# Patient Record
Sex: FEMALE | Race: WHITE | NOT HISPANIC OR LATINO | Employment: OTHER | URBAN - METROPOLITAN AREA
[De-identification: names, ages, dates, MRNs, and addresses within clinical notes are randomized per-mention and may not be internally consistent; named-entity substitution may affect disease eponyms.]

---

## 2017-03-21 ENCOUNTER — OFFICE VISIT (OUTPATIENT)
Dept: FAMILY MEDICINE | Facility: CLINIC | Age: 77
End: 2017-03-21

## 2017-03-21 VITALS
BODY MASS INDEX: 34.16 KG/M2 | HEART RATE: 83 BPM | RESPIRATION RATE: 16 BRPM | OXYGEN SATURATION: 93 % | WEIGHT: 205 LBS | SYSTOLIC BLOOD PRESSURE: 104 MMHG | HEIGHT: 65 IN | DIASTOLIC BLOOD PRESSURE: 62 MMHG | TEMPERATURE: 98.1 F

## 2017-03-21 DIAGNOSIS — E11.9 DIABETES MELLITUS WITHOUT COMPLICATION (H): ICD-10-CM

## 2017-03-21 DIAGNOSIS — J44.89 COPD WITH ASTHMA (H): ICD-10-CM

## 2017-03-21 DIAGNOSIS — N18.30 CKD (CHRONIC KIDNEY DISEASE) STAGE 3, GFR 30-59 ML/MIN (H): ICD-10-CM

## 2017-03-21 DIAGNOSIS — S62.102S: ICD-10-CM

## 2017-03-21 DIAGNOSIS — E03.9 ACQUIRED HYPOTHYROIDISM: ICD-10-CM

## 2017-03-21 DIAGNOSIS — Z01.818 PREOP GENERAL PHYSICAL EXAM: Primary | ICD-10-CM

## 2017-03-21 LAB
% GRANULOCYTES: 72.5 % (ref 42.2–75.2)
HCT VFR BLD AUTO: 36.5 % (ref 35–46)
HEMOGLOBIN: 12.1 G/DL (ref 11.8–15.5)
LYMPHOCYTES NFR BLD AUTO: 21.3 % (ref 20.5–51.1)
MCH RBC QN AUTO: 29 PG (ref 27–31)
MCHC RBC AUTO-ENTMCNC: 33.1 G/DL (ref 33–37)
MCV RBC AUTO: 87.5 FL (ref 80–100)
MONOCYTES NFR BLD AUTO: 6.2 % (ref 1.7–9.3)
PLATELET # BLD AUTO: 160 K/UL (ref 140–450)
RBC # BLD AUTO: 4.17 X10/CMM (ref 3.7–5.2)
WBC # BLD AUTO: 5.3 X10/CMM (ref 3.8–11)

## 2017-03-21 PROCEDURE — 80048 BASIC METABOLIC PNL TOTAL CA: CPT | Mod: 90 | Performed by: FAMILY MEDICINE

## 2017-03-21 PROCEDURE — 84443 ASSAY THYROID STIM HORMONE: CPT | Mod: 90 | Performed by: FAMILY MEDICINE

## 2017-03-21 PROCEDURE — 99215 OFFICE O/P EST HI 40 MIN: CPT | Performed by: FAMILY MEDICINE

## 2017-03-21 PROCEDURE — 93000 ELECTROCARDIOGRAM COMPLETE: CPT | Performed by: FAMILY MEDICINE

## 2017-03-21 PROCEDURE — 85025 COMPLETE CBC W/AUTO DIFF WBC: CPT | Performed by: FAMILY MEDICINE

## 2017-03-21 PROCEDURE — 83036 HEMOGLOBIN GLYCOSYLATED A1C: CPT | Mod: 90 | Performed by: FAMILY MEDICINE

## 2017-03-21 PROCEDURE — 36415 COLL VENOUS BLD VENIPUNCTURE: CPT | Performed by: FAMILY MEDICINE

## 2017-03-21 RX ORDER — METFORMIN HCL 500 MG
1500 TABLET, EXTENDED RELEASE 24 HR ORAL
Qty: 270 TABLET | Refills: 3 | Status: SHIPPED | OUTPATIENT
Start: 2017-03-21 | End: 2018-08-01

## 2017-03-21 NOTE — LETTER
[facility]  [address] [place]  [address]  Phone: [phone]    March 23, 2017      Macie Jorge  [address] [place] [address]              Dear Macie,    The results from your recent visit showed  That due to your hard work at weight loss and improving her health your diabetes control is remarkably better.    You have the best hemoglobin A1c you have had in the last two years.    Continue on your current medications.    This test should be repeated in six months.    Plan on seeing me in the end of September or early October.        Sincerely,     Eric Ledezma M.D.    Results for orders placed or performed in visit on 03/21/17   CBC with Diff/Plt ([facility])   Result Value Ref Range    WBC x10/cmm 5.3 3.8 - 11.0 x10/cmm    % Lymphocytes 21.3 20.5 - 51.1 %    % Monocytes 6.2 1.7 - 9.3 %    % Granulocytes 72.5 42.2 - 75.2 %    RBC x10/cmm 4.17 3.7 - 5.2 x10/cmm    Hemoglobin 12.1 11.8 - 15.5 g/dl    Hematocrit 36.5 35 - 46 %    MCV 87.5 80 - 100 fL    MCH 29.0 27.0 - 31.0 pg    MCHC 33.1 33.0 - 37.0 g/dL    Platelet Count 160 140 - 450 K/uL   TSH ([facility])   Result Value Ref Range    TSH 5.230 (H) 0.450 - 4.500 uIU/mL    Narrative    Performed at:  01 - [facility] [address], [place], [place]  [id number]  : Kristopher Candelaria MD, Phone:  [phone]   Basic Metabolic Panel (8) ([facility])   Result Value Ref Range    Glucose 92 65 - 99 mg/dL    Urea Nitrogen 23 8 - 27 mg/dL    Creatinine 1.31 (H) 0.57 - 1.00 mg/dL    eGFR If NonAfricn Am 40 (L) >59 mL/min/1.73    eGFR If Africn Am 46 (L) >59 mL/min/1.73    BUN/Creatinine Ratio 18 11 - 26    Sodium 139 134 - 144 mmol/L    Potassium 4.3 3.5 - 5.2 mmol/L    Chloride 101 96 - 106 mmol/L    Total CO2 24 18 - 28 mmol/L    Calcium 9.4 8.7 - 10.3 mg/dL    Narrative    Performed at:  01 - [facility] [address], [place], [place]  [id number]  : Kristopher Candelaria MD, Phone:  [phone]   Hemoglobin  A1C ([facility])   Result Value Ref Range    Hemoglobin A1C 5.9 (H) 4.8 - 5.6 %    Narrative    Performed at:  01 - [facility] [address], [place], [place]  [id number]  : Kristopher Candelaria MD, Phone:  [phone]

## 2017-03-21 NOTE — MR AVS SNAPSHOT
After Visit Summary   3/21/2017    Macie Jorge    MRN: [id number]           Patient Information     Date Of Birth          1940        Visit Information        Provider Department      3/21/2017 3:30 PM Eric Henry MD [facility]        Today's Diagnoses     Preop general physical exam    -  1    Fracture of wrist, left, sequela        COPD with asthma (H)        CKD (chronic kidney disease) stage 3, GFR 30-59 ml/min        Diabetes mellitus without complication (H)        Acquired hypothyroidism          Care Instructions      Before Your Surgery      Call your surgeon if there is any change in your health. This includes signs of a cold or flu (such as a sore throat, runny nose, cough, rash or fever).    Do not smoke, drink alcohol or take over the counter medicine (unless your surgeon or primary care doctor tells you to) for the 24 hours before and after surgery.    If you take prescribed drugs: Follow your doctor s orders about which medicines to take and which to stop until after surgery.    Eating and drinking prior to surgery: follow the instructions from your surgeon    Take a shower or bath the night before surgery. Use the soap your surgeon gave you to gently clean your skin. If you do not have soap from your surgeon, use your regular soap. Do not shave or scrub the surgery site.  Wear clean pajamas and have clean sheets on your bed.         Follow-ups after your visit        Additional Services     OPHTHALMOLOGY ADULT REFERRAL       Your provider has referred you to: patient's choice according to insurance coverage    Please be aware that coverage of these services is subject to the terms and limitations of your health insurance plan.  Call member services at your health plan with any benefit or coverage questions.      Please bring the following with you to your appointment:    (1) Any X-Rays, CTs or MRIs which have been performed.  Contact the facility where  "they were done to arrange for  prior to your scheduled appointment.    (2) List of current medications  (3) This referral request   (4) Any documents/labs given to you for this referral                  Who to contact     If you have questions or need follow up information about today's clinic visit or your schedule please contact [facility] directly at [phone].  Normal or non-critical lab and imaging results will be communicated to you by MyChart, letter or phone within 4 business days after the clinic has received the results. If you do not hear from us within 7 days, please contact the clinic through [email or web address] or phone. If you have a critical or abnormal lab result, we will notify you by phone as soon as possible.  Submit refill requests through [employer] or call your pharmacy and they will forward the refill request to us. Please allow 3 business days for your refill to be completed.          Additional Information About Your Visit        [email or web address] Education Information     [employer] lets you send messages to your doctor, view your test results, renew your prescriptions, schedule appointments and more. To sign up, go to [email or web address] . Click on \"Log in\" on the left side of the screen, which will take you to the Welcome page. Then click on \"Sign up Now\" on the right side of the page.     You will be asked to enter the access code listed below, as well as some personal information. Please follow the directions to create your username and password.     Your access code is: [email or web address]  Expires: 2017  7:35 PM     Your access code will  in 90 days. If you need help or a new code, please call your [place] clinic or [phone].        Care EveryWhere ID     This is your Care EveryWhere ID. This could be used by other organizations to access your [place] medical records  [id number]        Your Vitals Were     Pulse Temperature Respirations Height Pulse Oximetry BMI (Body Mass Index)    " "83 98.1  F (36.7  C) (Oral) 16 1.651 m (5' 5\") 93% 34.11 kg/m2       Blood Pressure from Last 3 Encounters:   03/21/17 104/62   09/21/16 132/74   06/14/16 143/78    Weight from Last 3 Encounters:   03/21/17 93 kg (205 lb)   09/21/16 93.2 kg (205 lb 6.4 oz)   06/14/16 96.6 kg (212 lb 14.4 oz)              We Performed the Following     Basic Metabolic Panel (8) ([facility])     CBC with Diff/Plt ([facility])     EKG 12-lead complete w/read - Clinics     Hemoglobin A1C ([facility])     Hemoglobin A1c     OPHTHALMOLOGY ADULT REFERRAL     TSH ([facility])          Today's Medication Changes          These changes are accurate as of: 3/21/17  7:35 PM.  If you have any questions, ask your nurse or doctor.               Stop taking these medicines if you haven't already. Please contact your care team if you have questions.     azithromycin 250 MG tablet   Commonly known as:  ZITHROMAX   Stopped by:  Eric Henry MD           cephALEXin 500 MG capsule   Commonly known as:  KEFLEX   Stopped by:  Eric Henry MD           traMADol 50 MG tablet   Commonly known as:  ULTRAM   Stopped by:  Eric Henry MD                Where to get your medicines      These medications were sent to [facility] [address] [place] [address]     Phone:  [phone]     metFORMIN 500 MG 24 hr tablet                Primary Care Provider Office Phone # Fax #    Eric Henry [phone]       [facility] [address]  [facility] [place] [address]        Thank you!     Thank you for choosing [facility]  for your care. Our goal is always to provide you with excellent care. Hearing back from our patients is one way we can continue to improve our services. Please take a few minutes to complete the written survey that you may receive in the mail after your visit with us. Thank you!             Your Updated Medication List - Protect others around " you: Learn how to safely use, store and throw away your medicines at www.disposemymeds.org.          This list is accurate as of: 3/21/17  7:35 PM.  Always use your most recent med list.                   Brand Name Dispense Instructions for use    albuterol 108 (90 BASE) MCG/ACT Inhaler    albuterol    1 Inhaler    INHALE 2 PUFFS INTO LUNGS EVERY 6 HOURS AS NEEDED       blood glucose lancing device     1 each    Use to test blood sugars one time daily or as directed.       blood glucose monitoring lancets     1 Box    Use to test blood sugars 1 times daily or as directed.       blood glucose monitoring test strip    ONE TOUCH ULTRA    100 each    Use to test blood sugars one time daily or as directed.       butalbital-acetaminophen-caffeine -40 MG per tablet    FIORICET/ESGIC    30 tablet    Take 1 tablet by mouth every 4 hours as needed       fluticasone-salmeterol 250-50 MCG/DOSE diskus inhaler    ADVAIR DISKUS    180 Inhaler    INHALE 1 PUFF INTO THE LUNGS 2 TIMES DAILY       HYDROcodone-acetaminophen 5-325 MG per tablet    NORCO     as needed Reported on 3/21/2017       levothyroxine 112 MCG tablet    SYNTHROID/LEVOTHROID    90 tablet    Take 1 tablet (112 mcg) by mouth daily       metFORMIN 500 MG 24 hr tablet    GLUCOPHAGE-XR    270 tablet    Take 3 tablets (1,500 mg) by mouth daily (with dinner)       RABEprazole 20 MG EC tablet    ACIPHEX    30 tablet    TAKE ONE TABLET BY MOUTH ONCE DAILY       STATIN NOT PRESCRIBED (INTENTIONAL)     0 each    1 each 2 times daily Statin not prescribed intentionally due to Allergy to statin

## 2017-03-21 NOTE — PROGRESS NOTES
[facility]  [address] [place] [id number] [phone]  Dept: [phone]    PRE-OP EVALUATION:  Today's date: 3/21/2017    Macie Jorge (: 1940) presents for pre-operative evaluation assessment as requested by Dr. Hastings.  She requires evaluation and anesthesia risk assessment prior to undergoing surgery/procedure for treatment of left wrist fractured - Dec 19, 2016 - remove plate & pins .  Proposed procedure: fractured - Dec 19, 2016 - remove plate & pins - left wrist    Date of Surgery/ Procedure: 3/30/17  Time of Surgery/ Procedure: [address] [place] [address]/Surgical Facility: [facility]   [phone]  Primary Physician: Eric Henry  Type of Anesthesia Anticipated: General    Patient has a Health Care Directive or Living Will:  YES in chart    1. NO - Do you have a history of heart attack, stroke, stent, bypass or surgery on an artery in the head, neck, heart or legs?  2. NO - Do you ever have any pain or discomfort in your chest?  3. NO - Do you have a history of  Heart Failure?  4. NO - Are you troubled by shortness of breath when: walking on the level, up a slight hill or at night?  5. NO - Do you currently have a cold, bronchitis or other respiratory infection?  6. NO - Do you have a cough, shortness of breath or wheezing?  7. NO - Do you sometimes get pains in the calves of your legs when you walk?  8. NO - Do you or anyone in your family have previous history of blood clots?  9. NO - Do you or does anyone in your family have a serious bleeding problem such as prolonged bleeding following surgeries or cuts?  10. YES - HAVE YOU EVER HAD PROBLEMS WITH ANEMIA OR BEEN TOLD TO TAKE IRON PILLS? Years ago with pregnancy  11. NO - Have you had any abnormal blood loss such as black, tarry or bloody stools, or abnormal vaginal bleeding?  12. NO - Have you ever had a blood transfusion?  13. NO - Have you or any of your relatives ever had problems with  anesthesia?  14. NO - Do you have sleep apnea, excessive snoring or daytime drowsiness?  15. NO - Do you have any prosthetic heart valves?  16. NO - Do you have prosthetic joints?  17. NO - Is there any chance that you may be pregnant?      HPI:                                                      Brief HPI related to upcoming procedure: Macie is an obese lady who has diabetes as well as  COPD.  Currently she feels well she's not having any problems with her breathing she tells me her diabetes is generally below 120 in the morning but she checks it infrequently.      MEDICAL HISTORY:                                                      Patient Active Problem List    Diagnosis Date Noted     COPD with asthma (H)      Priority: Medium      followed with pulmonary DR LENNOX 60-pack-year history of smoking       Type 2 diabetes mellitus without complication (H)      Priority: Medium     Nonintractable migraine, unspecified migraine type 01/20/2016     Priority: Medium     Urgency incontinence 04/02/2014     Priority: Medium     CKD (chronic kidney disease) stage 3, GFR 30-59 ml/min 04/02/2014     Priority: Medium     Obesity 03/05/2014     Priority: Medium     Health Care Home 03/04/2014     Priority: Medium     State Tier Level:  Tier 2         History of hip replacement, total 03/13/2013     Priority: Medium     Hyperlipidemia with target LDL less than 100      Priority: Medium     Diagnosis updated by automated process. Provider to review and confirm.       Advanced directives, counseling/discussion 09/01/2011     Priority: Medium     As per reasonable care for Seniors, wants in the Short term aggressive care.   No desire for long term prolongation of life through artificial means if no hope to bring back to a reasonable status.        Hypothyroid      Priority: Medium     Panic attacks      Priority: Medium      Past Medical History   Diagnosis Date     Calculus of left ureter 2014     dr Arriaga - ureteroscopy and  lithitripsy     Cataract      Chronic low back pain      Dr Villagran at [facility] in 2015- not surgical      COPD with asthma (H)       followed with pulmonary DR LENNOX 60-pack-year history of smoking     Esophagitis, reflux 9/2012     SOME EOSINOPHILIA NO BARRETS     Gastro-oesophageal reflux disease       has seen GI     Hyperlipidaemia LDL goal < 100      Hypothyroid      Migraine headaches 9/1/2011     Moderate persistent asthma       lifelong, saw Pulm 2013     Obesity (BMI 30-39.9)      Panic attacks      Renal stones 2016     kidney stones, multiple small stones high risk recurrent ureteral stones. , Dr Arriaga in 2016 rec 24 hour urine.     Type 2 diabetes mellitus without complication (H)      Past Surgical History   Procedure Laterality Date     Cholecystectomy, open  25 yo     Laminect/discectomy, lumbar       Laminectomy/Discectomy Lumbar     Recession eyelid upper       Combined cystoscopy, insert stent ureter(s)  8/5/2014     Procedure: COMBINED CYSTOSCOPY, INSERT STENT URETER(S);  Surgeon: Sam Arriaga MD;  Location:  OR     Laser holmium lithotripsy ureter(s), insert stent, combined Left 8/20/2014     Procedure: COMBINED CYSTOSCOPY, URETEROSCOPY, LASER HOLMIUM LITHOTRIPSY URETER(S), INSERT STENT;  Surgeon: Sam Arriaga MD;  Location:  OR     Arthroplasty patello-femoral (knee)  2005ish     Left     Orthopedic surgery       left ankle     Arthroplasty hip  3/13/2013     Procedure: ARTHROPLASTY HIP;  LEFT TOTAL HIP ARTHROPLASTY (BIOMET)^ ;  Surgeon: Anand Main MD;  Location:  OR     Genitourinary surgery       Combined cystoscopy, retrogrades, ureteroscopy, laser holmium lithotripsy ureter(s), insert stent Left 6/14/2016     Procedure: COMBINED CYSTOSCOPY, RETROGRADES, URETEROSCOPY, LASER HOLMIUM LITHOTRIPSY URETER(S), INSERT STENT;  Surgeon: Thomas Edmondson MD;  Location:  OR     Current Outpatient Prescriptions   Medication Sig Dispense Refill     metFORMIN (GLUCOPHAGE-XR) 500 MG 24  hr tablet Take 4 tablets (2,000 mg) by mouth daily (with dinner) (Patient taking differently: Take 1,500 mg by mouth daily (with dinner) ) 360 tablet 1     RABEprazole (ACIPHEX) 20 MG tablet TAKE ONE TABLET BY MOUTH ONCE DAILY 30 tablet 3     fluticasone-salmeterol (ADVAIR DISKUS) 250-50 MCG/DOSE diskus inhaler INHALE 1 PUFF INTO THE LUNGS 2 TIMES DAILY 180 Inhaler 1     blood glucose (ONE TOUCH DELICA) lancing device Use to test blood sugars one time daily or as directed. 1 each 0     blood glucose monitoring (ONE TOUCH ULTRA) test strip Use to test blood sugars one time daily or as directed. 100 each 3     blood glucose monitoring (ONE TOUCH DELICA) lancets Use to test blood sugars 1 times daily or as directed. 1 Box 3     butalbital-acetaminophen-caffeine (FIORICET, ESGIC) -40 MG per tablet Take 1 tablet by mouth every 4 hours as needed 30 tablet 1     levothyroxine (SYNTHROID, LEVOTHROID) 112 MCG tablet Take 1 tablet (112 mcg) by mouth daily 90 tablet 3     albuterol (ALBUTEROL) 108 (90 BASE) MCG/ACT inhaler INHALE 2 PUFFS INTO LUNGS EVERY 6 HOURS AS NEEDED 1 Inhaler 2     HYDROcodone-acetaminophen (NORCO) 5-325 MG per tablet as needed Reported on 3/21/2017       azithromycin (ZITHROMAX) 250 MG tablet 2 pills to start then one daily IF FLARE UP OF ASTHMA (Patient not taking: Reported on 3/21/2017) 6 tablet 0     cephALEXin (KEFLEX) 500 MG capsule as needed Reported on 3/21/2017       STATIN NOT PRESCRIBED, INTENTIONAL, 1 each 2 times daily Statin not prescribed intentionally due to Allergy to statin (Patient not taking: Reported on 3/21/2017) 0 each 0     OTC products: None     Allergies   Allergen Reactions     Imitrex [Sumatriptan] Nausea     Morphine      Severe vomiting     Oxycontin [Oxycodone] Nausea and Vomiting     Severe vomiting     Tetracycline      Sores in mouth      Latex Allergy: NO    Social History   Substance Use Topics     Smoking status: Former Smoker     Quit date: 1/1/1995      "Smokeless tobacco: Never Used     Alcohol use Yes      Comment: occ.     History   Drug Use No       REVIEW OF SYSTEMS:                                                    C: NEGATIVE for fever, chills, change in weight  I: NEGATIVE for worrisome rashes, moles or lesions  E: NEGATIVE for vision changes or irritation  E/M: NEGATIVE for ear, mouth and throat problems  RESP:NEGATIVE for significant cough or SOB and no recent pulm complaints  B: NEGATIVE for masses, tenderness or discharge  CV: NEGATIVE for chest pain, palpitations or peripheral edema  GI: NEGATIVE for nausea, abdominal pain, heartburn, or change in bowel habits  : NEGATIVE for frequency, dysuria, or hematuria  MUSCULOSKELETAL:pain in wrist and chronic low back pain  N: NEGATIVE for weakness, dizziness or paresthesias  E: NEGATIVE for temperature intolerance, skin/hair changes  H: NEGATIVE for bleeding problems  P: NEGATIVE for changes in mood or affect    Diabetes   Not checking much in 120   Lab Results   Component Value Date    A1C 6.4 09/21/2016    A1C 7.5 05/18/2016    A1C 6.0 11/25/2015    A1C 6.4 04/22/2015    A1C 6.1 11/19/2014       EXAM:                                                    /62  Pulse 83  Temp 98.1  F (36.7  C) (Oral)  Resp 16  Ht 1.651 m (5' 5\")  Wt 93 kg (205 lb)  SpO2 93%  BMI 34.11 kg/m2    GENERAL APPEARANCE: healthy, alert and no distress     EYES: EOMI, PERRL     HENT: ear canals and TM's normal and nose and mouth without ulcers or lesions     NECK: no adenopathy, no asymmetry, masses, or scars and thyroid normal to palpation     RESP: lungs clear to auscultation - no rales, rhonchi or wheezes     CV: regular rates and rhythm, sys murmur heard Rirht upper sternal area     ABDOMEN:  soft, nontender, no HSM or masses and bowel sounds normal     MS: wrist on left with well healed scar     SKIN: no suspicious lesions or rashes     NEURO: Normal strength and tone, sensory exam grossly normal, mentation intact and speech " normal     PSYCH: mentation appears normal. and affect normal/bright     LYMPHATICS: No axillary, cervical, or supraclavicular nodes    DIAGNOSTICS:                                                    EKG: Normal Sinus Rhythm, Right axis deviation, no LVH by voltage criteria,     Recent Labs   Lab Test  09/21/16   1249  05/18/16   1444  05/18/16   1433   10/01/15   0900   08/04/14   1721   03/16/13   0635   HGB   --   11.4*   --    --   12.8   < >  13.5   < >   --    PLT   --   187   --    --   167   < >  170   < >   --    INR   --    --    --    --    --    --   1.02   --   2.39*   NA   --    --   139   --   135   < >  139   < >   --    POTASSIUM   --    --   4.8   --   4.0   < >  3.8   < >   --    CR   --    --   1.53*   --   1.39*   < >  1.49*   < >  0.89   A1C  6.4*   --   7.5*   < >   --    < >   --    < >   --     < > = values in this interval not displayed.      Hemoglobin   Date Value Ref Range Status   03/21/2017 12.1 11.8 - 15.5 g/dl Final   ]    IMPRESSION:                                                    Reason for surgery/procedure: removal of plate in wrist  Diagnosis/reason for consult: preop evaluation      The proposed surgical procedure is considered LOW risk.    REVISED CARDIAC RISK INDEX  The patient has the following serious cardiovascular risks for perioperative complications such as (MI, PE, VFib and 3  AV Block):  No serious cardiac risks  INTERPRETATION: 1 risks: Class II (low risk - 0.9% complication rate)    The patient has the following additional risks for perioperative complications:  No identified additional risks    (Z01.818) Preop general physical exam  (primary encounter diagnosis)  OKAY FOR SURGERY    (S62.102S) Fracture of wrist, left, sequela      (J44.9,  J45.909) COPD with asthma (H)  STABLE ON CURRENT INHALERS    (E11.9) Type 2 diabetes mellitus without complication (H)  REPEAT TODAY BUT SEEMS LIKE CONTROL OKAY  Lab Results   Component Value Date    A1C 6.4 09/21/2016    A1C  7.5 05/18/2016    A1C 6.0 11/25/2015    A1C 6.4 04/22/2015    A1C 6.1 11/19/2014       (N18.3) CKD (chronic kidney disease) stage 3, GFR   REPEAT BMP TODAY    (E03.9) Hypothyroid    Plan: TSH ([facility]),     RECOMMENDATIONS:                                                          --Patient is to take all scheduled medications on the day of surgery EXCEPT for modifications listed below.    APPROVAL GIVEN to proceed with proposed procedure, without further diagnostic evaluation       Signed Electronically by: Eric Henry MD    Copy of this evaluation report is provided to requesting physician.    [place] Preop Guidelines

## 2017-03-22 LAB
BUN SERPL-MCNC: 23 MG/DL (ref 8–27)
BUN/CREATININE RATIO: 18 (ref 11–26)
CALCIUM SERPL-MCNC: 9.4 MG/DL (ref 8.7–10.3)
CHLORIDE SERPLBLD-SCNC: 101 MMOL/L (ref 96–106)
CREAT SERPL-MCNC: 1.31 MG/DL (ref 0.57–1)
EGFR IF AFRICN AM: 46 ML/MIN/1.73
EGFR IF NONAFRICN AM: 40 ML/MIN/1.73
GLUCOSE SERPL-MCNC: 92 MG/DL (ref 65–99)
HBA1C MFR BLD: 5.9 % (ref 4.8–5.6)
POTASSIUM SERPL-SCNC: 4.3 MMOL/L (ref 3.5–5.2)
SODIUM SERPL-SCNC: 139 MMOL/L (ref 134–144)
TOTAL CO2: 24 MMOL/L (ref 18–28)
TSH BLD-ACNC: 5.23 UIU/ML (ref 0.45–4.5)

## 2017-03-22 ASSESSMENT — ASTHMA QUESTIONNAIRES: ACT_TOTALSCORE: 25

## 2017-03-29 NOTE — PROGRESS NOTES
Order(s) created erroneously. Erroneous order ID: [id number]   Order canceled by: TAY HENNESSY   Order cancel date/time: 03/29/2017 2:03 PM

## 2017-04-12 ENCOUNTER — TRANSFERRED RECORDS (OUTPATIENT)
Dept: FAMILY MEDICINE | Facility: CLINIC | Age: 77
End: 2017-04-12

## 2017-04-13 ENCOUNTER — TRANSFERRED RECORDS (OUTPATIENT)
Dept: FAMILY MEDICINE | Facility: CLINIC | Age: 77
End: 2017-04-13

## 2017-04-24 ENCOUNTER — TRANSFERRED RECORDS (OUTPATIENT)
Dept: FAMILY MEDICINE | Facility: CLINIC | Age: 77
End: 2017-04-24

## 2017-05-08 ENCOUNTER — TRANSFERRED RECORDS (OUTPATIENT)
Dept: FAMILY MEDICINE | Facility: CLINIC | Age: 77
End: 2017-05-08

## 2017-06-09 ENCOUNTER — OFFICE VISIT (OUTPATIENT)
Dept: FAMILY MEDICINE | Facility: CLINIC | Age: 77
End: 2017-06-09

## 2017-06-09 VITALS
TEMPERATURE: 98 F | BODY MASS INDEX: 34.82 KG/M2 | HEIGHT: 65 IN | DIASTOLIC BLOOD PRESSURE: 78 MMHG | OXYGEN SATURATION: 98 % | HEART RATE: 80 BPM | WEIGHT: 209 LBS | SYSTOLIC BLOOD PRESSURE: 132 MMHG | RESPIRATION RATE: 18 BRPM

## 2017-06-09 DIAGNOSIS — J45.901 ASTHMA WITH ACUTE EXACERBATION, UNSPECIFIED ASTHMA SEVERITY: ICD-10-CM

## 2017-06-09 DIAGNOSIS — J44.1 CHRONIC OBSTRUCTIVE PULMONARY DISEASE WITH ACUTE EXACERBATION (H): ICD-10-CM

## 2017-06-09 DIAGNOSIS — J20.9 ACUTE BRONCHITIS WITH COEXISTING CONDITION REQUIRING PROPHYLACTIC TREATMENT: Primary | ICD-10-CM

## 2017-06-09 DIAGNOSIS — E11.9 TYPE 2 DIABETES MELLITUS WITHOUT COMPLICATION, WITHOUT LONG-TERM CURRENT USE OF INSULIN (H): ICD-10-CM

## 2017-06-09 DIAGNOSIS — E78.5 HYPERLIPIDEMIA WITH TARGET LDL LESS THAN 100: ICD-10-CM

## 2017-06-09 DIAGNOSIS — N18.30 CKD (CHRONIC KIDNEY DISEASE) STAGE 3, GFR 30-59 ML/MIN (H): ICD-10-CM

## 2017-06-09 DIAGNOSIS — E03.9 ACQUIRED HYPOTHYROIDISM: ICD-10-CM

## 2017-06-09 PROCEDURE — 99213 OFFICE O/P EST LOW 20 MIN: CPT | Performed by: FAMILY MEDICINE

## 2017-06-09 RX ORDER — METHYLPREDNISOLONE 4 MG
4 TABLET, DOSE PACK ORAL SEE ADMIN INSTRUCTIONS
Qty: 21 TABLET | Refills: 0 | Status: SHIPPED | OUTPATIENT
Start: 2017-06-09 | End: 2017-10-27

## 2017-06-09 RX ORDER — CODEINE PHOSPHATE AND GUAIFENESIN 10; 100 MG/5ML; MG/5ML
1 SOLUTION ORAL EVERY 4 HOURS PRN
Qty: 473 ML | Refills: 0 | Status: SHIPPED | OUTPATIENT
Start: 2017-06-09 | End: 2017-10-27

## 2017-06-09 RX ORDER — CIPROFLOXACIN 500 MG/1
500 TABLET, FILM COATED ORAL 2 TIMES DAILY
Qty: 20 TABLET | Refills: 0 | Status: SHIPPED | OUTPATIENT
Start: 2017-06-09 | End: 2017-10-27

## 2017-06-09 NOTE — MR AVS SNAPSHOT
"              After Visit Summary   6/9/2017    Macie Jorge    MRN: [id number]           Patient Information     Date Of Birth          1940        Visit Information        Provider Department      6/9/2017 11:15 AM Jeni Gutierrez MD [facility]        Today's Diagnoses     Acute bronchitis with coexisting condition requiring prophylactic treatment    -  1    Chronic obstructive pulmonary disease with acute exacerbation (H)        Asthma with acute exacerbation, unspecified asthma severity        CKD (chronic kidney disease) stage 3, GFR 30-59 ml/min          Care Instructions    Codeine cough syrup  Cipro antibiotic twice daily  Medrol dose pack 4 mg     F/u if not better in 2 weeks          Follow-ups after your visit        Who to contact     If you have questions or need follow up information about today's clinic visit or your schedule please contact [facility] directly at [phone].  Normal or non-critical lab and imaging results will be communicated to you by Nallatechhart, letter or phone within 4 business days after the clinic has received the results. If you do not hear from us within 7 days, please contact the clinic through Nallatechhart or phone. If you have a critical or abnormal lab result, we will notify you by phone as soon as possible.  Submit refill requests through Presidio or call your pharmacy and they will forward the refill request to us. Please allow 3 business days for your refill to be completed.          Additional Information About Your Visit        MyChart Information     Presidio lets you send messages to your doctor, view your test results, renew your prescriptions, schedule appointments and more. To sign up, go to [email or web address] . Click on \"Log in\" on the left side of the screen, which will take you to the Welcome page. Then click on \"Sign up Now\" on the right side of the page.     You will be asked to enter the access code listed below, as " "well as some personal information. Please follow the directions to create your username and password.     Your access code is: [email or web address]  Expires: 2017  7:35 PM     Your access code will  in 90 days. If you need help or a new code, please call your [place] clinic or [phone].        Care EveryWhere ID     This is your Care EveryWhere ID. This could be used by other organizations to access your [place] medical records  [id number]        Your Vitals Were     Pulse Temperature Respirations Height Pulse Oximetry BMI (Body Mass Index)    80 98  F (36.7  C) (Oral) 18 1.651 m (5' 5\") 98% 34.78 kg/m2       Blood Pressure from Last 3 Encounters:   17 132/78   17 104/62   16 132/74    Weight from Last 3 Encounters:   17 94.8 kg (209 lb)   17 93 kg (205 lb)   16 93.2 kg (205 lb 6.4 oz)              Today, you had the following     No orders found for display         Today's Medication Changes          These changes are accurate as of: 17 11:49 AM.  If you have any questions, ask your nurse or doctor.               Start taking these medicines.        Dose/Directions    ciprofloxacin 500 MG tablet   Commonly known as:  CIPRO   Used for:  Acute bronchitis with coexisting condition requiring prophylactic treatment, Chronic obstructive pulmonary disease with acute exacerbation (H), Asthma with acute exacerbation, unspecified asthma severity   Started by:  Jeni Gutierrez MD        Dose:  500 mg   Take 1 tablet (500 mg) by mouth 2 times daily   Quantity:  20 tablet   Refills:  0       guaiFENesin-codeine 100-10 MG/5ML Soln solution   Commonly known as:  ROBITUSSIN AC   Used for:  Asthma with acute exacerbation, unspecified asthma severity, Chronic obstructive pulmonary disease with acute exacerbation (H), Acute bronchitis with coexisting condition requiring prophylactic treatment   Started by:  Jeni Gutierrez MD        Dose:  1 tsp.   Take 5 mLs by mouth every 4 " hours as needed for cough   Quantity:  473 mL   Refills:  0       methylPREDNISolone 4 MG tablet   Commonly known as:  MEDROL   Used for:  Acute bronchitis with coexisting condition requiring prophylactic treatment, Chronic obstructive pulmonary disease with acute exacerbation (H), Asthma with acute exacerbation, unspecified asthma severity   Started by:  Jeni Gutierrez MD        Dose:  4 mg   Take 1 tablet (4 mg) by mouth See Admin Instructions follow package directions   Quantity:  21 tablet   Refills:  0            Where to get your medicines      These medications were sent to [facility] [address] [place] [address]  [address] [facility] [address]     Phone:  [phone]     ciprofloxacin 500 MG tablet    methylPREDNISolone 4 MG tablet         Some of these will need a paper prescription and others can be bought over the counter.  Ask your nurse if you have questions.     Bring a paper prescription for each of these medications     guaiFENesin-codeine 100-10 MG/5ML Soln solution                Primary Care Provider Office Phone # Fax #    Eric Henry [phone]       [facility] [address] [address]        Thank you!     Thank you for choosing [facility]  for your care. Our goal is always to provide you with excellent care. Hearing back from our patients is one way we can continue to improve our services. Please take a few minutes to complete the written survey that you may receive in the mail after your visit with us. Thank you!             Your Updated Medication List - Protect others around you: Learn how to safely use, store and throw away your medicines at www.disposemymeds.org.          This list is accurate as of: 6/9/17 11:49 AM.  Always use your most recent med list.                   Brand Name Dispense Instructions for use    albuterol 108 (90 BASE) MCG/ACT Inhaler    albuterol    1  Inhaler    INHALE 2 PUFFS INTO LUNGS EVERY 6 HOURS AS NEEDED       blood glucose lancing device     1 each    Use to test blood sugars one time daily or as directed.       blood glucose monitoring lancets     1 Box    Use to test blood sugars 1 times daily or as directed.       blood glucose monitoring test strip    ONE TOUCH ULTRA    100 each    Use to test blood sugars one time daily or as directed.       butalbital-acetaminophen-caffeine -40 MG per tablet    FIORICET/ESGIC    30 tablet    Take 1 tablet by mouth every 4 hours as needed       ciprofloxacin 500 MG tablet    CIPRO    20 tablet    Take 1 tablet (500 mg) by mouth 2 times daily       fluticasone-salmeterol 250-50 MCG/DOSE diskus inhaler    ADVAIR DISKUS    180 Inhaler    INHALE 1 PUFF INTO THE LUNGS 2 TIMES DAILY       guaiFENesin-codeine 100-10 MG/5ML Soln solution    ROBITUSSIN AC    473 mL    Take 5 mLs by mouth every 4 hours as needed for cough       levothyroxine 112 MCG tablet    SYNTHROID/LEVOTHROID    90 tablet    Take 1 tablet (112 mcg) by mouth daily       metFORMIN 500 MG 24 hr tablet    GLUCOPHAGE-XR    270 tablet    Take 3 tablets (1,500 mg) by mouth daily (with dinner)       methylPREDNISolone 4 MG tablet    MEDROL    21 tablet    Take 1 tablet (4 mg) by mouth See Admin Instructions follow package directions       RABEprazole 20 MG EC tablet    ACIPHEX    30 tablet    TAKE ONE TABLET BY MOUTH ONCE DAILY       STATIN NOT PRESCRIBED (INTENTIONAL)     0 each    1 each 2 times daily Statin not prescribed intentionally due to Allergy to statin

## 2017-06-09 NOTE — PROGRESS NOTES
SUBJECTIVE:                                                    Macie Jorge is a 76 year old female who presents to clinic today for the following health issues:  Obese white female    Retired   Hobbies   Pets no    Deep bronchial coughing    Diabetes Follow-up  Lab Results   Component Value Date    A1C 5.9 03/21/2017    A1C 6.4 09/21/2016    A1C 7.5 05/18/2016    A1C 6.0 11/25/2015    A1C 6.4 04/22/2015     Metformin    No results found for: TSH]  Consider recheck next lab draw    Patient is checking blood sugars: not at all    Diabetic concerns: None     Symptoms of hypoglycemia (low blood sugar): none     Paresthesias (numbness or burning in feet) or sores: No     Date of last diabetic eye exam: UTD     Hyperlipidemia Follow-Up  LDL Cholesterol Calculated   Date Value Ref Range Status   11/25/2015 121 (H) 0 - 99 mg/dL Final   ]      Rate your low fat/cholesterol diet?: not monitoring fat    Taking statin?  No    Other lipid medications/supplements?:  none     Hypertension Follow-up  BP Readings from Last 3 Encounters:   06/09/17 132/78   03/21/17 104/62   09/21/16 132/74         Outpatient blood pressures are not being checked.    Low Salt Diet: not monitoring salt         Amount of exercise or physical activity: None    Problems taking medications regularly: No    Medication side effects: none    Diet: regular (no restrictions)    Kidney stones no pain  No panic recently  Migraine last week, getting them less.  No results found for: TSH]  GERD occasional    COPD/Asthma no pulmonary doctor  Epidural in back 2-3 weeks ago.    Sleep 6 hours  Appetite ok  Exercise when not side shopping and stairs 13.    Creatinine   Date Value Ref Range Status   03/21/2017 1.31 (H) 0.57 - 1.00 mg/dL Final   ]  GFR Estimate   Date Value Ref Range Status   10/01/2015 37 (L) >60 mL/min/1.7m2 Final     Comment:     Non  GFR Calc   08/07/2014 47 (L) >60 mL/min/1.7m2 Final     Comment:     Non   GFR Calc   08/05/2014 40 (L) >60 mL/min/1.7m2 Final     Comment:     Non  GFR Calc         ETOH no  Street/[place] no  Caffeine yes coffee    RESPIRATORY SYMPTOMS      Duration: Onset 1 week ago. Tightness in the mid chest while [place] drove there and it was raining 3 days. Ex smoker for 23 years. No fever. Productive. Yellow nasal drainage.    Bronchitis/asthma since 5 years old. No Chills. Hot flashes not related to this. No N/V/D/R    No exposures    Description  cough and wheezing    Severity: moderate    Accompanying signs and symptoms: None    History (predisposing factors):  asthma and COPD    Precipitating or alleviating factors: None    Therapies tried and outcome:  rest and fluids guaifenesin       Problem list and histories reviewed & adjusted, as indicated.  Additional history: as documented    Patient Active Problem List   Diagnosis     Panic attacks     Advanced directives, counseling/discussion     Hyperlipidemia with target LDL less than 100     History of hip replacement, total     Health Care Home     Obesity     Urgency incontinence     CKD (chronic kidney disease) stage 3, GFR 30-59 ml/min     Nonintractable migraine, unspecified migraine type     Type 2 diabetes mellitus without complication (H)     COPD with asthma (H)     Diabetes mellitus without complication (H)     Acquired hypothyroidism     Past Surgical History:   Procedure Laterality Date     ARTHROPLASTY HIP  3/13/2013    Procedure: ARTHROPLASTY HIP;  LEFT TOTAL HIP ARTHROPLASTY (BIOMET)^ ;  Surgeon: Anand Main MD;  Location:  OR     ARTHROPLASTY PATELLO-FEMORAL (KNEE)  2005ish    Left     CHOLECYSTECTOMY, OPEN  25 yo     COMBINED CYSTOSCOPY, INSERT STENT URETER(S)  8/5/2014    Procedure: COMBINED CYSTOSCOPY, INSERT STENT URETER(S);  Surgeon: Sam Arriaga MD;  Location:  OR     COMBINED CYSTOSCOPY, RETROGRADES, URETEROSCOPY, LASER HOLMIUM LITHOTRIPSY URETER(S), INSERT STENT Left 6/14/2016     Procedure: COMBINED CYSTOSCOPY, RETROGRADES, URETEROSCOPY, LASER HOLMIUM LITHOTRIPSY URETER(S), INSERT STENT;  Surgeon: Thomas Edmondson MD;  Location:  OR     GENITOURINARY SURGERY       LAMINECT/DISCECTOMY, LUMBAR      Laminectomy/Discectomy Lumbar     LASER HOLMIUM LITHOTRIPSY URETER(S), INSERT STENT, COMBINED Left 8/20/2014    Procedure: COMBINED CYSTOSCOPY, URETEROSCOPY, LASER HOLMIUM LITHOTRIPSY URETER(S), INSERT STENT;  Surgeon: Sam Arriaga MD;  Location:  OR     ORTHOPEDIC SURGERY      left ankle     RECESSION EYELID UPPER         Social History   Substance Use Topics     Smoking status: Former Smoker     Quit date: 1/1/1995     Smokeless tobacco: Never Used     Alcohol use Yes      Comment: occ.     No family history on file.      Current Outpatient Prescriptions   Medication Sig Dispense Refill     ciprofloxacin (CIPRO) 500 MG tablet Take 1 tablet (500 mg) by mouth 2 times daily 20 tablet 0     methylPREDNISolone (MEDROL) 4 MG tablet Take 1 tablet (4 mg) by mouth See Admin Instructions follow package directions 21 tablet 0     guaiFENesin-codeine (ROBITUSSIN AC) 100-10 MG/5ML SOLN solution Take 5 mLs by mouth every 4 hours as needed for cough 473 mL 0     metFORMIN (GLUCOPHAGE-XR) 500 MG 24 hr tablet Take 3 tablets (1,500 mg) by mouth daily (with dinner) 270 tablet 3     RABEprazole (ACIPHEX) 20 MG tablet TAKE ONE TABLET BY MOUTH ONCE DAILY 30 tablet 3     fluticasone-salmeterol (ADVAIR DISKUS) 250-50 MCG/DOSE diskus inhaler INHALE 1 PUFF INTO THE LUNGS 2 TIMES DAILY 180 Inhaler 1     blood glucose (ONE TOUCH DELICA) lancing device Use to test blood sugars one time daily or as directed. 1 each 0     blood glucose monitoring (ONE TOUCH ULTRA) test strip Use to test blood sugars one time daily or as directed. 100 each 3     blood glucose monitoring (ONE TOUCH DELICA) lancets Use to test blood sugars 1 times daily or as directed. 1 Box 3     butalbital-acetaminophen-caffeine (FIORICET,  ESGIC) -40 MG per tablet Take 1 tablet by mouth every 4 hours as needed 30 tablet 1     levothyroxine (SYNTHROID, LEVOTHROID) 112 MCG tablet Take 1 tablet (112 mcg) by mouth daily 90 tablet 3     albuterol (ALBUTEROL) 108 (90 BASE) MCG/ACT inhaler INHALE 2 PUFFS INTO LUNGS EVERY 6 HOURS AS NEEDED 1 Inhaler 2     STATIN NOT PRESCRIBED, INTENTIONAL, 1 each 2 times daily Statin not prescribed intentionally due to Allergy to statin 0 each 0     Allergies   Allergen Reactions     Imitrex [Sumatriptan] Nausea     Morphine      Severe vomiting     Oxycontin [Oxycodone] Nausea and Vomiting     Severe vomiting     Tetracycline      Sores in mouth     Recent Labs   Lab Test  03/21/17   1657  03/21/17   1530  09/21/16   1249  05/18/16   1433  11/25/15   1635  10/01/15   0900   04/22/15   1430   08/07/14   0735   08/04/14   1721   02/13/14   1244   09/01/11   0949   A1C  5.9*   --   6.4*  7.5*  6.0*   --    < >   --    < >   --    < >   --    --   6.1*   < >   --    LDL   --    --    --    --   121*   --    --    --    --    --    --    --    --   128*   --   98   HDL   --    --    --    --   49   --    --    --    --    --    --    --    --   46   --   40   TRIG   --    --    --    --   143   --    --    --    --    --    --    --    --   123   --   105   ALT   --    --    --    --    --    --    --   15   --    --    --   23   --   21   < >   --    CR   --   1.31*   --   1.53*   --   1.39*   --   1.19*   < >  1.14*   < >  1.49*   < >  1.42*   < >   --    GFRESTIMATED   --    --    --    --    --   37*   --    --    --   47*   < >  34*   --    --    < >   --    GFRESTBLACK   --    --    --    --    --   45*   --    --    --   56*   < >  41*   --    --    < >   --    POTASSIUM   --   4.3   --   4.8   --   4.0   --   4.2   < >  3.8   < >  3.8   < >  5.1   < >   --     < > = values in this interval not displayed.      BP Readings from Last 3 Encounters:   06/09/17 132/78   03/21/17 104/62   09/21/16 132/74    Wt Readings  "from Last 3 Encounters:   06/09/17 94.8 kg (209 lb)   03/21/17 93 kg (205 lb)   09/21/16 93.2 kg (205 lb 6.4 oz)       Estimated body mass index is 34.78 kg/(m^2) as calculated from the following:    Height as of this encounter: 1.651 m (5' 5\").    Weight as of this encounter: 94.8 kg (209 lb).  Weight loss is encouraged           Labs reviewed in EPIC    Reviewed and updated as needed this visit by clinical staff  Tobacco  Allergies  Meds       Reviewed and updated as needed this visit by Provider         ROS:  Constitutional, HEENT, cardiovascular, pulmonary, GI, , musculoskeletal, neuro, skin, endocrine and psych systems are negative, except as otherwise noted.    OBJECTIVE:                                                    /78  Pulse 80  Temp 98  F (36.7  C) (Oral)  Resp 18  Ht 1.651 m (5' 5\")  Wt 94.8 kg (209 lb)  SpO2 98%  BMI 34.78 kg/m2  Body mass index is 34.78 kg/(m^2).  GENERAL:Obese white female healthy, alert and no distress Deep bronchial cough. No cyanosis or retractions.  EYES: Eyes grossly normal to inspection, PERRL and conjunctivae and sclerae normal  HENT: ear canals and TM's normal, nose and mouth without ulcers or lesions  NECK: no adenopathy, no asymmetry, masses, or scars and thyroid normal to palpation  RESP: lungs clear to auscultation - no rales, rhonchi or wheezes  CV: regular rate and rhythm, normal S1 S2, no S3 or S4, no murmur, click or rub, no peripheral edema and peripheral pulses strong no dennise/cords  ABDOMEN: soft, nontender, no hepatosplenomegaly, no masses and bowel sounds normal  MS: no gross musculoskeletal defects noted, no edema  SKIN: no suspicious lesions or rashes  NEURO: Normal strength and tone, mentation intact and speech normal  PSYCH: mentation appears normal, affect normal/bright  LYMPH: no cervical, supraclavicular, axillary, or inguinal adenopathy    Diagnostic Test Results:  Results for orders placed or performed in visit on 03/21/17   CBC with " Diff/Plt ([facility] [place]   Result Value Ref Range    WBC x10/cmm 5.3 3.8 - 11.0 x10/cmm    % Lymphocytes 21.3 20.5 - 51.1 %    % Monocytes 6.2 1.7 - 9.3 %    % Granulocytes 72.5 42.2 - 75.2 %    RBC x10/cmm 4.17 3.7 - 5.2 x10/cmm    Hemoglobin 12.1 11.8 - 15.5 g/dl    Hematocrit 36.5 35 - 46 %    MCV 87.5 80 - 100 fL    MCH 29.0 27.0 - 31.0 pg    MCHC 33.1 33.0 - 37.0 g/dL    Platelet Count 160 140 - 450 K/uL   TSH ([facility])   Result Value Ref Range    TSH 5.230 (H) 0.450 - 4.500 uIU/mL    Narrative    Performed at:  01 - [facility] [address], [place], [place]  [id number]  : Kristopher Candelaria MD, Phone:  [phone]   Basic Metabolic Panel (8) ([facility])   Result Value Ref Range    Glucose 92 65 - 99 mg/dL    Urea Nitrogen 23 8 - 27 mg/dL    Creatinine 1.31 (H) 0.57 - 1.00 mg/dL    eGFR If NonAfricn Am 40 (L) >59 mL/min/1.73    eGFR If Africn Am 46 (L) >59 mL/min/1.73    BUN/Creatinine Ratio 18 11 - 26    Sodium 139 134 - 144 mmol/L    Potassium 4.3 3.5 - 5.2 mmol/L    Chloride 101 96 - 106 mmol/L    Total CO2 24 18 - 28 mmol/L    Calcium 9.4 8.7 - 10.3 mg/dL    Narrative    Performed at:  01 - [facility] [address], [place], [place]  [id number]  : Kristopher Candelaria MD, Phone:  [phone]   Hemoglobin A1C ([facility])   Result Value Ref Range    Hemoglobin A1C 5.9 (H) 4.8 - 5.6 %    Narrative    Performed at:  01 - [facility] [address], [place], [place]  [id number]  : Kristopher Candelaria MD, Phone:  [phone]        ASSESSMENT/PLAN:                                                    ASSESSMENT / PLAN:  (J20.9) Acute bronchitis with coexisting condition requiring prophylactic treatment  (primary encounter diagnosis)  Comment: she has her advair and albuterol  Plan: ciprofloxacin (CIPRO) 500 MG tablet,         methylPREDNISolone (MEDROL) 4 MG tablet,         guaiFENesin-codeine (ROBITUSSIN AC) 100-10         MG/5ML SOLN solution        Sugar free cough syrups  Monitor sugars while on  steroids Call if >300    (J44.1) Chronic obstructive pulmonary disease with acute exacerbation (H)  Comment:   Plan: ciprofloxacin (CIPRO) 500 MG tablet,         methylPREDNISolone (MEDROL) 4 MG tablet,         guaiFENesin-codeine (ROBITUSSIN AC) 100-10         MG/5ML SOLN solution            (J45.901) Asthma with acute exacerbation, unspecified asthma severity  Comment:   Plan: ciprofloxacin (CIPRO) 500 MG tablet,         methylPREDNISolone (MEDROL) 4 MG tablet,         guaiFENesin-codeine (ROBITUSSIN AC) 100-10         MG/5ML SOLN solution            (N18.3) CKD (chronic kidney disease) stage 3, GFR 30-59 ml/min  Comment:   Creatinine   Date Value Ref Range Status   03/21/2017 1.31 (H) 0.57 - 1.00 mg/dL Final   ]  GFR Estimate   Date Value Ref Range Status   10/01/2015 37 (L) >60 mL/min/1.7m2 Final     Comment:     Non  GFR Calc   08/07/2014 47 (L) >60 mL/min/1.7m2 Final     Comment:     Non  GFR Calc   08/05/2014 40 (L) >60 mL/min/1.7m2 Final     Comment:     Non  GFR Calc       Plan: follow.     LDL Cholesterol Calculated   Date Value Ref Range Status   11/25/2015 121 (H) 0 - 99 mg/dL Final   ]  CONTINUE CARES    DIABETES TYPE 2 ON METFORMIN.  Consider checking blood sugars.    Hypothyroid needs lab check next blood draw.    Patient Instructions   Codeine cough syrup  Cipro antibiotic twice daily  Medrol dose pack 4 mg     F/u if not better in 2 weeks      Jeni Gutierrez MD  [facility]

## 2017-06-09 NOTE — PATIENT INSTRUCTIONS
Codeine cough syrup  Cipro antibiotic twice daily  Medrol dose pack 4 mg     F/u if not better in 2 weeks

## 2017-06-16 ENCOUNTER — TRANSFERRED RECORDS (OUTPATIENT)
Dept: FAMILY MEDICINE | Facility: CLINIC | Age: 77
End: 2017-06-16

## 2017-06-30 ENCOUNTER — TRANSFERRED RECORDS (OUTPATIENT)
Dept: FAMILY MEDICINE | Facility: CLINIC | Age: 77
End: 2017-06-30

## 2017-08-07 DIAGNOSIS — Z76.0 ENCOUNTER FOR MEDICATION REFILL: ICD-10-CM

## 2017-08-07 RX ORDER — METFORMIN HCL 500 MG
TABLET, EXTENDED RELEASE 24 HR ORAL
Qty: 360 TABLET | Refills: 1 | Status: SHIPPED | OUTPATIENT
Start: 2017-08-07 | End: 2017-10-27

## 2017-08-17 ENCOUNTER — TELEPHONE (OUTPATIENT)
Dept: FAMILY MEDICINE | Facility: CLINIC | Age: 77
End: 2017-08-17

## 2017-10-27 ENCOUNTER — OFFICE VISIT (OUTPATIENT)
Dept: FAMILY MEDICINE | Facility: CLINIC | Age: 77
End: 2017-10-27

## 2017-10-27 VITALS
DIASTOLIC BLOOD PRESSURE: 80 MMHG | HEART RATE: 76 BPM | BODY MASS INDEX: 33.43 KG/M2 | SYSTOLIC BLOOD PRESSURE: 132 MMHG | OXYGEN SATURATION: 98 % | WEIGHT: 208 LBS | HEIGHT: 66 IN

## 2017-10-27 DIAGNOSIS — E03.9 ACQUIRED HYPOTHYROIDISM: ICD-10-CM

## 2017-10-27 DIAGNOSIS — M54.5 MIDLINE LOW BACK PAIN, UNSPECIFIED CHRONICITY, WITH SCIATICA PRESENCE UNSPECIFIED: Primary | ICD-10-CM

## 2017-10-27 DIAGNOSIS — J45.31 MILD PERSISTENT ASTHMA WITH EXACERBATION: ICD-10-CM

## 2017-10-27 DIAGNOSIS — R94.6 THYROID FUNCTION TEST ABNORMAL: ICD-10-CM

## 2017-10-27 PROCEDURE — 36415 COLL VENOUS BLD VENIPUNCTURE: CPT | Performed by: FAMILY MEDICINE

## 2017-10-27 PROCEDURE — 99214 OFFICE O/P EST MOD 30 MIN: CPT | Performed by: FAMILY MEDICINE

## 2017-10-27 PROCEDURE — 84443 ASSAY THYROID STIM HORMONE: CPT | Mod: 90 | Performed by: FAMILY MEDICINE

## 2017-10-27 RX ORDER — AZITHROMYCIN 250 MG/1
TABLET, FILM COATED ORAL
Qty: 6 TABLET | Refills: 0 | Status: SHIPPED | OUTPATIENT
Start: 2017-10-27 | End: 2017-11-30

## 2017-10-27 RX ORDER — METHYLPREDNISOLONE 4 MG
TABLET, DOSE PACK ORAL
Qty: 21 TABLET | Refills: 0 | Status: SHIPPED | OUTPATIENT
Start: 2017-10-27 | End: 2017-11-30

## 2017-10-27 RX ORDER — HYDROCODONE BITARTRATE AND ACETAMINOPHEN 5; 325 MG/1; MG/1
1-2 TABLET ORAL PRN
Qty: 30 TABLET | Refills: 0 | Status: SHIPPED | OUTPATIENT
Start: 2017-10-27 | End: 2018-02-27

## 2017-10-27 NOTE — LETTER
[place] [address] [place]  [address]  Phone: [phone]    October 30, 2017      Macie Jorge  [address] [facility] [address]              Dear Macie,    The results from your recent visit showed the thyroid test is good.  Plan to repeat in 6 months.    Sincerely,     Eric Ledezma M.D.    Results for orders placed or performed in visit on 10/27/17   TSH ([facility])   Result Value Ref Range    TSH 3.920 0.450 - 4.500 uIU/mL    Narrative    Performed at:  01 - [facility] [address], [place], [place]  [id number]  : Kristopher Candelaria MD, Phone:  [phone]

## 2017-10-27 NOTE — MR AVS SNAPSHOT
"              After Visit Summary   10/27/2017    Macie Jorge    MRN: [id number]           Patient Information     Date Of Birth          1940        Visit Information        Provider Department      10/27/2017 2:00 PM Dominguez Reina MD [facility]        Today's Diagnoses     Midline low back pain, unspecified chronicity, with sciatica presence unspecified    -  1    Thyroid function test abnormal        Acquired hypothyroidism        Mild persistent asthma with exacerbation           Follow-ups after your visit        Who to contact     If you have questions or need follow up information about today's clinic visit or your schedule please contact [facility] directly at [phone].  Normal or non-critical lab and imaging results will be communicated to you by MyChart, letter or phone within 4 business days after the clinic has received the results. If you do not hear from us within 7 days, please contact the clinic through [employer] or phone. If you have a critical or abnormal lab result, we will notify you by phone as soon as possible.  Submit refill requests through [employer] or call your pharmacy and they will forward the refill request to us. Please allow 3 business days for your refill to be completed.          Additional Information About Your Visit        MyChart Information     [employer] lets you send messages to your doctor, view your test results, renew your prescriptions, schedule appointments and more. To sign up, go to [email or web address] . Click on \"Log in\" on the left side of the screen, which will take you to the Welcome page. Then click on \"Sign up Now\" on the right side of the page.     You will be asked to enter the access code listed below, as well as some personal information. Please follow the directions to create your username and password.     Your access code is: [email or web address]  Expires: 2018  4:29 PM     Your access code will  in 90 days. " "If you need help or a new code, please call your [facility] clinic or [phone].        Care EveryWhere ID     This is your Care EveryWhere ID. This could be used by other organizations to access your [facility] medical records  [id number]        Your Vitals Were     Pulse Height Pulse Oximetry BMI (Body Mass Index)          76 1.67 m (5' 5.75\") 98% 33.83 kg/m2         Blood Pressure from Last 3 Encounters:   10/27/17 132/80   06/09/17 132/78   03/21/17 104/62    Weight from Last 3 Encounters:   10/27/17 94.3 kg (208 lb)   06/09/17 94.8 kg (209 lb)   03/21/17 93 kg (205 lb)              We Performed the Following     TSH ([facility])          Today's Medication Changes          These changes are accurate as of: 10/27/17  4:29 PM.  If you have any questions, ask your nurse or doctor.               Start taking these medicines.        Dose/Directions    azithromycin 250 MG tablet   Commonly known as:  ZITHROMAX   Used for:  Mild persistent asthma with exacerbation   Started by:  Dominguez Reina MD        Two tablets first day, then one tablet daily for four days.   Quantity:  6 tablet   Refills:  0       HYDROcodone-acetaminophen 5-325 MG per tablet   Commonly known as:  NORCO   Used for:  Midline low back pain, unspecified chronicity, with sciatica presence unspecified   Started by:  Dominguez Reina MD        Dose:  1-2 tablet   Take 1-2 tablets by mouth as needed Reported on 3/21/2017   Quantity:  30 tablet   Refills:  0       methylPREDNISolone 4 MG tablet   Commonly known as:  MEDROL DOSEPAK   Used for:  Mild persistent asthma with exacerbation   Started by:  Dominguez Reina MD        Follow package instructions   Quantity:  21 tablet   Refills:  0         Stop taking these medicines if you haven't already. Please contact your care team if you have questions.     ciprofloxacin 500 MG tablet   Commonly known as:  CIPRO   Stopped by:  Dominguez Reina MD                Where to get your medicines      These " medications were sent to [facility] [address] [place], [place] - [address]  [address] [address]     Phone:  [phone]     azithromycin 250 MG tablet    methylPREDNISolone 4 MG tablet         Some of these will need a paper prescription and others can be bought over the counter.  Ask your nurse if you have questions.     Bring a paper prescription for each of these medications     HYDROcodone-acetaminophen 5-325 MG per tablet                Primary Care Provider Office Phone # Fax #    Eric Henry [phone] [address]  [facility] [address]        Equal Access to Services     [facility]: Hadii aad ku hadasho Soomaali, waaxda luqadaha, qaybta kaalmada adeegyada, waxkalin macias haygustabon anne marie ingram . So [facility] [phone].    ATENCIÓN: Si habla español, tiene a hanna disposición servicios gratuitos de asistencia lingüística. LlThe [facility] [phone].    We comply with applicable federal civil rights laws and [place] laws. We do not discriminate on the basis of race, color, national origin, age, disability, sex, sexual orientation, or gender identity.            Thank you!     Thank you for choosing [facility]  for your care. Our goal is always to provide you with excellent care. Hearing back from our patients is one way we can continue to improve our services. Please take a few minutes to complete the written survey that you may receive in the mail after your visit with us. Thank you!             Your Updated Medication List - Protect others around you: Learn how to safely use, store and throw away your medicines at www.disposemymeds.org.          This list is accurate as of: 10/27/17  4:29 PM.  Always use your most recent med list.                   Brand Name Dispense Instructions for use Diagnosis    ADVAIR DISKUS 250-50 MCG/DOSE diskus inhaler   Generic drug:  fluticasone-salmeterol     1 Inhaler    INHALE ONE DOSE  INTO THE LUNGS 2 TIMES DAILY    COPD with asthma (H)       albuterol 108 (90 BASE) MCG/ACT Inhaler    PROAIR HFA    1 Inhaler    INHALE 2 PUFFS INTO LUNGS EVERY 6 HOURS AS NEEDED    Moderate persistent asthma without complication       azithromycin 250 MG tablet    ZITHROMAX    6 tablet    Two tablets first day, then one tablet daily for four days.    Mild persistent asthma with exacerbation       blood glucose lancing device     1 each    Use to test blood sugars one time daily or as directed.    Type 2 diabetes mellitus without complication (H)       blood glucose monitoring lancets     1 Box    Use to test blood sugars 1 times daily or as directed.    Type 2 diabetes mellitus without complication (H)       blood glucose monitoring test strip    ONE TOUCH ULTRA    100 each    Use to test blood sugars one time daily or as directed.    Type 2 diabetes mellitus without complication (H)       HYDROcodone-acetaminophen 5-325 MG per tablet    NORCO    30 tablet    Take 1-2 tablets by mouth as needed Reported on 3/21/2017    Midline low back pain, unspecified chronicity, with sciatica presence unspecified       levothyroxine 112 MCG tablet    SYNTHROID/LEVOTHROID    30 tablet    TAKE ONE TABLET BY MOUTH ONCE DAILY    Refill clinic medication management patient       metFORMIN 500 MG 24 hr tablet    GLUCOPHAGE-XR    270 tablet    Take 3 tablets (1,500 mg) by mouth daily (with dinner)    Diabetes mellitus without complication (H)       methylPREDNISolone 4 MG tablet    MEDROL DOSEPAK    21 tablet    Follow package instructions    Mild persistent asthma with exacerbation       RABEprazole 20 MG EC tablet    ACIPHEX    30 tablet    TAKE ONE TABLET BY MOUTH ONCE DAILY    Encounter for medication refill       STATIN NOT PRESCRIBED (INTENTIONAL)     0 each    1 each 2 times daily Statin not prescribed intentionally due to Allergy to statin    Type 2 diabetes mellitus with diabetic chronic kidney disease (H)

## 2017-10-27 NOTE — PROGRESS NOTES
"Here for TSH follow up of slightly elevated TSH on new dose of l-thyroxine; recent chest tightness on top of her mild persistent asthma for a few days; and also requests pain medication for low back pain. Her  is suffering with extensive myalgias, and she is doing more work. This has flared her low back, for which she is receiving treatment at [facility]. She has not taken opioids routinely.   /80  Pulse 76  Ht 1.67 m (5' 5.75\")  Wt 94.3 kg (208 lb)  SpO2 98%  BMI 33.83 kg/m2   NAD WDWN lungs are clear, but decreased flow noticed with cough. HEENT Ok. Back is not examined  (M54.5) Midline low back pain, unspecified chronicity, with sciatica presence unspecified  (primary encounter diagnosis)  Comment:   Plan: HYDROcodone-acetaminophen (NORCO) 5-325 MG per         tablet            (R94.6) Thyroid function test abnormal  Comment:   Plan: TSH order released    (E03.9) Acquired hypothyroidism  Comment:   Plan:     (J45.31) Mild persistent asthma with exacerbation  Comment:   Plan: azithromycin (ZITHROMAX) 250 MG tablet,         methylPREDNISolone (MEDROL DOSEPAK) 4 MG tablet                "

## 2017-10-28 LAB — TSH BLD-ACNC: 3.92 UIU/ML (ref 0.45–4.5)

## 2017-10-28 ASSESSMENT — ASTHMA QUESTIONNAIRES: ACT_TOTALSCORE: 20

## 2017-10-30 DIAGNOSIS — J44.89 COPD WITH ASTHMA (H): ICD-10-CM

## 2017-11-09 DIAGNOSIS — J45.40 MODERATE PERSISTENT ASTHMA WITHOUT COMPLICATION: ICD-10-CM

## 2017-11-09 RX ORDER — ALBUTEROL SULFATE 90 UG/1
AEROSOL, METERED RESPIRATORY (INHALATION)
Qty: 18 G | Refills: 2 | Status: SHIPPED | OUTPATIENT
Start: 2017-11-09 | End: 2019-04-23

## 2017-11-09 NOTE — TELEPHONE ENCOUNTER
VENTOLIN  (90 BASE) MCG/ACT Inhaler last OV - & labs 10/27/17  Gaby Overton MA November 9, 2017 10:37 AM

## 2017-11-14 ENCOUNTER — OFFICE VISIT (OUTPATIENT)
Dept: FAMILY MEDICINE | Facility: CLINIC | Age: 77
End: 2017-11-14

## 2017-11-14 VITALS
TEMPERATURE: 98.3 F | HEART RATE: 81 BPM | DIASTOLIC BLOOD PRESSURE: 70 MMHG | OXYGEN SATURATION: 97 % | SYSTOLIC BLOOD PRESSURE: 124 MMHG

## 2017-11-14 DIAGNOSIS — J44.89 COPD WITH ASTHMA (H): Primary | ICD-10-CM

## 2017-11-14 DIAGNOSIS — R05.9 COUGH: ICD-10-CM

## 2017-11-14 DIAGNOSIS — J20.9 ACUTE BRONCHITIS WITH SYMPTOMS > 10 DAYS: ICD-10-CM

## 2017-11-14 PROCEDURE — 99214 OFFICE O/P EST MOD 30 MIN: CPT | Performed by: FAMILY MEDICINE

## 2017-11-14 PROCEDURE — 71020 XR CHEST 2 VW: CPT | Performed by: FAMILY MEDICINE

## 2017-11-14 RX ORDER — CEFUROXIME AXETIL 500 MG/1
500 TABLET ORAL 2 TIMES DAILY
Qty: 20 TABLET | Refills: 0 | Status: SHIPPED | OUTPATIENT
Start: 2017-11-14 | End: 2018-02-27

## 2017-11-14 NOTE — MR AVS SNAPSHOT
"              After Visit Summary   2017    Macie Jorge    MRN: [id number]           Patient Information     Date Of Birth          1940        Visit Information        Provider Department      2017 11:15 AM Marjan Pa MD [facility]        Today's Diagnoses     COPD with asthma (H)    -  1    Cough        Acute bronchitis with symptoms > 10 days           Follow-ups after your visit        Who to contact     If you have questions or need follow up information about today's clinic visit or your schedule please contact [facility] directly at [phone].  Normal or non-critical lab and imaging results will be communicated to you by [employer], letter or phone within 4 business days after the clinic has received the results. If you do not hear from us within 7 days, please contact the clinic through [employer] or phone. If you have a critical or abnormal lab result, we will notify you by phone as soon as possible.  Submit refill requests through [employer] or call your pharmacy and they will forward the refill request to us. Please allow 3 business days for your refill to be completed.          Additional Information About Your Visit        MyChart Information     [employer] lets you send messages to your doctor, view your test results, renew your prescriptions, schedule appointments and more. To sign up, go to [email or web address] . Click on \"Log in\" on the left side of the screen, which will take you to the Welcome page. Then click on \"Sign up Now\" on the right side of the page.     You will be asked to enter the access code listed below, as well as some personal information. Please follow the directions to create your username and password.     Your access code is: [email or web address]  Expires: 2018  3:29 PM     Your access code will  in 90 days. If you need help or a new code, please call your [facility] clinic or [phone].        Care EveryWhere ID     " This is your Care EveryWhere ID. This could be used by other organizations to access your [facility] medical records  [id number]        Your Vitals Were     Pulse Temperature Pulse Oximetry             81 98.3  F (36.8  C) (Oral) 97%          Blood Pressure from Last 3 Encounters:   11/14/17 124/70   10/27/17 132/80   06/09/17 132/78    Weight from Last 3 Encounters:   10/27/17 94.3 kg (208 lb)   06/09/17 94.8 kg (209 lb)   03/21/17 93 kg (205 lb)              We Performed the Following     X-ray Chest 2 vws*          Today's Medication Changes          These changes are accurate as of: 11/14/17 10:37 PM.  If you have any questions, ask your nurse or doctor.               Start taking these medicines.        Dose/Directions    cefuroxime 500 MG tablet   Commonly known as:  CEFTIN   Used for:  Acute bronchitis with symptoms > 10 days   Started by:  Marjan Pa MD        Dose:  500 mg   Take 1 tablet (500 mg) by mouth 2 times daily   Quantity:  20 tablet   Refills:  0            Where to get your medicines      These medications were sent to [facility] [address] [place] [address]     Phone:  [phone]     cefuroxime 500 MG tablet                Primary Care Provider Office Phone # Fax #    Eric Henry [phone] [address] [address]        Equal Access to Services     [facility] : Hadii jt mccoy hadasho Someghanali, waaxda luqadaha, qaybta kaalmada yahiregdat sainikalin gilberto ingram . So [facility] [phone].    ATENCIÓN: Si habla español, tiene a hanna disposición servicios gratuitos de asistencia lingüística. Llame al [phone].    We comply with applicable federal civil rights laws and [place] laws. We do not discriminate on the basis of race, color, national origin, age, disability, sex, sexual orientation, or gender identity.            Thank you!     Thank you for  choosing [facility]  for your care. Our goal is always to provide you with excellent care. Hearing back from our patients is one way we can continue to improve our services. Please take a few minutes to complete the written survey that you may receive in the mail after your visit with us. Thank you!             Your Updated Medication List - Protect others around you: Learn how to safely use, store and throw away your medicines at www.disposemymeds.org.          This list is accurate as of: 11/14/17 10:37 PM.  Always use your most recent med list.                   Brand Name Dispense Instructions for use Diagnosis    azithromycin 250 MG tablet    ZITHROMAX    6 tablet    Two tablets first day, then one tablet daily for four days.    Mild persistent asthma with exacerbation       blood glucose lancing device     1 each    Use to test blood sugars one time daily or as directed.    Type 2 diabetes mellitus without complication (H)       blood glucose monitoring lancets     1 Box    Use to test blood sugars 1 times daily or as directed.    Type 2 diabetes mellitus without complication (H)       blood glucose monitoring test strip    ONETOUCH ULTRA    100 each    Use to test blood sugars one time daily or as directed.    Type 2 diabetes mellitus without complication (H)       cefuroxime 500 MG tablet    CEFTIN    20 tablet    Take 1 tablet (500 mg) by mouth 2 times daily    Acute bronchitis with symptoms > 10 days       fluticasone-salmeterol 250-50 MCG/DOSE diskus inhaler    ADVAIR DISKUS    3 Inhaler    INHALE ONE DOSE INTO THE LUNGS 2 TIMES DAILY    COPD with asthma (H)       HYDROcodone-acetaminophen 5-325 MG per tablet    NORCO    30 tablet    Take 1-2 tablets by mouth as needed Reported on 3/21/2017    Midline low back pain, unspecified chronicity, with sciatica presence unspecified       levothyroxine 112 MCG tablet    SYNTHROID/LEVOTHROID    90 tablet    TAKE ONE TABLET BY MOUTH ONCE DAILY (DOCTOR'S  NOTE: PATIENT DUE FOR LABS AND OFFICE VISIT.)    Refill clinic medication management patient       metFORMIN 500 MG 24 hr tablet    GLUCOPHAGE-XR    270 tablet    Take 3 tablets (1,500 mg) by mouth daily (with dinner)    Diabetes mellitus without complication (H)       methylPREDNISolone 4 MG tablet    MEDROL DOSEPAK    21 tablet    Follow package instructions    Mild persistent asthma with exacerbation       RABEprazole 20 MG EC tablet    ACIPHEX    30 tablet    TAKE ONE TABLET BY MOUTH ONCE DAILY    Encounter for medication refill       STATIN NOT PRESCRIBED (INTENTIONAL)     0 each    1 each 2 times daily Statin not prescribed intentionally due to Allergy to statin    Type 2 diabetes mellitus with diabetic chronic kidney disease (H)       VENTOLIN  (90 BASE) MCG/ACT Inhaler   Generic drug:  albuterol     18 g    INHALE TWO PUFFS INTO LUNGS BY MOUTH EVERY 6 HOURS AS NEEDED    Moderate persistent asthma without complication

## 2017-11-14 NOTE — PROGRESS NOTES
"Problem(s) Oriented visit        SUBJECTIVE:                                                    Macie Jorge is a 77 year old female who presents to clinic today for ongoing cough. She took azithromycin and medrol dose pack and is not feeling better. She has had asthma since childhood. She quit tobacco 22 years ago but smoked for about 40 years. She typically takes Advair and prn ventolin. Cough is phlegmy but she is unable to get anything up. No fever. She is fatigued and \"just doesn't feel good.\" Her voice is more hoarse than usual.       Problem list, Medication list, Allergies, and Medical/Social/Surgical histories reviewed in [facility] and updated as appropriate.   Additional history: as documented    ROS:  5 point ROS completed and negative except noted above, including Gen, CV, Resp, GI, MS      Histories:   Patient Active Problem List   Diagnosis     Panic attacks     Advanced directives, counseling/discussion     Hyperlipidemia with target LDL less than 100     History of hip replacement, total     Health Care Home     Obesity     Urgency incontinence     CKD (chronic kidney disease) stage 3, GFR 30-59 ml/min     Nonintractable migraine, unspecified migraine type     Type 2 diabetes mellitus without complication (H)     COPD with asthma (H)     Diabetes mellitus without complication (H)     Acquired hypothyroidism     Past Surgical History:   Procedure Laterality Date     ARTHROPLASTY HIP  3/13/2013    Procedure: ARTHROPLASTY HIP;  LEFT TOTAL HIP ARTHROPLASTY (BIOMET)^ ;  Surgeon: Anand Main MD;  Location:  OR     ARTHROPLASTY PATELLO-FEMORAL (KNEE)  2005ish    Left     CHOLECYSTECTOMY, OPEN  27 yo     COMBINED CYSTOSCOPY, INSERT STENT URETER(S)  8/5/2014    Procedure: COMBINED CYSTOSCOPY, INSERT STENT URETER(S);  Surgeon: Sam Arriaga MD;  Location:  OR     COMBINED CYSTOSCOPY, RETROGRADES, URETEROSCOPY, LASER HOLMIUM LITHOTRIPSY URETER(S), INSERT STENT Left 6/14/2016    Procedure: " COMBINED CYSTOSCOPY, RETROGRADES, URETEROSCOPY, LASER HOLMIUM LITHOTRIPSY URETER(S), INSERT STENT;  Surgeon: Thomas Edmondson MD;  Location:  OR     GENITOURINARY SURGERY       LAMINECT/DISCECTOMY, LUMBAR      Laminectomy/Discectomy Lumbar     LASER HOLMIUM LITHOTRIPSY URETER(S), INSERT STENT, COMBINED Left 8/20/2014    Procedure: COMBINED CYSTOSCOPY, URETEROSCOPY, LASER HOLMIUM LITHOTRIPSY URETER(S), INSERT STENT;  Surgeon: Sam Arriaga MD;  Location:  OR     ORTHOPEDIC SURGERY      left ankle     RECESSION EYELID UPPER         Social History   Substance Use Topics     Smoking status: Former Smoker     Quit date: 1/1/1995     Smokeless tobacco: Never Used     Alcohol use Yes      Comment: occ.     No family history on file.        OBJECTIVE:                                                    /70  Pulse 81  Temp 98.3  F (36.8  C) (Oral)  SpO2 97%  There is no height or weight on file to calculate BMI.   GENERAL APPEARANCE: Alert, no acute distress  EYES: PERRL, EOM normal, conjunctiva and lids normal  HENT: Ears and TMs normal, oral mucosa and posterior oropharynx normal  NECK: No adenopathy,masses or thyromegaly  RESP: lungs clear to auscultation , no wheeze, rales, or rhonchi. Distant breath sounds. No increased expiratory phase.  CV: normal rate, regular rhythm, no murmur or gallop  MS: extremities normal, no peripheral edema  NEURO: Alert, oriented, speech and mentation normal  PSYCH: mentation appears normal, affect and mood normal  CXR: no apparent pneumonia, but bases have atalectasis.   Labs Resulted Today:   Results for orders placed or performed in visit on 10/27/17   TSH ([facility])   Result Value Ref Range    TSH 3.920 0.450 - 4.500 uIU/mL    Narrative    Performed at:  01 - [facility]  [address] [place] [address]  [id number]  : Kristopher Candelaria MD, Phone:  [phone]     ASSESSMENT/PLAN:                                                        Macie was seen today  for cough and asthma.    Diagnoses and all orders for this visit:    COPD with asthma (H)  -     X-ray Chest 2 vws*    Cough  -     X-ray Chest 2 vws*    Acute bronchitis with symptoms > 10 days  -     cefuroxime (CEFTIN) 500 MG tablet; Take 1 tablet (500 mg) by mouth 2 times daily  No improvement on Z-pack, therefore change to cefuroxime. Recommend Mucinex to loosen cough. She will remain on her Advair. She is given a sample of Incruse one puff daily assuming that 40 years of smoking caused some degree of COPD. She needs formal testing for this and therefore given referral to Pulmonary for PFTs. F/U after that to discuss treatment options.    There are no Patient Instructions on file for this visit.    The following health maintenance items are reviewed in Epic and correct as of today:  Health Maintenance   Topic Date Due     COPD ACTION PLAN Q1 YR  1940     MIGRAINE ACTION PLAN  10/14/1958     ADVANCE DIRECTIVE PLANNING Q5 YRS  09/01/2016     LIPID MONITORING Q1 YEAR  11/25/2016     EYE EXAM Q1 YEAR  01/28/2017     MICROALBUMIN Q1 YEAR  08/11/2017     INFLUENZA VACCINE (SYSTEM ASSIGNED)  09/01/2017     FOOT EXAM Q1 YEAR  09/21/2017     A1C Q6 MO  09/21/2017     CREATININE Q1 YEAR  03/21/2018     ASTHMA ACTION PLAN Q1 YR  03/21/2018     FALL RISK ASSESSMENT  03/21/2018     ASTHMA CONTROL TEST Q6 MOS  04/27/2018     TSH Q1 YEAR  10/27/2018     COLONOSCOPY Q3 YR  03/16/2019     TSH W/ FREE T4 REFLEX Q2 YEAR  10/27/2019     TETANUS IMMUNIZATION (SYSTEM ASSIGNED)  11/15/2022     SPIROMETRY ONETIME  Completed     DEXA SCAN SCREENING (SYSTEM ASSIGNED)  Completed     PNEUMOCOCCAL  Completed       Marjan Pa MD  [facility]  Family Practice  [facility]  [phone]    For any issues my office # is [phone]

## 2017-11-30 ENCOUNTER — OFFICE VISIT (OUTPATIENT)
Dept: FAMILY MEDICINE | Facility: CLINIC | Age: 77
End: 2017-11-30

## 2017-11-30 VITALS — SYSTOLIC BLOOD PRESSURE: 128 MMHG | DIASTOLIC BLOOD PRESSURE: 72 MMHG | RESPIRATION RATE: 20 BRPM | HEART RATE: 80 BPM

## 2017-11-30 DIAGNOSIS — R40.0 SOMNOLENCE: Primary | ICD-10-CM

## 2017-11-30 PROCEDURE — 99213 OFFICE O/P EST LOW 20 MIN: CPT | Performed by: FAMILY MEDICINE

## 2017-11-30 RX ORDER — TRIAMCINOLONE ACETONIDE 1 MG/G
OINTMENT TOPICAL
COMMUNITY
Start: 2017-10-31 | End: 2019-11-12

## 2017-11-30 NOTE — MR AVS SNAPSHOT
"              After Visit Summary   2017    Macie Jorge    MRN: [id number]           Patient Information     Date Of Birth          1940        Visit Information        Provider Department      2017 12:00 PM Eric Henry MD [facility]        Today's Diagnoses     Somnolence    -  1       Follow-ups after your visit        Who to contact     If you have questions or need follow up information about today's clinic visit or your schedule please contact [facility] directly at [phone].  Normal or non-critical lab and imaging results will be communicated to you by [employer]i-Logichart, letter or phone within 4 business days after the clinic has received the results. If you do not hear from us within 7 days, please contact the clinic through Opti-Logichart or phone. If you have a critical or abnormal lab result, we will notify you by phone as soon as possible.  Submit refill requests through [email or web address] or call your pharmacy and they will forward the refill request to us. Please allow 3 business days for your refill to be completed.          Additional Information About Your Visit        MyChart Information     [email or web address] lets you send messages to your doctor, view your test results, renew your prescriptions, schedule appointments and more. To sign up, go to [email or web address] . Click on \"Log in\" on the left side of the screen, which will take you to the Welcome page. Then click on \"Sign up Now\" on the right side of the page.     You will be asked to enter the access code listed below, as well as some personal information. Please follow the directions to create your username and password.     Your access code is: [email or web address]  Expires: 2018  3:29 PM     Your access code will  in 90 days. If you need help or a new code, please call your [place] clinic or [phone].        Care EveryWhere ID     This is your Care EveryWhere ID. This could be used by other organizations to " access your [facility] medical records  [id number]        Your Vitals Were     Pulse Respirations                80 20           Blood Pressure from Last 3 Encounters:   11/30/17 128/72   11/14/17 124/70   10/27/17 132/80    Weight from Last 3 Encounters:   10/27/17 94.3 kg (208 lb)   06/09/17 94.8 kg (209 lb)   03/21/17 93 kg (205 lb)              Today, you had the following     No orders found for display       Primary Care Provider Office Phone # Fax #    Eric Henry [phone] [address]  [facility] [address]        Equal Access to Services     ERWIN CHOWDHURY : Hadii jt mccoy hadasho Soomaali, waaxda luqadaha, qaybta kaalmada adeegyada, lian ingram . So [facility] [phone].    ATENCIÓN: Si habla español, tiene a hanna disposición servicios gratuitos de asistencia lingüística. Llame al [phone].    We comply with applicable federal civil rights laws and [place] laws. We do not discriminate on the basis of race, color, national origin, age, disability, sex, sexual orientation, or gender identity.            Thank you!     Thank you for choosing [facility]  for your care. Our goal is always to provide you with excellent care. Hearing back from our patients is one way we can continue to improve our services. Please take a few minutes to complete the written survey that you may receive in the mail after your visit with us. Thank you!             Your Updated Medication List - Protect others around you: Learn how to safely use, store and throw away your medicines at www.disposemymeds.org.          This list is accurate as of: 11/30/17 11:59 PM.  Always use your most recent med list.                   Brand Name Dispense Instructions for use Diagnosis    blood glucose lancing device     1 each    Use to test blood sugars one time daily or as directed.    Type 2 diabetes mellitus without complication (H)       blood glucose monitoring lancets      1 Box    Use to test blood sugars 1 times daily or as directed.    Type 2 diabetes mellitus without complication (H)       blood glucose monitoring test strip    ONETOUCH ULTRA    100 each    Use to test blood sugars one time daily or as directed.    Type 2 diabetes mellitus without complication (H)       cefuroxime 500 MG tablet    CEFTIN    20 tablet    Take 1 tablet (500 mg) by mouth 2 times daily    Acute bronchitis with symptoms > 10 days       fluticasone-salmeterol 250-50 MCG/DOSE diskus inhaler    ADVAIR DISKUS    3 Inhaler    INHALE ONE DOSE INTO THE LUNGS 2 TIMES DAILY    COPD with asthma (H)       HYDROcodone-acetaminophen 5-325 MG per tablet    NORCO    30 tablet    Take 1-2 tablets by mouth as needed Reported on 3/21/2017    Midline low back pain, unspecified chronicity, with sciatica presence unspecified       levothyroxine 112 MCG tablet    SYNTHROID/LEVOTHROID    90 tablet    TAKE ONE TABLET BY MOUTH ONCE DAILY (DOCTOR'S NOTE: PATIENT DUE FOR LABS AND OFFICE VISIT.)    Refill clinic medication management patient       metFORMIN 500 MG 24 hr tablet    GLUCOPHAGE-XR    270 tablet    Take 3 tablets (1,500 mg) by mouth daily (with dinner)    Diabetes mellitus without complication (H)       RABEprazole 20 MG EC tablet    ACIPHEX    30 tablet    TAKE ONE TABLET BY MOUTH ONCE DAILY    Encounter for medication refill       STATIN NOT PRESCRIBED (INTENTIONAL)     0 each    1 each 2 times daily Statin not prescribed intentionally due to Allergy to statin    Type 2 diabetes mellitus with diabetic chronic kidney disease (H)       triamcinolone 0.1 % ointment    KENALOG          VENTOLIN  (90 BASE) MCG/ACT Inhaler   Generic drug:  albuterol     18 g    INHALE TWO PUFFS INTO LUNGS BY MOUTH EVERY 6 HOURS AS NEEDED    Moderate persistent asthma without complication

## 2017-12-01 PROBLEM — R41.82 ALTERED MENTAL STATUS: Status: ACTIVE | Noted: 2017-12-01

## 2017-12-01 NOTE — PROGRESS NOTES
"Macie is here today with her  who had an appointment. Initially she was waiting in her car and her  stated she was very confused and disoriented in the middle of the night. He is cincerned she had a stroke.     Macie feels fine, she states she was having a dream and awoke thinking she was in a hotel, she couldn't find the bathroom until she \"woke up\"   She did not have speech issues , visual problems weakness or unsteadiness.    Past Medical History:   Diagnosis Date     Calculus of left ureter 2014    dr Arriaga - ureteroscopy and lithitripsy     Cataract      Chronic low back pain     Dr Villagran at [facility] in 2015- not surgical      COPD with asthma (H)      followed with pulmonary DR LENNOX 60-pack-year history of smoking     Esophagitis, reflux 9/2012    SOME EOSINOPHILIA NO BARRETS     Gastro-oesophageal reflux disease      has seen GI     Hydronephrosis, left     chronic , in 2017 Dr Arriaga plan was observaton     Hyperlipidaemia LDL goal < 100      Hypothyroid      Migraine headaches 9/1/2011     Moderate persistent asthma      lifelong, saw Pulm 2013     Obesity (BMI 30-39.9)      Panic attacks      Renal stones 2016    kidney stones, multiple small stones high risk recurrent ureteral stones. , Dr Arriaga in 2016 rec 24 hour urine.     Type 2 diabetes mellitus without complication (H)      Past Surgical History:   Procedure Laterality Date     ARTHROPLASTY HIP  3/13/2013    Procedure: ARTHROPLASTY HIP;  LEFT TOTAL HIP ARTHROPLASTY (BIOMET)^ ;  Surgeon: Anand Main MD;  Location:  OR     ARTHROPLASTY PATELLO-FEMORAL (KNEE)  2005ish    Left     CHOLECYSTECTOMY, OPEN  27 yo     COMBINED CYSTOSCOPY, INSERT STENT URETER(S)  8/5/2014    Procedure: COMBINED CYSTOSCOPY, INSERT STENT URETER(S);  Surgeon: Sam Arriaga MD;  Location:  OR     COMBINED CYSTOSCOPY, RETROGRADES, URETEROSCOPY, LASER HOLMIUM LITHOTRIPSY URETER(S), INSERT STENT Left 6/14/2016    Procedure: COMBINED CYSTOSCOPY, RETROGRADES, " URETEROSCOPY, LASER HOLMIUM LITHOTRIPSY URETER(S), INSERT STENT;  Surgeon: Thomas Edmondson MD;  Location:  OR     GENITOURINARY SURGERY       LAMINECT/DISCECTOMY, LUMBAR      Laminectomy/Discectomy Lumbar     LASER HOLMIUM LITHOTRIPSY URETER(S), INSERT STENT, COMBINED Left 8/20/2014    Procedure: COMBINED CYSTOSCOPY, URETEROSCOPY, LASER HOLMIUM LITHOTRIPSY URETER(S), INSERT STENT;  Surgeon: Sam Arriaga MD;  Location:  OR     ORTHOPEDIC SURGERY      left ankle     RECESSION EYELID UPPER       Current Outpatient Prescriptions   Medication     triamcinolone (KENALOG) 0.1 % ointment     VENTOLIN  (90 BASE) MCG/ACT Inhaler     levothyroxine (SYNTHROID/LEVOTHROID) 112 MCG tablet     fluticasone-salmeterol (ADVAIR DISKUS) 250-50 MCG/DOSE diskus inhaler     HYDROcodone-acetaminophen (NORCO) 5-325 MG per tablet     RABEprazole (ACIPHEX) 20 MG EC tablet     metFORMIN (GLUCOPHAGE-XR) 500 MG 24 hr tablet     cefuroxime (CEFTIN) 500 MG tablet     blood glucose (ONE TOUCH DELICA) lancing device     blood glucose monitoring (ONE TOUCH ULTRA) test strip     blood glucose monitoring (ONE TOUCH DELICA) lancets     STATIN NOT PRESCRIBED, INTENTIONAL,     No current facility-administered medications for this visit.      Ros   Neuro not dizzy, no headache   Cor no palp no cp, no Dyspnea    /72  Pulse 80  Resp 20  Obese looks well  EYES = NL  EARS= NL  MOUTH =NL  NECK = NL  LUNGS=NL  COR=NL  NEURO:  equal  strength, neg Romberg, DTR II/IV bilaterally (UE and LE), finger to nose normal, CN II-XII intact, ambulates without difficulty.  no focal deficits noted.neg pronator drift.  EXT=NL   MOOD AFFECT =NL      ASSESSMENT / PLAN  (R41.82) Altered mental status  (primary encounter diagnosis)  Related to dream state  No evidence on exam of problem. Story seems consistent with waking state.     Richard Ledezma MD

## 2017-12-04 ENCOUNTER — TELEPHONE (OUTPATIENT)
Dept: FAMILY MEDICINE | Facility: CLINIC | Age: 77
End: 2017-12-04

## 2017-12-04 NOTE — TELEPHONE ENCOUNTER
Received fax from [facility]'s pharmacy saying patients rx for Rabeprazole needs PA.  Submitted PA to [employer].   Received fax back stating approval.  Approval dates 09/05/2017-12/04/2018.  Called [facility] pharmacy to inform them of approval.

## 2017-12-31 ENCOUNTER — TRANSFERRED RECORDS (OUTPATIENT)
Dept: FAMILY MEDICINE | Facility: CLINIC | Age: 77
End: 2017-12-31

## 2018-01-05 ENCOUNTER — TRANSFERRED RECORDS (OUTPATIENT)
Dept: FAMILY MEDICINE | Facility: CLINIC | Age: 78
End: 2018-01-05

## 2018-02-27 ENCOUNTER — OFFICE VISIT (OUTPATIENT)
Dept: FAMILY MEDICINE | Facility: CLINIC | Age: 78
End: 2018-02-27

## 2018-02-27 VITALS
WEIGHT: 203.2 LBS | BODY MASS INDEX: 33.05 KG/M2 | DIASTOLIC BLOOD PRESSURE: 70 MMHG | HEART RATE: 84 BPM | SYSTOLIC BLOOD PRESSURE: 150 MMHG | TEMPERATURE: 97.4 F

## 2018-02-27 DIAGNOSIS — M67.442 GANGLION CYST OF FLEXOR TENDON SHEATH OF FINGER OF LEFT HAND: ICD-10-CM

## 2018-02-27 DIAGNOSIS — E11.9 TYPE 2 DIABETES MELLITUS WITHOUT COMPLICATION, WITHOUT LONG-TERM CURRENT USE OF INSULIN (H): ICD-10-CM

## 2018-02-27 DIAGNOSIS — M54.9 CHRONIC BACK PAIN, UNSPECIFIED BACK LOCATION, UNSPECIFIED BACK PAIN LATERALITY: ICD-10-CM

## 2018-02-27 DIAGNOSIS — N18.30 CHRONIC RENAL INSUFFICIENCY, STAGE 3 (MODERATE) (H): ICD-10-CM

## 2018-02-27 DIAGNOSIS — F41.0 PANIC ATTACKS: ICD-10-CM

## 2018-02-27 DIAGNOSIS — R11.0 CHRONIC NAUSEA: Primary | ICD-10-CM

## 2018-02-27 DIAGNOSIS — G89.29 CHRONIC BACK PAIN, UNSPECIFIED BACK LOCATION, UNSPECIFIED BACK PAIN LATERALITY: ICD-10-CM

## 2018-02-27 DIAGNOSIS — E03.9 ACQUIRED HYPOTHYROIDISM: ICD-10-CM

## 2018-02-27 DIAGNOSIS — Z86.0100 HISTORY OF COLONIC POLYPS: ICD-10-CM

## 2018-02-27 LAB
% GRANULOCYTES: 75.9 % (ref 42.2–75.2)
HCT VFR BLD AUTO: 35.7 % (ref 35–46)
HEMOGLOBIN: 11.7 G/DL (ref 11.8–15.5)
LYMPHOCYTES NFR BLD AUTO: 17.7 % (ref 20.5–51.1)
MCH RBC QN AUTO: 28.7 PG (ref 27–31)
MCHC RBC AUTO-ENTMCNC: 32.8 G/DL (ref 33–37)
MCV RBC AUTO: 87.4 FL (ref 80–100)
MONOCYTES NFR BLD AUTO: 6.4 % (ref 1.7–9.3)
PLATELET # BLD AUTO: 179 K/UL (ref 140–450)
RBC # BLD AUTO: 4.08 X10/CMM (ref 3.7–5.2)
WBC # BLD AUTO: 5.5 X10/CMM (ref 3.8–11)

## 2018-02-27 PROCEDURE — 84439 ASSAY OF FREE THYROXINE: CPT | Mod: 90 | Performed by: FAMILY MEDICINE

## 2018-02-27 PROCEDURE — 80050 GENERAL HEALTH PANEL: CPT | Mod: 90 | Performed by: FAMILY MEDICINE

## 2018-02-27 PROCEDURE — 80053 COMPREHEN METABOLIC PANEL: CPT | Mod: 90 | Performed by: FAMILY MEDICINE

## 2018-02-27 PROCEDURE — 84443 ASSAY THYROID STIM HORMONE: CPT | Mod: 90 | Performed by: FAMILY MEDICINE

## 2018-02-27 PROCEDURE — 99215 OFFICE O/P EST HI 40 MIN: CPT | Performed by: FAMILY MEDICINE

## 2018-02-27 PROCEDURE — 36415 COLL VENOUS BLD VENIPUNCTURE: CPT | Performed by: FAMILY MEDICINE

## 2018-02-27 PROCEDURE — 85025 COMPLETE CBC W/AUTO DIFF WBC: CPT | Performed by: FAMILY MEDICINE

## 2018-02-27 RX ORDER — NICOTINE POLACRILEX 4 MG/1
20 GUM, CHEWING ORAL 2 TIMES DAILY
COMMUNITY
End: 2021-08-02

## 2018-02-27 RX ORDER — TRAMADOL HYDROCHLORIDE 50 MG/1
50-100 TABLET ORAL EVERY 6 HOURS PRN
Qty: 60 TABLET | Refills: 0 | Status: SHIPPED | OUTPATIENT
Start: 2018-02-27 | End: 2018-04-30

## 2018-02-27 RX ORDER — DIAZEPAM 5 MG
TABLET ORAL
Qty: 20 TABLET | Refills: 0 | Status: SHIPPED | OUTPATIENT
Start: 2018-02-27 | End: 2018-05-17

## 2018-02-27 NOTE — NURSING NOTE
Ongoing nausea for 2 months. Switched from Aciphex to Omeprazole. Robbie Hastings LPN      2/27/2018    11:16 AM

## 2018-02-27 NOTE — PROGRESS NOTES
"Problem(s) Oriented visit        SUBJECTIVE:                                                    Macie Jorge is a 77 year old female who presents to clinic today for ongoing nausea for 2 months. She notes that she is scheduled to be at a wedding shower on Saturday and will be seeing her daughter from whom she has been estranged for >3 years. She doesn't want to just blame it on moods, but realizes it may just be that. She vomits occasionally. She seems to be set off by bad smells. Her bowel movements are normal. She occasionally has a soft stool, but not typically. She has lost about 4 pounds since November.     She notes confounding issues include:    History of severe back pain for which there is no good treatment. She needs something for pain and to \"get through this wedding.\"     She also has history of multiple colon polyps with last colonoscopy showing 13 done in 3/2016. She was advised to repeat colonoscopy in 6 months, but hasn't.     She has history of diabetes, typically with A1c readings in the 6.0-6.7 range. Most recently her A1c was 5.9 one year ago. She has history of     She has history of nephrolithiasis and is s/p lithotripsy 1.5 years ago. She has three remaining stones.     She has history of renal insufficiency, urinating frequently. No pain with urination, but gets up 2-3 times per night to urinate.      Problem list, Medication list, Allergies, and Medical/Social/Surgical histories reviewed in [facility] and updated as appropriate.   Additional history: as documented    ROS:  5 point ROS completed and negative except noted above, including Gen, CV, Resp, GI, MS  She complains about hard lumps under the skin on her left palm.    Histories:   Patient Active Problem List   Diagnosis     Panic attacks     Advanced directives, counseling/discussion     Hyperlipidemia with target LDL less than 100     History of hip replacement, total     Health Care Home     Obesity     Urgency incontinence     " CKD (chronic kidney disease) stage 3, GFR 30-59 ml/min     Nonintractable migraine, unspecified migraine type     Type 2 diabetes mellitus without complication (H)     COPD with asthma (H)     Acquired hypothyroidism     Altered mental status     History of colonic polyps     Past Surgical History:   Procedure Laterality Date     ARTHROPLASTY HIP  3/13/2013    Procedure: ARTHROPLASTY HIP;  LEFT TOTAL HIP ARTHROPLASTY (BIOMET)^ ;  Surgeon: Anand Main MD;  Location:  OR     ARTHROPLASTY PATELLO-FEMORAL (KNEE)  [facility]    Left     CHOLECYSTECTOMY, OPEN  27 yo     COMBINED CYSTOSCOPY, INSERT STENT URETER(S)  8/5/2014    Procedure: COMBINED CYSTOSCOPY, INSERT STENT URETER(S);  Surgeon: Sam Arriaga MD;  Location:  OR     COMBINED CYSTOSCOPY, RETROGRADES, URETEROSCOPY, LASER HOLMIUM LITHOTRIPSY URETER(S), INSERT STENT Left 6/14/2016    Procedure: COMBINED CYSTOSCOPY, RETROGRADES, URETEROSCOPY, LASER HOLMIUM LITHOTRIPSY URETER(S), INSERT STENT;  Surgeon: Thomas Edmondson MD;  Location:  OR     GENITOURINARY SURGERY       LAMINECT/DISCECTOMY, LUMBAR      Laminectomy/Discectomy Lumbar     LASER HOLMIUM LITHOTRIPSY URETER(S), INSERT STENT, COMBINED Left 8/20/2014    Procedure: COMBINED CYSTOSCOPY, URETEROSCOPY, LASER HOLMIUM LITHOTRIPSY URETER(S), INSERT STENT;  Surgeon: Sam Arriaga MD;  Location:  OR     ORTHOPEDIC SURGERY      left ankle     RECESSION EYELID UPPER         Social History   Substance Use Topics     Smoking status: Former Smoker     Quit date: 1/1/1995     Smokeless tobacco: Never Used     Alcohol use Yes      Comment: occ.     No family history on file.        OBJECTIVE:                                                    /70 (BP Location: Left arm, Patient Position: Sitting, Cuff Size: Adult Large)  Pulse 84  Temp 97.4  F (36.3  C) (Oral)  Wt 92.2 kg (203 lb 3.2 oz)  Breastfeeding? No  BMI 33.05 kg/m2  Body mass index is 33.05 kg/(m^2).   GENERAL APPEARANCE: Alert, no  acute distress  EYES: sclera anicteric  HENT: Ears and TMs normal, oral mucosa and posterior oropharynx normal  NECK: No adenopathy,masses or thyromegaly  RESP: lungs clear to auscultation   CV: normal rate, regular rhythm, no murmur or gallop  ABDOMEN: diffusely tender but without any rebound or guarding, soft, no organomegaly or masses   MS: extremities normal, no peripheral edema  MS: palmar surface of the left hand has what feels like three separate cystic lesions on the tendons overlying the 3rd and 4th metacarpals.  NEURO: Alert, oriented, speech and mentation normal  PSYCH: mentation appears normal, affect and mood normal   Labs Resulted Today:   Results for orders placed or performed in visit on 02/27/18   CBC with Diff/Plt ([facility])   Result Value Ref Range    WBC x10/cmm 5.5 3.8 - 11.0 x10/cmm    % Lymphocytes 17.7 (A) 20.5 - 51.1 %    % Monocytes 6.4 1.7 - 9.3 %    % Granulocytes 75.9 (A) 42.2 - 75.2 %    RBC x10/cmm 4.08 3.7 - 5.2 x10/cmm    Hemoglobin 11.7 (A) 11.8 - 15.5 g/dl    Hematocrit 35.7 35 - 46 %    MCV 87.4 80 - 100 fL    MCH 28.7 27.0 - 31.0 pg    MCHC 32.8 (A) 33.0 - 37.0 g/dL    Platelet Count 179 140 - 450 K/uL     ASSESSMENT/PLAN:                                                        Mcaie was seen today for nausea.    Diagnoses and all orders for this visit:    Chronic nausea, etiology uncertain, possibly multifactorial  -     Comp. Metabolic Panel (14) ([facility])  -     TSH ([facility])  -     CBC with Diff/Plt ([facility])  -     Thyroxine (T4) Free  Direct  S ([facility])  -     Urinalysis w/reflex protein, bili ([facility]); Future  Follow up pending results of tests, UA, colonoscopy.    Chronic renal insufficiency, stage 3 (moderate)  -     ACE/ARB/ARNI NOT PRESCRIBED, INTENTIONAL,; Please choose reason not prescribed, below  -     Comp. Metabolic Panel (14) ([facility])  Continue metformin, may need to reduce dose if A1c drops any further.    Type 2 diabetes mellitus without complication, without long-term  current use of insulin (H)  -     Comp. Metabolic Panel (14) ([facility])    Acquired hypothyroidism  -     TSH ([facility])  -     Thyroxine (T4) Free  Direct  S ([facility])  F/U pending result.    History of colonic polyps  -     GASTROENTEROLOGY ADULT REF CONSULT ONLY for colonoscopy that is 1.5 years overdue.    Panic attacks  -     diazepam (VALIUM) 5 MG tablet; 1/2 to one po every 8 hours as needed for panic attacks or back spasm    Chronic back pain, unspecified back location, unspecified back pain laterality  -     diazepam (VALIUM) 5 MG tablet; 1/2 to one po every 8 hours as needed for panic attacks or back spasm  -     traMADol (ULTRAM) 50 MG tablet; Take 1-2 tablets ( mg) by mouth every 6 hours as needed for pain maximum 8 tablet(s) per day  She will be careful adding the valium, warned about possible sedation.    Ganglion cyst of flexor tendon sheath of finger of left hand  -     Referral to [facility] To discuss removal.      There are no Patient Instructions on file for this visit.    The following health maintenance items are reviewed in Epic and correct as of today:  Health Maintenance   Topic Date Due     COPD ACTION PLAN Q1 YR  1940     MIGRAINE ACTION PLAN  10/14/1958     ADVANCE DIRECTIVE PLANNING Q5 YRS  09/01/2016     LIPID MONITORING Q1 YEAR  11/25/2016     MICROALBUMIN Q1 YEAR  08/11/2017     FOOT EXAM Q1 YEAR  09/21/2017     A1C Q6 MO  09/21/2017     CREATININE Q1 YEAR  03/21/2018     ASTHMA ACTION PLAN Q1 YR  03/21/2018     FALL RISK ASSESSMENT  03/21/2018     ASTHMA CONTROL TEST Q6 MOS  04/27/2018     EYE EXAM Q1 YEAR  06/16/2018     TSH Q1 YEAR  10/27/2018     COLONOSCOPY Q3 YR  03/16/2019     TSH W/ FREE T4 REFLEX Q2 YEAR  10/27/2019     TETANUS IMMUNIZATION (SYSTEM ASSIGNED)  11/15/2022     INFLUENZA VACCINE (SYSTEM ASSIGNED)  Completed     SPIROMETRY ONETIME  Completed     DEXA SCAN SCREENING (SYSTEM ASSIGNED)  Completed     PNEUMOCOCCAL  Completed       Marjan SHAH  MD Thierno  [facility]  [phone]    For any issues my office # is [phone]

## 2018-02-27 NOTE — MR AVS SNAPSHOT
After Visit Summary   2/27/2018    Macie Jorge    MRN: [id number]           Patient Information     Date Of Birth          1940        Visit Information        Provider Department      2/27/2018 11:15 AM Marjan Pa MD [facility]        Today's Diagnoses     Chronic nausea    -  1    Chronic renal insufficiency, stage 3 (moderate)        Type 2 diabetes mellitus without complication, without long-term current use of insulin (H)        Acquired hypothyroidism        History of colonic polyps        Panic attacks        Chronic back pain, unspecified back location, unspecified back pain laterality        Ganglion cyst of flexor tendon sheath of finger of left hand           Follow-ups after your visit        Additional Services     GASTROENTEROLOGY ADULT REF CONSULT ONLY       Preferred Location: [facility] [phone]  COLONOSCOPY-Polyposis    Please be aware that coverage of these services is subject to the terms and limitations of your health insurance plan.  Call member services at your health plan with any benefit or coverage questions.  Any procedures must be performed at a [facility] facility OR coordinated by your clinic's referral office.    Please bring the following with you to your appointment:    (1) Any X-Rays, CTs or MRIs which have been performed.  Contact the facility where they were done to arrange for  prior to your scheduled appointment.    (2) List of current medications   (3) This referral request   (4) Any documents/labs given to you for this referral            Referral to [facility]       Referral to [facility]  Phone:  [phone]  Reason for Referral:  Ganglion Cyst                         She has had surgery with Dr Gautam Hastings in the past and would like to see him    Please be aware that coverage of these services is subject to the terms and limitations of your health insurance plan.  Call member  "services at your health plan with any benefit or coverage questions.                  Future tests that were ordered for you today     Open Future Orders        Priority Expected Expires Ordered    Urinalysis w/reflex protein, bili ([facility]) Routine 2018 3/2/2018 2018            Who to contact     If you have questions or need follow up information about today's clinic visit or your schedule please contact [facility] directly at [phone].  Normal or non-critical lab and imaging results will be communicated to you by Kabanchikhart, letter or phone within 4 business days after the clinic has received the results. If you do not hear from us within 7 days, please contact the clinic through ZQGamet or phone. If you have a critical or abnormal lab result, we will notify you by phone as soon as possible.  Submit refill requests through Antria or call your pharmacy and they will forward the refill request to us. Please allow 3 business days for your refill to be completed.          Additional Information About Your Visit        Antria Information     Antria lets you send messages to your doctor, view your test results, renew your prescriptions, schedule appointments and more. To sign up, go to [email or web address] . Click on \"Log in\" on the left side of the screen, which will take you to the Welcome page. Then click on \"Sign up Now\" on the right side of the page.     You will be asked to enter the access code listed below, as well as some personal information. Please follow the directions to create your username and password.     Your access code is: [email or web address]  Expires: 2018  5:13 PM     Your access code will  in 90 days. If you need help or a new code, please call your [facility] or [phone].        Care EveryWhere ID     This is your Care EveryWhere ID. This could be used by other organizations to access your [facility] medical records  [id number]        Your Vitals Were     " Pulse Temperature Breastfeeding? BMI (Body Mass Index)          84 97.4  F (36.3  C) (Oral) No 33.05 kg/m2         Blood Pressure from Last 3 Encounters:   02/27/18 150/70   11/30/17 128/72   11/14/17 124/70    Weight from Last 3 Encounters:   02/27/18 92.2 kg (203 lb 3.2 oz)   10/27/17 94.3 kg (208 lb)   06/09/17 94.8 kg (209 lb)              We Performed the Following     CBC with Diff/Plt ([facility])     Comp. Metabolic Panel (14) ([facility])     GASTROENTEROLOGY ADULT REF CONSULT ONLY     Referral to [facility]     Thyroxine (T4) Free  Direct  S ([facility])     TSH ([facility])          Today's Medication Changes          These changes are accurate as of 2/27/18  5:13 PM.  If you have any questions, ask your nurse or doctor.               Start taking these medicines.        Dose/Directions    ACE/ARB/ARNI NOT PRESCRIBED (INTENTIONAL)   Used for:  Chronic renal insufficiency, stage 3 (moderate)   Started by:  Marjan Pa MD        Please choose reason not prescribed, below   Refills:  0       diazepam 5 MG tablet   Commonly known as:  VALIUM   Used for:  Panic attacks, Chronic back pain, unspecified back location, unspecified back pain laterality   Started by:  Marjan Pa MD        1/2 to one po every 8 hours as needed for panic attacks or back spasm   Quantity:  20 tablet   Refills:  0       traMADol 50 MG tablet   Commonly known as:  ULTRAM   Used for:  Chronic back pain, unspecified back location, unspecified back pain laterality   Started by:  Marjan Pa MD        Dose:   mg   Take 1-2 tablets ( mg) by mouth every 6 hours as needed for pain maximum 8 tablet(s) per day   Quantity:  60 tablet   Refills:  0         Stop taking these medicines if you haven't already. Please contact your care team if you have questions.     RABEprazole 20 MG EC tablet   Commonly known as:  ACIPHEX   Stopped by:  Marjan Pa MD                Where to get your  medicines      Some of these will need a paper prescription and others can be bought over the counter.  Ask your nurse if you have questions.     Bring a paper prescription for each of these medications     diazepam 5 MG tablet    traMADol 50 MG tablet       You don't need a prescription for these medications     ACE/ARB/ARNI NOT PRESCRIBED (INTENTIONAL)                Primary Care Provider Office Phone # Fax #    Marjan Senia Pa [phone]       [facility] [address]  [facility] [address]        Equal Access to Services     MITCHELL CHOWDHURY : Hadii aad ku hadasho Soomaali, waaxda luqadaha, qaybta kaalmada adeegyada, waxay idiin hayaan adeeg kharash laann . So [facility] [phone].    ATENCIÓN: Si habla español, tiene a hanna disposición servicios gratuitos de asistencia lingüística. Llame al [phone].    We comply with applicable federal civil rights laws and [place] laws. We do not discriminate on the basis of race, color, national origin, age, disability, sex, sexual orientation, or gender identity.            Thank you!     Thank you for choosing [facility]  for your care. Our goal is always to provide you with excellent care. Hearing back from our patients is one way we can continue to improve our services. Please take a few minutes to complete the written survey that you may receive in the mail after your visit with us. Thank you!             Your Updated Medication List - Protect others around you: Learn how to safely use, store and throw away your medicines at www.disposemymeds.org.          This list is accurate as of 2/27/18  5:13 PM.  Always use your most recent med list.                   Brand Name Dispense Instructions for use Diagnosis    ACE/ARB/ARNI NOT PRESCRIBED (INTENTIONAL)      Please choose reason not prescribed, below    Chronic renal insufficiency, stage 3 (moderate)       blood glucose lancing device     1 each    Use to test blood sugars  one time daily or as directed.    Type 2 diabetes mellitus without complication (H)       blood glucose monitoring lancets     1 Box    Use to test blood sugars 1 times daily or as directed.    Type 2 diabetes mellitus without complication (H)       blood glucose monitoring test strip    ONETOUCH ULTRA    100 each    Use to test blood sugars one time daily or as directed.    Type 2 diabetes mellitus without complication (H)       diazepam 5 MG tablet    VALIUM    20 tablet    1/2 to one po every 8 hours as needed for panic attacks or back spasm    Panic attacks, Chronic back pain, unspecified back location, unspecified back pain laterality       fluticasone-salmeterol 250-50 MCG/DOSE diskus inhaler    ADVAIR DISKUS    3 Inhaler    INHALE ONE DOSE INTO THE LUNGS 2 TIMES DAILY    COPD with asthma (H)       levothyroxine 112 MCG tablet    SYNTHROID/LEVOTHROID    90 tablet    TAKE ONE TABLET BY MOUTH ONCE DAILY (DOCTOR'S NOTE: PATIENT DUE FOR LABS AND OFFICE VISIT.)    Refill clinic medication management patient       metFORMIN 500 MG 24 hr tablet    GLUCOPHAGE-XR    270 tablet    Take 3 tablets (1,500 mg) by mouth daily (with dinner)    Diabetes mellitus without complication (H)       omeprazole 20 MG tablet      Take 20 mg by mouth 2 times daily        STATIN NOT PRESCRIBED (INTENTIONAL)     0 each    1 each 2 times daily Statin not prescribed intentionally due to Allergy to statin    Type 2 diabetes mellitus with diabetic chronic kidney disease (H)       traMADol 50 MG tablet    ULTRAM    60 tablet    Take 1-2 tablets ( mg) by mouth every 6 hours as needed for pain maximum 8 tablet(s) per day    Chronic back pain, unspecified back location, unspecified back pain laterality       triamcinolone 0.1 % ointment    KENALOG          VENTOLIN  (90 BASE) MCG/ACT Inhaler   Generic drug:  albuterol     18 g    INHALE TWO PUFFS INTO LUNGS BY MOUTH EVERY 6 HOURS AS NEEDED    Moderate persistent asthma without  complication

## 2018-02-28 ENCOUNTER — TELEPHONE (OUTPATIENT)
Dept: FAMILY MEDICINE | Facility: CLINIC | Age: 78
End: 2018-02-28

## 2018-02-28 DIAGNOSIS — R11.0 CHRONIC NAUSEA: ICD-10-CM

## 2018-02-28 DIAGNOSIS — Z76.0 REFILL CLINIC MEDICATION MANAGEMENT PATIENT: ICD-10-CM

## 2018-02-28 DIAGNOSIS — E03.9 ACQUIRED HYPOTHYROIDISM: Primary | ICD-10-CM

## 2018-02-28 LAB
ALBUMIN SERPL-MCNC: 4.3 G/DL (ref 3.5–4.8)
ALBUMIN/GLOB SERPL: 1.2 {RATIO} (ref 1.2–2.2)
ALP SERPL-CCNC: 52 IU/L (ref 39–117)
ALT SERPL-CCNC: 16 IU/L (ref 0–32)
AST SERPL-CCNC: 20 IU/L (ref 0–40)
BACTERIA URINE: NORMAL
BILIRUB SERPL-MCNC: 0.4 MG/DL (ref 0–1.2)
BILIRUB UR QL STRIP: NORMAL
BLOOD URINE DIP: NORMAL
BUN SERPL-MCNC: 18 MG/DL (ref 8–27)
BUN/CREATININE RATIO: 15 (ref 12–28)
CALCIUM SERPL-MCNC: 9.7 MG/DL (ref 8.7–10.3)
CASTS/LPF: 0
CHLORIDE SERPLBLD-SCNC: 103 MMOL/L (ref 96–106)
COLOR UR: YELLOW
CREAT SERPL-MCNC: 1.19 MG/DL (ref 0.57–1)
CRYSTAL URINE: 0
EGFR IF AFRICN AM: 51 ML/MIN/1.73
EGFR IF NONAFRICN AM: 44 ML/MIN/1.73
EPITHELIAL CELLS - QUEST: NORMAL
GLOBULIN, TOTAL: 3.6 G/DL (ref 1.5–4.5)
GLUCOSE SERPL-MCNC: 137 MG/DL (ref 65–99)
GLUCOSE UR STRIP-MCNC: 0 MG/DL
KETONES UR QL STRIP: 0
LEUKOCYTE ESTERASE URINE DIP: NORMAL
MUCOUS URINE: 0
NITRITE UR QL STRIP: NORMAL
PH UR STRIP: 6 PH (ref 5–9)
POTASSIUM SERPL-SCNC: 4.5 MMOL/L (ref 3.5–5.2)
PROT SERPL-MCNC: 7.9 G/DL (ref 6–8.5)
PROT UR QL: 0 MG/DL (ref ?–0.01)
RBC URINE: NORMAL (ref 0–3)
SODIUM SERPL-SCNC: 143 MMOL/L (ref 134–144)
SP GR UR STRIP: 1.02 (ref 1–1.02)
T4 FREE SERPL-MCNC: 1.48 NG/DL (ref 0.82–1.77)
TOTAL CO2: 26 MMOL/L (ref 18–28)
TSH BLD-ACNC: 6.27 UIU/ML (ref 0.45–4.5)
UROBILINOGEN UR QL STRIP: 0.2 EU/DL (ref 0.2–1)
WBC URINE: NORMAL (ref 0–3)

## 2018-02-28 PROCEDURE — 81003 URINALYSIS AUTO W/O SCOPE: CPT | Performed by: FAMILY MEDICINE

## 2018-02-28 RX ORDER — LEVOTHYROXINE SODIUM 112 UG/1
112 TABLET ORAL DAILY
Qty: 90 TABLET | Refills: 3 | Status: SHIPPED | OUTPATIENT
Start: 2018-02-28 | End: 2019-02-07

## 2018-03-01 NOTE — TELEPHONE ENCOUNTER
----- Message from Marjan Pa MD sent at 2/28/2018  6:45 PM CST -----  Kidney function is mildly impaired, but better than at last check, continue good hydration on a regular basis.  TSH is slightly abnormal, but free T4, the active hormone, is normal therefore levothyroxine 112  g daily and recheck annually.

## 2018-03-01 NOTE — TELEPHONE ENCOUNTER
Called patient with results and Dr. Pa's recommendations. Refill sent for levothyroxine x 1 year.  Vickie Potts RN

## 2018-03-16 ENCOUNTER — TRANSFERRED RECORDS (OUTPATIENT)
Dept: FAMILY MEDICINE | Facility: CLINIC | Age: 78
End: 2018-03-16

## 2018-04-30 DIAGNOSIS — M54.9 CHRONIC BACK PAIN, UNSPECIFIED BACK LOCATION, UNSPECIFIED BACK PAIN LATERALITY: ICD-10-CM

## 2018-04-30 DIAGNOSIS — G89.29 CHRONIC BACK PAIN, UNSPECIFIED BACK LOCATION, UNSPECIFIED BACK PAIN LATERALITY: ICD-10-CM

## 2018-04-30 RX ORDER — TRAMADOL HYDROCHLORIDE 50 MG/1
TABLET ORAL
Qty: 60 TABLET | Refills: 0 | Status: SHIPPED | OUTPATIENT
Start: 2018-04-30 | End: 2018-05-17

## 2018-04-30 NOTE — TELEPHONE ENCOUNTER
traMADol (ULTRAM) 50 MG   LAST  Med check 2/27/18   last labs(for RX) 2/27/18  Next  appt scheduled =  none  Gaby Overton MA

## 2018-05-02 ENCOUNTER — TRANSFERRED RECORDS (OUTPATIENT)
Dept: FAMILY MEDICINE | Facility: CLINIC | Age: 78
End: 2018-05-02

## 2018-05-09 ENCOUNTER — TRANSFERRED RECORDS (OUTPATIENT)
Dept: FAMILY MEDICINE | Facility: CLINIC | Age: 78
End: 2018-05-09

## 2018-05-17 ENCOUNTER — OFFICE VISIT (OUTPATIENT)
Dept: FAMILY MEDICINE | Facility: CLINIC | Age: 78
End: 2018-05-17

## 2018-05-17 VITALS
BODY MASS INDEX: 33.05 KG/M2 | OXYGEN SATURATION: 98 % | SYSTOLIC BLOOD PRESSURE: 130 MMHG | HEIGHT: 65 IN | HEART RATE: 89 BPM | RESPIRATION RATE: 16 BRPM | WEIGHT: 198.4 LBS | DIASTOLIC BLOOD PRESSURE: 72 MMHG

## 2018-05-17 DIAGNOSIS — R53.81 PHYSICAL DECONDITIONING: ICD-10-CM

## 2018-05-17 DIAGNOSIS — M47.816 LUMBAR SPONDYLOSIS: ICD-10-CM

## 2018-05-17 DIAGNOSIS — M54.50 CHRONIC MIDLINE LOW BACK PAIN WITHOUT SCIATICA: Primary | ICD-10-CM

## 2018-05-17 DIAGNOSIS — G89.29 CHRONIC MIDLINE LOW BACK PAIN WITHOUT SCIATICA: Primary | ICD-10-CM

## 2018-05-17 PROCEDURE — 99214 OFFICE O/P EST MOD 30 MIN: CPT | Performed by: FAMILY MEDICINE

## 2018-05-17 RX ORDER — HYDROCODONE BITARTRATE AND ACETAMINOPHEN 5; 325 MG/1; MG/1
1 TABLET ORAL 2 TIMES DAILY PRN
Qty: 45 TABLET | Refills: 0 | Status: SHIPPED | OUTPATIENT
Start: 2018-05-17 | End: 2018-06-20

## 2018-05-17 NOTE — PROGRESS NOTES
Problem(s) Oriented visit      SUBJECTIVE:                                                    Macie Jorge is a 77 year old female who presents to clinic today for:  Back Pain for Years    Hx of multilevel disc disease and moderate scoliosis having acute on chronic flare in back pain. Hx of lumbar spine decompression. MRI in scanning in 2014 shows progressively worsening disc disease.   No recent falls. Having a flare. Now pain is localized to low back. No radiculopathy.   Hx L THAO.   Nml Dexa 5/7/2015    Problem list, Medication list, Allergies, and Medical/Social/Surgical histories reviewed in [facility] and updated as appropriate.       ROS:  General and Resp. completed and negative except as noted above    Histories:   Patient Active Problem List   Diagnosis     Panic attacks     Advanced directives, counseling/discussion     Hyperlipidemia with target LDL less than 100     History of hip replacement, total     Health Care Home     Obesity     Urgency incontinence     CKD (chronic kidney disease) stage 3, GFR 30-59 ml/min     Nonintractable migraine, unspecified migraine type     Type 2 diabetes mellitus without complication (H)     COPD with asthma (H)     Acquired hypothyroidism     Altered mental status     History of colonic polyps     Past Medical History:   Diagnosis Date     Calculus of left ureter 2014    dr Arriaga - ureteroscopy and lithitripsy     Cataract      Chronic low back pain     Dr Villagran at [facility] in 2015- not surgical      COPD with asthma (H)      followed with pulmonary DR LENNOX 60-pack-year history of smoking     Esophagitis, reflux 9/2012    SOME EOSINOPHILIA NO BARRETS     Gastro-oesophageal reflux disease      has seen GI     Hydronephrosis, left     chronic , in 2017 Dr Arriaga plan was observaton     Hyperlipidaemia LDL goal < 100      Hypothyroid      Migraine headaches 9/1/2011     Moderate persistent asthma      lifelong, saw Pulm 2013     Obesity (BMI 30-39.9)      Panic attacks      Renal  "stones 2016    kidney stones, multiple small stones high risk recurrent ureteral stones. , Dr Arriaga in 2016 rec 24 hour urine.     Type 2 diabetes mellitus without complication (H)      Past Surgical History:   Procedure Laterality Date     ARTHROPLASTY HIP  3/13/2013    Procedure: ARTHROPLASTY HIP;  LEFT TOTAL HIP ARTHROPLASTY (BIOMET)^ ;  Surgeon: Anand Main MD;  Location:  OR     ARTHROPLASTY PATELLO-FEMORAL (KNEE)  2005ish    Left     CHOLECYSTECTOMY, OPEN  25 yo     COMBINED CYSTOSCOPY, INSERT STENT URETER(S)  8/5/2014    Procedure: COMBINED CYSTOSCOPY, INSERT STENT URETER(S);  Surgeon: Sam Arriaga MD;  Location:  OR     COMBINED CYSTOSCOPY, RETROGRADES, URETEROSCOPY, LASER HOLMIUM LITHOTRIPSY URETER(S), INSERT STENT Left 6/14/2016    Procedure: COMBINED CYSTOSCOPY, RETROGRADES, URETEROSCOPY, LASER HOLMIUM LITHOTRIPSY URETER(S), INSERT STENT;  Surgeon: Thomas Edmondson MD;  Location:  OR     GENITOURINARY SURGERY       LAMINECT/DISCECTOMY, LUMBAR      Laminectomy/Discectomy Lumbar     LASER HOLMIUM LITHOTRIPSY URETER(S), INSERT STENT, COMBINED Left 8/20/2014    Procedure: COMBINED CYSTOSCOPY, URETEROSCOPY, LASER HOLMIUM LITHOTRIPSY URETER(S), INSERT STENT;  Surgeon: Sam Arriaga MD;  Location:  OR     ORTHOPEDIC SURGERY      left ankle     RECESSION EYELID UPPER       Social History   Substance Use Topics     Smoking status: Former Smoker     Quit date: 1/1/1995     Smokeless tobacco: Never Used     Alcohol use Yes      Comment: occ.     No family history on file.    OBJECTIVE:                                                    /72  Pulse 89  Resp 16  Ht 1.651 m (5' 5\")  Wt 90 kg (198 lb 6.4 oz)  SpO2 98%  BMI 33.02 kg/m2  Body mass index is 33.02 kg/(m^2).  Obese body habitus noted.   Guards her low back.   APPEARANCE: = alert and moderate distress     ASSESSMENT/PLAN:                                                      Macie was seen today for back pain.    Diagnoses " and all orders for this visit:    Chronic midline low back pain without sciatica  -     HYDROcodone-acetaminophen (NORCO) 5-325 MG per tablet; Take 1 tablet by mouth 2 times daily as needed for severe pain  -     PHYSICAL THERAPY REFERRAL   Pt aware we generally do not tx low back pain w/ chronic narcotics.    She has known lumbar spine abnormalities - it is reasonable to try pain meds for the acute aspect of her pain.    She is aware to call if anything gets worse.     Lumbar spondylosis  -     HYDROcodone-acetaminophen (NORCO) 5-325 MG per tablet; Take 1 tablet by mouth 2 times daily as needed for severe pain  -     PHYSICAL THERAPY REFERRAL    Physical deconditioning  -     HYDROcodone-acetaminophen (NORCO) 5-325 MG per tablet; Take 1 tablet by mouth 2 times daily as needed for severe pain  -     PHYSICAL THERAPY REFERRAL      >25 min spent with patient, greater than 50% spent on discussion/education/planning, etc. About The primary encounter diagnosis was Chronic midline low back pain without sciatica. Diagnoses of Lumbar spondylosis and Physical deconditioning were also pertinent to this visit.    The following health maintenance items are reviewed in Epic and correct as of today:  Health Maintenance   Topic Date Due     COPD ACTION PLAN Q1 YR  1940     MIGRAINE ACTION PLAN  10/14/1958     ADVANCE DIRECTIVE PLANNING Q5 YRS  09/01/2016     LIPID MONITORING Q1 YEAR  11/25/2016     MICROALBUMIN Q1 YEAR  08/11/2017     FOOT EXAM Q1 YEAR  09/21/2017     A1C Q6 MO  09/21/2017     EYE EXAM Q1 YEAR  06/16/2018     ASTHMA CONTROL TEST Q6 MOS  11/17/2018     TSH Q1 YEAR  02/27/2019     CREATININE Q1 YEAR  02/27/2019     ASTHMA ACTION PLAN Q1 YR  05/17/2019     FALL RISK ASSESSMENT  05/17/2019     TSH W/ FREE T4 REFLEX Q2 YEAR  02/27/2020     COLONOSCOPY Q3 YR  04/05/2021     TETANUS IMMUNIZATION (SYSTEM ASSIGNED)  11/15/2022     SPIROMETRY ONETIME  Completed     DEXA SCAN SCREENING (SYSTEM ASSIGNED)  Completed      PNEUMOCOCCAL  Completed     INFLUENZA VACCINE  Completed       Samantha Gao MD  Family Medicine    For any issues, please call my office  [facility] at [phone].

## 2018-05-17 NOTE — LETTER
My Asthma Action Plan  Name: Macie Jorge   YOB: 1940  Date: 5/17/2018   My doctor: Samantha Gao MD   My clinic: [facility]        My Control Medicine: Fluticasone + salmeterol (Advair) -  Diskus 250/50 mcg /DOSE  My Rescue Medicine: Albuterol (Proair/Ventolin/Proventil) inhaler 108mcg   My Asthma Severity: mild persistent  Avoid your asthma triggers: Patient is unaware of triggers               GREEN ZONE   Good Control    I feel good    No cough or wheeze    Can work, sleep and play without asthma symptoms       Take your asthma control medicine every day.     1. If exercise triggers your asthma, take your rescue medication    15 minutes before exercise or sports, and    During exercise if you have asthma symptoms  2. Spacer to use with inhaler: If you have a spacer, make sure to use it with your inhaler             YELLOW ZONE Getting Worse  I have ANY of these:    I do not feel good    Cough or wheeze    Chest feels tight    Wake up at night   1. Keep taking your Green Zone medications  2. Start taking your rescue medicine:    every 20 minutes for up to 1 hour. Then every 4 hours for 24-48 hours.  3. If you stay in the Yellow Zone for more than 12-24 hours, contact your doctor.  4. If you do not return to the Green Zone in 12-24 hours or you get worse, start taking your oral steroid medicine if prescribed by your provider.           RED ZONE Medical Alert - Get Help  I have ANY of these:    I feel awful    Medicine is not helping    Breathing getting harder    Trouble walking or talking    Nose opens wide to breathe       1. Take your rescue medicine NOW  2. If your provider has prescribed an oral steroid medicine, start taking it NOW  3. Call your doctor NOW  4. If you are still in the Red Zone after 20 minutes and you have not reached your doctor:    Take your rescue medicine again and    Call 911 or go to the emergency room right away    See your regular doctor  within 2 weeks of an Emergency Room or Urgent Care visit for follow-up treatment.          Annual Reminders:  Meet with Asthma Educator,  Flu Shot in the Fall, consider Pneumonia Vaccination for patients with asthma (aged 19 and older).    Pharmacy:    [facility] - MAIL ORDER MAINT MEDS - NON-EPRESCRIBE  [facility] [address] [place], [place] [address] [facility] [address] [place] [address]                      Asthma Triggers  How To Control Things That Make Your Asthma Worse    Triggers are things that make your asthma worse.  Look at the list below to help you find your triggers and what you can do about them.  You can help prevent asthma flare-ups by staying away from your triggers.      Trigger                                                          What you can do   Cigarette Smoke  Tobacco smoke can make asthma worse. Do not allow smoking in your home, car or around you.  Be sure no one smokes at a child s day care or school.  If you smoke, ask your health care provider for ways to help you quit.  Ask family members to quit too.  Ask your health care provider for a referral to Quit Plan to help you quit smoking, or call 5-260-020-PLAN.     Colds, Flu, Bronchitis  These are common triggers of asthma. Wash your hands often.  Don t touch your eyes, nose or mouth.  Get a flu shot every year.     Dust Mites  These are tiny bugs that live in cloth or carpet. They are too small to see. Wash sheets and blankets in hot water every week.   Encase pillows and mattress in dust mite proof covers.  Avoid having carpet if you can. If you have carpet, vacuum weekly.   Use a dust mask and HEPA vacuum.   Pollen and Outdoor Mold  Some people are allergic to trees, grass, or weed pollen, or molds. Try to keep your windows closed.  Limit time out doors when pollen count is high.   Ask you health care provider about taking medicine during allergy season.     Animal Dander  Some  people are allergic to skin flakes, urine or saliva from pets with fur or feathers. Keep pets with fur or feathers out of your home.    If you can t keep the pet outdoors, then keep the pet out of your bedroom.  Keep the bedroom door closed.  Keep pets off cloth furniture and away from stuffed toys.     Mice, Rats, and Cockroaches  Some people are allergic to the waste from these pests.   Cover food and garbage.  Clean up spills and food crumbs.  Store grease in the refrigerator.   Keep food out of the bedroom.   Indoor Mold  This can be a trigger if your home has high moisture. Fix leaking faucets, pipes, or other sources of water.   Clean moldy surfaces.  Dehumidify basement if it is damp and smelly.   Smoke, Strong Odors, and Sprays  These can reduce air quality. Stay away from strong odors and sprays, such as perfume, powder, hair spray, paints, smoke incense, paint, cleaning products, candles and new carpet.   Exercise or Sports  Some people with asthma have this trigger. Be active!  Ask your doctor about taking medicine before sports or exercise to prevent symptoms.    Warm up for 5-10 minutes before and after sports or exercise.     Other Triggers of Asthma  Cold air:  Cover your nose and mouth with a scarf.  Sometimes laughing or crying can be a trigger.  Some medicines and food can trigger asthma.

## 2018-05-17 NOTE — MR AVS SNAPSHOT
"              After Visit Summary   2018    Macie Jorge    MRN: [id number]           Patient Information     Date Of Birth          1940        Visit Information        Provider Department      2018 10:30 AM Samantha Gao MD [facility]        Today's Diagnoses     Chronic midline low back pain without sciatica    -  1    Lumbar spondylosis        Physical deconditioning           Follow-ups after your visit        Follow-up notes from your care team     Return in about 1 month (around 2018).      Who to contact     If you have questions or need follow up information about today's clinic visit or your schedule please contact [facility] directly at [phone].  Normal or non-critical lab and imaging results will be communicated to you by MyChart, letter or phone within 4 business days after the clinic has received the results. If you do not hear from us within 7 days, please contact the clinic through [employer] or phone. If you have a critical or abnormal lab result, we will notify you by phone as soon as possible.  Submit refill requests through easy2map or call your pharmacy and they will forward the refill request to us. Please allow 3 business days for your refill to be completed.          Additional Information About Your Visit        MyChart Information     [email or web address] lets you send messages to your doctor, view your test results, renew your prescriptions, schedule appointments and more. To sign up, go to [email or web address] . Click on \"Log in\" on the left side of the screen, which will take you to the Welcome page. Then click on \"Sign up Now\" on the right side of the page.     You will be asked to enter the access code listed below, as well as some personal information. Please follow the directions to create your username and password.     Your access code is: [email or web address]  Expires: 2018  6:13 PM     Your access code will  in 90 days. " "If you need help or a new code, please call your [facility] clinic or [phone].        Care EveryWhere ID     This is your Care EveryWhere ID. This could be used by other organizations to access your [facility] medical records  [id number]        Your Vitals Were     Pulse Respirations Height Pulse Oximetry BMI (Body Mass Index)       89 16 1.651 m (5' 5\") 98% 33.02 kg/m2        Blood Pressure from Last 3 Encounters:   05/17/18 130/72   02/27/18 150/70   11/30/17 128/72    Weight from Last 3 Encounters:   05/17/18 90 kg (198 lb 6.4 oz)   02/27/18 92.2 kg (203 lb 3.2 oz)   10/27/17 94.3 kg (208 lb)              We Performed the Following     Asthma Action Plan (AAP)          Today's Medication Changes          These changes are accurate as of 5/17/18 11:07 AM.  If you have any questions, ask your nurse or doctor.               Start taking these medicines.        Dose/Directions    HYDROcodone-acetaminophen 5-325 MG per tablet   Commonly known as:  NORCO   Used for:  Chronic midline low back pain without sciatica, Lumbar spondylosis, Physical deconditioning   Started by:  Samantha Gao MD        Dose:  1 tablet   Take 1 tablet by mouth 2 times daily as needed for severe pain   Quantity:  45 tablet   Refills:  0         Stop taking these medicines if you haven't already. Please contact your care team if you have questions.     diazepam 5 MG tablet   Commonly known as:  VALIUM   Stopped by:  Samantha Gao MD           traMADol 50 MG tablet   Commonly known as:  ULTRAM   Stopped by:  Samantha Gao MD                Where to get your medicines      Some of these will need a paper prescription and others can be bought over the counter.  Ask your nurse if you have questions.     Bring a paper prescription for each of these medications     HYDROcodone-acetaminophen 5-325 MG per tablet               Information about OPIOIDS     PRESCRIPTION OPIOIDS: WHAT YOU NEED TO KNOW   You have a " prescription for an opioid (narcotic) pain medicine. Opioids can cause addiction. If you have a history of chemical dependency of any type, you are at a higher risk of becoming addicted to opioids. Only take this medicine after all other options have been tried. Take it for as short a time and as few doses as possible.     Do not:    Drive. If you drive while taking these medicines, you could be arrested for driving under the influence (DUI).    Operate heavy machinery    Do any other dangerous activities while taking these medicines.     Drink any alcohol while taking these medicines.      Take with any other medicines that contain acetaminophen. Read all labels carefully. Look for the word  acetaminophen  or  Tylenol.  Ask your pharmacist if you have questions or are unsure.    Store your pills in a secure place, locked if possible. We will not replace any lost or stolen medicine. If you don t finish your medicine, please throw away (dispose) as directed by your pharmacist. The [place] Pollution Control Agency has more information about safe disposal: https://www.pca.[facility].[place].us/living-green/managing-unwanted-medications    All opioids tend to cause constipation. Drink plenty of water and eat foods that have a lot of fiber, such as fruits, vegetables, prune juice, apple juice and high-fiber cereal. Take a laxative (Miralax, milk of magnesia, Colace, Senna) if you don t move your bowels at least every other day.          Primary Care Provider Office Phone # Fax #    Marjan Senia Pa [phone] [address] [place] [address]        Equal Access to Services     [facility] AH: Hadii jt ku hadasho Soomaali, waaxda luqadaha, qaybta kaalmada adeegyada, lian ingram . So [facility] [phone].    ATENCIÓN: Si habla español, tiene a hanna disposición servicios gratuitos de asistencia lingüística. Llame al [phone].    We comply with applicable federal  civil rights laws and [place] laws. We do not discriminate on the basis of race, color, national origin, age, disability, sex, sexual orientation, or gender identity.            Thank you!     Thank you for choosing [facility]  for your care. Our goal is always to provide you with excellent care. Hearing back from our patients is one way we can continue to improve our services. Please take a few minutes to complete the written survey that you may receive in the mail after your visit with us. Thank you!             Your Updated Medication List - Protect others around you: Learn how to safely use, store and throw away your medicines at www.disposemymeds.org.          This list is accurate as of 5/17/18 11:07 AM.  Always use your most recent med list.                   Brand Name Dispense Instructions for use Diagnosis    ACE/ARB/ARNI NOT PRESCRIBED (INTENTIONAL)      Please choose reason not prescribed, below    Chronic renal insufficiency, stage 3 (moderate)       blood glucose lancing device     1 each    Use to test blood sugars one time daily or as directed.    Type 2 diabetes mellitus without complication (H)       blood glucose monitoring lancets     1 Box    Use to test blood sugars 1 times daily or as directed.    Type 2 diabetes mellitus without complication (H)       blood glucose monitoring test strip    ONETOUCH ULTRA    100 each    Use to test blood sugars one time daily or as directed.    Type 2 diabetes mellitus without complication (H)       fluticasone-salmeterol 250-50 MCG/DOSE diskus inhaler    ADVAIR DISKUS    3 Inhaler    INHALE ONE DOSE INTO THE LUNGS 2 TIMES DAILY    COPD with asthma (H)       HYDROcodone-acetaminophen 5-325 MG per tablet    NORCO    45 tablet    Take 1 tablet by mouth 2 times daily as needed for severe pain    Chronic midline low back pain without sciatica, Lumbar spondylosis, Physical deconditioning       levothyroxine 112 MCG tablet    SYNTHROID/LEVOTHROID     90 tablet    Take 1 tablet (112 mcg) by mouth daily    Acquired hypothyroidism       metFORMIN 500 MG 24 hr tablet    GLUCOPHAGE-XR    270 tablet    Take 3 tablets (1,500 mg) by mouth daily (with dinner)    Diabetes mellitus without complication (H)       omeprazole 20 MG tablet      Take 20 mg by mouth 2 times daily        STATIN NOT PRESCRIBED (INTENTIONAL)     0 each    1 each 2 times daily Statin not prescribed intentionally due to Allergy to statin    Type 2 diabetes mellitus with diabetic chronic kidney disease (H)       triamcinolone 0.1 % ointment    KENALOG          VENTOLIN  (90 Base) MCG/ACT Inhaler   Generic drug:  albuterol     18 g    INHALE TWO PUFFS INTO LUNGS BY MOUTH EVERY 6 HOURS AS NEEDED    Moderate persistent asthma without complication

## 2018-05-18 ASSESSMENT — ASTHMA QUESTIONNAIRES: ACT_TOTALSCORE: 22

## 2018-06-20 DIAGNOSIS — M47.816 LUMBAR SPONDYLOSIS: ICD-10-CM

## 2018-06-20 DIAGNOSIS — M54.50 CHRONIC MIDLINE LOW BACK PAIN WITHOUT SCIATICA: ICD-10-CM

## 2018-06-20 DIAGNOSIS — R53.81 PHYSICAL DECONDITIONING: ICD-10-CM

## 2018-06-20 DIAGNOSIS — G89.29 CHRONIC MIDLINE LOW BACK PAIN WITHOUT SCIATICA: ICD-10-CM

## 2018-06-20 RX ORDER — HYDROCODONE BITARTRATE AND ACETAMINOPHEN 5; 325 MG/1; MG/1
1 TABLET ORAL 2 TIMES DAILY PRN
Qty: 45 TABLET | Refills: 0 | Status: SHIPPED | OUTPATIENT
Start: 2018-06-20 | End: 2018-08-01

## 2018-06-20 NOTE — TELEPHONE ENCOUNTER
Patient calls requesting refill on norco 5/325 #45 tabs ordered by Dr. Jinny Gao at 5/17/18 appt. Filled this rx on 5/17/18.   States has lot of stress with her husbands upcoming heart surgery and her own bad back and knee pain - needs knee replacement.   Plan: routed request to Dr. Jinny Gao.  Vickie Potts RN

## 2018-06-20 NOTE — TELEPHONE ENCOUNTER
I will refill once, but we discussed this is not a viable long-term solution. I would like to see her in clinic after this refill if she is still needing rx. We'll discuss other options for managing her pain at that visit.     I hope her 's heart surgery goes well!!!

## 2018-06-21 NOTE — TELEPHONE ENCOUNTER
Called patient and informed of Dr. Jinny Gao's message to her. She will RTC  Today to  rx.   Vickie Potts RN

## 2018-07-10 ENCOUNTER — TRANSFERRED RECORDS (OUTPATIENT)
Dept: FAMILY MEDICINE | Facility: CLINIC | Age: 78
End: 2018-07-10

## 2018-08-01 ENCOUNTER — OFFICE VISIT (OUTPATIENT)
Dept: FAMILY MEDICINE | Facility: CLINIC | Age: 78
End: 2018-08-01

## 2018-08-01 VITALS
SYSTOLIC BLOOD PRESSURE: 126 MMHG | OXYGEN SATURATION: 97 % | RESPIRATION RATE: 16 BRPM | HEART RATE: 78 BPM | WEIGHT: 190.4 LBS | DIASTOLIC BLOOD PRESSURE: 76 MMHG | BODY MASS INDEX: 31.68 KG/M2

## 2018-08-01 DIAGNOSIS — Z02.89 ENCOUNTER FOR COMPLETION OF FORM WITH PATIENT: ICD-10-CM

## 2018-08-01 DIAGNOSIS — R53.81 PHYSICAL DECONDITIONING: ICD-10-CM

## 2018-08-01 DIAGNOSIS — E78.00 ELEVATED LDL CHOLESTEROL LEVEL: ICD-10-CM

## 2018-08-01 DIAGNOSIS — E11.9 DIABETES MELLITUS WITHOUT COMPLICATION (H): ICD-10-CM

## 2018-08-01 DIAGNOSIS — G89.29 CHRONIC MIDLINE LOW BACK PAIN WITHOUT SCIATICA: Primary | ICD-10-CM

## 2018-08-01 DIAGNOSIS — M54.50 CHRONIC MIDLINE LOW BACK PAIN WITHOUT SCIATICA: Primary | ICD-10-CM

## 2018-08-01 DIAGNOSIS — M47.816 LUMBAR SPONDYLOSIS: ICD-10-CM

## 2018-08-01 PROCEDURE — 36415 COLL VENOUS BLD VENIPUNCTURE: CPT | Performed by: FAMILY MEDICINE

## 2018-08-01 PROCEDURE — 83036 HEMOGLOBIN GLYCOSYLATED A1C: CPT | Mod: 90 | Performed by: FAMILY MEDICINE

## 2018-08-01 PROCEDURE — 80061 LIPID PANEL: CPT | Mod: 90 | Performed by: FAMILY MEDICINE

## 2018-08-01 PROCEDURE — 99214 OFFICE O/P EST MOD 30 MIN: CPT | Performed by: FAMILY MEDICINE

## 2018-08-01 RX ORDER — HYDROCODONE BITARTRATE AND ACETAMINOPHEN 5; 325 MG/1; MG/1
1 TABLET ORAL 2 TIMES DAILY PRN
Qty: 60 TABLET | Refills: 0 | Status: SHIPPED | OUTPATIENT
Start: 2018-08-01 | End: 2018-09-11

## 2018-08-01 RX ORDER — METFORMIN HCL 500 MG
1500 TABLET, EXTENDED RELEASE 24 HR ORAL
Qty: 270 TABLET | Refills: 3 | Status: SHIPPED | OUTPATIENT
Start: 2018-08-01 | End: 2019-02-05

## 2018-08-01 NOTE — LETTER
"[facility]  [address] [place]  [address]  Phone: [phone]    August 3, 2018      Macie Jorge  [address] [place] [address]              Dear Macie,    Good news - your cholesterol numbers look very good and your diabetes continues to be well controlled (your A1c is stable).  Keep up the good work!        Sincerely,     Samantha \"Jinny\" MD Sima  Cristina, CMA    Results for orders placed or performed in visit on 08/01/18   Hemoglobin A1C ([facility])   Result Value Ref Range    Hemoglobin A1C 5.9 (H) 4.8 - 5.6 %    Narrative    Performed at:  01 - [facility]  [employer] [place] [address]  [id number]  : Riley Rachel MD, Phone:  [phone]   Lipid Panel ([facility])   Result Value Ref Range    Cholesterol 170 100 - 199 mg/dL    Triglycerides 102 0 - 149 mg/dL    HDL Cholesterol 54 >39 mg/dL    VLDL Cholesterol Wilber 20 5 - 40 mg/dL    LDL Cholesterol Calculated 96 0 - 99 mg/dL    LDL/HDL Ratio 1.8 0.0 - 3.2 ratio    Narrative    Performed at:  01 - [facility]  [employer] [place], [place]  [id number]  : Riley Rachel MD, Phone:  [phone]      "

## 2018-08-01 NOTE — PROGRESS NOTES
Problem(s) Oriented visit      SUBJECTIVE:                                                    Macie Jorge is a 77 year old female who presents to clinic today for:  Patient presents with:  RECHECK: Back Pain - still very painful. Only relief is when taking Norco.  had open heart surgery so doing all housework.  Recheck Medication  Refill Request: Norco  Forms: Handicap sticker    Here to follow up on chronic back pain. I gave Norco refill on 6/20/2018, but requested pt RTC if needed additional pain meds.     Pt had Ortho consult 5/9/2018. See notes in My Chart. Was to RTC in 4 weeks. Did not return as planned.     From my last note on 5/17/2018:   Hx of multilevel disc disease and moderate scoliosis having acute on chronic flare in back pain. Hx of lumbar spine decompression. MRI in scanning in 2014 shows progressively worsening disc disease.   No recent falls. Having a flare. Now pain is localized to low back. No radiculopathy.   Hx L THAO.   Nml Dexa 5/7/2015   was about to have heart surgery at her last visit.     At last visit, wrote [place], gave PT referral for lumbar spondylosis and deconditioning.   Norco is very helpful managing her back pain.   Was not able go to pool therapy due to 's heart surgery. He is very unstable by her report. She is concerned he will fall. He also needs hip surgery.   She is doing more around the house as a result of 's inability to assist. Back pain is worse - combo of increased activity and stress from her 's medical conditions.  All pain localized to low back. No radiculopathy symptoms.     Losing weight due to moving around more. She has DM. Due for DM visit. Pt states is not good at checking blood sugars. She does not bring in her meter today. We do not have time to do a DM visit today, but she agrees to lab draw in anticipation of meeting w/ her PCP to discuss DM mgmt. She is not fasting today, but is long overdue for cholesterol  check. She states she will likely not be in the office for some time now until her  is more stable. We opted to check cholesterol today in non-fasting state. Wrote recent food intake in the lipid panel order.     Problem list, Medication list, Allergies, and Medical/Social/Surgical histories reviewed in [facility] and updated as appropriate.     ROS:  5 point ROS completed and negative except noted above, including Gen, CV, Resp, GI, MS    Histories:   Patient Active Problem List   Diagnosis     Panic attacks     Advanced directives, counseling/discussion     Hyperlipidemia with target LDL less than 100     History of hip replacement, total     Health Care Home     Obesity     Urgency incontinence     CKD (chronic kidney disease) stage 3, GFR 30-59 ml/min     Nonintractable migraine, unspecified migraine type     Type 2 diabetes mellitus without complication (H)     COPD with asthma (H)     Acquired hypothyroidism     Altered mental status     History of colonic polyps     Past Medical History:   Diagnosis Date     Calculus of left ureter 2014    dr Arriaga - ureteroscopy and lithitripsy     Cataract      Chronic low back pain     Dr Villagran at [facility] in 2015- not surgical      COPD with asthma (H)      followed with pulmonary DR LENNOX 60-pack-year history of smoking     Esophagitis, reflux 9/2012    SOME EOSINOPHILIA NO BARRETS     Gastro-oesophageal reflux disease      has seen GI     Hydronephrosis, left     chronic , in 2017 Dr Arriaga plan was observaton     Hyperlipidaemia LDL goal < 100      Hypothyroid      Migraine headaches 9/1/2011     Moderate persistent asthma      lifelong, saw Pulm 2013     Obesity (BMI 30-39.9)      Panic attacks      Renal stones 2016    kidney stones, multiple small stones high risk recurrent ureteral stones. , Dr Arriaga in 2016 rec 24 hour urine.     Type 2 diabetes mellitus without complication (H)      Past Surgical History:   Procedure Laterality Date     ARTHROPLASTY HIP  3/13/2013     Procedure: ARTHROPLASTY HIP;  LEFT TOTAL HIP ARTHROPLASTY (BIOMET)^ ;  Surgeon: Anand Main MD;  Location:  OR     ARTHROPLASTY PATELLO-FEMORAL (KNEE)  2005ish    Left     CHOLECYSTECTOMY, OPEN  27 yo     COMBINED CYSTOSCOPY, INSERT STENT URETER(S)  8/5/2014    Procedure: COMBINED CYSTOSCOPY, INSERT STENT URETER(S);  Surgeon: Sam Arriaga MD;  Location:  OR     COMBINED CYSTOSCOPY, RETROGRADES, URETEROSCOPY, LASER HOLMIUM LITHOTRIPSY URETER(S), INSERT STENT Left 6/14/2016    Procedure: COMBINED CYSTOSCOPY, RETROGRADES, URETEROSCOPY, LASER HOLMIUM LITHOTRIPSY URETER(S), INSERT STENT;  Surgeon: Thomas Edmondson MD;  Location:  OR     GENITOURINARY SURGERY       LAMINECT/DISCECTOMY, LUMBAR      Laminectomy/Discectomy Lumbar     LASER HOLMIUM LITHOTRIPSY URETER(S), INSERT STENT, COMBINED Left 8/20/2014    Procedure: COMBINED CYSTOSCOPY, URETEROSCOPY, LASER HOLMIUM LITHOTRIPSY URETER(S), INSERT STENT;  Surgeon: Sam Arriaga MD;  Location:  OR     ORTHOPEDIC SURGERY      left ankle     RECESSION EYELID UPPER       Social History   Substance Use Topics     Smoking status: Former Smoker     Quit date: 1/1/1995     Smokeless tobacco: Never Used     Alcohol use Yes      Comment: occ.     No family history on file.    OBJECTIVE:                                                    /76  Pulse 78  Resp 16  Wt 86.4 kg (190 lb 6.4 oz)  SpO2 97%  BMI 31.68 kg/m2  Body mass index is 31.68 kg/(m^2).   Gen: Cooperative. No acute distress. Obese body habitus noted. Appears tired.   Eyes: PERRL, sclera white.   Neck: Supple, without masses, lymphadenopathy or tenderness.   Heart: Regular rate and rhythm without murmurs, rubs, or gallops.   Lungs: Normal respiratory effort. Lungs are clear to auscultation. Good breath sounds bilaterally.   Musculoskeletal: No point tenderness w/ palpation of her low back musculature. Sits slid down in her chair to protect her back.   Skin: Warm and well perfused.    Neurologic: Alert, oriented x3, nonfocal. Gait is very cautious.   Psych:  Mood and affect are appropriate. Tends to repeat herself periodically - is obviously very concerned about her 's medical conditions.      ASSESSMENT/PLAN:                                                      Macie was seen today for recheck, recheck medication, refill request and forms.    Diagnoses and all orders for this visit:    Chronic midline low back pain without sciatica  -     HYDROcodone-acetaminophen (NORCO) 5-325 MG per tablet; Take 1 tablet by mouth 2 times daily as needed for severe pain    Encounter for completion of form with patient   Completed [employer] parking application for patient.     Lumbar spondylosis  -     HYDROcodone-acetaminophen (NORCO) 5-325 MG per tablet; Take 1 tablet by mouth 2 times daily as needed for severe pain    Physical deconditioning  -     HYDROcodone-acetaminophen (NORCO) 5-325 MG per tablet; Take 1 tablet by mouth 2 times daily as needed for severe pain   She will return to pool therapy as soon as her  stabilizes.     Diabetes mellitus without complication (H)  -     Cancel: Microalbumin ([facility])  -     Hemoglobin A1C ([facility])  -     metFORMIN (GLUCOPHAGE-XR) 500 MG 24 hr tablet; Take 3 tablets (1,500 mg) by mouth daily (with dinner)  -     Cancel: Lipid Panel ([facility]); Future  -     Lipid Panel ([facility])  -     Microalbumin ([facility]); Future    Elevated LDL cholesterol level  -     Lipid Panel ([facility])    Other orders  -     Cancel: C FOOT EXAM  NO CHARGE - not performed today. Not a DM visit.   -     Cancel: Lipid Panel ([facility])    OK to refill [place] next month if she not seen by me. If she continues to need Norco, may either have her meet w/ her PCP or me. Will need a pain contract if we continue to fill it at [facility].   Saw [facility] - will need knee surgery. Had steroid injection to temporize her situation.     The following health maintenance items are reviewed in Epic and correct  as of today:  Health Maintenance   Topic Date Due     COPD ACTION PLAN Q1 YR  1940     MIGRAINE ACTION PLAN  10/14/1958     ADVANCE DIRECTIVE PLANNING Q5 YRS  09/01/2016     LIPID MONITORING Q1 YEAR  11/25/2016     PHQ-2 Q1 YR  01/20/2017     MICROALBUMIN Q1 YEAR  08/11/2017     FOOT EXAM Q1 YEAR  09/21/2017     A1C Q6 MO  09/21/2017     EYE EXAM Q1 YEAR  06/16/2018     INFLUENZA VACCINE (1) 09/01/2018     ASTHMA CONTROL TEST Q6 MOS  11/17/2018     TSH Q1 YEAR  02/27/2019     CREATININE Q1 YEAR  02/27/2019     ASTHMA ACTION PLAN Q1 YR  05/17/2019     FALL RISK ASSESSMENT  05/17/2019     TSH W/ FREE T4 REFLEX Q2 YEAR  02/27/2020     COLONOSCOPY Q3 YR  04/05/2021     TETANUS IMMUNIZATION (SYSTEM ASSIGNED)  11/15/2022     SPIROMETRY ONETIME  Completed     DEXA SCAN SCREENING (SYSTEM ASSIGNED)  Completed     PNEUMOCOCCAL  Completed       Samantha Gao MD  Family Medicine    For any issues, please call my office  [facility] at [phone].

## 2018-08-01 NOTE — MR AVS SNAPSHOT
"              After Visit Summary   2018    Macie Jorge    MRN: [id number]           Patient Information     Date Of Birth          1940        Visit Information        Provider Department      2018 10:30 AM Samantha Gao MD [facility]        Today's Diagnoses     Chronic midline low back pain without sciatica    -  1    Lumbar spondylosis        Physical deconditioning        Diabetes mellitus without complication (H)        Elevated LDL cholesterol level           Follow-ups after your visit        Who to contact     If you have questions or need follow up information about today's clinic visit or your schedule please contact [facility] directly at [phone].  Normal or non-critical lab and imaging results will be communicated to you by MyChart, letter or phone within 4 business days after the clinic has received the results. If you do not hear from us within 7 days, please contact the clinic through Qumuhart or phone. If you have a critical or abnormal lab result, we will notify you by phone as soon as possible.  Submit refill requests through Paws for Life or call your pharmacy and they will forward the refill request to us. Please allow 3 business days for your refill to be completed.          Additional Information About Your Visit        MyChart Information     P[employer] Life lets you send messages to your doctor, view your test results, renew your prescriptions, schedule appointments and more. To sign up, go to [email or web address] . Click on \"Log in\" on the left side of the screen, which will take you to the Welcome page. Then click on \"Sign up Now\" on the right side of the page.     You will be asked to enter the access code listed below, as well as some personal information. Please follow the directions to create your username and password.     Your access code is: [email or web address]  Expires: 10/30/2018 11:10 AM     Your access code will  in 90 days. If " you need help or a new code, please call your [place] clinic or [phone].        Care EveryWhere ID     This is your Care EveryWhere ID. This could be used by other organizations to access your [place] medical records  [id number]        Your Vitals Were     Pulse Respirations Pulse Oximetry BMI (Body Mass Index)          78 16 97% 31.68 kg/m2         Blood Pressure from Last 3 Encounters:   08/01/18 126/76   05/17/18 130/72   02/27/18 150/70    Weight from Last 3 Encounters:   08/01/18 86.4 kg (190 lb 6.4 oz)   05/17/18 90 kg (198 lb 6.4 oz)   02/27/18 92.2 kg (203 lb 3.2 oz)              We Performed the Following     Hemoglobin A1C ([facility])     Lipid Panel ([facility])     Microalbumin ([facility])          Where to get your medicines      These medications were sent to [facility] [address] [place] [id number]     Phone:  [phone]     metFORMIN 500 MG 24 hr tablet         Some of these will need a paper prescription and others can be bought over the counter.  Ask your nurse if you have questions.     Bring a paper prescription for each of these medications     HYDROcodone-acetaminophen 5-325 MG per tablet         Information about OPIOIDS     PRESCRIPTION OPIOIDS: WHAT YOU NEED TO KNOW   We gave you an opioid (narcotic) pain medicine. It is important to manage your pain, but opioids are not always the best choice. You should first try all the other options your care team gave you. Take this medicine for as short a time (and as few doses) as possible.     These medicines have risks:    DO NOT drive when on new or higher doses of pain medicine. These medicines can affect your alertness and reaction times, and you could be arrested for driving under the influence (DUI). If you need to use opioids long-term, talk to your care team about driving.    DO NOT operate heave machinery    DO NOT do any other dangerous activities while taking these  medicines.     DO NOT drink any alcohol while taking these medicines.      If the opioid prescribed includes acetaminophen, DO NOT take with any other medicines that contain acetaminophen. Read all labels carefully. Look for the word  acetaminophen  or  Tylenol.  Ask your pharmacist if you have questions or are unsure.    You can get addicted to pain medicines, especially if you have a history of addiction (chemical, alcohol or substance dependence). Talk to your care team about ways to reduce this risk.    Store your pills in a secure place, locked if possible. We will not replace any lost or stolen medicine. If you don t finish your medicine, please throw away (dispose) as directed by your pharmacist. The [place] Pollution Control Agency has more information about safe disposal: https://www.pca.[facility].[place]/living-green/managing-unwanted-medications.     All opioids tend to cause constipation. Drink plenty of water and eat foods that have a lot of fiber, such as fruits, vegetables, prune juice, apple juice and high-fiber cereal. Take a laxative (Miralax, milk of magnesia, Colace, Senna) if you don t move your bowels at least every other day.          Primary Care Provider Office Phone # Fax #    Marjan Senia Pa [phone] [address] [place] [address]        Equal Access to Services     MITCHELL CHOWDHURY AH: Hadii jt mccoy hadasho Soomaali, waaxda luqadaha, qaybta kaalmada adeegyada, lian olvera. So [facility] [phone].    ATENCIÓN: Si habla español, tiene a hanna disposición servicios gratuitos de asistencia lingüística. Llame al [phone].    We comply with applicable federal civil rights laws and [place] laws. We do not discriminate on the basis of race, color, national origin, age, disability, sex, sexual orientation, or gender identity.            Thank you!     Thank you for choosing [facility]  for your care. Our goal is  always to provide you with excellent care. Hearing back from our patients is one way we can continue to improve our services. Please take a few minutes to complete the written survey that you may receive in the mail after your visit with us. Thank you!             Your Updated Medication List - Protect others around you: Learn how to safely use, store and throw away your medicines at www.disposemymeds.org.          This list is accurate as of 8/1/18 11:10 AM.  Always use your most recent med list.                   Brand Name Dispense Instructions for use Diagnosis    ACE/ARB/ARNI NOT PRESCRIBED (INTENTIONAL)      Please choose reason not prescribed, below    Chronic renal insufficiency, stage 3 (moderate)       blood glucose lancing device     1 each    Use to test blood sugars one time daily or as directed.    Type 2 diabetes mellitus without complication (H)       blood glucose monitoring lancets     1 Box    Use to test blood sugars 1 times daily or as directed.    Type 2 diabetes mellitus without complication (H)       blood glucose monitoring test strip    ONETOUCH ULTRA    100 each    Use to test blood sugars one time daily or as directed.    Type 2 diabetes mellitus without complication (H)       fluticasone-salmeterol 250-50 MCG/DOSE diskus inhaler    ADVAIR DISKUS    3 Inhaler    INHALE ONE DOSE INTO THE LUNGS 2 TIMES DAILY    COPD with asthma (H)       HYDROcodone-acetaminophen 5-325 MG per tablet    NORCO    60 tablet    Take 1 tablet by mouth 2 times daily as needed for severe pain    Chronic midline low back pain without sciatica, Lumbar spondylosis, Physical deconditioning       levothyroxine 112 MCG tablet    SYNTHROID/LEVOTHROID    90 tablet    Take 1 tablet (112 mcg) by mouth daily    Acquired hypothyroidism       metFORMIN 500 MG 24 hr tablet    GLUCOPHAGE-XR    270 tablet    Take 3 tablets (1,500 mg) by mouth daily (with dinner)    Diabetes mellitus without complication (H)       omeprazole 20 MG  tablet      Take 20 mg by mouth 2 times daily        STATIN NOT PRESCRIBED (INTENTIONAL)     0 each    1 each 2 times daily Statin not prescribed intentionally due to Allergy to statin    Type 2 diabetes mellitus with diabetic chronic kidney disease (H)       triamcinolone 0.1 % ointment    KENALOG          VENTOLIN  (90 Base) MCG/ACT Inhaler   Generic drug:  albuterol     18 g    INHALE TWO PUFFS INTO LUNGS BY MOUTH EVERY 6 HOURS AS NEEDED    Moderate persistent asthma without complication

## 2018-08-02 LAB
CHOLEST SERPL-MCNC: 170 MG/DL (ref 100–199)
HBA1C MFR BLD: 5.9 % (ref 4.8–5.6)
HDLC SERPL-MCNC: 54 MG/DL
LDL/HDL RATIO: 1.8 RATIO (ref 0–3.2)
LDLC SERPL CALC-MCNC: 96 MG/DL (ref 0–99)
TRIGL SERPL-MCNC: 102 MG/DL (ref 0–149)
VLDLC SERPL CALC-MCNC: 20 MG/DL (ref 5–40)

## 2018-08-31 DIAGNOSIS — R35.0 URINARY FREQUENCY: Primary | ICD-10-CM

## 2018-08-31 RX ORDER — MIRABEGRON 50 MG/1
50 TABLET, EXTENDED RELEASE ORAL DAILY
Qty: 7 TABLET | Refills: 0
Start: 2018-08-31 | End: 2018-09-05

## 2018-09-05 DIAGNOSIS — R35.0 URINARY FREQUENCY: ICD-10-CM

## 2018-09-06 RX ORDER — MIRABEGRON 50 MG/1
50 TABLET, EXTENDED RELEASE ORAL DAILY
Qty: 90 TABLET | Refills: 0 | Status: SHIPPED | OUTPATIENT
Start: 2018-09-06 | End: 2019-02-05

## 2018-09-11 DIAGNOSIS — M54.50 CHRONIC MIDLINE LOW BACK PAIN WITHOUT SCIATICA: ICD-10-CM

## 2018-09-11 DIAGNOSIS — N32.81 OVERACTIVE BLADDER: Primary | ICD-10-CM

## 2018-09-11 DIAGNOSIS — G89.29 CHRONIC MIDLINE LOW BACK PAIN WITHOUT SCIATICA: ICD-10-CM

## 2018-09-11 DIAGNOSIS — M47.816 LUMBAR SPONDYLOSIS: ICD-10-CM

## 2018-09-11 DIAGNOSIS — R53.81 PHYSICAL DECONDITIONING: ICD-10-CM

## 2018-09-11 RX ORDER — HYDROCODONE BITARTRATE AND ACETAMINOPHEN 5; 325 MG/1; MG/1
1 TABLET ORAL 2 TIMES DAILY PRN
Qty: 60 TABLET | Refills: 0 | Status: SHIPPED | OUTPATIENT
Start: 2018-09-11 | End: 2018-10-26

## 2018-09-11 NOTE — TELEPHONE ENCOUNTER
Patient calls requesting refill on Bailey 5/325 for her chronic back pain and knee pain. Needs knee replacement but can't have this done until after  has his hip replacement surgery 10/11 and recovers from this. Is primary caregiver for .   Last visit regarding pain was 8/1/18 with Dr. Jinny Gao.   Per [place] PDMP last fills on Norco:   8/2/18 #60  6/21/18 #45  5/17/18 #45  5/1/18 tramadol #60  2/27/18 tramadol #60      Patient also requesting cheaper alternative to Myrbetriq that was ordered last week for urinary frequency. Reports cost of this for her is $105 per month - not affordable. Patient has tried several meds for overactive bladder over past several years.   Myrbetriq has worked well for her in past but cost was issue then and now.   Detrol=dry mouth  Sanctura (trospium) - insurance coverage/cost issues - 2013 and 2015.   vesicare - prescribed 4/2014-dc'd from med list 8/2014 during hospital stay-no reason noted. Assume patient had not been taking due to cost or ineffective.   Discussed with patient today that going back to less expensive alternatives may come with undesirable s/e. Patient verbalizes understanding and states will have to deal with it as can't afford the Myrbetriq.   Will forward to Dr. Jinny Gao for review of these requests.  Vickie Potts RN

## 2018-09-12 RX ORDER — OXYBUTYNIN CHLORIDE 10 MG/1
10 TABLET, EXTENDED RELEASE ORAL DAILY
Qty: 30 TABLET | Refills: 1 | Status: SHIPPED | OUTPATIENT
Start: 2018-09-12 | End: 2019-01-15

## 2018-09-12 NOTE — TELEPHONE ENCOUNTER
Dr. Jinny Gao authorized refill on [employer]. Rx is at  for patient to . Patient aware.     Regarding overactive bladder - Dr. Jinny Gao suggests try oxybutynin ER 10mg QD - may increase to 15mg QD wishes. Patient informed and rx sent to pharmacy.  Vickie Potts RN

## 2018-10-26 ENCOUNTER — OFFICE VISIT (OUTPATIENT)
Dept: FAMILY MEDICINE | Facility: CLINIC | Age: 78
End: 2018-10-26

## 2018-10-26 VITALS
OXYGEN SATURATION: 94 % | DIASTOLIC BLOOD PRESSURE: 78 MMHG | HEART RATE: 74 BPM | RESPIRATION RATE: 16 BRPM | SYSTOLIC BLOOD PRESSURE: 124 MMHG

## 2018-10-26 DIAGNOSIS — Z23 NEED FOR PROPHYLACTIC VACCINATION AND INOCULATION AGAINST INFLUENZA: ICD-10-CM

## 2018-10-26 DIAGNOSIS — M54.50 CHRONIC MIDLINE LOW BACK PAIN WITHOUT SCIATICA: Primary | ICD-10-CM

## 2018-10-26 DIAGNOSIS — G89.29 CHRONIC MIDLINE LOW BACK PAIN WITHOUT SCIATICA: Primary | ICD-10-CM

## 2018-10-26 DIAGNOSIS — R53.81 PHYSICAL DECONDITIONING: ICD-10-CM

## 2018-10-26 DIAGNOSIS — M47.816 LUMBAR SPONDYLOSIS: ICD-10-CM

## 2018-10-26 PROCEDURE — 99213 OFFICE O/P EST LOW 20 MIN: CPT | Mod: 25 | Performed by: FAMILY MEDICINE

## 2018-10-26 PROCEDURE — G0008 ADMIN INFLUENZA VIRUS VAC: HCPCS | Performed by: FAMILY MEDICINE

## 2018-10-26 PROCEDURE — 90662 IIV NO PRSV INCREASED AG IM: CPT | Performed by: FAMILY MEDICINE

## 2018-10-26 RX ORDER — HYDROCODONE BITARTRATE AND ACETAMINOPHEN 5; 325 MG/1; MG/1
TABLET ORAL
Qty: 120 TABLET | Refills: 0 | Status: SHIPPED | OUTPATIENT
Start: 2018-10-26 | End: 2019-02-05

## 2018-10-26 NOTE — PROGRESS NOTES
Injectable Influenza Immunization Documentation    1.  Is the person to be vaccinated sick today?   No    2. Does the person to be vaccinated have an allergy to a component   of the vaccine?   No  Egg Allergy Algorithm Link    3. Has the person to be vaccinated ever had a serious reaction   to influenza vaccine in the past?   No    4. Has the person to be vaccinated ever had Guillain-Barré syndrome?   No    Form completed by Fariha Aguirre [facility]

## 2018-10-26 NOTE — MR AVS SNAPSHOT
"              After Visit Summary   10/26/2018    Macie Jorge    MRN: [id number]           Patient Information     Date Of Birth          1940        Visit Information        Provider Department      10/26/2018 3:45 PM Samantha Gao MD [facility]        Today's Diagnoses     Chronic midline low back pain without sciatica    -  1    Lumbar spondylosis        Physical deconditioning        Need for prophylactic vaccination and inoculation against influenza           Follow-ups after your visit        Who to contact     If you have questions or need follow up information about today's clinic visit or your schedule please contact [facility] directly at [phone].  Normal or non-critical lab and imaging results will be communicated to you by Ruck.ushart, letter or phone within 4 business days after the clinic has received the results. If you do not hear from us within 7 days, please contact the clinic through Ruck.ushart or phone. If you have a critical or abnormal lab result, we will notify you by phone as soon as possible.  Submit refill requests through [employer] or call your pharmacy and they will forward the refill request to us. Please allow 3 business days for your refill to be completed.          Additional Information About Your Visit        MyChart Information     S[employer]Call lets you send messages to your doctor, view your test results, renew your prescriptions, schedule appointments and more. To sign up, go to [email or web address] . Click on \"Log in\" on the left side of the screen, which will take you to the Welcome page. Then click on \"Sign up Now\" on the right side of the page.     You will be asked to enter the access code listed below, as well as some personal information. Please follow the directions to create your username and password.     Your access code is: [email or web address]  Expires: 10/30/2018 11:10 AM     Your access code will  in 90 days. If you need " help or a new code, please call your [place] clinic or [phone].        Care EveryWhere ID     This is your Care EveryWhere ID. This could be used by other organizations to access your [place] medical records  [id number]        Your Vitals Were     Pulse Respirations Pulse Oximetry             74 16 94%          Blood Pressure from Last 3 Encounters:   10/26/18 124/78   08/01/18 126/76   05/17/18 130/72    Weight from Last 3 Encounters:   08/01/18 86.4 kg (190 lb 6.4 oz)   05/17/18 90 kg (198 lb 6.4 oz)   02/27/18 92.2 kg (203 lb 3.2 oz)              We Performed the Following     ADMIN INFLUENZA (For MEDICARE Patients ONLY) []     FLU VACCINE, INCREASED ANTIGEN, PRESV FREE, AGE 65+ [57948]          Today's Medication Changes          These changes are accurate as of 10/26/18  4:40 PM.  If you have any questions, ask your nurse or doctor.               These medicines have changed or have updated prescriptions.        Dose/Directions    HYDROcodone-acetaminophen 5-325 MG per tablet   Commonly known as:  NORCO   This may have changed:    - how much to take  - how to take this  - when to take this  - reasons to take this  - additional instructions   Used for:  Chronic midline low back pain without sciatica, Lumbar spondylosis, Physical deconditioning   Changed by:  Samantha Gao MD        Take 1 tablet up to 4 times a day for severe back pain. Use lowest effective dose.   Quantity:  120 tablet   Refills:  0            Where to get your medicines      Some of these will need a paper prescription and others can be bought over the counter.  Ask your nurse if you have questions.     Bring a paper prescription for each of these medications     HYDROcodone-acetaminophen 5-325 MG per tablet               Information about OPIOIDS     PRESCRIPTION OPIOIDS: WHAT YOU NEED TO KNOW   We gave you an opioid (narcotic) pain medicine. It is important to manage your pain, but opioids are not always the best  choice. You should first try all the other options your care team gave you. Take this medicine for as short a time (and as few doses) as possible.    Some activities can increase your pain, such as bandage changes or therapy sessions. It may help to take your pain medicine 30 to 60 minutes before these activities. Reduce your stress by getting enough sleep, working on hobbies you enjoy and practicing relaxation or meditation. Talk to your care team about ways to manage your pain beyond prescription opioids.    These medicines have risks:    DO NOT drive when on new or higher doses of pain medicine. These medicines can affect your alertness and reaction times, and you could be arrested for driving under the influence (DUI). If you need to use opioids long-term, talk to your care team about driving.    DO NOT operate heavy machinery    DO NOT do any other dangerous activities while taking these medicines.    DO NOT drink any alcohol while taking these medicines.     If the opioid prescribed includes acetaminophen, DO NOT take with any other medicines that contain acetaminophen. Read all labels carefully. Look for the word  acetaminophen  or  Tylenol.  Ask your pharmacist if you have questions or are unsure.    You can get addicted to pain medicines, especially if you have a history of addiction (chemical, alcohol or substance dependence). Talk to your care team about ways to reduce this risk.    All opioids tend to cause constipation. Drink plenty of water and eat foods that have a lot of fiber, such as fruits, vegetables, prune juice, apple juice and high-fiber cereal. Take a laxative (Miralax, milk of magnesia, Colace, Senna) if you don t move your bowels at least every other day. Other side effects include upset stomach, sleepiness, dizziness, throwing up, tolerance (needing more of the medicine to have the same effect), physical dependence and slowed breathing.    Store your pills in a secure place, locked if  possible. We will not replace any lost or stolen medicine. If you don t finish your medicine, please throw away (dispose) as directed by your pharmacist. The [place] Pollution Control Agency has more information about safe disposal: https://www.pca.[facility].[place]/living-green/managing-unwanted-medications         Primary Care Provider Office Phone # Fax #    Samantha Gao [phone] [address] [facility] [address]        Equal Access to Services     MITCHELL CHOWDHURY : Hadii aad ku hadasho Soomaali, waaxda luqadaha, qaybta kaalmada adeegyada, waxay idiin hayaan adeeg kharash laDallinaageoffrey . So [facility] [phone].    ATENCIÓN: Si habla español, tiene a hanna disposición servicios gratuitos de asistencia lingüística. Luis E al [phone].    We comply with applicable federal civil rights laws and [place] laws. We do not discriminate on the basis of race, color, national origin, age, disability, sex, sexual orientation, or gender identity.            Thank you!     Thank you for choosing [facility]  for your care. Our goal is always to provide you with excellent care. Hearing back from our patients is one way we can continue to improve our services. Please take a few minutes to complete the written survey that you may receive in the mail after your visit with us. Thank you!             Your Updated Medication List - Protect others around you: Learn how to safely use, store and throw away your medicines at www.disposemymeds.org.          This list is accurate as of 10/26/18  4:40 PM.  Always use your most recent med list.                   Brand Name Dispense Instructions for use Diagnosis    ACE/ARB/ARNI NOT PRESCRIBED (INTENTIONAL)      Please choose reason not prescribed, below    Chronic renal insufficiency, stage 3 (moderate) (H)       blood glucose lancing device     1 each    Use to test blood sugars one time daily or as directed.    Type 2 diabetes mellitus without  complication (H)       blood glucose monitoring lancets     1 Box    Use to test blood sugars 1 times daily or as directed.    Type 2 diabetes mellitus without complication (H)       blood glucose monitoring test strip    ONETOUCH ULTRA    100 each    Use to test blood sugars one time daily or as directed.    Type 2 diabetes mellitus without complication (H)       fluticasone-salmeterol 250-50 MCG/DOSE diskus inhaler    ADVAIR DISKUS    3 Inhaler    INHALE ONE DOSE INTO THE LUNGS 2 TIMES DAILY    COPD with asthma (H)       HYDROcodone-acetaminophen 5-325 MG per tablet    NORCO    120 tablet    Take 1 tablet up to 4 times a day for severe back pain. Use lowest effective dose.    Chronic midline low back pain without sciatica, Lumbar spondylosis, Physical deconditioning       levothyroxine 112 MCG tablet    SYNTHROID/LEVOTHROID    90 tablet    Take 1 tablet (112 mcg) by mouth daily    Acquired hypothyroidism       metFORMIN 500 MG 24 hr tablet    GLUCOPHAGE-XR    270 tablet    Take 3 tablets (1,500 mg) by mouth daily (with dinner)    Diabetes mellitus without complication (H)       mirabegron 50 MG 24 hr tablet    MYRBETRIQ    90 tablet    Take 1 tablet (50 mg) by mouth daily    Urinary frequency       omeprazole 20 MG tablet      Take 20 mg by mouth 2 times daily        oxybutynin 10 MG 24 hr tablet    DITROPAN XL    30 tablet    Take 1 tablet (10 mg) by mouth daily    Overactive bladder       STATIN NOT PRESCRIBED (INTENTIONAL)     0 each    1 each 2 times daily Statin not prescribed intentionally due to Allergy to statin    Type 2 diabetes mellitus with diabetic chronic kidney disease (H)       triamcinolone 0.1 % ointment    KENALOG          VENTOLIN  (90 Base) MCG/ACT inhaler   Generic drug:  albuterol     18 g    INHALE TWO PUFFS INTO LUNGS BY MOUTH EVERY 6 HOURS AS NEEDED    Moderate persistent asthma without complication

## 2018-10-26 NOTE — PROGRESS NOTES
Problem(s) Oriented visit      SUBJECTIVE:                                                    Macie Jorge is a 78 year old female who presents to clinic today for:  Patient presents with:  Recheck Medication  Flu Shot    Pt is here to request narcotic refill. Takes regularly due to chronic back pain. Pain has been present for years. Getting worse. Limits her ADLs. Present all of the time. Last lumbar MRI 2014 w/ disc disease at all levels, evidence of prior surgery and possible worsening scoliosis. She reports she has seen 3 orthos over time, had PT, injections and nothing more can be done for her back.      Gary recently had MI then hip replaced. She is doing everything around the house now. He is not able live or move anything, and also requires a lot of care. Her back is flaring with with all of the extra activity. No falls. Has tried OTCs in the past and not as effective. Tries to minimize narcotic use.     Cholesterol was checked 8/1/18 and under good control. A1c was 5.9% 8/2018.   TSH was elevated to 6.270 when last checked 2/27/2018. Her med was refilled at a stable dose of 112 mcg the following day.   Cr was mildly elevated to 1.19 and hgb was slightly low at 11.7 on 2/27/2018.     Problem list, Medication list, Allergies, and Medical/Social/Surgical histories reviewed in [facility] and updated as appropriate.     ROS:  5 point ROS completed and negative except noted above, including Gen, CV, Resp, GI, MS    Histories:   Patient Active Problem List   Diagnosis     Panic attacks     Advanced directives, counseling/discussion     Hyperlipidemia with target LDL less than 100     History of hip replacement, total     Health Care Home     Obesity     Urgency incontinence     CKD (chronic kidney disease) stage 3, GFR 30-59 ml/min (H)     Nonintractable migraine, unspecified migraine type     Type 2 diabetes mellitus without complication (H)     COPD with asthma (H)     Acquired hypothyroidism      Altered mental status     History of colonic polyps     Past Medical History:   Diagnosis Date     Calculus of left ureter 2014    dr Arriaga - ureteroscopy and lithitripsy     Cataract      Chronic low back pain     Dr Villagran at [facility] in 2015- not surgical      COPD with asthma (H)      followed with pulmonary DR LENNOX 60-pack-year history of smoking     Esophagitis, reflux 9/2012    SOME EOSINOPHILIA NO BARRETS     Gastro-oesophageal reflux disease      has seen GI     Hydronephrosis, left     chronic , in 2017 Dr Arriaga plan was observaton     Hyperlipidaemia LDL goal < 100      Hypothyroid      Migraine headaches 9/1/2011     Moderate persistent asthma      lifelong, saw Pulm 2013     Obesity (BMI 30-39.9)      Panic attacks      Renal stones 2016    kidney stones, multiple small stones high risk recurrent ureteral stones. , Dr Arriaga in 2016 rec 24 hour urine.     Type 2 diabetes mellitus without complication (H)      Past Surgical History:   Procedure Laterality Date     ARTHROPLASTY HIP  3/13/2013    Procedure: ARTHROPLASTY HIP;  LEFT TOTAL HIP ARTHROPLASTY (BIOMET)^ ;  Surgeon: Anand Main MD;  Location:  OR     ARTHROPLASTY PATELLO-FEMORAL (KNEE)  2005ish    Left     CHOLECYSTECTOMY, OPEN  25 yo     COMBINED CYSTOSCOPY, INSERT STENT URETER(S)  8/5/2014    Procedure: COMBINED CYSTOSCOPY, INSERT STENT URETER(S);  Surgeon: Sam Arriaga MD;  Location:  OR     COMBINED CYSTOSCOPY, RETROGRADES, URETEROSCOPY, LASER HOLMIUM LITHOTRIPSY URETER(S), INSERT STENT Left 6/14/2016    Procedure: COMBINED CYSTOSCOPY, RETROGRADES, URETEROSCOPY, LASER HOLMIUM LITHOTRIPSY URETER(S), INSERT STENT;  Surgeon: Thomas Edmondson MD;  Location:  OR     GENITOURINARY SURGERY       LAMINECT/DISCECTOMY, LUMBAR      Laminectomy/Discectomy Lumbar     LASER HOLMIUM LITHOTRIPSY URETER(S), INSERT STENT, COMBINED Left 8/20/2014    Procedure: COMBINED CYSTOSCOPY, URETEROSCOPY, LASER HOLMIUM LITHOTRIPSY URETER(S), INSERT STENT;   "Surgeon: Sam Arriaga MD;  Location:  OR     ORTHOPEDIC SURGERY      left ankle     RECESSION EYELID UPPER       Social History   Substance Use Topics     Smoking status: Former Smoker     Quit date: 1/1/1995     Smokeless tobacco: Never Used     Alcohol use Yes      Comment: occ.     No family history on file.    OBJECTIVE:                                                    /78  Pulse 74  Resp 16  SpO2 94%  There is no height or weight on file to calculate BMI.  Pt declines wt check today. Last ht 5' 5.75\". Last BMI was 33 in May 2018. BMI appears stable to me today.   Gen: Cooperative. Appears uncomfortable sitting in exam room chair.    Eyes: PERRL, sclera white. Pupils are 3 mm.   Neck: Supple, without masses, lymphadenopathy or tenderness.   Heart: Regular rate and rhythm.  Lungs: Normal respiratory effort. Lungs a little wheezy today. Moving air OK.   Musculoskeletal: No lower extremity edema. Limited ROM w/ low back discomfort. Flex/ex and rotation at hips is limited. No point tenderness.   Skin: Warm and well perfused.   Neurologic: Alert, oriented x3, nonfocal.    Psych:  Mood and affect are appropriate.     ASSESSMENT/PLAN:                                                      Macie was seen today for recheck medication and flu shot.    Diagnoses and all orders for this visit:    Chronic midline low back pain without sciatica  -     HYDROcodone-acetaminophen (NORCO) 5-325 MG per tablet; Take 1 tablet up to 4 times a day for severe back pain. Use lowest effective dose.   Refilled rx. She is aware not to mix w/ etoh and all narcotics are to come from this office. This has not been a problem in the past.     Lumbar spondylosis  -     HYDROcodone-acetaminophen (NORCO) 5-325 MG per tablet; Take 1 tablet up to 4 times a day for severe back pain. Use lowest effective dose.    Physical deconditioning  -     HYDROcodone-acetaminophen (NORCO) 5-325 MG per tablet; Take 1 tablet up to 4 times a day for " severe back pain. Use lowest effective dose.    Need for prophylactic vaccination and inoculation against influenza  -     FLU VACCINE, INCREASED ANTIGEN, PRESV FREE, AGE 65+ [68441]  -     ADMIN INFLUENZA (For MEDICARE Patients ONLY) []   Counseled pt on vaccination. No known complications w/ administration.      Other orders  -     Cancel: C FOOT EXAM  NO CHARGE  -     Cancel: Microalbumin ([facility])   Due for DM check, but states needs to get home to her  to tend to his care.    Last A1c was 5.9%.    She will RTC for DM med check. Will need TSH checked - it was a little high at 6.27 when checked by her then PCP in Feb 2018.     RTC prn.     The following health maintenance items are reviewed in Epic and correct as of today:  Health Maintenance   Topic Date Due     COPD ACTION PLAN Q1 YR  1940     MIGRAINE ACTION PLAN  10/14/1958     ADVANCE DIRECTIVE PLANNING Q5 YRS  09/01/2016     PHQ-2 Q1 YR  01/20/2017     MICROALBUMIN Q1 YEAR  08/11/2017     FOOT EXAM Q1 YEAR  09/21/2017     EYE EXAM Q1 YEAR  06/16/2018     ASTHMA CONTROL TEST Q6 MOS  11/17/2018     A1C Q6 MO  02/01/2019     TSH Q1 YEAR  02/27/2019     CREATININE Q1 YEAR  02/27/2019     ASTHMA ACTION PLAN Q1 YR  05/17/2019     FALL RISK ASSESSMENT  05/17/2019     LIPID MONITORING Q1 YEAR  08/01/2019     TSH W/ FREE T4 REFLEX Q2 YEAR  02/27/2020     COLONOSCOPY Q3 YR  04/05/2021     TETANUS IMMUNIZATION (SYSTEM ASSIGNED)  11/15/2022     SPIROMETRY ONETIME  Completed     DEXA SCAN SCREENING (SYSTEM ASSIGNED)  Completed     PNEUMOCOCCAL  Completed     INFLUENZA VACCINE  Completed       Samantha Gao MD  Family Medicine    For any issues, please call my office  [facility] at [phone].

## 2018-12-13 ENCOUNTER — TRANSFERRED RECORDS (OUTPATIENT)
Dept: FAMILY MEDICINE | Facility: CLINIC | Age: 78
End: 2018-12-13

## 2019-01-15 DIAGNOSIS — N32.81 OVERACTIVE BLADDER: ICD-10-CM

## 2019-01-15 RX ORDER — OXYBUTYNIN CHLORIDE 10 MG/1
10 TABLET, EXTENDED RELEASE ORAL DAILY
Qty: 90 TABLET | Refills: 2 | Status: SHIPPED | OUTPATIENT
Start: 2019-01-15 | End: 2019-11-12

## 2019-02-05 ENCOUNTER — OFFICE VISIT (OUTPATIENT)
Dept: FAMILY MEDICINE | Facility: CLINIC | Age: 79
End: 2019-02-05

## 2019-02-05 VITALS
BODY MASS INDEX: 32.45 KG/M2 | RESPIRATION RATE: 16 BRPM | SYSTOLIC BLOOD PRESSURE: 118 MMHG | DIASTOLIC BLOOD PRESSURE: 78 MMHG | WEIGHT: 195 LBS | HEART RATE: 82 BPM

## 2019-02-05 DIAGNOSIS — R53.81 PHYSICAL DECONDITIONING: ICD-10-CM

## 2019-02-05 DIAGNOSIS — E03.4 HYPOTHYROIDISM DUE TO ACQUIRED ATROPHY OF THYROID: ICD-10-CM

## 2019-02-05 DIAGNOSIS — M48.062 SPINAL STENOSIS OF LUMBAR REGION WITH NEUROGENIC CLAUDICATION: ICD-10-CM

## 2019-02-05 DIAGNOSIS — Z87.891 HISTORY OF SMOKING GREATER THAN 50 PACK YEARS: Primary | ICD-10-CM

## 2019-02-05 DIAGNOSIS — N18.30 TYPE 2 DIABETES MELLITUS WITH STAGE 3 CHRONIC KIDNEY DISEASE, WITHOUT LONG-TERM CURRENT USE OF INSULIN (H): ICD-10-CM

## 2019-02-05 DIAGNOSIS — G89.29 CHRONIC MIDLINE LOW BACK PAIN WITHOUT SCIATICA: ICD-10-CM

## 2019-02-05 DIAGNOSIS — J44.89 COPD WITH ASTHMA (H): ICD-10-CM

## 2019-02-05 DIAGNOSIS — M54.50 CHRONIC MIDLINE LOW BACK PAIN WITHOUT SCIATICA: ICD-10-CM

## 2019-02-05 DIAGNOSIS — E11.22 TYPE 2 DIABETES MELLITUS WITH STAGE 3 CHRONIC KIDNEY DISEASE, WITHOUT LONG-TERM CURRENT USE OF INSULIN (H): ICD-10-CM

## 2019-02-05 PROCEDURE — 80053 COMPREHEN METABOLIC PANEL: CPT | Mod: 90 | Performed by: FAMILY MEDICINE

## 2019-02-05 PROCEDURE — 83036 HEMOGLOBIN GLYCOSYLATED A1C: CPT | Mod: 90 | Performed by: FAMILY MEDICINE

## 2019-02-05 PROCEDURE — 82570 ASSAY OF URINE CREATININE: CPT | Mod: 90 | Performed by: FAMILY MEDICINE

## 2019-02-05 PROCEDURE — 82044 UR ALBUMIN SEMIQUANTITATIVE: CPT | Performed by: FAMILY MEDICINE

## 2019-02-05 PROCEDURE — 36415 COLL VENOUS BLD VENIPUNCTURE: CPT | Performed by: FAMILY MEDICINE

## 2019-02-05 PROCEDURE — 84443 ASSAY THYROID STIM HORMONE: CPT | Mod: 90 | Performed by: FAMILY MEDICINE

## 2019-02-05 PROCEDURE — 99207 C FOOT EXAM  NO CHARGE: CPT | Performed by: FAMILY MEDICINE

## 2019-02-05 PROCEDURE — 99214 OFFICE O/P EST MOD 30 MIN: CPT | Performed by: FAMILY MEDICINE

## 2019-02-05 RX ORDER — METFORMIN HCL 500 MG
1500 TABLET, EXTENDED RELEASE 24 HR ORAL
Qty: 270 TABLET | Refills: 3 | Status: SHIPPED | OUTPATIENT
Start: 2019-02-05 | End: 2020-01-24

## 2019-02-05 RX ORDER — HYDROCODONE BITARTRATE AND ACETAMINOPHEN 5; 325 MG/1; MG/1
TABLET ORAL
Qty: 120 TABLET | Refills: 0 | Status: SHIPPED | OUTPATIENT
Start: 2019-02-05 | End: 2019-04-23

## 2019-02-05 NOTE — PROGRESS NOTES
Problem(s) Oriented visit        SUBJECTIVE:                                                    Macie Jorge is a 78 year old female who presents to clinic today for narcotic refill and medication check.   1. Back pain: due to spinal stenosis, disc disease, and scoliosis. She has been told she is not a surgical candidate. She uses Norco as needed. She estimates taking 0-2 daily with her last Rx for #120 on 10/27/18.   2. Right arm pain/numbness/tingling: seems to be made better by moving the arm.   3. DM: taking metformin, tolerating this well. She does not follow a diabetic diet. She checks finger stick very infrequently but gets numbers of about 110-130. No neuropathy symptoms in the feet. She has an annual eye exam.   4. Hypothyroidism: Her weight has been stable, she denies any heat or cold intolerance, no hair or skin changes.  5. COPD: Using Advair BID with prn albuterol, not needing commonly. No smoking, feels stable. No current cough.    Problem list, Medication list, Allergies, and Medical/Social/Surgical histories reviewed in [facility] and updated as appropriate.   Additional history: as documented    ROS:  5 point ROS completed and negative except noted above, including Gen, CV, Resp, GI, MS      Histories:   Patient Active Problem List   Diagnosis     Panic attacks     Advanced directives, counseling/discussion     Hyperlipidemia with target LDL less than 100     History of hip replacement, total     Health Care Home     Obesity     Urgency incontinence     CKD (chronic kidney disease) stage 3, GFR 30-59 ml/min (H)     Nonintractable migraine, unspecified migraine type     Type 2 diabetes mellitus with stage 3 chronic kidney disease, without long-term current use of insulin (H)     COPD with asthma (H)     Acquired hypothyroidism     Altered mental status     History of colonic polyps     Chronic midline low back pain without sciatica     Physical deconditioning     Spinal stenosis of lumbar region  with neurogenic claudication     Hypothyroidism due to acquired atrophy of thyroid     Past Surgical History:   Procedure Laterality Date     ARTHROPLASTY HIP  3/13/2013    Procedure: ARTHROPLASTY HIP;  LEFT TOTAL HIP ARTHROPLASTY (BIOMET)^ ;  Surgeon: Anand Main MD;  Location:  OR     ARTHROPLASTY PATELLO-FEMORAL (KNEE)  2005ish    Left     CHOLECYSTECTOMY, OPEN  25 yo     COMBINED CYSTOSCOPY, INSERT STENT URETER(S)  2014    Procedure: COMBINED CYSTOSCOPY, INSERT STENT URETER(S);  Surgeon: Sam Arriaga MD;  Location:  OR     COMBINED CYSTOSCOPY, RETROGRADES, URETEROSCOPY, LASER HOLMIUM LITHOTRIPSY URETER(S), INSERT STENT Left 2016    Procedure: COMBINED CYSTOSCOPY, RETROGRADES, URETEROSCOPY, LASER HOLMIUM LITHOTRIPSY URETER(S), INSERT STENT;  Surgeon: Thomas Edmondson MD;  Location:  OR     GENITOURINARY SURGERY       LAMINECT/DISCECTOMY, LUMBAR      Laminectomy/Discectomy Lumbar     LASER HOLMIUM LITHOTRIPSY URETER(S), INSERT STENT, COMBINED Left 2014    Procedure: COMBINED CYSTOSCOPY, URETEROSCOPY, LASER HOLMIUM LITHOTRIPSY URETER(S), INSERT STENT;  Surgeon: Sam Arriaga MD;  Location:  OR     ORTHOPEDIC SURGERY      left ankle     RECESSION EYELID UPPER         Social History     Tobacco Use     Smoking status: Former Smoker     Packs/day: 1.50     Years: 40.00     Pack years: 60.00     Last attempt to quit: 1995     Years since quittin.1     Smokeless tobacco: Never Used   Substance Use Topics     Alcohol use: Yes     Comment: occ.     No family history on file.        OBJECTIVE:                                                    /78   Pulse 82   Resp 16   Wt 88.5 kg (195 lb)   BMI 32.45 kg/m    Body mass index is 32.45 kg/m .   GENERAL APPEARANCE: Alert, no acute distress  NECK: No adenopathy,masses or thyromegaly  RESP: lungs clear to auscultation   CV: normal rate, regular rhythm, no murmur or gallop  MS: foot exam normal DP and PT pulses, no  trophic changes or ulcerative lesions and normal monofilament exam  NEURO: Alert, oriented, speech and mentation normal  PSYCH: mentation appears normal, affect and mood normal   Labs Resulted Today:   Results for orders placed or performed in visit on 08/01/18   Hemoglobin A1C ([facility])   Result Value Ref Range    Hemoglobin A1C 5.9 (H) 4.8 - 5.6 %    Narrative    Performed at:  01 - [facility]  [facility] [address] [place] [address]  [id number]  : Riley Rachel MD, Phone:  [phone]   Lipid Panel ([facility])   Result Value Ref Range    Cholesterol 170 100 - 199 mg/dL    Triglycerides 102 0 - 149 mg/dL    HDL Cholesterol 54 >39 mg/dL    VLDL Cholesterol Wilber 20 5 - 40 mg/dL    LDL Cholesterol Calculated 96 0 - 99 mg/dL    LDL/HDL Ratio 1.8 0.0 - 3.2 ratio    Narrative    Performed at:  01 - [facility]  [facility] [address] [place] [address]  [id number]  : Rliey Rachel MD, Phone:  [phone]     ASSESSMENT/PLAN:                                                        Macie was seen today for recheck medication and refill request.    Diagnoses and all orders for this visit:    History of smoking greater than 50 pack years  -     Referral to [facility] for CT chest for lung cancer screening.    COPD with asthma (H)  -     fluticasone-salmeterol (ADVAIR DISKUS) 250-50 MCG/DOSE inhaler; INHALE ONE DOSE INTO THE LUNGS 2 TIMES DAILY  Continue Advair.    Chronic midline low back pain without sciatica    Physical deconditioning    Spinal stenosis of lumbar region with neurogenic claudication  -     HYDROcodone-acetaminophen (NORCO) 5-325 MG tablet; Take 1 tablet up to 4 times a day for severe back pain. Use lowest effective dose.    Hypothyroidism due to acquired atrophy of thyroid  -     TSH ([facility])  -     VENOUS COLLECTION  Need to verify proper dosing.    Type 2 diabetes mellitus with stage 3 chronic kidney disease, without long-term current use of insulin (H)  -     C FOOT EXAM  NO CHARGE  -      Hemoglobin A1C ([facility])  -     Comp. Metabolic Panel (14) ([facility])  -     Microalbumin ([facility]); Future  -     VENOUS COLLECTION  -     metFORMIN (GLUCOPHAGE-XR) 500 MG 24 hr tablet; Take 3 tablets (1,500 mg) by mouth daily (with dinner)  She prefers to just eat what she wants as long as her blood sugar is controlled with metformin. She is encouraged to just adopt the very basics of diabetic eating concepts. Follow up pending A1c,       Patient Instructions   Refer to [facility] for CT chest to screen for lung cancer.  Diagnosis: 60 pack year smoking history      The following health maintenance items are reviewed in Epic and correct as of today:  Health Maintenance   Topic Date Due     COPD ACTION PLAN Q1 YR  1940     MIGRAINE ACTION PLAN  10/14/1958     MARLYS QUESTIONNAIRE 1 YEAR  10/14/1958     ZOSTER IMMUNIZATION (1 of 2) 10/14/1990     DTAP/TDAP/TD IMMUNIZATION (1 - Tdap) 11/16/2012     PHQ-9 Q1YR  02/13/2015     ADVANCE DIRECTIVE PLANNING Q5 YRS  09/01/2016     MICROALBUMIN Q1 YEAR  08/11/2017     EYE EXAM Q1 YEAR  06/16/2018     ASTHMA CONTROL TEST Q6 MOS  11/17/2018     A1C Q6 MO  02/01/2019     TSH Q1 YEAR  02/27/2019     CREATININE Q1 YEAR  02/27/2019     ASTHMA ACTION PLAN Q1 YR  05/17/2019     FALL RISK ASSESSMENT  05/17/2019     LIPID MONITORING Q1 YEAR  08/01/2019     FOOT EXAM Q1 YEAR  02/05/2020     TSH W/ FREE T4 REFLEX Q2 YEAR  02/27/2020     COLONOSCOPY Q3 YR  04/05/2021     SPIROMETRY ONETIME  Completed     DEXA SCAN SCREENING (SYSTEM ASSIGNED)  Completed     INFLUENZA VACCINE  Completed     PNEUMOCOCCAL IMMUNIZATION 65+ LOW/MEDIUM RISK  Completed     IPV IMMUNIZATION  Aged Out     MENINGITIS IMMUNIZATION  Aged Out       Marjan Pa MD  [facility]  Family Practice  [facility]  [phone]    For any issues my office # is [phone]

## 2019-02-05 NOTE — PATIENT INSTRUCTIONS
Refer to [facility] for CT chest to screen for lung cancer.  Diagnosis: 60 pack year smoking history

## 2019-02-06 LAB
A/C RATIO MG/G: ABNORMAL MG/G
ALBUMIN (URINE) MG/L: 80 MG/L
ALBUMIN SERPL-MCNC: 4.3 G/DL (ref 3.5–4.8)
ALBUMIN/GLOB SERPL: 1.1 {RATIO} (ref 1.2–2.2)
ALP SERPL-CCNC: 52 IU/L (ref 39–117)
ALT SERPL-CCNC: 10 IU/L (ref 0–32)
AST SERPL-CCNC: 15 IU/L (ref 0–40)
BILIRUB SERPL-MCNC: 0.4 MG/DL (ref 0–1.2)
BUN SERPL-MCNC: 24 MG/DL (ref 8–27)
BUN/CREATININE RATIO: 22 (ref 12–28)
CALCIUM SERPL-MCNC: 9.6 MG/DL (ref 8.7–10.3)
CHLORIDE SERPLBLD-SCNC: 103 MMOL/L (ref 96–106)
CREAT SERPL-MCNC: 1.1 MG/DL (ref 0.57–1)
EGFR IF AFRICN AM: 56 ML/MIN/1.73
EGFR IF NONAFRICN AM: 48 ML/MIN/1.73
GLOBULIN, TOTAL: 3.8 G/DL (ref 1.5–4.5)
GLUCOSE SERPL-MCNC: 102 MG/DL (ref 65–99)
HBA1C MFR BLD: 5.9 % (ref 4.8–5.6)
INTERPRETATION: ABNORMAL
POTASSIUM SERPL-SCNC: 4.4 MMOL/L (ref 3.5–5.2)
PROT SERPL-MCNC: 8.1 G/DL (ref 6–8.5)
SODIUM SERPL-SCNC: 141 MMOL/L (ref 134–144)
TOTAL CO2: 26 MMOL/L (ref 20–29)
TSH BLD-ACNC: 5.12 UIU/ML (ref 0.45–4.5)
URINE CREATININE MG/DL - QUEST: 200 MG/DL

## 2019-02-07 DIAGNOSIS — E03.9 ACQUIRED HYPOTHYROIDISM: ICD-10-CM

## 2019-02-07 RX ORDER — LEVOTHYROXINE SODIUM 112 UG/1
112 TABLET ORAL DAILY
Qty: 90 TABLET | Refills: 3 | Status: SHIPPED | OUTPATIENT
Start: 2019-02-07 | End: 2020-01-24

## 2019-02-11 ENCOUNTER — TELEPHONE (OUTPATIENT)
Dept: FAMILY MEDICINE | Facility: CLINIC | Age: 79
End: 2019-02-11

## 2019-02-11 DIAGNOSIS — N18.30 TYPE 2 DIABETES MELLITUS WITH STAGE 3 CHRONIC KIDNEY DISEASE, WITHOUT LONG-TERM CURRENT USE OF INSULIN (H): Primary | ICD-10-CM

## 2019-02-11 DIAGNOSIS — R80.9 MICROALBUMINURIA: ICD-10-CM

## 2019-02-11 DIAGNOSIS — E11.22 TYPE 2 DIABETES MELLITUS WITH STAGE 3 CHRONIC KIDNEY DISEASE, WITHOUT LONG-TERM CURRENT USE OF INSULIN (H): Primary | ICD-10-CM

## 2019-02-11 RX ORDER — LISINOPRIL 10 MG/1
10 TABLET ORAL DAILY
Qty: 90 TABLET | Refills: 3 | Status: SHIPPED | OUTPATIENT
Start: 2019-02-11 | End: 2019-03-04

## 2019-02-11 NOTE — LETTER
[facility]  [address] [place]  [address]  Phone: [phone]    February 11, 2019      Macie Jorge  [address] [facility] [id number]    Mrs. Jorge -- here is Dr. Pa's note to you that we discussed this evening:   Your A1c is excellent at 5.9. Continue metformin 1500 mg daily. If you are having any low blood sugars we should lower metformin to 1000 mg daily.  Thyroid is stable. Continue same dose of levothyroxine.  Normal electrolytes and liver function. Kidney function is mildly diminished, but stable.  Urine has more protein. It would be helpful to add a medication to keep your kidney function good. I would recommend starting lisinopril 10 mg daily.    We did sent the prescription to [facility] as you requested.   Please call us if you have any questions.  Thanks  Vickie JONES       Results for orders placed or performed in visit on 02/05/19   Hemoglobin A1C ([facility])   Result Value Ref Range    Hemoglobin A1C 5.9 (H) 4.8 - 5.6 %    Narrative    Performed at:  01  [facility] [address], [place], [place]  [id number]  : Riley Rachel MD, Phone:  [phone]   TSH ([facility])   Result Value Ref Range    TSH 5.120 (H) 0.450 - 4.500 uIU/mL    Narrative    Performed at:  01 - [facility] [address], [place], [place]  [id number]  : Riley Rachel MD, Phone:  [phone]   Comp. Metabolic Panel (14) ([facility])   Result Value Ref Range    Glucose 102 (H) 65 - 99 mg/dL    Urea Nitrogen 24 8 - 27 mg/dL    Creatinine 1.10 (H) 0.57 - 1.00 mg/dL    eGFR If NonAfricn Am 48 (L) >59 mL/min/1.73    eGFR If Africn Am 56 (L) >59 mL/min/1.73    BUN/Creatinine Ratio 22 12 - 28    Sodium 141 134 - 144 mmol/L    Potassium 4.4 3.5 - 5.2 mmol/L    Chloride 103 96 - 106 mmol/L    Total CO2 26 20 - 29 mmol/L    Calcium 9.6 8.7 - 10.3 mg/dL    Protein Total 8.1 6.0 - 8.5 g/dL    Albumin 4.3 3.5 - 4.8 g/dL    Globulin, Total 3.8 1.5 - 4.5 g/dL    A/G Ratio  1.1 (L) 1.2 - 2.2    Bilirubin Total 0.4 0.0 - 1.2 mg/dL    Alkaline Phosphatase 52 39 - 117 IU/L    AST 15 0 - 40 IU/L    ALT 10 0 - 32 IU/L    Narrative    Performed at:  01 - [facility] [address], [place], [place]  [id number]  : Riley Rachel MD, Phone:  [phone]   Microalbumin ([facility])   Result Value Ref Range    Albumin mg/L 80 (A) mg/L    Urine Creatinine Mg/Dl 200 mg/dL    A/C Ratio mg/g  (A) mg/g    Interpretation Abnormal

## 2019-02-12 NOTE — TELEPHONE ENCOUNTER
Called patient and reviewed results and Dr. Pa's recommendations.   Patient agreeable to starting lisinopril 10mg QD. Rx sent to pharmacy.   Mailed result letter with plan.   Vickie Potts RN

## 2019-02-12 NOTE — TELEPHONE ENCOUNTER
----- Message from Marjan Pa MD sent at 2/7/2019  6:48 PM CST -----  A1c is excellent at 5.9. Continue metformin 1500 mg daily. If you are having any low blood sugars we should lower metformin to 1000 mg daily.  Thyroid is stable. Continue same dose of levothyroxine.  Normal electrolytes and liver function. Kidney function is mildly diminished, but stable.  Urine has more protein. It would be helpful to add and ACE or ARB to keep your kidney function good. I would recommend starting lisinopril 10 mg daily. If you had intolerance of lisinopril in the past I would recommend starting losartan 25 mg daily.

## 2019-02-14 ENCOUNTER — TRANSFERRED RECORDS (OUTPATIENT)
Dept: FAMILY MEDICINE | Facility: CLINIC | Age: 79
End: 2019-02-14

## 2019-02-21 ENCOUNTER — TRANSFERRED RECORDS (OUTPATIENT)
Dept: FAMILY MEDICINE | Facility: CLINIC | Age: 79
End: 2019-02-21

## 2019-02-22 NOTE — PROGRESS NOTES
[facility]  [address] [place] [id number] [phone]  Dept: [phone]    PRE-OP EVALUATION:  Today's date: 2019    Macie Jorge (: 1940) presents for pre-operative evaluation assessment as requested by  ***.  She requires evaluation and anesthesia risk assessment prior to undergoing surgery/procedure for treatment of *** .    {PREOP QUESTIONNAIRE OPTIONS (by MA):494032}    HPI:     HPI related to upcoming procedure: ***      {Ch. Problems:022287}    MEDICAL HISTORY:     Patient Active Problem List    Diagnosis Date Noted     Chronic midline low back pain without sciatica 2019     Priority: Medium     Physical deconditioning 2019     Priority: Medium     Spinal stenosis of lumbar region with neurogenic claudication 2019     Priority: Medium     Hypothyroidism due to acquired atrophy of thyroid 2019     Priority: Medium     History of colonic polyps 2018     Priority: Medium     Altered mental status 2017     Priority: Medium     Acquired hypothyroidism 2017     Priority: Medium     COPD with asthma (H)      Priority: Medium      followed with pulmonary DR LENNOX 60-pack-year history of smoking       Type 2 diabetes mellitus with stage 3 chronic kidney disease, without long-term current use of insulin (H)      Priority: Medium     Nonintractable migraine, unspecified migraine type 2016     Priority: Medium     Urgency incontinence 2014     Priority: Medium     CKD (chronic kidney disease) stage 3, GFR 30-59 ml/min (H) 2014     Priority: Medium     Obesity 2014     Priority: Medium     Health Care Home 2014     Priority: Medium     State Tier Level:  Tier 2         History of hip replacement, total 2013     Priority: Medium     Hyperlipidemia with target LDL less than 100      Priority: Medium     Diagnosis updated by automated process. Provider to review and confirm.        Advanced directives, counseling/discussion 09/01/2011     Priority: Medium     As per reasonable care for Seniors, wants in the Short term aggressive care.   No desire for long term prolongation of life through artificial means if no hope to bring back to a reasonable status.        Panic attacks      Priority: Medium      Past Medical History:   Diagnosis Date     Calculus of left ureter 2014    dr Arriaga - ureteroscopy and lithitripsy     Cataract      Chronic low back pain     Dr Villagran at [facility] in 2015- not surgical      COPD with asthma (H)      followed with pulmonary DR LENNOX 60-pack-year history of smoking     Esophagitis, reflux 9/2012    SOME EOSINOPHILIA NO BARRETS     Gastro-oesophageal reflux disease      has seen GI     Hydronephrosis, left     chronic , in 2017 Dr Arriaga plan was observaton     Hyperlipidaemia LDL goal < 100      Hypothyroid      Migraine headaches 9/1/2011     Moderate persistent asthma      lifelong, saw Pulm 2013     Obesity (BMI 30-39.9)      Panic attacks      Renal stones 2016    kidney stones, multiple small stones high risk recurrent ureteral stones. , Dr Arriaga in 2016 rec 24 hour urine.     Type 2 diabetes mellitus without complication (H)      Past Surgical History:   Procedure Laterality Date     ARTHROPLASTY HIP  3/13/2013    Procedure: ARTHROPLASTY HIP;  LEFT TOTAL HIP ARTHROPLASTY (BIOMET)^ ;  Surgeon: Anand Main MD;  Location:  OR     ARTHROPLASTY PATELLO-FEMORAL (KNEE)  2005ish    Left     CHOLECYSTECTOMY, OPEN  27 yo     COMBINED CYSTOSCOPY, INSERT STENT URETER(S)  8/5/2014    Procedure: COMBINED CYSTOSCOPY, INSERT STENT URETER(S);  Surgeon: Sam Arriaga MD;  Location:  OR     COMBINED CYSTOSCOPY, RETROGRADES, URETEROSCOPY, LASER HOLMIUM LITHOTRIPSY URETER(S), INSERT STENT Left 6/14/2016    Procedure: COMBINED CYSTOSCOPY, RETROGRADES, URETEROSCOPY, LASER HOLMIUM LITHOTRIPSY URETER(S), INSERT STENT;  Surgeon: Thomas Edmondson MD;  Location:  OR      GENITOURINARY SURGERY       LAMINECT/DISCECTOMY, LUMBAR      Laminectomy/Discectomy Lumbar     LASER HOLMIUM LITHOTRIPSY URETER(S), INSERT STENT, COMBINED Left 8/20/2014    Procedure: COMBINED CYSTOSCOPY, URETEROSCOPY, LASER HOLMIUM LITHOTRIPSY URETER(S), INSERT STENT;  Surgeon: Sam Arriaga MD;  Location:  OR     ORTHOPEDIC SURGERY      left ankle     RECESSION EYELID UPPER       Current Outpatient Medications   Medication Sig Dispense Refill     ACE/ARB/ARNI NOT PRESCRIBED, INTENTIONAL, Please choose reason not prescribed, below       blood glucose (ONE TOUCH DELICA) lancing device Use to test blood sugars one time daily or as directed. 1 each 0     blood glucose monitoring (ONE TOUCH DELICA) lancets Use to test blood sugars 1 times daily or as directed. 1 Box 3     blood glucose monitoring (ONE TOUCH ULTRA) test strip Use to test blood sugars one time daily or as directed. 100 each 3     fluticasone-salmeterol (ADVAIR DISKUS) 250-50 MCG/DOSE inhaler INHALE ONE DOSE INTO THE LUNGS 2 TIMES DAILY 3 Inhaler 3     HYDROcodone-acetaminophen (NORCO) 5-325 MG tablet Take 1 tablet up to 4 times a day for severe back pain. Use lowest effective dose. 120 tablet 0     levothyroxine (SYNTHROID/LEVOTHROID) 112 MCG tablet Take 1 tablet (112 mcg) by mouth daily 90 tablet 3     lisinopril (PRINIVIL/ZESTRIL) 10 MG tablet Take 1 tablet (10 mg) by mouth daily 90 tablet 3     metFORMIN (GLUCOPHAGE-XR) 500 MG 24 hr tablet Take 3 tablets (1,500 mg) by mouth daily (with dinner) 270 tablet 3     omeprazole 20 MG tablet Take 20 mg by mouth 2 times daily       oxybutynin ER (DITROPAN XL) 10 MG 24 hr tablet Take 1 tablet (10 mg) by mouth daily 90 tablet 2     STATIN NOT PRESCRIBED, INTENTIONAL, 1 each 2 times daily Statin not prescribed intentionally due to Allergy to statin 0 each 0     triamcinolone (KENALOG) 0.1 % ointment        VENTOLIN  (90 BASE) MCG/ACT Inhaler INHALE TWO PUFFS INTO LUNGS BY MOUTH EVERY 6 HOURS AS NEEDED  "18 g 2     OTC products: {OTC ANALGESICS:610975}    Allergies   Allergen Reactions     Baclofen GI Disturbance     Imitrex [Sumatriptan] Nausea     Morphine      Severe vomiting     Tetracycline      Sores in mouth      Latex Allergy: {YES/NO WITH DEFAULT:097238::\"NO\"}    Social History     Tobacco Use     Smoking status: Former Smoker     Packs/day: 1.50     Years: 40.00     Pack years: 60.00     Last attempt to quit: 1995     Years since quittin.1     Smokeless tobacco: Never Used   Substance Use Topics     Alcohol use: Yes     Comment: occ.     History   Drug Use No       REVIEW OF SYSTEMS:   {ROS Preop Choices:095714}    EXAM:   There were no vitals taken for this visit.  {EXAM Preop Choices:010098}    DIAGNOSTICS:   {DIAGNOSTIC FOR PREOP:906307}    Recent Labs   Lab Test 19  1203 18  1129 18  1259 18  1209  17  1539  14  1721  13  0635   HGB  --   --   --  11.7*  --  12.1   < > 13.5   < >  --    PLT  --   --   --  179  --  160   < > 170   < >  --    INR  --   --   --   --   --   --   --  1.02  --  2.39*     --  143  --   --   --    < > 139   < >  --    POTASSIUM 4.4  --  4.5  --   --   --    < > 3.8   < >  --    CR 1.10*  --  1.19*  --   --   --    < > 1.49*   < > 0.89   A1C 5.9* 5.9*  --   --    < >  --    < >  --    < >  --     < > = values in this interval not displayed.        IMPRESSION:   {PREOP REASONS:220211::\"Reason for surgery/procedure: ***\",\"Diagnosis/reason for consult: ***\"}    The proposed surgical procedure is considered {HIGH=major cardiovascular or procedures requiring prolonged anesthesia >4 hours or large fluid shifts;    INTERMEDIATE=abdominal, most orthopedic and intrathoracic surgery; LOW= endoscopy, cataract and breast surgery:819855} risk.    REVISED CARDIAC RISK INDEX  The patient has the following serious cardiovascular risks for perioperative complications such as (MI, PE, VFib and 3  AV Block):  {PREOP REVISED CARDIAC INDEX " "(RCI):495017:p:\"No serious cardiac risks\"}  INTERPRETATION: {REVISED CARDIAC RISK INTERPRETATION:883931}    The patient has the following additional risks for perioperative complications:  {Additional perioperative risks:170060:p:\"No identified additional risks\"}      ICD-10-CM    1. Preop general physical exam Z01.818        RECOMMENDATIONS:     {IMPORTANT - Conditions - complete carefully!!:442154}    {IMPORTANT - Medications:956507::\"--Patient is to take all scheduled medications on the day of surgery EXCEPT for modifications listed below.\"}    {IMPORTANT - Approval:207114:p:\"APPROVAL GIVEN to proceed with proposed procedure, without further diagnostic evaluation\"}       Signed Electronically by: Jeni Gutierrez MD    Copy of this evaluation report is provided to requesting physician.    [facility] Preop Guidelines    Revised Cardiac Risk Index  "

## 2019-03-04 ENCOUNTER — OFFICE VISIT (OUTPATIENT)
Dept: FAMILY MEDICINE | Facility: CLINIC | Age: 79
End: 2019-03-04

## 2019-03-04 VITALS
BODY MASS INDEX: 32.45 KG/M2 | HEART RATE: 79 BPM | DIASTOLIC BLOOD PRESSURE: 80 MMHG | TEMPERATURE: 98 F | WEIGHT: 195 LBS | RESPIRATION RATE: 16 BRPM | OXYGEN SATURATION: 97 % | SYSTOLIC BLOOD PRESSURE: 126 MMHG

## 2019-03-04 DIAGNOSIS — N18.30 TYPE 2 DIABETES MELLITUS WITH STAGE 3 CHRONIC KIDNEY DISEASE, WITHOUT LONG-TERM CURRENT USE OF INSULIN (H): ICD-10-CM

## 2019-03-04 DIAGNOSIS — Z01.818 PREOP GENERAL PHYSICAL EXAM: Primary | ICD-10-CM

## 2019-03-04 DIAGNOSIS — J44.9 CHRONIC OBSTRUCTIVE PULMONARY DISEASE (H): ICD-10-CM

## 2019-03-04 DIAGNOSIS — E11.22 TYPE 2 DIABETES MELLITUS WITH STAGE 3 CHRONIC KIDNEY DISEASE, WITHOUT LONG-TERM CURRENT USE OF INSULIN (H): ICD-10-CM

## 2019-03-04 DIAGNOSIS — E03.4 HYPOTHYROIDISM DUE TO ACQUIRED ATROPHY OF THYROID: ICD-10-CM

## 2019-03-04 DIAGNOSIS — R79.89 ABNORMAL TSH: ICD-10-CM

## 2019-03-04 DIAGNOSIS — N18.2 CHRONIC RENAL INSUFFICIENCY, STAGE 2 (MILD): ICD-10-CM

## 2019-03-04 PROCEDURE — 99215 OFFICE O/P EST HI 40 MIN: CPT | Performed by: FAMILY MEDICINE

## 2019-03-04 PROCEDURE — 84480 ASSAY TRIIODOTHYRONINE (T3): CPT | Mod: 90 | Performed by: FAMILY MEDICINE

## 2019-03-04 PROCEDURE — 93000 ELECTROCARDIOGRAM COMPLETE: CPT | Performed by: FAMILY MEDICINE

## 2019-03-04 PROCEDURE — 36415 COLL VENOUS BLD VENIPUNCTURE: CPT | Performed by: FAMILY MEDICINE

## 2019-03-04 PROCEDURE — 84443 ASSAY THYROID STIM HORMONE: CPT | Mod: 90 | Performed by: FAMILY MEDICINE

## 2019-03-04 PROCEDURE — 84439 ASSAY OF FREE THYROXINE: CPT | Mod: 90 | Performed by: FAMILY MEDICINE

## 2019-03-04 PROCEDURE — 80048 BASIC METABOLIC PNL TOTAL CA: CPT | Mod: 90 | Performed by: FAMILY MEDICINE

## 2019-03-04 RX ORDER — CIPROFLOXACIN 2 MG/ML
400 INJECTION, SOLUTION INTRAVENOUS
Status: CANCELLED | OUTPATIENT
Start: 2019-03-08

## 2019-03-04 ASSESSMENT — ANXIETY QUESTIONNAIRES
7. FEELING AFRAID AS IF SOMETHING AWFUL MIGHT HAPPEN: NOT AT ALL
1. FEELING NERVOUS, ANXIOUS, OR ON EDGE: NOT AT ALL
2. NOT BEING ABLE TO STOP OR CONTROL WORRYING: NOT AT ALL
IF YOU CHECKED OFF ANY PROBLEMS ON THIS QUESTIONNAIRE, HOW DIFFICULT HAVE THESE PROBLEMS MADE IT FOR YOU TO DO YOUR WORK, TAKE CARE OF THINGS AT HOME, OR GET ALONG WITH OTHER PEOPLE: NOT DIFFICULT AT ALL
5. BEING SO RESTLESS THAT IT IS HARD TO SIT STILL: NOT AT ALL
6. BECOMING EASILY ANNOYED OR IRRITABLE: NOT AT ALL
GAD7 TOTAL SCORE: 0
3. WORRYING TOO MUCH ABOUT DIFFERENT THINGS: NOT AT ALL

## 2019-03-04 ASSESSMENT — PATIENT HEALTH QUESTIONNAIRE - PHQ9
SUM OF ALL RESPONSES TO PHQ QUESTIONS 1-9: 0
5. POOR APPETITE OR OVEREATING: NOT AT ALL

## 2019-03-04 NOTE — LETTER
[facility]  [address] [place]  [address]  Phone: [phone]    March 7, 2019      Macie Jorge  [address], [place]. \[facility].\"" [id number]              Dear Macie,    The results from your recent visit showed Thyroid is entirely normal. No changes needed.   Kidney function is stable, electrolytes are normal.         Sincerely,     Marjan Pa M.D.    Results for orders placed or performed in visit on 03/04/19   Basic Metabolic Panel (8) ([facility])   Result Value Ref Range    Glucose 140 (H) 65 - 99 mg/dL    Urea Nitrogen 20 8 - 27 mg/dL    Creatinine 1.08 (H) 0.57 - 1.00 mg/dL    eGFR If NonAfricn Am 49 (L) >59 mL/min/1.73    eGFR If Africn Am 57 (L) >59 mL/min/1.73    BUN/Creatinine Ratio 19 12 - 28    Sodium 139 134 - 144 mmol/L    Potassium 4.3 3.5 - 5.2 mmol/L    Chloride 101 96 - 106 mmol/L    Total CO2 25 20 - 29 mmol/L    Calcium 9.4 8.7 - 10.3 mg/dL    Narrative    Performed at:  01 - [facility] [address], [place], [place]  [id number]  : Riley Rachel MD, Phone:  [phone]   TSH ([facility])   Result Value Ref Range    TSH 3.650 0.450 - 4.500 uIU/mL    Narrative    Performed at:  01 - [facility] [address], [place], [place]  [id number]  : Riley Rachel MD, Phone:  [phone]   Thyroxine (T4) Free  Direct  S ([facility])   Result Value Ref Range    T4 Free 1.27 0.82 - 1.77 ng/dL    Narrative    Performed at:  01 - [facility] [address], [place], [place]  [id number]  : Riley Rachel MD, Phone:  [phone]   Triiodothyronine (T3) ([facility])   Result Value Ref Range    Triiodothyronine (T3) 81 71 - 180 ng/dL    Narrative    Performed at:  01 - [facility] [address], [place], [place]  [id number]  : Riley Rachel MD, Phone:  [phone]

## 2019-03-04 NOTE — PROGRESS NOTES
[facility]  [address] [place] [id number] [phone]  Dept: [phone]    PRE-OP EVALUATION:  Today's date: 3/4/2019    Macie Jorge (: 1940) presents for pre-operative evaluation assessment as requested by Dr. Rivera.  She requires evaluation and anesthesia risk assessment prior to undergoing surgery/procedure for treatment of Kidney Stones .    Proposed Surgery/ Procedure: CYSTOSCOPY,LEFT UREASEROSCOPY, HOLMIUM LITHOTRIPSY URETER(S), POSSIBLE LEFT STENT  Date of Surgery/ Procedure: 3/8/19  Time of Surgery/ Procedure: 730  Hospital/Surgical Facility: [facility]  Fax number for surgical facility:   Primary Physician: Marjan Pa  Type of Anesthesia Anticipated: to be determined    Patient has a Health Care Directive or Living Will:  YES     1. NO - Do you have a history of heart attack, stroke, stent, bypass or surgery on an artery in the head, neck, heart or legs?  2. NO - Do you ever have any pain or discomfort in your chest?  3. NO - Do you have a history of  Heart Failure?  4. YES - ARE YOUR TROUBLED BY SHORTNESS OF BREATH WHEN WALKING ON THE LEVEL, UP A SLIGHT HILL OR AT NIGHT? Due to asthma  5. NO - Do you currently have a cold, bronchitis or other respiratory infection?  6. NO - Do you have a cough, shortness of breath or wheezing?  7. NO - Do you sometimes get pains in the calves of your legs when you walk?  8. NO - Do you or anyone in your family have previous history of blood clots?  9. NO - Do you or does anyone in your family have a serious bleeding problem such as prolonged bleeding following surgeries or cuts?  10. NO - Have you ever had problems with anemia or been told to take iron pills?  11. NO - Have you had any abnormal blood loss such as black, tarry or bloody stools, or abnormal vaginal bleeding?  12. NO - Have you ever had a blood transfusion?  13. NO - Have you or any of your relatives ever had problems with anesthesia?  14. NO - Do  you have sleep apnea, excessive snoring or daytime drowsiness?  15. NO - Do you have any prosthetic heart valves?  16. YES - DO YOU HAVE PROSTHETIC JOINTS? Left knee   17. NO - Is there any chance that you may be pregnant?      HPI:     HPI related to upcoming procedure: Macie was seen by her urologist for a routine exam and was found to have a large kidney stone. She has chronic low back pain daily and was not aware of a change in this chronic pain.      ASTHMA - Patient has a longstanding history of moderate-severe Asthma . Patient has been doing well overall noting NO SYMPTOMS and continues on medication regimen consisting of Advair 250/50 one puff BID and prn albuterol which is rarely needed, without adverse reactions or side effects.                                                                                                                                               .  DIABETES - Patient has a longstanding history of DiabetesType Type II . Patient is being treated with oral agents and denies significant side effects. Control has been good. Complicating factors include but are not limited to: tobacco use in the past, quit 1994.                                                                                                                            .  HYPOTHYROIDISM - Patient has a longstanding history of chronic Hypothyroidism. Patient has been doing well, noting no tremor, insomnia, hair loss or changes in skin texture. Continues to take medications as directed, without adverse reactions or side effects. Last TSH one month ago was mildly elevated at 5.120.                                                                                                                                                                                                                        .  RENAL INSUFFICIENCY - Patient has a longstanding history of moderate-severe chronic renal insufficiency. Last Cr 1.10 on 2/5/19.                                                                                                                                                                                   MEDICAL HISTORY:     Patient Active Problem List    Diagnosis Date Noted     Chronic midline low back pain without sciatica 02/05/2019     Priority: Medium     Physical deconditioning 02/05/2019     Priority: Medium     Spinal stenosis of lumbar region with neurogenic claudication 02/05/2019     Priority: Medium     Hypothyroidism due to acquired atrophy of thyroid 02/05/2019     Priority: Medium     History of colonic polyps 02/27/2018     Priority: Medium     Altered mental status 12/01/2017     Priority: Medium     Acquired hypothyroidism 03/21/2017     Priority: Medium     COPD with asthma (H)      Priority: Medium      followed with pulmonary DR LENNOX 60-pack-year history of smoking       Type 2 diabetes mellitus with stage 3 chronic kidney disease, without long-term current use of insulin (H)      Priority: Medium     Nonintractable migraine, unspecified migraine type 01/20/2016     Priority: Medium     Urgency incontinence 04/02/2014     Priority: Medium     CKD (chronic kidney disease) stage 3, GFR 30-59 ml/min (H) 04/02/2014     Priority: Medium     Obesity 03/05/2014     Priority: Medium     Health Care Home 03/04/2014     Priority: Medium     State Tier Level:  Tier 2         History of hip replacement, total 03/13/2013     Priority: Medium     Hyperlipidemia with target LDL less than 100      Priority: Medium     Diagnosis updated by automated process. Provider to review and confirm.       Advanced directives, counseling/discussion 09/01/2011     Priority: Medium     As per reasonable care for Seniors, wants in the Short term aggressive care.   No desire for long term prolongation of life through artificial means if no hope to bring back to a reasonable status.        Panic attacks      Priority: Medium      Past Medical History:    Diagnosis Date     Calculus of left ureter 2014    dr Arriaga - ureteroscopy and lithitripsy     Cataract      Chronic low back pain     Dr Villagran at [facility] in 2015- not surgical      COPD with asthma (H)      followed with pulmonary DR LENNOX 60-pack-year history of smoking     Esophagitis, reflux 9/2012    SOME EOSINOPHILIA NO BARRETS     Gastro-oesophageal reflux disease      has seen GI     Hydronephrosis, left     chronic , in 2017 Dr Arriaga plan was observaton     Hyperlipidaemia LDL goal < 100      Hypothyroid      Migraine headaches 9/1/2011     Moderate persistent asthma      lifelong, saw Pulm 2013     Obesity (BMI 30-39.9)      Panic attacks      Renal stones 2016    kidney stones, multiple small stones high risk recurrent ureteral stones. , Dr Arriaga in 2016 rec 24 hour urine.     Type 2 diabetes mellitus without complication (H)      Past Surgical History:   Procedure Laterality Date     ARTHROPLASTY HIP  3/13/2013    Procedure: ARTHROPLASTY HIP;  LEFT TOTAL HIP ARTHROPLASTY (BIOMET)^ ;  Surgeon: Anand Main MD;  Location:  OR     ARTHROPLASTY PATELLO-FEMORAL (KNEE)  2005ish    Left     CHOLECYSTECTOMY, OPEN  27 yo     COMBINED CYSTOSCOPY, INSERT STENT URETER(S)  8/5/2014    Procedure: COMBINED CYSTOSCOPY, INSERT STENT URETER(S);  Surgeon: Sam Arriaga MD;  Location:  OR     COMBINED CYSTOSCOPY, RETROGRADES, URETEROSCOPY, LASER HOLMIUM LITHOTRIPSY URETER(S), INSERT STENT Left 6/14/2016    Procedure: COMBINED CYSTOSCOPY, RETROGRADES, URETEROSCOPY, LASER HOLMIUM LITHOTRIPSY URETER(S), INSERT STENT;  Surgeon: Thomas Edmondson MD;  Location:  OR     GENITOURINARY SURGERY       LAMINECT/DISCECTOMY, LUMBAR      Laminectomy/Discectomy Lumbar     LASER HOLMIUM LITHOTRIPSY URETER(S), INSERT STENT, COMBINED Left 8/20/2014    Procedure: COMBINED CYSTOSCOPY, URETEROSCOPY, LASER HOLMIUM LITHOTRIPSY URETER(S), INSERT STENT;  Surgeon: Sam Arriaga MD;  Location:  OR     ORTHOPEDIC SURGERY      [facility]  ankle     RECESSION EYELID UPPER       Current Outpatient Medications   Medication Sig Dispense Refill     ACE/ARB/ARNI NOT PRESCRIBED, INTENTIONAL, Please choose reason not prescribed, below       blood glucose (ONE TOUCH DELICA) lancing device Use to test blood sugars one time daily or as directed. 1 each 0     blood glucose monitoring (ONE TOUCH DELICA) lancets Use to test blood sugars 1 times daily or as directed. 1 Box 3     blood glucose monitoring (ONE TOUCH ULTRA) test strip Use to test blood sugars one time daily or as directed. 100 each 3     fluticasone-salmeterol (ADVAIR DISKUS) 250-50 MCG/DOSE inhaler INHALE ONE DOSE INTO THE LUNGS 2 TIMES DAILY 3 Inhaler 3     HYDROcodone-acetaminophen (NORCO) 5-325 MG tablet Take 1 tablet up to 4 times a day for severe back pain. Use lowest effective dose. 120 tablet 0     levothyroxine (SYNTHROID/LEVOTHROID) 112 MCG tablet Take 1 tablet (112 mcg) by mouth daily 90 tablet 3     metFORMIN (GLUCOPHAGE-XR) 500 MG 24 hr tablet Take 3 tablets (1,500 mg) by mouth daily (with dinner) 270 tablet 3     omeprazole 20 MG tablet Take 20 mg by mouth 2 times daily       oxybutynin ER (DITROPAN XL) 10 MG 24 hr tablet Take 1 tablet (10 mg) by mouth daily 90 tablet 2     STATIN NOT PRESCRIBED, INTENTIONAL, 1 each 2 times daily Statin not prescribed intentionally due to Allergy to statin 0 each 0     triamcinolone (KENALOG) 0.1 % ointment        VENTOLIN  (90 BASE) MCG/ACT Inhaler INHALE TWO PUFFS INTO LUNGS BY MOUTH EVERY 6 HOURS AS NEEDED 18 g 2     OTC products: None, except as noted above    Allergies   Allergen Reactions     Morphine DO NOT GIVE MORPHINE. She has tolerated hydrocodone in the past.     Severe vomiting     Baclofen GI Disturbance     Imitrex [Sumatriptan] Nausea     Tetracycline      Sores in mouth      Latex Allergy: NO    Social History     Tobacco Use     Smoking status: Former Smoker     Packs/day: 1.50     Years: 40.00     Pack years: 60.00     Last  attempt to quit: 1995     Years since quittin.1     Smokeless tobacco: Never Used   Substance Use Topics     Alcohol use: Yes     Comment: occ.     History   Drug Use No       REVIEW OF SYSTEMS:   Constitutional, neuro, ENT, endocrine, pulmonary, cardiac, gastrointestinal, genitourinary, musculoskeletal, integument and psychiatric systems are negative, except as otherwise noted. She has chronic back pain that is unchanged, chronic right knee pain that is unchanged.  Right eye has increased tearing, no photophobia or pain, no foreign body sensation, no vision change.  EXAM:   /80   Pulse 79   Temp 98  F (36.7  C) (Oral)   Resp 16   Wt 88.5 kg (195 lb)   SpO2 97%   BMI 32.45 kg/m      GENERAL APPEARANCE: healthy, alert and no distress     EYES: EOMI, PERRL     HENT: ear canals and TM's normal and nose and mouth without ulcers or lesions     NECK: no adenopathy, no asymmetry, masses, or scars and thyroid normal to palpation     RESP: lungs clear to auscultation - no rales, rhonchi or wheezes     CV: regular rates and rhythm, normal S1 S2, no S3 or S4 and no murmur, click or rub     ABDOMEN:  soft, nontender, no HSM or masses and bowel sounds normal     MS: extremities normal- no gross deformities noted, no evidence of inflammation in joints, FROM in all extremities.     SKIN: no suspicious lesions or rashes     NEURO: Normal strength and tone, sensory exam grossly normal, mentation intact and speech normal     PSYCH: mentation appears normal. and affect normal/bright     LYMPHATICS: No cervical adenopathy    DIAGNOSTICS:     EKG: appears normal, NSR, normal axis, normal intervals, no acute ST/T changes c/w ischemia, no LVH by voltage criteria, unchanged from previous tracings  Labs Drawn and in Process:   Unresulted Labs Ordered in the Past 30 Days of this Admission     No orders found from 1/3/2019 to 3/5/2019.          Recent Labs   Lab Test 19  1203 18  1129 18  1259  02/27/18  1209  03/21/17  1539  08/04/14  1721  03/16/13  0635   HGB  --   --   --  11.7*  --  12.1   < > 13.5   < >  --    PLT  --   --   --  179  --  160   < > 170   < >  --    INR  --   --   --   --   --   --   --  1.02  --  2.39*     --  143  --   --   --    < > 139   < >  --    POTASSIUM 4.4  --  4.5  --   --   --    < > 3.8   < >  --    CR 1.10*  --  1.19*  --   --   --    < > 1.49*   < > 0.89   A1C 5.9* 5.9*  --   --    < >  --    < >  --    < >  --     < > = values in this interval not displayed.        IMPRESSION:   Reason for surgery/procedure: obstructing nephrolithiasis  Diagnosis/reason for consult: preoperative clearance    The proposed surgical procedure is considered INTERMEDIATE risk.    REVISED CARDIAC RISK INDEX  The patient has the following serious cardiovascular risks for perioperative complications such as (MI, PE, VFib and 3  AV Block):  No serious cardiac risks  INTERPRETATION: 0 risks: Class I (very low risk - 0.4% complication rate)    The patient has the following additional risks for perioperative complications:  No identified additional risks      ICD-10-CM    1. Preop general physical exam Z01.818    2. Abnormal TSH R79.89 TSH ([facility])     Thyroxine (T4) Free  Direct  S ([facility])     Triiodothyronine (T3) ([facility])   3. Hypothyroidism due to acquired atrophy of thyroid E03.4 TSH ([facility])     Thyroxine (T4) Free  Direct  S ([facility])     Triiodothyronine (T3) ([facility])   4. Chronic renal insufficiency, stage 2 (mild) N18.2 Basic Metabolic Panel (8) ([facility])     EKG 12-lead complete w/read - Clinics   5. Type 2 diabetes mellitus with stage 3 chronic kidney disease, without long-term current use of insulin (H) E11.22 EKG 12-lead complete w/read - Clinics    N18.3        RECOMMENDATIONS:         Hold metformin dose the night prior to surgery.    APPROVAL GIVEN to proceed with proposed procedure, without further diagnostic evaluation       Signed Electronically by: Marjan Pa,  MD    Copy of this evaluation report is provided to requesting physician.    [place] Preop Guidelines    Revised Cardiac Risk Index

## 2019-03-04 NOTE — H&P (VIEW-ONLY)
[facility]  [address] [place] [id number] [phone]  Dept: [phone]    PRE-OP EVALUATION:  Today's date: 3/4/2019    Macie Jorge (: 1940) presents for pre-operative evaluation assessment as requested by Dr. Rivera.  She requires evaluation and anesthesia risk assessment prior to undergoing surgery/procedure for treatment of Kidney Stones .    Proposed Surgery/ Procedure: CYSTOSCOPY,LEFT UREASEROSCOPY, HOLMIUM LITHOTRIPSY URETER(S), POSSIBLE LEFT STENT  Date of Surgery/ Procedure: 3/8/19  Time of Surgery/ Procedure: 730  Hospital/Surgical Facility: [facility]  Fax number for surgical facility:   Primary Physician: Marjan Pa  Type of Anesthesia Anticipated: to be determined    Patient has a Health Care Directive or Living Will:  YES     1. NO - Do you have a history of heart attack, stroke, stent, bypass or surgery on an artery in the head, neck, heart or legs?  2. NO - Do you ever have any pain or discomfort in your chest?  3. NO - Do you have a history of  Heart Failure?  4. YES - ARE YOUR TROUBLED BY SHORTNESS OF BREATH WHEN WALKING ON THE LEVEL, UP A SLIGHT HILL OR AT NIGHT? Due to asthma  5. NO - Do you currently have a cold, bronchitis or other respiratory infection?  6. NO - Do you have a cough, shortness of breath or wheezing?  7. NO - Do you sometimes get pains in the calves of your legs when you walk?  8. NO - Do you or anyone in your family have previous history of blood clots?  9. NO - Do you or does anyone in your family have a serious bleeding problem such as prolonged bleeding following surgeries or cuts?  10. NO - Have you ever had problems with anemia or been told to take iron pills?  11. NO - Have you had any abnormal blood loss such as black, tarry or bloody stools, or abnormal vaginal bleeding?  12. NO - Have you ever had a blood transfusion?  13. NO - Have you or any of your relatives ever had problems with anesthesia?  14. NO - Do  you have sleep apnea, excessive snoring or daytime drowsiness?  15. NO - Do you have any prosthetic heart valves?  16. YES - DO YOU HAVE PROSTHETIC JOINTS? Left knee   17. NO - Is there any chance that you may be pregnant?      HPI:     HPI related to upcoming procedure: Macie was seen by her urologist for a routine exam and was found to have a large kidney stone. She has chronic low back pain daily and was not aware of a change in this chronic pain.      ASTHMA - Patient has a longstanding history of moderate-severe Asthma . Patient has been doing well overall noting NO SYMPTOMS and continues on medication regimen consisting of Advair 250/50 one puff BID and prn albuterol which is rarely needed, without adverse reactions or side effects.                                                                                                                                               .  DIABETES - Patient has a longstanding history of DiabetesType Type II . Patient is being treated with oral agents and denies significant side effects. Control has been good. Complicating factors include but are not limited to: tobacco use in the past, quit 1994.                                                                                                                            .  HYPOTHYROIDISM - Patient has a longstanding history of chronic Hypothyroidism. Patient has been doing well, noting no tremor, insomnia, hair loss or changes in skin texture. Continues to take medications as directed, without adverse reactions or side effects. Last TSH one month ago was mildly elevated at 5.120.                                                                                                                                                                                                                        .  RENAL INSUFFICIENCY - Patient has a longstanding history of moderate-severe chronic renal insufficiency. Last Cr 1.10 on 2/5/19.                                                                                                                                                                                   MEDICAL HISTORY:     Patient Active Problem List    Diagnosis Date Noted     Chronic midline low back pain without sciatica 02/05/2019     Priority: Medium     Physical deconditioning 02/05/2019     Priority: Medium     Spinal stenosis of lumbar region with neurogenic claudication 02/05/2019     Priority: Medium     Hypothyroidism due to acquired atrophy of thyroid 02/05/2019     Priority: Medium     History of colonic polyps 02/27/2018     Priority: Medium     Altered mental status 12/01/2017     Priority: Medium     Acquired hypothyroidism 03/21/2017     Priority: Medium     COPD with asthma (H)      Priority: Medium      followed with pulmonary DR LENNOX 60-pack-year history of smoking       Type 2 diabetes mellitus with stage 3 chronic kidney disease, without long-term current use of insulin (H)      Priority: Medium     Nonintractable migraine, unspecified migraine type 01/20/2016     Priority: Medium     Urgency incontinence 04/02/2014     Priority: Medium     CKD (chronic kidney disease) stage 3, GFR 30-59 ml/min (H) 04/02/2014     Priority: Medium     Obesity 03/05/2014     Priority: Medium     Health Care Home 03/04/2014     Priority: Medium     State Tier Level:  Tier 2         History of hip replacement, total 03/13/2013     Priority: Medium     Hyperlipidemia with target LDL less than 100      Priority: Medium     Diagnosis updated by automated process. Provider to review and confirm.       Advanced directives, counseling/discussion 09/01/2011     Priority: Medium     As per reasonable care for Seniors, wants in the Short term aggressive care.   No desire for long term prolongation of life through artificial means if no hope to bring back to a reasonable status.        Panic attacks      Priority: Medium      Past Medical History:    Diagnosis Date     Calculus of left ureter 2014    dr Arriaga - ureteroscopy and lithitripsy     Cataract      Chronic low back pain     Dr Villagran at [facility] in 2015- not surgical      COPD with asthma (H)      followed with pulmonary DR LENNOX 60-pack-year history of smoking     Esophagitis, reflux 9/2012    SOME EOSINOPHILIA NO BARRETS     Gastro-oesophageal reflux disease      has seen GI     Hydronephrosis, left     chronic , in 2017 Dr Arriaga plan was observaton     Hyperlipidaemia LDL goal < 100      Hypothyroid      Migraine headaches 9/1/2011     Moderate persistent asthma      lifelong, saw Pulm 2013     Obesity (BMI 30-39.9)      Panic attacks      Renal stones 2016    kidney stones, multiple small stones high risk recurrent ureteral stones. , Dr Arriaga in 2016 rec 24 hour urine.     Type 2 diabetes mellitus without complication (H)      Past Surgical History:   Procedure Laterality Date     ARTHROPLASTY HIP  3/13/2013    Procedure: ARTHROPLASTY HIP;  LEFT TOTAL HIP ARTHROPLASTY (BIOMET)^ ;  Surgeon: Anand Main MD;  Location:  OR     ARTHROPLASTY PATELLO-FEMORAL (KNEE)  2005ish    Left     CHOLECYSTECTOMY, OPEN  25 yo     COMBINED CYSTOSCOPY, INSERT STENT URETER(S)  8/5/2014    Procedure: COMBINED CYSTOSCOPY, INSERT STENT URETER(S);  Surgeon: Sam Arriaga MD;  Location:  OR     COMBINED CYSTOSCOPY, RETROGRADES, URETEROSCOPY, LASER HOLMIUM LITHOTRIPSY URETER(S), INSERT STENT Left 6/14/2016    Procedure: COMBINED CYSTOSCOPY, RETROGRADES, URETEROSCOPY, LASER HOLMIUM LITHOTRIPSY URETER(S), INSERT STENT;  Surgeon: Thomas Edmondson MD;  Location:  OR     GENITOURINARY SURGERY       LAMINECT/DISCECTOMY, LUMBAR      Laminectomy/Discectomy Lumbar     LASER HOLMIUM LITHOTRIPSY URETER(S), INSERT STENT, COMBINED Left 8/20/2014    Procedure: COMBINED CYSTOSCOPY, URETEROSCOPY, LASER HOLMIUM LITHOTRIPSY URETER(S), INSERT STENT;  Surgeon: Sam Arriaga MD;  Location:  OR     ORTHOPEDIC SURGERY      [facility]  ankle     RECESSION EYELID UPPER       Current Outpatient Medications   Medication Sig Dispense Refill     ACE/ARB/ARNI NOT PRESCRIBED, INTENTIONAL, Please choose reason not prescribed, below       blood glucose (ONE TOUCH DELICA) lancing device Use to test blood sugars one time daily or as directed. 1 each 0     blood glucose monitoring (ONE TOUCH DELICA) lancets Use to test blood sugars 1 times daily or as directed. 1 Box 3     blood glucose monitoring (ONE TOUCH ULTRA) test strip Use to test blood sugars one time daily or as directed. 100 each 3     fluticasone-salmeterol (ADVAIR DISKUS) 250-50 MCG/DOSE inhaler INHALE ONE DOSE INTO THE LUNGS 2 TIMES DAILY 3 Inhaler 3     HYDROcodone-acetaminophen (NORCO) 5-325 MG tablet Take 1 tablet up to 4 times a day for severe back pain. Use lowest effective dose. 120 tablet 0     levothyroxine (SYNTHROID/LEVOTHROID) 112 MCG tablet Take 1 tablet (112 mcg) by mouth daily 90 tablet 3     metFORMIN (GLUCOPHAGE-XR) 500 MG 24 hr tablet Take 3 tablets (1,500 mg) by mouth daily (with dinner) 270 tablet 3     omeprazole 20 MG tablet Take 20 mg by mouth 2 times daily       oxybutynin ER (DITROPAN XL) 10 MG 24 hr tablet Take 1 tablet (10 mg) by mouth daily 90 tablet 2     STATIN NOT PRESCRIBED, INTENTIONAL, 1 each 2 times daily Statin not prescribed intentionally due to Allergy to statin 0 each 0     triamcinolone (KENALOG) 0.1 % ointment        VENTOLIN  (90 BASE) MCG/ACT Inhaler INHALE TWO PUFFS INTO LUNGS BY MOUTH EVERY 6 HOURS AS NEEDED 18 g 2     OTC products: None, except as noted above    Allergies   Allergen Reactions     Morphine DO NOT GIVE MORPHINE. She has tolerated hydrocodone in the past.     Severe vomiting     Baclofen GI Disturbance     Imitrex [Sumatriptan] Nausea     Tetracycline      Sores in mouth      Latex Allergy: NO    Social History     Tobacco Use     Smoking status: Former Smoker     Packs/day: 1.50     Years: 40.00     Pack years: 60.00     Last  attempt to quit: 1995     Years since quittin.1     Smokeless tobacco: Never Used   Substance Use Topics     Alcohol use: Yes     Comment: occ.     History   Drug Use No       REVIEW OF SYSTEMS:   Constitutional, neuro, ENT, endocrine, pulmonary, cardiac, gastrointestinal, genitourinary, musculoskeletal, integument and psychiatric systems are negative, except as otherwise noted. She has chronic back pain that is unchanged, chronic right knee pain that is unchanged.  Right eye has increased tearing, no photophobia or pain, no foreign body sensation, no vision change.  EXAM:   /80   Pulse 79   Temp 98  F (36.7  C) (Oral)   Resp 16   Wt 88.5 kg (195 lb)   SpO2 97%   BMI 32.45 kg/m      GENERAL APPEARANCE: healthy, alert and no distress     EYES: EOMI, PERRL     HENT: ear canals and TM's normal and nose and mouth without ulcers or lesions     NECK: no adenopathy, no asymmetry, masses, or scars and thyroid normal to palpation     RESP: lungs clear to auscultation - no rales, rhonchi or wheezes     CV: regular rates and rhythm, normal S1 S2, no S3 or S4 and no murmur, click or rub     ABDOMEN:  soft, nontender, no HSM or masses and bowel sounds normal     MS: extremities normal- no gross deformities noted, no evidence of inflammation in joints, FROM in all extremities.     SKIN: no suspicious lesions or rashes     NEURO: Normal strength and tone, sensory exam grossly normal, mentation intact and speech normal     PSYCH: mentation appears normal. and affect normal/bright     LYMPHATICS: No cervical adenopathy    DIAGNOSTICS:     EKG: appears normal, NSR, normal axis, normal intervals, no acute ST/T changes c/w ischemia, no LVH by voltage criteria, unchanged from previous tracings  Labs Drawn and in Process:   Unresulted Labs Ordered in the Past 30 Days of this Admission     No orders found from 1/3/2019 to 3/5/2019.          Recent Labs   Lab Test 19  1203 18  1129 18  1259  02/27/18  1209  03/21/17  1539  08/04/14  1721  03/16/13  0635   HGB  --   --   --  11.7*  --  12.1   < > 13.5   < >  --    PLT  --   --   --  179  --  160   < > 170   < >  --    INR  --   --   --   --   --   --   --  1.02  --  2.39*     --  143  --   --   --    < > 139   < >  --    POTASSIUM 4.4  --  4.5  --   --   --    < > 3.8   < >  --    CR 1.10*  --  1.19*  --   --   --    < > 1.49*   < > 0.89   A1C 5.9* 5.9*  --   --    < >  --    < >  --    < >  --     < > = values in this interval not displayed.        IMPRESSION:   Reason for surgery/procedure: obstructing nephrolithiasis  Diagnosis/reason for consult: preoperative clearance    The proposed surgical procedure is considered INTERMEDIATE risk.    REVISED CARDIAC RISK INDEX  The patient has the following serious cardiovascular risks for perioperative complications such as (MI, PE, VFib and 3  AV Block):  No serious cardiac risks  INTERPRETATION: 0 risks: Class I (very low risk - 0.4% complication rate)    The patient has the following additional risks for perioperative complications:  No identified additional risks      ICD-10-CM    1. Preop general physical exam Z01.818    2. Abnormal TSH R79.89 TSH ([facility])     Thyroxine (T4) Free  Direct  S ([facility])     Triiodothyronine (T3) ([facility])   3. Hypothyroidism due to acquired atrophy of thyroid E03.4 TSH ([facility])     Thyroxine (T4) Free  Direct  S ([facility])     Triiodothyronine (T3) ([facility])   4. Chronic renal insufficiency, stage 2 (mild) N18.2 Basic Metabolic Panel (8) ([facility])     EKG 12-lead complete w/read - Clinics   5. Type 2 diabetes mellitus with stage 3 chronic kidney disease, without long-term current use of insulin (H) E11.22 EKG 12-lead complete w/read - Clinics    N18.3        RECOMMENDATIONS:         Hold metformin dose the night prior to surgery.    APPROVAL GIVEN to proceed with proposed procedure, without further diagnostic evaluation       Signed Electronically by: Marjan Pa,  MD    Copy of this evaluation report is provided to requesting physician.    [facility] Preop Guidelines    Revised Cardiac Risk Index

## 2019-03-05 ASSESSMENT — ANXIETY QUESTIONNAIRES: GAD7 TOTAL SCORE: 0

## 2019-03-05 ASSESSMENT — ASTHMA QUESTIONNAIRES: ACT_TOTALSCORE: 23

## 2019-03-06 LAB
BUN SERPL-MCNC: 20 MG/DL (ref 8–27)
BUN/CREATININE RATIO: 19 (ref 12–28)
CALCIUM SERPL-MCNC: 9.4 MG/DL (ref 8.7–10.3)
CHLORIDE SERPLBLD-SCNC: 101 MMOL/L (ref 96–106)
CREAT SERPL-MCNC: 1.08 MG/DL (ref 0.57–1)
EGFR IF AFRICN AM: 57 ML/MIN/1.73
EGFR IF NONAFRICN AM: 49 ML/MIN/1.73
GLUCOSE SERPL-MCNC: 140 MG/DL (ref 65–99)
POTASSIUM SERPL-SCNC: 4.3 MMOL/L (ref 3.5–5.2)
SODIUM SERPL-SCNC: 139 MMOL/L (ref 134–144)
T3 SERPL-MCNC: 81 NG/DL (ref 71–180)
T4 FREE SERPL-MCNC: 1.27 NG/DL (ref 0.82–1.77)
TOTAL CO2: 25 MMOL/L (ref 20–29)
TSH BLD-ACNC: 3.65 UIU/ML (ref 0.45–4.5)

## 2019-03-08 ENCOUNTER — APPOINTMENT (OUTPATIENT)
Dept: GENERAL RADIOLOGY | Facility: CLINIC | Age: 79
End: 2019-03-08
Attending: UROLOGY
Payer: COMMERCIAL

## 2019-03-08 ENCOUNTER — HOSPITAL ENCOUNTER (OUTPATIENT)
Facility: CLINIC | Age: 79
Discharge: HOME | End: 2019-03-08
Attending: UROLOGY | Admitting: UROLOGY
Payer: COMMERCIAL

## 2019-03-08 ENCOUNTER — ANESTHESIA (OUTPATIENT)
Dept: SURGERY | Facility: CLINIC | Age: 79
End: 2019-03-08
Payer: COMMERCIAL

## 2019-03-08 ENCOUNTER — ANESTHESIA EVENT (OUTPATIENT)
Dept: SURGERY | Facility: CLINIC | Age: 79
End: 2019-03-08
Payer: COMMERCIAL

## 2019-03-08 VITALS
WEIGHT: 197.5 LBS | RESPIRATION RATE: 12 BRPM | DIASTOLIC BLOOD PRESSURE: 74 MMHG | HEART RATE: 67 BPM | BODY MASS INDEX: 31.74 KG/M2 | TEMPERATURE: 95.4 F | HEIGHT: 66 IN | SYSTOLIC BLOOD PRESSURE: 145 MMHG | OXYGEN SATURATION: 95 %

## 2019-03-08 DIAGNOSIS — Z98.890 POSTOPERATIVE STATE: Primary | ICD-10-CM

## 2019-03-08 PROCEDURE — 71000012 ZZH RECOVERY PHASE 1 LEVEL 1 FIRST HR: Performed by: UROLOGY

## 2019-03-08 PROCEDURE — 71000027 ZZH RECOVERY PHASE 2 EACH 15 MINS: Performed by: UROLOGY

## 2019-03-08 PROCEDURE — 25000128 H RX IP 250 OP 636: Performed by: NURSE ANESTHETIST, CERTIFIED REGISTERED

## 2019-03-08 PROCEDURE — C1769 GUIDE WIRE: HCPCS | Performed by: UROLOGY

## 2019-03-08 PROCEDURE — 25000125 ZZHC RX 250: Performed by: NURSE ANESTHETIST, CERTIFIED REGISTERED

## 2019-03-08 PROCEDURE — 27210794 ZZH OR GENERAL SUPPLY STERILE: Performed by: UROLOGY

## 2019-03-08 PROCEDURE — 25000132 ZZH RX MED GY IP 250 OP 250 PS 637: Performed by: UROLOGY

## 2019-03-08 PROCEDURE — 36000058 ZZH SURGERY LEVEL 3 EA 15 ADDTL MIN: Performed by: UROLOGY

## 2019-03-08 PROCEDURE — C2617 STENT, NON-COR, TEM W/O DEL: HCPCS | Performed by: UROLOGY

## 2019-03-08 PROCEDURE — 40000169 ZZH STATISTIC PRE-PROCEDURE ASSESSMENT I: Performed by: UROLOGY

## 2019-03-08 PROCEDURE — 25800030 ZZH RX IP 258 OP 636: Performed by: NURSE ANESTHETIST, CERTIFIED REGISTERED

## 2019-03-08 PROCEDURE — 36000056 ZZH SURGERY LEVEL 3 1ST 30 MIN: Performed by: UROLOGY

## 2019-03-08 PROCEDURE — C1758 CATHETER, URETERAL: HCPCS | Performed by: UROLOGY

## 2019-03-08 PROCEDURE — 25000566 ZZH SEVOFLURANE, EA 15 MIN: Performed by: UROLOGY

## 2019-03-08 PROCEDURE — 25800025 ZZH RX 258: Performed by: UROLOGY

## 2019-03-08 PROCEDURE — 37000008 ZZH ANESTHESIA TECHNICAL FEE, 1ST 30 MIN: Performed by: UROLOGY

## 2019-03-08 PROCEDURE — 40000277 XR SURGERY CARM FLUORO LESS THAN 5 MIN W STILLS: Mod: TC

## 2019-03-08 PROCEDURE — 25000128 H RX IP 250 OP 636: Performed by: UROLOGY

## 2019-03-08 PROCEDURE — 37000009 ZZH ANESTHESIA TECHNICAL FEE, EACH ADDTL 15 MIN: Performed by: UROLOGY

## 2019-03-08 DEVICE — STENT URETERAL CONTOUR SOFT PERCUFLEX 6FRX24CM
Type: IMPLANTABLE DEVICE | Site: URETER | Status: NON-FUNCTIONAL
Removed: 2019-03-28

## 2019-03-08 RX ORDER — DEXAMETHASONE SODIUM PHOSPHATE 4 MG/ML
INJECTION, SOLUTION INTRA-ARTICULAR; INTRALESIONAL; INTRAMUSCULAR; INTRAVENOUS; SOFT TISSUE PRN
Status: DISCONTINUED | OUTPATIENT
Start: 2019-03-08 | End: 2019-03-08

## 2019-03-08 RX ORDER — FENTANYL CITRATE 50 UG/ML
25-50 INJECTION, SOLUTION INTRAMUSCULAR; INTRAVENOUS EVERY 5 MIN PRN
Status: DISCONTINUED | OUTPATIENT
Start: 2019-03-08 | End: 2019-03-08 | Stop reason: HOSPADM

## 2019-03-08 RX ORDER — LIDOCAINE HYDROCHLORIDE 20 MG/ML
INJECTION, SOLUTION INFILTRATION; PERINEURAL PRN
Status: DISCONTINUED | OUTPATIENT
Start: 2019-03-08 | End: 2019-03-08

## 2019-03-08 RX ORDER — DEXAMETHASONE SODIUM PHOSPHATE 4 MG/ML
4 INJECTION, SOLUTION INTRA-ARTICULAR; INTRALESIONAL; INTRAMUSCULAR; INTRAVENOUS; SOFT TISSUE EVERY 10 MIN PRN
Status: DISCONTINUED | OUTPATIENT
Start: 2019-03-08 | End: 2019-03-08 | Stop reason: HOSPADM

## 2019-03-08 RX ORDER — HYDROCODONE BITARTRATE AND ACETAMINOPHEN 5; 325 MG/1; MG/1
1-2 TABLET ORAL EVERY 4 HOURS PRN
Qty: 15 TABLET | Refills: 0 | Status: ON HOLD | OUTPATIENT
Start: 2019-03-08 | End: 2019-03-28

## 2019-03-08 RX ORDER — HYDROCODONE BITARTRATE AND ACETAMINOPHEN 5; 325 MG/1; MG/1
1 TABLET ORAL ONCE
Status: COMPLETED | OUTPATIENT
Start: 2019-03-08 | End: 2019-03-08

## 2019-03-08 RX ORDER — PROPOFOL 10 MG/ML
INJECTION, EMULSION INTRAVENOUS PRN
Status: DISCONTINUED | OUTPATIENT
Start: 2019-03-08 | End: 2019-03-08

## 2019-03-08 RX ORDER — SODIUM CHLORIDE, SODIUM LACTATE, POTASSIUM CHLORIDE, CALCIUM CHLORIDE 600; 310; 30; 20 MG/100ML; MG/100ML; MG/100ML; MG/100ML
INJECTION, SOLUTION INTRAVENOUS CONTINUOUS PRN
Status: DISCONTINUED | OUTPATIENT
Start: 2019-03-08 | End: 2019-03-08

## 2019-03-08 RX ORDER — ALBUTEROL SULFATE 0.83 MG/ML
2.5 SOLUTION RESPIRATORY (INHALATION)
Status: DISCONTINUED | OUTPATIENT
Start: 2019-03-08 | End: 2019-03-08 | Stop reason: HOSPADM

## 2019-03-08 RX ORDER — NALOXONE HYDROCHLORIDE 0.4 MG/ML
.1-.4 INJECTION, SOLUTION INTRAMUSCULAR; INTRAVENOUS; SUBCUTANEOUS
Status: DISCONTINUED | OUTPATIENT
Start: 2019-03-08 | End: 2019-03-08 | Stop reason: HOSPADM

## 2019-03-08 RX ORDER — HYDROMORPHONE HYDROCHLORIDE 1 MG/ML
.3-.5 INJECTION, SOLUTION INTRAMUSCULAR; INTRAVENOUS; SUBCUTANEOUS EVERY 10 MIN PRN
Status: DISCONTINUED | OUTPATIENT
Start: 2019-03-08 | End: 2019-03-08 | Stop reason: HOSPADM

## 2019-03-08 RX ORDER — ONDANSETRON 4 MG/1
4 TABLET, ORALLY DISINTEGRATING ORAL EVERY 30 MIN PRN
Status: DISCONTINUED | OUTPATIENT
Start: 2019-03-08 | End: 2019-03-08 | Stop reason: HOSPADM

## 2019-03-08 RX ORDER — LORAZEPAM 2 MG/ML
.5-1 INJECTION INTRAMUSCULAR
Status: DISCONTINUED | OUTPATIENT
Start: 2019-03-08 | End: 2019-03-08 | Stop reason: HOSPADM

## 2019-03-08 RX ORDER — DIMENHYDRINATE 50 MG/ML
12.5 INJECTION, SOLUTION INTRAMUSCULAR; INTRAVENOUS
Status: DISCONTINUED | OUTPATIENT
Start: 2019-03-08 | End: 2019-03-08 | Stop reason: HOSPADM

## 2019-03-08 RX ORDER — CEFUROXIME AXETIL 500 MG/1
500 TABLET ORAL 2 TIMES DAILY
Qty: 6 TABLET | Refills: 0 | Status: ON HOLD | OUTPATIENT
Start: 2019-03-08 | End: 2019-03-28

## 2019-03-08 RX ORDER — ONDANSETRON 2 MG/ML
INJECTION INTRAMUSCULAR; INTRAVENOUS PRN
Status: DISCONTINUED | OUTPATIENT
Start: 2019-03-08 | End: 2019-03-08

## 2019-03-08 RX ORDER — FENTANYL CITRATE 50 UG/ML
INJECTION, SOLUTION INTRAMUSCULAR; INTRAVENOUS PRN
Status: DISCONTINUED | OUTPATIENT
Start: 2019-03-08 | End: 2019-03-08

## 2019-03-08 RX ORDER — NEOSTIGMINE METHYLSULFATE 1 MG/ML
VIAL (ML) INJECTION PRN
Status: DISCONTINUED | OUTPATIENT
Start: 2019-03-08 | End: 2019-03-08

## 2019-03-08 RX ORDER — SODIUM CHLORIDE, SODIUM LACTATE, POTASSIUM CHLORIDE, CALCIUM CHLORIDE 600; 310; 30; 20 MG/100ML; MG/100ML; MG/100ML; MG/100ML
INJECTION, SOLUTION INTRAVENOUS CONTINUOUS
Status: DISCONTINUED | OUTPATIENT
Start: 2019-03-08 | End: 2019-03-08 | Stop reason: HOSPADM

## 2019-03-08 RX ORDER — CEFAZOLIN SODIUM 2 G/100ML
2 INJECTION, SOLUTION INTRAVENOUS EVERY 12 HOURS
Status: DISCONTINUED | OUTPATIENT
Start: 2019-03-08 | End: 2019-03-08 | Stop reason: HOSPADM

## 2019-03-08 RX ORDER — MEPERIDINE HYDROCHLORIDE 25 MG/ML
12.5 INJECTION INTRAMUSCULAR; INTRAVENOUS; SUBCUTANEOUS
Status: DISCONTINUED | OUTPATIENT
Start: 2019-03-08 | End: 2019-03-08 | Stop reason: HOSPADM

## 2019-03-08 RX ORDER — GLYCOPYRROLATE 0.2 MG/ML
INJECTION, SOLUTION INTRAMUSCULAR; INTRAVENOUS PRN
Status: DISCONTINUED | OUTPATIENT
Start: 2019-03-08 | End: 2019-03-08

## 2019-03-08 RX ORDER — ONDANSETRON 2 MG/ML
4 INJECTION INTRAMUSCULAR; INTRAVENOUS EVERY 30 MIN PRN
Status: DISCONTINUED | OUTPATIENT
Start: 2019-03-08 | End: 2019-03-08 | Stop reason: HOSPADM

## 2019-03-08 RX ADMIN — ONDANSETRON 4 MG: 2 INJECTION INTRAMUSCULAR; INTRAVENOUS at 07:34

## 2019-03-08 RX ADMIN — FENTANYL CITRATE 25 MCG: 50 INJECTION, SOLUTION INTRAMUSCULAR; INTRAVENOUS at 07:52

## 2019-03-08 RX ADMIN — NEOSTIGMINE METHYLSULFATE 5 MG: 1 INJECTION, SOLUTION INTRAVENOUS at 08:06

## 2019-03-08 RX ADMIN — SODIUM CHLORIDE, POTASSIUM CHLORIDE, SODIUM LACTATE AND CALCIUM CHLORIDE: 600; 310; 30; 20 INJECTION, SOLUTION INTRAVENOUS at 07:23

## 2019-03-08 RX ADMIN — CEFAZOLIN SODIUM 2 G: 2 INJECTION, SOLUTION INTRAVENOUS at 07:32

## 2019-03-08 RX ADMIN — GLYCOPYRROLATE 0.8 MG: 0.2 INJECTION, SOLUTION INTRAMUSCULAR; INTRAVENOUS at 08:06

## 2019-03-08 RX ADMIN — PROPOFOL 160 MG: 10 INJECTION, EMULSION INTRAVENOUS at 07:27

## 2019-03-08 RX ADMIN — ROCURONIUM BROMIDE 40 MG: 10 INJECTION INTRAVENOUS at 07:28

## 2019-03-08 RX ADMIN — FENTANYL CITRATE 25 MCG: 50 INJECTION, SOLUTION INTRAMUSCULAR; INTRAVENOUS at 07:49

## 2019-03-08 RX ADMIN — DEXAMETHASONE SODIUM PHOSPHATE 4 MG: 4 INJECTION, SOLUTION INTRA-ARTICULAR; INTRALESIONAL; INTRAMUSCULAR; INTRAVENOUS; SOFT TISSUE at 07:29

## 2019-03-08 RX ADMIN — HYDROCODONE BITARTRATE AND ACETAMINOPHEN 1 TABLET: 5; 325 TABLET ORAL at 08:48

## 2019-03-08 RX ADMIN — FENTANYL CITRATE 50 MCG: 50 INJECTION, SOLUTION INTRAMUSCULAR; INTRAVENOUS at 07:27

## 2019-03-08 RX ADMIN — LIDOCAINE HYDROCHLORIDE 80 MG: 20 INJECTION, SOLUTION INFILTRATION; PERINEURAL at 07:27

## 2019-03-08 ASSESSMENT — ENCOUNTER SYMPTOMS
SEIZURES: 0
DYSRHYTHMIAS: 0

## 2019-03-08 ASSESSMENT — COPD QUESTIONNAIRES
CAT_SEVERITY: MILD
COPD: 1

## 2019-03-08 ASSESSMENT — MIFFLIN-ST. JEOR: SCORE: 1392.6

## 2019-03-08 ASSESSMENT — LIFESTYLE VARIABLES: TOBACCO_USE: 1

## 2019-03-08 NOTE — DISCHARGE INSTRUCTIONS
Same Day Surgery Discharge Instructions for  Sedation and General Anesthesia       It's not unusual to feel dizzy, light-headed or faint for up to 24 hours after surgery or while taking pain medication.  If you have these symptoms: sit for a few minutes before standing and have someone assist you when you get up to walk or use the bathroom.      You should rest and relax for the next 24 hours. We recommend you make arrangements to have an adult stay with you for at least 24 hours after your discharge.  Avoid hazardous and strenuous activity.      DO NOT DRIVE any vehicle or operate mechanical equipment for 24 hours following the end of your surgery.  Even though you may feel normal, your reactions may be affected by the medication you have received.      Do not drink alcoholic beverages for 24 hours following surgery.       Slowly progress to your regular diet as you feel able. It's not unusual to feel nauseated and/or vomit after receiving anesthesia.  If you develop these symptoms, drink clear liquids (apple juice, ginger ale, broth, 7-up, etc. ) until you feel better.  If your nausea and vomiting persists for 24 hours, please notify your surgeon.        All narcotic pain medications, along with inactivity and anesthesia, can cause constipation. Drinking plenty of liquids and increasing fiber intake will help.      For any questions of a medical nature, call your surgeon.      Do not make important decisions for 24 hours.      If you had general anesthesia, you may have a sore throat for a couple of days related to the breathing tube used during surgery.  You may use Cepacol lozenges to help with this discomfort.  If it worsens or if you develop a fever, contact your surgeon.       If you feel your pain is not well managed with the pain medications prescribed by your surgeon, please contact your surgeon's office to let them know so they can address your concerns.       Cystoscopy and Stent Placement Discharge  Instructions    During surgery, a stent was placed in the ureter.  The ureter is the tube that drains urine from the kidney to the bladder.  The stent is placed to dilate (open) the ureter so the stone fragments can pass easily through the ureter or to decrease ureteral swelling after surgery, or to relieve an obstruction. The stent is made of rubber. The upper end of the stent curls in the kidney while the lower end rests in the bladder      Diet:    Return to the diet that you were on before the procedure, unless you are given specific diet instructions.    It is important to drink 6-8 glasses of fluids per day at home - at least 3-4 glasses should be water.    Activity:    Walk short distances and increase as your strength allows.    You may climb stairs.    Do not do strenuous exercise or heavy lifting until approved by surgeon.    Do not drive while taking narcotic pain medications.    Bathing:    You may take a shower.    While the stent is in place you may experience the following symptoms:    Blood and/or small blood clots in urine.    Bladder spasm (frequency and urgency of urination).    Discomfort or aching in the back or side where the stent is.    Burning or discomfort at the end of urine stream.    To decrease these symptoms you should:    Take pain medication as prescribed.    Drink plenty of fluids.    If you experience pain at the end of urination try not emptying your bladder completely.    If having discomfort in back or side, decrease activity.    Call your physician if these signs/symptoms are present:    Pain that is not relieved by a short rest or ordered pain medications.    Temperature at or above 101.0 F or chills.    Inability or difficulty urinating.     Excessive blood in urine.    Any questions or concerns.    **If you have questions or concerns about your procedure,   call Dr. Rivera at [phone]**

## 2019-03-08 NOTE — ANESTHESIA POSTPROCEDURE EVALUATION
Patient: Macie Jorge    Procedure(s):  CYSTOSCOPY,LEFT RETROGRADE, LEFT STENT PLACEMENT    Diagnosis:LEFT URETEROPELVIC STONE AND MILD LEFT HYDRONEPHROSIS  Diagnosis Additional Information: No value filed.    Anesthesia Type:  General, ETT    Note:  Anesthesia Post Evaluation    Patient location during evaluation: Bedside  Patient participation: Able to fully participate in evaluation  Level of consciousness: awake and alert  Pain management: adequate  Airway patency: patent  Cardiovascular status: acceptable  Respiratory status: acceptable  Hydration status: acceptable  PONV: none             Last vitals:  Vitals:    03/08/19 0815 03/08/19 0830 03/08/19 0845   BP: 151/74 146/74 145/67   Pulse: 94 77 75   Resp: 16 16 12   Temp:  35.3  C (95.5  F) 35.2  C (95.4  F)   SpO2: 100% 100% 95%         Electronically Signed By: Dwaine Weiss MD  March 8, 2019  9:14 AM

## 2019-03-08 NOTE — ANESTHESIA CARE TRANSFER NOTE
Patient: Macie Jorge    Procedure(s):  CYSTOSCOPY,LEFT RETROGRADE, LEFT STENT PLACEMENT    Diagnosis: LEFT URETEROPELVIC STONE AND MILD LEFT HYDRONEPHROSIS  Diagnosis Additional Information: No value filed.    Anesthesia Type:   No value filed.     Note:  Airway :Face Mask  Patient transferred to:PACU  Comments: Anesthesia Care Note    Patient: Macie Jorge    Transferred to: PACU    Patient vital signs: Stable    Airway: FM    Monitors placed. VSS. PIV patent. No change in dentition. Report given to RN.      Yaima Urbina CRNA   3/8/2019  Handoff Report: Identifed the Patient, Identified the Reponsible Provider, Reviewed the pertinent medical history, Discussed the surgical course, Reviewed Intra-OP anesthesia mangement and issues during anesthesia, Set expectations for post-procedure period and Allowed opportunity for questions and acknowledgement of understanding      Vitals: (Last set prior to Anesthesia Care Transfer)    CRNA VITALS  3/8/2019 0744 - 3/8/2019 0818      3/8/2019             NIBP:  154/91  (Abnormal)     Pulse:  98    NIBP Mean:  119    SpO2:  99 %    Resp Rate (observed):  18    Resp Rate (set):  10                Electronically Signed By: WILEY Craig CRNA  March 8, 2019  8:18 AM

## 2019-03-08 NOTE — ANESTHESIA PREPROCEDURE EVALUATION
Anesthesia Pre-Procedure Evaluation    Patient: Macie Jorge   MRN: [id number] : 1940          Preoperative Diagnosis: LEFT URETEROPELVIC STONE AND MILD LEFT HYDRONEPHROSIS    Procedure(s):  CYSTOSCOPY,LEFT UREASEROSCOPY, HOLMIUM LITHOTRIPSY URETER(S), POSSIBLE LEFT STENT    Past Medical History:   Diagnosis Date     Calculus of left ureter     dr Arriaga - ureteroscopy and lithitripsy     Cataract      Chronic low back pain     Dr Villagran at [facility] in - not surgical      COPD with asthma (H)      followed with pulmonary DR LENNOX 60-pack-year history of smoking     Esophagitis, reflux 2012    SOME EOSINOPHILIA NO BARRETS     Gastro-oesophageal reflux disease      has seen GI     Hydronephrosis, left     chronic , in  Dr Arriaga plan was observaton     Hyperlipidaemia LDL goal < 100      Hypothyroid      Migraine headaches 2011     Moderate persistent asthma      lifelong, saw Pulm      Obesity (BMI 30-39.9)      Panic attacks      Renal stones 2016    kidney stones, multiple small stones high risk recurrent ureteral stones. , Dr Arriaga in  rec 24 hour urine.     Type 2 diabetes mellitus without complication (H)      Past Surgical History:   Procedure Laterality Date     ARTHROPLASTY HIP  3/13/2013    Procedure: ARTHROPLASTY HIP;  LEFT TOTAL HIP ARTHROPLASTY (BIOMET)^ ;  Surgeon: Anand Main MD;  Location:  OR     ARTHROPLASTY PATELLO-FEMORAL (KNEE)  2005ish    Left     CHOLECYSTECTOMY, OPEN  25 yo     COMBINED CYSTOSCOPY, INSERT STENT URETER(S)  2014    Procedure: COMBINED CYSTOSCOPY, INSERT STENT URETER(S);  Surgeon: Sam Arriaga MD;  Location:  OR     COMBINED CYSTOSCOPY, RETROGRADES, URETEROSCOPY, LASER HOLMIUM LITHOTRIPSY URETER(S), INSERT STENT Left 2016    Procedure: COMBINED CYSTOSCOPY, RETROGRADES, URETEROSCOPY, LASER HOLMIUM LITHOTRIPSY URETER(S), INSERT STENT;  Surgeon: Thomas Edmondson MD;  Location:  OR     GENITOURINARY SURGERY        LAMINECT/DISCECTOMY, LUMBAR      Laminectomy/Discectomy Lumbar     LASER HOLMIUM LITHOTRIPSY URETER(S), INSERT STENT, COMBINED Left 8/20/2014    Procedure: COMBINED CYSTOSCOPY, URETEROSCOPY, LASER HOLMIUM LITHOTRIPSY URETER(S), INSERT STENT;  Surgeon: Sam Arriaga MD;  Location:  OR     ORTHOPEDIC SURGERY      left ankle     RECESSION EYELID UPPER         Anesthesia Evaluation     .             ROS/MED HX    ENT/Pulmonary:     (+)tobacco use, Past use Mild Persistent asthma Treatment: Inhaler prn,  mild COPD, , . .   (-) sleep apnea   Neurologic:     (+)migraines, other neuro Lumbar spinal stenosis   (-) seizures, CVA and TIA   Cardiovascular:     (+) Dyslipidemia, ----. : . . . :. . Previous cardiac testing Echodate:2013results:Left Ventricle  The left ventricle is normal in size.  There is normal left ventricular wall thickness.  Left ventricular systolic function is normal.  The visual ejection fraction is estimated at 60-65%.  Left ventricular diastolic function is indeterminent.  No regional wall motion abnormalities noted.     Right Ventricle  The right ventricle is normal size.  The right ventricular systolic function is normal.     Atria  Normal left atrial size.  Right atrial size is normal.     Mitral Valve  The mitral valve leaflets appear normal. There is no evidence of stenosis,   fluttering, or prolapse.     Tricuspid Valve  The tricuspid valve is not well visualized, but is grossly normal.  There is trace tricuspid regurgitation.  The right ventricular systolic pressure is approximated at 29 mmHgmmHg plus   the right atrial pressure.     Aortic Valve  The aortic valve is trileaflet with aortic valve sclerosis.  There is mild (1+) aortic regurgitation.     Pulmonic Valve  The pulmonic valve is not well visualized.     Vessels  Normal size ascending aorta.  The IVC is normal in size and reactivity with respiration, suggesting normal   central venous pressure.     Pericardium  There is no  pericardial effusion.     Rhythm  The rhythm was normal sinus.Stress Testdate:2014 results:Impression  1. Myocardial perfusion imaging using single isotope technique  demonstrated no evidence of ischemia or infarction. A fixed inferior  defect is noted likely due to soft tissue attenuation.  2. Gated images demonstrated normal-sized left ventricle with normal  wall motion. The left ventricular systolic function is normal  ejection fraction 76%.  3. Compared to the prior study from  .ECG reviewed date:3/2019 results:NSR, RAD date: results:         (-) CAD, CHF and arrhythmias   METS/Exercise Tolerance:     Hematologic:         Musculoskeletal:         GI/Hepatic:     (+) GERD Asymptomatic on medication,      (-) liver disease   Renal/Genitourinary:     (+) chronic renal disease, type: CRI, Nephrolithiasis ,       Endo: Comment: BMI 32    (+) type II DM thyroid problem hypothyroidism, Obesity, .   (-) Type I DM   Psychiatric:     (+) psychiatric history anxiety      Infectious Disease:         Malignancy:         Other:                          Physical Exam  Normal systems: dental    Airway   Mallampati: II  TM distance: >3 FB  Neck ROM: full    Dental     Cardiovascular   Rhythm and rate: regular  (+) murmur       Pulmonary    breath sounds clear to auscultation            Lab Results   Component Value Date    WBC 5.5 02/27/2018    HGB 11.7 (A) 02/27/2018    HCT 35.7 02/27/2018     02/27/2018     03/04/2019    POTASSIUM 4.3 03/04/2019    CHLORIDE 101 03/04/2019    CO2 26 10/01/2015    BUN 20 03/04/2019    BUN 19 03/04/2019    CR 1.08 (H) 03/04/2019     (H) 03/04/2019    WARD 9.4 03/04/2019    ALBUMIN 4.3 02/05/2019    PROTTOTAL 8.1 02/05/2019    ALT 10 02/05/2019    AST 15 02/05/2019    ALKPHOS 52 02/05/2019    BILITOTAL 0.4 02/05/2019    LIPASE 154 08/04/2014    PTT 31 02/15/2006    INR 1.02 08/04/2014    T4 1.27 03/04/2019    T3 81 03/04/2019       Preop Vitals  BP Readings from Last 3  "Encounters:   03/08/19 153/73   03/04/19 126/80   02/05/19 118/78    Pulse Readings from Last 3 Encounters:   03/08/19 76   03/04/19 79   02/05/19 82      Resp Readings from Last 3 Encounters:   03/08/19 18   03/04/19 16   02/05/19 16    SpO2 Readings from Last 3 Encounters:   03/08/19 96%   03/04/19 97%   10/26/18 94%      Temp Readings from Last 1 Encounters:   03/08/19 36.2  C (97.2  F) (Temporal)    Ht Readings from Last 1 Encounters:   03/08/19 1.676 m (5' 6\")      Wt Readings from Last 1 Encounters:   03/08/19 89.6 kg (197 lb 8 oz)    Estimated body mass index is 31.88 kg/m  as calculated from the following:    Height as of this encounter: 1.676 m (5' 6\").    Weight as of this encounter: 89.6 kg (197 lb 8 oz).       Anesthesia Plan      History & Physical Review  History and physical reviewed and following examination; no interval change.    ASA Status:  3 .    NPO Status:  > 8 hours    Plan for General and ETT with Intravenous and Propofol induction. Maintenance will be Balanced.    PONV prophylaxis:  Ondansetron (or other 5HT-3) and Dexamethasone or Solumedrol       Postoperative Care  Postoperative pain management:  Multi-modal analgesia.      Consents  Anesthetic plan, risks, benefits and alternatives discussed with:  Patient..                 Dwaine Weiss MD  "

## 2019-03-08 NOTE — BRIEF OP NOTE
[facility] Urology Brief Operative Note    Pre-operative diagnosis: LEFT URETEROPELVIC STONE AND MILD LEFT HYDRONEPHROSIS   Post-operative diagnosis: Same and Left proximal ureteral stricture   Procedure: Procedure(s):  CYSTOSCOPY,LEFT RETROGRADE, LEFT STENT PLACEMENT   Surgeon: POWER DELANEY MD   Assistant(s): None   Anesthesia: General endotracheal anesthesia   Estimated blood loss: Less than 10 ml   Total IV fluids: (See anesthesia record)   Blood transfusion: No transfusion was given during surgery   Total urine output: (See anesthesia record)   Drains: None   Specimens: None   Implants: 6 Fr x 24 cm stent in Left ureter   Findings: Dense - 5 Fr x 4 mm - stricture in proximal ureter   Complications: None   Condition: Stable   Comments: See dictated operative report for full details

## 2019-03-09 NOTE — OP NOTE
Procedure Date: 03/08/2019      PREOPERATIVE DIAGNOSIS:  Left renal calculi.        POSTOPERATIVE DIAGNOSIS:  Left renal calculi, left proximal ureteral stricture.      PROCEDURE PERFORMED:  Cystoscopy, left retrograde pyelogram and left ureteral stent placement.      SURGEON:  Derrek Rivera MD      ANESTHESIA:  General.        ESTIMATED BLOOD LOSS:  3 mL.         SPECIMEN:  None.         COMPLICATIONS:  None.         FINDINGS:  Dense stricture in ureter, 5-Mexican by 4 mm.      INDICATIONS:  The patient is a 78-year-old female who has a history of recurrent kidney stones (85% calcium oxalate dihydrate and 15% calcium phosphate) who has had several procedures in the past.  Her last surgery was a left ureteroscopy with laser lithotripsy on 06/14/2016.  At that time she had a dense stricture in the proximal ureter.  She saw Dr. Arriaga in February with complaints of back pain.  A CT scan was performed on 02/14/2019 which showed mild-to-moderate hydronephrosis of the left kidney.  There appears to be a 1 cm stone at the ureteropelvic junction, two 4 mm stones in the mid pole of her left kidney, and 4 stones (from 2 to 4 mm) in the lower pole of the left kidney.  In addition, she had a 1.1 cm stone in the lower pole of her right kidney.  Discussed the treatment options and presents today to undergo cystoscopy with left ureteroscopy, laser lithotripsy, stone removal and stent placement.  The potential risk for significant stricture was discussed and need to have a stent without taking her stones out.  Other risks include bleeding, infection and stent irritation.      DESCRIPTION OF PROCEDURE:  The patient was brought to the operating room, placed in a supine position.  After undergoing general anesthesia, she was placed in a low lithotomy position.  A 23-Mexican cystoscope was in the bladder.  There were no urethral strictures.  Inspection of the bladder to the trigone.  There is 1+ trabeculation of the bladder, but no tumors  or erythema in the bladder.  A 0.035 Sensor was passed up the left ureter with aid of 8-Belizean ureteral catheter.  The cystoscope was removed.  Attempts were made to advance a dual lumen catheter up the ureter and met significant resistance in the proximal ureter.  Retrograde pyelogram was performed which showed a very narrow stricture in the proximal ureter appeared to be roughly 4 mm wide and roughly 5-Belizean in diameter.  A decision at this point was made to abort the uroscopy and plan to put a stent and come back in 2-3 weeks.  The Sensor wire was exchanged for a 0.035 Amplatz Super Stiff wire.  The scope was advanced in the bladder.  Attempts were made to place a 7-Belizean by 24 cm stent that could not be advanced through the strictured area.  Next, attempts were made to place a 6-Belizean by 24 cm stent.  This was advanced through the stricture into the kidney.  The stent was seen curled in the renal pelvis.  The wire was removed and the distal end was curled in the bladder.  The patient's bladder was emptied and cystoscope was removed.  She was put back in a supine position, woken anesthesia, transferred back to the recovery room in good condition.      PLAN:  The stent should allow dilation of the strictured area and hopefully allow us to perform uroscopy and that will be in 3 weeks' time.         POWER DELANEY MD             D: 2019   T: 2019   MT: ALVA      Name:     MAURI GRUBER   MRN:      [id number]        Account:        [id number]   :      1940           Procedure Date: 2019      Document: [id number]       cc: Marjan Pa MD

## 2019-03-19 RX ORDER — ALBUTEROL SULFATE 90 UG/1
2 AEROSOL, METERED RESPIRATORY (INHALATION) EVERY 6 HOURS
Qty: 8.5 G | Refills: 1 | Status: SHIPPED | OUTPATIENT
Start: 2019-03-19 | End: 2020-09-14

## 2019-03-27 RX ORDER — CEFAZOLIN SODIUM 2 G/100ML
2 INJECTION, SOLUTION INTRAVENOUS
Status: CANCELLED | OUTPATIENT
Start: 2019-03-28

## 2019-03-28 ENCOUNTER — HOSPITAL ENCOUNTER (OUTPATIENT)
Facility: CLINIC | Age: 79
Discharge: HOME | End: 2019-03-29
Attending: UROLOGY | Admitting: UROLOGY
Payer: COMMERCIAL

## 2019-03-28 ENCOUNTER — APPOINTMENT (OUTPATIENT)
Dept: GENERAL RADIOLOGY | Facility: CLINIC | Age: 79
End: 2019-03-28
Attending: UROLOGY
Payer: COMMERCIAL

## 2019-03-28 ENCOUNTER — SURGERY (OUTPATIENT)
Age: 79
End: 2019-03-28
Payer: COMMERCIAL

## 2019-03-28 ENCOUNTER — ANESTHESIA (OUTPATIENT)
Dept: SURGERY | Facility: CLINIC | Age: 79
End: 2019-03-28
Payer: COMMERCIAL

## 2019-03-28 ENCOUNTER — ANESTHESIA EVENT (OUTPATIENT)
Dept: SURGERY | Facility: CLINIC | Age: 79
End: 2019-03-28
Payer: COMMERCIAL

## 2019-03-28 DIAGNOSIS — Z98.890 POSTOPERATIVE STATE: Primary | ICD-10-CM

## 2019-03-28 PROBLEM — N20.0 KIDNEY STONE: Status: ACTIVE | Noted: 2019-03-28

## 2019-03-28 PROCEDURE — 36000056 ZZH SURGERY LEVEL 3 1ST 30 MIN: Performed by: UROLOGY

## 2019-03-28 PROCEDURE — 71000012 ZZH RECOVERY PHASE 1 LEVEL 1 FIRST HR: Performed by: UROLOGY

## 2019-03-28 PROCEDURE — 25000132 ZZH RX MED GY IP 250 OP 250 PS 637: Performed by: UROLOGY

## 2019-03-28 PROCEDURE — 37000008 ZZH ANESTHESIA TECHNICAL FEE, 1ST 30 MIN: Performed by: UROLOGY

## 2019-03-28 PROCEDURE — 71000013 ZZH RECOVERY PHASE 1 LEVEL 1 EA ADDTL HR: Performed by: UROLOGY

## 2019-03-28 PROCEDURE — 25000128 H RX IP 250 OP 636: Performed by: NURSE ANESTHETIST, CERTIFIED REGISTERED

## 2019-03-28 PROCEDURE — 25000125 ZZHC RX 250: Performed by: UROLOGY

## 2019-03-28 PROCEDURE — 36000058 ZZH SURGERY LEVEL 3 EA 15 ADDTL MIN: Performed by: UROLOGY

## 2019-03-28 PROCEDURE — 27210794 ZZH OR GENERAL SUPPLY STERILE: Performed by: UROLOGY

## 2019-03-28 PROCEDURE — 40000936 ZZH STATISTIC OUTPATIENT (NON-OBS) NIGHT

## 2019-03-28 PROCEDURE — 25000128 H RX IP 250 OP 636: Performed by: UROLOGY

## 2019-03-28 PROCEDURE — C1758 CATHETER, URETERAL: HCPCS | Performed by: UROLOGY

## 2019-03-28 PROCEDURE — 25800025 ZZH RX 258: Performed by: UROLOGY

## 2019-03-28 PROCEDURE — 25000125 ZZHC RX 250: Performed by: NURSE ANESTHETIST, CERTIFIED REGISTERED

## 2019-03-28 PROCEDURE — C1894 INTRO/SHEATH, NON-LASER: HCPCS | Performed by: UROLOGY

## 2019-03-28 PROCEDURE — 37000009 ZZH ANESTHESIA TECHNICAL FEE, EACH ADDTL 15 MIN: Performed by: UROLOGY

## 2019-03-28 PROCEDURE — 25500064 ZZH RX 255 OP 636: Performed by: UROLOGY

## 2019-03-28 PROCEDURE — C2617 STENT, NON-COR, TEM W/O DEL: HCPCS | Performed by: UROLOGY

## 2019-03-28 PROCEDURE — 25800030 ZZH RX IP 258 OP 636: Performed by: UROLOGY

## 2019-03-28 PROCEDURE — 25000566 ZZH SEVOFLURANE, EA 15 MIN: Performed by: UROLOGY

## 2019-03-28 PROCEDURE — 40000935 ZZH STATISTIC OUTPATIENT (NON-OBS) EVE

## 2019-03-28 PROCEDURE — C1769 GUIDE WIRE: HCPCS | Performed by: UROLOGY

## 2019-03-28 PROCEDURE — 40000170 ZZH STATISTIC PRE-PROCEDURE ASSESSMENT II: Performed by: UROLOGY

## 2019-03-28 PROCEDURE — 25800030 ZZH RX IP 258 OP 636: Performed by: ANESTHESIOLOGY

## 2019-03-28 PROCEDURE — 25800030 ZZH RX IP 258 OP 636: Performed by: NURSE ANESTHETIST, CERTIFIED REGISTERED

## 2019-03-28 PROCEDURE — 40000277 XR SURGERY CARM FLUORO LESS THAN 5 MIN W STILLS: Mod: TC

## 2019-03-28 PROCEDURE — 25000128 H RX IP 250 OP 636: Performed by: ANESTHESIOLOGY

## 2019-03-28 DEVICE — IMPLANTABLE DEVICE: Type: IMPLANTABLE DEVICE | Site: URETER | Status: FUNCTIONAL

## 2019-03-28 RX ORDER — LIDOCAINE HYDROCHLORIDE 20 MG/ML
INJECTION, SOLUTION INFILTRATION; PERINEURAL PRN
Status: DISCONTINUED | OUTPATIENT
Start: 2019-03-28 | End: 2019-03-28

## 2019-03-28 RX ORDER — HYDROCODONE BITARTRATE AND ACETAMINOPHEN 5; 325 MG/1; MG/1
1-2 TABLET ORAL EVERY 4 HOURS PRN
Qty: 15 TABLET | Refills: 0 | Status: SHIPPED | OUTPATIENT
Start: 2019-03-28 | End: 2019-06-05

## 2019-03-28 RX ORDER — FENTANYL CITRATE 50 UG/ML
INJECTION, SOLUTION INTRAMUSCULAR; INTRAVENOUS PRN
Status: DISCONTINUED | OUTPATIENT
Start: 2019-03-28 | End: 2019-03-28

## 2019-03-28 RX ORDER — LEVOTHYROXINE SODIUM 112 UG/1
112 TABLET ORAL DAILY
Status: DISCONTINUED | OUTPATIENT
Start: 2019-03-28 | End: 2019-03-29 | Stop reason: HOSPADM

## 2019-03-28 RX ORDER — SODIUM CHLORIDE, SODIUM LACTATE, POTASSIUM CHLORIDE, CALCIUM CHLORIDE 600; 310; 30; 20 MG/100ML; MG/100ML; MG/100ML; MG/100ML
INJECTION, SOLUTION INTRAVENOUS CONTINUOUS PRN
Status: DISCONTINUED | OUTPATIENT
Start: 2019-03-28 | End: 2019-03-28

## 2019-03-28 RX ORDER — GLYCOPYRROLATE 0.2 MG/ML
INJECTION, SOLUTION INTRAMUSCULAR; INTRAVENOUS PRN
Status: DISCONTINUED | OUTPATIENT
Start: 2019-03-28 | End: 2019-03-28

## 2019-03-28 RX ORDER — ALBUTEROL SULFATE 90 UG/1
2 AEROSOL, METERED RESPIRATORY (INHALATION) EVERY 6 HOURS PRN
Status: DISCONTINUED | OUTPATIENT
Start: 2019-03-28 | End: 2019-03-29 | Stop reason: HOSPADM

## 2019-03-28 RX ORDER — LIDOCAINE 40 MG/G
CREAM TOPICAL
Status: DISCONTINUED | OUTPATIENT
Start: 2019-03-28 | End: 2019-03-29 | Stop reason: HOSPADM

## 2019-03-28 RX ORDER — ONDANSETRON 2 MG/ML
INJECTION INTRAMUSCULAR; INTRAVENOUS PRN
Status: DISCONTINUED | OUTPATIENT
Start: 2019-03-28 | End: 2019-03-28

## 2019-03-28 RX ORDER — MEPERIDINE HYDROCHLORIDE 25 MG/ML
12.5 INJECTION INTRAMUSCULAR; INTRAVENOUS; SUBCUTANEOUS
Status: DISCONTINUED | OUTPATIENT
Start: 2019-03-28 | End: 2019-03-28

## 2019-03-28 RX ORDER — HYDROCODONE BITARTRATE AND ACETAMINOPHEN 5; 325 MG/1; MG/1
1 TABLET ORAL ONCE
Status: COMPLETED | OUTPATIENT
Start: 2019-03-28 | End: 2019-03-28

## 2019-03-28 RX ORDER — ACETAMINOPHEN 325 MG/1
650 TABLET ORAL ONCE
Status: DISCONTINUED | OUTPATIENT
Start: 2019-03-28 | End: 2019-03-28

## 2019-03-28 RX ORDER — MAGNESIUM HYDROXIDE 1200 MG/15ML
LIQUID ORAL PRN
Status: DISCONTINUED | OUTPATIENT
Start: 2019-03-28 | End: 2019-03-28 | Stop reason: HOSPADM

## 2019-03-28 RX ORDER — METFORMIN HCL 500 MG
500 TABLET, EXTENDED RELEASE 24 HR ORAL ONCE
Status: COMPLETED | OUTPATIENT
Start: 2019-03-28 | End: 2019-03-28

## 2019-03-28 RX ORDER — SODIUM CHLORIDE, SODIUM LACTATE, POTASSIUM CHLORIDE, CALCIUM CHLORIDE 600; 310; 30; 20 MG/100ML; MG/100ML; MG/100ML; MG/100ML
INJECTION, SOLUTION INTRAVENOUS CONTINUOUS
Status: DISCONTINUED | OUTPATIENT
Start: 2019-03-28 | End: 2019-03-28

## 2019-03-28 RX ORDER — ONDANSETRON 4 MG/1
4 TABLET, ORALLY DISINTEGRATING ORAL EVERY 30 MIN PRN
Status: DISCONTINUED | OUTPATIENT
Start: 2019-03-28 | End: 2019-03-28

## 2019-03-28 RX ORDER — ONDANSETRON 2 MG/ML
4 INJECTION INTRAMUSCULAR; INTRAVENOUS EVERY 6 HOURS PRN
Status: DISCONTINUED | OUTPATIENT
Start: 2019-03-28 | End: 2019-03-29 | Stop reason: HOSPADM

## 2019-03-28 RX ORDER — OXYBUTYNIN CHLORIDE 10 MG/1
10 TABLET, EXTENDED RELEASE ORAL DAILY
Status: DISCONTINUED | OUTPATIENT
Start: 2019-03-29 | End: 2019-03-29 | Stop reason: HOSPADM

## 2019-03-28 RX ORDER — PROCHLORPERAZINE MALEATE 5 MG
5 TABLET ORAL EVERY 6 HOURS PRN
Status: DISCONTINUED | OUTPATIENT
Start: 2019-03-28 | End: 2019-03-29 | Stop reason: HOSPADM

## 2019-03-28 RX ORDER — PROPOFOL 10 MG/ML
INJECTION, EMULSION INTRAVENOUS PRN
Status: DISCONTINUED | OUTPATIENT
Start: 2019-03-28 | End: 2019-03-28

## 2019-03-28 RX ORDER — NALOXONE HYDROCHLORIDE 0.4 MG/ML
.1-.4 INJECTION, SOLUTION INTRAMUSCULAR; INTRAVENOUS; SUBCUTANEOUS
Status: DISCONTINUED | OUTPATIENT
Start: 2019-03-28 | End: 2019-03-28

## 2019-03-28 RX ORDER — ONDANSETRON 2 MG/ML
4 INJECTION INTRAMUSCULAR; INTRAVENOUS EVERY 30 MIN PRN
Status: DISCONTINUED | OUTPATIENT
Start: 2019-03-28 | End: 2019-03-28

## 2019-03-28 RX ORDER — CEFUROXIME AXETIL 500 MG/1
500 TABLET ORAL 2 TIMES DAILY
Qty: 6 TABLET | Refills: 0 | Status: SHIPPED | OUTPATIENT
Start: 2019-03-28 | End: 2019-04-23

## 2019-03-28 RX ORDER — ACETAMINOPHEN 325 MG/1
650 TABLET ORAL EVERY 6 HOURS PRN
Status: DISCONTINUED | OUTPATIENT
Start: 2019-03-28 | End: 2019-03-29 | Stop reason: HOSPADM

## 2019-03-28 RX ORDER — ALBUTEROL SULFATE 90 UG/1
2 AEROSOL, METERED RESPIRATORY (INHALATION) EVERY 6 HOURS PRN
Status: DISCONTINUED | OUTPATIENT
Start: 2019-03-28 | End: 2019-03-28

## 2019-03-28 RX ORDER — TRIAMCINOLONE ACETONIDE 1 MG/G
OINTMENT TOPICAL 2 TIMES DAILY
Status: DISCONTINUED | OUTPATIENT
Start: 2019-03-28 | End: 2019-03-28

## 2019-03-28 RX ORDER — NEOSTIGMINE METHYLSULFATE 1 MG/ML
VIAL (ML) INJECTION PRN
Status: DISCONTINUED | OUTPATIENT
Start: 2019-03-28 | End: 2019-03-28

## 2019-03-28 RX ORDER — DEXTROSE MONOHYDRATE, SODIUM CHLORIDE, AND POTASSIUM CHLORIDE 50; 1.49; 4.5 G/1000ML; G/1000ML; G/1000ML
INJECTION, SOLUTION INTRAVENOUS CONTINUOUS
Status: DISCONTINUED | OUTPATIENT
Start: 2019-03-28 | End: 2019-03-29 | Stop reason: HOSPADM

## 2019-03-28 RX ORDER — CEFAZOLIN SODIUM 500 MG/2.2ML
INJECTION, POWDER, FOR SOLUTION INTRAMUSCULAR; INTRAVENOUS PRN
Status: DISCONTINUED | OUTPATIENT
Start: 2019-03-28 | End: 2019-03-28

## 2019-03-28 RX ORDER — HYDROCODONE BITARTRATE AND ACETAMINOPHEN 5; 325 MG/1; MG/1
1-2 TABLET ORAL EVERY 6 HOURS PRN
Status: DISCONTINUED | OUTPATIENT
Start: 2019-03-28 | End: 2019-03-29 | Stop reason: HOSPADM

## 2019-03-28 RX ORDER — HYDROMORPHONE HYDROCHLORIDE 1 MG/ML
.3-.5 INJECTION, SOLUTION INTRAMUSCULAR; INTRAVENOUS; SUBCUTANEOUS EVERY 10 MIN PRN
Status: DISCONTINUED | OUTPATIENT
Start: 2019-03-28 | End: 2019-03-28

## 2019-03-28 RX ORDER — ONDANSETRON 4 MG/1
4 TABLET, ORALLY DISINTEGRATING ORAL EVERY 6 HOURS PRN
Status: DISCONTINUED | OUTPATIENT
Start: 2019-03-28 | End: 2019-03-29 | Stop reason: HOSPADM

## 2019-03-28 RX ORDER — METFORMIN HCL 500 MG
1500 TABLET, EXTENDED RELEASE 24 HR ORAL
Status: DISCONTINUED | OUTPATIENT
Start: 2019-03-28 | End: 2019-03-29 | Stop reason: HOSPADM

## 2019-03-28 RX ORDER — CEFUROXIME AXETIL 500 MG/1
500 TABLET ORAL 2 TIMES DAILY
Status: DISCONTINUED | OUTPATIENT
Start: 2019-03-28 | End: 2019-03-29 | Stop reason: HOSPADM

## 2019-03-28 RX ORDER — NALOXONE HYDROCHLORIDE 0.4 MG/ML
.1-.4 INJECTION, SOLUTION INTRAMUSCULAR; INTRAVENOUS; SUBCUTANEOUS
Status: DISCONTINUED | OUTPATIENT
Start: 2019-03-28 | End: 2019-03-29 | Stop reason: HOSPADM

## 2019-03-28 RX ORDER — CEFAZOLIN SODIUM 1 G/3ML
INJECTION, POWDER, FOR SOLUTION INTRAMUSCULAR; INTRAVENOUS PRN
Status: DISCONTINUED | OUTPATIENT
Start: 2019-03-28 | End: 2019-03-28

## 2019-03-28 RX ORDER — HYDROMORPHONE HYDROCHLORIDE 1 MG/ML
0.2 INJECTION, SOLUTION INTRAMUSCULAR; INTRAVENOUS; SUBCUTANEOUS
Status: DISCONTINUED | OUTPATIENT
Start: 2019-03-28 | End: 2019-03-29 | Stop reason: HOSPADM

## 2019-03-28 RX ORDER — FENTANYL CITRATE 50 UG/ML
25-50 INJECTION, SOLUTION INTRAMUSCULAR; INTRAVENOUS
Status: DISCONTINUED | OUTPATIENT
Start: 2019-03-28 | End: 2019-03-28 | Stop reason: HOSPADM

## 2019-03-28 RX ORDER — DEXAMETHASONE SODIUM PHOSPHATE 4 MG/ML
INJECTION, SOLUTION INTRA-ARTICULAR; INTRALESIONAL; INTRAMUSCULAR; INTRAVENOUS; SOFT TISSUE PRN
Status: DISCONTINUED | OUTPATIENT
Start: 2019-03-28 | End: 2019-03-28

## 2019-03-28 RX ADMIN — FENTANYL CITRATE 50 MCG: 50 INJECTION, SOLUTION INTRAMUSCULAR; INTRAVENOUS at 13:44

## 2019-03-28 RX ADMIN — ONDANSETRON 4 MG: 2 INJECTION INTRAMUSCULAR; INTRAVENOUS at 16:23

## 2019-03-28 RX ADMIN — SODIUM CHLORIDE 38 ML GIVEN: 900 IRRIGANT IRRIGATION at 14:02

## 2019-03-28 RX ADMIN — DEXMEDETOMIDINE HYDROCHLORIDE 8 MCG: 100 INJECTION, SOLUTION INTRAVENOUS at 13:09

## 2019-03-28 RX ADMIN — CEFAZOLIN 2 G: 1 INJECTION, POWDER, FOR SOLUTION INTRAMUSCULAR; INTRAVENOUS at 13:21

## 2019-03-28 RX ADMIN — POTASSIUM CHLORIDE, DEXTROSE MONOHYDRATE AND SODIUM CHLORIDE: 150; 5; 450 INJECTION, SOLUTION INTRAVENOUS at 17:54

## 2019-03-28 RX ADMIN — FLUTICASONE FUROATE AND VILANTEROL TRIFENATATE 1 PUFF: 100; 25 POWDER RESPIRATORY (INHALATION) at 22:57

## 2019-03-28 RX ADMIN — SODIUM CHLORIDE, POTASSIUM CHLORIDE, SODIUM LACTATE AND CALCIUM CHLORIDE: 600; 310; 30; 20 INJECTION, SOLUTION INTRAVENOUS at 15:07

## 2019-03-28 RX ADMIN — HYDROCODONE BITARTRATE AND ACETAMINOPHEN 1 TABLET: 5; 325 TABLET ORAL at 21:06

## 2019-03-28 RX ADMIN — PROPOFOL 180 MG: 10 INJECTION, EMULSION INTRAVENOUS at 13:11

## 2019-03-28 RX ADMIN — HYDROCODONE BITARTRATE AND ACETAMINOPHEN 1 TABLET: 5; 325 TABLET ORAL at 15:45

## 2019-03-28 RX ADMIN — ONDANSETRON 4 MG: 2 INJECTION INTRAMUSCULAR; INTRAVENOUS at 17:55

## 2019-03-28 RX ADMIN — ONDANSETRON 4 MG: 2 INJECTION INTRAMUSCULAR; INTRAVENOUS at 20:57

## 2019-03-28 RX ADMIN — PHENYLEPHRINE HYDROCHLORIDE 150 MCG: 10 INJECTION, SOLUTION INTRAMUSCULAR; INTRAVENOUS; SUBCUTANEOUS at 13:58

## 2019-03-28 RX ADMIN — LIDOCAINE HYDROCHLORIDE 80 MG: 20 INJECTION, SOLUTION INFILTRATION; PERINEURAL at 13:10

## 2019-03-28 RX ADMIN — PROCHLORPERAZINE EDISYLATE 5 MG: 5 INJECTION INTRAMUSCULAR; INTRAVENOUS at 22:56

## 2019-03-28 RX ADMIN — PHENYLEPHRINE HYDROCHLORIDE 100 MCG: 10 INJECTION, SOLUTION INTRAMUSCULAR; INTRAVENOUS; SUBCUTANEOUS at 13:13

## 2019-03-28 RX ADMIN — NEOSTIGMINE METHYLSULFATE 4 MG: 1 INJECTION, SOLUTION INTRAVENOUS at 14:07

## 2019-03-28 RX ADMIN — SODIUM CHLORIDE, POTASSIUM CHLORIDE, SODIUM LACTATE AND CALCIUM CHLORIDE: 600; 310; 30; 20 INJECTION, SOLUTION INTRAVENOUS at 13:09

## 2019-03-28 RX ADMIN — METFORMIN HYDROCHLORIDE 1500 MG: 500 TABLET, EXTENDED RELEASE ORAL at 20:30

## 2019-03-28 RX ADMIN — SODIUM CHLORIDE 1000 ML: 900 IRRIGANT IRRIGATION at 13:24

## 2019-03-28 RX ADMIN — PHENYLEPHRINE HYDROCHLORIDE 100 MCG: 10 INJECTION, SOLUTION INTRAMUSCULAR; INTRAVENOUS; SUBCUTANEOUS at 13:31

## 2019-03-28 RX ADMIN — SODIUM CHLORIDE, POTASSIUM CHLORIDE, SODIUM LACTATE AND CALCIUM CHLORIDE: 600; 310; 30; 20 INJECTION, SOLUTION INTRAVENOUS at 16:25

## 2019-03-28 RX ADMIN — FENTANYL CITRATE 50 MCG: 50 INJECTION, SOLUTION INTRAMUSCULAR; INTRAVENOUS at 13:10

## 2019-03-28 RX ADMIN — METFORMIN HYDROCHLORIDE 500 MG: 500 TABLET, EXTENDED RELEASE ORAL at 20:56

## 2019-03-28 RX ADMIN — FENTANYL CITRATE 50 MCG: 50 INJECTION, SOLUTION INTRAMUSCULAR; INTRAVENOUS at 15:10

## 2019-03-28 RX ADMIN — CEFAZOLIN 2000 MG: 225 INJECTION, POWDER, FOR SOLUTION INTRAMUSCULAR; INTRAVENOUS at 13:20

## 2019-03-28 RX ADMIN — LEVOTHYROXINE SODIUM 112 MCG: 112 TABLET ORAL at 20:32

## 2019-03-28 RX ADMIN — DEXAMETHASONE SODIUM PHOSPHATE 4 MG: 4 INJECTION, SOLUTION INTRA-ARTICULAR; INTRALESIONAL; INTRAMUSCULAR; INTRAVENOUS; SOFT TISSUE at 13:27

## 2019-03-28 RX ADMIN — SODIUM CHLORIDE 3000 ML: 900 IRRIGANT IRRIGATION at 13:30

## 2019-03-28 RX ADMIN — OMEPRAZOLE 20 MG: 20 CAPSULE, DELAYED RELEASE ORAL at 20:32

## 2019-03-28 RX ADMIN — PHENYLEPHRINE HYDROCHLORIDE 100 MCG: 10 INJECTION, SOLUTION INTRAMUSCULAR; INTRAVENOUS; SUBCUTANEOUS at 13:26

## 2019-03-28 RX ADMIN — ROCURONIUM BROMIDE 40 MG: 10 INJECTION INTRAVENOUS at 13:12

## 2019-03-28 RX ADMIN — PROPOFOL 20 MG: 10 INJECTION, EMULSION INTRAVENOUS at 13:43

## 2019-03-28 RX ADMIN — ONDANSETRON 4 MG: 2 INJECTION INTRAMUSCULAR; INTRAVENOUS at 13:27

## 2019-03-28 RX ADMIN — Medication 0.5 MG: at 15:20

## 2019-03-28 RX ADMIN — PHENYLEPHRINE HYDROCHLORIDE 100 MCG: 10 INJECTION, SOLUTION INTRAMUSCULAR; INTRAVENOUS; SUBCUTANEOUS at 13:52

## 2019-03-28 RX ADMIN — GLYCOPYRROLATE 0.6 MG: 0.2 INJECTION, SOLUTION INTRAMUSCULAR; INTRAVENOUS at 14:06

## 2019-03-28 RX ADMIN — PHENYLEPHRINE HYDROCHLORIDE 100 MCG: 10 INJECTION, SOLUTION INTRAMUSCULAR; INTRAVENOUS; SUBCUTANEOUS at 13:24

## 2019-03-28 RX ADMIN — FENTANYL CITRATE 50 MCG: 50 INJECTION, SOLUTION INTRAMUSCULAR; INTRAVENOUS at 15:05

## 2019-03-28 RX ADMIN — Medication 0.5 MG: at 15:35

## 2019-03-28 RX ADMIN — DEXMEDETOMIDINE HYDROCHLORIDE 12 MCG: 100 INJECTION, SOLUTION INTRAVENOUS at 13:44

## 2019-03-28 RX ADMIN — CEFUROXIME AXETIL 500 MG: 500 TABLET ORAL at 20:31

## 2019-03-28 ASSESSMENT — COPD QUESTIONNAIRES
CAT_SEVERITY: MILD
COPD: 1

## 2019-03-28 ASSESSMENT — LIFESTYLE VARIABLES: TOBACCO_USE: 1

## 2019-03-28 NOTE — DISCHARGE INSTRUCTIONS
Same Day Surgery Discharge Instructions for  Sedation and General Anesthesia       It's not unusual to feel dizzy, light-headed or faint for up to 24 hours after surgery or while taking pain medication.  If you have these symptoms: sit for a few minutes before standing and have someone assist you when you get up to walk or use the bathroom.      You should rest and relax for the next 24 hours. We recommend you make arrangements to have an adult stay with you for at least 24 hours after your discharge.  Avoid hazardous and strenuous activity.      DO NOT DRIVE any vehicle or operate mechanical equipment for 24 hours following the end of your surgery.  Even though you may feel normal, your reactions may be affected by the medication you have received.      Do not drink alcoholic beverages for 24 hours following surgery.       Slowly progress to your regular diet as you feel able. It's not unusual to feel nauseated and/or vomit after receiving anesthesia.  If you develop these symptoms, drink clear liquids (apple juice, ginger ale, broth, 7-up, etc. ) until you feel better.  If your nausea and vomiting persists for 24 hours, please notify your surgeon.        All narcotic pain medications, along with inactivity and anesthesia, can cause constipation. Drinking plenty of liquids and increasing fiber intake will help.      For any questions of a medical nature, call your surgeon.      Do not make important decisions for 24 hours.      If you had general anesthesia, you may have a sore throat for a couple of days related to the breathing tube used during surgery.  You may use Cepacol lozenges to help with this discomfort.  If it worsens or if you develop a fever, contact your surgeon.       If you feel your pain is not well managed with the pain medications prescribed by your surgeon, please contact your surgeon's office to let them know so they can address your concerns.       Cystoscopy, Holmium Laser with Stent  Placement Discharge Instructions    Holmium laser lithotripsy was used to break up your kidney stone(s). It is normal to have visible blood in the urine, burning, urgency and frequency following this procedure. These symptoms may last for a few days to weeks.     Diet:    To help pass stone fragments and clear the blood out of the urine, it is important to drink 6-8 glasses of fluids per day at home - at least 3-4 glasses should be water.       Return to the diet that you were on before the procedure, unless you are given specific diet instructions.    Activity:    Walk short distances and increase as your strength allows.    You may climb stairs.    Do not do strenuous exercise or heavy lifting until approved by surgeon.    Do not drive while taking narcotic pain medications.    Bathing:    You may take a shower.          Stent information    During surgery, a stent was placed in the ureter.  The ureter is the tube that drains urine from the kidney to the bladder.  The stent is placed to dilate (open) the ureter so the stone fragments can pass easily through the ureter or to decrease ureteral swelling after surgery, or to relieve an obstruction. The stent is made of rubber. The upper end of the stent curls in the kidney while the lower end rests in the bladder.    While the stent is in place you may experience the following symptoms:    Blood and/or small blood clots in urine.    Bladder spasm (frequency and urgency of urination).    Discomfort or aching in the back or side where the stent is.    Burning or discomfort at the end of urine stream.    To decrease these symptoms you should:    Take pain medication as prescribed.    Drink plenty of fluids.    If you experience pain at the end of urination try not emptying your bladder completely.    If having discomfort in back or side, decrease activity.    Call your physician if these signs/symptoms are present:    Pain that is not relieved by a short rest or ordered  pain medications.    Temperature at or above 101.0 F or chills.    Inability or difficulty urinating.     Excessive blood in urine.    Any questions or concerns.    **If you have questions or concerns about your procedure,   call Dr. Rivera at [phone]**

## 2019-03-28 NOTE — ANESTHESIA PREPROCEDURE EVALUATION
Anesthesia Pre-Procedure Evaluation    Patient: Macie Jorge   MRN: [id number] : 1940          Preoperative Diagnosis: LEFT RENAL STONE    Procedure(s):  COMBINED CYSTOSCOPY,LEFT  RETROGRADE PYELOGRAM ; LASER HOLMIUM LITHOTRIPSY; LEFT URETERAL STENT PLACEMENT (digital flexible scope)    Past Medical History:   Diagnosis Date     Calculus of left ureter     dr Arriaga - ureteroscopy and lithitripsy     Cataract      Chronic low back pain     Dr Villagran at [facility] in - not surgical      COPD with asthma (H)      followed with pulmonary DR LENNOX 60-pack-year history of smoking     Esophagitis, reflux 2012    SOME EOSINOPHILIA NO BARRETS     Gastro-oesophageal reflux disease      has seen GI     Hydronephrosis, left     chronic , in  Dr Arriaga plan was observaton     Hyperlipidaemia LDL goal < 100      Hypothyroid      Migraine headaches 2011     Moderate persistent asthma      lifelong, saw Pulm 2013     Obesity (BMI 30-39.9)      Panic attacks      Renal stones 2016    kidney stones, multiple small stones high risk recurrent ureteral stones. , Dr Arriaga in  rec 24 hour urine.     Type 2 diabetes mellitus without complication (H)      Past Surgical History:   Procedure Laterality Date     ARTHROPLASTY HIP  3/13/2013    Procedure: ARTHROPLASTY HIP;  LEFT TOTAL HIP ARTHROPLASTY (BIOMET)^ ;  Surgeon: Anand Main MD;  Location:  OR     ARTHROPLASTY PATELLO-FEMORAL (KNEE)  2005ish    Left     CHOLECYSTECTOMY, OPEN  27 yo     COMBINED CYSTOSCOPY, INSERT STENT URETER(S)  2014    Procedure: COMBINED CYSTOSCOPY, INSERT STENT URETER(S);  Surgeon: Sam Arriaga MD;  Location:  OR     COMBINED CYSTOSCOPY, RETROGRADES, URETEROSCOPY, LASER HOLMIUM LITHOTRIPSY URETER(S), INSERT STENT Left 2016    Procedure: COMBINED CYSTOSCOPY, RETROGRADES, URETEROSCOPY, LASER HOLMIUM LITHOTRIPSY URETER(S), INSERT STENT;  Surgeon: Thomas Edmondson MD;  Location:  OR     CYSTOSCOPY, RETROGRADES,  INSERT STENT URETER(S), COMBINED Left 3/8/2019    Procedure: CYSTOSCOPY,LEFT RETROGRADE, LEFT STENT PLACEMENT;  Surgeon: Derrek Rivera MD;  Location:  OR     GENITOURINARY SURGERY       LAMINECT/DISCECTOMY, LUMBAR      Laminectomy/Discectomy Lumbar     LASER HOLMIUM LITHOTRIPSY URETER(S), INSERT STENT, COMBINED Left 8/20/2014    Procedure: COMBINED CYSTOSCOPY, URETEROSCOPY, LASER HOLMIUM LITHOTRIPSY URETER(S), INSERT STENT;  Surgeon: Sam Arriaga MD;  Location:  OR     ORTHOPEDIC SURGERY      left ankle     RECESSION EYELID UPPER         Anesthesia Evaluation     . Pt has had prior anesthetic.     No history of anesthetic complications          ROS/MED HX    ENT/Pulmonary:     (+)tobacco use, Current use asthma mild COPD, , . .   (-) sleep apnea   Neurologic:       Cardiovascular:     (+) ----. : . . . :. valvular problems/murmurs .       METS/Exercise Tolerance:     Hematologic:         Musculoskeletal:         GI/Hepatic:     (+) GERD       Renal/Genitourinary:     (+) chronic renal disease, Nephrolithiasis ,       Endo:     (+) type II DM thyroid problem Obesity, .      Psychiatric:         Infectious Disease:         Malignancy:         Other:                          Physical Exam  Normal systems: dental    Airway   Mallampati: II  TM distance: >3 FB  Neck ROM: full    Dental     Cardiovascular   Rhythm and rate: regular and normal      Pulmonary    breath sounds clear to auscultation            Lab Results   Component Value Date    WBC 5.5 02/27/2018    HGB 11.7 (A) 02/27/2018    HCT 35.7 02/27/2018     02/27/2018     03/04/2019    POTASSIUM 4.3 03/04/2019    CHLORIDE 101 03/04/2019    CO2 26 10/01/2015    BUN 20 03/04/2019    BUN 19 03/04/2019    CR 1.08 (H) 03/04/2019     (H) 03/04/2019    WARD 9.4 03/04/2019    ALBUMIN 4.3 02/05/2019    PROTTOTAL 8.1 02/05/2019    ALT 10 02/05/2019    AST 15 02/05/2019    ALKPHOS 52 02/05/2019    BILITOTAL 0.4 02/05/2019    LIPASE 154  "08/04/2014    PTT 31 02/15/2006    INR 1.02 08/04/2014    T4 1.27 03/04/2019    T3 81 03/04/2019       Preop Vitals  BP Readings from Last 3 Encounters:   03/08/19 145/74   03/04/19 126/80   02/05/19 118/78    Pulse Readings from Last 3 Encounters:   03/08/19 67   03/04/19 79   02/05/19 82      Resp Readings from Last 3 Encounters:   03/08/19 12   03/04/19 16   02/05/19 16    SpO2 Readings from Last 3 Encounters:   03/08/19 95%   03/04/19 97%   10/26/18 94%      Temp Readings from Last 1 Encounters:   03/08/19 35.2  C (95.4  F)    Ht Readings from Last 1 Encounters:   03/08/19 1.676 m (5' 6\")      Wt Readings from Last 1 Encounters:   03/08/19 89.6 kg (197 lb 8 oz)    Estimated body mass index is 31.88 kg/m  as calculated from the following:    Height as of 3/8/19: 1.676 m (5' 6\").    Weight as of 3/8/19: 89.6 kg (197 lb 8 oz).       Anesthesia Plan      History & Physical Review  History and physical reviewed and following examination; no interval change.    ASA Status:  2 .        Plan for General and ETT with Intravenous induction. Maintenance will be Balanced.    PONV prophylaxis:  Ondansetron (or other 5HT-3) and Dexamethasone or Solumedrol       Postoperative Care  Postoperative pain management:  IV analgesics and Oral pain medications.      Consents  Anesthetic plan, risks, benefits and alternatives discussed with:  Patient..                 Jeanette Ruelas  "

## 2019-03-28 NOTE — OR NURSING
"In PACU when awoken c/o dalila n 10/10- admin  Fentanyl 50 mcgX2- not effective- admin dilaudid 0.5 IV- pain control more effective- however shortly after c/o pain returning- pain to right flank- \sharp- checked flank- palpated- soft- no hardened areas noted- admin additional dilaudid 0.5 mg IV- pain decreases- pt does desat to 85% RA- encouraged DB&C use of I/S- says back to greater than 93% ra however at attempting to get pt up to be dressed for discharge- pt states \"Oh I cant move- the pain is terrible\" also c/o nausea-pt states \"I cant go home like this.\" Dr Rivera called- Dr morillo at bedside- talks to pt- pt will be admitted for overnight observation.   "

## 2019-03-28 NOTE — ANESTHESIA CARE TRANSFER NOTE
Patient: Macie Jorge    Procedure(s):  CYSTOSCOPY, LEFT RETROGRADE PYLEOGRAM, LEFT URETEROSCOPY, HOLMIUM LASER LITHOTRIPSY, LEFT URETERAL STENT EXCHANGE    Diagnosis: LEFT RENAL STONE  Diagnosis Additional Information: No value filed.    Anesthesia Type:   General, ETT     Note:  Airway :Face Mask  Patient transferred to:PACU  Comments: Vitals stable, exchanging well.      Vitals: (Last set prior to Anesthesia Care Transfer)    CRNA VITALS  3/28/2019 1348 - 3/28/2019 1423      3/28/2019             Pulse:  36  (Abnormal)     SpO2:  100 %    Resp Rate (observed):  1  (Abnormal)     Resp Rate (set):  10                Electronically Signed By: WILEY Dill CRNA  March 28, 2019  2:23 PM

## 2019-03-28 NOTE — BRIEF OP NOTE
[facility] Urology Brief Operative Note    Pre-operative diagnosis: Left kidney stones   Post-operative diagnosis: Same / blood clot   Procedure: Procedure(s):  CYSTOSCOPY, LEFT RETROGRADE PYLEOGRAM, LEFT URETEROSCOPY, HOLMIUM LASER LITHOTRIPSY, LEFT URETERAL STENT EXCHANGE   Surgeon: POWER DELANEY MD   Assistant(s): None   Anesthesia: General endotracheal anesthesia   Estimated blood loss: Less than 10 ml   Total IV fluids: (See anesthesia record)   Blood transfusion: No transfusion was given during surgery   Total urine output: (See anesthesia record)   Drains: None   Specimens: None   Implants: 8 Fr x 24 cm stent in Left ureter   Findings: Large blood clot in renal pelvis - obstructing vision   Complications: Large blood clot in renal pelvis obstructing visualization and causing termination of surgery   Condition: Stable   Comments: See dictated operative report for full details

## 2019-03-28 NOTE — ANESTHESIA POSTPROCEDURE EVALUATION
Patient: Macie Jorge    Procedure(s):  CYSTOSCOPY, LEFT RETROGRADE PYLEOGRAM, LEFT URETEROSCOPY, HOLMIUM LASER LITHOTRIPSY, LEFT URETERAL STENT EXCHANGE    Diagnosis:LEFT RENAL STONE  Diagnosis Additional Information: No value filed.    Anesthesia Type:  General, ETT    Note:  Anesthesia Post Evaluation    Patient location during evaluation: PACU  Patient participation: Able to fully participate in evaluation  Level of consciousness: awake  Pain management: adequate  Airway patency: patent  Cardiovascular status: acceptable  Respiratory status: acceptable  Hydration status: acceptable  PONV: none     Anesthetic complications: None          Last vitals:  Vitals:    03/28/19 1223 03/28/19 1420 03/28/19 1430   BP: 145/62 132/62 121/58   Pulse:  90    Resp: 16 20 20   Temp: 36.4  C (97.5  F) 35.9  C (96.6  F) 35.9  C (96.6  F)   SpO2:  99% 99%         Electronically Signed By: Jeanette Ruelas  March 28, 2019  3:00 PM

## 2019-03-29 VITALS
DIASTOLIC BLOOD PRESSURE: 62 MMHG | RESPIRATION RATE: 16 BRPM | SYSTOLIC BLOOD PRESSURE: 127 MMHG | WEIGHT: 198 LBS | TEMPERATURE: 97.4 F | BODY MASS INDEX: 31.96 KG/M2 | HEART RATE: 70 BPM | OXYGEN SATURATION: 92 %

## 2019-03-29 PROCEDURE — 25800030 ZZH RX IP 258 OP 636: Performed by: UROLOGY

## 2019-03-29 PROCEDURE — 25000128 H RX IP 250 OP 636: Performed by: UROLOGY

## 2019-03-29 PROCEDURE — 25000132 ZZH RX MED GY IP 250 OP 250 PS 637: Performed by: UROLOGY

## 2019-03-29 PROCEDURE — 40000934 ZZH STATISTIC OUTPATIENT (NON-OBS) DAY

## 2019-03-29 RX ORDER — CALCIUM CARBONATE 500 MG/1
1000 TABLET, CHEWABLE ORAL EVERY 4 HOURS PRN
Status: DISCONTINUED | OUTPATIENT
Start: 2019-03-29 | End: 2019-03-29 | Stop reason: HOSPADM

## 2019-03-29 RX ADMIN — CEFUROXIME AXETIL 500 MG: 500 TABLET ORAL at 07:53

## 2019-03-29 RX ADMIN — OXYBUTYNIN CHLORIDE 10 MG: 10 TABLET, EXTENDED RELEASE ORAL at 07:53

## 2019-03-29 RX ADMIN — HYDROMORPHONE HYDROCHLORIDE 0.2 MG: 1 INJECTION, SOLUTION INTRAMUSCULAR; INTRAVENOUS; SUBCUTANEOUS at 01:09

## 2019-03-29 RX ADMIN — HYDROCODONE BITARTRATE AND ACETAMINOPHEN 2 TABLET: 5; 325 TABLET ORAL at 03:05

## 2019-03-29 RX ADMIN — HYDROCODONE BITARTRATE AND ACETAMINOPHEN 1 TABLET: 5; 325 TABLET ORAL at 08:50

## 2019-03-29 RX ADMIN — POTASSIUM CHLORIDE, DEXTROSE MONOHYDRATE AND SODIUM CHLORIDE: 150; 5; 450 INJECTION, SOLUTION INTRAVENOUS at 03:14

## 2019-03-29 RX ADMIN — LEVOTHYROXINE SODIUM 112 MCG: 112 TABLET ORAL at 07:53

## 2019-03-29 RX ADMIN — OMEPRAZOLE 20 MG: 20 CAPSULE, DELAYED RELEASE ORAL at 07:53

## 2019-03-29 RX ADMIN — FLUTICASONE FUROATE AND VILANTEROL TRIFENATATE 1 PUFF: 100; 25 POWDER RESPIRATORY (INHALATION) at 07:57

## 2019-03-29 RX ADMIN — CALCIUM CARBONATE (ANTACID) CHEW TAB 500 MG 1000 MG: 500 CHEW TAB at 01:15

## 2019-03-29 NOTE — PLAN OF CARE
PT A&Ox4, SBA, advanced diet to regular for tomorrow morning, had 3 episodes of nausea of vomiting throughout the shift - controlled with Zofran. Voiding adequately, but bloody urine. IV fluids running. 1L of oxygen, continuous pulse ox. Breo inhaler ordered but is not up yet. Pending discharge tomorrow. Will continue to monitor.

## 2019-03-29 NOTE — PLAN OF CARE
VSS. Tele NSR. A&O x4. C/o back pain. IV dilaudid given x1 and two tablets of NORCO given with improvement. Heat pack applied. Pt slept soundly. Urine red/bloody. Denies passing flatus. No nausea overnight. TUMS given per pt request. Up SBA to bathroom. Likely discharge today.

## 2019-03-29 NOTE — OP NOTE
Procedure Date: 03/28/2019      PREOPERATIVE DIAGNOSIS:  Left renal calculi.      POSTOPERATIVE DIAGNOSIS:  Left renal calculi -- large renal pelvic blood clot.      PROCEDURE:  Cystoscopy, left retrograde pyelogram, left ureteroscopy with laser lithotripsy and left ureteral stent exchange.      OPERATING SURGEON:  Derrek Rivera MD      ANESTHESIA:  General.      ESTIMATED BLOOD LOSS:  5 mL.      SPECIMENS:  None.      COMPLICATIONS:  Unable to complete the procedure due to a blood clot obscuring view during surgery.      FINDINGS:  A previous proximal ureteral stricture was now open.  However, ureteroscopy revealed a large clot in the renal pelvis which obscured the view of the stones and prevented completion of her surgery.      INDICATIONS:  The patient is a 78-year-old female who has history of kidney stones (85% calcium oxalate dihydrate and 50% calcium phosphate).  She has had multiple surgeries in the past for stones.  Her last surgery was a left ureteroscopy with laser lithotripsy on 06/14/2016.  At that time, she had a dense stricture in the proximal ureter.  She had left back pain in February.  She was seen by Dr. Arriaga and a CT scan was performed on 02/14/2019.  This showed mild to moderate hydronephrosis of the left kidney.  There appears to be a 2 (4-5 mm) stones at the left ureteropelvic junction, 2 (4 mm) stones in the mid pole of left kidney and 4 (2-4 mm) stones lower pole of the left kidney.  In addition, she has a 1.1 cm stone in the lower pole of right kidney.  She had an attempted ureteroscopy and stent placement on 03/08/2019.  She presents today to undergo repeat left ureteroscopy with laser lithotripsy, stone removal, and stent change.  The risks and benefits were discussed with  the patient.      PROCEDURE:  The patient was brought to the operating room, placed in a supine position.  After undergoing general anesthetic, she was placed in a low lithotomy position.  Her genitalia was prepped and  draped in the usual sterile fashion.  A 21-Greek rigid cystoscope was inserted in the bladder.  There were no urethral lesions or strictures.  A stent could be seen curled in the left ureteral orifice.  A 2-prong grasper was used to grab the stent and was brought to the urethral opening.  A 0.035 Sensor wire was passed up the ureteral stent in the ureter and the stent was removed.  Retrograde pyelogram was performed which showed that the wire was not completely in the kidney.  A Glidewire was passed up the ureteral catheter.  I was able to advance it up and into the kidney.  The ureteral catheter was advanced over the Glidewire into the kidney.  A retrograde pyelogram confirmed this.  A 0.035 Sensor wire was then passed through the ureteral catheter into the kidney.  The ureteral catheter was removed.  The cystoscope was removed.  A dual-lumen catheter was advanced over the Sensor wire into the proximal left ureter.  An Amplatz Super Stiff wire was then passed through the dual lumen catheter into the kidney.  The lumen catheter was removed.  An 11/13 Greek ureteral sheath was then advanced up in the proximal ureter under fluoroscopic guidance.  The Amplatz Super Stiff wire was then removed.      The digital flexible ureteroscope was then advanced through the sheath up into the kidney.  Once we get past the ureteropelvic junction, a large clot was present in the renal pelvis.  Attempts were made to remove this out of the way as well as to explore the kidney.  Due to the large clot and hematuria, visualization was very poor.  Contrast was injected into the kidney to see different locations.  In the lower pole, I did see 4 stones (ranging in size from 2-4 mm).  A 200 micron laser was then passed through the ureteroscope.  Holmium laser was used to fragment a stones.  After 1 stone was fragmented, hematuria persisted, and visualization was so bad I could no longer see the stones.  Decision was made to abort the  procedure at this point.  Contrast injection of the kidney which shows roughly 2/3 renal pelvis was clot.  The ureteroscope was removed.  There were no stones in the ureter.      The cystoscope was again inserted in the bladder.  An 8-Maori x 24 cm stent was then placed in the left ureter.  The stent could be seen curled in the kidney and the bladder.  The patient's bladder was emptied, and cystoscope was removed.  She was put back in a supine position, awoken from anesthesia, and transferred back recovery room in good condition.  Only complication was having to abort the procedure.        PLAN:  We will need to drain her kidney for several weeks to let the clot dissolve.  We will plan to check a noncontrast CT scan in 3 weeks to make sure the clot is gone, then proceed with repeat left ureteroscopy, laser lithotripsy, stone removal and stent exchange.         POWER DELANEY MD             D: 2019   T: 2019   MT: CHRIS      Name:     MAURI GRUBER   MRN:      [id number]        Account:        [id number]   :      1940           Procedure Date: 2019      Document: [id number]       cc: Marjan Pa MD       [facility]

## 2019-03-29 NOTE — PROGRESS NOTES
[facility]    Urology Progress Note     Assessment & Plan   Macie Jorge is a 78 year old female who was admitted on 3/28/2019. POD #1 left ureteroscopy-- stable.      Plan:   -Right flank pain and n/v improved this AM   -Regular diet  -Ambulate   -Plan for discharge home midday   -our office to coordinate 3 week follow up with repeat CT scan   -She is aware she will likely have hematuria for several weeks     Anabella Riley PA-C  [facility]   [email or web address]  Text Page (7:30am to 4:30pm)    Interval History   Postop n/v and right flank pain-- now improved  Voiding well, urine very bloody per pt   Ambulating without issue     AVSS    Physical Exam   Temp: 97.1  F (36.2  C) Temp src: Oral BP: 141/70 Pulse: 70 Heart Rate: 90 Resp: 16 SpO2: 92 % O2 Device: None (Room air) Oxygen Delivery: 1 LPM  Vitals:    03/28/19 1223   Weight: 89.8 kg (198 lb)     Vital Signs with Ranges  Temp:  [95.9  F (35.5  C)-97.5  F (36.4  C)] 97.1  F (36.2  C)  Pulse:  [69-90] 70  Heart Rate:  [65-90] 90  Resp:  [9-24] 16  BP: (120-173)/(58-98) 141/70  SpO2:  [92 %-99 %] 92 %  I/O last 3 completed shifts:  In: 3320 [P.O.:560; I.V.:2760]  Out: 1555 [Urine:1400; Emesis/NG output:150; Blood:5]    Sitting up in bed, NAD  Breathing unlabored   abd soft, mildly tender, ND  Mild bilateral CVAT   No LE edema   A&Ox3    Medications     dextrose 5% and 0.45% NaCl + KCl 20 mEq/L 100 mL/hr at 03/29/19 0314       cefuroxime  500 mg Oral BID     fluticasone-vilanterol  1 puff Inhalation Daily     levothyroxine  112 mcg Oral Daily     metFORMIN  1,500 mg Oral Daily with supper     omeprazole  20 mg Oral BID     oxybutynin ER  10 mg Oral Daily     sodium chloride (PF)  3 mL Intracatheter Q8H       Data   No results found for this or any previous visit (from the past 24 hour(s)).

## 2019-03-29 NOTE — PLAN OF CARE
Pt a/o. VSS. Pain controlled. Tolerating diet. Up adllib. Discharge paper work reviewed. Verbalizes understanding. PIV removed. Follow up aware. Discharge meds given. Verified 5 rights of med. Copy signed. Discharge home with family/

## 2019-04-20 ENCOUNTER — TRANSFERRED RECORDS (OUTPATIENT)
Dept: FAMILY MEDICINE | Facility: CLINIC | Age: 79
End: 2019-04-20

## 2019-04-22 ENCOUNTER — TRANSFERRED RECORDS (OUTPATIENT)
Dept: FAMILY MEDICINE | Facility: CLINIC | Age: 79
End: 2019-04-22

## 2019-04-23 ENCOUNTER — OFFICE VISIT (OUTPATIENT)
Dept: FAMILY MEDICINE | Facility: CLINIC | Age: 79
End: 2019-04-23

## 2019-04-23 VITALS
DIASTOLIC BLOOD PRESSURE: 82 MMHG | HEART RATE: 78 BPM | RESPIRATION RATE: 16 BRPM | SYSTOLIC BLOOD PRESSURE: 140 MMHG | OXYGEN SATURATION: 99 %

## 2019-04-23 DIAGNOSIS — M54.50 CHRONIC BILATERAL LOW BACK PAIN WITHOUT SCIATICA: ICD-10-CM

## 2019-04-23 DIAGNOSIS — N18.30 TYPE 2 DIABETES MELLITUS WITH STAGE 3 CHRONIC KIDNEY DISEASE, WITHOUT LONG-TERM CURRENT USE OF INSULIN (H): ICD-10-CM

## 2019-04-23 DIAGNOSIS — Z01.818 PREOP GENERAL PHYSICAL EXAM: Primary | ICD-10-CM

## 2019-04-23 DIAGNOSIS — E11.22 TYPE 2 DIABETES MELLITUS WITH STAGE 3 CHRONIC KIDNEY DISEASE, WITHOUT LONG-TERM CURRENT USE OF INSULIN (H): ICD-10-CM

## 2019-04-23 DIAGNOSIS — N20.0 KIDNEY STONE: ICD-10-CM

## 2019-04-23 DIAGNOSIS — G89.29 CHRONIC BILATERAL LOW BACK PAIN WITHOUT SCIATICA: ICD-10-CM

## 2019-04-23 PROCEDURE — 99215 OFFICE O/P EST HI 40 MIN: CPT | Performed by: FAMILY MEDICINE

## 2019-04-23 RX ORDER — OXYCODONE HCL 10 MG/1
10 TABLET, FILM COATED, EXTENDED RELEASE ORAL EVERY 12 HOURS
Qty: 30 TABLET | Refills: 0 | Status: ON HOLD | OUTPATIENT
Start: 2019-04-23 | End: 2019-06-13

## 2019-04-23 RX ORDER — OXYCODONE HYDROCHLORIDE 15 MG/1
15 TABLET, FILM COATED, EXTENDED RELEASE ORAL EVERY 12 HOURS
Qty: 30 TABLET | Refills: 0 | Status: SHIPPED | OUTPATIENT
Start: 2019-04-23 | End: 2019-04-23

## 2019-04-23 NOTE — PROGRESS NOTES
Received fax from [facility] pharmacy that patient's rx for oxycontin ER needs PA. Submitted through [employer]. Will wait for response.

## 2019-04-23 NOTE — PROGRESS NOTES
[facility]  [address] [place] [id number] [phone]  Dept: [phone]    PRE-OP EVALUATION:  Today's date: 2019    Macie Jorge (: 1940) presents for pre-operative evaluation assessment as requested by Derrek Doyle MD .  She requires evaluation and anesthesia risk assessment prior to undergoing surgery/procedure for treatment of CYSTOSCOPY, LEFT URETEROSCOPY, HOLMIUM LASER LITHOTRIPSY, STONE REMOVAL, LEFT STENT EXCHANGE .    Proposed Surgery/ Procedure: CYSTOSCOPY, LEFT URETEROSCOPY, HOLMIUM LASER LITHOTRIPSY, STONE REMOVAL, LEFT STENT EXCHANGE  Date of Surgery/ Procedure: 2019  Time of Surgery/ Procedure: [address] [place] [address]/Surgical Facility: [facility]  Primary Physician: Marjan Pa  Type of Anesthesia Anticipated: to be determined    Patient has a Health Care Directive or Living Will:  YES    1. NO - Do you have a history of heart attack, stroke, stent, bypass or surgery on an artery in the head, neck, heart or legs?  2. NO - Do you ever have any pain or discomfort in your chest?  3. NO - Do you have a history of  Heart Failure?  4. NO - Are you troubled by shortness of breath when: walking on the level, up a slight hill or at night?  5. NO - Do you currently have a cold, bronchitis or other respiratory infection?  6. NO - Do you have a cough, shortness of breath or wheezing?  7. NO - Do you sometimes get pains in the calves of your legs when you walk?  8. NO - Do you or anyone in your family have previous history of blood clots?  9. NO - Do you or does anyone in your family have a serious bleeding problem such as prolonged bleeding following surgeries or cuts?  10. NO - Have you ever had problems with anemia or been told to take iron pills?  11. NO - Have you had any abnormal blood loss such as black, tarry or bloody stools, or abnormal vaginal bleeding?  12. NO - Have you ever had a blood transfusion?  13. NO - Have you or any of your  relatives ever had problems with anesthesia?  14. NO - Do you have sleep apnea, excessive snoring or daytime drowsiness?  15. NO - Do you have any prosthetic heart valves?  16. YES- Do you have prosthetic joints? Left knee, hip, and ankle  17. NO - Is there any chance that you may be pregnant?      HPI:     HPI related to upcoming procedure: Recurrent issues with kidney stones for 2 years, most recently with need for stent and had a failed attempt at lithotripsy due to blood clot.       COPD - Patient has a longstanding history of moderate-severe COPD . Patient has been doing well overall noting NO SYMPTOMS and continues on medication regimen consisting of Advair 250/50 one puff BID without adverse reactions or side effects. She has ProAir as needed for rescue inhaler.                                                                                                      .  DIABETES - Patient has a longstanding history of Diabetes Type II . Patient is being treated with diet and oral agents and denies significant side effects. Control has been good. Complicating factors include but are not limited to: hyperlipidemia. Her last A1c was 5.9 on 2/5/19.                                                                                                                   HYPERLIPIDEMIA - Patient has a long history of significant Hyperlipidemia requiring medication for treatment with recent good control. Patient reports no problems or side effects with the medication.                                                                                                                                                                 HYPOTHYROIDISM - Patient has a longstanding history of chronic Hypothyroidism. Patient has been doing well, noting no tremor, insomnia, hair loss or changes in skin texture. Continues to take medications as directed, without adverse reactions or side effects. Last TSH was 3.65 on 3/4/19.                                                                                                                                                                                                                       .    MEDICAL HISTORY:     Patient Active Problem List    Diagnosis Date Noted     Kidney stone 03/28/2019     Priority: Medium     Chronic midline low back pain without sciatica 02/05/2019     Priority: Medium     Physical deconditioning 02/05/2019     Priority: Medium     Spinal stenosis of lumbar region with neurogenic claudication 02/05/2019     Priority: Medium     Hypothyroidism due to acquired atrophy of thyroid 02/05/2019     Priority: Medium     History of colonic polyps 02/27/2018     Priority: Medium     Altered mental status 12/01/2017     Priority: Medium     Acquired hypothyroidism 03/21/2017     Priority: Medium     COPD with asthma (H)      Priority: Medium      followed with pulmonary DR LENNOX 60-pack-year history of smoking       Type 2 diabetes mellitus with stage 3 chronic kidney disease, without long-term current use of insulin (H)      Priority: Medium     Nonintractable migraine, unspecified migraine type 01/20/2016     Priority: Medium     Urgency incontinence 04/02/2014     Priority: Medium     CKD (chronic kidney disease) stage 3, GFR 30-59 ml/min (H) 04/02/2014     Priority: Medium     Obesity 03/05/2014     Priority: Medium     Health Care Home 03/04/2014     Priority: Medium     State Tier Level:  Tier 2         History of hip replacement, total 03/13/2013     Priority: Medium     Hyperlipidemia with target LDL less than 100      Priority: Medium     Diagnosis updated by automated process. Provider to review and confirm.       Advanced directives, counseling/discussion 09/01/2011     Priority: Medium     As per reasonable care for Seniors, wants in the Short term aggressive care.   No desire for long term prolongation of life through artificial means if no hope to bring back to a reasonable status.         Panic attacks      Priority: Medium      Past Medical History:   Diagnosis Date     Calculus of left ureter 2014    dr Arriaga - ureteroscopy and lithitripsy     Cataract      Chronic low back pain     Dr Villagran at [facility] in 2015- not surgical      COPD with asthma (H)      followed with pulmonary DR LENNOX 60-pack-year history of smoking     Esophagitis, reflux 9/2012    SOME EOSINOPHILIA NO BARRETS     Gastro-oesophageal reflux disease      has seen GI     Hydronephrosis, left     chronic , in 2017 Dr Arriaga plan was observaton     Hyperlipidaemia LDL goal < 100      Hypothyroid      Migraine headaches 9/1/2011     Moderate persistent asthma      lifelong, saw Pulm 2013     Obesity (BMI 30-39.9)      Panic attacks      Renal stones 2016    kidney stones, multiple small stones high risk recurrent ureteral stones. , Dr Arriaga in 2016 rec 24 hour urine.     Type 2 diabetes mellitus without complication (H)      Past Surgical History:   Procedure Laterality Date     ARTHROPLASTY HIP  3/13/2013    Procedure: ARTHROPLASTY HIP;  LEFT TOTAL HIP ARTHROPLASTY (BIOMET)^ ;  Surgeon: Anand Main MD;  Location:  OR     ARTHROPLASTY PATELLO-FEMORAL (KNEE)  2005ish    Left     CHOLECYSTECTOMY, OPEN  27 yo     COMBINED CYSTOSCOPY, INSERT STENT URETER(S)  8/5/2014    Procedure: COMBINED CYSTOSCOPY, INSERT STENT URETER(S);  Surgeon: Sam Arriaga MD;  Location:  OR     COMBINED CYSTOSCOPY, RETROGRADES, URETEROSCOPY, LASER HOLMIUM LITHOTRIPSY URETER(S), INSERT STENT Left 6/14/2016    Procedure: COMBINED CYSTOSCOPY, RETROGRADES, URETEROSCOPY, LASER HOLMIUM LITHOTRIPSY URETER(S), INSERT STENT;  Surgeon: Thomas Edmondson MD;  Location:  OR     COMBINED CYSTOSCOPY, RETROGRADES, URETEROSCOPY, LASER HOLMIUM LITHOTRIPSY URETER(S), INSERT STENT Left 3/28/2019    Procedure: CYSTOSCOPY, LEFT RETROGRADE PYLEOGRAM, LEFT URETEROSCOPY, HOLMIUM LASER LITHOTRIPSY, LEFT URETERAL STENT EXCHANGE;  Surgeon: Derrek Rivera MD;  Location:   OR     CYSTOSCOPY, RETROGRADES, INSERT STENT URETER(S), COMBINED Left 3/8/2019    Procedure: CYSTOSCOPY,LEFT RETROGRADE, LEFT STENT PLACEMENT;  Surgeon: Derrek Rivera MD;  Location:  OR     GENITOURINARY SURGERY       LAMINECT/DISCECTOMY, LUMBAR      Laminectomy/Discectomy Lumbar     LASER HOLMIUM LITHOTRIPSY URETER(S), INSERT STENT, COMBINED Left 8/20/2014    Procedure: COMBINED CYSTOSCOPY, URETEROSCOPY, LASER HOLMIUM LITHOTRIPSY URETER(S), INSERT STENT;  Surgeon: Sam Arriaga MD;  Location:  OR     ORTHOPEDIC SURGERY      left ankle     RECESSION EYELID UPPER       Current Outpatient Medications   Medication Sig Dispense Refill     albuterol (PROAIR HFA/PROVENTIL HFA/VENTOLIN HFA) 108 (90 Base) MCG/ACT inhaler Inhale 2 puffs into the lungs every 6 hours (Patient taking differently: Inhale 2 puffs into the lungs every 6 hours as needed ) 8.5 g 1     blood glucose (ONE TOUCH DELICA) lancing device Use to test blood sugars one time daily or as directed. 1 each 0     blood glucose monitoring (ONE TOUCH DELICA) lancets Use to test blood sugars 1 times daily or as directed. 1 Box 3     blood glucose monitoring (ONE TOUCH ULTRA) test strip Use to test blood sugars one time daily or as directed. 100 each 3     fluticasone-salmeterol (ADVAIR DISKUS) 250-50 MCG/DOSE inhaler INHALE ONE DOSE INTO THE LUNGS 2 TIMES DAILY 3 Inhaler 3     HYDROcodone-acetaminophen (NORCO) 5-325 MG tablet Take 1-2 tablets by mouth every 4 hours as needed for moderate to severe pain 15 tablet 0     levothyroxine (SYNTHROID/LEVOTHROID) 112 MCG tablet Take 1 tablet (112 mcg) by mouth daily 90 tablet 3     metFORMIN (GLUCOPHAGE-XR) 500 MG 24 hr tablet Take 3 tablets (1,500 mg) by mouth daily (with dinner) 270 tablet 3     omeprazole 20 MG tablet Take 20 mg by mouth 2 times daily       oxybutynin ER (DITROPAN XL) 10 MG 24 hr tablet Take 1 tablet (10 mg) by mouth daily 90 tablet 2     oxyCODONE (OXYCONTIN) 15 MG 12 hr tablet Take 1  tablet (15 mg) by mouth every 12 hours maximum 2 tablet(s) per day 30 tablet 0     triamcinolone (KENALOG) 0.1 % ointment        OTC products: None, except as noted above    Allergies   Allergen Reactions     Morphine      Severe vomiting     Baclofen GI Disturbance     Imitrex [Sumatriptan] Nausea     Tetracycline      Sores in mouth      Latex Allergy: NO    Social History     Tobacco Use     Smoking status: Former Smoker     Packs/day: 1.50     Years: 40.00     Pack years: 60.00     Last attempt to quit: 1995     Years since quittin.3     Smokeless tobacco: Never Used   Substance Use Topics     Alcohol use: Yes     Comment: occ.     History   Drug Use No       REVIEW OF SYSTEMS:   Constitutional, neuro, ENT, endocrine, pulmonary, cardiac, gastrointestinal, genitourinary, musculoskeletal, integument and psychiatric systems are negative, except as otherwise noted.  Recent migraine headache, previously used sumatriptan but with significant side effect.  EXAM:   /82   Pulse 78   Resp 16   SpO2 99%     GENERAL APPEARANCE: healthy, alert and no distress     EYES: EOMI, PERRL     HENT: ear canals and TM's normal and nose and mouth without ulcers or lesions     NECK: no adenopathy, no asymmetry, masses, or scars and thyroid normal to palpation     RESP: lungs clear to auscultation - no rales, rhonchi or wheezes     CV: regular rates and rhythm, normal S1 S2, no S3 or S4, but with II/VI systolic murmur     ABDOMEN:  soft, nontender, no HSM or masses and bowel sounds normal     MS: extremities normal- no gross deformities noted, no evidence of inflammation in joints, FROM in all extremities.     SKIN: no suspicious lesions or rashes     NEURO: Normal strength and tone, sensory exam grossly normal, mentation intact and speech normal     PSYCH: mentation appears normal. and affect normal/bright     LYMPHATICS: No cervical adenopathy    DIAGNOSTICS:   EKG was normal on 3/4/19 therefore not repeated.  Labs  not needed.    Recent Labs   Lab Test 03/04/19  1713 02/05/19  1203 08/01/18  1129  02/27/18  1209  03/21/17  1539  08/04/14  1721  03/16/13  0635   HGB  --   --   --   --  11.7*  --  12.1   < > 13.5   < >  --    PLT  --   --   --   --  179  --  160   < > 170   < >  --    INR  --   --   --   --   --   --   --   --  1.02  --  2.39*    141  --    < >  --   --   --    < > 139   < >  --    POTASSIUM 4.3 4.4  --    < >  --   --   --    < > 3.8   < >  --    CR 1.08* 1.10*  --    < >  --   --   --    < > 1.49*   < > 0.89   A1C  --  5.9* 5.9*  --   --    < >  --    < >  --    < >  --     < > = values in this interval not displayed.        IMPRESSION:   Reason for surgery/procedure: kidney stones  Diagnosis/reason for consult: preoperative clearance    The proposed surgical procedure is considered INTERMEDIATE risk.    REVISED CARDIAC RISK INDEX  The patient has the following serious cardiovascular risks for perioperative complications such as (MI, PE, VFib and 3  AV Block):  No serious cardiac risks  INTERPRETATION: 0 risks: Class I (very low risk - 0.4% complication rate)    The patient has the following additional risks for perioperative complications:  No identified additional risks      ICD-10-CM    1. Preop general physical exam Z01.818    2. Chronic bilateral low back pain with bilateral sciatica M54.42 oxyCODONE (OXYCONTIN) 15 MG 12 hr tablet    M54.41     G89.29    3. Kidney stone N20.0    4. Spinal stenosis of lumbar region with neurogenic claudication M48.062    5. Type 2 diabetes mellitus with stage 3 chronic kidney disease, without long-term current use of insulin (H) E11.22     N18.3    She is given a small Rx for Oxycontin 12 hour as she only gets 1-2 hours of good relief from her Norco and is planning a trip to visit an ill relative right after surgery. The trip will likely involve a lot of driving. She understands that she is not allowed to drive after taking any narcotic.    RECOMMENDATIONS:          --Patient is to take all scheduled medications on the day of surgery EXCEPT for modifications listed below.  Do NOT take Metformin on the evening before surgery as you are not allowed to eat after midnight.       APPROVAL GIVEN to proceed with proposed procedure, without further diagnostic evaluation       Signed Electronically by: Marjan Pa MD    Copy of this evaluation report is provided to requesting physician.    [facility] Preop Guidelines    Revised Cardiac Risk Index

## 2019-04-26 ENCOUNTER — TELEPHONE (OUTPATIENT)
Dept: FAMILY MEDICINE | Facility: CLINIC | Age: 79
End: 2019-04-26

## 2019-04-26 NOTE — TELEPHONE ENCOUNTER
Received fax from [facility] pharmacy with request for PA to be done for Oxycontin.  Submitted through [employer] on 04/23.  Fax came back with approval.  Approval dates 01/25/2019-04/25/2020.  Called [facility] pharmacy and informed them of approval.

## 2019-05-02 ENCOUNTER — ANESTHESIA (OUTPATIENT)
Dept: SURGERY | Facility: CLINIC | Age: 79
End: 2019-05-02
Payer: COMMERCIAL

## 2019-05-02 ENCOUNTER — ANESTHESIA EVENT (OUTPATIENT)
Dept: SURGERY | Facility: CLINIC | Age: 79
End: 2019-05-02
Payer: COMMERCIAL

## 2019-05-02 ENCOUNTER — HOSPITAL ENCOUNTER (OUTPATIENT)
Facility: CLINIC | Age: 79
Discharge: HOME | End: 2019-05-02
Attending: UROLOGY | Admitting: UROLOGY
Payer: COMMERCIAL

## 2019-05-02 ENCOUNTER — APPOINTMENT (OUTPATIENT)
Dept: GENERAL RADIOLOGY | Facility: CLINIC | Age: 79
End: 2019-05-02
Attending: UROLOGY
Payer: COMMERCIAL

## 2019-05-02 VITALS
HEART RATE: 69 BPM | DIASTOLIC BLOOD PRESSURE: 70 MMHG | SYSTOLIC BLOOD PRESSURE: 151 MMHG | RESPIRATION RATE: 14 BRPM | WEIGHT: 194 LBS | TEMPERATURE: 96.1 F | OXYGEN SATURATION: 96 % | BODY MASS INDEX: 31.18 KG/M2 | HEIGHT: 66 IN

## 2019-05-02 DIAGNOSIS — Z98.890 POSTOPERATIVE STATE: Primary | ICD-10-CM

## 2019-05-02 LAB — GLUCOSE BLDC GLUCOMTR-MCNC: 107 MG/DL (ref 70–99)

## 2019-05-02 PROCEDURE — 82962 GLUCOSE BLOOD TEST: CPT

## 2019-05-02 PROCEDURE — 27210794 ZZH OR GENERAL SUPPLY STERILE: Performed by: UROLOGY

## 2019-05-02 PROCEDURE — 71000027 ZZH RECOVERY PHASE 2 EACH 15 MINS: Performed by: UROLOGY

## 2019-05-02 PROCEDURE — C1769 GUIDE WIRE: HCPCS | Performed by: UROLOGY

## 2019-05-02 PROCEDURE — 71000012 ZZH RECOVERY PHASE 1 LEVEL 1 FIRST HR: Performed by: UROLOGY

## 2019-05-02 PROCEDURE — 25800030 ZZH RX IP 258 OP 636: Performed by: NURSE ANESTHETIST, CERTIFIED REGISTERED

## 2019-05-02 PROCEDURE — 37000009 ZZH ANESTHESIA TECHNICAL FEE, EACH ADDTL 15 MIN: Performed by: UROLOGY

## 2019-05-02 PROCEDURE — 36000058 ZZH SURGERY LEVEL 3 EA 15 ADDTL MIN: Performed by: UROLOGY

## 2019-05-02 PROCEDURE — 40000170 ZZH STATISTIC PRE-PROCEDURE ASSESSMENT II: Performed by: UROLOGY

## 2019-05-02 PROCEDURE — 36000056 ZZH SURGERY LEVEL 3 1ST 30 MIN: Performed by: UROLOGY

## 2019-05-02 PROCEDURE — C1894 INTRO/SHEATH, NON-LASER: HCPCS | Performed by: UROLOGY

## 2019-05-02 PROCEDURE — 40000277 XR SURGERY CARM FLUORO LESS THAN 5 MIN W STILLS: Mod: TC

## 2019-05-02 PROCEDURE — 25500064 ZZH RX 255 OP 636: Performed by: UROLOGY

## 2019-05-02 PROCEDURE — 82365 CALCULUS SPECTROSCOPY: CPT | Performed by: UROLOGY

## 2019-05-02 PROCEDURE — C1758 CATHETER, URETERAL: HCPCS | Performed by: UROLOGY

## 2019-05-02 PROCEDURE — 25000566 ZZH SEVOFLURANE, EA 15 MIN: Performed by: UROLOGY

## 2019-05-02 PROCEDURE — 88300 SURGICAL PATH GROSS: CPT | Performed by: UROLOGY

## 2019-05-02 PROCEDURE — 37000008 ZZH ANESTHESIA TECHNICAL FEE, 1ST 30 MIN: Performed by: UROLOGY

## 2019-05-02 PROCEDURE — C2617 STENT, NON-COR, TEM W/O DEL: HCPCS | Performed by: UROLOGY

## 2019-05-02 PROCEDURE — 25800025 ZZH RX 258: Performed by: UROLOGY

## 2019-05-02 PROCEDURE — 25000128 H RX IP 250 OP 636: Performed by: NURSE ANESTHETIST, CERTIFIED REGISTERED

## 2019-05-02 PROCEDURE — 25000128 H RX IP 250 OP 636: Performed by: UROLOGY

## 2019-05-02 PROCEDURE — 25000125 ZZHC RX 250: Performed by: NURSE ANESTHETIST, CERTIFIED REGISTERED

## 2019-05-02 DEVICE — STENT URETERAL CONTOUR SOFT PERCUFLEX 6FRX24CM: Type: IMPLANTABLE DEVICE | Site: URETER | Status: FUNCTIONAL

## 2019-05-02 RX ORDER — ACETAMINOPHEN 325 MG/1
650 TABLET ORAL ONCE
Status: DISCONTINUED | OUTPATIENT
Start: 2019-05-02 | End: 2019-05-02 | Stop reason: HOSPADM

## 2019-05-02 RX ORDER — CEFAZOLIN SODIUM 2 G/100ML
2 INJECTION, SOLUTION INTRAVENOUS
Status: COMPLETED | OUTPATIENT
Start: 2019-05-02 | End: 2019-05-02

## 2019-05-02 RX ORDER — IOPAMIDOL 612 MG/ML
INJECTION, SOLUTION INTRAVASCULAR PRN
Status: DISCONTINUED | OUTPATIENT
Start: 2019-05-02 | End: 2019-05-02 | Stop reason: HOSPADM

## 2019-05-02 RX ORDER — LIDOCAINE HYDROCHLORIDE 20 MG/ML
INJECTION, SOLUTION INFILTRATION; PERINEURAL PRN
Status: DISCONTINUED | OUTPATIENT
Start: 2019-05-02 | End: 2019-05-02

## 2019-05-02 RX ORDER — HYDROCODONE BITARTRATE AND ACETAMINOPHEN 5; 325 MG/1; MG/1
1-2 TABLET ORAL EVERY 4 HOURS PRN
Qty: 15 TABLET | Refills: 0 | Status: SHIPPED | OUTPATIENT
Start: 2019-05-02 | End: 2019-06-05

## 2019-05-02 RX ORDER — PROPOFOL 10 MG/ML
INJECTION, EMULSION INTRAVENOUS PRN
Status: DISCONTINUED | OUTPATIENT
Start: 2019-05-02 | End: 2019-05-02

## 2019-05-02 RX ORDER — DEXAMETHASONE SODIUM PHOSPHATE 4 MG/ML
INJECTION, SOLUTION INTRA-ARTICULAR; INTRALESIONAL; INTRAMUSCULAR; INTRAVENOUS; SOFT TISSUE PRN
Status: DISCONTINUED | OUTPATIENT
Start: 2019-05-02 | End: 2019-05-02

## 2019-05-02 RX ORDER — CEFUROXIME AXETIL 500 MG/1
500 TABLET ORAL 2 TIMES DAILY
Qty: 6 TABLET | Refills: 0 | Status: ON HOLD | OUTPATIENT
Start: 2019-05-02 | End: 2019-06-13

## 2019-05-02 RX ORDER — HYDROCODONE BITARTRATE AND ACETAMINOPHEN 5; 325 MG/1; MG/1
1 TABLET ORAL ONCE
Status: DISCONTINUED | OUTPATIENT
Start: 2019-05-02 | End: 2019-05-02 | Stop reason: HOSPADM

## 2019-05-02 RX ORDER — ONDANSETRON 2 MG/ML
INJECTION INTRAMUSCULAR; INTRAVENOUS PRN
Status: DISCONTINUED | OUTPATIENT
Start: 2019-05-02 | End: 2019-05-02

## 2019-05-02 RX ORDER — SODIUM CHLORIDE, SODIUM LACTATE, POTASSIUM CHLORIDE, CALCIUM CHLORIDE 600; 310; 30; 20 MG/100ML; MG/100ML; MG/100ML; MG/100ML
INJECTION, SOLUTION INTRAVENOUS CONTINUOUS PRN
Status: DISCONTINUED | OUTPATIENT
Start: 2019-05-02 | End: 2019-05-02

## 2019-05-02 RX ORDER — FENTANYL CITRATE 50 UG/ML
INJECTION, SOLUTION INTRAMUSCULAR; INTRAVENOUS PRN
Status: DISCONTINUED | OUTPATIENT
Start: 2019-05-02 | End: 2019-05-02

## 2019-05-02 RX ADMIN — PROPOFOL 20 MG: 10 INJECTION, EMULSION INTRAVENOUS at 13:27

## 2019-05-02 RX ADMIN — SODIUM CHLORIDE, POTASSIUM CHLORIDE, SODIUM LACTATE AND CALCIUM CHLORIDE: 600; 310; 30; 20 INJECTION, SOLUTION INTRAVENOUS at 13:02

## 2019-05-02 RX ADMIN — FENTANYL CITRATE 25 MCG: 50 INJECTION, SOLUTION INTRAMUSCULAR; INTRAVENOUS at 13:24

## 2019-05-02 RX ADMIN — FENTANYL CITRATE 50 MCG: 50 INJECTION, SOLUTION INTRAMUSCULAR; INTRAVENOUS at 13:07

## 2019-05-02 RX ADMIN — PROPOFOL 130 MG: 10 INJECTION, EMULSION INTRAVENOUS at 13:07

## 2019-05-02 RX ADMIN — DEXMEDETOMIDINE HYDROCHLORIDE 8 MCG: 100 INJECTION, SOLUTION INTRAVENOUS at 13:04

## 2019-05-02 RX ADMIN — DEXAMETHASONE SODIUM PHOSPHATE 4 MG: 4 INJECTION, SOLUTION INTRA-ARTICULAR; INTRALESIONAL; INTRAMUSCULAR; INTRAVENOUS; SOFT TISSUE at 13:19

## 2019-05-02 RX ADMIN — FENTANYL CITRATE 25 MCG: 50 INJECTION, SOLUTION INTRAMUSCULAR; INTRAVENOUS at 13:46

## 2019-05-02 RX ADMIN — ONDANSETRON 4 MG: 2 INJECTION INTRAMUSCULAR; INTRAVENOUS at 13:53

## 2019-05-02 RX ADMIN — CEFAZOLIN SODIUM 2 G: 2 INJECTION, SOLUTION INTRAVENOUS at 13:11

## 2019-05-02 RX ADMIN — LIDOCAINE HYDROCHLORIDE 100 MG: 20 INJECTION, SOLUTION INFILTRATION; PERINEURAL at 13:07

## 2019-05-02 ASSESSMENT — LIFESTYLE VARIABLES: TOBACCO_USE: 1

## 2019-05-02 ASSESSMENT — ENCOUNTER SYMPTOMS: SEIZURES: 0

## 2019-05-02 ASSESSMENT — COPD QUESTIONNAIRES
CAT_SEVERITY: MILD
COPD: 1

## 2019-05-02 ASSESSMENT — MIFFLIN-ST. JEOR: SCORE: 1376.73

## 2019-05-02 NOTE — ANESTHESIA CARE TRANSFER NOTE
Patient: Macie Jorge    Procedure(s):  CYSTOSCOPY, LEFT RETROGRADE, LEFT URETEROSCOPY, HOLMIUM LASER LITHOTRIPSY, STONE REMOVAL, LEFT STENT EXCHANGE    Diagnosis: left renal stone  Diagnosis Additional Information: No value filed.    Anesthesia Type:   General, LMA     Note:  Airway :Face Mask  Patient transferred to:PACU  Comments: VSS on arrival to PACU. Alert and talking, on 8L SFM 02. Report given to RN before transfer of patient care.Handoff Report: Identifed the Patient, Identified the Reponsible Provider, Reviewed the pertinent medical history, Discussed the surgical course, Reviewed Intra-OP anesthesia mangement and issues during anesthesia, Set expectations for post-procedure period and Allowed opportunity for questions and acknowledgement of understanding      Vitals: (Last set prior to Anesthesia Care Transfer)    CRNA VITALS  5/2/2019 1331 - 5/2/2019 1406      5/2/2019             Pulse:  77    SpO2:  100 %    Resp Rate (observed):  5  (Abnormal)                 Electronically Signed By: WILEY Shahid CRNA  May 2, 2019  2:06 PM

## 2019-05-02 NOTE — DISCHARGE INSTRUCTIONS
Same Day Surgery Discharge Instructions for  Sedation and General Anesthesia       It's not unusual to feel dizzy, light-headed or faint for up to 24 hours after surgery or while taking pain medication.  If you have these symptoms: sit for a few minutes before standing and have someone assist you when you get up to walk or use the bathroom.      You should rest and relax for the next 24 hours. We recommend you make arrangements to have an adult stay with you for at least 24 hours after your discharge.  Avoid hazardous and strenuous activity.      DO NOT DRIVE any vehicle or operate mechanical equipment for 24 hours following the end of your surgery.  Even though you may feel normal, your reactions may be affected by the medication you have received.      Do not drink alcoholic beverages for 24 hours following surgery.       Slowly progress to your regular diet as you feel able. It's not unusual to feel nauseated and/or vomit after receiving anesthesia.  If you develop these symptoms, drink clear liquids (apple juice, ginger ale, broth, 7-up, etc. ) until you feel better.  If your nausea and vomiting persists for 24 hours, please notify your surgeon.        All narcotic pain medications, along with inactivity and anesthesia, can cause constipation. Drinking plenty of liquids and increasing fiber intake will help.      For any questions of a medical nature, call your surgeon.      Do not make important decisions for 24 hours.      If you had general anesthesia, you may have a sore throat for a couple of days related to the breathing tube used during surgery.  You may use Cepacol lozenges to help with this discomfort.  If it worsens or if you develop a fever, contact your surgeon.       If you feel your pain is not well managed with the pain medications prescribed by your surgeon, please contact your surgeon's office to let them know so they can address your concerns.       Cystoscopy, Holmium Laser with Stent  Placement Discharge Instructions    Holmium laser lithotripsy was used to break up your kidney stone(s). It is normal to have visible blood in the urine, burning, urgency and frequency following this procedure. These symptoms may last for a few days to weeks.     Diet:    To help pass stone fragments and clear the blood out of the urine, it is important to drink 6-8 glasses of fluids per day at home - at least 3-4 glasses should be water.       Return to the diet that you were on before the procedure, unless you are given specific diet instructions.    Activity:    Walk short distances and increase as your strength allows.    You may climb stairs.    Do not do strenuous exercise or heavy lifting until approved by surgeon.    Do not drive while taking narcotic pain medications.    Bathing:    You may take a shower.          Stent information    During surgery, a stent was placed in the ureter.  The ureter is the tube that drains urine from the kidney to the bladder.  The stent is placed to dilate (open) the ureter so the stone fragments can pass easily through the ureter or to decrease ureteral swelling after surgery, or to relieve an obstruction. The stent is made of rubber. The upper end of the stent curls in the kidney while the lower end rests in the bladder.    While the stent is in place you may experience the following symptoms:    Blood and/or small blood clots in urine.    Bladder spasm (frequency and urgency of urination).    Discomfort or aching in the back or side where the stent is.    Burning or discomfort at the end of urine stream.    To decrease these symptoms you should:    Take pain medication as prescribed.    Drink plenty of fluids.    If you experience pain at the end of urination try not emptying your bladder completely.    If having discomfort in back or side, decrease activity.    Call your physician if these signs/symptoms are present:    Pain that is not relieved by a short rest or ordered  pain medications.    Temperature at or above 101.0 F or chills.    Inability or difficulty urinating.     Excessive blood in urine.    Any questions or concerns.

## 2019-05-02 NOTE — ANESTHESIA POSTPROCEDURE EVALUATION
Patient: Macie Jorge    Procedure(s):  CYSTOSCOPY, LEFT RETROGRADE, LEFT URETEROSCOPY, HOLMIUM LASER LITHOTRIPSY, STONE REMOVAL, LEFT STENT EXCHANGE    Diagnosis:left renal stone  Diagnosis Additional Information: No value filed.    Anesthesia Type:  General, LMA    Note:  Anesthesia Post Evaluation    Patient location during evaluation: PACU  Patient participation: Able to fully participate in evaluation  Level of consciousness: awake and alert  Pain management: satisfactory to patient  Airway patency: patent  Cardiovascular status: acceptable and hemodynamically stable  Respiratory status: acceptable and unassisted  Hydration status: acceptable  PONV: controlled             Last vitals:  Vitals:    05/02/19 1208 05/02/19 1400   BP: 144/75 145/76   Resp: 16 16   Temp: 36.2  C (97.2  F) 35.7  C (96.3  F)   SpO2: 99% 100%         Electronically Signed By: Shun Grant DO  May 2, 2019  2:13 PM

## 2019-05-02 NOTE — OP NOTE
Procedure Date: 05/02/2019      PREOPERATIVE DIAGNOSIS:  Left renal calculi.      POSTOPERATIVE DIAGNOSIS:  Left renal calculus.      PROCEDURE:  Cystoscopy, left ureteroscopy with laser lithotripsy, stone removal and left ureteral stent exchange.      OPERATING SURGEON:  Derrek Rivera MD      ANESTHESIA:  General.      ESTIMATED BLOOD LOSS:  Zero.      SPECIMENS:  Left kidney stone fragments sent for analysis.      COMPLICATIONS:  None.      FINDINGS:  10 stones (ranging in size from 2-6 mm) in the lower pole of left kidney.  Narrowing at the left ureteropelvic junction.      INDICATIONS:  The patient is a 78-year-old female with history of kidney stones in the past (85% calcium oxalate dihydrate and 15% calcium phosphate).  She has had multiple surgeries for stones in the past.  She underwent a left ureteroscopy with laser lithotripsy on 06/14/2016.  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  She developed left back pain in 02/2018.  She was seen by Dr. Arriaga and a CT scan was performed on 02/14/2019.  This showed mild to moderate hydronephrosis, left side.  There appeared to be 2 (4-5 mm stones at the left ureteropelvic junction), 2 (4 mm stones) in the mid pole of the left kidney and 4 (2-4 mm stones) in the lower pole of the left kidney.  There is also 1.1 cm stone lower pole of her right kidney.  She nderwent attempted ureteroscopy and stent placement on 03/08/2019.  Then she underwent a repeat left ureteroscopy on 03/28/2010.  The case was aborted at that time due to bleeding and a blood clot in the renal pelvis.  She presents today to undergo treatment again for left-side kidney stones, the left ureteroscopy with laser lithotripsy, stone removal and stent exchange.  The risks and benefits were discussed with the patient.      PROCEDURE:  The patient was brought to the operating room, placed in a supine position.  After undergoing general anesthetic, she was placed in a low  lithotomy position.  Her genitalia was prepped and draped in the usual sterile fashion.  A 21-American rigid cystoscope was inserted in the bladder.  There were no urethral lesions.  A stent could be seen curled in the left ureteral orifice.  A 2-prong grasper was used to grab the stent and this brought through the urethral opening.  A 0.035 Sensor wire was passed through the stent in the kidney under fluoroscopic guidance.  The stent was removed.  A dual-lumen catheter was advanced over the Sensor wire into the proximal ureter under fluoroscopic guidance.  A 0.035 Amplatz Super Stiff wire was then passed through the lumen catheter into the kidney under fluoroscopic guidance.  The dual-lumen catheter was removed.  An 11/13 American ureteral sheath was advanced over the Super Stiff wire into the proximal left ureter under fluoroscopic guidance.  Super stiff guidewire was removed.  The digital flexible ureteroscope was then advanced through the sheath up into the kidney under direct vision.  Of note, there is a narrowing at the left ureteropelvic junction.  The scope passes through easily.  The upper, mid and lower pole calyx were explored.  There were approximately 10 stones in the lower pole calices.  They ranged in size from 2-6 mm.  A 200 micron laser fiber was then passed through the ureteroscope.  The stones were fragmented into small pieces using the holmium laser.  Initial setting was 0.2 joules at a frequency 50 Hz.  Ultimately, this increased to 0.4 joules at a frequency 50 Hz.  The stones were fragmented into small pieces.  Escape basket was then used to extract the larger stone pieces.  The upper or mid pole calyx were then reexplored.  No significant stone fragments were seen.  Largest pieces of 1 mm in size.  There was still a lot of dust noted in the renal pelvis.  This should pass without difficulty.  Contrast injected into the kidney.  The entire left ureter was then visualized with the ureteroscope.   There were no stones seen in the ureter.  The ureteroscope was removed.      The cystoscope was inserted in the bladder.  A 6 [place] x 24 cm stent was then placed in the left ureter.  The stent could be seen curled in the renal pelvis and the bladder.  The patient's bladder was emptied.  Cystoscope was removed.  She was put back in a supine position, awoken from anesthesia and transferred back recovery room in good condition.      PLAN:  I plan to see her back in the office in roughly 1 week for cystoscopy with left ureteral stent removal.  In terms of treating her right side kidney stone would recommend either ESWL with stent versus right ureteroscopy with laser lithotripsy and will be addressed at follow up.  In addition, would recommend she have a 24 hour urine test performed to reassess risk factors for stone formation.         POWER DELANEY MD             D: 2019   T: 2019   MT: BENJAMIN      Name:     MAURI GRUBER   MRN:      [id number]        Account:        [id number]   :      1940           Procedure Date: 2019      Document: [id number]       cc: Marjan Pa MD

## 2019-05-02 NOTE — ANESTHESIA PREPROCEDURE EVALUATION
Anesthesia Evaluation     . Pt has had prior anesthetic.     No history of anesthetic complications          ROS/MED HX    ENT/Pulmonary:     (+)tobacco use, Past use Moderate Persistent asthma Treatment: Inhaler daily,  mild COPD, , . .   (-) sleep apnea and recent URI   Neurologic:      (-) seizures, CVA and migraines   Cardiovascular:  - neg cardiovascular ROS       METS/Exercise Tolerance:     Hematologic:         Musculoskeletal:         GI/Hepatic:     (+) GERD Asymptomatic on medication,      (-) liver disease   Renal/Genitourinary:     (+) chronic renal disease, Nephrolithiasis ,       Endo:     (+) type II DM Not using insulin thyroid problem hypothyroidism, Obesity, .      Psychiatric:        (-) psychiatric history   Infectious Disease:         Malignancy:         Other:                            Physical Exam  Normal systems: dental    Airway   Mallampati: II  TM distance: >3 FB  Neck ROM: full    Dental     Cardiovascular   Rhythm and rate: regular and normal      Pulmonary    breath sounds clear to auscultation            Lab Results   Component Value Date    WBC 5.5 02/27/2018    HGB 11.7 (A) 02/27/2018    HCT 35.7 02/27/2018     02/27/2018     03/04/2019    POTASSIUM 4.3 03/04/2019    CHLORIDE 101 03/04/2019    CO2 26 10/01/2015    BUN 20 03/04/2019    BUN 19 03/04/2019    CR 1.08 (H) 03/04/2019     (H) 03/04/2019    WARD 9.4 03/04/2019    ALBUMIN 4.3 02/05/2019    PROTTOTAL 8.1 02/05/2019    ALT 10 02/05/2019    AST 15 02/05/2019    ALKPHOS 52 02/05/2019    BILITOTAL 0.4 02/05/2019    LIPASE 154 08/04/2014    PTT 31 02/15/2006    INR 1.02 08/04/2014    T4 1.27 03/04/2019    T3 81 03/04/2019       Preop Vitals  BP Readings from Last 3 Encounters:   04/23/19 140/82   03/29/19 127/62   03/08/19 145/74    Pulse Readings from Last 3 Encounters:   04/23/19 78   03/28/19 70   03/08/19 67      Resp Readings from Last 3 Encounters:   04/23/19 16   03/29/19 16   03/08/19 12    SpO2  "Readings from Last 3 Encounters:   04/23/19 99%   03/29/19 92%   03/08/19 95%      Temp Readings from Last 1 Encounters:   03/29/19 36.3  C (97.4  F) (Oral)    Ht Readings from Last 1 Encounters:   03/08/19 1.676 m (5' 6\")      Wt Readings from Last 1 Encounters:   03/28/19 89.8 kg (198 lb)    Estimated body mass index is 31.96 kg/m  as calculated from the following:    Height as of 3/8/19: 1.676 m (5' 6\").    Weight as of 3/28/19: 89.8 kg (198 lb).       Anesthesia Plan      History & Physical Review  History and physical reviewed and following examination; no interval change.    ASA Status:  2 .    NPO Status:  > 8 hours    Plan for General and LMA with Intravenous and Propofol induction. Maintenance will be Balanced.    PONV prophylaxis:  Ondansetron (or other 5HT-3) and Dexamethasone or Solumedrol       Postoperative Care  Postoperative pain management:  IV analgesics and Oral pain medications.      Consents  Anesthetic plan, risks, benefits and alternatives discussed with:  Patient..                   Shun Grant, DO  "

## 2019-05-02 NOTE — BRIEF OP NOTE
[facility] Urology Brief Operative Note    Pre-operative diagnosis: left renal stone   Post-operative diagnosis: Same   Procedure: Procedure(s):  CYSTOSCOPY, LEFT RETROGRADE, LEFT URETEROSCOPY, HOLMIUM LASER LITHOTRIPSY, STONE REMOVAL, LEFT STENT EXCHANGE   Surgeon: POWER DELANEY MD   Assistant(s): None   Anesthesia: General endotracheal anesthesia   Estimated blood loss: None   Total IV fluids: (See anesthesia record)   Blood transfusion: No transfusion was given during surgery   Total urine output: (See anesthesia record)  None   Drains: None   Specimens: Left kidney stone fragments - sent for analysis   Implants: 6 Fr x 24 cm stent in Left ureter   Findings: 10 stones (2-6 mm) in lower pole - narrowing at Left UPJ   Complications: None   Condition: Stable   Comments: See dictated operative report for full details

## 2019-05-03 LAB — COPATH REPORT: NORMAL

## 2019-05-07 LAB
APPEARANCE STONE: NORMAL
COMPN STONE: NORMAL
NUMBER STONE: 3
SIZE STONE: NORMAL MM
WT STONE: 16 MG

## 2019-05-12 ENCOUNTER — TRANSFERRED RECORDS (OUTPATIENT)
Dept: FAMILY MEDICINE | Facility: CLINIC | Age: 79
End: 2019-05-12

## 2019-06-05 ENCOUNTER — OFFICE VISIT (OUTPATIENT)
Dept: FAMILY MEDICINE | Facility: CLINIC | Age: 79
End: 2019-06-05

## 2019-06-05 VITALS
RESPIRATION RATE: 18 BRPM | OXYGEN SATURATION: 94 % | WEIGHT: 194 LBS | HEART RATE: 80 BPM | HEIGHT: 66 IN | DIASTOLIC BLOOD PRESSURE: 78 MMHG | BODY MASS INDEX: 31.18 KG/M2 | SYSTOLIC BLOOD PRESSURE: 131 MMHG

## 2019-06-05 DIAGNOSIS — M47.27 OSTEOARTHRITIS OF SPINE WITH RADICULOPATHY, LUMBOSACRAL REGION: ICD-10-CM

## 2019-06-05 DIAGNOSIS — N18.30 TYPE 2 DIABETES MELLITUS WITH STAGE 3 CHRONIC KIDNEY DISEASE, WITHOUT LONG-TERM CURRENT USE OF INSULIN (H): ICD-10-CM

## 2019-06-05 DIAGNOSIS — Z01.818 PREOP GENERAL PHYSICAL EXAM: Primary | ICD-10-CM

## 2019-06-05 DIAGNOSIS — E11.22 TYPE 2 DIABETES MELLITUS WITH STAGE 3 CHRONIC KIDNEY DISEASE, WITHOUT LONG-TERM CURRENT USE OF INSULIN (H): ICD-10-CM

## 2019-06-05 DIAGNOSIS — N20.0 KIDNEY STONE: ICD-10-CM

## 2019-06-05 PROCEDURE — 36415 COLL VENOUS BLD VENIPUNCTURE: CPT | Performed by: FAMILY MEDICINE

## 2019-06-05 PROCEDURE — 80048 BASIC METABOLIC PNL TOTAL CA: CPT | Mod: 90 | Performed by: FAMILY MEDICINE

## 2019-06-05 PROCEDURE — 84550 ASSAY OF BLOOD/URIC ACID: CPT | Mod: 90 | Performed by: FAMILY MEDICINE

## 2019-06-05 PROCEDURE — 99215 OFFICE O/P EST HI 40 MIN: CPT | Performed by: FAMILY MEDICINE

## 2019-06-05 RX ORDER — LISINOPRIL 10 MG/1
TABLET ORAL
Refills: 0 | Status: ON HOLD | COMMUNITY
Start: 2019-02-12 | End: 2019-06-13

## 2019-06-05 RX ORDER — MIRABEGRON 50 MG/1
TABLET, FILM COATED, EXTENDED RELEASE ORAL
COMMUNITY
Start: 2018-09-07 | End: 2021-08-02

## 2019-06-05 RX ORDER — HYDROCODONE BITARTRATE AND ACETAMINOPHEN 5; 325 MG/1; MG/1
1-2 TABLET ORAL EVERY 4 HOURS PRN
Qty: 60 TABLET | Refills: 0 | Status: SHIPPED | OUTPATIENT
Start: 2019-06-05 | End: 2019-09-03

## 2019-06-05 ASSESSMENT — MIFFLIN-ST. JEOR: SCORE: 1376.73

## 2019-06-05 NOTE — PROGRESS NOTES
[facility]  [address] [place] [id number] [phone]  Dept: [phone]    PRE-OP EVALUATION:  Today's date: 2019    Macie Jorge (: 1940) presents for pre-operative evaluation assessment as requested by Dr. Rivera.  She requires evaluation and anesthesia risk assessment prior to undergoing surgery/procedure for treatment of right kidney stone.    Proposed Surgery/ Procedure:  Kidney stone  Date of Surgery/ Procedure: 19  Time of Surgery/ Procedure: 11:30am  Hospital/Surgical Facility: [facility]  Surgeon: Miguel  Primary Physician: Marjan Pa  Type of Anesthesia Anticipated: to be determined    Patient has a Health Care Directive or Living Will:  YES                 2. NO - Do you ever have any pain or discomfort in your chest?  3. NO - Do you have a history of  Heart Failure?  4. NO - Are you troubled by shortness of breath when: walking on the level, up a slight hill or at night?  5. NO - Do you currently have a cold, bronchitis or other respiratory infection?  6. NO - Do you have a cough, shortness of breath or wheezing?  7. NO - Do you sometimes get pains in the calves of your legs when you walk?  8. NO - Do you or anyone in your family have previous history of blood clots?  9. NO - Do you or does anyone in your family have a serious bleeding problem such as prolonged bleeding following surgeries or cuts?  10. YES - Have you ever had problems with anemia or been told to take iron pills? With pregnancy  11. NO - Have you had any abnormal blood loss such as black, tarry or bloody stools, or abnormal vaginal bleeding?  12. NO - Have you ever had a blood transfusion?  13. NO - Have you or any of your relatives ever had problems with anesthesia?  14. NO - Do you have sleep apnea, excessive snoring or daytime drowsiness?  15. NO - Do you have any prosthetic heart valves?  16. YES - Do you have prosthetic joints? Left knee, left hip  17. NO - Is there  any chance that you may be pregnant?      HPI:     HPI related to upcoming procedure: Recurrent kidney stones, now with left sided requiring removal.      COPD - Patient has a longstanding history of moderate-severe COPD . Patient has been doing well overall noting NO SYMPTOMS and continues on medication regimen consisting of Advair 250/50 without adverse reactions or side effects.                                                                                                         .  DIABETES - Patient has a longstanding history of DiabetesType Type II . Patient is being treated with oral agents and denies significant side effects. Control has been good. Complicating factors include but are not limited to: none.                                                                                                                            .  HYPOTHYROIDISM - Patient has a longstanding history of chronic Hypothyroidism. Patient has been doing well, noting no tremor, insomnia, hair loss or changes in skin texture. Continues to take medications as directed, without adverse reactions or side effects. Last TSH 3.65.                                                                                                                                                                                                                          RENAL INSUFFICIENCY - Patient has a longstanding history of moderate-severe chronic renal insufficiency. Last Cr 1.08.                                                                                                                                                                                 OSTEOARTHRITIS - chronic back pain, severe, cannot tell if back pain is due to kidney stone or arthritis    MEDICAL HISTORY:     Patient Active Problem List    Diagnosis Date Noted     Kidney stone 03/28/2019     Priority: Medium     Chronic midline low back pain without sciatica 02/05/2019     Priority:  Medium     Physical deconditioning 02/05/2019     Priority: Medium     Spinal stenosis of lumbar region with neurogenic claudication 02/05/2019     Priority: Medium     Hypothyroidism due to acquired atrophy of thyroid 02/05/2019     Priority: Medium     History of colonic polyps 02/27/2018     Priority: Medium     Altered mental status 12/01/2017     Priority: Medium     Acquired hypothyroidism 03/21/2017     Priority: Medium     COPD with asthma (H)      Priority: Medium      followed with pulmonary DR LENNOX 60-pack-year history of smoking       Type 2 diabetes mellitus with stage 3 chronic kidney disease, without long-term current use of insulin (H)      Priority: Medium     Nonintractable migraine, unspecified migraine type 01/20/2016     Priority: Medium     Urgency incontinence 04/02/2014     Priority: Medium     CKD (chronic kidney disease) stage 3, GFR 30-59 ml/min (H) 04/02/2014     Priority: Medium     Obesity 03/05/2014     Priority: Medium     Health Care Home 03/04/2014     Priority: Medium     State Tier Level:  Tier 2         History of hip replacement, total 03/13/2013     Priority: Medium     Hyperlipidemia with target LDL less than 100      Priority: Medium     Diagnosis updated by automated process. Provider to review and confirm.       Advanced directives, counseling/discussion 09/01/2011     Priority: Medium     As per reasonable care for Seniors, wants in the Short term aggressive care.   No desire for long term prolongation of life through artificial means if no hope to bring back to a reasonable status.        Panic attacks      Priority: Medium      Past Medical History:   Diagnosis Date     Calculus of left ureter 2014    dr Arriaga - ureteroscopy and lithitripsy     Cataract      Chronic low back pain     Dr Villagran at [facility] in 2015- not surgical      Complication of anesthesia      COPD with asthma (H)      followed with pulmonary DR LENNOX 60-pack-year history of smoking     Esophagitis, reflux  9/2012    SOME EOSINOPHILIA NO BARRETS     Gastro-oesophageal reflux disease      has seen GI     Hydronephrosis, left     chronic , in 2017 Dr Arriaga plan was observaton     Hyperlipidaemia LDL goal < 100      Hypothyroid      Migraine headaches 9/1/2011     Moderate persistent asthma      lifelong, saw Pulm 2013     Obesity (BMI 30-39.9)      Panic attacks      PONV (postoperative nausea and vomiting)      Renal stones 2016    kidney stones, multiple small stones high risk recurrent ureteral stones. , Dr Arriaga in 2016 rec 24 hour urine.     Type 2 diabetes mellitus without complication (H)      Past Surgical History:   Procedure Laterality Date     ARTHROPLASTY HIP  3/13/2013    Procedure: ARTHROPLASTY HIP;  LEFT TOTAL HIP ARTHROPLASTY (BIOMET)^ ;  Surgeon: Anand Main MD;  Location:  OR     ARTHROPLASTY PATELLO-FEMORAL (KNEE)  2005ish    Left     CHOLECYSTECTOMY, OPEN  27 yo     COMBINED CYSTOSCOPY, INSERT STENT URETER(S)  8/5/2014    Procedure: COMBINED CYSTOSCOPY, INSERT STENT URETER(S);  Surgeon: Sam Arriaga MD;  Location:  OR     COMBINED CYSTOSCOPY, RETROGRADES, URETEROSCOPY, LASER HOLMIUM LITHOTRIPSY URETER(S), INSERT STENT Left 6/14/2016    Procedure: COMBINED CYSTOSCOPY, RETROGRADES, URETEROSCOPY, LASER HOLMIUM LITHOTRIPSY URETER(S), INSERT STENT;  Surgeon: Thomas Edmondson MD;  Location:  OR     COMBINED CYSTOSCOPY, RETROGRADES, URETEROSCOPY, LASER HOLMIUM LITHOTRIPSY URETER(S), INSERT STENT Left 3/28/2019    Procedure: CYSTOSCOPY, LEFT RETROGRADE PYLEOGRAM, LEFT URETEROSCOPY, HOLMIUM LASER LITHOTRIPSY, LEFT URETERAL STENT EXCHANGE;  Surgeon: Derrek Rivera MD;  Location:  OR     CYSTOSCOPY, RETROGRADES, INSERT STENT URETER(S), COMBINED Left 3/8/2019    Procedure: CYSTOSCOPY,LEFT RETROGRADE, LEFT STENT PLACEMENT;  Surgeon: Derrek Rivera MD;  Location:  OR     GENITOURINARY SURGERY       LAMINECT/DISCECTOMY, LUMBAR      Laminectomy/Discectomy Lumbar     LASER HOLMIUM  LITHOTRIPSY URETER(S), INSERT STENT, COMBINED Left 8/20/2014    Procedure: COMBINED CYSTOSCOPY, URETEROSCOPY, LASER HOLMIUM LITHOTRIPSY URETER(S), INSERT STENT;  Surgeon: Sam Arriaga MD;  Location:  OR     LASER HOLMIUM LITHOTRIPSY URETER(S), INSERT STENT, COMBINED Left 5/2/2019    Procedure: CYSTOSCOPY, LEFT RETROGRADE, LEFT URETEROSCOPY, HOLMIUM LASER LITHOTRIPSY, STONE REMOVAL, LEFT STENT EXCHANGE;  Surgeon: Derrek Rivera MD;  Location:  OR     ORTHOPEDIC SURGERY      left ankle     RECESSION EYELID UPPER       Current Outpatient Medications   Medication Sig Dispense Refill     albuterol (PROAIR HFA/PROVENTIL HFA/VENTOLIN HFA) 108 (90 Base) MCG/ACT inhaler Inhale 2 puffs into the lungs every 6 hours (Patient taking differently: Inhale 2 puffs into the lungs every 6 hours as needed ) 8.5 g 1     blood glucose (ONE TOUCH DELICA) lancing device Use to test blood sugars one time daily or as directed. 1 each 0     blood glucose monitoring (ONE TOUCH DELICA) lancets Use to test blood sugars 1 times daily or as directed. 1 Box 3     blood glucose monitoring (ONE TOUCH ULTRA) test strip Use to test blood sugars one time daily or as directed. 100 each 3     fluticasone-salmeterol (ADVAIR DISKUS) 250-50 MCG/DOSE inhaler INHALE ONE DOSE INTO THE LUNGS 2 TIMES DAILY 3 Inhaler 3     HYDROcodone-acetaminophen (NORCO) 5-325 MG tablet Take 1-2 tablets by mouth every 4 hours as needed for moderate to severe pain 60 tablet 0     levothyroxine (SYNTHROID/LEVOTHROID) 112 MCG tablet Take 1 tablet (112 mcg) by mouth daily 90 tablet 3     lisinopril (PRINIVIL/ZESTRIL) 10 MG tablet   0     metFORMIN (GLUCOPHAGE-XR) 500 MG 24 hr tablet Take 3 tablets (1,500 mg) by mouth daily (with dinner) 270 tablet 3     MYRBETRIQ 50 MG 24 hr tablet        omeprazole 20 MG tablet Take 20 mg by mouth 2 times daily       oxybutynin ER (DITROPAN XL) 10 MG 24 hr tablet Take 1 tablet (10 mg) by mouth daily 90 tablet 2     triamcinolone  "(KENALOG) 0.1 % ointment        OTC products: None, except as noted above    Allergies   Allergen Reactions     Morphine      Severe vomiting     Baclofen GI Disturbance     Imitrex [Sumatriptan] Nausea     Tetracycline      Sores in mouth      Latex Allergy: NO    Social History     Tobacco Use     Smoking status: Former Smoker     Packs/day: 1.50     Years: 40.00     Pack years: 60.00     Last attempt to quit: 1995     Years since quittin.4     Smokeless tobacco: Never Used   Substance Use Topics     Alcohol use: Yes     Comment: occ.     History   Drug Use No       REVIEW OF SYSTEMS:   Constitutional, neuro, ENT, endocrine, pulmonary, cardiac, gastrointestinal, genitourinary, musculoskeletal, integument and psychiatric systems are negative, except as otherwise noted.    EXAM:   /78   Pulse 80   Resp 18   Ht 1.676 m (5' 6\")   Wt 88 kg (194 lb)   SpO2 94%   BMI 31.31 kg/m      GENERAL APPEARANCE: healthy, alert and no distress     EYES: EOMI, PERRL     HENT: ear canals and TM's normal and nose and mouth without ulcers or lesions     NECK: no adenopathy, no asymmetry, masses, or scars and thyroid normal to palpation     RESP: lungs clear to auscultation - no rales, rhonchi or wheezes     CV: regular rates and rhythm, normal S1 S2, no S3 or S4 and no murmur, click or rub     ABDOMEN:  soft, nontender, no HSM or masses and bowel sounds normal     MS: extremities normal- no gross deformities noted, no evidence of inflammation in joints, FROM in all extremities.     SKIN: no suspicious lesions or rashes     NEURO: Normal strength and tone, sensory exam grossly normal, mentation intact and speech normal     PSYCH: mentation appears normal. and affect normal/bright     LYMPHATICS: No cervical adenopathy    DIAGNOSTICS:   EKG: appears normal, NSR, normal axis, normal intervals, no acute ST/T changes c/w ischemia, no LVH by voltage criteria, unchanged from previous tracings, done at office visit on " 3/4/19.    Recent Labs   Lab Test 03/04/19  1713 02/05/19  1203 08/01/18  1129  02/27/18  1209  03/21/17  1539  08/04/14  1721  03/16/13  0635   HGB  --   --   --   --  11.7*  --  12.1   < > 13.5   < >  --    PLT  --   --   --   --  179  --  160   < > 170   < >  --    INR  --   --   --   --   --   --   --   --  1.02  --  2.39*    141  --    < >  --   --   --    < > 139   < >  --    POTASSIUM 4.3 4.4  --    < >  --   --   --    < > 3.8   < >  --    CR 1.08* 1.10*  --    < >  --   --   --    < > 1.49*   < > 0.89   A1C  --  5.9* 5.9*  --   --    < >  --    < >  --    < >  --     < > = values in this interval not displayed.        IMPRESSION:   Reason for surgery/procedure: recurrent kidney stones  Diagnosis/reason for consult: preoperative clearance    The proposed surgical procedure is considered INTERMEDIATE risk.    REVISED CARDIAC RISK INDEX  The patient has the following serious cardiovascular risks for perioperative complications such as (MI, PE, VFib and 3  AV Block):  No serious cardiac risks  INTERPRETATION: 0 risks: Class I (very low risk - 0.4% complication rate)    The patient has the following additional risks for perioperative complications:  none      ICD-10-CM    1. Preop general physical exam Z01.818    2. Osteoarthritis of spine with radiculopathy, lumbosacral region M47.27 HYDROcodone-acetaminophen (NORCO) 5-325 MG tablet   3. Type 2 diabetes mellitus with stage 3 chronic kidney disease, without long-term current use of insulin (H) E11.22 Basic Metabolic Panel (8) ([facility])    N18.3    4. Kidney stone N20.0 Uric Acid  Serum ([facility])       RECOMMENDATIONS:     Given that her stone was 40% uric acid in composition, check uric acid level and if elevated consider dietary changes to reduce uric acid or medication such as allopurinol.    --Patient is to take all scheduled medications on the day of surgery EXCEPT for modifications listed below.    Diabetes Medication Use    -----Hold usual oral and  non-insulin diabetic meds (e.g. Metformin, Actos, Glipizide) while NPO.       APPROVAL GIVEN to proceed with proposed procedure, without further diagnostic evaluation       Signed Electronically by: Marjan Pa MD    Copy of this evaluation report is provided to requesting physician.    [facility] Preop Guidelines    Revised Cardiac Risk Index

## 2019-06-06 LAB
BUN SERPL-MCNC: 16 MG/DL (ref 8–27)
BUN/CREATININE RATIO: 12 (ref 12–28)
CALCIUM SERPL-MCNC: 9.7 MG/DL (ref 8.7–10.3)
CHLORIDE SERPLBLD-SCNC: 103 MMOL/L (ref 96–106)
CREAT SERPL-MCNC: 1.33 MG/DL (ref 0.57–1)
EGFR IF AFRICN AM: 44 ML/MIN/1.73
EGFR IF NONAFRICN AM: 38 ML/MIN/1.73
GLUCOSE SERPL-MCNC: 131 MG/DL (ref 65–99)
POTASSIUM SERPL-SCNC: 4.1 MMOL/L (ref 3.5–5.2)
SODIUM SERPL-SCNC: 142 MMOL/L (ref 134–144)
TOTAL CO2: 24 MMOL/L (ref 20–29)
URATE SERPL-MCNC: 8.5 MG/DL (ref 2.5–7.1)

## 2019-06-07 ENCOUNTER — TELEPHONE (OUTPATIENT)
Dept: FAMILY MEDICINE | Facility: CLINIC | Age: 79
End: 2019-06-07

## 2019-06-07 DIAGNOSIS — N20.0 URIC ACID KIDNEY STONE: ICD-10-CM

## 2019-06-07 DIAGNOSIS — E79.0 ELEVATED URIC ACID IN BLOOD: Primary | ICD-10-CM

## 2019-06-07 RX ORDER — ALLOPURINOL 100 MG/1
100 TABLET ORAL DAILY
Qty: 90 TABLET | Refills: 3 | Status: SHIPPED | OUTPATIENT
Start: 2019-06-07 | End: 2020-01-24

## 2019-06-07 NOTE — TELEPHONE ENCOUNTER
Called patient with lab results per Dr. Pa. Prescription previously sent to pharmacy. Patient is advised to continue to hydrate well and also RTC in 6-8 weeks for recheck. Future lab orders entered. Cherelle Mccullough

## 2019-06-07 NOTE — TELEPHONE ENCOUNTER
----- Message from Marjan Pa MD sent at 6/7/2019  8:10 AM CDT -----  Your electrolytes are fine, but your kidney function remains a bit compromised. Please continue to always hydrate well.  Uric acid level is too high. Given that your kidney stone was 40% uric acid, we should start a medication to decrease your uric acid level. Rx is sent to your pharmacy for allopurinol 100 mg daily. Return in 6-8 weeks to recheck uric acid level.

## 2019-06-13 ENCOUNTER — ANESTHESIA (OUTPATIENT)
Dept: SURGERY | Facility: CLINIC | Age: 79
End: 2019-06-13
Payer: COMMERCIAL

## 2019-06-13 ENCOUNTER — APPOINTMENT (OUTPATIENT)
Dept: GENERAL RADIOLOGY | Facility: CLINIC | Age: 79
End: 2019-06-13
Attending: UROLOGY
Payer: COMMERCIAL

## 2019-06-13 ENCOUNTER — HOSPITAL ENCOUNTER (OUTPATIENT)
Facility: CLINIC | Age: 79
Discharge: HOME | End: 2019-06-13
Attending: UROLOGY | Admitting: UROLOGY
Payer: COMMERCIAL

## 2019-06-13 ENCOUNTER — ANESTHESIA EVENT (OUTPATIENT)
Dept: SURGERY | Facility: CLINIC | Age: 79
End: 2019-06-13
Payer: COMMERCIAL

## 2019-06-13 VITALS
BODY MASS INDEX: 31.34 KG/M2 | TEMPERATURE: 97.7 F | RESPIRATION RATE: 16 BRPM | HEART RATE: 72 BPM | HEIGHT: 66 IN | DIASTOLIC BLOOD PRESSURE: 74 MMHG | SYSTOLIC BLOOD PRESSURE: 156 MMHG | OXYGEN SATURATION: 93 % | WEIGHT: 195 LBS

## 2019-06-13 DIAGNOSIS — N20.0 KIDNEY STONE: Primary | ICD-10-CM

## 2019-06-13 LAB
GLUCOSE BLDC GLUCOMTR-MCNC: 112 MG/DL (ref 70–99)
GLUCOSE BLDC GLUCOMTR-MCNC: 90 MG/DL (ref 70–99)

## 2019-06-13 PROCEDURE — 40000277 XR SURGERY CARM FLUORO LESS THAN 5 MIN W STILLS: Mod: TC

## 2019-06-13 PROCEDURE — 37000008 ZZH ANESTHESIA TECHNICAL FEE, 1ST 30 MIN: Performed by: UROLOGY

## 2019-06-13 PROCEDURE — 71000027 ZZH RECOVERY PHASE 2 EACH 15 MINS: Performed by: UROLOGY

## 2019-06-13 PROCEDURE — 71000012 ZZH RECOVERY PHASE 1 LEVEL 1 FIRST HR: Performed by: UROLOGY

## 2019-06-13 PROCEDURE — C1758 CATHETER, URETERAL: HCPCS | Performed by: UROLOGY

## 2019-06-13 PROCEDURE — 25000566 ZZH SEVOFLURANE, EA 15 MIN: Performed by: UROLOGY

## 2019-06-13 PROCEDURE — 25800030 ZZH RX IP 258 OP 636: Performed by: NURSE ANESTHETIST, CERTIFIED REGISTERED

## 2019-06-13 PROCEDURE — 36000056 ZZH SURGERY LEVEL 3 1ST 30 MIN: Performed by: UROLOGY

## 2019-06-13 PROCEDURE — 25000128 H RX IP 250 OP 636: Performed by: NURSE ANESTHETIST, CERTIFIED REGISTERED

## 2019-06-13 PROCEDURE — 25000125 ZZHC RX 250: Performed by: NURSE ANESTHETIST, CERTIFIED REGISTERED

## 2019-06-13 PROCEDURE — 25000132 ZZH RX MED GY IP 250 OP 250 PS 637: Performed by: UROLOGY

## 2019-06-13 PROCEDURE — C1769 GUIDE WIRE: HCPCS | Performed by: UROLOGY

## 2019-06-13 PROCEDURE — 25800025 ZZH RX 258: Performed by: UROLOGY

## 2019-06-13 PROCEDURE — 25000125 ZZHC RX 250: Performed by: UROLOGY

## 2019-06-13 PROCEDURE — 82962 GLUCOSE BLOOD TEST: CPT

## 2019-06-13 PROCEDURE — 27210794 ZZH OR GENERAL SUPPLY STERILE: Performed by: UROLOGY

## 2019-06-13 PROCEDURE — 40000169 ZZH STATISTIC PRE-PROCEDURE ASSESSMENT I: Performed by: UROLOGY

## 2019-06-13 PROCEDURE — 36000058 ZZH SURGERY LEVEL 3 EA 15 ADDTL MIN: Performed by: UROLOGY

## 2019-06-13 PROCEDURE — C2617 STENT, NON-COR, TEM W/O DEL: HCPCS | Performed by: UROLOGY

## 2019-06-13 PROCEDURE — 25000128 H RX IP 250 OP 636: Performed by: UROLOGY

## 2019-06-13 PROCEDURE — 37000009 ZZH ANESTHESIA TECHNICAL FEE, EACH ADDTL 15 MIN: Performed by: UROLOGY

## 2019-06-13 DEVICE — STENT URETERAL PERCUFLEX PLUS 4.8FRX24CM: Type: IMPLANTABLE DEVICE | Site: URETER | Status: FUNCTIONAL

## 2019-06-13 RX ORDER — SODIUM CHLORIDE, SODIUM LACTATE, POTASSIUM CHLORIDE, CALCIUM CHLORIDE 600; 310; 30; 20 MG/100ML; MG/100ML; MG/100ML; MG/100ML
INJECTION, SOLUTION INTRAVENOUS CONTINUOUS PRN
Status: DISCONTINUED | OUTPATIENT
Start: 2019-06-13 | End: 2019-06-13

## 2019-06-13 RX ORDER — NALOXONE HYDROCHLORIDE 0.4 MG/ML
.1-.4 INJECTION, SOLUTION INTRAMUSCULAR; INTRAVENOUS; SUBCUTANEOUS
Status: DISCONTINUED | OUTPATIENT
Start: 2019-06-13 | End: 2019-06-13 | Stop reason: HOSPADM

## 2019-06-13 RX ORDER — HYDROMORPHONE HYDROCHLORIDE 1 MG/ML
.3-.5 INJECTION, SOLUTION INTRAMUSCULAR; INTRAVENOUS; SUBCUTANEOUS EVERY 5 MIN PRN
Status: DISCONTINUED | OUTPATIENT
Start: 2019-06-13 | End: 2019-06-13 | Stop reason: HOSPADM

## 2019-06-13 RX ORDER — HYDROCODONE BITARTRATE AND ACETAMINOPHEN 5; 325 MG/1; MG/1
1 TABLET ORAL ONCE
Status: COMPLETED | OUTPATIENT
Start: 2019-06-13 | End: 2019-06-13

## 2019-06-13 RX ORDER — LIDOCAINE HYDROCHLORIDE 20 MG/ML
INJECTION, SOLUTION INFILTRATION; PERINEURAL PRN
Status: DISCONTINUED | OUTPATIENT
Start: 2019-06-13 | End: 2019-06-13

## 2019-06-13 RX ORDER — MAGNESIUM HYDROXIDE 1200 MG/15ML
LIQUID ORAL PRN
Status: DISCONTINUED | OUTPATIENT
Start: 2019-06-13 | End: 2019-06-13 | Stop reason: HOSPADM

## 2019-06-13 RX ORDER — ONDANSETRON 2 MG/ML
4 INJECTION INTRAMUSCULAR; INTRAVENOUS EVERY 30 MIN PRN
Status: DISCONTINUED | OUTPATIENT
Start: 2019-06-13 | End: 2019-06-13 | Stop reason: HOSPADM

## 2019-06-13 RX ORDER — HYDROCODONE BITARTRATE AND ACETAMINOPHEN 5; 325 MG/1; MG/1
1-2 TABLET ORAL EVERY 4 HOURS PRN
Qty: 20 TABLET | Refills: 0 | Status: SHIPPED | OUTPATIENT
Start: 2019-06-13 | End: 2019-09-03

## 2019-06-13 RX ORDER — CEFUROXIME AXETIL 500 MG/1
500 TABLET ORAL 2 TIMES DAILY
Qty: 6 TABLET | Refills: 0 | Status: SHIPPED | OUTPATIENT
Start: 2019-06-13 | End: 2019-09-03

## 2019-06-13 RX ORDER — ONDANSETRON 4 MG/1
4 TABLET, ORALLY DISINTEGRATING ORAL EVERY 30 MIN PRN
Status: DISCONTINUED | OUTPATIENT
Start: 2019-06-13 | End: 2019-06-13 | Stop reason: HOSPADM

## 2019-06-13 RX ORDER — PROPOFOL 10 MG/ML
INJECTION, EMULSION INTRAVENOUS PRN
Status: DISCONTINUED | OUTPATIENT
Start: 2019-06-13 | End: 2019-06-13

## 2019-06-13 RX ORDER — ONDANSETRON 2 MG/ML
INJECTION INTRAMUSCULAR; INTRAVENOUS PRN
Status: DISCONTINUED | OUTPATIENT
Start: 2019-06-13 | End: 2019-06-13

## 2019-06-13 RX ORDER — SODIUM CHLORIDE, SODIUM LACTATE, POTASSIUM CHLORIDE, CALCIUM CHLORIDE 600; 310; 30; 20 MG/100ML; MG/100ML; MG/100ML; MG/100ML
INJECTION, SOLUTION INTRAVENOUS CONTINUOUS
Status: DISCONTINUED | OUTPATIENT
Start: 2019-06-13 | End: 2019-06-13 | Stop reason: HOSPADM

## 2019-06-13 RX ORDER — DEXAMETHASONE SODIUM PHOSPHATE 4 MG/ML
INJECTION, SOLUTION INTRA-ARTICULAR; INTRALESIONAL; INTRAMUSCULAR; INTRAVENOUS; SOFT TISSUE PRN
Status: DISCONTINUED | OUTPATIENT
Start: 2019-06-13 | End: 2019-06-13

## 2019-06-13 RX ORDER — CEFAZOLIN SODIUM 2 G/100ML
2 INJECTION, SOLUTION INTRAVENOUS
Status: COMPLETED | OUTPATIENT
Start: 2019-06-13 | End: 2019-06-13

## 2019-06-13 RX ORDER — FENTANYL CITRATE 50 UG/ML
25-50 INJECTION, SOLUTION INTRAMUSCULAR; INTRAVENOUS
Status: DISCONTINUED | OUTPATIENT
Start: 2019-06-13 | End: 2019-06-13 | Stop reason: HOSPADM

## 2019-06-13 RX ORDER — FENTANYL CITRATE 50 UG/ML
INJECTION, SOLUTION INTRAMUSCULAR; INTRAVENOUS PRN
Status: DISCONTINUED | OUTPATIENT
Start: 2019-06-13 | End: 2019-06-13

## 2019-06-13 RX ADMIN — FENTANYL CITRATE 50 MCG: 50 INJECTION, SOLUTION INTRAMUSCULAR; INTRAVENOUS at 12:53

## 2019-06-13 RX ADMIN — HYDROCODONE BITARTRATE AND ACETAMINOPHEN 1 TABLET: 5; 325 TABLET ORAL at 14:25

## 2019-06-13 RX ADMIN — SODIUM CHLORIDE, POTASSIUM CHLORIDE, SODIUM LACTATE AND CALCIUM CHLORIDE: 600; 310; 30; 20 INJECTION, SOLUTION INTRAVENOUS at 12:51

## 2019-06-13 RX ADMIN — ONDANSETRON 4 MG: 2 INJECTION INTRAMUSCULAR; INTRAVENOUS at 13:02

## 2019-06-13 RX ADMIN — DEXAMETHASONE SODIUM PHOSPHATE 4 MG: 4 INJECTION, SOLUTION INTRA-ARTICULAR; INTRALESIONAL; INTRAMUSCULAR; INTRAVENOUS; SOFT TISSUE at 13:01

## 2019-06-13 RX ADMIN — CEFAZOLIN SODIUM 2 G: 2 INJECTION, SOLUTION INTRAVENOUS at 12:58

## 2019-06-13 RX ADMIN — DEXMEDETOMIDINE HYDROCHLORIDE 16 MCG: 100 INJECTION, SOLUTION INTRAVENOUS at 13:15

## 2019-06-13 RX ADMIN — LIDOCAINE HYDROCHLORIDE 100 MG: 20 INJECTION, SOLUTION INFILTRATION; PERINEURAL at 12:55

## 2019-06-13 RX ADMIN — PROPOFOL 150 MG: 10 INJECTION, EMULSION INTRAVENOUS at 12:55

## 2019-06-13 RX ADMIN — FENTANYL CITRATE 50 MCG: 50 INJECTION, SOLUTION INTRAMUSCULAR; INTRAVENOUS at 13:13

## 2019-06-13 ASSESSMENT — COPD QUESTIONNAIRES
COPD: 1
CAT_SEVERITY: MILD

## 2019-06-13 ASSESSMENT — LIFESTYLE VARIABLES: TOBACCO_USE: 1

## 2019-06-13 ASSESSMENT — MIFFLIN-ST. JEOR: SCORE: 1381.26

## 2019-06-13 ASSESSMENT — ENCOUNTER SYMPTOMS: SEIZURES: 0

## 2019-06-13 NOTE — OR NURSING
"Pt stated that she was in pain, declined further interventions from writer, stating that \"that will make me nauseous.\" Stated that she just wanted to go home. Education about post operative care given, medications given to , pt transported in a wheelchair to [facility]. Denies nausea/vomiting. Dr. Rivera spoke with patient during discharge process.   "

## 2019-06-13 NOTE — OP NOTE
Procedure Date: 06/13/2019      PREOPERATIVE DIAGNOSIS:  Right kidney stone.      POSTOPERATIVE DIAGNOSIS:  Right kidney stone.      PROCEDURE:  Cystoscopy, right ureteroscopy with laser lithotripsy and right ureteral stent placement.      SURGEON:  Derrek Rivera MD.      ANESTHESIA:  General.      ESTIMATED BLOOD LOSS:  Zero.      SPECIMENS:  None.      COMPLICATIONS:  None.      FINDINGS:  An 11 mm stone in lower pole of right kidney.      INDICATIONS:  The patient is a 78-year-old female with history of kidney stones in the past.  She had a CT scan performed on 02/14/2019 which showed 2 stones (4-5 mm) in the left ureteropelvic junction, 2 (4 mm) stones in the mid pole of the left kidney, 2 (4 mm) stones in the lower pole of the left kidney, and also a 1.1 cm stone lower pole of the right kidney.  She underwent treatment for her left-sided kidney stones in 04/2019 and 05/2019.  Those stones were 60% calcium oxalate, monohydrate and 40% uric acid.  She presents today to undergo treatment for her right-sided kidney stone.  The risks and benefits were discussed with the patient.  Plan was to perform cystoscopy with right ureteroscopy, laser lithotripsy, stone removal and stent placement.      PROCEDURE:  The patient was brought to the operating room, placed in a supine position.  After undergoing general anesthetic, she was placed in a low lithotomy position.  Genitalia was prepped and draped in the usual sterile fashion.  A 21-Croatian rigid cystoscope was inserted in the bladder.  There were no urethral lesions or strictures.  There are no tumors in the bladder.      A 0.035 Sensor wire was passed to the right ureter with the aid of 6-Croatian ureteral catheter.  Cystoscope was removed.  The ureteral catheter was removed.  A dual-lumen catheter was advanced over the Sensor wire to the mid ureter under fluoroscopic guidance.  An Amplatz Super Stiff guidewire was then passed through the dual lumen catheter into the  kidney under fluoroscopic guidance.  Dual-lumen catheter was removed.  Attempts were made to place 11/13 Sami ureteral sheath without success -- ureter was too narrow for ureteral sheath placement.      Next, the digital flexible ureteroscope was advanced over the Super Stiff guidewire into the kidney under fluoroscopic guidance.  The Super Stiff guidewire was then removed.  The kidney was explored.  There was a small clot in the upper pole of the right kidney.  There are no stones in the mid pole calices.  In the lower pole caridad, there was a 1.1 cm stone adhered to the caridad wall.  A 200 micron laser fiber was then passed through the ureteroscope.  The holmium laser was used to fragment the stones at a setting of 0.2 joules and frequency of 50 Hz.  The pieces were less than 2 mm in size.  Vast majority were less than 1 mm in size and most were gravel.  The calices were explored.  No large stone pieces were seen in the mid or upper pole calices.      Contrast was injected into the kidney.  The ureteroscope was removed.  There were no stones in the ureter.      The cystoscope was again inserted in the bladder.  A 4.8 Sami x 24 cm stent was then placed in the left ureter.  The stent could be seen curled in the kidney and the bladder.  The patient's bladder was emptied.  The cystoscope was removed.  She was put back in a supine position, awoken from anesthesia and transferred to the recovery room in good condition.  There were no complications.      I plan to see her back in roughly 7-10 days for stent removal in the office via cystoscopy.         POWER DELANEY MD             D: 2019   T: 2019   MT: [id number]      Name:     MAURI GRUBER   MRN:      [id number]        Account:        [id number]   :      1940           Procedure Date: 2019      Document: [id number]       cc: Marjan Pa MD       [facility]

## 2019-06-13 NOTE — ANESTHESIA PREPROCEDURE EVALUATION
Anesthesia Pre-Procedure Evaluation    Patient: Macie Jorge   MRN: [id number] : 1940          Preoperative Diagnosis: KIDNEY STONES    Procedure(s):  CYSTOSCOPY; RIGHT URETEROSCOPY WITH HOLMIUM LASER LITHOTRIPSY; STONE REMOVAL; RIGHT STENT PLACEMENT (DIGITAL FLEXIBLE URETEROSCOPE)    Past Medical History:   Diagnosis Date     Calculus of left ureter     dr Arriaga - ureteroscopy and lithitripsy     Cataract      Chronic low back pain     Dr Villagran at [facility] in - not surgical      Complication of anesthesia      COPD with asthma (H)      followed with pulmonary DR LENNOX 60-pack-year history of smoking     Esophagitis, reflux 2012    SOME EOSINOPHILIA NO BARRETS     Gastro-oesophageal reflux disease      has seen GI     Hydronephrosis, left     chronic , in  Dr Arriaga plan was observaton     Hyperlipidaemia LDL goal < 100      Hypothyroid      Migraine headaches 2011     Moderate persistent asthma      lifelong, saw Pulm      Obesity (BMI 30-39.9)      Panic attacks      PONV (postoperative nausea and vomiting)      Renal stones     kidney stones, multiple small stones high risk recurrent ureteral stones. , Dr Arriaga in  rec 24 hour urine.     Type 2 diabetes mellitus without complication (H)      Past Surgical History:   Procedure Laterality Date     ARTHROPLASTY HIP  3/13/2013    Procedure: ARTHROPLASTY HIP;  LEFT TOTAL HIP ARTHROPLASTY (BIOMET)^ ;  Surgeon: Anand Main MD;  Location:  OR     ARTHROPLASTY PATELLO-FEMORAL (KNEE)  2005ish    Left     CHOLECYSTECTOMY, OPEN  27 yo     COMBINED CYSTOSCOPY, INSERT STENT URETER(S)  2014    Procedure: COMBINED CYSTOSCOPY, INSERT STENT URETER(S);  Surgeon: Sam Arriaga MD;  Location:  OR     COMBINED CYSTOSCOPY, RETROGRADES, URETEROSCOPY, LASER HOLMIUM LITHOTRIPSY URETER(S), INSERT STENT Left 2016    Procedure: COMBINED CYSTOSCOPY, RETROGRADES, URETEROSCOPY, LASER HOLMIUM LITHOTRIPSY URETER(S), INSERT STENT;  Surgeon:  Thomas Edmondson MD;  Location:  OR     COMBINED CYSTOSCOPY, RETROGRADES, URETEROSCOPY, LASER HOLMIUM LITHOTRIPSY URETER(S), INSERT STENT Left 3/28/2019    Procedure: CYSTOSCOPY, LEFT RETROGRADE PYLEOGRAM, LEFT URETEROSCOPY, HOLMIUM LASER LITHOTRIPSY, LEFT URETERAL STENT EXCHANGE;  Surgeon: Derrek Rivera MD;  Location:  OR     CYSTOSCOPY, RETROGRADES, INSERT STENT URETER(S), COMBINED Left 3/8/2019    Procedure: CYSTOSCOPY,LEFT RETROGRADE, LEFT STENT PLACEMENT;  Surgeon: Derrek Rivera MD;  Location:  OR     GENITOURINARY SURGERY       LAMINECT/DISCECTOMY, LUMBAR      Laminectomy/Discectomy Lumbar     LASER HOLMIUM LITHOTRIPSY URETER(S), INSERT STENT, COMBINED Left 8/20/2014    Procedure: COMBINED CYSTOSCOPY, URETEROSCOPY, LASER HOLMIUM LITHOTRIPSY URETER(S), INSERT STENT;  Surgeon: Sam Arriaga MD;  Location:  OR     LASER HOLMIUM LITHOTRIPSY URETER(S), INSERT STENT, COMBINED Left 5/2/2019    Procedure: CYSTOSCOPY, LEFT RETROGRADE, LEFT URETEROSCOPY, HOLMIUM LASER LITHOTRIPSY, STONE REMOVAL, LEFT STENT EXCHANGE;  Surgeon: Derrek Rivera MD;  Location:  OR     ORTHOPEDIC SURGERY      left ankle     RECESSION EYELID UPPER         Anesthesia Evaluation     . Pt has had prior anesthetic.     History of anesthetic complications (Patient has severe nausea with morphine)   - PONV        ROS/MED HX    ENT/Pulmonary:     (+)tobacco use, Past use Moderate Persistent asthma Treatment: Inhaler daily,  mild COPD, , . .   (-) sleep apnea and recent URI   Neurologic:     (+)migraines,    (-) seizures and CVA   Cardiovascular:  - neg cardiovascular ROS   (+) Dyslipidemia, ----. : . . . :. . Previous cardiac testing date:results:date: results:ECG reviewed date:3/4/19 results:NSR date: results:          METS/Exercise Tolerance:  1 - Eating, dressing   Hematologic:         Musculoskeletal:   (+) arthritis,  -       GI/Hepatic:     (+) GERD Asymptomatic on medication,      (-) liver disease  "  Renal/Genitourinary:     (+) chronic renal disease, type: CRI and ARF, Nephrolithiasis ,       Endo:     (+) type II DM Not using insulin thyroid problem hypothyroidism, Obesity (Class 1), .   (-) Type I DM   Psychiatric:     (+) psychiatric history anxiety      Infectious Disease:         Malignancy:         Other:    (+) H/O Chronic Pain,H/O chronic opiod use ,                         Physical Exam  Normal systems: cardiovascular and pulmonary    Airway   Mallampati: II  TM distance: >3 FB  Neck ROM: full    Dental   (+) upper dentures and lower dentures    Cardiovascular       Pulmonary             Lab Results   Component Value Date    WBC 5.5 02/27/2018    HGB 11.7 (A) 02/27/2018    HCT 35.7 02/27/2018     02/27/2018     06/05/2019    POTASSIUM 4.1 06/05/2019    CHLORIDE 103 06/05/2019    CO2 26 10/01/2015    BUN 16 06/05/2019    BUN 12 06/05/2019    CR 1.33 (H) 06/05/2019     (H) 06/05/2019    WARD 9.7 06/05/2019    ALBUMIN 4.3 02/05/2019    PROTTOTAL 8.1 02/05/2019    ALT 10 02/05/2019    AST 15 02/05/2019    ALKPHOS 52 02/05/2019    BILITOTAL 0.4 02/05/2019    LIPASE 154 08/04/2014    PTT 31 02/15/2006    INR 1.02 08/04/2014    T4 1.27 03/04/2019    T3 81 03/04/2019       Preop Vitals  BP Readings from Last 3 Encounters:   06/05/19 131/78   05/02/19 151/70   04/23/19 140/82    Pulse Readings from Last 3 Encounters:   06/05/19 80   05/02/19 69   04/23/19 78      Resp Readings from Last 3 Encounters:   06/05/19 18   05/02/19 14   04/23/19 16    SpO2 Readings from Last 3 Encounters:   06/05/19 94%   05/02/19 96%   04/23/19 99%      Temp Readings from Last 1 Encounters:   05/02/19 35.6  C (96.1  F)    Ht Readings from Last 1 Encounters:   06/05/19 1.676 m (5' 6\")      Wt Readings from Last 1 Encounters:   06/05/19 88 kg (194 lb)    Estimated body mass index is 31.31 kg/m  as calculated from the following:    Height as of 6/5/19: 1.676 m (5' 6\").    Weight as of 6/5/19: 88 kg (194 lb). "       Anesthesia Plan      History & Physical Review  History and physical reviewed and following examination; no interval change.    ASA Status:  3 .    NPO Status:  > 8 hours    Plan for General and LMA with Intravenous and Propofol induction. Maintenance will be Balanced.    PONV prophylaxis:  Ondansetron (or other 5HT-3), Dexamethasone or Solumedrol and Other (See comment) (Propofol gtt)       Postoperative Care  Postoperative pain management:  IV analgesics, Oral pain medications and Multi-modal analgesia.      Consents  Anesthetic plan, risks, benefits and alternatives discussed with:  Patient..                 Quan Sood MD

## 2019-06-13 NOTE — OR NURSING
PNDS met, po per I&O sheet. Pt dressed, up in recliner and transported to Phase 2. Pt needs to void before discharge to home.

## 2019-06-13 NOTE — ANESTHESIA POSTPROCEDURE EVALUATION
Patient: Macie Jorge    Procedure(s):  CYSTOSCOPY; RIGHT URETEROSCOPY WITH HOLMIUM LASER LITHOTRIPSY; RIGHT STENT PLACEMENT (DIGITAL FLEXIBLE URETEROSCOPE)    Diagnosis:KIDNEY STONES  Diagnosis Additional Information: No value filed.    Anesthesia Type:  General, LMA    Note:  Anesthesia Post Evaluation    Patient location during evaluation: PACU  Patient participation: Able to fully participate in evaluation  Level of consciousness: awake  Pain management: adequate  Airway patency: patent  Cardiovascular status: acceptable  Respiratory status: acceptable  Hydration status: acceptable  PONV: none             Last vitals:  Vitals:    06/13/19 1120 06/13/19 1338   BP: 145/80 127/61   Pulse:  73   Resp: 16 27   Temp: 36.5  C (97.7  F) 36.4  C (97.5  F)   SpO2: 97% 99%         Electronically Signed By: Quan Sood MD  June 13, 2019  1:54 PM

## 2019-06-13 NOTE — DISCHARGE INSTRUCTIONS
Same Day Surgery Discharge Instructions for  Sedation and General Anesthesia       It's not unusual to feel dizzy, light-headed or faint for up to 24 hours after surgery or while taking pain medication.  If you have these symptoms: sit for a few minutes before standing and have someone assist you when you get up to walk or use the bathroom.      You should rest and relax for the next 24 hours. We recommend you make arrangements to have an adult stay with you for at least 24 hours after your discharge.  Avoid hazardous and strenuous activity.      DO NOT DRIVE any vehicle or operate mechanical equipment for 24 hours following the end of your surgery.  Even though you may feel normal, your reactions may be affected by the medication you have received.      Do not drink alcoholic beverages for 24 hours following surgery.       Slowly progress to your regular diet as you feel able. It's not unusual to feel nauseated and/or vomit after receiving anesthesia.  If you develop these symptoms, drink clear liquids (apple juice, ginger ale, broth, 7-up, etc. ) until you feel better.  If your nausea and vomiting persists for 24 hours, please notify your surgeon.        All narcotic pain medications, along with inactivity and anesthesia, can cause constipation. Drinking plenty of liquids and increasing fiber intake will help.      For any questions of a medical nature, call your surgeon.      Do not make important decisions for 24 hours.      If you had general anesthesia, you may have a sore throat for a couple of days related to the breathing tube used during surgery.  You may use Cepacol lozenges to help with this discomfort.  If it worsens or if you develop a fever, contact your surgeon.       If you feel your pain is not well managed with the pain medications prescribed by your surgeon, please contact your surgeon's office to let them know so they can address your concerns.       Cystoscopy and  Holmium Laser Discharge  Instructions    Holmium laser lithotripsy was used to break up your kidney stone(s). It is normal to have visible blood in the urine, burning, urgency and frequency following this procedure. These symptoms may last for a few days to weeks.     Diet:    To help pass stone fragments and clear the blood out of the urine, it is important to drink 6-8 glasses of fluids per day at home - at least 3-4 glasses should be water.       Return to the diet that you were on before the procedure, unless you are given specific diet instructions.    Activity:    Walk short distances and increase as your strength allows.    You may climb stairs.    Do not do strenuous exercise or heavy lifting until approved by surgeon.    Do not drive while taking narcotic pain medications.    Bathing:    You may take a shower.           Call your physician if these signs/symptoms are present:    Pain that is not relieved by a short rest or ordered pain medications.    Temperature at or above 101.0 F or chills.    Inability or difficulty urinating.     Excessive blood in urine.    Any questions or concerns.      Cystoscopy and Stent Placement Discharge Instructions    During surgery, a stent was placed in the ureter.  The ureter is the tube that drains urine from the kidney to the bladder.  The stent is placed to dilate (open) the ureter so the stone fragments can pass easily through the ureter or to decrease ureteral swelling after surgery, or to relieve an obstruction. The stent is made of rubber. The upper end of the stent curls in the kidney while the lower end rests in the bladder      Diet:    Return to the diet that you were on before the procedure, unless you are given specific diet instructions.    It is important to drink 6-8 glasses of fluids per day at home - at least 3-4 glasses should be water.    Activity:    Walk short distances and increase as your strength allows.    You may climb stairs.    Do not do strenuous exercise or heavy  lifting until approved by surgeon.    Do not drive while taking narcotic pain medications.    Bathing:    You may take a shower.    While the stent is in place you may experience the following symptoms:    Blood and/or small blood clots in urine.    Bladder spasm (frequency and urgency of urination).    Discomfort or aching in the back or side where the stent is.    Burning or discomfort at the end of urine stream.    To decrease these symptoms you should:    Take pain medication as prescribed.    Drink plenty of fluids.    If you experience pain at the end of urination try not emptying your bladder completely.    If having discomfort in back or side, decrease activity.    Call your physician if these signs/symptoms are present:    Pain that is not relieved by a short rest or ordered pain medications.    Temperature at or above 101.0 F or chills.    Inability or difficulty urinating.     Excessive blood in urine.    Any questions or concerns.      **If you have questions or concerns about your procedure,   call Dr. Rivera at [phone]**

## 2019-06-13 NOTE — ANESTHESIA CARE TRANSFER NOTE
Patient: Macie Jorge    Procedure(s):  CYSTOSCOPY; RIGHT URETEROSCOPY WITH HOLMIUM LASER LITHOTRIPSY; RIGHT STENT PLACEMENT (DIGITAL FLEXIBLE URETEROSCOPE)    Diagnosis: KIDNEY STONES  Diagnosis Additional Information: No value filed.    Anesthesia Type:   General, LMA     Note:  Airway :Face Mask  Patient transferred to:PACU  Comments: Anesthesia Care Note    Patient: Macie Jorge    Transferred to: PACU    Patient vital signs: Stable    Airway: FM    Monitors placed. VSS. PIV patent. No change in dentition. Report given to JAHAIRA Urbina CRNA   6/13/2019  Handoff Report: Identifed the Patient, Identified the Reponsible Provider, Reviewed the pertinent medical history, Discussed the surgical course, Reviewed Intra-OP anesthesia mangement and issues during anesthesia, Set expectations for post-procedure period and Allowed opportunity for questions and acknowledgement of understanding      Vitals: (Last set prior to Anesthesia Care Transfer)    CRNA VITALS  6/13/2019 1306 - 6/13/2019 1340      6/13/2019             NIBP:  114/80    NIBP Mean:  97                Electronically Signed By: WILEY Craig CRNA  June 13, 2019  1:40 PM

## 2019-06-13 NOTE — BRIEF OP NOTE
[facility] Urology Brief Operative Note    Pre-operative diagnosis: KIDNEY STONES   Post-operative diagnosis: Same - Right kidney stone   Procedure: Procedure(s):  CYSTOSCOPY; RIGHT URETEROSCOPY WITH HOLMIUM LASER LITHOTRIPSY; RIGHT STENT PLACEMENT (DIGITAL FLEXIBLE URETEROSCOPE)   Surgeon: POWER DELANEY MD   Assistant(s): None   Anesthesia: General endotracheal anesthesia   Estimated blood loss: None   Total IV fluids: (See anesthesia record)   Blood transfusion: No transfusion was given during surgery   Total urine output: Not measured   Drains: None   Specimens: None   Implants: 4.8 Fr x 24 cm stent in Right ureter   Findings: 11 mm stone in Right lower pole   Complications: None   Condition: Stable   Comments: See dictated operative report for full details

## 2019-06-22 ENCOUNTER — TRANSFERRED RECORDS (OUTPATIENT)
Dept: FAMILY MEDICINE | Facility: CLINIC | Age: 79
End: 2019-06-22

## 2019-07-01 NOTE — ADDENDUM NOTE
Addendum  created 07/01/19 1719 by Taz Johnson MD    Attestation recorded in Intraprocedure, Intraprocedure Attestations filed

## 2019-07-17 ENCOUNTER — TRANSFERRED RECORDS (OUTPATIENT)
Dept: FAMILY MEDICINE | Facility: CLINIC | Age: 79
End: 2019-07-17

## 2019-09-03 ENCOUNTER — OFFICE VISIT (OUTPATIENT)
Dept: FAMILY MEDICINE | Facility: CLINIC | Age: 79
End: 2019-09-03

## 2019-09-03 VITALS
DIASTOLIC BLOOD PRESSURE: 68 MMHG | HEART RATE: 83 BPM | BODY MASS INDEX: 30.67 KG/M2 | WEIGHT: 190 LBS | SYSTOLIC BLOOD PRESSURE: 122 MMHG | OXYGEN SATURATION: 96 %

## 2019-09-03 DIAGNOSIS — M47.27 OSTEOARTHRITIS OF SPINE WITH RADICULOPATHY, LUMBOSACRAL REGION: ICD-10-CM

## 2019-09-03 DIAGNOSIS — M54.2 CERVICAL PAIN: ICD-10-CM

## 2019-09-03 DIAGNOSIS — M54.50 LUMBAR BACK PAIN: Primary | ICD-10-CM

## 2019-09-03 DIAGNOSIS — K52.9 CHRONIC DIARRHEA: ICD-10-CM

## 2019-09-03 DIAGNOSIS — M41.25 OTHER IDIOPATHIC SCOLIOSIS, THORACOLUMBAR REGION: ICD-10-CM

## 2019-09-03 LAB
% GRANULOCYTES: 78.1 % (ref 42.2–75.2)
HCT VFR BLD AUTO: 33.9 % (ref 35–46)
HEMOGLOBIN: 11.5 G/DL (ref 11.8–15.5)
LYMPHOCYTES NFR BLD AUTO: 16.9 % (ref 20.5–51.1)
MCH RBC QN AUTO: 29.5 PG (ref 27–31)
MCHC RBC AUTO-ENTMCNC: 34 G/DL (ref 33–37)
MCV RBC AUTO: 86.9 FL (ref 80–100)
MONOCYTES NFR BLD AUTO: 5 % (ref 1.7–9.3)
PLATELET # BLD AUTO: 195 K/UL (ref 140–450)
RBC # BLD AUTO: 3.9 X10/CMM (ref 3.7–5.2)
WBC # BLD AUTO: 6.7 X10/CMM (ref 3.8–11)

## 2019-09-03 PROCEDURE — 85025 COMPLETE CBC W/AUTO DIFF WBC: CPT | Performed by: FAMILY MEDICINE

## 2019-09-03 PROCEDURE — 72100 X-RAY EXAM L-S SPINE 2/3 VWS: CPT | Performed by: FAMILY MEDICINE

## 2019-09-03 PROCEDURE — 36415 COLL VENOUS BLD VENIPUNCTURE: CPT | Performed by: FAMILY MEDICINE

## 2019-09-03 PROCEDURE — 99214 OFFICE O/P EST MOD 30 MIN: CPT | Mod: 25 | Performed by: FAMILY MEDICINE

## 2019-09-03 PROCEDURE — 72040 X-RAY EXAM NECK SPINE 2-3 VW: CPT | Performed by: FAMILY MEDICINE

## 2019-09-03 PROCEDURE — 80053 COMPREHEN METABOLIC PANEL: CPT | Mod: 90 | Performed by: FAMILY MEDICINE

## 2019-09-03 RX ORDER — CYCLOBENZAPRINE HCL 5 MG
5 TABLET ORAL 3 TIMES DAILY PRN
Qty: 30 TABLET | Refills: 1 | Status: SHIPPED | OUTPATIENT
Start: 2019-09-03 | End: 2020-06-15

## 2019-09-03 RX ORDER — HYDROCODONE BITARTRATE AND ACETAMINOPHEN 5; 325 MG/1; MG/1
1-2 TABLET ORAL EVERY 4 HOURS PRN
Qty: 40 TABLET | Refills: 0 | Status: SHIPPED | OUTPATIENT
Start: 2019-09-03 | End: 2019-11-12

## 2019-09-03 NOTE — PROGRESS NOTES
"Problem(s) Oriented visit        SUBJECTIVE:                                                    Macie Jorge is a 78 year old female who presents to clinic today for several issues.  1. Back pain: She has long history of low back pain that typically stays in the low back. Recently she did a lot of antony and since then the pain has been radiating into her upper back. No radiation into the legs or arms. She does note that her right arm will fall asleep periodically and will need to change positions to \"wake up\" the arm.  2. Diarrhea: 3-4 times weekly for a few months. It is both large and small volume, watery, going 3-4 times daily. No hard stools in between the bouts of diarrhea. No blood in the stool. She has unintentionally lost a few pounds. She has a history of colon polyps and her last colonoscopy was 2018 with several polyps removed.       Problem list, Medication list, Allergies, and Medical/Social/Surgical histories reviewed in EPIC and updated as appropriate.   Additional history: as documented    ROS:  5 point ROS completed and negative except noted above, including Gen, CV, Resp, GI, MS      Histories:   Patient Active Problem List   Diagnosis     Panic attacks     Advanced directives, counseling/discussion     Hyperlipidemia with target LDL less than 100     History of hip replacement, total     Health Care Home     Obesity     Urgency incontinence     CKD (chronic kidney disease) stage 3, GFR 30-59 ml/min (H)     Nonintractable migraine, unspecified migraine type     Type 2 diabetes mellitus with stage 3 chronic kidney disease, without long-term current use of insulin (H)     COPD with asthma (H)     Acquired hypothyroidism     Altered mental status     History of colonic polyps     Chronic midline low back pain without sciatica     Physical deconditioning     Spinal stenosis of lumbar region with neurogenic claudication     Hypothyroidism due to acquired atrophy of thyroid     Kidney stone     " Past Surgical History:   Procedure Laterality Date     ARTHROPLASTY HIP  3/13/2013    Procedure: ARTHROPLASTY HIP;  LEFT TOTAL HIP ARTHROPLASTY (BIOMET)^ ;  Surgeon: Anand Main MD;  Location:  OR     ARTHROPLASTY PATELLO-FEMORAL (KNEE)  2005ish    Left     CHOLECYSTECTOMY, OPEN  25 yo     COMBINED CYSTOSCOPY, INSERT STENT URETER(S)  8/5/2014    Procedure: COMBINED CYSTOSCOPY, INSERT STENT URETER(S);  Surgeon: Sam Arriaga MD;  Location:  OR     COMBINED CYSTOSCOPY, RETROGRADES, URETEROSCOPY, LASER HOLMIUM LITHOTRIPSY URETER(S), INSERT STENT Left 6/14/2016    Procedure: COMBINED CYSTOSCOPY, RETROGRADES, URETEROSCOPY, LASER HOLMIUM LITHOTRIPSY URETER(S), INSERT STENT;  Surgeon: Thomas Edmondson MD;  Location:  OR     COMBINED CYSTOSCOPY, RETROGRADES, URETEROSCOPY, LASER HOLMIUM LITHOTRIPSY URETER(S), INSERT STENT Left 3/28/2019    Procedure: CYSTOSCOPY, LEFT RETROGRADE PYLEOGRAM, LEFT URETEROSCOPY, HOLMIUM LASER LITHOTRIPSY, LEFT URETERAL STENT EXCHANGE;  Surgeon: Derrek Rivera MD;  Location:  OR     CYSTOSCOPY, RETROGRADES, INSERT STENT URETER(S), COMBINED Left 3/8/2019    Procedure: CYSTOSCOPY,LEFT RETROGRADE, LEFT STENT PLACEMENT;  Surgeon: Derrek Rivera MD;  Location:  OR     GENITOURINARY SURGERY       LAMINECT/DISCECTOMY, LUMBAR      Laminectomy/Discectomy Lumbar     LASER HOLMIUM LITHOTRIPSY URETER(S), INSERT STENT, COMBINED Left 8/20/2014    Procedure: COMBINED CYSTOSCOPY, URETEROSCOPY, LASER HOLMIUM LITHOTRIPSY URETER(S), INSERT STENT;  Surgeon: Sam Arriaga MD;  Location:  OR     LASER HOLMIUM LITHOTRIPSY URETER(S), INSERT STENT, COMBINED Left 5/2/2019    Procedure: CYSTOSCOPY, LEFT RETROGRADE, LEFT URETEROSCOPY, HOLMIUM LASER LITHOTRIPSY, STONE REMOVAL, LEFT STENT EXCHANGE;  Surgeon: Derrek Rivera MD;  Location:  OR     LASER HOLMIUM LITHOTRIPSY URETER(S), INSERT STENT, COMBINED Right 6/13/2019    Procedure: CYSTOSCOPY; RIGHT URETEROSCOPY WITH HOLMIUM  LASER LITHOTRIPSY; RIGHT STENT PLACEMENT (DIGITAL FLEXIBLE URETEROSCOPE);  Surgeon: Derrek Rivera MD;  Location:  OR     ORTHOPEDIC SURGERY      left ankle     RECESSION EYELID UPPER         Social History     Tobacco Use     Smoking status: Former Smoker     Packs/day: 1.50     Years: 40.00     Pack years: 60.00     Last attempt to quit: 1995     Years since quittin.7     Smokeless tobacco: Never Used   Substance Use Topics     Alcohol use: Yes     Comment: occ.     No family history on file.        OBJECTIVE:                                                    /68 (BP Location: Left arm, Patient Position: Sitting, Cuff Size: Adult Large)   Pulse 83   Wt 86.2 kg (190 lb)   SpO2 96%   BMI 30.67 kg/m    Body mass index is 30.67 kg/m .   GENERAL APPEARANCE: Alert, no acute distress  EYES: PERRL, EOM normal, conjunctiva and lids normal  NECK: No adenopathy,masses or thyromegaly  RESP: lungs clear to auscultation   CV: normal rate, regular rhythm, no murmur or gallop  ABDOMEN: soft, no organomegaly, masses or tenderness  MS: curvature of the spine is noted with unequal heights of the shoulder blades and SI joints. Neck with decreased ROM. Normal muscle strength in the UE and LE both proximally and distally. Negaive straight leg raise. Muscles are tight along the length of the spine.  NEURO: Alert, oriented, speech and mentation normal  PSYCH: mentation appears normal, affect and mood normal  X-rays of the cervical spine and lumbar spine show osteoarthritic changes, significant loss of disc space, and changes of scoliosis   Labs Resulted Today:   Results for orders placed or performed in visit on 19   Comp. Metabolic Panel (14) ([facility])   Result Value Ref Range    Glucose 130 (H) 65 - 99 mg/dL    Urea Nitrogen 20 8 - 27 mg/dL    Creatinine 1.55 (H) 0.57 - 1.00 mg/dL    eGFR If NonAfricn Am 32 (L) >59 mL/min/1.73    eGFR If Africn Am 37 (L) >59 mL/min/1.73    BUN/Creatinine Ratio 13 12 -  28    Sodium 139 134 - 144 mmol/L    Potassium 4.8 3.5 - 5.2 mmol/L    Chloride 101 96 - 106 mmol/L    Total CO2 23 20 - 29 mmol/L    Calcium 9.9 8.7 - 10.3 mg/dL    Protein Total 8.2 6.0 - 8.5 g/dL    Albumin 4.5 3.5 - 4.8 g/dL    Globulin, Total 3.7 1.5 - 4.5 g/dL    A/G Ratio 1.2 1.2 - 2.2    Bilirubin Total 0.5 0.0 - 1.2 mg/dL    Alkaline Phosphatase 49 39 - 117 IU/L    AST 14 0 - 40 IU/L    ALT 11 0 - 32 IU/L    Narrative    Performed at:  01 - [facility] [address], [place], [place]  [id number]  : Riley Rachel MD, Phone:  [phone]   CBC with Diff/Plt (G)   Result Value Ref Range    WBC x10/cmm 6.7 3.8 - 11.0 x10/cmm    % Lymphocytes 16.9 (A) 20.5 - 51.1 %    % Monocytes 5.0 1.7 - 9.3 %    % Granulocytes 78.1 (A) 42.2 - 75.2 %    RBC x10/cmm 3.90 3.7 - 5.2 x10/cmm    Hemoglobin 11.5 (A) 11.8 - 15.5 g/dl    Hematocrit 33.9 (A) 35 - 46 %    MCV 86.9 80 - 100 fL    MCH 29.5 27.0 - 31.0 pg    MCHC 34.0 33.0 - 37.0 g/dL    Platelet Count 195 140 - 450 K/uL     ASSESSMENT/PLAN:                                                        Macie was seen today for musculoskeletal problem, consult and diarrhea.    Diagnoses and all orders for this visit:    Lumbar back pain  Osteoarthritis of spine with radiculopathy, lumbosacral region  Other idiopathic scoliosis, thoracolumbar region  -     X-ray lumbar spine 2-3 views*  -     HYDROcodone-acetaminophen (NORCO) 5-325 MG tablet; Take 1-2 tablets by mouth every 4 hours as needed for moderate to severe pain  -     cyclobenzaprine (FLEXERIL) 5 MG tablet; Take 1 tablet (5 mg) by mouth 3 times daily as needed for muscle spasms  -     naloxone (NARCAN) 4 MG/0.1ML nasal spray; Spray 1 spray (4 mg) into one nostril alternating nostrils once as needed for opioid reversal Every 2-3 minutes until patient responsive or EMS arrives  -     Urinalysis w/reflex protein, bili ([facility]); Future to verify there isn't any related kidney or stone involvement.  Trial of  muscle relaxants and judicious use of norco. Warned about sedation and addiction potential.    Cervical pain  -     X-ray Cervical spine 2-3 vws  -     HYDROcodone-acetaminophen (NORCO) 5-325 MG tablet; Take 1-2 tablets by mouth every 4 hours as needed for moderate to severe pain  -     cyclobenzaprine (FLEXERIL) 5 MG tablet; Take 1 tablet (5 mg) by mouth 3 times daily as needed for muscle spasms  -     naloxone (NARCAN) 4 MG/0.1ML nasal spray; Spray 1 spray (4 mg) into one nostril alternating nostrils once as needed for opioid reversal Every 2-3 minutes until patient responsive or EMS arrives    Chronic diarrhea, etiology uncertain.  -     Comp. Metabolic Panel (14) ([facility])  -     CBC with Diff/Plt ([facility])  Stool studies are ordered for her to complete at home and return here.      There are no Patient Instructions on file for this visit.    The following health maintenance items are reviewed in Epic and correct as of today:  Health Maintenance   Topic Date Due     URINE DRUG SCREEN  1940     COPD ACTION PLAN  1940     MIGRAINE ACTION PLAN  1940     ZOSTER IMMUNIZATION (1 of 2) 10/14/1990     ADVANCE CARE PLANNING  09/01/2016     MEDICARE ANNUAL WELLNESS VISIT  01/20/2017     EYE EXAM  06/16/2018     ASTHMA ACTION PLAN  05/17/2019     FALL RISK ASSESSMENT  05/17/2019     LIPID  08/01/2019     A1C  08/05/2019     INFLUENZA VACCINE (1) 09/01/2019     ASTHMA CONTROL TEST  09/04/2019     MICROALBUMIN  02/05/2020     DIABETIC FOOT EXAM  02/05/2020     TSH W/FREE T4 REFLEX  03/04/2020     MARLYS ASSESSMENT  03/04/2020     PHQ-9  03/04/2020     BMP  09/03/2020     COLONOSCOPY  04/05/2021     DTAP/TDAP/TD IMMUNIZATION (2 - Td) 11/15/2022     DEXA  Completed     SPIROMETRY  Completed     PNEUMOCOCCAL IMMUNIZATION 65+ LOW/MEDIUM RISK  Completed     IPV IMMUNIZATION  Aged Out     MENINGITIS IMMUNIZATION  Aged Out       Marjan Pa MD  [facility]  Family Practice  [facility]  Group  [phone]    For any issues my office # is [phone]

## 2019-09-03 NOTE — LETTER
[facility] [address] [place]  [address]  Phone: [phone]    September 9, 2019      Macie Jorge  [address], [place]. \[facility]"" [id number]              Dear Macie,    The results from your recent visit showed Blood counts are mildly low, but without evidence of iron deficiency.   Kidney function is slowly declining over the past 6 months. This may be due to dehydration from the large amount of water that can be lost with diarrhea. Please try to hydrate better.   Return stool samples so we can help determine the reason for your diarrhea.        Sincerely,     Marjan Pa M.D.    Results for orders placed or performed in visit on 09/03/19   Comp. Metabolic Panel (14) ([facility])   Result Value Ref Range    Glucose 130 (H) 65 - 99 mg/dL    Urea Nitrogen 20 8 - 27 mg/dL    Creatinine 1.55 (H) 0.57 - 1.00 mg/dL    eGFR If NonAfricn Am 32 (L) >59 mL/min/1.73    eGFR If Africn Am 37 (L) >59 mL/min/1.73    BUN/Creatinine Ratio 13 12 - 28    Sodium 139 134 - 144 mmol/L    Potassium 4.8 3.5 - 5.2 mmol/L    Chloride 101 96 - 106 mmol/L    Total CO2 23 20 - 29 mmol/L    Calcium 9.9 8.7 - 10.3 mg/dL    Protein Total 8.2 6.0 - 8.5 g/dL    Albumin 4.5 3.5 - 4.8 g/dL    Globulin, Total 3.7 1.5 - 4.5 g/dL    A/G Ratio 1.2 1.2 - 2.2    Bilirubin Total 0.5 0.0 - 1.2 mg/dL    Alkaline Phosphatase 49 39 - 117 IU/L    AST 14 0 - 40 IU/L    ALT 11 0 - 32 IU/L    Narrative    Performed at:  01 - [facility] [address], [place], [place]  [id number]  : Riley Rachel MD, Phone:  [phone]   CBC with Diff/Plt ([facility])   Result Value Ref Range    WBC x10/cmm 6.7 3.8 - 11.0 x10/cmm    % Lymphocytes 16.9 (A) 20.5 - 51.1 %    % Monocytes 5.0 1.7 - 9.3 %    % Granulocytes 78.1 (A) 42.2 - 75.2 %    RBC x10/cmm 3.90 3.7 - 5.2 x10/cmm    Hemoglobin 11.5 (A) 11.8 - 15.5 g/dl    Hematocrit 33.9 (A) 35 - 46 %    MCV 86.9 80 - 100 fL    MCH 29.5 27.0 - 31.0 pg    MCHC 34.0 33.0 - 37.0 g/dL     Platelet Count 195 140 - 450 K/uL

## 2019-09-04 LAB
ALBUMIN SERPL-MCNC: 4.5 G/DL (ref 3.5–4.8)
ALBUMIN/GLOB SERPL: 1.2 {RATIO} (ref 1.2–2.2)
ALP SERPL-CCNC: 49 IU/L (ref 39–117)
ALT SERPL-CCNC: 11 IU/L (ref 0–32)
AST SERPL-CCNC: 14 IU/L (ref 0–40)
BILIRUB SERPL-MCNC: 0.5 MG/DL (ref 0–1.2)
BUN SERPL-MCNC: 20 MG/DL (ref 8–27)
BUN/CREATININE RATIO: 13 (ref 12–28)
CALCIUM SERPL-MCNC: 9.9 MG/DL (ref 8.7–10.3)
CHLORIDE SERPLBLD-SCNC: 101 MMOL/L (ref 96–106)
CREAT SERPL-MCNC: 1.55 MG/DL (ref 0.57–1)
EGFR IF AFRICN AM: 37 ML/MIN/1.73
EGFR IF NONAFRICN AM: 32 ML/MIN/1.73
GLOBULIN, TOTAL: 3.7 G/DL (ref 1.5–4.5)
GLUCOSE SERPL-MCNC: 130 MG/DL (ref 65–99)
POTASSIUM SERPL-SCNC: 4.8 MMOL/L (ref 3.5–5.2)
PROT SERPL-MCNC: 8.2 G/DL (ref 6–8.5)
SODIUM SERPL-SCNC: 139 MMOL/L (ref 134–144)
TOTAL CO2: 23 MMOL/L (ref 20–29)

## 2019-09-23 DIAGNOSIS — M54.50 LUMBAR BACK PAIN: ICD-10-CM

## 2019-09-23 LAB
BACTERIA URINE: ABNORMAL
BILIRUB UR QL STRIP: 0
BLOOD URINE DIP: ABNORMAL
CASTS/LPF: 0
COLOR UR: YELLOW
CRYSTAL URINE: 0
EPITHELIAL CELLS - QUEST: ABNORMAL
GLUCOSE UR STRIP-MCNC: 0 MG/DL
KETONES UR QL STRIP: 0
LEUKOCYTE ESTERASE URINE DIP: ABNORMAL
MUCOUS URINE: 0
NITRITE UR QL STRIP: ABNORMAL
PH UR STRIP: 7 PH (ref 5–9)
PROT UR QL: 0 MG/DL (ref ?–0.01)
RBC URINE: ABNORMAL (ref 0–3)
SP GR UR STRIP: 1.01 (ref 1–1.02)
UROBILINOGEN UR QL STRIP: 0.2 EU/DL (ref 0.2–1)
WBC URINE: ABNORMAL (ref 0–3)

## 2019-09-23 PROCEDURE — 81003 URINALYSIS AUTO W/O SCOPE: CPT | Performed by: FAMILY MEDICINE

## 2019-09-26 LAB
ANTIMICROBIAL SUSCEPTIBILITY: ABNORMAL
Lab: ABNORMAL
URINE CULTURE: ABNORMAL

## 2019-09-27 ENCOUNTER — TELEPHONE (OUTPATIENT)
Dept: FAMILY MEDICINE | Facility: CLINIC | Age: 79
End: 2019-09-27

## 2019-09-27 DIAGNOSIS — N39.0 URINARY TRACT INFECTION: Primary | ICD-10-CM

## 2019-09-27 RX ORDER — CIPROFLOXACIN 500 MG/1
500 TABLET, FILM COATED ORAL 2 TIMES DAILY
Qty: 14 TABLET | Refills: 0 | Status: SHIPPED | OUTPATIENT
Start: 2019-09-27 | End: 2019-11-12

## 2019-09-27 NOTE — TELEPHONE ENCOUNTER
----- Message from Marjan Pa MD sent at 9/26/2019  8:11 PM CDT -----  Treat with cipro 500 mg BID for 7 days.

## 2019-09-27 NOTE — TELEPHONE ENCOUNTER
Called and spoke with patient regarding UC results. Per Dr. Marjan Pa patient to take Cipro-prescription sent to pharmacy. Patient advised to call with questions or concerns. Cherelle Mccullough

## 2019-10-06 ENCOUNTER — TRANSFERRED RECORDS (OUTPATIENT)
Dept: FAMILY MEDICINE | Facility: CLINIC | Age: 79
End: 2019-10-06

## 2019-11-07 DIAGNOSIS — M54.50 LUMBAR BACK PAIN: ICD-10-CM

## 2019-11-07 DIAGNOSIS — M54.2 CERVICAL PAIN: ICD-10-CM

## 2019-11-07 RX ORDER — HYDROCODONE BITARTRATE AND ACETAMINOPHEN 5; 325 MG/1; MG/1
1-2 TABLET ORAL EVERY 4 HOURS PRN
Qty: 40 TABLET | Refills: 0 | OUTPATIENT
Start: 2019-11-07

## 2019-11-12 ENCOUNTER — OFFICE VISIT (OUTPATIENT)
Dept: FAMILY MEDICINE | Facility: CLINIC | Age: 79
End: 2019-11-12

## 2019-11-12 VITALS
DIASTOLIC BLOOD PRESSURE: 74 MMHG | BODY MASS INDEX: 30.99 KG/M2 | OXYGEN SATURATION: 98 % | HEART RATE: 78 BPM | SYSTOLIC BLOOD PRESSURE: 154 MMHG | WEIGHT: 192 LBS

## 2019-11-12 DIAGNOSIS — M54.50 LUMBAR BACK PAIN: ICD-10-CM

## 2019-11-12 DIAGNOSIS — M54.2 CERVICAL PAIN: ICD-10-CM

## 2019-11-12 PROCEDURE — 99213 OFFICE O/P EST LOW 20 MIN: CPT | Performed by: FAMILY MEDICINE

## 2019-11-12 RX ORDER — HYDROCODONE BITARTRATE AND ACETAMINOPHEN 5; 325 MG/1; MG/1
1-2 TABLET ORAL EVERY 4 HOURS PRN
Qty: 60 TABLET | Refills: 0 | Status: SHIPPED | OUTPATIENT
Start: 2019-11-12 | End: 2020-01-15

## 2019-11-12 RX ORDER — AMOXICILLIN 500 MG/1
CAPSULE ORAL
Refills: 0 | COMMUNITY
Start: 2019-11-11 | End: 2020-06-15

## 2019-11-12 NOTE — PROGRESS NOTES
Problem(s) Oriented visit        SUBJECTIVE:                                                    Macie Jorge is a 79 year old female who presents to clinic today for refill of narcotic. She has chronic back pain. Pain does not radiate to the legs or arms. She denies bladder or bowel incontinence. She has been evaluated by 3 different surgeons who all say she is not a surgical candidate for her back pain. She wonders if there is anything else to try.      Problem list, Medication list, Allergies, and Medical/Social/Surgical histories reviewed in EPIC and updated as appropriate.   Additional history: as documented    ROS:  5 point ROS completed and negative except noted above, including Gen, CV, Resp, GI, MS      Histories:   Patient Active Problem List   Diagnosis     Panic attacks     Advanced directives, counseling/discussion     Hyperlipidemia with target LDL less than 100     History of hip replacement, total     Health Care Home     Obesity     Urgency incontinence     CKD (chronic kidney disease) stage 3, GFR 30-59 ml/min (H)     Nonintractable migraine, unspecified migraine type     Type 2 diabetes mellitus with stage 3 chronic kidney disease, without long-term current use of insulin (H)     COPD with asthma (H)     Acquired hypothyroidism     Altered mental status     History of colonic polyps     Chronic midline low back pain without sciatica     Physical deconditioning     Spinal stenosis of lumbar region with neurogenic claudication     Hypothyroidism due to acquired atrophy of thyroid     Kidney stone     Past Surgical History:   Procedure Laterality Date     ARTHROPLASTY HIP  3/13/2013    Procedure: ARTHROPLASTY HIP;  LEFT TOTAL HIP ARTHROPLASTY (BIOMET)^ ;  Surgeon: Anand Main MD;  Location: [facility] OR     ARTHROPLASTY PATELLO-FEMORAL (KNEE)  2005ish    Left     CHOLECYSTECTOMY, OPEN  27 yo     COMBINED CYSTOSCOPY, INSERT STENT URETER(S)  8/5/2014    Procedure: COMBINED CYSTOSCOPY, INSERT  STENT URETER(S);  Surgeon: Sam Arriaga MD;  Location:  OR     COMBINED CYSTOSCOPY, RETROGRADES, URETEROSCOPY, LASER HOLMIUM LITHOTRIPSY URETER(S), INSERT STENT Left 2016    Procedure: COMBINED CYSTOSCOPY, RETROGRADES, URETEROSCOPY, LASER HOLMIUM LITHOTRIPSY URETER(S), INSERT STENT;  Surgeon: Thomas Edmondson MD;  Location:  OR     COMBINED CYSTOSCOPY, RETROGRADES, URETEROSCOPY, LASER HOLMIUM LITHOTRIPSY URETER(S), INSERT STENT Left 3/28/2019    Procedure: CYSTOSCOPY, LEFT RETROGRADE PYLEOGRAM, LEFT URETEROSCOPY, HOLMIUM LASER LITHOTRIPSY, LEFT URETERAL STENT EXCHANGE;  Surgeon: Derrek Rivera MD;  Location:  OR     CYSTOSCOPY, RETROGRADES, INSERT STENT URETER(S), COMBINED Left 3/8/2019    Procedure: CYSTOSCOPY,LEFT RETROGRADE, LEFT STENT PLACEMENT;  Surgeon: Derrek Rivera MD;  Location:  OR     GENITOURINARY SURGERY       LAMINECT/DISCECTOMY, LUMBAR      Laminectomy/Discectomy Lumbar     LASER HOLMIUM LITHOTRIPSY URETER(S), INSERT STENT, COMBINED Left 2014    Procedure: COMBINED CYSTOSCOPY, URETEROSCOPY, LASER HOLMIUM LITHOTRIPSY URETER(S), INSERT STENT;  Surgeon: Sam Arriaga MD;  Location:  OR     LASER HOLMIUM LITHOTRIPSY URETER(S), INSERT STENT, COMBINED Left 2019    Procedure: CYSTOSCOPY, LEFT RETROGRADE, LEFT URETEROSCOPY, HOLMIUM LASER LITHOTRIPSY, STONE REMOVAL, LEFT STENT EXCHANGE;  Surgeon: Derrek Rivera MD;  Location:  OR     LASER HOLMIUM LITHOTRIPSY URETER(S), INSERT STENT, COMBINED Right 2019    Procedure: CYSTOSCOPY; RIGHT URETEROSCOPY WITH HOLMIUM LASER LITHOTRIPSY; RIGHT STENT PLACEMENT (DIGITAL FLEXIBLE URETEROSCOPE);  Surgeon: Derrek Rivera MD;  Location:  OR     ORTHOPEDIC SURGERY      left ankle     RECESSION EYELID UPPER         Social History     Tobacco Use     Smoking status: Former Smoker     Packs/day: 1.50     Years: 40.00     Pack years: 60.00     Last attempt to quit: 1995     Years since quittin.8      Smokeless tobacco: Never Used   Substance Use Topics     Alcohol use: Yes     Comment: occ.     No family history on file.        OBJECTIVE:                                                    BP (!) 154/74 (BP Location: Left arm, Patient Position: Sitting, Cuff Size: Adult Large)   Pulse 78   Wt 87.1 kg (192 lb)   SpO2 98%   BMI 30.99 kg/m    Body mass index is 30.99 kg/m .   GENERAL APPEARANCE: Alert, no acute distress  NEURO: Alert, oriented, speech and mentation normal  PSYCH: mentation appears normal, affect and mood normal   Labs Resulted Today: No results found for any visits on 11/12/19.  ASSESSMENT/PLAN:                                                        Macie was seen today for recheck medication and musculoskeletal problem.    Diagnoses and all orders for this visit:    Lumbar back pain  -     HYDROcodone-acetaminophen (NORCO) 5-325 MG tablet; Take 1-2 tablets by mouth every 4 hours as needed for moderate to severe pain    Cervical pain  -     HYDROcodone-acetaminophen (NORCO) 5-325 MG tablet; Take 1-2 tablets by mouth every 4 hours as needed for moderate to severe pain      She uses her Norco sparingly and only as needed. She is warned about possible sedation and overdose potential. She never takes more than two tablets in a day.     Patient Instructions   Refer to [facility] for chronic cervical and lumbar back pain.      The following health maintenance items are reviewed in Epic and correct as of today:  Health Maintenance   Topic Date Due     URINE DRUG SCREEN  1940     COPD ACTION PLAN  1940     MIGRAINE ACTION PLAN  1940     ZOSTER IMMUNIZATION (1 of 2) 10/14/1990     ADVANCE CARE PLANNING  09/01/2016     MEDICARE ANNUAL WELLNESS VISIT  01/20/2017     EYE EXAM  06/16/2018     ASTHMA ACTION PLAN  05/17/2019     FALL RISK ASSESSMENT  05/17/2019     LIPID  08/01/2019     A1C  08/05/2019     ASTHMA CONTROL TEST  09/04/2019     MICROALBUMIN  02/05/2020      DIABETIC FOOT EXAM  02/05/2020     TSH W/FREE T4 REFLEX  03/04/2020     MARLYS ASSESSMENT  03/04/2020     PHQ-9  03/04/2020     BMP  09/03/2020     COLONOSCOPY  04/05/2021     DTAP/TDAP/TD IMMUNIZATION (2 - Td) 11/15/2022     DEXA  Completed     SPIROMETRY  Completed     INFLUENZA VACCINE  Completed     PNEUMOCOCCAL IMMUNIZATION 65+ LOW/MEDIUM RISK  Completed     IPV IMMUNIZATION  Aged Out     MENINGITIS IMMUNIZATION  Aged Out       Marjan Pa MD  [facility]  Family Practice  [facility]  [phone]    For any issues my office # is [phone]

## 2019-12-31 ENCOUNTER — TRANSFERRED RECORDS (OUTPATIENT)
Dept: FAMILY MEDICINE | Facility: CLINIC | Age: 79
End: 2019-12-31

## 2020-01-15 ENCOUNTER — OFFICE VISIT (OUTPATIENT)
Dept: FAMILY MEDICINE | Facility: CLINIC | Age: 80
End: 2020-01-15

## 2020-01-15 VITALS
DIASTOLIC BLOOD PRESSURE: 76 MMHG | BODY MASS INDEX: 31.75 KG/M2 | HEIGHT: 65 IN | HEART RATE: 80 BPM | OXYGEN SATURATION: 94 % | SYSTOLIC BLOOD PRESSURE: 122 MMHG | WEIGHT: 190.6 LBS | RESPIRATION RATE: 16 BRPM

## 2020-01-15 DIAGNOSIS — R80.9 MICROALBUMINURIA: Primary | ICD-10-CM

## 2020-01-15 DIAGNOSIS — E78.5 HYPERLIPIDEMIA WITH TARGET LDL LESS THAN 100: ICD-10-CM

## 2020-01-15 DIAGNOSIS — M54.50 LUMBAR PAIN: Primary | ICD-10-CM

## 2020-01-15 DIAGNOSIS — E11.22 TYPE 2 DIABETES MELLITUS WITH STAGE 3 CHRONIC KIDNEY DISEASE, WITHOUT LONG-TERM CURRENT USE OF INSULIN (H): ICD-10-CM

## 2020-01-15 DIAGNOSIS — N18.30 TYPE 2 DIABETES MELLITUS WITH STAGE 3 CHRONIC KIDNEY DISEASE, WITHOUT LONG-TERM CURRENT USE OF INSULIN (H): ICD-10-CM

## 2020-01-15 DIAGNOSIS — M54.2 CERVICAL PAIN: ICD-10-CM

## 2020-01-15 DIAGNOSIS — E03.4 HYPOTHYROIDISM DUE TO ACQUIRED ATROPHY OF THYROID: ICD-10-CM

## 2020-01-15 LAB
A/C RATIO MG/G: ABNORMAL MG/G
ALBUMIN (URINE) MG/L: 80 MG/L
INTERPRETATION: ABNORMAL
URINE CREATININE MG/DL - QUEST: 100 MG/DL

## 2020-01-15 PROCEDURE — 82570 ASSAY OF URINE CREATININE: CPT | Performed by: FAMILY MEDICINE

## 2020-01-15 PROCEDURE — 82044 UR ALBUMIN SEMIQUANTITATIVE: CPT | Performed by: FAMILY MEDICINE

## 2020-01-15 PROCEDURE — 99213 OFFICE O/P EST LOW 20 MIN: CPT | Performed by: FAMILY MEDICINE

## 2020-01-15 RX ORDER — OXYBUTYNIN CHLORIDE 10 MG/1
TABLET, EXTENDED RELEASE ORAL
COMMUNITY
Start: 2019-12-20 | End: 2020-06-01

## 2020-01-15 RX ORDER — LISINOPRIL 5 MG/1
5 TABLET ORAL DAILY
Qty: 90 TABLET | Refills: 3 | Status: SHIPPED | OUTPATIENT
Start: 2020-01-15 | End: 2020-07-22

## 2020-01-15 RX ORDER — HYDROCODONE BITARTRATE AND ACETAMINOPHEN 5; 325 MG/1; MG/1
1-2 TABLET ORAL EVERY 4 HOURS PRN
Qty: 60 TABLET | Refills: 0 | Status: SHIPPED | OUTPATIENT
Start: 2020-01-15 | End: 2020-06-15

## 2020-01-15 ASSESSMENT — MIFFLIN-ST. JEOR: SCORE: 1344.4

## 2020-01-15 NOTE — LETTER
My Asthma Action Plan    Name: Macie Jorge   YOB: 1940  Date: 1/15/2020   My doctor: Marjan Pa MD   My clinic: [facility]        My Rescue Medicine:   Albuterol inhaler (Proair/Ventolin/Proventil HFA)  2-4 puffs EVERY 4 HOURS as needed. Use a spacer if recommended by your provider.   My Asthma Severity:   Mild Persistent  Know your asthma triggers: Patient is unaware of triggers             GREEN ZONE   Good Control    I feel good    No cough or wheeze    Can work, sleep and play without asthma symptoms       Take your asthma control medicine every day.     1. If exercise triggers your asthma, take your rescue medication    15 minutes before exercise or sports, and    During exercise if you have asthma symptoms  2. Spacer to use with inhaler: If you have a spacer, make sure to use it with your inhaler             YELLOW ZONE Getting Worse  I have ANY of these:    I do not feel good    Cough or wheeze    Chest feels tight    Wake up at night   1. Keep taking your Green Zone medications  2. Start taking your rescue medicine:    every 20 minutes for up to 1 hour. Then every 4 hours for 24-48 hours.  3. If you stay in the Yellow Zone for more than 12-24 hours, contact your doctor.  4. If you do not return to the Green Zone in 12-24 hours or you get worse, start taking your oral steroid medicine if prescribed by your provider.           RED ZONE Medical Alert - Get Help  I have ANY of these:    I feel awful    Medicine is not helping    Breathing getting harder    Trouble walking or talking    Nose opens wide to breathe       1. Take your rescue medicine NOW  2. If your provider has prescribed an oral steroid medicine, start taking it NOW  3. Call your doctor NOW  4. If you are still in the Red Zone after 20 minutes and you have not reached your doctor:    Take your rescue medicine again and    Call 911 or go to the emergency room right away    See your regular doctor within  2 weeks of an Emergency Room or Urgent Care visit for follow-up treatment.          Annual Reminders:  Meet with Asthma Educator,  Flu Shot in the Fall, consider Pneumonia Vaccination for patients with asthma (aged 19 and older).    Pharmacy:    [facility] - MAIL ORDER MAINT MEDS - NON-EPRESCRIBE  [facility] [address] [place], [place] [address] [facility] [address] [place] [address]    Electronically signed by Marjan Pa MD   Date: 01/15/20                    Asthma Triggers  How To Control Things That Make Your Asthma Worse    Triggers are things that make your asthma worse.  Look at the list below to help you find your triggers and   what you can do about them. You can help prevent asthma flare-ups by staying away from your triggers.      Trigger                                                          What you can do   Cigarette Smoke  Tobacco smoke can make asthma worse. Do not allow smoking in your home, car or around you.  Be sure no one smokes at a child s day care or school.  If you smoke, ask your health care provider for ways to help you quit.  Ask family members to quit too.  Ask your health care provider for a referral to Quit Plan to help you quit smoking, or call 5-120-846-PLAN.     Colds, Flu, Bronchitis  These are common triggers of asthma. Wash your hands often.  Don t touch your eyes, nose or mouth.  Get a flu shot every year.     Dust Mites  These are tiny bugs that live in cloth or carpet. They are too small to see. Wash sheets and blankets in hot water every week.   Encase pillows and mattress in dust mite proof covers.  Avoid having carpet if you can. If you have carpet, vacuum weekly.   Use a dust mask and HEPA vacuum.   Pollen and Outdoor Mold  Some people are allergic to trees, grass, or weed pollen, or molds. Try to keep your windows closed.  Limit time out doors when pollen count is high.   Ask you health care provider about taking  medicine during allergy season.     Animal Dander  Some people are allergic to skin flakes, urine or saliva from pets with fur or feathers. Keep pets with fur or feathers out of your home.    If you can t keep the pet outdoors, then keep the pet out of your bedroom.  Keep the bedroom door closed.  Keep pets off cloth furniture and away from stuffed toys.     Mice, Rats, and Cockroaches  Some people are allergic to the waste from these pests.   Cover food and garbage.  Clean up spills and food crumbs.  Store grease in the refrigerator.   Keep food out of the bedroom.   Indoor Mold  This can be a trigger if your home has high moisture. Fix leaking faucets, pipes, or other sources of water.   Clean moldy surfaces.  Dehumidify basement if it is damp and smelly.   Smoke, Strong Odors, and Sprays  These can reduce air quality. Stay away from strong odors and sprays, such as perfume, powder, hair spray, paints, smoke incense, paint, cleaning products, candles and new carpet.   Exercise or Sports  Some people with asthma have this trigger. Be active!  Ask your doctor about taking medicine before sports or exercise to prevent symptoms.    Warm up for 5-10 minutes before and after sports or exercise.     Other Triggers of Asthma  Cold air:  Cover your nose and mouth with a scarf.  Sometimes laughing or crying can be a trigger.  Some medicines and food can trigger asthma.

## 2020-01-15 NOTE — PROGRESS NOTES
Problem(s) Oriented visit        SUBJECTIVE:                                                    Macie Jorge is a 79 year old female who presents to clinic today for refill of narcotics. She typically takes Norco for her cervical and lumbar back pain. She notes that in the past year she had surgery for kidney stones and dental procedures for which she got small doses of narcotic. Her [place] Prescription Monitoring Program report was reviewed with the patient and she now understands that she should only get narcotic through us unless it is an emergency situation, in which case she needs to report any additional prescription.       Problem list, Medication list, Allergies, and Medical/Social/Surgical histories reviewed in EPIC and updated as appropriate.   Additional history: as documented    ROS:  5 point ROS completed and negative except noted above, including Gen, CV, Resp, GI, MS      Histories:   Patient Active Problem List   Diagnosis     Panic attacks     Advanced directives, counseling/discussion     Hyperlipidemia with target LDL less than 100     History of hip replacement, total     Health Care Home     Obesity     Urgency incontinence     CKD (chronic kidney disease) stage 3, GFR 30-59 ml/min (H)     Nonintractable migraine, unspecified migraine type     Type 2 diabetes mellitus with stage 3 chronic kidney disease, without long-term current use of insulin (H)     COPD with asthma (H)     Acquired hypothyroidism     Altered mental status     History of colonic polyps     Chronic midline low back pain without sciatica     Physical deconditioning     Spinal stenosis of lumbar region with neurogenic claudication     Hypothyroidism due to acquired atrophy of thyroid     Kidney stone     Past Surgical History:   Procedure Laterality Date     ARTHROPLASTY HIP  3/13/2013    Procedure: ARTHROPLASTY HIP;  LEFT TOTAL HIP ARTHROPLASTY (BIOMET)^ ;  Surgeon: Anand Main MD;  Location: [facility] OR     ARTHROPLASTY  PATELLO-FEMORAL (KNEE)  2005ish    Left     CHOLECYSTECTOMY, OPEN  25 yo     COMBINED CYSTOSCOPY, INSERT STENT URETER(S)  8/5/2014    Procedure: COMBINED CYSTOSCOPY, INSERT STENT URETER(S);  Surgeon: Sam Arriaga MD;  Location:  OR     COMBINED CYSTOSCOPY, RETROGRADES, URETEROSCOPY, LASER HOLMIUM LITHOTRIPSY URETER(S), INSERT STENT Left 6/14/2016    Procedure: COMBINED CYSTOSCOPY, RETROGRADES, URETEROSCOPY, LASER HOLMIUM LITHOTRIPSY URETER(S), INSERT STENT;  Surgeon: Thomas Edmondson MD;  Location:  OR     COMBINED CYSTOSCOPY, RETROGRADES, URETEROSCOPY, LASER HOLMIUM LITHOTRIPSY URETER(S), INSERT STENT Left 3/28/2019    Procedure: CYSTOSCOPY, LEFT RETROGRADE PYLEOGRAM, LEFT URETEROSCOPY, HOLMIUM LASER LITHOTRIPSY, LEFT URETERAL STENT EXCHANGE;  Surgeon: Derrek Rivera MD;  Location:  OR     CYSTOSCOPY, RETROGRADES, INSERT STENT URETER(S), COMBINED Left 3/8/2019    Procedure: CYSTOSCOPY,LEFT RETROGRADE, LEFT STENT PLACEMENT;  Surgeon: Derrek Rivera MD;  Location:  OR     GENITOURINARY SURGERY       LAMINECT/DISCECTOMY, LUMBAR      Laminectomy/Discectomy Lumbar     LASER HOLMIUM LITHOTRIPSY URETER(S), INSERT STENT, COMBINED Left 8/20/2014    Procedure: COMBINED CYSTOSCOPY, URETEROSCOPY, LASER HOLMIUM LITHOTRIPSY URETER(S), INSERT STENT;  Surgeon: Sam Arriaga MD;  Location:  OR     LASER HOLMIUM LITHOTRIPSY URETER(S), INSERT STENT, COMBINED Left 5/2/2019    Procedure: CYSTOSCOPY, LEFT RETROGRADE, LEFT URETEROSCOPY, HOLMIUM LASER LITHOTRIPSY, STONE REMOVAL, LEFT STENT EXCHANGE;  Surgeon: Derrek Rivera MD;  Location:  OR     LASER HOLMIUM LITHOTRIPSY URETER(S), INSERT STENT, COMBINED Right 6/13/2019    Procedure: CYSTOSCOPY; RIGHT URETEROSCOPY WITH HOLMIUM LASER LITHOTRIPSY; RIGHT STENT PLACEMENT (DIGITAL FLEXIBLE URETEROSCOPE);  Surgeon: Derrek Rivera MD;  Location:  OR     ORTHOPEDIC SURGERY      left ankle     RECESSION EYELID UPPER         Social History  "    Tobacco Use     Smoking status: Former Smoker     Packs/day: 1.50     Years: 40.00     Pack years: 60.00     Last attempt to quit: 1995     Years since quittin.0     Smokeless tobacco: Never Used   Substance Use Topics     Alcohol use: Yes     Comment: occ.     No family history on file.        OBJECTIVE:                                                    /76   Pulse 80   Resp 16   Ht 1.657 m (5' 5.25\")   Wt 86.5 kg (190 lb 9.6 oz)   SpO2 94%   BMI 31.47 kg/m    Body mass index is 31.47 kg/m .   GENERAL APPEARANCE: Alert, no acute distress  NEURO: Alert, oriented, speech and mentation normal  PSYCH: mentation appears normal, affect and mood normal   Labs Resulted Today: No results found for any visits on 01/15/20.  ASSESSMENT/PLAN:                                                        Macie was seen today for refill request.    Diagnoses and all orders for this visit:    Lumbar pain  -     ToxASSURE Urine Drug Screen ([facility])  -     HYDROcodone-acetaminophen (NORCO) 5-325 MG tablet; Take 1-2 tablets by mouth every 4 hours as needed for moderate to severe pain  -     diclofenac (VOLTAREN) 1 % topical gel; Place 2 g onto the skin 4 times daily    Cervical pain  -     ToxASSURE Urine Drug Screen ([facility])  -     HYDROcodone-acetaminophen (NORCO) 5-325 MG tablet; Take 1-2 tablets by mouth every 4 hours as needed for moderate to severe pain  -     diclofenac (VOLTAREN) 1 % topical gel; Place 2 g onto the skin 4 times daily    Trial of adding Voltaren gel. Continue judicious use of Norco. Sign CSA.   Discussed that she needs to get narcotics only from one provider and if she gets Rx from anyone else it needs to be reported here and should only be for emergency needs.    There are no Patient Instructions on file for this visit.    The following health maintenance items are reviewed in Epic and correct as of today:  Health Maintenance   Topic Date Due     URINE DRUG SCREEN  1940     COPD " ACTION PLAN  1940     MIGRAINE ACTION PLAN  1940     ZOSTER IMMUNIZATION (1 of 2) 10/14/1990     ADVANCE CARE PLANNING  09/01/2016     MEDICARE ANNUAL WELLNESS VISIT  01/20/2017     EYE EXAM  06/16/2018     ASTHMA ACTION PLAN  05/17/2019     FALL RISK ASSESSMENT  05/17/2019     LIPID  08/01/2019     A1C  08/05/2019     ASTHMA CONTROL TEST  09/04/2019     PHQ-2  01/01/2020     MICROALBUMIN  02/05/2020     DIABETIC FOOT EXAM  02/05/2020     TSH W/FREE T4 REFLEX  03/04/2020     BMP  09/03/2020     COLONOSCOPY  04/05/2021     DTAP/TDAP/TD IMMUNIZATION (2 - Td) 11/15/2022     DEXA  Completed     SPIROMETRY  Completed     INFLUENZA VACCINE  Completed     PNEUMOCOCCAL IMMUNIZATION 65+ LOW/MEDIUM RISK  Completed     IPV IMMUNIZATION  Aged Out     MENINGITIS IMMUNIZATION  Aged Out       Marjan Pa MD  [facility]  Family Practice  [facility]  [phone]    For any issues my office # is [phone]

## 2020-01-15 NOTE — LETTER
[facility] [address] [place]  [address]  Phone: [phone]    January 16, 2020      Macie Jorge  [address] [id number]              Dear Macie,    The results from your recent visit showed You continue to have protein in your urine and abnormal kidney function. It is advised that you be on a class of medications called ACE inhibitors to help this. It is typically given for high blood pressure, but even though your blood pressure is normal, we should try a very low dose to help your kidneys. Prescription for lisinopril 5 mg once daily is sent to your pharmacy (dosing for lisinopril ranges from 5 mg to 40 mg). Hydrate well. Avoid any ibuprofen or Aleve type products.        Sincerely,     Marjan Pa M.D.    Results for orders placed or performed in visit on 01/15/20   Microalbumin ([facility])     Status: Abnormal   Result Value Ref Range    Albumin mg/L 80 (A) mg/L    Urine Creatinine Mg/Dl 100 mg/dL    A/C Ratio mg/g  (A) mg/g    Interpretation abnormal

## 2020-01-15 NOTE — LETTER
[facility]  01/15/20    Patient: Macie Jorge  YOB: 1940  Medical Record Number: [id number]                                                                  Opioid / Opioid Plus Controlled Substance Agreement    I understand that my care provider has prescribed an opioid (narcotic) controlled substance to help manage my condition(s). I am taking this medicine to help me function or work. I know this is strong medicine, and that it can cause serious side effects. Opioid medicine can be sedating, addicting and may cause a dependency on the drug. They can affect my ability to drive or think, and cause depression. They need to be taken exactly as prescribed. Combining opioids with certain medicines or chemicals (such as cocaine, sedatives and tranquilizers, sleeping pills, meth) can be dangerous or even fatal. Also, if I stop opioids suddenly, I may have severe withdrawal symptoms. Last, I understand that opioids do not work for all types of pain nor for all patients. If not helpful, I may be asked to stop them.      The risks, benefits, and side effects of these medicine(s) were explained to me. I agree that:    1. I will take part in other treatments as advised by my care team. This may be psychiatry or counseling, physical therapy, behavioral therapy, group treatment or a referral to a pain clinic. I will reduce or stop my medicine when my care team tells me to do so.  2. I will take my medicines as prescribed. I will not change the dose or schedule unless my care team tells me to. There will be no refills if I  run out early.   I may be contactedwithout warning and asked to complete a urine drug test or pill count at any time.   3. I will keep all my appointments, and understand this is part of the monitoring of opioids. My care team may require an office visit for EVERY opioid/controlled substance refill. If I miss appointments or don t follow instructions, my care team may stop  my medicine.  4. I will not ask other providers to prescribe controlled substances, and I will not accept controlled substances from other people. If I need another prescribed controlled substance for a new reason, I will tell my care team within 1 business day.  5. I will use one pharmacy to fill all of my controlled substance prescriptions, and it is up to me to make sure that I do not run out of my medicines on weekends or holidays. If my care team is willing to refill my opioid prescription without a visit, I must request refills only during office hours, refills may take up to 3 days to process, and it may take up to 5 to 7 days for my medicine to be mailed and ready at my pharmacy. Prescriptions will not be mailed anywhere except my pharmacy.        [id number]  Rev 12/18         Registration to scan to EHR                             Page 1 of 2               Controlled Substance Agreement Opioid        [facility]  01/15/20  Patient: Macie Jorge  YOB: 1940  Medical Record Number: [id number]                                                                  6. I am responsible for my prescriptions. If the medicine/prescription is lost or stolen, it will not be replaced. I also agree not to share controlled substance medicines with anyone.  7. I agree to not use ANY illegal or recreational drugs. This includes marijuana, cocaine, bath salts or other drugs. I agree not to use alcohol unless my care team says I may.          I agree to give urine samples whenever asked. If I don t give a urine sample, the care team may stop my medicine.    8. If I enroll in the [place] Medical Marijuana program, I will tell my care team. I will also sign an agreement to share my medical records with my care team.   9. I will bring in my list of medicines (or my medicine bottles) each time I come to the clinic.   10. I will tell my care team right away if I become pregnant or have a new medical  problem treated outside of my regular clinic.  11. I understand that this medicine can affect my thinking and judgment. It may be unsafe for me to drive, use machinery and do dangerous tasks. I will not do any of these things until I know how the medicine affects me. If my dose changes, I will wait to see how it affects me. I will contact my care team if I have concerns about medicine side effects.    I understand that if I do not follow any of the conditions above, my prescriptions or treatment may be stopped.      I agree that my provider, clinic care team, and pharmacy may work with any city, state or federal law enforcement agency that investigates the misuse, sale, or other diversion of my controlled medicine. I will allow my provider to discuss my care with or share a copy of this agreement with any other treating provider, pharmacy or emergency room where I receive care. I agree to give up (waive) any right of privacy or confidentiality with respect to these consents.     I have read this agreement and have asked questions about anything I did not understand.      ________________________________________________________________________  Patient signature - Date/Time -  Macie Jorge                                      ________________________________________________________________________  Witness signature                                                            ________________________________________________________________________  Provider signature - Marjan Pa MD      [id number]  Rev 12/18         Registration to scan to EHR                         Page 2 of 2                   Controlled Substance Agreement Opioid           Page 1 of 2  Opioid Pain Medicines (also known as Narcotics)  What You Need to Know    What are opioids?   Opioids are pain medicines that must be prescribed by a doctor.  They are also known as narcotics.    Examples are:     morphine (MS Contin, Mary)    oxycodone  (Oxycontin)    oxycodone and acetaminophen (Percocet)    hydrocodone and acetaminophen (Vicodin, Norco)     fentanyl patch (Duragesic)     hydromorphone (Dilaudid)     methadone     What do opioids do well?   Opioids are best for short-term pain after a surgery or injury. They also work well for cancer pain. Unlike other pain medicines, they do not cause liver or kidney failure or ulcers. They may help some people with long-lasting (chronic) pain.     What do opioids NOT do well?   Opioids never get rid of pain entirely, and they do not work well for most patients with chronic pain. Opioids do not reduce swelling, one of the causes of pain. They also don t work well for nerve pain.                           For informational purposes only.  Not to replace the advice of your care provider.  Copyright [facility]. All right reserved. [employer] 669754-[id number] 02/18.      Page 2 of 2    Risks and side effects   Talk to your doctor before you start or decide to keep taking one of these medicines. Side effects include:    Lowering your breathing rate enough to cause death    Overdose, including death, especially if taking higher than prescribed doses    Long-term opioid use    Worse depression symptoms; less pleasure in things you usually enjoy    Feeling tired or sluggish    Slower thoughts or cloudy thinking    Being more sensitive to pain over time; pain is harder to control    Trouble sleeping or restless sleep    Changes in hormone levels (for example, less testosterone)    Changes in sex drive or ability to have sex    Constipation    Unsafe driving    Itching and sweating    Feeling dizzy    Nausea, vomiting and dry mouth    What else should I know about opioids?  When someone takes opioids for too long or too often, they become dependent. This means that if you stop or reduce the medicine too quickly, you will have withdrawal symptoms.    Dependence is not the same as addiction. Addiction is when  people keep using a substance that harms their body, their mind or their relations with others. If you have a history of drug or alcohol abuse, taking opioids can cause a relapse.    Over time, opioids don t work as well. Most people will need higher and higher doses. The higher the dose, the more serious the side effects. We don t know the long-term effects of opioids.      Prescribed opioids aren't the best way to manage chronic pain    Other ways to manage pain include:      Ibuprofen or acetaminophen.  You should always try this first.      Treat health problems that may be causing pain.      acupuncture or massage, deep breathing, meditation, visual imagery, aromatherapy.      Use heat or ice at the pain site      Physical therapy and exercise      Stop smoking      See a counselor or therapist                                                  People who have used opioids for a long time may have a lower quality of life, worse depression, higher levels of pain and more visits to doctors.    Never share your opioids with others. Be sure to store opioids in a secure place, locked if possible.Young children can easily swallow them and overdose.     You can overdose on opioids.  Signs of overdose include decrease or loss of consciousness, slowed breathing, trouble waking and blue lips.  If someone is worried about overdose, they should call 911.    If you are at risk for overdose, you may get naloxone (Narcan, a medicine that reverses the effects of opioids.  If you overdose, a friend or family member can give you Narcan while waiting for the ambulance.  They need to know the signs of overdose and how to give Narcan.    While you're taking opioids:    Don't use alcohol or street drugs. Taking them together can cause death.    Don't take any of these medicines unless your doctor says its okay.  Taking these with opioids can cause death.    Benzodiazepines (such as lorazepam         or diazepam)    Muscle relaxers  (such as cyclobenzaprine)    sleeping pills    other opioids    Safe disposal of opioids  Find your area drug take-back program, your pharmacy mail-back program, buy a special disposal bag (such as Deterra) from your pharmacy or flush them down the toilet.  Use the guidelines at:  www.fda.gov/drugs/resourcesforyou

## 2020-01-20 DIAGNOSIS — N18.30 TYPE 2 DIABETES MELLITUS WITH STAGE 3 CHRONIC KIDNEY DISEASE, WITHOUT LONG-TERM CURRENT USE OF INSULIN (H): ICD-10-CM

## 2020-01-20 DIAGNOSIS — E11.22 TYPE 2 DIABETES MELLITUS WITH STAGE 3 CHRONIC KIDNEY DISEASE, WITHOUT LONG-TERM CURRENT USE OF INSULIN (H): ICD-10-CM

## 2020-01-20 DIAGNOSIS — E79.0 ELEVATED URIC ACID IN BLOOD: ICD-10-CM

## 2020-01-20 DIAGNOSIS — E03.4 HYPOTHYROIDISM DUE TO ACQUIRED ATROPHY OF THYROID: ICD-10-CM

## 2020-01-20 DIAGNOSIS — E78.5 HYPERLIPIDEMIA WITH TARGET LDL LESS THAN 100: ICD-10-CM

## 2020-01-20 LAB
% GRANULOCYTES: 79.3 % (ref 42.2–75.2)
HCT VFR BLD AUTO: 35.5 % (ref 35–46)
HEMOGLOBIN: 11.6 G/DL (ref 11.8–15.5)
LYMPHOCYTES NFR BLD AUTO: 15.3 % (ref 20.5–51.1)
MCH RBC QN AUTO: 29.5 PG (ref 27–31)
MCHC RBC AUTO-ENTMCNC: 32.7 G/DL (ref 33–37)
MCV RBC AUTO: 90 FL (ref 80–100)
MONOCYTES NFR BLD AUTO: 5.4 % (ref 1.7–9.3)
PLATELET # BLD AUTO: 180 K/UL (ref 140–450)
RBC # BLD AUTO: 3.94 X10/CMM (ref 3.7–5.2)
WBC # BLD AUTO: 5.2 X10/CMM (ref 3.8–11)

## 2020-01-20 PROCEDURE — 84443 ASSAY THYROID STIM HORMONE: CPT | Mod: 90 | Performed by: FAMILY MEDICINE

## 2020-01-20 PROCEDURE — 83036 HEMOGLOBIN GLYCOSYLATED A1C: CPT | Mod: 90 | Performed by: FAMILY MEDICINE

## 2020-01-20 PROCEDURE — 80053 COMPREHEN METABOLIC PANEL: CPT | Mod: 90 | Performed by: FAMILY MEDICINE

## 2020-01-20 PROCEDURE — 36415 COLL VENOUS BLD VENIPUNCTURE: CPT | Performed by: FAMILY MEDICINE

## 2020-01-20 PROCEDURE — 80061 LIPID PANEL: CPT | Mod: 90 | Performed by: FAMILY MEDICINE

## 2020-01-20 PROCEDURE — 85025 COMPLETE CBC W/AUTO DIFF WBC: CPT | Performed by: FAMILY MEDICINE

## 2020-01-20 PROCEDURE — 84550 ASSAY OF BLOOD/URIC ACID: CPT | Mod: 90 | Performed by: FAMILY MEDICINE

## 2020-01-20 NOTE — PROGRESS NOTES
fasting open orders - Pa - A1C, cbc, comp, lipid, tsh, uric acid  Gaby Overton MA January 20, 2020 12:01 PM

## 2020-01-20 NOTE — LETTER
[facility] [address] [place]  [address]  Phone: [phone]    January 24, 2020      Macie Jorge  [address] [address]              Dear Macie,    The results from your recent visit showed Kidney function has improved a bit. Continue good hydration and avoiding Advil, ibuprofen, Aleve, naproxen, and Motrin.   A1c is good and stable over time at just under 6. Continue current diabetes medications unless you are having low blood sugars in which case we should lower your doses.   Thyroid is good, continue same dose levothyroxine.   Blood counts are mildly low, likely due to your chronic kidney disease.   Cholesterol is good.   Uric acid level is too high (goal is less than 6). I recommend increasing allopurinol to 200 mg (two tabs) daily.   Medications have been renewed        Sincerely,     Marjan Pa M.D.    Results for orders placed or performed in visit on 01/20/20   CBC with Diff/Plt ([facility])     Status: Abnormal   Result Value Ref Range    WBC x10/cmm 5.2 3.8 - 11.0 x10/cmm    % Lymphocytes 15.3 (A) 20.5 - 51.1 %    % Monocytes 5.4 1.7 - 9.3 %    % Granulocytes 79.3 (A) 42.2 - 75.2 %    RBC x10/cmm 3.94 3.7 - 5.2 x10/cmm    Hemoglobin 11.6 (A) 11.8 - 15.5 g/dl    Hematocrit 35.5 35 - 46 %    MCV 90.0 80 - 100 fL    MCH 29.5 27.0 - 31.0 pg    MCHC 32.7 (A) 33.0 - 37.0 g/dL    Platelet Count 180 140 - 450 K/uL   TSH ([facility])     Status: Abnormal   Result Value Ref Range    TSH 4.520 (H) 0.450 - 4.500 uIU/mL    Narrative    Performed at:  01 - [facility] [address], [place], [place]  [id number]  : Riley Rachel MD, Phone:  [phone]   Comp. Metabolic Panel (14) ([facility])     Status: Abnormal   Result Value Ref Range    Glucose 99 65 - 99 mg/dL    Urea Nitrogen 25 8 - 27 mg/dL    Creatinine 1.33 (H) 0.57 - 1.00 mg/dL    eGFR If NonAfricn Am 38 (L) >59 mL/min/1.73    eGFR If Africn Am 44 (L) >59 mL/min/1.73    BUN/Creatinine  Ratio 19 12 - 28    Sodium 139 134 - 144 mmol/L    Potassium 4.3 3.5 - 5.2 mmol/L    Chloride 102 96 - 106 mmol/L    Total CO2 23 20 - 29 mmol/L    Calcium 9.0 8.7 - 10.3 mg/dL    Protein Total 7.8 6.0 - 8.5 g/dL    Albumin 4.2 3.7 - 4.7 g/dL    Globulin, Total 3.6 1.5 - 4.5 g/dL    A/G Ratio 1.2 1.2 - 2.2    Bilirubin Total 0.5 0.0 - 1.2 mg/dL    Alkaline Phosphatase 51 39 - 117 IU/L    AST 14 0 - 40 IU/L    ALT 12 0 - 32 IU/L    Narrative    Performed at:  01 - [facility] [address], [place], [place]  [id number]  : Riley Rachel MD, Phone:  [phone]   Lipid Panel ([facility])     Status: Abnormal   Result Value Ref Range    Cholesterol 187 100 - 199 mg/dL    Triglycerides 70 0 - 149 mg/dL    HDL Cholesterol 60 >39 mg/dL    VLDL Cholesterol Wilber 14 5 - 40 mg/dL    LDL Cholesterol Calculated 113 (H) 0 - 99 mg/dL    LDL/HDL Ratio 1.9 0.0 - 3.2 ratio    Narrative    Performed at:  01 - [facility] [address], [place], [place]  [id number]  : Riley Rachel MD, Phone:  [phone]   Hemoglobin A1C ([facility])     Status: Abnormal   Result Value Ref Range    Hemoglobin A1C 5.9 (H) 4.8 - 5.6 %    Narrative    Performed at:  01 - [facility] [address], [place], [place]  [id number]  : Riley Rachel MD, Phone:  [phone]   Uric Acid  Serum ([facility])     Status: Abnormal   Result Value Ref Range    Uric Acid 7.2 (H) 2.5 - 7.1 mg/dL    Narrative    Performed at:  01 - [facility] [address], [place], [place]  [id number]  : Riley Rachel MD, Phone:  [phone]

## 2020-01-21 LAB
ALBUMIN SERPL-MCNC: 4.2 G/DL (ref 3.7–4.7)
ALBUMIN/GLOB SERPL: 1.2 {RATIO} (ref 1.2–2.2)
ALP SERPL-CCNC: 51 IU/L (ref 39–117)
ALT SERPL-CCNC: 12 IU/L (ref 0–32)
AST SERPL-CCNC: 14 IU/L (ref 0–40)
BILIRUB SERPL-MCNC: 0.5 MG/DL (ref 0–1.2)
BUN SERPL-MCNC: 25 MG/DL (ref 8–27)
BUN/CREATININE RATIO: 19 (ref 12–28)
CALCIUM SERPL-MCNC: 9 MG/DL (ref 8.7–10.3)
CHLORIDE SERPLBLD-SCNC: 102 MMOL/L (ref 96–106)
CHOLEST SERPL-MCNC: 187 MG/DL (ref 100–199)
CREAT SERPL-MCNC: 1.33 MG/DL (ref 0.57–1)
EGFR IF AFRICN AM: 44 ML/MIN/1.73
EGFR IF NONAFRICN AM: 38 ML/MIN/1.73
GLOBULIN, TOTAL: 3.6 G/DL (ref 1.5–4.5)
GLUCOSE SERPL-MCNC: 99 MG/DL (ref 65–99)
HBA1C MFR BLD: 5.9 % (ref 4.8–5.6)
HDLC SERPL-MCNC: 60 MG/DL
LDL/HDL RATIO: 1.9 RATIO (ref 0–3.2)
LDLC SERPL CALC-MCNC: 113 MG/DL (ref 0–99)
POTASSIUM SERPL-SCNC: 4.3 MMOL/L (ref 3.5–5.2)
PROT SERPL-MCNC: 7.8 G/DL (ref 6–8.5)
SODIUM SERPL-SCNC: 139 MMOL/L (ref 134–144)
TOTAL CO2: 23 MMOL/L (ref 20–29)
TRIGL SERPL-MCNC: 70 MG/DL (ref 0–149)
TSH BLD-ACNC: 4.52 UIU/ML (ref 0.45–4.5)
URATE SERPL-MCNC: 7.2 MG/DL (ref 2.5–7.1)
VLDLC SERPL CALC-MCNC: 14 MG/DL (ref 5–40)

## 2020-01-24 DIAGNOSIS — E03.9 ACQUIRED HYPOTHYROIDISM: ICD-10-CM

## 2020-01-24 DIAGNOSIS — E79.0 ELEVATED URIC ACID IN BLOOD: ICD-10-CM

## 2020-01-24 DIAGNOSIS — J44.89 COPD WITH ASTHMA (H): ICD-10-CM

## 2020-01-24 DIAGNOSIS — N20.0 URIC ACID KIDNEY STONE: ICD-10-CM

## 2020-01-24 DIAGNOSIS — N18.30 TYPE 2 DIABETES MELLITUS WITH STAGE 3 CHRONIC KIDNEY DISEASE, WITHOUT LONG-TERM CURRENT USE OF INSULIN (H): ICD-10-CM

## 2020-01-24 DIAGNOSIS — E11.22 TYPE 2 DIABETES MELLITUS WITH STAGE 3 CHRONIC KIDNEY DISEASE, WITHOUT LONG-TERM CURRENT USE OF INSULIN (H): ICD-10-CM

## 2020-01-24 RX ORDER — LEVOTHYROXINE SODIUM 112 UG/1
112 TABLET ORAL DAILY
Qty: 90 TABLET | Refills: 3 | Status: SHIPPED | OUTPATIENT
Start: 2020-01-24 | End: 2021-03-16

## 2020-01-24 RX ORDER — METFORMIN HCL 500 MG
1500 TABLET, EXTENDED RELEASE 24 HR ORAL
Qty: 270 TABLET | Refills: 3 | Status: SHIPPED | OUTPATIENT
Start: 2020-01-24 | End: 2021-08-02

## 2020-01-24 RX ORDER — ALLOPURINOL 100 MG/1
200 TABLET ORAL DAILY
Qty: 180 TABLET | Refills: 3 | Status: SHIPPED | OUTPATIENT
Start: 2020-01-24 | End: 2021-04-08

## 2020-01-28 ENCOUNTER — TRANSFERRED RECORDS (OUTPATIENT)
Dept: FAMILY MEDICINE | Facility: CLINIC | Age: 80
End: 2020-01-28

## 2020-03-17 ENCOUNTER — TELEPHONE (OUTPATIENT)
Dept: FAMILY MEDICINE | Facility: CLINIC | Age: 80
End: 2020-03-17

## 2020-03-17 DIAGNOSIS — Z53.9 ERRONEOUS ENCOUNTER--DISREGARD: Primary | ICD-10-CM

## 2020-03-17 NOTE — TELEPHONE ENCOUNTER
Patient calls requesting refill on her Norco 5/325. Typically uses prn for cervical and lumbar pain, but is having surgery tomorrow for left sinus lift and dental implants and will need pain meds for post-surgical pain. At 1/15/20 visit with Dr. Pa patient was instructed should only get narcotics through PCP unless it is an emergency.   Patient has 5 tabs of #60 Norco's filled on 1/15/20. Patient reports uses ~1 tab per day if needed for her back/neck pain but expects the sinus lift and implants to be more painful than Norco can manage. States one tablet helps mild-moderate pain but can't take 2 as it causes nausea. Is asking for medication stronger than Norco for post-op pain.   Per EPIC history took oxycodone 4/2019 after kidney stone surgery.   Plan: per Dr. Bruner - advised patient ok to get post-op pain management from her surgeon as they will know best how to manage pain after this type of procedure. Patient agrees and will call surgeon office.   Patient called back 10 minutes later reporting surgery has been rescheduled to May due to Coronavirus.   Vickie Potts RN

## 2020-06-01 DIAGNOSIS — Z76.0 ENCOUNTER FOR MEDICATION REFILL: Primary | ICD-10-CM

## 2020-06-01 RX ORDER — OXYBUTYNIN CHLORIDE 10 MG/1
TABLET, EXTENDED RELEASE ORAL
Qty: 90 TABLET | Refills: 0 | Status: SHIPPED | OUTPATIENT
Start: 2020-06-01 | End: 2020-10-25

## 2020-06-15 ENCOUNTER — OFFICE VISIT (OUTPATIENT)
Dept: FAMILY MEDICINE | Facility: CLINIC | Age: 80
End: 2020-06-15

## 2020-06-15 ENCOUNTER — TRANSFERRED RECORDS (OUTPATIENT)
Dept: FAMILY MEDICINE | Facility: CLINIC | Age: 80
End: 2020-06-15

## 2020-06-15 VITALS
SYSTOLIC BLOOD PRESSURE: 118 MMHG | HEART RATE: 72 BPM | TEMPERATURE: 97.9 F | DIASTOLIC BLOOD PRESSURE: 74 MMHG | RESPIRATION RATE: 17 BRPM | OXYGEN SATURATION: 97 %

## 2020-06-15 DIAGNOSIS — M54.2 CERVICAL PAIN: ICD-10-CM

## 2020-06-15 DIAGNOSIS — J41.0 SIMPLE CHRONIC BRONCHITIS (H): ICD-10-CM

## 2020-06-15 DIAGNOSIS — M54.50 LUMBAR PAIN: ICD-10-CM

## 2020-06-15 DIAGNOSIS — N18.30 TYPE 2 DIABETES MELLITUS WITH STAGE 3 CHRONIC KIDNEY DISEASE, WITHOUT LONG-TERM CURRENT USE OF INSULIN (H): ICD-10-CM

## 2020-06-15 DIAGNOSIS — M48.062 SPINAL STENOSIS OF LUMBAR REGION WITH NEUROGENIC CLAUDICATION: Primary | ICD-10-CM

## 2020-06-15 DIAGNOSIS — J44.89 COPD WITH ASTHMA (H): ICD-10-CM

## 2020-06-15 DIAGNOSIS — E11.22 TYPE 2 DIABETES MELLITUS WITH STAGE 3 CHRONIC KIDNEY DISEASE, WITHOUT LONG-TERM CURRENT USE OF INSULIN (H): ICD-10-CM

## 2020-06-15 PROCEDURE — 99214 OFFICE O/P EST MOD 30 MIN: CPT | Performed by: FAMILY MEDICINE

## 2020-06-15 PROCEDURE — 99207 C FOOT EXAM  NO CHARGE: CPT | Performed by: FAMILY MEDICINE

## 2020-06-15 RX ORDER — HYDROCODONE BITARTRATE AND ACETAMINOPHEN 5; 325 MG/1; MG/1
1-2 TABLET ORAL EVERY 4 HOURS PRN
Qty: 60 TABLET | Refills: 0 | Status: SHIPPED | OUTPATIENT
Start: 2020-06-15 | End: 2020-10-09

## 2020-06-15 RX ORDER — FLUTICASONE PROPIONATE AND SALMETEROL 55; 14 UG/1; UG/1
1 POWDER, METERED RESPIRATORY (INHALATION) 2 TIMES DAILY
Qty: 1 INHALER | Refills: 11 | Status: SHIPPED | OUTPATIENT
Start: 2020-06-15 | End: 2021-03-16

## 2020-06-15 NOTE — PROGRESS NOTES
Problem(s) Oriented visit        SUBJECTIVE:                                                    Macie Jorge is a 79 year old female who presents to clinic today for the following health issues :    1. Simple chronic bronchitis (H)  COPD remains stable.  Has not had any recent breathing troubles beyond usual baseline.  Has not any acute respiratory events.  Remains with intermittent cough, mild shortness of breath with overexertion as per usual.  Using medication as directed with reported side effects.    - fluticasone-salmeterol (AIRDUO RESPICLICK) 55-14 MCG/ACT inhaler; Inhale 1 puff into the lungs 2 times daily  Dispense: 1 Inhaler; Refill: 11    2. Cervical pain  and    3. Lumbar pain  from  4. Spinal stenosis of lumbar region with neurogenic claudication  Ongoing uses     5. Type 2 diabetes mellitus with stage 3 chronic kidney disease, without long-term current use of insulin (H)  [id number]  Diabetes  she  reports no new symptoms.  No significnat or regular episodes of hypo or hyperglycemia  Medication compliance: compliant most of the time  Diabetic diet compliance: compliant most of the time  Diabetic ROS: no polyuria or polydipsia, no chest pain, dyspnea or TIA's, no numbness, tingling or pain in extremities    Home blood sugar monitoring: are performed regularly, Review of patients self blood glucose monitoring shows fasting most always < 130 and post prandial average under 170.  As a direct cause of their history of diabetes they have the following Diabetic complications: nephropathy    Most  recent A1C: Smoking: BP:   The last 5 available A1C values from [facility] reference lab, [facility]:  No components found for: [id number]     Lab Results   Component Value Date    A1C 5.9 01/20/2020    A1C 5.9 02/05/2019    A1C 5.9 08/01/2018    History   Smoking Status     Former Smoker     Packs/day: 1.50     Years: 40.00     Quit date: 1/1/1995   Smokeless Tobacco     Never Used    BP Readings from Last 1  Encounters:   06/15/20 118/74        Kidney studies:  Creatinine   Date Value Ref Range Status   01/20/2020 1.33 (H) 0.57 - 1.00 mg/dL Final   09/03/2019 1.55 (H) 0.57 - 1.00 mg/dL Final   06/05/2019 1.33 (H) 0.57 - 1.00 mg/dL Final   ]    GFR Estimate   Date Value Ref Range Status   10/01/2015 37 (L) >60 mL/min/1.7m2 Final     Comment:     Non  GFR Calc                No components found for: MICORALBUMINLC      GFR Estimate If Black   Date Value Ref Range Status   10/01/2015 45 (L) >60 mL/min/1.7m2 Final     Comment:      GFR Calc     Medication (Note: This includes the hypertensive combination subclass to make sure to show all ACEI/ARB's.)     ACE Inhibitors       lisinopril (PRINIVIL/ZESTRIL) 5 MG tablet    Take 1 tablet (5 mg) by mouth daily               Statin and ASA:  Current Outpatient Medications   Medication     albuterol (PROAIR HFA/PROVENTIL HFA/VENTOLIN HFA) 108 (90 Base) MCG/ACT inhaler     allopurinol (ZYLOPRIM) 100 MG tablet     blood glucose (NO BRAND SPECIFIED) lancets standard     blood glucose (NO BRAND SPECIFIED) test strip     blood glucose (ONE TOUCH DELICA) lancing device     blood glucose monitoring (NO BRAND SPECIFIED) meter device kit     blood glucose monitoring (ONE TOUCH DELICA) lancets     blood glucose monitoring (ONE TOUCH ULTRA) test strip     fluticasone-salmeterol (ADVAIR DISKUS) 250-50 MCG/DOSE inhaler     fluticasone-salmeterol (AIRDUO RESPICLICK) 55-14 MCG/ACT inhaler     HYDROcodone-acetaminophen (NORCO) 5-325 MG tablet     levothyroxine (SYNTHROID/LEVOTHROID) 112 MCG tablet     lisinopril (PRINIVIL/ZESTRIL) 5 MG tablet     metFORMIN (GLUCOPHAGE-XR) 500 MG 24 hr tablet     naloxone (NARCAN) 4 MG/0.1ML nasal spray     omeprazole 20 MG tablet     oxybutynin ER (DITROPAN-XL) 10 MG 24 hr tablet     MYRBETRIQ 50 MG 24 hr tablet     No current facility-administered medications for this visit.        - C FOOT EXAM  NO CHARGE       Problem list,  Medication list, Allergies, and Medical/Social/Surgical histories reviewed in [facility] and updated as appropriate.   Additional history: as documented    ROS:  General and Resp. completed and negative except as noted above    Histories:   Patient Active Problem List   Diagnosis     Panic attacks     Advanced directives, counseling/discussion     Hyperlipidemia with target LDL less than 100     History of hip replacement, total     Health Care Home     Obesity     Urgency incontinence     CKD (chronic kidney disease) stage 3, GFR 30-59 ml/min (H)     Nonintractable migraine, unspecified migraine type     Type 2 diabetes mellitus with stage 3 chronic kidney disease, without long-term current use of insulin (H)     COPD with asthma (H)     Acquired hypothyroidism     Altered mental status     History of colonic polyps     Chronic midline low back pain without sciatica     Physical deconditioning     Spinal stenosis of lumbar region with neurogenic claudication     Hypothyroidism due to acquired atrophy of thyroid     Kidney stone     Past Surgical History:   Procedure Laterality Date     ARTHROPLASTY HIP  3/13/2013    Procedure: ARTHROPLASTY HIP;  LEFT TOTAL HIP ARTHROPLASTY (BIOMET)^ ;  Surgeon: Anand Main MD;  Location:  OR     ARTHROPLASTY PATELLO-FEMORAL (KNEE)  2005ish    Left     CHOLECYSTECTOMY, OPEN  27 yo     COMBINED CYSTOSCOPY, INSERT STENT URETER(S)  8/5/2014    Procedure: COMBINED CYSTOSCOPY, INSERT STENT URETER(S);  Surgeon: Sam Arriaga MD;  Location:  OR     COMBINED CYSTOSCOPY, RETROGRADES, URETEROSCOPY, LASER HOLMIUM LITHOTRIPSY URETER(S), INSERT STENT Left 6/14/2016    Procedure: COMBINED CYSTOSCOPY, RETROGRADES, URETEROSCOPY, LASER HOLMIUM LITHOTRIPSY URETER(S), INSERT STENT;  Surgeon: Thomas Edmondson MD;  Location:  OR     COMBINED CYSTOSCOPY, RETROGRADES, URETEROSCOPY, LASER HOLMIUM LITHOTRIPSY URETER(S), INSERT STENT Left 3/28/2019    Procedure: CYSTOSCOPY, LEFT RETROGRADE  PYLEOGRAM, LEFT URETEROSCOPY, HOLMIUM LASER LITHOTRIPSY, LEFT URETERAL STENT EXCHANGE;  Surgeon: Derrek Rivera MD;  Location:  OR     CYSTOSCOPY, RETROGRADES, INSERT STENT URETER(S), COMBINED Left 3/8/2019    Procedure: CYSTOSCOPY,LEFT RETROGRADE, LEFT STENT PLACEMENT;  Surgeon: Derrek Rivera MD;  Location:  OR     GENITOURINARY SURGERY       LAMINECT/DISCECTOMY, LUMBAR      Laminectomy/Discectomy Lumbar     LASER HOLMIUM LITHOTRIPSY URETER(S), INSERT STENT, COMBINED Left 2014    Procedure: COMBINED CYSTOSCOPY, URETEROSCOPY, LASER HOLMIUM LITHOTRIPSY URETER(S), INSERT STENT;  Surgeon: Sam Arriaga MD;  Location:  OR     LASER HOLMIUM LITHOTRIPSY URETER(S), INSERT STENT, COMBINED Left 2019    Procedure: CYSTOSCOPY, LEFT RETROGRADE, LEFT URETEROSCOPY, HOLMIUM LASER LITHOTRIPSY, STONE REMOVAL, LEFT STENT EXCHANGE;  Surgeon: Derrek Rivera MD;  Location:  OR     LASER HOLMIUM LITHOTRIPSY URETER(S), INSERT STENT, COMBINED Right 2019    Procedure: CYSTOSCOPY; RIGHT URETEROSCOPY WITH HOLMIUM LASER LITHOTRIPSY; RIGHT STENT PLACEMENT (DIGITAL FLEXIBLE URETEROSCOPE);  Surgeon: Derrek Rivera MD;  Location:  OR     ORTHOPEDIC SURGERY      left ankle     RECESSION EYELID UPPER         Social History     Tobacco Use     Smoking status: Former Smoker     Packs/day: 1.50     Years: 40.00     Pack years: 60.00     Last attempt to quit: 1995     Years since quittin.4     Smokeless tobacco: Never Used   Substance Use Topics     Alcohol use: Yes     Comment: occ.     No family history on file.        OBJECTIVE:                                                    /74   Pulse 72   Temp 97.9  F (36.6  C) (Skin)   Resp 17   SpO2 97%   There is no height or weight on file to calculate BMI.   APPEARANCE: = Relaxed and in no distress  Conj/Eyelids = noninjected and lids and lashes are without inflammation  PERRLA/Irises = Pupils Round Reactive to Light and Irisis without  inflammation  Neck = No anterior or posterior adenopathy appreciated.  Thyroid = Not enlarged and no masses felt  Resp effort = Calm regular breathing  Breath Sounds = Good air movement with no rales or rhonchi in any lung fields  Heart Rate, Rhythm, & sounds (no Murm)  = Regular rate and rhythm with no S3, S4, or murmur appreciated.  Carotid Art's = Pulses full and equal and no bruits appreciated  Abdomen = Soft, nontender, no masses, & bowel sounds in all quadrants  Liver/Spleen = Normal span and no splenomegaly noted  Digits and Nails = FROM in all finger joints, no nail dystrophy  Ext (edema) = No pretibial edema noted or elsewhere  Musculsktl =  Strength and ROM of major joints are within normal limits  SKIN = absent significant rashes or lesions   Recent/Remote Memory = Alert and Oriented x 3  Mood/Affect = Cooperative and interested     ASSESSMENT/PLAN:                                                        Macie was seen today for diabetes and refill request.    Diagnoses and all orders for this visit:    Spinal stenosis of lumbar region with neurogenic claudication  -     HYDROcodone-acetaminophen (NORCO) 5-325 MG tablet; Take 1-2 tablets by mouth every 4 hours as needed for moderate to severe pain    Simple chronic bronchitis (H)  -     fluticasone-salmeterol (AIRDUO RESPICLICK) 55-14 MCG/ACT inhaler; Inhale 1 puff into the lungs 2 times daily    Cervical pain  -     HYDROcodone-acetaminophen (NORCO) 5-325 MG tablet; Take 1-2 tablets by mouth every 4 hours as needed for moderate to severe pain    Lumbar pain  -     HYDROcodone-acetaminophen (NORCO) 5-325 MG tablet; Take 1-2 tablets by mouth every 4 hours as needed for moderate to severe pain    COPD with asthma (H)    Type 2 diabetes mellitus with stage 3 chronic kidney disease, without long-term current use of insulin (H)  -     C FOOT EXAM  NO CHARGE        See Patient Instructions  There are no Patient Instructions on file for this visit.    The  following health maintenance items are reviewed in Epic and correct as of today:  Health Maintenance   Topic Date Due     COPD ACTION PLAN  1940     MIGRAINE ACTION PLAN  1940     ZOSTER IMMUNIZATION (1 of 2) 10/14/1990     ADVANCE CARE PLANNING  09/01/2016     MEDICARE ANNUAL WELLNESS VISIT  01/20/2017     EYE EXAM  06/16/2018     DIABETIC FOOT EXAM  02/05/2020     A1C  07/20/2020     MICROALBUMIN  01/15/2021     FALL RISK ASSESSMENT  01/15/2021     URINE DRUG SCREEN  01/17/2021     BMP  01/20/2021     LIPID  01/20/2021     TSH W/FREE T4 REFLEX  01/20/2021     COLORECTAL CANCER SCREENING  04/05/2021     DTAP/TDAP/TD IMMUNIZATION (2 - Td) 11/15/2022     DEXA  Completed     SPIROMETRY  Completed     PHQ-2  Completed     INFLUENZA VACCINE  Completed     PNEUMOCOCCAL IMMUNIZATION 65+ LOW/MEDIUM RISK  Completed     IPV IMMUNIZATION  Aged Out     MENINGITIS IMMUNIZATION  Aged Out       Juve Bruner MD  [facility]  [facility]  [facility]  [phone]    For any issues my office # is [phone]

## 2020-06-23 ENCOUNTER — TRANSFERRED RECORDS (OUTPATIENT)
Dept: FAMILY MEDICINE | Facility: CLINIC | Age: 80
End: 2020-06-23

## 2020-06-26 ENCOUNTER — TRANSFERRED RECORDS (OUTPATIENT)
Dept: FAMILY MEDICINE | Facility: CLINIC | Age: 80
End: 2020-06-26

## 2020-07-13 ENCOUNTER — OFFICE VISIT (OUTPATIENT)
Dept: FAMILY MEDICINE | Facility: CLINIC | Age: 80
End: 2020-07-13

## 2020-07-13 VITALS
BODY MASS INDEX: 31.82 KG/M2 | SYSTOLIC BLOOD PRESSURE: 120 MMHG | HEIGHT: 65 IN | DIASTOLIC BLOOD PRESSURE: 74 MMHG | WEIGHT: 191 LBS

## 2020-07-13 DIAGNOSIS — E03.9 ACQUIRED HYPOTHYROIDISM: ICD-10-CM

## 2020-07-13 DIAGNOSIS — N18.30 TYPE 2 DIABETES MELLITUS WITH STAGE 3 CHRONIC KIDNEY DISEASE, WITHOUT LONG-TERM CURRENT USE OF INSULIN (H): ICD-10-CM

## 2020-07-13 DIAGNOSIS — G43.009 MIGRAINE WITHOUT AURA AND WITHOUT STATUS MIGRAINOSUS, NOT INTRACTABLE: ICD-10-CM

## 2020-07-13 DIAGNOSIS — Z01.818 PREOP GENERAL PHYSICAL EXAM: Primary | ICD-10-CM

## 2020-07-13 DIAGNOSIS — J44.89 COPD WITH ASTHMA (H): ICD-10-CM

## 2020-07-13 DIAGNOSIS — E11.22 TYPE 2 DIABETES MELLITUS WITH STAGE 3 CHRONIC KIDNEY DISEASE, WITHOUT LONG-TERM CURRENT USE OF INSULIN (H): ICD-10-CM

## 2020-07-13 DIAGNOSIS — M48.062 SPINAL STENOSIS OF LUMBAR REGION WITH NEUROGENIC CLAUDICATION: ICD-10-CM

## 2020-07-13 DIAGNOSIS — N18.30 CKD (CHRONIC KIDNEY DISEASE) STAGE 3, GFR 30-59 ML/MIN (H): ICD-10-CM

## 2020-07-13 PROCEDURE — 80048 BASIC METABOLIC PNL TOTAL CA: CPT | Mod: 90 | Performed by: NURSE PRACTITIONER

## 2020-07-13 PROCEDURE — 99215 OFFICE O/P EST HI 40 MIN: CPT | Performed by: NURSE PRACTITIONER

## 2020-07-13 PROCEDURE — 93000 ELECTROCARDIOGRAM COMPLETE: CPT | Performed by: NURSE PRACTITIONER

## 2020-07-13 PROCEDURE — 36415 COLL VENOUS BLD VENIPUNCTURE: CPT | Performed by: NURSE PRACTITIONER

## 2020-07-13 PROCEDURE — 84443 ASSAY THYROID STIM HORMONE: CPT | Mod: 90 | Performed by: NURSE PRACTITIONER

## 2020-07-13 PROCEDURE — 83036 HEMOGLOBIN GLYCOSYLATED A1C: CPT | Mod: 90 | Performed by: NURSE PRACTITIONER

## 2020-07-13 PROCEDURE — 84439 ASSAY OF FREE THYROXINE: CPT | Mod: 90 | Performed by: NURSE PRACTITIONER

## 2020-07-13 RX ORDER — SUMATRIPTAN 25 MG/1
25 TABLET, FILM COATED ORAL
Qty: 30 TABLET | Refills: 1 | Status: SHIPPED | OUTPATIENT
Start: 2020-07-13 | End: 2022-02-25

## 2020-07-13 ASSESSMENT — ASTHMA QUESTIONNAIRES
QUESTION_1 LAST FOUR WEEKS HOW MUCH OF THE TIME DID YOUR ASTHMA KEEP YOU FROM GETTING AS MUCH DONE AT WORK, SCHOOL OR AT HOME: NONE OF THE TIME
QUESTION_5 LAST FOUR WEEKS HOW WOULD YOU RATE YOUR ASTHMA CONTROL: WELL CONTROLLED
QUESTION_3 LAST FOUR WEEKS HOW OFTEN DID YOUR ASTHMA SYMPTOMS (WHEEZING, COUGHING, SHORTNESS OF BREATH, CHEST TIGHTNESS OR PAIN) WAKE YOU UP AT NIGHT OR EARLIER THAN USUAL IN THE MORNING: NOT AT ALL
QUESTION_4 LAST FOUR WEEKS HOW OFTEN HAVE YOU USED YOUR RESCUE INHALER OR NEBULIZER MEDICATION (SUCH AS ALBUTEROL): NOT AT ALL
QUESTION_2 LAST FOUR WEEKS HOW OFTEN HAVE YOU HAD SHORTNESS OF BREATH: NOT AT ALL
ACT_TOTALSCORE: 24

## 2020-07-13 ASSESSMENT — MIFFLIN-ST. JEOR: SCORE: 1346.21

## 2020-07-13 NOTE — PROGRESS NOTES
[facility]  [address] [place] [id number] [phone]  Dept: [phone]    PRE-OP EVALUATION:  Today's date: 2020    Macie Jorge (: 1940) presents for pre-operative evaluation assessment as requested by Dr. Lance.  She requires evaluation and anesthesia risk assessment prior to undergoing surgery/procedure for treatment of rt wrist carpal .    Proposed Surgery/ Procedure: carpal tunnel  Date of Surgery/ Procedure: 20  Time of Surgery/ Procedure: [facility]  Hospital/Surgical Facility: [facility]  Primary Physician: Juve Bruner  Type of Anesthesia Anticipated: to be determined    Patient has a Health Care Directive or Living Will:  YES     1. NO - Do you have a history of heart attack, stroke, stent, bypass or surgery on an artery in the head, neck, heart or legs?  2. NO - Do you ever have any pain or discomfort in your chest?  3. NO - Do you have a history of  Heart Failure?  4. NO - Are you troubled by shortness of breath when: walking on the level, up a slight hill or at night?  5. NO - Do you currently have a cold, bronchitis or other respiratory infection?  6. NO - Do you have a cough, shortness of breath or wheezing?  7. NO - Do you sometimes get pains in the calves of your legs when you walk?  8. NO - Do you or anyone in your family have previous history of blood clots?  9. NO - Do you or does anyone in your family have a serious bleeding problem such as prolonged bleeding following surgeries or cuts?  10. NO - Have you ever had problems with anemia or been told to take iron pills?  11. NO - Have you had any abnormal blood loss such as black, tarry or bloody stools, or abnormal vaginal bleeding?  12. NO - Have you ever had a blood transfusion?  13. NO - Have you or any of your relatives ever had problems with anesthesia?  14. NO - Do you have sleep apnea, excessive snoring or daytime drowsiness?  15. NO - Do you have any prosthetic  heart valves?  16. YES - Do you have prosthetic joints? Left knee, left hip   17. NO - Is there any chance that you may be pregnant?      HPI:     HPI related to upcoming procedure: Carpal tunnel syndrome to R wrist, keeps her awake at night.     #1) Long standing hx of migraines, gets about once per month. Imitrex shots made her vomit, open to trying Imitrex pill.    #2) COPD: Some MYRICK but generally well controlled chronic bronchitis, using AirDuo respiclick. Quit smoking.    #3) Hypothyroidism: Managed on levothyroxine, feels euthyroid. Most recent TSH 4.52.    #4) Spinal stenosis: Longstanding issue causing chronic pain and limited exercise tolerance, taking Norco about once per week.    #5) GERD: Managed well with esomeprazole daily    #6) T2DM: Managing well with metformin 1500mg in the evenings    #7) CKD: On lisinopril 5mg daily, avoiding nephrotoxins.      MEDICAL HISTORY:     Patient Active Problem List    Diagnosis Date Noted     Kidney stone 03/28/2019     Priority: Medium     Chronic midline low back pain without sciatica 02/05/2019     Priority: Medium     Physical deconditioning 02/05/2019     Priority: Medium     Spinal stenosis of lumbar region with neurogenic claudication 02/05/2019     Priority: Medium     Hypothyroidism due to acquired atrophy of thyroid 02/05/2019     Priority: Medium     History of colonic polyps 02/27/2018     Priority: Medium     Altered mental status 12/01/2017     Priority: Medium     Acquired hypothyroidism 03/21/2017     Priority: Medium     COPD with asthma (H)      Priority: Medium      followed with pulmonary DR LENNOX 60-pack-year history of smoking       Type 2 diabetes mellitus with stage 3 chronic kidney disease, without long-term current use of insulin (H)      Priority: Medium     Nonintractable migraine, unspecified migraine type 01/20/2016     Priority: Medium     Urgency incontinence 04/02/2014     Priority: Medium     CKD (chronic kidney disease) stage 3, GFR  30-59 ml/min (H) 04/02/2014     Priority: Medium     Obesity 03/05/2014     Priority: Medium     Health Care Home 03/04/2014     Priority: Medium     State Tier Level:  Tier 2         History of hip replacement, total 03/13/2013     Priority: Medium     Hyperlipidemia with target LDL less than 100      Priority: Medium     Diagnosis updated by automated process. Provider to review and confirm.       Advanced directives, counseling/discussion 09/01/2011     Priority: Medium     As per reasonable care for Seniors, wants in the Short term aggressive care.   No desire for long term prolongation of life through artificial means if no hope to bring back to a reasonable status.        Panic attacks      Priority: Medium      Past Medical History:   Diagnosis Date     Calculus of left ureter 2014    dr Arriaga - ureteroscopy and lithitripsy     Cataract      Chronic low back pain     Dr Villagran at [facility] in 2015- not surgical      Complication of anesthesia      COPD with asthma (H)      followed with pulmonary DR LENNOX 60-pack-year history of smoking     Esophagitis, reflux 9/2012    SOME EOSINOPHILIA NO BARRETS     Gastro-oesophageal reflux disease      has seen GI     Hydronephrosis, left     chronic , in 2017 Dr Arriaga plan was observaton     Hyperlipidaemia LDL goal < 100      Hypothyroid      Migraine headaches 9/1/2011     Moderate persistent asthma      lifelong, saw Pulm 2013     Obesity (BMI 30-39.9)      Panic attacks      PONV (postoperative nausea and vomiting)      Renal stones 2016    kidney stones, multiple small stones high risk recurrent ureteral stones. , Dr Arriaga in 2016 rec 24 hour urine.     Type 2 diabetes mellitus without complication (H)      Past Surgical History:   Procedure Laterality Date     ARTHROPLASTY HIP  3/13/2013    Procedure: ARTHROPLASTY HIP;  LEFT TOTAL HIP ARTHROPLASTY (BIOMET)^ ;  Surgeon: Anand Main MD;  Location: [facility] OR     ARTHROPLASTY PATELLO-FEMORAL (KNEE)  2005ish    Left      CHOLECYSTECTOMY, OPEN  27 yo     COMBINED CYSTOSCOPY, INSERT STENT URETER(S)  8/5/2014    Procedure: COMBINED CYSTOSCOPY, INSERT STENT URETER(S);  Surgeon: Sam Arriaga MD;  Location:  OR     COMBINED CYSTOSCOPY, RETROGRADES, URETEROSCOPY, LASER HOLMIUM LITHOTRIPSY URETER(S), INSERT STENT Left 6/14/2016    Procedure: COMBINED CYSTOSCOPY, RETROGRADES, URETEROSCOPY, LASER HOLMIUM LITHOTRIPSY URETER(S), INSERT STENT;  Surgeon: Thomas Edmondson MD;  Location:  OR     COMBINED CYSTOSCOPY, RETROGRADES, URETEROSCOPY, LASER HOLMIUM LITHOTRIPSY URETER(S), INSERT STENT Left 3/28/2019    Procedure: CYSTOSCOPY, LEFT RETROGRADE PYLEOGRAM, LEFT URETEROSCOPY, HOLMIUM LASER LITHOTRIPSY, LEFT URETERAL STENT EXCHANGE;  Surgeon: Derrek Rivera MD;  Location:  OR     CYSTOSCOPY, RETROGRADES, INSERT STENT URETER(S), COMBINED Left 3/8/2019    Procedure: CYSTOSCOPY,LEFT RETROGRADE, LEFT STENT PLACEMENT;  Surgeon: Derrek Rivera MD;  Location:  OR     GENITOURINARY SURGERY       LAMINECT/DISCECTOMY, LUMBAR      Laminectomy/Discectomy Lumbar     LASER HOLMIUM LITHOTRIPSY URETER(S), INSERT STENT, COMBINED Left 8/20/2014    Procedure: COMBINED CYSTOSCOPY, URETEROSCOPY, LASER HOLMIUM LITHOTRIPSY URETER(S), INSERT STENT;  Surgeon: Sam Arriaga MD;  Location:  OR     LASER HOLMIUM LITHOTRIPSY URETER(S), INSERT STENT, COMBINED Left 5/2/2019    Procedure: CYSTOSCOPY, LEFT RETROGRADE, LEFT URETEROSCOPY, HOLMIUM LASER LITHOTRIPSY, STONE REMOVAL, LEFT STENT EXCHANGE;  Surgeon: Derrek Rivera MD;  Location:  OR     LASER HOLMIUM LITHOTRIPSY URETER(S), INSERT STENT, COMBINED Right 6/13/2019    Procedure: CYSTOSCOPY; RIGHT URETEROSCOPY WITH HOLMIUM LASER LITHOTRIPSY; RIGHT STENT PLACEMENT (DIGITAL FLEXIBLE URETEROSCOPE);  Surgeon: Derrek Rivera MD;  Location:  OR     ORTHOPEDIC SURGERY      left ankle     RECESSION EYELID UPPER       Current Outpatient Medications   Medication Sig Dispense Refill      albuterol (PROAIR HFA/PROVENTIL HFA/VENTOLIN HFA) 108 (90 Base) MCG/ACT inhaler Inhale 2 puffs into the lungs every 6 hours (Patient taking differently: Inhale 2 puffs into the lungs every 6 hours as needed ) 8.5 g 1     allopurinol (ZYLOPRIM) 100 MG tablet Take 2 tablets (200 mg) by mouth daily 180 tablet 3     blood glucose (NO BRAND SPECIFIED) lancets standard Use to test blood sugar 1 time daily or as directed. 100 each 3     blood glucose (NO BRAND SPECIFIED) test strip Use to test blood sugar 1 time daily or as directed. 100 strip 3     blood glucose (ONE TOUCH DELICA) lancing device Use to test blood sugars one time daily or as directed. 1 each 0     blood glucose monitoring (NO BRAND SPECIFIED) meter device kit Use to test blood sugar 1 time daily or as directed. 1 kit 0     blood glucose monitoring (ONE TOUCH DELICA) lancets Use to test blood sugars 1 times daily or as directed. 1 Box 3     blood glucose monitoring (ONE TOUCH ULTRA) test strip Use to test blood sugars one time daily or as directed. 100 each 3     fluticasone-salmeterol (ADVAIR DISKUS) 250-50 MCG/DOSE inhaler INHALE ONE DOSE INTO THE LUNGS 2 TIMES DAILY 3 Inhaler 3     fluticasone-salmeterol (AIRDUO RESPICLICK) 55-14 MCG/ACT inhaler Inhale 1 puff into the lungs 2 times daily 1 Inhaler 11     HYDROcodone-acetaminophen (NORCO) 5-325 MG tablet Take 1-2 tablets by mouth every 4 hours as needed for moderate to severe pain 60 tablet 0     levothyroxine (SYNTHROID/LEVOTHROID) 112 MCG tablet Take 1 tablet (112 mcg) by mouth daily 90 tablet 3     lisinopril (PRINIVIL/ZESTRIL) 5 MG tablet Take 1 tablet (5 mg) by mouth daily 90 tablet 3     metFORMIN (GLUCOPHAGE-XR) 500 MG 24 hr tablet Take 3 tablets (1,500 mg) by mouth daily (with dinner) 270 tablet 3     MYRBETRIQ 50 MG 24 hr tablet        naloxone (NARCAN) 4 MG/0.1ML nasal spray Spray 1 spray (4 mg) into one nostril alternating nostrils once as needed for opioid reversal Every 2-3 minutes until  "patient responsive or EMS arrives 0.2 mL 0     omeprazole 20 MG tablet Take 20 mg by mouth 2 times daily       oxybutynin ER (DITROPAN-XL) 10 MG 24 hr tablet Take 1 tablet by mouth once daily 90 tablet 0     OTC products: None, except as noted above    Allergies   Allergen Reactions     Morphine      Severe vomiting     Baclofen GI Disturbance     Imitrex [Sumatriptan] Nausea     Tetracycline      Sores in mouth      Latex Allergy: NO    Social History     Tobacco Use     Smoking status: Former Smoker     Packs/day: 1.50     Years: 40.00     Pack years: 60.00     Last attempt to quit: 1995     Years since quittin.5     Smokeless tobacco: Never Used   Substance Use Topics     Alcohol use: Yes     Comment: occ.     History   Drug Use No       REVIEW OF SYSTEMS:   Constitutional, neuro, ENT, endocrine, pulmonary, cardiac, gastrointestinal, genitourinary, musculoskeletal, integument and psychiatric systems are negative, except as otherwise noted.    EXAM:   Physical Exam:    /74   Ht 1.657 m (5' 5.25\")   Wt 86.6 kg (191 lb)   BMI 31.54 kg/m      CONSTITUTIONAL: Alert non-toxic appearing female in no acute distress  HEAD: Normocephalic, atraumatic  EYES: PERRLA; no scleral icterus; sclera without injection  ENT: oropharynx pink without lesions; posterior pharynx without exudates  NECK: Neck supple without lymphadenopathy, thyroid without enlargement or nodularity  RESPIRATORY: Lungs clear to auscultation, respirations unlabored  CV: Regular rate and rhythm, S1S2, no clicks, murmurs, rubs, or gallops; bilateral lower extremities without edema, dorsalis pedis pulses 2+ bilaterally  GASTROINTESTINAL: Normoactive bowel sounds x4 quadrants, abdomen soft, non-distended, and non-tender to palpation without masses or organomegaly  LYMPHATIC: Cervical, supraclavicular, and inguinal nodes without lymphadenopathy  MUSCULOSKELETAL: Moves all extremities, no obvious muscle wasting  NEUROLOGIC: No gross deficits, " normal gait  SKIN: Appropriate color for race, warm and dry, no rashes or lesions to unclothed skin  PSYCHIATRIC: Pleasant and interactive, affect bright, makes appropriate eye contact, thought process logical      DIAGNOSTICS:     EKG: appears normal, NSR, rightward axis, unchanged from previous tracings  Labs Drawn and in Process:   Unresulted Labs Ordered in the Past 30 Days of this Admission     No orders found from 6/13/2020 to 7/14/2020.          Recent Labs   Lab Test 01/20/20  1110 01/20/20  0915 09/03/19  1345 09/03/19  1330  02/05/19  1203  08/04/14  1721  03/16/13  0635   HGB  --  11.6* 11.5*  --   --   --    < > 13.5   < >  --    PLT  --  180 195  --   --   --    < > 170   < >  --    INR  --   --   --   --   --   --   --  1.02  --  2.39*     --   --  139   < > 141   < > 139   < >  --    POTASSIUM 4.3  --   --  4.8   < > 4.4   < > 3.8   < >  --    CR 1.33*  --   --  1.55*   < > 1.10*   < > 1.49*   < > 0.89   A1C 5.9*  --   --   --   --  5.9*   < >  --    < >  --     < > = values in this interval not displayed.        IMPRESSION:   Reason for surgery/procedure: R carpal tunnel  Diagnosis/reason for consult: Pre-operative risk assessment    The proposed surgical procedure is considered INTERMEDIATE risk.    REVISED CARDIAC RISK INDEX  The patient has the following serious cardiovascular risks for perioperative complications such as (MI, PE, VFib and 3  AV Block):  No serious cardiac risks  INTERPRETATION: 0 risks: Class I (very low risk - 0.4% complication rate)    The patient has the following additional risks for perioperative complications:  No identified additional risks      ICD-10-CM    1. Preop general physical exam  Z01.818 Basic Metabolic Panel (8) ([facility])     Hemoglobin A1C ([facility])     EKG 12-lead complete w/read - Clinics     TSH ([facility])     Thyroxine (T4) Free  Direct  S ([facility])   2. Migraine without aura and without status migrainosus, not intractable  G43.009 SUMAtriptan  (IMITREX) 25 MG tablet   3. Spinal stenosis of lumbar region with neurogenic claudication  M48.062    4. COPD with asthma (H)  J44.9    5. Type 2 diabetes mellitus with stage 3 chronic kidney disease, without long-term current use of insulin (H)  E11.22     N18.3    6. Acquired hypothyroidism  E03.9    7. CKD (chronic kidney disease) stage 3, GFR 30-59 ml/min (H)  N18.3        RECOMMENDATIONS:     --Consult hospital rounder / IM to assist post-op medical management    Cardiovascular Risk  Performs 4 METs exercise without symptoms (Light housework (dusting, washing dishes)) .       Pulmonary Risk  Incentive spirometry post op  Respiratory Therapy (Respiratory Care IP Consult)  post op  NG tube decompression if abdominal distension or significant vomiting       --Patient is to take levothyroxine and AirDuo inhaler morning of surgery, hold all other medications the morning of.    May trial oral sumatriptan, to return to clinic for further eval if this fails to help her migraines.    No apparent contraindications with proposed procedure, appears medically appropriate to proceed with proposed procedure without further diagnostic evaluation.    Signed Electronically by: WILEY Esquivel CNP    Copy of this evaluation report is provided to requesting physician.    [facility] Preop Guidelines    Revised Cardiac Risk Index

## 2020-07-13 NOTE — PATIENT INSTRUCTIONS
Before Your Surgery      Call your surgeon if there is any change in your health. This includes signs of a cold or flu (such as a sore throat, runny nose, cough, rash or fever).    Do not smoke, drink alcohol or take over the counter medicine (unless your surgeon or primary care doctor tells you to) for the 24 hours before and after surgery.    If you take prescribed drugs: Follow your doctor s orders about which medicines to take and which to stop until after surgery.    Eating and drinking prior to surgery: follow the instructions from your surgeon    Take a shower or bath the night before surgery. Use the soap your surgeon gave you to gently clean your skin. If you do not have soap from your surgeon, use your regular soap. Do not shave or scrub the surgery site.  Wear clean pajamas and have clean sheets on your bed.     Only take the levothyroxine the morning of your surgery- with a small sip of water. Take your inhaler. Hold everything else the morning of.

## 2020-07-14 LAB
BUN SERPL-MCNC: 23 MG/DL (ref 8–27)
BUN/CREATININE RATIO: 15 (ref 12–28)
CALCIUM SERPL-MCNC: 9.4 MG/DL (ref 8.7–10.3)
CHLORIDE SERPLBLD-SCNC: 103 MMOL/L (ref 96–106)
CREAT SERPL-MCNC: 1.57 MG/DL (ref 0.57–1)
EGFR IF AFRICN AM: 36 ML/MIN/1.73
EGFR IF NONAFRICN AM: 31 ML/MIN/1.73
GLUCOSE SERPL-MCNC: 84 MG/DL (ref 65–99)
HBA1C MFR BLD: 5.9 % (ref 4.8–5.6)
POTASSIUM SERPL-SCNC: 4.7 MMOL/L (ref 3.5–5.2)
SODIUM SERPL-SCNC: 140 MMOL/L (ref 134–144)
T4 FREE SERPL-MCNC: 1.39 NG/DL (ref 0.82–1.77)
TOTAL CO2: 23 MMOL/L (ref 20–29)
TSH BLD-ACNC: 3.66 UIU/ML (ref 0.45–4.5)

## 2020-07-14 ASSESSMENT — ASTHMA QUESTIONNAIRES: ACT_TOTALSCORE: 24

## 2020-07-17 NOTE — PROGRESS NOTES
7/16/20 faxed preop and Kristy, Atten: kevin @ [phone]    Nirmal Jerome,   [facility]  [phone]

## 2020-07-17 NOTE — PROGRESS NOTES
7/14/20 faxed preop, EKG and labs to [facility] surgery @ [phone]    Nirmal Jerome,   [facility]  [phone]

## 2020-07-22 ENCOUNTER — TELEPHONE (OUTPATIENT)
Dept: FAMILY MEDICINE | Facility: CLINIC | Age: 80
End: 2020-07-22

## 2020-07-22 DIAGNOSIS — R80.9 MICROALBUMINURIA: ICD-10-CM

## 2020-07-22 RX ORDER — LISINOPRIL 10 MG/1
5 TABLET ORAL DAILY
Qty: 45 TABLET | Refills: 0 | Status: SHIPPED | OUTPATIENT
Start: 2020-07-22 | End: 2021-03-16

## 2020-07-22 NOTE — TELEPHONE ENCOUNTER
Fax received from [facility], lisinopril 5mg tab on back order and pharmacy is requesting prescription for 10mg tab with sig take 0.5mg tab PO QD. Per Dr. Bruner prescription for this sent to pharmacy. Cherelle Mccullough

## 2020-07-29 ENCOUNTER — TRANSFERRED RECORDS (OUTPATIENT)
Dept: FAMILY MEDICINE | Facility: CLINIC | Age: 80
End: 2020-07-29

## 2020-08-04 ENCOUNTER — TRANSFERRED RECORDS (OUTPATIENT)
Dept: FAMILY MEDICINE | Facility: CLINIC | Age: 80
End: 2020-08-04

## 2020-08-18 ENCOUNTER — TRANSFERRED RECORDS (OUTPATIENT)
Dept: FAMILY MEDICINE | Facility: CLINIC | Age: 80
End: 2020-08-18

## 2020-09-14 DIAGNOSIS — J44.9 CHRONIC OBSTRUCTIVE PULMONARY DISEASE, UNSPECIFIED COPD TYPE (H): ICD-10-CM

## 2020-09-14 RX ORDER — ALBUTEROL SULFATE 90 UG/1
2 AEROSOL, METERED RESPIRATORY (INHALATION) EVERY 6 HOURS PRN
Qty: 9 G | Refills: 0 | Status: SHIPPED | OUTPATIENT
Start: 2020-09-14 | End: 2021-10-04

## 2020-09-30 ENCOUNTER — VIRTUAL VISIT (OUTPATIENT)
Dept: FAMILY MEDICINE | Facility: CLINIC | Age: 80
End: 2020-09-30

## 2020-09-30 DIAGNOSIS — Z20.822 SUSPECTED COVID-19 VIRUS INFECTION: Primary | ICD-10-CM

## 2020-09-30 PROCEDURE — 99213 OFFICE O/P EST LOW 20 MIN: CPT | Mod: 95 | Performed by: NURSE PRACTITIONER

## 2020-09-30 NOTE — PROGRESS NOTES
Problem(s) Oriented visit      Telephone Visit Details    Type of service:  Telephone Visit    Start Time (time call started): 0857    End Time (time call stopped): 0905    Originating Location (pt. Location): Home    Distant Location (provider location):  [facility]     Mode of Communication:  Telephone    Clinician has received verbal consent for a telephone visit from the patient? Yes      WILEY Esquivel CNP      SUBJECTIVE:                                                    Macie Jorge is a 79 year old female who presents to clinic today for the following health issues :    Six days ago had a headache and sore throat. Two days ago started feeling congested with a runny nose. Daughter had the same symptoms and tested positive for COVID19, was around her granddaughter who tested positive about 1.5 weeks ago. Denies anosmia, ageusia, fatigue. Breathing feels somewhat labored- using albuterol once per day and this is helpful. Chest feels tight. Dry cough. More short of breath with going up the stairs but feels this is stable. She does have a longstanding history of asthma and she feels her breathing is manageable. No nausea, vomiting, diarrhea, rashes, muscle pain.    Problem list, Medication list, Allergies, and Medical/Social/Surgical histories reviewed in [facility] and updated as appropriate.   Additional history: as documented    ROS:  Gen, HEENT, CV, resp, GI, MSK, neuro negative except as listed per HPI    Histories:   Patient Active Problem List   Diagnosis     Panic attacks     Advanced directives, counseling/discussion     Hyperlipidemia with target LDL less than 100     History of hip replacement, total     Health Care Home     Obesity     Urgency incontinence     CKD (chronic kidney disease) stage 3, GFR 30-59 ml/min (H)     Nonintractable migraine, unspecified migraine type     Type 2 diabetes mellitus with stage 3 chronic kidney disease, without long-term current use of insulin  (H)     COPD with asthma (H)     Acquired hypothyroidism     Altered mental status     History of colonic polyps     Chronic midline low back pain without sciatica     Physical deconditioning     Spinal stenosis of lumbar region with neurogenic claudication     Hypothyroidism due to acquired atrophy of thyroid     Kidney stone     Past Surgical History:   Procedure Laterality Date     ARTHROPLASTY HIP  3/13/2013    Procedure: ARTHROPLASTY HIP;  LEFT TOTAL HIP ARTHROPLASTY (BIOMET)^ ;  Surgeon: Anand Main MD;  Location:  OR     ARTHROPLASTY PATELLO-FEMORAL (KNEE)  2005ish    Left     CHOLECYSTECTOMY, OPEN  25 yo     COMBINED CYSTOSCOPY, INSERT STENT URETER(S)  8/5/2014    Procedure: COMBINED CYSTOSCOPY, INSERT STENT URETER(S);  Surgeon: Sam Arriaga MD;  Location:  OR     COMBINED CYSTOSCOPY, RETROGRADES, URETEROSCOPY, LASER HOLMIUM LITHOTRIPSY URETER(S), INSERT STENT Left 6/14/2016    Procedure: COMBINED CYSTOSCOPY, RETROGRADES, URETEROSCOPY, LASER HOLMIUM LITHOTRIPSY URETER(S), INSERT STENT;  Surgeon: Thomas Edmondson MD;  Location:  OR     COMBINED CYSTOSCOPY, RETROGRADES, URETEROSCOPY, LASER HOLMIUM LITHOTRIPSY URETER(S), INSERT STENT Left 3/28/2019    Procedure: CYSTOSCOPY, LEFT RETROGRADE PYLEOGRAM, LEFT URETEROSCOPY, HOLMIUM LASER LITHOTRIPSY, LEFT URETERAL STENT EXCHANGE;  Surgeon: Derrek Rivera MD;  Location:  OR     CYSTOSCOPY, RETROGRADES, INSERT STENT URETER(S), COMBINED Left 3/8/2019    Procedure: CYSTOSCOPY,LEFT RETROGRADE, LEFT STENT PLACEMENT;  Surgeon: Derrek Rivera MD;  Location:  OR     GENITOURINARY SURGERY       LAMINECT/DISCECTOMY, LUMBAR      Laminectomy/Discectomy Lumbar     LASER HOLMIUM LITHOTRIPSY URETER(S), INSERT STENT, COMBINED Left 8/20/2014    Procedure: COMBINED CYSTOSCOPY, URETEROSCOPY, LASER HOLMIUM LITHOTRIPSY URETER(S), INSERT STENT;  Surgeon: Sam Arriaga MD;  Location:  OR     LASER HOLMIUM LITHOTRIPSY URETER(S), INSERT STENT,  COMBINED Left 2019    Procedure: CYSTOSCOPY, LEFT RETROGRADE, LEFT URETEROSCOPY, HOLMIUM LASER LITHOTRIPSY, STONE REMOVAL, LEFT STENT EXCHANGE;  Surgeon: Derrek Rivera MD;  Location:  OR     LASER HOLMIUM LITHOTRIPSY URETER(S), INSERT STENT, COMBINED Right 2019    Procedure: CYSTOSCOPY; RIGHT URETEROSCOPY WITH HOLMIUM LASER LITHOTRIPSY; RIGHT STENT PLACEMENT (DIGITAL FLEXIBLE URETEROSCOPE);  Surgeon: Derrek Rivera MD;  Location:  OR     ORTHOPEDIC SURGERY      left ankle     RECESSION EYELID UPPER         Social History     Tobacco Use     Smoking status: Former Smoker     Packs/day: 1.50     Years: 40.00     Pack years: 60.00     Last attempt to quit: 1995     Years since quittin.7     Smokeless tobacco: Never Used   Substance Use Topics     Alcohol use: Yes     Comment: occ.     No family history on file.      Current Outpatient Medications   Medication Sig Dispense Refill     albuterol (PROAIR HFA/PROVENTIL HFA/VENTOLIN HFA) 108 (90 Base) MCG/ACT inhaler Inhale 2 puffs into the lungs every 6 hours as needed for shortness of breath / dyspnea or wheezing 9 g 0     allopurinol (ZYLOPRIM) 100 MG tablet Take 2 tablets (200 mg) by mouth daily 180 tablet 3     blood glucose (NO BRAND SPECIFIED) lancets standard Use to test blood sugar 1 time daily or as directed. 100 each 3     blood glucose (NO BRAND SPECIFIED) test strip Use to test blood sugar 1 time daily or as directed. 100 strip 3     blood glucose (ONE TOUCH DELICA) lancing device Use to test blood sugars one time daily or as directed. 1 each 0     blood glucose monitoring (NO BRAND SPECIFIED) meter device kit Use to test blood sugar 1 time daily or as directed. 1 kit 0     blood glucose monitoring (ONE TOUCH DELICA) lancets Use to test blood sugars 1 times daily or as directed. 1 Box 3     blood glucose monitoring (ONE TOUCH ULTRA) test strip Use to test blood sugars one time daily or as directed. 100 each 3      fluticasone-salmeterol (ADVAIR DISKUS) 250-50 MCG/DOSE inhaler INHALE ONE DOSE INTO THE LUNGS 2 TIMES DAILY 3 Inhaler 3     fluticasone-salmeterol (AIRDUO RESPICLICK) 55-14 MCG/ACT inhaler Inhale 1 puff into the lungs 2 times daily 1 Inhaler 11     HYDROcodone-acetaminophen (NORCO) 5-325 MG tablet Take 1-2 tablets by mouth every 4 hours as needed for moderate to severe pain 60 tablet 0     levothyroxine (SYNTHROID/LEVOTHROID) 112 MCG tablet Take 1 tablet (112 mcg) by mouth daily 90 tablet 3     lisinopril (ZESTRIL) 10 MG tablet Take 0.5 tablets (5 mg) by mouth daily 45 tablet 0     metFORMIN (GLUCOPHAGE-XR) 500 MG 24 hr tablet Take 3 tablets (1,500 mg) by mouth daily (with dinner) 270 tablet 3     MYRBETRIQ 50 MG 24 hr tablet        naloxone (NARCAN) 4 MG/0.1ML nasal spray Spray 1 spray (4 mg) into one nostril alternating nostrils once as needed for opioid reversal Every 2-3 minutes until patient responsive or EMS arrives 0.2 mL 0     omeprazole 20 MG tablet Take 20 mg by mouth 2 times daily       oxybutynin ER (DITROPAN-XL) 10 MG 24 hr tablet Take 1 tablet by mouth once daily 90 tablet 0     SUMAtriptan (IMITREX) 25 MG tablet Take 1 tablet (25 mg) by mouth at onset of headache for migraine May repeat in 2 hours. Max 8 tablets/24 hours. 30 tablet 1       OBJECTIVE:                                                    No vitals- telephone visit    Alert sounding female in no acute distress. Speaking in full sentences without apparent dyspnea or wheezing. Slight cough at times. Pleasant and interactive, thought content logical.     ASSESSMENT/PLAN:                                                        Macie was seen today for pharyngitis.    Diagnoses and all orders for this visit:    Suspected COVID-19 virus infection  -     Symptomatic COVID-19 Virus (Coronavirus) by PCR; Future      Concerning symptoms for COVID19 with exposure to people who have tested positive. Testing ordered. Reviewed recommendations for  quarantine, infection control, symptom management, and when to seek care. Recommend she seek emergency care for her chest tightness and more labored breathing and she declines, stating she feels this is manageable at home and that she has felt this way in the past with asthma. She has plans to go to a grandchild's birthday party this weekend- I have explicitly asked her not to attend this.    Patient needs assistance with ADLs: none identified today  Patient needs assistance with iADLs: none identified today    The following health maintenance items are reviewed in Epic and correct as of today:  Health Maintenance   Topic Date Due     COPD ACTION PLAN  1940     MIGRAINE ACTION PLAN  1940     HEPATITIS B IMMUNIZATION (1 of 3 - Risk 3-dose series) 10/14/1959     ZOSTER IMMUNIZATION (1 of 2) 10/14/1990     ADVANCE CARE PLANNING  09/01/2016     MEDICARE ANNUAL WELLNESS VISIT  01/20/2017     EYE EXAM  06/16/2018     INFLUENZA VACCINE (1) 09/01/2020     A1C  01/13/2021     MICROALBUMIN  01/15/2021     FALL RISK ASSESSMENT  01/15/2021     URINE DRUG SCREEN  01/17/2021     LIPID  01/20/2021     COLORECTAL CANCER SCREENING  04/05/2021     DIABETIC FOOT EXAM  06/15/2021     BMP  07/13/2021     TSH W/FREE T4 REFLEX  07/13/2021     DTAP/TDAP/TD IMMUNIZATION (2 - Td) 11/15/2022     DEXA  Completed     SPIROMETRY  Completed     PHQ-2  Completed     PNEUMOCOCCAL IMMUNIZATION 65+ LOW/MEDIUM RISK  Completed     IPV IMMUNIZATION  Aged Out     MENINGITIS IMMUNIZATION  Aged Out       This was a virtual video-visit conducted during COVID-19 outbreak in regulation with social distancing and quarantine recommendations of the CDC and [place] department of health and human services. A two way audio/video connection was used in real time with patient's consent.      WILEY Esquivel CNP  [facility]  Family Practice  [facility]  [phone]    For any issues my office # is [phone]

## 2020-10-02 DIAGNOSIS — Z20.822 SUSPECTED COVID-19 VIRUS INFECTION: ICD-10-CM

## 2020-10-02 PROCEDURE — U0003 INFECTIOUS AGENT DETECTION BY NUCLEIC ACID (DNA OR RNA); SEVERE ACUTE RESPIRATORY SYNDROME CORONAVIRUS 2 (SARS-COV-2) (CORONAVIRUS DISEASE [COVID-19]), AMPLIFIED PROBE TECHNIQUE, MAKING USE OF HIGH THROUGHPUT TECHNOLOGIES AS DESCRIBED BY CMS-2020-01-R: HCPCS | Performed by: NURSE PRACTITIONER

## 2020-10-03 LAB
SARS-COV-2 RNA SPEC QL NAA+PROBE: NOT DETECTED
SPECIMEN SOURCE: NORMAL

## 2020-10-05 ENCOUNTER — TELEPHONE (OUTPATIENT)
Dept: FAMILY MEDICINE | Facility: CLINIC | Age: 80
End: 2020-10-05

## 2020-10-09 ENCOUNTER — OFFICE VISIT (OUTPATIENT)
Dept: FAMILY MEDICINE | Facility: CLINIC | Age: 80
End: 2020-10-09

## 2020-10-09 VITALS
DIASTOLIC BLOOD PRESSURE: 86 MMHG | SYSTOLIC BLOOD PRESSURE: 166 MMHG | TEMPERATURE: 97.8 F | HEART RATE: 86 BPM | OXYGEN SATURATION: 89 %

## 2020-10-09 DIAGNOSIS — M54.2 CERVICAL PAIN: ICD-10-CM

## 2020-10-09 DIAGNOSIS — M54.50 LUMBAR PAIN: ICD-10-CM

## 2020-10-09 DIAGNOSIS — M48.062 SPINAL STENOSIS OF LUMBAR REGION WITH NEUROGENIC CLAUDICATION: ICD-10-CM

## 2020-10-09 DIAGNOSIS — Z23 NEED FOR INFLUENZA VACCINATION: Primary | ICD-10-CM

## 2020-10-09 DIAGNOSIS — J45.31 MILD PERSISTENT ASTHMA WITH EXACERBATION: ICD-10-CM

## 2020-10-09 PROCEDURE — 90662 IIV NO PRSV INCREASED AG IM: CPT | Performed by: FAMILY MEDICINE

## 2020-10-09 PROCEDURE — G0008 ADMIN INFLUENZA VIRUS VAC: HCPCS | Performed by: FAMILY MEDICINE

## 2020-10-09 PROCEDURE — 99214 OFFICE O/P EST MOD 30 MIN: CPT | Mod: 25 | Performed by: FAMILY MEDICINE

## 2020-10-09 RX ORDER — METHYLPREDNISOLONE 4 MG
TABLET, DOSE PACK ORAL
Qty: 21 TABLET | Refills: 0 | Status: SHIPPED | OUTPATIENT
Start: 2020-10-09 | End: 2021-07-29

## 2020-10-09 RX ORDER — HYDROCODONE BITARTRATE AND ACETAMINOPHEN 5; 325 MG/1; MG/1
1-2 TABLET ORAL EVERY 4 HOURS PRN
Qty: 60 TABLET | Refills: 0 | Status: SHIPPED | OUTPATIENT
Start: 2020-10-09 | End: 2021-02-23

## 2020-10-09 RX ORDER — AZITHROMYCIN 250 MG/1
TABLET, FILM COATED ORAL
Qty: 6 TABLET | Refills: 0 | Status: SHIPPED | OUTPATIENT
Start: 2020-10-09 | End: 2020-10-14

## 2020-10-09 NOTE — PROGRESS NOTES
Problem(s) Oriented visit    Having regular wheezing throughout the day.  Wheezing worse at no specific time,in cold or humid air, leaves, dander.  Also more pronounced with physical activity, especially in the extremes of temperatures or increased levels of exertion.  Feels occasional tightness/mild restriction in breathing.  Specific triggers include heat/humidity, cold air, increased levels of physical exertion, dust/air pollutants, allergens.     Chronic Pain Follow-Up    Where in your body do you have pain? Up and down the back  How has your pain affected your ability to work? Not applicable  Which of these pain treatments have you tried since your last clinic visit? Cold, Heat and Rest  How well are you sleeping? Fair  How has your mood been since your last visit? About the same  Have you had a significant life event? Other: covid stress  Other aggravating factors: none  Taking medication as directed? Yes    PHQ-9 SCORE 2/13/2014 3/4/2019   PHQ-9 Total Score 7 -   PHQ-9 Total Score - 0     MARLYS-7 SCORE 3/4/2019   Total Score 0     No flowsheet data found.  Encounter-Level CSA:    There are no encounter-level csa.     Patient-Level CSA:    Controlled Substance Agreement - Opioid - Scan on 1/20/2020 12:14 PM: CONTROLLED SUBSTANCE AGREEMENT 1/17/20       Have you had any exacerbations with this pain .Yes  Off and on going through 60 pills since last June  How many days per week do you miss taking your medication? 7  What makes it hard for you to take your medications?  just takes during exacerbations    Smoking:  no  Passive smoke exposure:  no  Alcohol:  no  Drugs:  no    How many days per week do you exercise enough to make your heart beat faster? 3 or less  How many minutes a day do you exercise enough to make your heart beat faster? 10 - 19    ROS:  General and Resp. completed and negative except as noted above     HISTORY:   reports current alcohol use.   reports that she quit smoking about 25 years ago.  She has a 60.00 pack-year smoking history. She has never used smokeless tobacco.    Past Medical History:   Diagnosis Date     Calculus of left ureter 2014     Cataract      Chronic low back pain      Complication of anesthesia      COPD with asthma (H)      Esophagitis, reflux 9/2012     Gastro-oesophageal reflux disease      Hydronephrosis, left      Hyperlipidaemia LDL goal < 100      Hypothyroid      Migraine headaches 9/1/2011     Moderate persistent asthma      Obesity (BMI 30-39.9)      Panic attacks      PONV (postoperative nausea and vomiting)      Renal stones 2016     Type 2 diabetes mellitus without complication (H)      Past Surgical History:   Procedure Laterality Date     ARTHROPLASTY HIP  3/13/2013    Procedure: ARTHROPLASTY HIP;  LEFT TOTAL HIP ARTHROPLASTY (BIOMET)^ ;  Surgeon: Anand Main MD;  Location:  OR     ARTHROPLASTY PATELLO-FEMORAL (KNEE)  2005ish    Left     CHOLECYSTECTOMY, OPEN  27 yo     COMBINED CYSTOSCOPY, INSERT STENT URETER(S)  8/5/2014    Procedure: COMBINED CYSTOSCOPY, INSERT STENT URETER(S);  Surgeon: Sam Arriaga MD;  Location:  OR     COMBINED CYSTOSCOPY, RETROGRADES, URETEROSCOPY, LASER HOLMIUM LITHOTRIPSY URETER(S), INSERT STENT Left 6/14/2016    Procedure: COMBINED CYSTOSCOPY, RETROGRADES, URETEROSCOPY, LASER HOLMIUM LITHOTRIPSY URETER(S), INSERT STENT;  Surgeon: Thomas Edmondson MD;  Location:  OR     COMBINED CYSTOSCOPY, RETROGRADES, URETEROSCOPY, LASER HOLMIUM LITHOTRIPSY URETER(S), INSERT STENT Left 3/28/2019    Procedure: CYSTOSCOPY, LEFT RETROGRADE PYLEOGRAM, LEFT URETEROSCOPY, HOLMIUM LASER LITHOTRIPSY, LEFT URETERAL STENT EXCHANGE;  Surgeon: Derrek Rivera MD;  Location:  OR     CYSTOSCOPY, RETROGRADES, INSERT STENT URETER(S), COMBINED Left 3/8/2019    Procedure: CYSTOSCOPY,LEFT RETROGRADE, LEFT STENT PLACEMENT;  Surgeon: Derrek Rivera MD;  Location:  OR     GENITOURINARY SURGERY       LAMINECT/DISCECTOMY, LUMBAR       Laminectomy/Discectomy Lumbar     LASER HOLMIUM LITHOTRIPSY URETER(S), INSERT STENT, COMBINED Left 8/20/2014    Procedure: COMBINED CYSTOSCOPY, URETEROSCOPY, LASER HOLMIUM LITHOTRIPSY URETER(S), INSERT STENT;  Surgeon: Sam Arriaga MD;  Location:  OR     LASER HOLMIUM LITHOTRIPSY URETER(S), INSERT STENT, COMBINED Left 5/2/2019    Procedure: CYSTOSCOPY, LEFT RETROGRADE, LEFT URETEROSCOPY, HOLMIUM LASER LITHOTRIPSY, STONE REMOVAL, LEFT STENT EXCHANGE;  Surgeon: Derrek Rivera MD;  Location:  OR     LASER HOLMIUM LITHOTRIPSY URETER(S), INSERT STENT, COMBINED Right 6/13/2019    Procedure: CYSTOSCOPY; RIGHT URETEROSCOPY WITH HOLMIUM LASER LITHOTRIPSY; RIGHT STENT PLACEMENT (DIGITAL FLEXIBLE URETEROSCOPE);  Surgeon: Derrek Rivera MD;  Location:  OR     ORTHOPEDIC SURGERY      left ankle     RECESSION EYELID UPPER         EXAM:  BP: 166/86   Pulse: 86    Temp: 97.8    Wt Readings from Last 2 Encounters:   07/13/20 86.6 kg (191 lb)   01/15/20 86.5 kg (190 lb 9.6 oz)       BMI= There is no height or weight on file to calculate BMI.    EXAM:  APPEARANCE: = Relaxed and in no distress  Conj/Eyelids = noninjected and lids and lashes are without inflammation  PERRLA/Irises = Pupils Round Reactive to Light and Irisis without inflammation  Ears/Nose = External structures and Nares have usual shape and form  Ear canals and TM's = Canals are not inflammed and have none or little wax and the drums are not injected and have a light reflex   Lips/Teeth/Gums = No lesions seen, good dentition, and gums seem healthy  Oropharynx = No leukoplakia, No injection to the tissues, Normal Uvula  Neck = No anterior or posterior adenopathy appreciated.  Resp effort = Calm regular breathing  Breath Sounds =  bilateral wheezing  Mood/Affect = Cooperative and interested      Assessment/Plan:  Macie was seen today for recheck.    Diagnoses and all orders for this visit:    Need for influenza vaccination  -     FLU VACCINE,  INCREASED ANTIGEN, PRESV FREE  -     ADMIN INFLUENZA VIRUS VAC    Mild persistent asthma with exacerbation  Discussed asthma in detail and discussed how their breathing symptoms and the pattern of the shortness of breath fits with asthma.   Discussed pathophysiology of asthma and treatments based on the degree of symptoms.   Discussed triggers for asthma in general, and those specific to the patient.  Also told them to anticipate potentially more intense coughing and shortness of breath with any URI (regardless of bacterial or viral etiology) and to be prepared to use the albuterol more often if needed and to return and see me for any such exacerbation.    I recommended having rescue inhaler available and using all throughout the year as needed, demonstrated proper MDI technique for them.  Emphasized the need for them to let me know if there are any changes for the worse in their breathing related to asthma, either acute or chronic and to seek emergency care if the breathing acutely worsens in a sever manner.      -     methylPREDNISolone (MEDROL DOSEPAK) 4 MG tablet therapy pack; Follow Package Directions  -     azithromycin (ZITHROMAX) 250 MG tablet; Take 2 tablets (500 mg) by mouth daily for 1 day, THEN 1 tablet (250 mg) daily for 4 days.    Cervical pain  -     HYDROcodone-acetaminophen (NORCO) 5-325 MG tablet; Take 1-2 tablets by mouth every 4 hours as needed for moderate to severe pain    Lumbar pain  -     HYDROcodone-acetaminophen (NORCO) 5-325 MG tablet; Take 1-2 tablets by mouth every 4 hours as needed for moderate to severe pain    Spinal stenosis of lumbar region with neurogenic claudication  -     HYDROcodone-acetaminophen (NORCO) 5-325 MG tablet; Take 1-2 tablets by mouth every 4 hours as needed for moderate to severe pain        COUNSELING:   reports that she quit smoking about 25 years ago. She has a 60.00 pack-year smoking history. She has never used smokeless tobacco.    Estimated body mass index  "is 31.54 kg/m  as calculated from the following:    Height as of 7/13/20: 1.657 m (5' 5.25\").    Weight as of 7/13/20: 86.6 kg (191 lb).       Appropriate preventive services were discussed with this patient, including applicable screening as appropriate for cardiovascular disease, diabetes, osteopenia/osteoporosis, and glaucoma.  As appropriate for age/gender, discussed screening for colorectal cancer, prostate cancer, breast cancer, and cervical cancer. Checklist reviewing preventive services available has been given to the patient.    Reviewed patients plan of care and provided an AVS. The Basic Care Plan (routine screening as documented in Health Maintenance) for Macie meets the Care Plan requirement. This Care Plan has been established and reviewed with the  Patient.      The following health maintenance items are reviewed in Epic and correct as of today:  Health Maintenance   Topic Date Due     COPD ACTION PLAN  1940     MIGRAINE ACTION PLAN  1940     HEPATITIS B IMMUNIZATION (1 of 3 - Risk 3-dose series) 10/14/1959     ZOSTER IMMUNIZATION (1 of 2) 10/14/1990     ADVANCE CARE PLANNING  09/01/2016     MEDICARE ANNUAL WELLNESS VISIT  01/20/2017     EYE EXAM  06/16/2018     A1C  01/13/2021     MICROALBUMIN  01/15/2021     FALL RISK ASSESSMENT  01/15/2021     URINE DRUG SCREEN  01/17/2021     LIPID  01/20/2021     COLORECTAL CANCER SCREENING  04/05/2021     DIABETIC FOOT EXAM  06/15/2021     BMP  07/13/2021     TSH W/FREE T4 REFLEX  07/13/2021     DTAP/TDAP/TD IMMUNIZATION (2 - Td) 11/15/2022     DEXA  Completed     SPIROMETRY  Completed     PHQ-2  Completed     INFLUENZA VACCINE  Completed     Pneumococcal Vaccine: 65+ Years  Completed     Pneumococcal Vaccine: Pediatrics (0 to 5 Years) and At-Risk Patients (6 to 64 Years)  Aged Out     IPV IMMUNIZATION  Aged Out     MENINGITIS IMMUNIZATION  Aged Out       Juve Bruner  [facility]  For any issues my office # is [phone]  "

## 2020-10-15 ENCOUNTER — TRANSFERRED RECORDS (OUTPATIENT)
Dept: FAMILY MEDICINE | Facility: CLINIC | Age: 80
End: 2020-10-15

## 2020-10-15 LAB — RETINOPATHY: NORMAL

## 2020-10-19 ENCOUNTER — TRANSFERRED RECORDS (OUTPATIENT)
Dept: FAMILY MEDICINE | Facility: CLINIC | Age: 80
End: 2020-10-19

## 2020-10-24 DIAGNOSIS — Z76.0 ENCOUNTER FOR MEDICATION REFILL: ICD-10-CM

## 2020-10-25 RX ORDER — OXYBUTYNIN CHLORIDE 10 MG/1
TABLET, EXTENDED RELEASE ORAL
Qty: 90 TABLET | Refills: 0 | Status: SHIPPED | OUTPATIENT
Start: 2020-10-25 | End: 2021-05-14

## 2020-12-12 NOTE — LETTER
July 15, 2020      Macie Jorge  [address] [place] [address]       Macie,     Your A1C is stable at 5.9, kidney function slightly worse- drink at least 2L water daily, avoid NSAIDs. Can keep current levothyroxine dose.     Resulted Orders   Basic Metabolic Panel (8) ([facility])   Result Value Ref Range    Glucose 84 65 - 99 mg/dL    Urea Nitrogen 23 8 - 27 mg/dL    Creatinine 1.57 (H) 0.57 - 1.00 mg/dL    eGFR If NonAfricn Am 31 (L) >59 mL/min/1.73    eGFR If Africn Am 36 (L) >59 mL/min/1.73    BUN/Creatinine Ratio 15 12 - 28    Sodium 140 134 - 144 mmol/L    Potassium 4.7 3.5 - 5.2 mmol/L    Chloride 103 96 - 106 mmol/L    Total CO2 23 20 - 29 mmol/L    Calcium 9.4 8.7 - 10.3 mg/dL    Narrative    Performed at:  01 - [facility] [address], [place], [place]  [id number]  : Riley Rachel MD, Phone:  [phone]   Hemoglobin A1C ([facility])   Result Value Ref Range    Hemoglobin A1C 5.9 (H) 4.8 - 5.6 %      Comment:               Prediabetes: 5.7 - 6.4           Diabetes: >6.4           Glycemic control for adults with diabetes: <7.0      Narrative    Performed at:  01 - [facility] [address], [place], [place]  [id number]  : Riley Rachel MD, Phone:  [phone]   TSH ([facility])   Result Value Ref Range    TSH 3.660 0.450 - 4.500 uIU/mL    Narrative    Performed at:  01 - [facility] [address], [place], [place]  [id number]  : Riley Rachel MD, Phone:  [phone]   Thyroxine (T4) Free  Direct  S ([facility])   Result Value Ref Range    T4 Free 1.39 0.82 - 1.77 ng/dL    Narrative    Performed at:  01 - [facility] [address], [place], [place]  [id number]  : Riley Rachel MD, Phone:  [phone]       If you have any questions or concerns, please call the clinic at the number listed above.       Sincerely,        WILEY Esquivel CNP                
None known

## 2021-01-30 ENCOUNTER — IMMUNIZATION (OUTPATIENT)
Dept: NURSING | Facility: CLINIC | Age: 81
End: 2021-01-30
Payer: COMMERCIAL

## 2021-01-30 PROCEDURE — 91300 PR COVID VAC PFIZER DIL RECON 30 MCG/0.3 ML IM: CPT

## 2021-01-30 PROCEDURE — 0001A PR COVID VAC PFIZER DIL RECON 30 MCG/0.3 ML IM: CPT

## 2021-02-20 ENCOUNTER — IMMUNIZATION (OUTPATIENT)
Dept: NURSING | Facility: CLINIC | Age: 81
End: 2021-02-20
Attending: INTERNAL MEDICINE
Payer: COMMERCIAL

## 2021-02-20 PROCEDURE — 0002A PR COVID VAC PFIZER DIL RECON 30 MCG/0.3 ML IM: CPT

## 2021-02-20 PROCEDURE — 91300 PR COVID VAC PFIZER DIL RECON 30 MCG/0.3 ML IM: CPT

## 2021-02-23 ENCOUNTER — OFFICE VISIT (OUTPATIENT)
Dept: FAMILY MEDICINE | Facility: CLINIC | Age: 81
End: 2021-02-23

## 2021-02-23 VITALS
RESPIRATION RATE: 16 BRPM | OXYGEN SATURATION: 98 % | DIASTOLIC BLOOD PRESSURE: 88 MMHG | SYSTOLIC BLOOD PRESSURE: 150 MMHG | TEMPERATURE: 98.5 F | HEIGHT: 66 IN | BODY MASS INDEX: 30.83 KG/M2 | HEART RATE: 76 BPM

## 2021-02-23 DIAGNOSIS — M48.062 SPINAL STENOSIS OF LUMBAR REGION WITH NEUROGENIC CLAUDICATION: ICD-10-CM

## 2021-02-23 DIAGNOSIS — N18.31 TYPE 2 DIABETES MELLITUS WITH STAGE 3A CHRONIC KIDNEY DISEASE, WITHOUT LONG-TERM CURRENT USE OF INSULIN (H): Primary | ICD-10-CM

## 2021-02-23 DIAGNOSIS — J44.89 COPD WITH ASTHMA (H): ICD-10-CM

## 2021-02-23 DIAGNOSIS — E11.22 TYPE 2 DIABETES MELLITUS WITH STAGE 3A CHRONIC KIDNEY DISEASE, WITHOUT LONG-TERM CURRENT USE OF INSULIN (H): Primary | ICD-10-CM

## 2021-02-23 DIAGNOSIS — M54.2 CERVICAL PAIN: ICD-10-CM

## 2021-02-23 DIAGNOSIS — M54.50 LUMBAR PAIN: ICD-10-CM

## 2021-02-23 PROCEDURE — 99214 OFFICE O/P EST MOD 30 MIN: CPT | Performed by: FAMILY MEDICINE

## 2021-02-23 PROCEDURE — 93050 ART PRESSURE WAVEFORM ANALYS: CPT | Performed by: FAMILY MEDICINE

## 2021-02-23 RX ORDER — HYDROCODONE BITARTRATE AND ACETAMINOPHEN 5; 325 MG/1; MG/1
1-2 TABLET ORAL EVERY 4 HOURS PRN
Qty: 60 TABLET | Refills: 0 | Status: SHIPPED | OUTPATIENT
Start: 2021-02-23 | End: 2021-04-26

## 2021-02-23 NOTE — PROGRESS NOTES
Problem(s) Oriented visit        SUBJECTIVE:                                                    Macie Jroge is a 80 year old female who presents to clinic today for the following health issues :      1. Type 2 diabetes mellitus with stage 3 chronic kidney disease, without long-term current use of insulin (H)    - [place] ART PRESS WAVEFORM ANALYS CENTRAL ART PRESSURE    2. Cervical pain  Chronic Pain Follow-Up    Where in your body do you have pain? Lower back is theworst\  How has your pain affected your ability to work? Not applicable  Which of these pain treatments have you tried since your last clinic visit? Acupuncture, Chiropractor, Cold, Heat and Physical Therapy  How well are you sleeping? Fair  How has your mood been since your last visit? About the same  Have you had a significant life event? No  Other aggravating factors: none  Taking medication as directed? Yes    PHQ-9 SCORE 2/13/2014 3/4/2019   PHQ-9 Total Score 7 -   PHQ-9 Total Score - 0     MARLYS-7 SCORE 3/4/2019   Total Score 0     No flowsheet data found.  Encounter-Level CSA:    There are no encounter-level csa.     Patient-Level CSA:    Controlled Substance Agreement - Opioid - Scan on 1/20/2020 12:14 PM: CONTROLLED SUBSTANCE AGREEMENT 1/17/20         - HYDROcodone-acetaminophen (NORCO) 5-325 MG tablet; Take 1-2 tablets by mouth every 4 hours as needed for moderate to severe pain  Dispense: 60 tablet; Refill: 0    3. Lumbar pain  See abpve  - Microalbumin (RMG)  - HYDROcodone-acetaminophen (NORCO) 5-325 MG tablet; Take 1-2 tablets by mouth every 4 hours as needed for moderate to severe pain  Dispense: 60 tablet; Refill: 0    4. Spinal stenosis of lumbar region with neurogenic claudication    - Microalbumin (RMG)  - HYDROcodone-acetaminophen (NORCO) 5-325 MG tablet; Take 1-2 tablets by mouth every 4 hours as needed for moderate to severe pain  Dispense: 60 tablet; Refill: 0    5. COPD with asthma (H)  COPD remains stable.  Has not had any  recent breathing troubles beyond usual baseline.  Has not any acute respiratory events.  Remains with intermittent cough, mild shortness of breath with overexertion as per usual.  Using medication as directed with reported side effects.         Problem list, Medication list, Allergies, and Medical/Social/Surgical histories reviewed in [facility] and updated as appropriate.   Additional history: as documented    ROS:  General and Resp. completed and negative except as noted above    Histories:   Patient Active Problem List   Diagnosis     Panic attacks     Advanced directives, counseling/discussion     Hyperlipidemia with target LDL less than 100     History of hip replacement, total     Health Care Home     Obesity     Urgency incontinence     CKD (chronic kidney disease) stage 3, GFR 30-59 ml/min     Nonintractable migraine, unspecified migraine type     Type 2 diabetes mellitus with stage 3 chronic kidney disease, without long-term current use of insulin (H)     COPD with asthma (H)     Acquired hypothyroidism     Altered mental status     History of colonic polyps     Chronic midline low back pain without sciatica     Physical deconditioning     Spinal stenosis of lumbar region with neurogenic claudication     Hypothyroidism due to acquired atrophy of thyroid     Kidney stone     Past Surgical History:   Procedure Laterality Date     ARTHROPLASTY HIP  3/13/2013    Procedure: ARTHROPLASTY HIP;  LEFT TOTAL HIP ARTHROPLASTY (BIOMET)^ ;  Surgeon: Anand Main MD;  Location:  OR     ARTHROPLASTY PATELLO-FEMORAL (KNEE)  2005ish    Left     CHOLECYSTECTOMY, OPEN  25 yo     COMBINED CYSTOSCOPY, INSERT STENT URETER(S)  8/5/2014    Procedure: COMBINED CYSTOSCOPY, INSERT STENT URETER(S);  Surgeon: Sam Arriaga MD;  Location:  OR     COMBINED CYSTOSCOPY, RETROGRADES, URETEROSCOPY, LASER HOLMIUM LITHOTRIPSY URETER(S), INSERT STENT Left 6/14/2016    Procedure: COMBINED CYSTOSCOPY, RETROGRADES, URETEROSCOPY, LASER HOLMIUM  LITHOTRIPSY URETER(S), INSERT STENT;  Surgeon: Thomas Edmondson MD;  Location:  OR     COMBINED CYSTOSCOPY, RETROGRADES, URETEROSCOPY, LASER HOLMIUM LITHOTRIPSY URETER(S), INSERT STENT Left 3/28/2019    Procedure: CYSTOSCOPY, LEFT RETROGRADE PYLEOGRAM, LEFT URETEROSCOPY, HOLMIUM LASER LITHOTRIPSY, LEFT URETERAL STENT EXCHANGE;  Surgeon: Derrek Rivera MD;  Location:  OR     CYSTOSCOPY, RETROGRADES, INSERT STENT URETER(S), COMBINED Left 3/8/2019    Procedure: CYSTOSCOPY,LEFT RETROGRADE, LEFT STENT PLACEMENT;  Surgeon: Derrek Rivera MD;  Location:  OR     GENITOURINARY SURGERY       LAMINECT/DISCECTOMY, LUMBAR      Laminectomy/Discectomy Lumbar     LASER HOLMIUM LITHOTRIPSY URETER(S), INSERT STENT, COMBINED Left 2014    Procedure: COMBINED CYSTOSCOPY, URETEROSCOPY, LASER HOLMIUM LITHOTRIPSY URETER(S), INSERT STENT;  Surgeon: Sam Arriaga MD;  Location:  OR     LASER HOLMIUM LITHOTRIPSY URETER(S), INSERT STENT, COMBINED Left 2019    Procedure: CYSTOSCOPY, LEFT RETROGRADE, LEFT URETEROSCOPY, HOLMIUM LASER LITHOTRIPSY, STONE REMOVAL, LEFT STENT EXCHANGE;  Surgeon: Derrek Rivera MD;  Location:  OR     LASER HOLMIUM LITHOTRIPSY URETER(S), INSERT STENT, COMBINED Right 2019    Procedure: CYSTOSCOPY; RIGHT URETEROSCOPY WITH HOLMIUM LASER LITHOTRIPSY; RIGHT STENT PLACEMENT (DIGITAL FLEXIBLE URETEROSCOPE);  Surgeon: Derrek Rivera MD;  Location:  OR     ORTHOPEDIC SURGERY      left ankle     RECESSION EYELID UPPER         Social History     Tobacco Use     Smoking status: Former Smoker     Packs/day: 1.50     Years: 40.00     Pack years: 60.00     Quit date: 1995     Years since quittin.1     Smokeless tobacco: Never Used   Substance Use Topics     Alcohol use: Yes     Comment: occ.     No family history on file.        OBJECTIVE:                                                    BP (!) 150/88   Pulse 76   Temp 98.5  F (36.9  C)   Resp 16   Ht 1.676 m  "(5' 6\")   SpO2 98%   BMI 30.83 kg/m    Body mass index is 30.83 kg/m .   APPEARANCE: = Relaxed and in no distress  Conj/Eyelids = noninjected and lids and lashes are without inflammation  PERRLA/Irises = Pupils Round Reactive to Light and Irisis without inflammation  Neck = No anterior or posterior adenopathy appreciated.  Thyroid = Not enlarged and no masses felt  Resp effort = Calm regular breathing  Breath Sounds = Good air movement with no rales or rhonchi in any lung fields  Heart Rate, Rhythm, & sounds (no Murm)  = Regular rate and rhythm with no S3, S4, or murmur appreciated.  Carotid Art's = Pulses full and equal and no bruits appreciated  Abdomen = Soft, nontender, no masses, & bowel sounds in all quadrants  Liver/Spleen = Normal span and no splenomegaly noted  Digits and Nails = FROM in all finger joints, no nail dystrophy  Ext (edema) = No pretibial edema noted or elsewhere  Musculsktl =  Strength and ROM of major joints are within normal limits  SKIN = absent significant rashes or lesions   Recent/Remote Memory = Alert and Oriented x 3  Mood/Affect = Cooperative and interested     ASSESSMENT/PLAN:                                                        Maice was seen today for refill request.    Diagnoses and all orders for this visit:    Type 2 diabetes mellitus with stage 3 chronic kidney disease, without long-term current use of insulin (H)  Return for follow up.  -     [place] ART PRESS WAVEFORM ANALYS CENTRAL ART PRESSURE    Cervical pain  -     HYDROcodone-acetaminophen (NORCO) 5-325 MG tablet; Take 1-2 tablets by mouth every 4 hours as needed for moderate to severe pain    Lumbar pain  Discussed side effects associated with narcotic pain meds including GI disturbances (such as diarrhea, nausea, vomiting, constipation, etc.), drowsiness, decreased cognition, narcotic withdrawal, and narcotic addiction/dependence.  Told the patient never to drink alcohol while taking this medication and not to drive " "or operate dangerous machinery while taking it.  Also discussed in specific detail my expectation regarding responsible use of narcotic and the implied \"contract\" with the patient that this type of medication will be used properly and exactly as instructed, and that any abnormal behaviors associated with the use of this medication will cause me to stop prescribing these medications at any time.      -     Microalbumin (RMG)  -     HYDROcodone-acetaminophen (NORCO) 5-325 MG tablet; Take 1-2 tablets by mouth every 4 hours as needed for moderate to severe pain    Spinal stenosis of lumbar region with neurogenic claudication  -     Microalbumin (RMG)  -     HYDROcodone-acetaminophen (NORCO) 5-325 MG tablet; Take 1-2 tablets by mouth every 4 hours as needed for moderate to severe pain    COPD with asthma (H)  Discussed general issues of COPD, including pathophysiology, ways it will affect the pt., when to seek help, reviewed the typical medications (how they are taken, how they help)        Work on weight loss  Regular exercise  There are no Patient Instructions on file for this visit.    The following health maintenance items are reviewed in Epic and correct as of today:  Health Maintenance   Topic Date Due     COPD ACTION PLAN  1940     MIGRAINE ACTION PLAN  1940     ZOSTER IMMUNIZATION (1 of 2) 10/14/1990     ADVANCE CARE PLANNING  09/01/2016     MEDICARE ANNUAL WELLNESS VISIT  01/20/2017     A1C  01/13/2021     MICROALBUMIN  01/15/2021     FALL RISK ASSESSMENT  01/15/2021     URINE DRUG SCREEN  01/17/2021     LIPID  01/20/2021     COLORECTAL CANCER SCREENING  04/05/2021     DIABETIC FOOT EXAM  06/15/2021     BMP  07/13/2021     TSH W/FREE T4 REFLEX  07/13/2021     EYE EXAM  10/15/2021     DTAP/TDAP/TD IMMUNIZATION (2 - Td) 11/15/2022     DEXA  05/07/2030     SPIROMETRY  Completed     PHQ-2  Completed     INFLUENZA VACCINE  Completed     Pneumococcal Vaccine: Pediatrics (0 to 5 Years) and At-Risk Patients " (6 to 64 Years)  Completed     Pneumococcal Vaccine: 65+ Years  Completed     COVID-19 Vaccine  Completed     IPV IMMUNIZATION  Aged Out     MENINGITIS IMMUNIZATION  Aged Out       Juve Brnuer MD  [facility]  [phone]    For any issues my office # is [phone]

## 2021-03-02 ENCOUNTER — TRANSFERRED RECORDS (OUTPATIENT)
Dept: FAMILY MEDICINE | Facility: CLINIC | Age: 81
End: 2021-03-02

## 2021-03-04 ENCOUNTER — HOSPITAL ENCOUNTER (OUTPATIENT)
Facility: CLINIC | Age: 81
End: 2021-03-04
Attending: ORTHOPAEDIC SURGERY | Admitting: ORTHOPAEDIC SURGERY
Payer: COMMERCIAL

## 2021-03-15 ENCOUNTER — TRANSFERRED RECORDS (OUTPATIENT)
Dept: FAMILY MEDICINE | Facility: CLINIC | Age: 81
End: 2021-03-15

## 2021-03-16 DIAGNOSIS — R80.9 MICROALBUMINURIA: ICD-10-CM

## 2021-03-16 DIAGNOSIS — E03.9 ACQUIRED HYPOTHYROIDISM: ICD-10-CM

## 2021-03-16 DIAGNOSIS — J41.0 SIMPLE CHRONIC BRONCHITIS (H): ICD-10-CM

## 2021-03-18 RX ORDER — LEVOTHYROXINE SODIUM 112 UG/1
112 TABLET ORAL DAILY
Qty: 90 TABLET | Refills: 1 | Status: SHIPPED | OUTPATIENT
Start: 2021-03-18 | End: 2021-08-16 | Stop reason: DRUGHIGH

## 2021-03-18 RX ORDER — FLUTICASONE PROPIONATE AND SALMETEROL 55; 14 UG/1; UG/1
1 POWDER, METERED RESPIRATORY (INHALATION) 2 TIMES DAILY
Qty: 180 EACH | Refills: 4 | Status: SHIPPED | OUTPATIENT
Start: 2021-03-18 | End: 2021-04-16 | Stop reason: ALTCHOICE

## 2021-03-18 RX ORDER — LISINOPRIL 10 MG/1
5 TABLET ORAL DAILY
Qty: 90 TABLET | Refills: 3 | Status: SHIPPED | OUTPATIENT
Start: 2021-03-18 | End: 2021-04-16

## 2021-03-22 DIAGNOSIS — I10 ESSENTIAL HYPERTENSION: Primary | ICD-10-CM

## 2021-03-22 RX ORDER — LISINOPRIL 5 MG/1
5 TABLET ORAL DAILY
Qty: 90 TABLET | Refills: 3 | Status: ON HOLD | OUTPATIENT
Start: 2021-03-22 | End: 2021-08-10

## 2021-03-22 NOTE — TELEPHONE ENCOUNTER
lisinopril---this was recently sent to pharmacy for 1 year, patient would like to not cut the tablet.  Asking for 5mg

## 2021-03-31 ENCOUNTER — MEDICAL CORRESPONDENCE (OUTPATIENT)
Dept: HEALTH INFORMATION MANAGEMENT | Facility: CLINIC | Age: 81
End: 2021-03-31

## 2021-04-08 DIAGNOSIS — N20.0 URIC ACID KIDNEY STONE: ICD-10-CM

## 2021-04-08 DIAGNOSIS — E79.0 ELEVATED URIC ACID IN BLOOD: ICD-10-CM

## 2021-04-08 RX ORDER — ALLOPURINOL 100 MG/1
TABLET ORAL
Qty: 180 TABLET | Refills: 0 | Status: SHIPPED | OUTPATIENT
Start: 2021-04-08 | End: 2021-08-02

## 2021-04-16 DIAGNOSIS — J44.89 COPD WITH ASTHMA (H): ICD-10-CM

## 2021-04-16 NOTE — TELEPHONE ENCOUNTER
Patient called clinic requesting prescription refill for Advair 250-50. Patient reports that prescription for Airduo was started 6/2020 as this was on insurance formulary. Patient reports that Airduo not effective in controlling COPD/asthma. She is requesting to return to Advair as she has taken this with success for years, refill entered and routed to PCP Dr. Bruner. Cherelle Mccullough

## 2021-04-26 ENCOUNTER — VIRTUAL VISIT (OUTPATIENT)
Dept: FAMILY MEDICINE | Facility: CLINIC | Age: 81
End: 2021-04-26

## 2021-04-26 DIAGNOSIS — M54.50 LUMBAR PAIN: ICD-10-CM

## 2021-04-26 DIAGNOSIS — J20.9 ACUTE BRONCHITIS WITH COEXISTING CONDITION REQUIRING PROPHYLACTIC TREATMENT: Primary | ICD-10-CM

## 2021-04-26 DIAGNOSIS — M54.2 CERVICAL PAIN: ICD-10-CM

## 2021-04-26 DIAGNOSIS — M48.062 SPINAL STENOSIS OF LUMBAR REGION WITH NEUROGENIC CLAUDICATION: ICD-10-CM

## 2021-04-26 PROCEDURE — 99214 OFFICE O/P EST MOD 30 MIN: CPT | Mod: GT | Performed by: FAMILY MEDICINE

## 2021-04-26 RX ORDER — AZITHROMYCIN 250 MG/1
TABLET, FILM COATED ORAL
Qty: 6 TABLET | Refills: 0 | Status: SHIPPED | OUTPATIENT
Start: 2021-04-26 | End: 2021-05-01

## 2021-04-26 RX ORDER — METHYLPREDNISOLONE 4 MG
TABLET, DOSE PACK ORAL
Qty: 21 TABLET | Refills: 0 | Status: SHIPPED | OUTPATIENT
Start: 2021-04-26 | End: 2021-08-02

## 2021-04-26 RX ORDER — LISINOPRIL 10 MG/1
TABLET ORAL
COMMUNITY
Start: 2021-03-18 | End: 2021-08-02

## 2021-04-26 RX ORDER — HYDROCODONE BITARTRATE AND ACETAMINOPHEN 5; 325 MG/1; MG/1
1-2 TABLET ORAL EVERY 4 HOURS PRN
Qty: 60 TABLET | Refills: 0 | Status: SHIPPED | OUTPATIENT
Start: 2021-04-26 | End: 2021-08-02

## 2021-04-26 RX ORDER — CEPHALEXIN 500 MG/1
CAPSULE ORAL
COMMUNITY
Start: 2021-04-20 | End: 2022-07-19

## 2021-04-26 NOTE — PROGRESS NOTES
"    Video Problem(s) Oriented visit      Video Start Time: 11:17 AM    The patient has been notified of following:     \"This video visit will be conducted via a call between you and your physician/provider. We have found that certain health care needs can be provided without the need for an in-person physical exam.  This service lets us provide the care you need with a video conversation.  If a prescription is necessary we can send it directly to your pharmacy.  If lab work is needed we can place an order for that and you can then stop by our lab to have the test done at a later time.    Video visits are billed at different rates depending on your insurance coverage.  Please reach out to your insurance provider with any questions.    If during the course of the call the physician/provider feels a video visit is not appropriate, you will not be charged for this service.\"    Patient has given verbal consent for Video visit? Yes    How would you like to obtain your AVS? MyChart    Will anyone else be joining your video visit? No  SUBJECTIVE:                                                    Macie Jorge is a 80 year old female who presents to clinic today for the following health issues :        Concern for COVID-19  About how many days ago did these symptoms start? 6  Is this your first visit for this illness? Yes  In the 14 days before your symptoms started, have you had close contact with someone with COVID-19 (Coronavirus)? No  Do you have a fever or chills? No  Are you having new or worsening difficulty breathing? Yes   Please describe what kind of difficulty you are having breathing:Mild dyspnea (able to do ADLs without difficulty, mild shortness of breath with activities such as climbing one or two flights of stairs or walking briskly)  Do you have new or worsening cough? Yes, I am coughing up mucus.  Have you had any new or unexplained body aches? No    Have you experienced any of the following NEW " symptoms?    Headache: No    Sore throat: No    Loss of taste or smell: No    Chest pain: No    Diarrhea: No    Rash: No  What treatments have you tried? Inhaler helps the wheezing  Who do you live with?   Are you, or a household member, a healthcare worker or a ? No  Do you live in a nursing home, group home, or shelter? No  Do you have a way to get food/medications if quarantined? Yes      Plus needs follow up on   1. Cervical pain  Known issue that she takes rare meds 3-5 times a week with exacerbations  2. Lumbar pain  same  3. Spinal stenosis of lumbar region with neurogenic claudication  Patient is followed by Juve Bruner MD for ongoing prescription of pain medication.  All refills should only be approved by this provider, or covering partner.    Medication(s): Norco.   Maximum quantity per month: 30  Clinic visit frequency required: Q 6  months   PDMP Review     None        Controlled substance agreement:    Pain Clinic evaluation in the past: No    Opioid Risk Tool Total Score(s):  OPIOID RISK TOOL TOTAL SCORE 4/26/2021   Total Score 0   Total Risk Score Category Low Risk (0-3)     Last [facility] website verification:  done on 4/26/21   [email or web address]         Problem list, Medication list, Allergies, and Medical/Social/Surgical histories reviewed in [email or web address] and updated as appropriate.   Additional history: as documented    ROS:  General and CV completed and negative except as noted above    Histories:   Patient Active Problem List   Diagnosis     Panic attacks     Advanced directives, counseling/discussion     Hyperlipidemia with target LDL less than 100     History of hip replacement, total     Health Care Home     Obesity     Urgency incontinence     CKD (chronic kidney disease) stage 3, GFR 30-59 ml/min     Nonintractable migraine, unspecified migraine type     Type 2 diabetes mellitus with stage 3 chronic kidney disease, without long-term current use of  insulin (H)     COPD with asthma (H)     Acquired hypothyroidism     Altered mental status     History of colonic polyps     Chronic midline low back pain without sciatica     Physical deconditioning     Spinal stenosis of lumbar region with neurogenic claudication     Hypothyroidism due to acquired atrophy of thyroid     Kidney stone     Past Surgical History:   Procedure Laterality Date     ARTHROPLASTY HIP  3/13/2013    Procedure: ARTHROPLASTY HIP;  LEFT TOTAL HIP ARTHROPLASTY (BIOMET)^ ;  Surgeon: Anand Main MD;  Location:  OR     ARTHROPLASTY PATELLO-FEMORAL (KNEE)  2005ish    Left     CHOLECYSTECTOMY, OPEN  27 yo     COMBINED CYSTOSCOPY, INSERT STENT URETER(S)  8/5/2014    Procedure: COMBINED CYSTOSCOPY, INSERT STENT URETER(S);  Surgeon: Sam Arriaga MD;  Location:  OR     COMBINED CYSTOSCOPY, RETROGRADES, URETEROSCOPY, LASER HOLMIUM LITHOTRIPSY URETER(S), INSERT STENT Left 6/14/2016    Procedure: COMBINED CYSTOSCOPY, RETROGRADES, URETEROSCOPY, LASER HOLMIUM LITHOTRIPSY URETER(S), INSERT STENT;  Surgeon: Thomas Edmondson MD;  Location:  OR     COMBINED CYSTOSCOPY, RETROGRADES, URETEROSCOPY, LASER HOLMIUM LITHOTRIPSY URETER(S), INSERT STENT Left 3/28/2019    Procedure: CYSTOSCOPY, LEFT RETROGRADE PYLEOGRAM, LEFT URETEROSCOPY, HOLMIUM LASER LITHOTRIPSY, LEFT URETERAL STENT EXCHANGE;  Surgeon: Derrek Rivera MD;  Location:  OR     CYSTOSCOPY, RETROGRADES, INSERT STENT URETER(S), COMBINED Left 3/8/2019    Procedure: CYSTOSCOPY,LEFT RETROGRADE, LEFT STENT PLACEMENT;  Surgeon: Derrek Rivera MD;  Location:  OR     GENITOURINARY SURGERY       LAMINECT/DISCECTOMY, LUMBAR      Laminectomy/Discectomy Lumbar     LASER HOLMIUM LITHOTRIPSY URETER(S), INSERT STENT, COMBINED Left 8/20/2014    Procedure: COMBINED CYSTOSCOPY, URETEROSCOPY, LASER HOLMIUM LITHOTRIPSY URETER(S), INSERT STENT;  Surgeon: Sam Arriaga MD;  Location:  OR     LASER HOLMIUM LITHOTRIPSY URETER(S), INSERT  STENT, COMBINED Left 2019    Procedure: CYSTOSCOPY, LEFT RETROGRADE, LEFT URETEROSCOPY, HOLMIUM LASER LITHOTRIPSY, STONE REMOVAL, LEFT STENT EXCHANGE;  Surgeon: Derrek Rivera MD;  Location:  OR     LASER HOLMIUM LITHOTRIPSY URETER(S), INSERT STENT, COMBINED Right 2019    Procedure: CYSTOSCOPY; RIGHT URETEROSCOPY WITH HOLMIUM LASER LITHOTRIPSY; RIGHT STENT PLACEMENT (DIGITAL FLEXIBLE URETEROSCOPE);  Surgeon: Derrek Rivera MD;  Location:  OR     ORTHOPEDIC SURGERY      left ankle     RECESSION EYELID UPPER         Social History     Tobacco Use     Smoking status: Former Smoker     Packs/day: 1.50     Years: 40.00     Pack years: 60.00     Quit date: 1995     Years since quittin.3     Smokeless tobacco: Never Used   Substance Use Topics     Alcohol use: Yes     Comment: occ.     No family history on file.        OBJECTIVE:                                                    There were no vitals taken for this visit.  There is no height or weight on file to calculate BMI.   APPEARANCE: = Relaxed and in no distress     ASSESSMENT/PLAN:                                                        Macie was seen today for cough and sinus problem.    Diagnoses and all orders for this visit:    Acute bronchitis with coexisting condition requiring prophylactic treatment  -     azithromycin (ZITHROMAX) 250 MG tablet; Take 2 tablets (500 mg) by mouth daily for 1 day, THEN 1 tablet (250 mg) daily for 4 days.  -     methylPREDNISolone (MEDROL DOSEPAK) 4 MG tablet therapy pack; Follow Package Directions    Cervical pain  -     HYDROcodone-acetaminophen (NORCO) 5-325 MG tablet; Take 1-2 tablets by mouth every 4 hours as needed for moderate to severe pain    Lumbar pain  -     HYDROcodone-acetaminophen (NORCO) 5-325 MG tablet; Take 1-2 tablets by mouth every 4 hours as needed for moderate to severe pain    Spinal stenosis of lumbar region with neurogenic claudication  -     HYDROcodone-acetaminophen  (NORCO) 5-325 MG tablet; Take 1-2 tablets by mouth every 4 hours as needed for moderate to severe pain        Work on weight loss  Regular exercise  There are no Patient Instructions on file for this visit.    The following health maintenance items are reviewed in Epic and correct as of today:  Health Maintenance   Topic Date Due     COPD ACTION PLAN  Never done     MIGRAINE ACTION PLAN  Never done     ZOSTER IMMUNIZATION (1 of 2) Never done     ADVANCE CARE PLANNING  09/01/2016     MEDICARE ANNUAL WELLNESS VISIT  01/20/2017     A1C  01/13/2021     MICROALBUMIN  01/15/2021     FALL RISK ASSESSMENT  01/15/2021     URINE DRUG SCREEN  01/17/2021     LIPID  01/20/2021     COLORECTAL CANCER SCREENING  04/05/2021     DIABETIC FOOT EXAM  06/15/2021     BMP  07/13/2021     TSH W/FREE T4 REFLEX  07/13/2021     EYE EXAM  10/15/2021     DTAP/TDAP/TD IMMUNIZATION (2 - Td) 11/15/2022     DEXA  05/07/2030     SPIROMETRY  Completed     PHQ-2  Completed     INFLUENZA VACCINE  Completed     Pneumococcal Vaccine: Pediatrics (0 to 5 Years) and At-Risk Patients (6 to 64 Years)  Completed     Pneumococcal Vaccine: 65+ Years  Completed     COVID-19 Vaccine  Completed     IPV IMMUNIZATION  Aged Out     MENINGITIS IMMUNIZATION  Aged Out       Video-Visit Details    This visit is being conducted as a virtual visit due to the emphasis on mitigation of the COVID-19 virus pandemic.   Type of service:  Video Visit    Originating Location (pt. Location): Home    Distant Location (provider location):  [facility]     Platform used for Video Visit: Unable to connect with video, audio only available    Juev Bruner MD  [facility]  Family Practice  [facility]  [phone]    Video End Time:11:22 AM  For any issues my office # is [phone]

## 2021-05-01 DIAGNOSIS — R05.9 COUGH: Primary | ICD-10-CM

## 2021-05-01 RX ORDER — GUAIFENESIN/DEXTROMETHORPHAN 100-10MG/5
5 SYRUP ORAL EVERY 4 HOURS PRN
Qty: 120 ML | Refills: 1 | Status: SHIPPED | OUTPATIENT
Start: 2021-05-01 | End: 2021-08-02

## 2021-05-01 RX ORDER — BENZONATATE 100 MG/1
100 CAPSULE ORAL 3 TIMES DAILY PRN
Qty: 30 CAPSULE | Refills: 0 | Status: SHIPPED | OUTPATIENT
Start: 2021-05-01 | End: 2021-08-02

## 2021-05-14 DIAGNOSIS — Z76.0 ENCOUNTER FOR MEDICATION REFILL: ICD-10-CM

## 2021-05-14 DIAGNOSIS — N20.0 URIC ACID KIDNEY STONE: Primary | ICD-10-CM

## 2021-05-14 RX ORDER — OXYBUTYNIN CHLORIDE 10 MG/1
TABLET, EXTENDED RELEASE ORAL
Qty: 90 TABLET | Refills: 0 | Status: SHIPPED | OUTPATIENT
Start: 2021-05-14 | End: 2021-08-17

## 2021-05-14 NOTE — TELEPHONE ENCOUNTER
oxybutynin ER (DITROPAN-XL) 10 MG 24 hr    LOV - 4/26/21 not related    Last labs - last urine checked in 2019

## 2021-07-26 DIAGNOSIS — Z11.59 ENCOUNTER FOR SCREENING FOR OTHER VIRAL DISEASES: ICD-10-CM

## 2021-07-28 ENCOUNTER — TRANSFERRED RECORDS (OUTPATIENT)
Dept: FAMILY MEDICINE | Facility: CLINIC | Age: 81
End: 2021-07-28

## 2021-07-29 NOTE — PROGRESS NOTES
[facility] [address] [place] [address]  Phone: [phone]  Fax: [phone]  Primary Provider: Juve Bruner  Pre-op Performing Provider: GHISLAINE MORFIN    PREOPERATIVE EVALUATION:  Today's date: 8/2/2021    Macie Jorge is a 80 year old female who presents for a preoperative evaluation.    Surgical Information:  Surgery/Procedure: Right total knee replacement   Surgery Location: [facility]  Surgeon: Dr Anand Main  Surgery Date: 8/9/2021  Time of Surgery: 1000  Where patient plans to recover: At home with family  Fax number for surgical facility: Note does not need to be faxed, will be available electronically in Epic.    Type of Anesthesia Anticipated: to be determined    Preoperative Questionnaire:   No - Have you ever had a heart attack or stroke?  No - Have you ever had surgery on your heart or blood vessels, such as a stent, coronary (heart) bypass, or surgery on an artery in the head, neck, heart, or legs?  No - Do you have chest pain when you are physically active?  No - Do you have a history of heart failure?  No - Do you currently have a cold, bronchitis, or symptoms of other respiratory (head and chest) infections?  YES - Do you have a cough, shortness of breath, or wheezing?  No - Do you or anyone in your family have a history of blood clots?  No - Do you or anyone in your family have a serious bleeding problem, such as long-lasting bleeding after surgeries or cuts?  YES - Have you ever had anemia or been told to take iron pills?  No - Have you had any abnormal blood loss such as black, tarry or bloody stools, or abnormal vaginal bleeding?  No - Have you ever had a blood transfusion?  Yes - Are you willing to have a blood transfusion if it is medically needed before, during, or after your surgery?  No - Have you or anyone in your family ever had problems with anesthesia (sedation for surgery)?  No - Do you have sleep apnea, excessive  snoring, or daytime drowsiness?   No - Do you have any artifical heart valves or other implanted medical devices, such as a pacemaker, defibrillator, or continuous glucose monitor?  YES, left knee replacement years ago. - Do you have any artifical joints?  No - Are you allergic to latex?  No - Is there any chance that you may be pregnant?      Assessment & Plan     The proposed surgical procedure is considered INTERMEDIATE risk.    Preop general physical exam  Cleared for upcoming right knee surgery    Type 2 diabetes mellitus with stage 3b chronic kidney disease, without long-term current use of insulin (H)  Well controlled. Labs ordered. Metformin 1000 mg daily. No changes to treatment plan  - Hemoglobin A1C ([facility])  - Basic Metabolic Panel (8) ([facility])  - FOOT EXAM  - EKG 12-lead complete w/read - Clinics  - metFORMIN (GLUCOPHAGE-XR) 500 MG 24 hr tablet  Dispense: 180 tablet; Refill: 3  - Microalbumin ([facility])    COPD with asthma (H)  Well controlled with current prescription for Advair controller inhaler.   - COPD ACTION PLAN    Cervical pain  Spinal stenosis of lumbar region with neurogenic claudication  Chronic pain. New prescription for Cymbalta 30 mg BID. Norco refilled - recommend using sparingly for severe pain. Safety precautions discussed  - DULoxetine (CYMBALTA) 30 MG capsule  Dispense: 180 capsule; Refill: 1  - HYDROcodone-acetaminophen (NORCO) 5-325 MG tablet  Dispense: 30 tablet; Refill: 0    Stage 3b chronic kidney disease  Stable - does not take NSAIDs. Labs done today  - Basic Metabolic Panel (8) ([facility])  - EKG 12-lead complete w/read - Clinics    Hypothyroidism due to acquired atrophy of thyroid  Due for labs  - TSH ([facility])    Hyperlipidemia with target LDL less than 100  Due for fasting lipid panel  - Lipid Panel ([facility])    Uric acid kidney stone  Due for uric acid check.  - allopurinol (ZYLOPRIM) 100 MG tablet  Dispense: 90 tablet; Refill: 3    Class 1 obesity due to excess calories with  serious comorbidity and body mass index (BMI) of 31.0 to 31.9 in adult  Diet & exercise discussed  - CBC with Diff/Plt ([facility])    Advanced care planning/counseling discussion       Risks and Recommendations:  The patient has the following additional risks and recommendations for perioperative complications:  Diabetes:  - Patient is not on insulin therapy: regular NPO guidelines can be followed.   Social and Substance:    - Patient is taking medications for chronic pain    Medication Instructions:   - ACE/ARB: HOLD day of surgery.    - metformin: HOLD day of surgery.   - SSRIs, SNRIs, TCAs, Antipsychotics: Continue without modification.    - LABA, inhaled corticosteroid, long-acting anticholinergics: Continue without modification.   - rescue Inhaler: Continue PRN. Bring to hospital on the day of surgery.    RECOMMENDATION:  APPROVAL GIVEN to proceed with proposed procedure, without further diagnostic evaluation.    Review of external notes as documented above     Subjective     HPI related to upcoming procedure: Having right knee replacement. Has been getting injections to help with pain and mobility. Wants to get it replaced before she gets too old. History of left knee replacement but had problems with pain afterwards.    Health Care Directive:  Patient does not have a Health Care Directive or Living Will: Discussed advance care planning with patient; however, patient declined at this time.    Preoperative Review of :   reviewed - controlled substances reflected in medication list.  PDMP Review       Value Time User    State PDMP site checked  Yes 8/2/2021 10:36 AM Haleigh Smallwood APRN CNP          Status of Chronic Conditions:  COPD - Patient has a longstanding history of moderate-severe COPD . Patient has been doing well overall noting NO SYMPTOMS and continues on medication regimen consisting of Advair 1 puff BID, Albuterol inhaler prn without adverse reactions or side effects.    DIABETES - Patient has  a longstanding history of DiabetesType Type II . Patient is being treated with oral agents and denies significant side effects. Control has been good. Complicating factors include but are not limited to: hypertension and chronic kidney disease.     HYPERTENSION - Patient has longstanding history of HTN , currently denies any symptoms referable to elevated blood pressure. Specifically denies chest pain, palpitations, dyspnea, orthopnea, PND or peripheral edema. Blood pressure readings have been in normal range. Current medication regimen is as listed below. Patient denies any side effects of medication.   BP Readings from Last 3 Encounters:   08/02/21 121/63   02/23/21 (!) 150/88   10/09/20 (!) 166/86     HYPOTHYROIDISM - Patient has a longstanding history of chronic Hypothyroidism. Patient has been doing well, noting no tremor, insomnia, hair loss or changes in skin texture. Continues to take medications as directed, without adverse reactions or side effects.    RENAL INSUFFICIENCY - Patient has a longstanding history of moderate-severe chronic renal insufficiency.  Creatinine   Date Value Ref Range Status   07/13/2020 1.57 (H) 0.57 - 1.00 mg/dL Final   Creatinine done today - in process    Chronic neck and low back pain - Tries to not take Norco - works well for 2 hours but makes her very tired. Sometimes takes 2 tabs per day if more active but then some days does not take any.  Went to [facility] - scoliosis thought to be due to having polio as a child. Can't walk far, go shopping. Was going to have surgery but cancelled when she read about potential side effects.    Review of Systems  Constitutional, neuro, ENT, endocrine, pulmonary, cardiac, gastrointestinal, genitourinary, musculoskeletal, integument and psychiatric systems are negative, except as otherwise noted.    Patient Active Problem List    Diagnosis Date Noted     Chronic midline low back pain without sciatica 02/05/2019     Priority: Medium      Physical deconditioning 02/05/2019     Priority: Medium     Spinal stenosis of lumbar region with neurogenic claudication 02/05/2019     Priority: Medium     Hypothyroidism due to acquired atrophy of thyroid 02/05/2019     Priority: Medium     History of colonic polyps 02/27/2018     Priority: Medium     Altered mental status 12/01/2017     Priority: Medium     COPD with asthma (H)      Priority: Medium      followed with pulmonary DR LENNOX 60-pack-year history of smoking       Type 2 diabetes mellitus with stage 3 chronic kidney disease, without long-term current use of insulin (H)      Priority: Medium     Nonintractable migraine, unspecified migraine type 01/20/2016     Priority: Medium     Urgency incontinence 04/02/2014     Priority: Medium     Stage 3b chronic kidney disease 04/02/2014     Priority: Medium     Class 1 obesity due to excess calories with serious comorbidity and body mass index (BMI) of 31.0 to 31.9 in adult 03/05/2014     Priority: Medium     Health Care Home 03/04/2014     Priority: Medium     State Tier Level:  Tier 2         History of hip replacement, total 03/13/2013     Priority: Medium     Hyperlipidemia with target LDL less than 100      Priority: Medium     Diagnosis updated by automated process. Provider to review and confirm.       Advanced directives, counseling/discussion 09/01/2011     Priority: Medium     As per reasonable care for Seniors, wants in the Short term aggressive care.   No desire for long term prolongation of life through artificial means if no hope to bring back to a reasonable status.        Panic attacks      Priority: Medium      Past Medical History:   Diagnosis Date     Calculus of left ureter 2014    dr Arriaga - ureteroscopy and lithitripsy     Cataract      Chronic low back pain     Dr Villagran at [facility] in 2015- not surgical      Complication of anesthesia      COPD with asthma (H)      followed with pulmonary DR LENNOX 60-pack-year history of smoking      Esophagitis, reflux 9/2012    SOME EOSINOPHILIA NO BARRETS     Gastro-oesophageal reflux disease      has seen GI     Hydronephrosis, left     chronic , in 2017 Dr Arriaga plan was observaton     Hyperlipidaemia LDL goal < 100      Hypothyroid      Kidney stone 3/28/2019     Migraine headaches 9/1/2011     Moderate persistent asthma      lifelong, saw Pulm 2013     Obesity (BMI 30-39.9)      Panic attacks      PONV (postoperative nausea and vomiting)      Renal stones 2016    kidney stones, multiple small stones high risk recurrent ureteral stones. , Dr Arriaga in 2016 rec 24 hour urine.     Type 2 diabetes mellitus without complication (H)      Past Surgical History:   Procedure Laterality Date     ARTHROPLASTY HIP  3/13/2013    Procedure: ARTHROPLASTY HIP;  LEFT TOTAL HIP ARTHROPLASTY (BIOMET)^ ;  Surgeon: Anand Main MD;  Location:  OR     ARTHROPLASTY PATELLO-FEMORAL (KNEE)  2005ish    Left     CHOLECYSTECTOMY, OPEN  25 yo     COMBINED CYSTOSCOPY, INSERT STENT URETER(S)  8/5/2014    Procedure: COMBINED CYSTOSCOPY, INSERT STENT URETER(S);  Surgeon: Sam Arriaga MD;  Location:  OR     COMBINED CYSTOSCOPY, RETROGRADES, URETEROSCOPY, LASER HOLMIUM LITHOTRIPSY URETER(S), INSERT STENT Left 6/14/2016    Procedure: COMBINED CYSTOSCOPY, RETROGRADES, URETEROSCOPY, LASER HOLMIUM LITHOTRIPSY URETER(S), INSERT STENT;  Surgeon: Thomas Edmondson MD;  Location:  OR     COMBINED CYSTOSCOPY, RETROGRADES, URETEROSCOPY, LASER HOLMIUM LITHOTRIPSY URETER(S), INSERT STENT Left 3/28/2019    Procedure: CYSTOSCOPY, LEFT RETROGRADE PYLEOGRAM, LEFT URETEROSCOPY, HOLMIUM LASER LITHOTRIPSY, LEFT URETERAL STENT EXCHANGE;  Surgeon: Derrek Rivera MD;  Location:  OR     CYSTOSCOPY, RETROGRADES, INSERT STENT URETER(S), COMBINED Left 3/8/2019    Procedure: CYSTOSCOPY,LEFT RETROGRADE, LEFT STENT PLACEMENT;  Surgeon: Derrek Rivera MD;  Location:  OR     GENITOURINARY SURGERY       LAMINECT/DISCECTOMY, LUMBAR       Laminectomy/Discectomy Lumbar     LASER HOLMIUM LITHOTRIPSY URETER(S), INSERT STENT, COMBINED Left 8/20/2014    Procedure: COMBINED CYSTOSCOPY, URETEROSCOPY, LASER HOLMIUM LITHOTRIPSY URETER(S), INSERT STENT;  Surgeon: Sam Arriaga MD;  Location:  OR     LASER HOLMIUM LITHOTRIPSY URETER(S), INSERT STENT, COMBINED Left 5/2/2019    Procedure: CYSTOSCOPY, LEFT RETROGRADE, LEFT URETEROSCOPY, HOLMIUM LASER LITHOTRIPSY, STONE REMOVAL, LEFT STENT EXCHANGE;  Surgeon: Derrek Rivera MD;  Location:  OR     LASER HOLMIUM LITHOTRIPSY URETER(S), INSERT STENT, COMBINED Right 6/13/2019    Procedure: CYSTOSCOPY; RIGHT URETEROSCOPY WITH HOLMIUM LASER LITHOTRIPSY; RIGHT STENT PLACEMENT (DIGITAL FLEXIBLE URETEROSCOPE);  Surgeon: Derrek Rivera MD;  Location:  OR     ORTHOPEDIC SURGERY      left ankle     RECESSION EYELID UPPER       Current Outpatient Medications   Medication Sig Dispense Refill     albuterol (PROAIR HFA/PROVENTIL HFA/VENTOLIN HFA) 108 (90 Base) MCG/ACT inhaler Inhale 2 puffs into the lungs every 6 hours as needed for shortness of breath / dyspnea or wheezing 9 g 0     allopurinol (ZYLOPRIM) 100 MG tablet Take 1 tablet (100 mg) by mouth daily 90 tablet 3     blood glucose (NO BRAND SPECIFIED) lancets standard Use to test blood sugar 1 time daily or as directed. 100 each 3     blood glucose (NO BRAND SPECIFIED) test strip Use to test blood sugar 1 time daily or as directed. 100 strip 3     blood glucose monitoring (NO BRAND SPECIFIED) meter device kit Use to test blood sugar 1 time daily or as directed. 1 kit 0     cephALEXin (KEFLEX) 500 MG capsule TAKE 2 CAPSULES BY MOUTH ONE HOUR BEFORE DENTAL APPOINTMENT AND TAKE 2 CAPSULES BY MOUTH 8 HOURS AFTER DENTIST       DULoxetine (CYMBALTA) 30 MG capsule Take 1 capsule (30 mg) by mouth 2 times daily 180 capsule 1     fluticasone-salmeterol (ADVAIR) 250-50 MCG/DOSE inhaler Inhale 1 puff into the lungs every 12 hours 180 each 2      "HYDROcodone-acetaminophen (NORCO) 5-325 MG tablet Take 1-2 tablets by mouth every 4 hours as needed for moderate to severe pain 30 tablet 0     levothyroxine (SYNTHROID/LEVOTHROID) 112 MCG tablet Take 1 tablet (112 mcg) by mouth daily 90 tablet 1     lisinopril (ZESTRIL) 5 MG tablet Take 1 tablet (5 mg) by mouth daily 90 tablet 3     metFORMIN (GLUCOPHAGE-XR) 500 MG 24 hr tablet Take 2 tablets (1,000 mg) by mouth daily (with dinner) 180 tablet 3     naloxone (NARCAN) 4 MG/0.1ML nasal spray Spray 1 spray (4 mg) into one nostril alternating nostrils once as needed for opioid reversal Every 2-3 minutes until patient responsive or EMS arrives 0.2 mL 0     oxybutynin ER (DITROPAN-XL) 10 MG 24 hr tablet Take 1 tablet by mouth once daily 90 tablet 0     SUMAtriptan (IMITREX) 25 MG tablet Take 1 tablet (25 mg) by mouth at onset of headache for migraine May repeat in 2 hours. Max 8 tablets/24 hours. 30 tablet 1       Allergies   Allergen Reactions     Morphine Nausea     Severe vomiting     Baclofen GI Disturbance     Imitrex [Sumatriptan] Nausea     Tetracycline      Sores in mouth        Social History     Tobacco Use     Smoking status: Former Smoker     Packs/day: 1.50     Years: 40.00     Pack years: 60.00     Quit date: 1995     Years since quittin.6     Smokeless tobacco: Never Used   Substance Use Topics     Alcohol use: Yes     Comment: occ.     History reviewed. No pertinent family history.  History   Drug Use No         Objective     /63   Pulse 77   Temp 98.5  F (36.9  C)   Ht 1.676 m (5' 6\")   Wt 87.9 kg (193 lb 12.8 oz)   SpO2 97%   BMI 31.28 kg/m      Physical Exam    GENERAL APPEARANCE: healthy, alert and no distress     EYES: Eyes grossly normal to inspection     HENT: nose and mouth without ulcers or lesions, oral mucous membranes moist and oropharynx clear     NECK: no adenopathy, no asymmetry, masses, or scars and thyroid normal to palpation     RESP: lungs clear to auscultation - no " rales, rhonchi or wheezes     CV: regular rates and rhythm, normal S1 S2, no S3 or S4, peripheral pulses strong and grade 3/6 systolic murmur     ABDOMEN:  soft, nontender, no HSM or masses and bowel sounds normal     MS: extremities normal- no gross deformities noted, no evidence of inflammation in joints, FROM in all extremities.     SKIN: no suspicious lesions or rashes     NEURO: Normal strength and tone, sensory exam grossly normal, mentation intact and speech normal     PSYCH: normal affect & mood     LYMPHATICS: No cervical adenopathy    Recent Labs   Lab Test 07/13/20  1141 07/13/20  1140 01/20/20  1110 01/20/20  0915 09/03/19  1345   HGB  --   --   --  11.6* 11.5*   PLT  --   --   --  180 195   NA  --  140 139  --   --    POTASSIUM  --  4.7 4.3  --   --    CR  --  1.57* 1.33*  --   --    A1C 5.9*  --  5.9*  --   --         Diagnostics:  Recent Results (from the past 24 hour(s))   CBC with Diff/Plt ([facility])    Collection Time: 08/02/21 11:02 AM   Result Value Ref Range    WBC x10/cmm 4.5 3.8 - 11.0 x10/cmm    % Lymphocytes 17.9 (A) 20.5 - 51.1 %    % Monocytes 5.8 1.7 - 9.3 %    % Granulocytes 76.3 (A) 42.2 - 75.2 %    RBC x10/cmm 3.57 (A) 3.7 - 5.2 x10/cmm    Hemoglobin 10.9 (A) 11.8 - 15.5 g/dl    Hematocrit 30.9 (A) 35 - 46 %    MCV 86.4 80 - 100 fL    MCH 30.6 27.0 - 31.0 pg    MCHC 35.4 33.0 - 37.0 g/dL    Platelet Count 183 140 - 450 K/uL   Hemoglobin A1C ([facility])    Collection Time: 08/02/21 11:03 AM   Result Value Ref Range    Hemoglobin A1C 5.8 4.0 - 6.0 %      EKG: appears normal, NSR, normal axis, normal intervals, no acute ST/T changes c/w ischemia, no LVH by voltage criteria, unchanged from previous tracings    Revised Cardiac Risk Index (RCRI):  The patient has the following serious cardiovascular risks for perioperative complications:   - No serious cardiac risks = 0 points     RCRI Interpretation: 0 points: Class I (very low risk - 0.4% complication rate)           Signed Electronically by:  WILEY Sunshine CNP  Copy of this evaluation report is provided to requesting physician.

## 2021-07-29 NOTE — PATIENT INSTRUCTIONS
Patient Instructions   - Nothing to eat/drink starting midnight the night before, unless surgical team tells you otherwise     - Avoid ibuprofen, naproxen (aleve), aspirin, fish oil, vitamin e 1 week prior to surgery     - If you have pain - ok to take Acetaminophen (Tylenol) 650 mg every 4-6 hours as needed.     - If you fall ill prior to surgery - (ie - fever, chills, nausea, vomiting or diarrhea, cough, etc) please let me and your surgeon know asap   Preparing for Your Surgery  Getting started  A nurse will call you to review your health history and instructions. They will give you an arrival time based on your scheduled surgery time.  Please be ready to share the following:    Your doctor's clinic name and phone number    Your medical, surgical and anesthesia history    A list of allergies and sensitivities    A list of medicines, including herbal treatments and over-the-counter drugs    Whether the patient has a legal guardian (ask how to send us the papers in advance)  If you have a child who's having surgery, please ask for a copy of Preparing for Your Child's Surgery.    Preparing for surgery    Within 30 days of surgery: Have a pre-op exam (sometimes called an H&P, or History and Physical). This can be done at a clinic or pre-operative center.  ? If you're having a , you may not need this exam. Talk to your care team    At your pre-op exam, talk to your care team about all medicines you take. If you need to stop any medicines before surgery, ask when to start taking them again.  ? We do this for your safety. Many medicines can make you bleed too much during surgery. Some change how well surgery (anesthesia) drugs work.    Call your insurance company to let them know you're having surgery. (If you don't have insurance, call [phone].)    Call your clinic if there's any change in your health. This includes signs of a cold or flu (sore throat, runny nose, cough, rash, fever). It also includes a  scrape or scratch near the surgery site.    If you have questions on the day of surgery, call your hospital or surgery center.  Eating and drinking guidelines  For your safety: Unless your surgeon tells you otherwise, follow the guidelines below.    Eat and drink as usual until 8 hours before surgery. After that, no food or milk.    Drink clear liquids until 2 hours before surgery. These are liquids you can see through, like water, Gatorade and Propel Water. You may also have black coffee and tea (no cream or milk).    Nothing by mouth within 2 hours of surgery. This includes gum, candy and breath mints.    If you drink, stop drinking alcohol the night before surgery.    If your care team tells you to take medicine on the morning of surgery, it's okay to take it with a sip of water.  Preventing infection    Shower or bathe the night before and morning of your surgery. Follow the instructions your clinic gave you. (If no instructions, use regular soap.)    Don't shave or clip hair near your surgery site. We'll remove the hair if needed.    Don't smoke or vape the morning of surgery. You may chew nicotine gum up to 2 hours before surgery. A nicotine patch is okay.  ? Note: Some surgeries require you to completely quit smoking and nicotine. Check with your surgeon.    Your care team will make every effort to keep you safe from infection. We will:  ? Clean our hands often with soap and water (or an alcohol-based hand rub).  ? Clean the skin at your surgery site with a special soap that kills germs.  ? Give you a special gown to keep you warm. (Cold raises the risk of infection.)  ? Wear special hair covers, masks, gowns and gloves during surgery.  ? Give antibiotic medicine, if prescribed. Not all surgeries need antibiotics.  What to bring on the day of surgery    Photo ID and insurance card    Copy of your health care directive, if you have one    Glasses and hearing aides (bring cases)  ? You can't wear contacts  during surgery    Inhaler and eye drops, if you use them (tell us about these when you arrive)    CPAP machine or breathing device, if you use them    A few personal items, if spending the night    If you have . . .  ? A pacemaker or ICD (cardiac defibrillator): Bring the ID card.  ? An implanted stimulator: Bring the remote control.  ? A legal guardian: Bring a copy of the certified (court-stamped) guardianship papers.  Please remove any jewelry, including body piercings. Leave jewelry and other valuables at home.  If you're going home the day of surgery  Important: If you don't follow the rules below, we must cancel your surgery.     Arrange for someone to drive you home after surgery. You may not drive, take a taxi or take public transportation by yourself (unless you'll have local anesthesia only).    Arrange for a responsible adult to stay with you overnight. If you don't, we may keep you in the hospital overnight, and you may need to pay the costs yourself.  Questions?   If you have any questions for your care team, list them here: _________________________________________________________________________________________________________________________________________________________________________________________________________________________________________________________________________________________________________________________  For informational purposes only. Not to replace the advice of your health care provider. Copyright   2003, 2019 [facility]. All rights reserved. Clinically reviewed by Adriana Balbuena MD. [employer] [id number] - REV 4/20.

## 2021-08-02 ENCOUNTER — OFFICE VISIT (OUTPATIENT)
Dept: FAMILY MEDICINE | Facility: CLINIC | Age: 81
End: 2021-08-02

## 2021-08-02 VITALS
DIASTOLIC BLOOD PRESSURE: 63 MMHG | TEMPERATURE: 98.5 F | HEART RATE: 77 BPM | SYSTOLIC BLOOD PRESSURE: 121 MMHG | OXYGEN SATURATION: 97 % | WEIGHT: 193.8 LBS | HEIGHT: 66 IN | BODY MASS INDEX: 31.15 KG/M2

## 2021-08-02 DIAGNOSIS — E66.09 CLASS 1 OBESITY DUE TO EXCESS CALORIES WITH SERIOUS COMORBIDITY AND BODY MASS INDEX (BMI) OF 31.0 TO 31.9 IN ADULT: ICD-10-CM

## 2021-08-02 DIAGNOSIS — E11.22 TYPE 2 DIABETES MELLITUS WITH STAGE 3B CHRONIC KIDNEY DISEASE, WITHOUT LONG-TERM CURRENT USE OF INSULIN (H): ICD-10-CM

## 2021-08-02 DIAGNOSIS — E03.4 HYPOTHYROIDISM DUE TO ACQUIRED ATROPHY OF THYROID: ICD-10-CM

## 2021-08-02 DIAGNOSIS — J44.89 COPD WITH ASTHMA (H): ICD-10-CM

## 2021-08-02 DIAGNOSIS — N18.32 TYPE 2 DIABETES MELLITUS WITH STAGE 3B CHRONIC KIDNEY DISEASE, WITHOUT LONG-TERM CURRENT USE OF INSULIN (H): ICD-10-CM

## 2021-08-02 DIAGNOSIS — Z71.89 ADVANCED CARE PLANNING/COUNSELING DISCUSSION: ICD-10-CM

## 2021-08-02 DIAGNOSIS — N18.32 STAGE 3B CHRONIC KIDNEY DISEASE (H): ICD-10-CM

## 2021-08-02 DIAGNOSIS — M54.2 CERVICAL PAIN: ICD-10-CM

## 2021-08-02 DIAGNOSIS — N20.0 URIC ACID KIDNEY STONE: ICD-10-CM

## 2021-08-02 DIAGNOSIS — E66.811 CLASS 1 OBESITY DUE TO EXCESS CALORIES WITH SERIOUS COMORBIDITY AND BODY MASS INDEX (BMI) OF 31.0 TO 31.9 IN ADULT: ICD-10-CM

## 2021-08-02 DIAGNOSIS — Z01.818 PREOP GENERAL PHYSICAL EXAM: Primary | ICD-10-CM

## 2021-08-02 DIAGNOSIS — E78.5 HYPERLIPIDEMIA WITH TARGET LDL LESS THAN 100: ICD-10-CM

## 2021-08-02 DIAGNOSIS — M48.062 SPINAL STENOSIS OF LUMBAR REGION WITH NEUROGENIC CLAUDICATION: ICD-10-CM

## 2021-08-02 PROBLEM — M1A.9XX0 CHRONIC GOUT WITHOUT TOPHUS, UNSPECIFIED CAUSE, UNSPECIFIED SITE: Status: RESOLVED | Noted: 2021-08-02 | Resolved: 2021-08-02

## 2021-08-02 PROBLEM — E03.9 ACQUIRED HYPOTHYROIDISM: Status: RESOLVED | Noted: 2017-03-21 | Resolved: 2021-08-02

## 2021-08-02 PROBLEM — M1A.9XX0 CHRONIC GOUT WITHOUT TOPHUS, UNSPECIFIED CAUSE, UNSPECIFIED SITE: Status: ACTIVE | Noted: 2021-08-02

## 2021-08-02 LAB
% GRANULOCYTES: 76.3 % (ref 42.2–75.2)
HBA1C MFR BLD: 5.8 % (ref 4–6)
HCT VFR BLD AUTO: 30.9 % (ref 35–46)
HEMOGLOBIN: 10.9 G/DL (ref 11.8–15.5)
LYMPHOCYTES NFR BLD AUTO: 17.9 % (ref 20.5–51.1)
MCH RBC QN AUTO: 30.6 PG (ref 27–31)
MCHC RBC AUTO-ENTMCNC: 35.4 G/DL (ref 33–37)
MCV RBC AUTO: 86.4 FL (ref 80–100)
MONOCYTES NFR BLD AUTO: 5.8 % (ref 1.7–9.3)
PLATELET # BLD AUTO: 183 K/UL (ref 140–450)
RBC # BLD AUTO: 3.57 X10/CMM (ref 3.7–5.2)
WBC # BLD AUTO: 4.5 X10/CMM (ref 3.8–11)

## 2021-08-02 PROCEDURE — 93000 ELECTROCARDIOGRAM COMPLETE: CPT | Mod: 59 | Performed by: NURSE PRACTITIONER

## 2021-08-02 PROCEDURE — 83036 HEMOGLOBIN GLYCOSYLATED A1C: CPT | Performed by: NURSE PRACTITIONER

## 2021-08-02 PROCEDURE — 85025 COMPLETE CBC W/AUTO DIFF WBC: CPT | Performed by: NURSE PRACTITIONER

## 2021-08-02 PROCEDURE — 36415 COLL VENOUS BLD VENIPUNCTURE: CPT | Performed by: NURSE PRACTITIONER

## 2021-08-02 PROCEDURE — 99207 PR FOOT EXAM NO CHARGE: CPT | Performed by: NURSE PRACTITIONER

## 2021-08-02 PROCEDURE — 99215 OFFICE O/P EST HI 40 MIN: CPT | Performed by: NURSE PRACTITIONER

## 2021-08-02 RX ORDER — HYDROCODONE BITARTRATE AND ACETAMINOPHEN 5; 325 MG/1; MG/1
1-2 TABLET ORAL EVERY 4 HOURS PRN
Qty: 30 TABLET | Refills: 0 | Status: SHIPPED | OUTPATIENT
Start: 2021-08-02 | End: 2021-10-14

## 2021-08-02 RX ORDER — ALLOPURINOL 100 MG/1
100 TABLET ORAL DAILY
Qty: 90 TABLET | Refills: 3
Start: 2021-08-02 | End: 2021-10-14

## 2021-08-02 RX ORDER — DULOXETIN HYDROCHLORIDE 30 MG/1
30 CAPSULE, DELAYED RELEASE ORAL 2 TIMES DAILY
Qty: 180 CAPSULE | Refills: 1 | Status: SHIPPED | OUTPATIENT
Start: 2021-08-02 | End: 2022-02-25

## 2021-08-02 RX ORDER — METFORMIN HCL 500 MG
1000 TABLET, EXTENDED RELEASE 24 HR ORAL
Qty: 180 TABLET | Refills: 3 | Status: ON HOLD
Start: 2021-08-02 | End: 2021-08-10

## 2021-08-02 RX ORDER — HYDROCODONE BITARTRATE AND ACETAMINOPHEN 5; 325 MG/1; MG/1
1-2 TABLET ORAL EVERY 4 HOURS PRN
Qty: 60 TABLET | Refills: 0 | Status: CANCELLED | OUTPATIENT
Start: 2021-08-02

## 2021-08-02 ASSESSMENT — MIFFLIN-ST. JEOR: SCORE: 1365.82

## 2021-08-02 NOTE — LETTER
Opioid / Opioid Plus Controlled Substance Agreement    This is an agreement between you and your provider about the safe and appropriate use of controlled substance/opioids prescribed by your care team. Controlled substances are medicines that can cause physical and mental dependence (abuse).    There are strict laws about having and using these medicines. We here at [facility] are committing to working with you in your efforts to get better. To support you in this work, we ll help you schedule regular office appointments for medicine refills. If we must cancel or change your appointment for any reason, we ll make sure you have enough medicine to last until your next appointment.     As a Provider, I will:    Listen carefully to your concerns and treat you with respect.     Recommend a treatment plan that I believe is in your best interest. This plan may involve therapies other than opioid pain medication.     Talk with you often about the possible benefits, and the risk of harm of any medicine that we prescribe for you.     Provide a plan on how to taper (discontinue or go off) using this medicine if the decision is made to stop its use.    As a Patient, I understand that opioid(s):     Are a controlled substance prescribed by my care team to help me function or work and manage my condition(s).     Are strong medicines and can cause serious side effects such as:    Drowsiness, which can seriously affect my driving ability    A lower breathing rate, enough to cause death    Harm to my thinking ability     Depression     Abuse of and addiction to this medicine    Need to be taken exactly as prescribed. Combining opioids with certain medicines or chemicals (such as illegal drugs, sedatives, sleeping pills, and benzodiazepines) can be dangerous or even fatal. If I stop opioids suddenly, I may have severe withdrawal symptoms.    Do not work for all types of pain nor for all patients. If they re not helpful, I may  be asked to stop them.        The risks, benefits and side effects of these medicine(s) were explained to me. I agree that:  1. I will take part in other treatments as advised by my care team. This may be psychiatry or counseling, physical therapy, behavioral therapy, group treatment or a referral to a specialist.     2. I will keep all my appointments. I understand that this is part of the monitoring of opioids. My care team may require an office visit for EVERY opioid/controlled substance refill. If I miss appointments or don t follow instructions, my care team may stop my medicine.    3. I will take my medicines as prescribed. I will not change the dose or schedule unless my care team tells me to. There will be no refills if I run out early.     4. I may be asked to come to the clinic and complete a urine drug test or complete a pill count at any time. If I don t give a urine sample or participate in a pill count, the care team may stop my medicine.    5. I will only receive prescriptions from this clinic for chronic pain. If I am treated by another provider for acute pain issues, I will tell them that I am taking opioid pain medication for chronic pain and that I have a treatment agreement with this provider. I will inform my [facility] care team within one business day if I am given a prescription for any pain medication by another healthcare provider. My [facility] care team can contact other providers and pharmacists about my use of any medicines.    6. It is up to me to make sure that I don t run out of my medicines on weekends or holidays. If my care team is willing to refill my opioid prescription without a visit, I must request refills only during office hours. Refills may take up to 3 business days to process. I will use one pharmacy to fill all my opioid and other controlled substance prescriptions. I will notify the clinic about any changes to my insurance or medication  availability.    7. I am responsible for my prescriptions. If the medicine/prescription is lost, stolen or destroyed, it will not be replaced. I also agree not to share controlled substance medicines with anyone.    8. I am aware I should not use any illegal or recreational drugs. I agree not to drink alcohol unless my care team says I can.       9. If I enroll in the [place] [facility] program, I will tell my care team prior to my next refill.     10. I will tell my care team right away if I become pregnant, have a new medical problem treated outside of my regular clinic, or have a change in my medications.    11. I understand that this medicine can affect my thinking, judgment and reaction time. Alcohol and drugs affect the brain and body, which can affect the safety of my driving. Being under the influence of alcohol or drugs can affect my decision-making, behaviors, personal safety, and the safety of others. Driving while impaired (DWI) can occur if a person is driving, operating, or in physical control of a car, motorcycle, boat, snowmobile, ATV, motorbike, off-road vehicle, or any other motor vehicle ([place] Statute 169A.20). I understand the risk if I choose to drive or operate any vehicle or machinery.    I understand that if I do not follow any of the conditions above, my prescriptions or treatment may be stopped or changed.          Opioids  What You Need to Know    What are opioids?   Opioids are pain medicines that must be prescribed by a doctor. They are also known as narcotics.     Examples are:   1. morphine (MS Contin, Mary)  2. oxycodone (Oxycontin)  3. oxycodone and acetaminophen (Percocet)  4. hydrocodone and acetaminophen (Vicodin, Norco)   5. fentanyl patch (Duragesic)   6. hydromorphone (Dilaudid)   7. methadone  8. codeine (Tylenol #3)     What do opioids do well?   Opioids are best for severe short-term pain such as after a surgery or injury. They may work well for cancer pain. They may  help some people with long-lasting (chronic) pain.     What do opioids NOT do well?   Opioids never get rid of pain entirely, and they don t work well for most patients with chronic pain. Opioids don t reduce swelling, one of the causes of pain.                                    Other ways to manage chronic pain and improve function include:       Treat the health problem that may be causing pain    Anti-inflammation medicines, which reduce swelling and tenderness, such as ibuprofen (Advil, Motrin) or naproxen (Aleve)    Acetaminophen (Tylenol)    Antidepressants and anti-seizure medicines, especially for nerve pain    Topical treatments such as patches or creams    Injections or nerve blocks    Chiropractic or osteopathic treatment    Acupuncture, massage, deep breathing, meditation, visual imagery, aromatherapy    Use heat or ice at the pain site    Physical therapy     Exercise    Stop smoking    Take part in therapy       Risks and side effects     Talk to your doctor before you start or decide to keep taking opioids. Possible side effects include:      Lowering your breathing rate enough to cause death    Overdose, including death, especially if taking higher than prescribed doses    Worse depression symptoms; less pleasure in things you usually enjoy    Feeling tired or sluggish    Slower thoughts or cloudy thinking    Being more sensitive to pain over time; pain is harder to control    Trouble sleeping or restless sleep    Changes in hormone levels (for example, less testosterone)    Changes in sex drive or ability to have sex    Constipation    Unsafe driving    Itching and sweating    Dizziness    Nausea, throwing up and dry mouth    What else should I know about opioids?    Opioids may lead to dependence, tolerance, or addiction.      Dependence means that if you stop or reduce the medicine too quickly, you will have withdrawal symptoms. These include loose poop (diarrhea), jitters, flu-like symptoms,  nervousness and tremors. Dependence is not the same as addiction.                       Tolerance means needing higher doses over time to get the same effect. This may increase the chance of serious side effects.      Addiction is when people improperly use a substance that harms their body, their mind or their relations with others. Use of opiates can cause a relapse of addiction if you have a history of drug or alcohol abuse.      People who have used opioids for a long time may have a lower quality of life, worse depression, higher levels of pain and more visits to doctors.    You can overdose on opioids. Take these steps to lower your risk of overdose:    1. Recognize the signs:  Signs of overdose include decrease or loss of consciousness (blackout), slowed breathing, trouble waking up and blue lips. If someone is worried about overdose, they should call 911.    2. Talk to your doctor about Narcan (naloxone).   If you are at risk for overdose, you may be given a prescription for Narcan. This medicine very quickly reverses the effects of opioids.   If you overdose, a friend or family member can give you Narcan while waiting for the ambulance. They need to know the signs of overdose and how to give Narcan.     3. Don't use alcohol or street drugs.   Taking them with opioids can cause death.    4. Do not take any of these medicines unless your doctor says it s OK. Taking these with opioids can cause death:    Benzodiazepines, such as lorazepam (Ativan), alprazolam (Xanax) or diazepam (Valium)    Muscle relaxers, such as cyclobenzaprine (Flexeril)    Sleeping pills like zolpidem (Ambien)     Other opioids      How to keep you and other people safe while taking opioids:    1. Never share your opioids with others.  Opioid medicines are regulated by the Drug Enforcement Agency (DAVID). Selling or sharing medications is a criminal act.    2. Be sure to store opioids in a secure place, locked up if possible. Young children  can easily swallow them and overdose.    3. When you are traveling with your medicines, keep them in the original bottles. If you use a pill box, be sure you also carry a copy of your medicine list from your clinic or pharmacy.    4. Safe disposal of opioids    Most pharmacies have places to get rid of medicine, called disposal kiosks. Medicine disposal options are also available in every [facility]. Search your county and  medication disposal  to find more options. You can find more details at:  [email or web address]     I agree that my provider, clinic care team, and pharmacy may work with any city, state or federal law enforcement agency that investigates the misuse, sale, or other diversion of my controlled medicine. I will allow my provider to discuss my care with, or share a copy of, this agreement with any other treating provider, pharmacy or emergency room where I receive care.    I have read this agreement and have asked questions about anything I did not understand.    _______________________________________________________  Patient Signature - Macie Jorge _____________________                   Date     _______________________________________________________  Provider Signature - WILEY Sunshine CNP   _____________________                   Date     _______________________________________________________  Witness Signature (required if provider not present while patient signing)   _____________________                   Date

## 2021-08-03 LAB
BUN SERPL-MCNC: 23 MG/DL (ref 8–27)
BUN/CREATININE RATIO: 15 (ref 12–28)
CALCIUM SERPL-MCNC: 9 MG/DL (ref 8.7–10.3)
CHLORIDE SERPLBLD-SCNC: 102 MMOL/L (ref 96–106)
CHOLEST SERPL-MCNC: 175 MG/DL (ref 100–199)
CREAT SERPL-MCNC: 1.55 MG/DL (ref 0.57–1)
EGFR IF AFRICN AM: 36 ML/MIN/1.73
EGFR IF NONAFRICN AM: 31 ML/MIN/1.73
GLUCOSE SERPL-MCNC: 105 MG/DL (ref 65–99)
HDLC SERPL-MCNC: 44 MG/DL
LDL/HDL RATIO: 2.4 RATIO (ref 0–3.2)
LDLC SERPL CALC-MCNC: 106 MG/DL (ref 0–99)
POTASSIUM SERPL-SCNC: 4.5 MMOL/L (ref 3.5–5.2)
SODIUM SERPL-SCNC: 138 MMOL/L (ref 134–144)
TOTAL CO2: 21 MMOL/L (ref 20–29)
TRIGL SERPL-MCNC: 138 MG/DL (ref 0–149)
TSH BLD-ACNC: 7.53 UIU/ML (ref 0.45–4.5)
URATE SERPL-MCNC: 8.4 MG/DL (ref 3.1–7.9)
VLDLC SERPL CALC-MCNC: 25 MG/DL (ref 5–40)

## 2021-08-06 NOTE — PROGRESS NOTES
PTA medications updated by Medication Scribe prior to surgery via phone call with patient (last doses completed by Nurse)     Medication history sources: Patient, Surescripts and H&P  In the past week, patient estimated taking medication this percent of the time: Greater than 90%  Adherence assessment: N/A Not Observed    Significant changes made to the medication list:  Patient reports no longer taking the following meds (med scribe removed from PTA med list): NARCAN NA SPRAY      Additional medication history information:   Patient brought own home meds: ALBUTEROL INH    Medication reconciliation completed by provider prior to medication history? No    Time spent in this activity: 40 MINUTES    The information provided in this note is only as accurate as the sources available at the time of update(s)      Prior to Admission medications    Medication Sig Last Dose Taking? Auth Provider   albuterol (PROAIR HFA/PROVENTIL HFA/VENTOLIN HFA) 108 (90 Base) MCG/ACT inhaler Inhale 2 puffs into the lungs every 6 hours as needed for shortness of breath / dyspnea or wheezing  at PRN Yes Haleigh Smallwood APRN CNP   allopurinol (ZYLOPRIM) 100 MG tablet Take 1 tablet (100 mg) by mouth daily  at AM Yes Haleigh Smallwood APRN CNP   blood glucose (NO BRAND SPECIFIED) lancets standard Use to test blood sugar 1 time daily or as directed.  Yes Marjan Pa MD   blood glucose (NO BRAND SPECIFIED) test strip Use to test blood sugar 1 time daily or as directed.  Yes Marjan Pa MD   blood glucose monitoring (NO BRAND SPECIFIED) meter device kit Use to test blood sugar 1 time daily or as directed.  Yes Marjan Pa MD   cephALEXin (KEFLEX) 500 MG capsule TAKE 2 CAPSULES BY MOUTH ONE HOUR BEFORE DENTAL APPOINTMENT AND TAKE 2 CAPSULES BY MOUTH 8 HOURS AFTER DENTIST  at PRN Yes Reported, Patient   DULoxetine (CYMBALTA) 30 MG capsule Take 1 capsule (30 mg) by mouth 2 times daily  at PM Yes Haleigh Smallwood  A, APRN CNP   fluticasone-salmeterol (ADVAIR) 250-50 MCG/DOSE inhaler Inhale 1 puff into the lungs every 12 hours  at AM Yes Juve Bruner MD   HYDROcodone-acetaminophen (NORCO) 5-325 MG tablet Take 1-2 tablets by mouth every 4 hours as needed for moderate to severe pain  at PRN Yes Haleigh Smallwood APRN CNP   levothyroxine (SYNTHROID/LEVOTHROID) 112 MCG tablet Take 1 tablet (112 mcg) by mouth daily  at AM Yes Juve Bruner MD   lisinopril (ZESTRIL) 5 MG tablet Take 1 tablet (5 mg) by mouth daily  at AM Yes Juve Bruner MD   metFORMIN (GLUCOPHAGE-XR) 500 MG 24 hr tablet Take 2 tablets (1,000 mg) by mouth daily (with dinner)  at PM Yes Haleigh Smallwood APRN CNP   oxybutynin ER (DITROPAN-XL) 10 MG 24 hr tablet Take 1 tablet by mouth once daily  at AM Yes Elias Meyer MD   SUMAtriptan (IMITREX) 25 MG tablet Take 1 tablet (25 mg) by mouth at onset of headache for migraine May repeat in 2 hours. Max 8 tablets/24 hours.  at PRN Yes Korin Vaca APRN CNP

## 2021-08-07 ENCOUNTER — LAB (OUTPATIENT)
Dept: URGENT CARE | Facility: URGENT CARE | Age: 81
End: 2021-08-07
Attending: ORTHOPAEDIC SURGERY
Payer: COMMERCIAL

## 2021-08-07 DIAGNOSIS — Z11.59 ENCOUNTER FOR SCREENING FOR OTHER VIRAL DISEASES: ICD-10-CM

## 2021-08-07 PROCEDURE — U0005 INFEC AGEN DETEC AMPLI PROBE: HCPCS

## 2021-08-07 PROCEDURE — U0003 INFECTIOUS AGENT DETECTION BY NUCLEIC ACID (DNA OR RNA); SEVERE ACUTE RESPIRATORY SYNDROME CORONAVIRUS 2 (SARS-COV-2) (CORONAVIRUS DISEASE [COVID-19]), AMPLIFIED PROBE TECHNIQUE, MAKING USE OF HIGH THROUGHPUT TECHNOLOGIES AS DESCRIBED BY CMS-2020-01-R: HCPCS

## 2021-08-08 LAB — SARS-COV-2 RNA RESP QL NAA+PROBE: NEGATIVE

## 2021-08-09 ENCOUNTER — ANESTHESIA EVENT (OUTPATIENT)
Dept: SURGERY | Facility: CLINIC | Age: 81
End: 2021-08-09
Payer: COMMERCIAL

## 2021-08-09 ENCOUNTER — HOSPITAL ENCOUNTER (OUTPATIENT)
Facility: CLINIC | Age: 81
Discharge: HOME | End: 2021-08-10
Attending: ORTHOPAEDIC SURGERY | Admitting: ORTHOPAEDIC SURGERY
Payer: COMMERCIAL

## 2021-08-09 ENCOUNTER — APPOINTMENT (OUTPATIENT)
Dept: GENERAL RADIOLOGY | Facility: CLINIC | Age: 81
End: 2021-08-09
Attending: ORTHOPAEDIC SURGERY
Payer: COMMERCIAL

## 2021-08-09 ENCOUNTER — APPOINTMENT (OUTPATIENT)
Dept: PHYSICAL THERAPY | Facility: CLINIC | Age: 81
End: 2021-08-09
Attending: ORTHOPAEDIC SURGERY
Payer: COMMERCIAL

## 2021-08-09 ENCOUNTER — ANESTHESIA (OUTPATIENT)
Dept: SURGERY | Facility: CLINIC | Age: 81
End: 2021-08-09
Payer: COMMERCIAL

## 2021-08-09 DIAGNOSIS — Z96.651 TOTAL KNEE REPLACEMENT STATUS, RIGHT: ICD-10-CM

## 2021-08-09 DIAGNOSIS — Z96.652 S/P TKR (TOTAL KNEE REPLACEMENT), LEFT: Primary | ICD-10-CM

## 2021-08-09 LAB
CREAT SERPL-MCNC: 1.77 MG/DL (ref 0.52–1.04)
FASTING STATUS PATIENT QL REPORTED: YES
GFR SERPL CREATININE-BSD FRML MDRD: 27 ML/MIN/1.73M2
GLUCOSE BLD-MCNC: 102 MG/DL (ref 70–99)
GLUCOSE BLDC GLUCOMTR-MCNC: 158 MG/DL (ref 70–99)
GLUCOSE BLDC GLUCOMTR-MCNC: 177 MG/DL (ref 70–99)
GLUCOSE BLDC GLUCOMTR-MCNC: 229 MG/DL (ref 70–99)
POTASSIUM BLD-SCNC: 4 MMOL/L (ref 3.4–5.3)

## 2021-08-09 PROCEDURE — 999N000141 HC STATISTIC PRE-PROCEDURE NURSING ASSESSMENT: Performed by: ORTHOPAEDIC SURGERY

## 2021-08-09 PROCEDURE — 258N000003 HC RX IP 258 OP 636: Performed by: NURSE ANESTHETIST, CERTIFIED REGISTERED

## 2021-08-09 PROCEDURE — 84132 ASSAY OF SERUM POTASSIUM: CPT | Performed by: ANESTHESIOLOGY

## 2021-08-09 PROCEDURE — 99203 OFFICE O/P NEW LOW 30 MIN: CPT | Performed by: PHYSICIAN ASSISTANT

## 2021-08-09 PROCEDURE — 278N000051 HC OR IMPLANT GENERAL: Performed by: ORTHOPAEDIC SURGERY

## 2021-08-09 PROCEDURE — 710N000009 HC RECOVERY PHASE 1, LEVEL 1, PER MIN: Performed by: ORTHOPAEDIC SURGERY

## 2021-08-09 PROCEDURE — 250N000013 HC RX MED GY IP 250 OP 250 PS 637: Performed by: INTERNAL MEDICINE

## 2021-08-09 PROCEDURE — 360N000077 HC SURGERY LEVEL 4, PER MIN: Performed by: ORTHOPAEDIC SURGERY

## 2021-08-09 PROCEDURE — 250N000009 HC RX 250: Performed by: ORTHOPAEDIC SURGERY

## 2021-08-09 PROCEDURE — 370N000017 HC ANESTHESIA TECHNICAL FEE, PER MIN: Performed by: ORTHOPAEDIC SURGERY

## 2021-08-09 PROCEDURE — 250N000011 HC RX IP 250 OP 636: Performed by: ORTHOPAEDIC SURGERY

## 2021-08-09 PROCEDURE — 250N000011 HC RX IP 250 OP 636: Performed by: ANESTHESIOLOGY

## 2021-08-09 PROCEDURE — 258N000001 HC RX 258: Performed by: ORTHOPAEDIC SURGERY

## 2021-08-09 PROCEDURE — 250N000012 HC RX MED GY IP 250 OP 636 PS 637: Performed by: PHYSICIAN ASSISTANT

## 2021-08-09 PROCEDURE — 258N000003 HC RX IP 258 OP 636: Performed by: ORTHOPAEDIC SURGERY

## 2021-08-09 PROCEDURE — 82565 ASSAY OF CREATININE: CPT | Performed by: ORTHOPAEDIC SURGERY

## 2021-08-09 PROCEDURE — 97530 THERAPEUTIC ACTIVITIES: CPT | Mod: GP | Performed by: PHYSICAL THERAPIST

## 2021-08-09 PROCEDURE — 250N000013 HC RX MED GY IP 250 OP 250 PS 637: Performed by: NURSE ANESTHETIST, CERTIFIED REGISTERED

## 2021-08-09 PROCEDURE — 82947 ASSAY GLUCOSE BLOOD QUANT: CPT | Performed by: ANESTHESIOLOGY

## 2021-08-09 PROCEDURE — 36415 COLL VENOUS BLD VENIPUNCTURE: CPT | Performed by: ANESTHESIOLOGY

## 2021-08-09 PROCEDURE — 250N000013 HC RX MED GY IP 250 OP 250 PS 637: Performed by: ORTHOPAEDIC SURGERY

## 2021-08-09 PROCEDURE — C1776 JOINT DEVICE (IMPLANTABLE): HCPCS | Performed by: ORTHOPAEDIC SURGERY

## 2021-08-09 PROCEDURE — 272N000001 HC OR GENERAL SUPPLY STERILE: Performed by: ORTHOPAEDIC SURGERY

## 2021-08-09 PROCEDURE — 250N000009 HC RX 250: Performed by: ANESTHESIOLOGY

## 2021-08-09 PROCEDURE — 999N000065 XR KNEE PORT RIGHT 1/2 VIEWS

## 2021-08-09 PROCEDURE — 99207 PR CDG-CODE CATEGORY CHANGED: CPT | Performed by: PHYSICIAN ASSISTANT

## 2021-08-09 PROCEDURE — 96372 THER/PROPH/DIAG INJ SC/IM: CPT | Performed by: PHYSICIAN ASSISTANT

## 2021-08-09 PROCEDURE — 97161 PT EVAL LOW COMPLEX 20 MIN: CPT | Mod: GP | Performed by: PHYSICAL THERAPIST

## 2021-08-09 PROCEDURE — 97116 GAIT TRAINING THERAPY: CPT | Mod: GP | Performed by: PHYSICAL THERAPIST

## 2021-08-09 PROCEDURE — 250N000011 HC RX IP 250 OP 636: Performed by: NURSE ANESTHETIST, CERTIFIED REGISTERED

## 2021-08-09 PROCEDURE — 258N000003 HC RX IP 258 OP 636: Performed by: ANESTHESIOLOGY

## 2021-08-09 PROCEDURE — 250N000009 HC RX 250: Performed by: NURSE ANESTHETIST, CERTIFIED REGISTERED

## 2021-08-09 DEVICE — IMP COMP FEMORAL VANGARD BIOM FIXATION 183099: Type: IMPLANTABLE DEVICE | Site: KNEE | Status: FUNCTIONAL

## 2021-08-09 DEVICE — SIMPLEX® HV IS A FAST-SETTING ACRYLIC RESIN FOR USE IN BONE SURGERY. MIXING THE TWO SEPARATE STERILE COMPONENTS PRODUCES A DUCTILE BONE CEMENT WHICH, AFTER HARDENING, FIXES THE IMPLANT AND TRANSFERS STRESSES PRODUCED DURING MOVEMENT EVENLY TO THE BONE. SIMPLEX® HV CEMENT POWDER ALSO CONTAINS INSOLUBLE ZIRCONIUM DIOXIDE AS AN X-RAY CONTRAST MEDIUM. SIMPLEX® HV DOES NOT EMIT A SIGNAL AND DOES NOT POSE A SAFETY RISK IN A MAGNETIC RESONANCE ENVIRONMENT.
Type: IMPLANTABLE DEVICE | Site: KNEE | Status: FUNCTIONAL
Brand: SIMPLEX HV

## 2021-08-09 DEVICE — IMPLANTABLE DEVICE
Type: IMPLANTABLE DEVICE | Site: KNEE | Status: FUNCTIONAL
Brand: VANGUARD® KNEE SYSTEM

## 2021-08-09 DEVICE — IMP COMP PATELLA BIOM 3 PEG 34MM STD 184766: Type: IMPLANTABLE DEVICE | Site: KNEE | Status: FUNCTIONAL

## 2021-08-09 DEVICE — IMP COMP TIBIAL KNEE ASCENT 75MM 141224: Type: IMPLANTABLE DEVICE | Site: KNEE | Status: FUNCTIONAL

## 2021-08-09 RX ORDER — CEFAZOLIN SODIUM 2 G/100ML
2 INJECTION, SOLUTION INTRAVENOUS SEE ADMIN INSTRUCTIONS
Status: DISCONTINUED | OUTPATIENT
Start: 2021-08-09 | End: 2021-08-09

## 2021-08-09 RX ORDER — PROPOFOL 10 MG/ML
INJECTION, EMULSION INTRAVENOUS CONTINUOUS PRN
Status: DISCONTINUED | OUTPATIENT
Start: 2021-08-09 | End: 2021-08-09

## 2021-08-09 RX ORDER — OXYCODONE HYDROCHLORIDE 5 MG/1
5 TABLET ORAL EVERY 4 HOURS PRN
Status: DISCONTINUED | OUTPATIENT
Start: 2021-08-09 | End: 2021-08-10 | Stop reason: HOSPADM

## 2021-08-09 RX ORDER — ONDANSETRON 4 MG/1
4 TABLET, ORALLY DISINTEGRATING ORAL EVERY 30 MIN PRN
Status: DISCONTINUED | OUTPATIENT
Start: 2021-08-09 | End: 2021-08-09 | Stop reason: HOSPADM

## 2021-08-09 RX ORDER — ACETAMINOPHEN 325 MG/1
650 TABLET ORAL EVERY 4 HOURS PRN
Qty: 100 TABLET | Refills: 0 | Status: SHIPPED | OUTPATIENT
Start: 2021-08-09 | End: 2022-02-25

## 2021-08-09 RX ORDER — LIDOCAINE HYDROCHLORIDE 20 MG/ML
INJECTION, SOLUTION INFILTRATION; PERINEURAL PRN
Status: DISCONTINUED | OUTPATIENT
Start: 2021-08-09 | End: 2021-08-09

## 2021-08-09 RX ORDER — ONDANSETRON 2 MG/ML
4 INJECTION INTRAMUSCULAR; INTRAVENOUS EVERY 6 HOURS PRN
Status: DISCONTINUED | OUTPATIENT
Start: 2021-08-09 | End: 2021-08-10 | Stop reason: HOSPADM

## 2021-08-09 RX ORDER — ONDANSETRON 4 MG/1
4-8 TABLET, ORALLY DISINTEGRATING ORAL EVERY 8 HOURS PRN
Qty: 10 TABLET | Refills: 0 | Status: SHIPPED | OUTPATIENT
Start: 2021-08-09 | End: 2021-09-07

## 2021-08-09 RX ORDER — NICOTINE POLACRILEX 4 MG
15-30 LOZENGE BUCCAL
Status: DISCONTINUED | OUTPATIENT
Start: 2021-08-09 | End: 2021-08-10 | Stop reason: HOSPADM

## 2021-08-09 RX ORDER — HYDROMORPHONE HCL IN WATER/PF 6 MG/30 ML
0.4 PATIENT CONTROLLED ANALGESIA SYRINGE INTRAVENOUS EVERY 5 MIN PRN
Status: DISCONTINUED | OUTPATIENT
Start: 2021-08-09 | End: 2021-08-09 | Stop reason: HOSPADM

## 2021-08-09 RX ORDER — ACETAMINOPHEN 325 MG/1
650 TABLET ORAL EVERY 4 HOURS PRN
Status: DISCONTINUED | OUTPATIENT
Start: 2021-08-12 | End: 2021-08-10 | Stop reason: HOSPADM

## 2021-08-09 RX ORDER — ALBUTEROL SULFATE 90 UG/1
2 AEROSOL, METERED RESPIRATORY (INHALATION) EVERY 6 HOURS PRN
Status: DISCONTINUED | OUTPATIENT
Start: 2021-08-09 | End: 2021-08-10 | Stop reason: HOSPADM

## 2021-08-09 RX ORDER — HYDRALAZINE HYDROCHLORIDE 20 MG/ML
10 INJECTION INTRAMUSCULAR; INTRAVENOUS EVERY 4 HOURS PRN
Status: DISCONTINUED | OUTPATIENT
Start: 2021-08-09 | End: 2021-08-10 | Stop reason: HOSPADM

## 2021-08-09 RX ORDER — SODIUM CHLORIDE, SODIUM LACTATE, POTASSIUM CHLORIDE, CALCIUM CHLORIDE 600; 310; 30; 20 MG/100ML; MG/100ML; MG/100ML; MG/100ML
INJECTION, SOLUTION INTRAVENOUS CONTINUOUS
Status: DISCONTINUED | OUTPATIENT
Start: 2021-08-09 | End: 2021-08-09 | Stop reason: HOSPADM

## 2021-08-09 RX ORDER — OXYCODONE HYDROCHLORIDE 5 MG/1
5 TABLET ORAL EVERY 4 HOURS PRN
Status: DISCONTINUED | OUTPATIENT
Start: 2021-08-09 | End: 2021-08-09

## 2021-08-09 RX ORDER — FAMOTIDINE 10 MG
10 TABLET ORAL 2 TIMES DAILY
Status: DISCONTINUED | OUTPATIENT
Start: 2021-08-09 | End: 2021-08-10 | Stop reason: HOSPADM

## 2021-08-09 RX ORDER — MAGNESIUM HYDROXIDE 1200 MG/15ML
LIQUID ORAL PRN
Status: DISCONTINUED | OUTPATIENT
Start: 2021-08-09 | End: 2021-08-09 | Stop reason: HOSPADM

## 2021-08-09 RX ORDER — NALOXONE HYDROCHLORIDE 0.4 MG/ML
0.2 INJECTION, SOLUTION INTRAMUSCULAR; INTRAVENOUS; SUBCUTANEOUS
Status: DISCONTINUED | OUTPATIENT
Start: 2021-08-09 | End: 2021-08-10 | Stop reason: HOSPADM

## 2021-08-09 RX ORDER — NALOXONE HYDROCHLORIDE 0.4 MG/ML
0.4 INJECTION, SOLUTION INTRAMUSCULAR; INTRAVENOUS; SUBCUTANEOUS
Status: DISCONTINUED | OUTPATIENT
Start: 2021-08-09 | End: 2021-08-10 | Stop reason: HOSPADM

## 2021-08-09 RX ORDER — TRANEXAMIC ACID 650 MG/1
1950 TABLET ORAL ONCE
Status: COMPLETED | OUTPATIENT
Start: 2021-08-09 | End: 2021-08-09

## 2021-08-09 RX ORDER — ALLOPURINOL 100 MG/1
100 TABLET ORAL DAILY
Status: DISCONTINUED | OUTPATIENT
Start: 2021-08-09 | End: 2021-08-09

## 2021-08-09 RX ORDER — DEXAMETHASONE SODIUM PHOSPHATE 4 MG/ML
INJECTION, SOLUTION INTRA-ARTICULAR; INTRALESIONAL; INTRAMUSCULAR; INTRAVENOUS; SOFT TISSUE PRN
Status: DISCONTINUED | OUTPATIENT
Start: 2021-08-09 | End: 2021-08-09

## 2021-08-09 RX ORDER — OXYBUTYNIN CHLORIDE 10 MG/1
10 TABLET, EXTENDED RELEASE ORAL DAILY
Status: DISCONTINUED | OUTPATIENT
Start: 2021-08-09 | End: 2021-08-10 | Stop reason: HOSPADM

## 2021-08-09 RX ORDER — CEFAZOLIN SODIUM 2 G/100ML
2 INJECTION, SOLUTION INTRAVENOUS EVERY 8 HOURS
Status: COMPLETED | OUTPATIENT
Start: 2021-08-09 | End: 2021-08-10

## 2021-08-09 RX ORDER — FERROUS GLUCONATE 324(38)MG
324 TABLET ORAL DAILY
Status: DISCONTINUED | OUTPATIENT
Start: 2021-08-09 | End: 2021-08-09

## 2021-08-09 RX ORDER — DIPHENHYDRAMINE HCL 12.5MG/5ML
12.5 LIQUID (ML) ORAL EVERY 6 HOURS PRN
Status: DISCONTINUED | OUTPATIENT
Start: 2021-08-09 | End: 2021-08-10 | Stop reason: HOSPADM

## 2021-08-09 RX ORDER — SUMATRIPTAN 25 MG/1
25 TABLET, FILM COATED ORAL
Status: DISCONTINUED | OUTPATIENT
Start: 2021-08-09 | End: 2021-08-10 | Stop reason: HOSPADM

## 2021-08-09 RX ORDER — AMOXICILLIN 250 MG
1-2 CAPSULE ORAL 2 TIMES DAILY
Qty: 30 TABLET | Refills: 0 | Status: SHIPPED | OUTPATIENT
Start: 2021-08-09 | End: 2022-01-27

## 2021-08-09 RX ORDER — HYDROMORPHONE HCL IN WATER/PF 6 MG/30 ML
0.2 PATIENT CONTROLLED ANALGESIA SYRINGE INTRAVENOUS
Status: DISCONTINUED | OUTPATIENT
Start: 2021-08-09 | End: 2021-08-10 | Stop reason: HOSPADM

## 2021-08-09 RX ORDER — DULOXETIN HYDROCHLORIDE 30 MG/1
30 CAPSULE, DELAYED RELEASE ORAL 2 TIMES DAILY
Status: DISCONTINUED | OUTPATIENT
Start: 2021-08-09 | End: 2021-08-10 | Stop reason: HOSPADM

## 2021-08-09 RX ORDER — LISINOPRIL 5 MG/1
5 TABLET ORAL DAILY
Status: DISCONTINUED | OUTPATIENT
Start: 2021-08-10 | End: 2021-08-09

## 2021-08-09 RX ORDER — ONDANSETRON 4 MG/1
4 TABLET, ORALLY DISINTEGRATING ORAL EVERY 6 HOURS PRN
Status: DISCONTINUED | OUTPATIENT
Start: 2021-08-09 | End: 2021-08-10 | Stop reason: HOSPADM

## 2021-08-09 RX ORDER — PROCHLORPERAZINE MALEATE 5 MG
5 TABLET ORAL EVERY 6 HOURS PRN
Status: DISCONTINUED | OUTPATIENT
Start: 2021-08-09 | End: 2021-08-10 | Stop reason: HOSPADM

## 2021-08-09 RX ORDER — CEFAZOLIN SODIUM 2 G/100ML
2 INJECTION, SOLUTION INTRAVENOUS
Status: DISCONTINUED | OUTPATIENT
Start: 2021-08-09 | End: 2021-08-09

## 2021-08-09 RX ORDER — LEVOTHYROXINE SODIUM 112 UG/1
112 TABLET ORAL
Status: DISCONTINUED | OUTPATIENT
Start: 2021-08-10 | End: 2021-08-09

## 2021-08-09 RX ORDER — SODIUM CHLORIDE, SODIUM LACTATE, POTASSIUM CHLORIDE, CALCIUM CHLORIDE 600; 310; 30; 20 MG/100ML; MG/100ML; MG/100ML; MG/100ML
INJECTION, SOLUTION INTRAVENOUS CONTINUOUS
Status: DISCONTINUED | OUTPATIENT
Start: 2021-08-09 | End: 2021-08-10 | Stop reason: HOSPADM

## 2021-08-09 RX ORDER — BISACODYL 10 MG
10 SUPPOSITORY, RECTAL RECTAL DAILY PRN
Status: DISCONTINUED | OUTPATIENT
Start: 2021-08-09 | End: 2021-08-10 | Stop reason: HOSPADM

## 2021-08-09 RX ORDER — PROPOFOL 10 MG/ML
INJECTION, EMULSION INTRAVENOUS PRN
Status: DISCONTINUED | OUTPATIENT
Start: 2021-08-09 | End: 2021-08-09

## 2021-08-09 RX ORDER — ONDANSETRON 2 MG/ML
4 INJECTION INTRAMUSCULAR; INTRAVENOUS EVERY 30 MIN PRN
Status: DISCONTINUED | OUTPATIENT
Start: 2021-08-09 | End: 2021-08-09 | Stop reason: HOSPADM

## 2021-08-09 RX ORDER — LIDOCAINE 40 MG/G
CREAM TOPICAL
Status: DISCONTINUED | OUTPATIENT
Start: 2021-08-09 | End: 2021-08-10 | Stop reason: HOSPADM

## 2021-08-09 RX ORDER — ONDANSETRON 2 MG/ML
INJECTION INTRAMUSCULAR; INTRAVENOUS PRN
Status: DISCONTINUED | OUTPATIENT
Start: 2021-08-09 | End: 2021-08-09

## 2021-08-09 RX ORDER — DEXTROSE MONOHYDRATE 25 G/50ML
25-50 INJECTION, SOLUTION INTRAVENOUS
Status: DISCONTINUED | OUTPATIENT
Start: 2021-08-09 | End: 2021-08-10 | Stop reason: HOSPADM

## 2021-08-09 RX ORDER — POLYETHYLENE GLYCOL 3350 17 G/17G
17 POWDER, FOR SOLUTION ORAL DAILY
Status: DISCONTINUED | OUTPATIENT
Start: 2021-08-10 | End: 2021-08-10 | Stop reason: HOSPADM

## 2021-08-09 RX ORDER — HYDROXYZINE HYDROCHLORIDE 10 MG/1
10 TABLET, FILM COATED ORAL EVERY 6 HOURS PRN
Qty: 30 TABLET | Refills: 0 | Status: SHIPPED | OUTPATIENT
Start: 2021-08-09 | End: 2021-10-14

## 2021-08-09 RX ORDER — ALBUTEROL SULFATE 90 UG/1
AEROSOL, METERED RESPIRATORY (INHALATION) PRN
Status: DISCONTINUED | OUTPATIENT
Start: 2021-08-09 | End: 2021-08-09

## 2021-08-09 RX ORDER — METFORMIN HCL 500 MG
1000 TABLET, EXTENDED RELEASE 24 HR ORAL
Status: DISCONTINUED | OUTPATIENT
Start: 2021-08-09 | End: 2021-08-09

## 2021-08-09 RX ORDER — HYDROXYZINE HYDROCHLORIDE 10 MG/1
10 TABLET, FILM COATED ORAL EVERY 6 HOURS PRN
Status: DISCONTINUED | OUTPATIENT
Start: 2021-08-09 | End: 2021-08-10 | Stop reason: HOSPADM

## 2021-08-09 RX ORDER — FENTANYL CITRATE 0.05 MG/ML
50 INJECTION, SOLUTION INTRAMUSCULAR; INTRAVENOUS EVERY 5 MIN PRN
Status: DISCONTINUED | OUTPATIENT
Start: 2021-08-09 | End: 2021-08-09 | Stop reason: HOSPADM

## 2021-08-09 RX ORDER — SODIUM CHLORIDE, SODIUM LACTATE, POTASSIUM CHLORIDE, CALCIUM CHLORIDE 600; 310; 30; 20 MG/100ML; MG/100ML; MG/100ML; MG/100ML
INJECTION, SOLUTION INTRAVENOUS CONTINUOUS
Status: DISCONTINUED | OUTPATIENT
Start: 2021-08-09 | End: 2021-08-09

## 2021-08-09 RX ORDER — ACETAMINOPHEN 325 MG/1
975 TABLET ORAL EVERY 8 HOURS
Status: DISCONTINUED | OUTPATIENT
Start: 2021-08-09 | End: 2021-08-10 | Stop reason: HOSPADM

## 2021-08-09 RX ORDER — POLYETHYLENE GLYCOL 3350 17 G/17G
1 POWDER, FOR SOLUTION ORAL DAILY
Qty: 7 PACKET | Refills: 0 | Status: SHIPPED | OUTPATIENT
Start: 2021-08-09 | End: 2022-01-27

## 2021-08-09 RX ORDER — SODIUM CHLORIDE, SODIUM LACTATE, POTASSIUM CHLORIDE, CALCIUM CHLORIDE 600; 310; 30; 20 MG/100ML; MG/100ML; MG/100ML; MG/100ML
INJECTION, SOLUTION INTRAVENOUS CONTINUOUS PRN
Status: DISCONTINUED | OUTPATIENT
Start: 2021-08-09 | End: 2021-08-09

## 2021-08-09 RX ORDER — OXYCODONE HYDROCHLORIDE 5 MG/1
5-10 TABLET ORAL
Qty: 30 TABLET | Refills: 0 | Status: SHIPPED | OUTPATIENT
Start: 2021-08-09 | End: 2021-08-10

## 2021-08-09 RX ORDER — AMOXICILLIN 250 MG
1 CAPSULE ORAL 2 TIMES DAILY
Status: DISCONTINUED | OUTPATIENT
Start: 2021-08-09 | End: 2021-08-10 | Stop reason: HOSPADM

## 2021-08-09 RX ADMIN — MELATONIN 5 MG TABLET 5 MG: at 22:35

## 2021-08-09 RX ADMIN — HYDROXYZINE HYDROCHLORIDE 10 MG: 10 TABLET ORAL at 15:41

## 2021-08-09 RX ADMIN — OXYBUTYNIN CHLORIDE 10 MG: 10 TABLET, EXTENDED RELEASE ORAL at 15:41

## 2021-08-09 RX ADMIN — DULOXETINE HYDROCHLORIDE 30 MG: 30 CAPSULE, DELAYED RELEASE ORAL at 21:05

## 2021-08-09 RX ADMIN — INSULIN ASPART 1 UNITS: 100 INJECTION, SOLUTION INTRAVENOUS; SUBCUTANEOUS at 17:42

## 2021-08-09 RX ADMIN — CEFAZOLIN SODIUM 2 G: 2 INJECTION, SOLUTION INTRAVENOUS at 17:45

## 2021-08-09 RX ADMIN — CEFAZOLIN SODIUM 2 G: 2 INJECTION, SOLUTION INTRAVENOUS at 10:12

## 2021-08-09 RX ADMIN — ONDANSETRON 4 MG: 2 INJECTION INTRAMUSCULAR; INTRAVENOUS at 11:22

## 2021-08-09 RX ADMIN — PROPOFOL 125 MCG/KG/MIN: 10 INJECTION, EMULSION INTRAVENOUS at 10:11

## 2021-08-09 RX ADMIN — SODIUM CHLORIDE, POTASSIUM CHLORIDE, SODIUM LACTATE AND CALCIUM CHLORIDE: 600; 310; 30; 20 INJECTION, SOLUTION INTRAVENOUS at 10:01

## 2021-08-09 RX ADMIN — PROPOFOL 10 MG: 10 INJECTION, EMULSION INTRAVENOUS at 10:15

## 2021-08-09 RX ADMIN — HYDROMORPHONE HYDROCHLORIDE 0.4 MG: 0.2 INJECTION, SOLUTION INTRAMUSCULAR; INTRAVENOUS; SUBCUTANEOUS at 13:29

## 2021-08-09 RX ADMIN — PHENYLEPHRINE HYDROCHLORIDE 100 MCG: 10 INJECTION INTRAVENOUS at 11:11

## 2021-08-09 RX ADMIN — DEXMEDETOMIDINE 8 MCG: 100 INJECTION, SOLUTION, CONCENTRATE INTRAVENOUS at 10:15

## 2021-08-09 RX ADMIN — SENNOSIDES AND DOCUSATE SODIUM 1 TABLET: 8.6; 5 TABLET ORAL at 20:55

## 2021-08-09 RX ADMIN — PHENYLEPHRINE HYDROCHLORIDE 100 MCG: 10 INJECTION INTRAVENOUS at 10:42

## 2021-08-09 RX ADMIN — PHENYLEPHRINE HYDROCHLORIDE 100 MCG: 10 INJECTION INTRAVENOUS at 10:52

## 2021-08-09 RX ADMIN — DEXAMETHASONE SODIUM PHOSPHATE 4 MG: 4 INJECTION, SOLUTION INTRA-ARTICULAR; INTRALESIONAL; INTRAMUSCULAR; INTRAVENOUS; SOFT TISSUE at 10:37

## 2021-08-09 RX ADMIN — LIDOCAINE HYDROCHLORIDE 20 MG: 20 INJECTION, SOLUTION INFILTRATION; PERINEURAL at 10:14

## 2021-08-09 RX ADMIN — DEXMEDETOMIDINE 12 MCG: 100 INJECTION, SOLUTION, CONCENTRATE INTRAVENOUS at 11:50

## 2021-08-09 RX ADMIN — FENTANYL CITRATE 50 MCG: 50 INJECTION, SOLUTION INTRAMUSCULAR; INTRAVENOUS at 13:46

## 2021-08-09 RX ADMIN — PHENYLEPHRINE HYDROCHLORIDE 100 MCG: 10 INJECTION INTRAVENOUS at 11:34

## 2021-08-09 RX ADMIN — PHENYLEPHRINE HYDROCHLORIDE 100 MCG: 10 INJECTION INTRAVENOUS at 11:22

## 2021-08-09 RX ADMIN — FAMOTIDINE 10 MG: 10 TABLET ORAL at 22:35

## 2021-08-09 RX ADMIN — ACETAMINOPHEN 975 MG: 325 TABLET, FILM COATED ORAL at 15:40

## 2021-08-09 RX ADMIN — PHENYLEPHRINE HYDROCHLORIDE 100 MCG: 10 INJECTION INTRAVENOUS at 11:01

## 2021-08-09 RX ADMIN — OXYCODONE HYDROCHLORIDE 5 MG: 5 TABLET ORAL at 21:54

## 2021-08-09 RX ADMIN — LIDOCAINE HYDROCHLORIDE 20 MG: 20 INJECTION, SOLUTION INFILTRATION; PERINEURAL at 10:18

## 2021-08-09 RX ADMIN — MEPIVACAINE HYDROCHLORIDE 3 ML: 20 INJECTION, SOLUTION EPIDURAL; INFILTRATION at 15:06

## 2021-08-09 RX ADMIN — OXYCODONE HYDROCHLORIDE 2.5 MG: 5 TABLET ORAL at 15:41

## 2021-08-09 RX ADMIN — HYDROMORPHONE HYDROCHLORIDE 0.4 MG: 0.2 INJECTION, SOLUTION INTRAMUSCULAR; INTRAVENOUS; SUBCUTANEOUS at 13:48

## 2021-08-09 RX ADMIN — SODIUM CHLORIDE, POTASSIUM CHLORIDE, SODIUM LACTATE AND CALCIUM CHLORIDE: 600; 310; 30; 20 INJECTION, SOLUTION INTRAVENOUS at 08:28

## 2021-08-09 RX ADMIN — ROPIVACAINE HYDROCHLORIDE 20 ML: 5 INJECTION, SOLUTION EPIDURAL; INFILTRATION; PERINEURAL at 15:06

## 2021-08-09 RX ADMIN — ASPIRIN 325 MG: 325 TABLET, COATED ORAL at 17:41

## 2021-08-09 RX ADMIN — TRANEXAMIC ACID 1950 MG: 650 TABLET ORAL at 08:18

## 2021-08-09 RX ADMIN — SODIUM CHLORIDE, POTASSIUM CHLORIDE, SODIUM LACTATE AND CALCIUM CHLORIDE: 600; 310; 30; 20 INJECTION, SOLUTION INTRAVENOUS at 15:01

## 2021-08-09 RX ADMIN — PROPOFOL 20 MG: 10 INJECTION, EMULSION INTRAVENOUS at 10:08

## 2021-08-09 RX ADMIN — SODIUM CHLORIDE, POTASSIUM CHLORIDE, SODIUM LACTATE AND CALCIUM CHLORIDE: 600; 310; 30; 20 INJECTION, SOLUTION INTRAVENOUS at 11:55

## 2021-08-09 RX ADMIN — ALBUTEROL SULFATE 2 PUFF: 90 AEROSOL, METERED RESPIRATORY (INHALATION) at 10:01

## 2021-08-09 ASSESSMENT — COPD QUESTIONNAIRES
COPD: 1
CAT_SEVERITY: MODERATE

## 2021-08-09 ASSESSMENT — LIFESTYLE VARIABLES: TOBACCO_USE: 1

## 2021-08-09 ASSESSMENT — MIFFLIN-ST. JEOR: SCORE: 1356.75

## 2021-08-09 NOTE — CONSULTS
[facility]  Consult Note - Hospitalist Service     Date of Admission:  8/9/2021  Consult Requested by: Dr. Main  Reason for Consult: Diabetes management following right TKA.  PRIMARY CARE PROVIDER:    Juve Bruner    Assessment & Plan   Macie Jorge is a 80 year old female admitted on 8/9/2021.    Past medical history significant for OA, Chronic pain (Cervical pain, Lumbar spinal stenosis with neurogenic claudication), HTN, DM2, CKD stage 3b, COPD with asthma, Hypothyroidism, Obesity, Urinary incontinence, History of Uric acid kidney stone who under an elective right TKA.      OA with degenerative changes of the right knee s/p right TKA  POD# 0.   - Orthopedic Service is managing.   --Defer analgesic management, DVT prophylaxis, PT/OT.     --Defer resumption of ASA therapy.    - HGB check in the morning.    - Encourage utilization of incentive spirometer.     Chronic pain   (Cervical pain, Lumbar spinal stenosis with neurogenic claudication, migrines)  - Analgesic management per Ortho.    - Resumed on PTA Cymbalta 30 mg BID.    - PRN Imitrex available.      HTN  - Hold PTA lisinopril 5 mg/d.  Resume at discharge.    - Check BMP in the  morning.    -PRN IV hydralazine 10 mg every 4 hours for SBP > 170.      DM2  Notably, patient received 4 mg of IV dexamethasone intra-op and anticipate seeing some hyperglycemia.    - Hold PTA metformin 1000 mg/d (with dinner).  - Sliding scale insulin, medium intensity.    - Glucose checks QID.    - Hypoglycemic protocol.      CKD stage 3b  Baseline creatinine recently around 1.50-1.60 with GFR in the 30's.    - Monitor.      COPD with asthma  - Resumed on PTA Advair BID; please use patient's own inhaler.    - PRN Albuterol inhaler available.    - Monitor O2 saturations.    - Wean O2 as able.    - Encourage use of IS.      Hypothyroidism  - Hold PTA levothyroxine 112 mcg/d.  Resume at discharge.      Urinary incontinence  - Resumed on PTA  Ditropan-XL 10 mg/d.      Obesity  Body mass index is 30.96 kg/m .  Increase in all-cause morbidity and mortality.   - Encourage weight loss.  - Follow up with PCP regarding ongoing management.    - Continue CPAP with home settings.       Anemia, chronic  Patient with ongoing anemia with baseline HGB in the 11's.  Recent decrease pre-op (10.9).  Patient was started on Iron supplementation.    - Hold iron supplementation and resume at discharge.      History of Uric acid kidney stone  - Hold PTA Allopurinol and resume at discharge.      Clinically Significant Risk Factors Present on Admission                     Diet: Advance Diet as Tolerated: Regular Diet Adult  Regular Diet Adult     DVT Prophylaxis: Defer to primary service   Claudio Catheter: Not present  Code Status: Full Code     Disposition Plan    Expected discharge: Per Ortho.    Entered: Corona Shrestha PA-C 08/09/2021, 3:45 PM        The patient's care was discussed with the Patient.    The patient has been discussed with Dr. Valentine, who agrees with the assessment and plan at this time.    At this time, I'd like to thank Dr. Main for consulting the Hospitalist service.  We will continue to follow.        Corona Shrestha PA-C  [facility]  Securely message with the [employer] Web Console (learn more here)  Text page via [employer] Paging/Directory    ______________________________________________________________________    Chief Complaint   Elective right TKA.      History is obtained from the patient and EMR.      History of Present Illness   Macie Jorge is a 80 year old female with a past medical history significant for OA, Chronic pain (Cervical pain, Lumbar spinal stenosis with neurogenic claudication), HTN, DM2, CKD stage 3b, COPD with asthma, Hypothyroidism, Obesity, Urinary incontinence, History of Uric acid kidney stone who under an elective right TKA.      Surgery was performed under spinal anesthesia.   EBL was documented at less than 50 ml.      Patient was resting in bed upon arrival.  Initially, we reviewed her medical and surgical history as well as some of her PTA medications.  She indicated that she has never had issues with blood pressure and some of her medications are for avoiding development of kidney stones.      Upon questioning, she indicated that she periodically has migraines but none recently.  She has some pre-cancerous lesions on her face that she is using a cream for.  She has chronic shortness of breath when going up and down stairs but stated it is unchanged from her normal.  Last week, she had some dizziness and stated her HGB was lower than it was previously.  She was started on iron tablets and another medication.      Reviewed plans with patient in regards to PTA medications and she was agree bale to holding some medications as she is anticipating returning home likely tomorrow. We also discussed management of her diabetes (holding oral medication and use of subcutaneous insulin).  Patient understood and agreed with this plan.        Review of Systems   The 10 point Review of Systems is negative other than noted in the HPI.    Past Medical History    I have reviewed this patient's medical history and updated it with pertinent information if needed.   Past Medical History:   Diagnosis Date     Calculus of left ureter 2014    dr Arriaga - ureteroscopy and lithitripsy     Cataract      Chronic low back pain     Dr Villagran at [facility] in 2015- not surgical      Complication of anesthesia      COPD with asthma (H)      followed with pulmonary DR LENNOX 60-pack-year history of smoking     Esophagitis, reflux 9/2012    SOME EOSINOPHILIA NO BARRETS     Gastro-oesophageal reflux disease      has seen GI     Hydronephrosis, left     chronic , in 2017 Dr Arriaga plan was observaton     Hyperlipidaemia LDL goal < 100      Hypothyroid      Kidney stone 3/28/2019     Migraine headaches 9/1/2011     Moderate persistent  asthma      lifelong, saw Pulm 2013     Obesity (BMI 30-39.9)      Panic attacks      PONV (postoperative nausea and vomiting)      Renal stones 2016    kidney stones, multiple small stones high risk recurrent ureteral stones. , Dr Arriaga in 2016 rec 24 hour urine.     Type 2 diabetes mellitus without complication (H)    OA, Chronic pain (Cervical pain, Lumbar spinal stenosis with neurogenic claudication), HTN, DM2, CKD stage 3b, COPD with asthma, Hypothyroidism, Obesity, MDD with anxiety, Urinary incontinence, History of Uric acid kidney stone    Past Surgical History   I have reviewed this patient's surgical history and updated it with pertinent information if needed.  Past Surgical History:   Procedure Laterality Date     ARTHROPLASTY HIP  3/13/2013    Procedure: ARTHROPLASTY HIP;  LEFT TOTAL HIP ARTHROPLASTY (BIOMET)^ ;  Surgeon: Anand Main MD;  Location:  OR     ARTHROPLASTY PATELLO-FEMORAL (KNEE)  2005ish    Left     CHOLECYSTECTOMY, OPEN  25 yo     COMBINED CYSTOSCOPY, INSERT STENT URETER(S)  8/5/2014    Procedure: COMBINED CYSTOSCOPY, INSERT STENT URETER(S);  Surgeon: Sam Arriaga MD;  Location:  OR     COMBINED CYSTOSCOPY, RETROGRADES, URETEROSCOPY, LASER HOLMIUM LITHOTRIPSY URETER(S), INSERT STENT Left 6/14/2016    Procedure: COMBINED CYSTOSCOPY, RETROGRADES, URETEROSCOPY, LASER HOLMIUM LITHOTRIPSY URETER(S), INSERT STENT;  Surgeon: Thomas Edmondson MD;  Location:  OR     COMBINED CYSTOSCOPY, RETROGRADES, URETEROSCOPY, LASER HOLMIUM LITHOTRIPSY URETER(S), INSERT STENT Left 3/28/2019    Procedure: CYSTOSCOPY, LEFT RETROGRADE PYLEOGRAM, LEFT URETEROSCOPY, HOLMIUM LASER LITHOTRIPSY, LEFT URETERAL STENT EXCHANGE;  Surgeon: Derrek Rivera MD;  Location:  OR     CYSTOSCOPY, RETROGRADES, INSERT STENT URETER(S), COMBINED Left 3/8/2019    Procedure: CYSTOSCOPY,LEFT RETROGRADE, LEFT STENT PLACEMENT;  Surgeon: Derrek Rivera MD;  Location:  OR     GENITOURINARY SURGERY        LAMINECT/DISCECTOMY, LUMBAR      Laminectomy/Discectomy Lumbar     LASER HOLMIUM LITHOTRIPSY URETER(S), INSERT STENT, COMBINED Left 2014    Procedure: COMBINED CYSTOSCOPY, URETEROSCOPY, LASER HOLMIUM LITHOTRIPSY URETER(S), INSERT STENT;  Surgeon: Sam Arriaga MD;  Location:  OR     LASER HOLMIUM LITHOTRIPSY URETER(S), INSERT STENT, COMBINED Left 2019    Procedure: CYSTOSCOPY, LEFT RETROGRADE, LEFT URETEROSCOPY, HOLMIUM LASER LITHOTRIPSY, STONE REMOVAL, LEFT STENT EXCHANGE;  Surgeon: Derrek Rivera MD;  Location:  OR     LASER HOLMIUM LITHOTRIPSY URETER(S), INSERT STENT, COMBINED Right 2019    Procedure: CYSTOSCOPY; RIGHT URETEROSCOPY WITH HOLMIUM LASER LITHOTRIPSY; RIGHT STENT PLACEMENT (DIGITAL FLEXIBLE URETEROSCOPE);  Surgeon: Derrek Rivera MD;  Location:  OR     ORTHOPEDIC SURGERY      left ankle     RECESSION EYELID UPPER     Left THAO, Left TKA, Cholecystectomy, Bilateral cataracts, Upper eye lid surgery, Left ankle surgery, Left wrist surgery x2 and Several urologic procedures with lithotripsy/ureteral stents    Social History   I have reviewed this patient's social history and updated it with pertinent information if needed.  Patient resides at a house in [place], [place] with her .  She is a former smoker who quit in .  She consumes alcohol once in awhile.  She does not use illicit drugs.  She does not use a cane/walker or use a CPAP.    Social History     Tobacco Use     Smoking status: Former Smoker     Packs/day: 1.50     Years: 40.00     Pack years: 60.00     Quit date: 1995     Years since quittin.6     Smokeless tobacco: Never Used   Substance Use Topics     Alcohol use: Yes     Comment: occ.     Drug use: No       Family History   I have reviewed this patient's family history and updated it with pertinent information if needed.   History reviewed. No pertinent family history.   Mother:  from ovarian cancer.  Father:  from cancer.       Medications   Prior to Admission Medications   Prescriptions Last Dose Informant Patient Reported? Taking?   DULoxetine (CYMBALTA) 30 MG capsule 8/8/2021 at PM Self No Yes   Sig: Take 1 capsule (30 mg) by mouth 2 times daily   HYDROcodone-acetaminophen (NORCO) 5-325 MG tablet  at PRN Self No Yes   Sig: Take 1-2 tablets by mouth every 4 hours as needed for moderate to severe pain   SUMAtriptan (IMITREX) 25 MG tablet  at PRN Self No Yes   Sig: Take 1 tablet (25 mg) by mouth at onset of headache for migraine May repeat in 2 hours. Max 8 tablets/24 hours.   albuterol (PROAIR HFA/PROVENTIL HFA/VENTOLIN HFA) 108 (90 Base) MCG/ACT inhaler Past Week at PRN Self No Yes   Sig: Inhale 2 puffs into the lungs every 6 hours as needed for shortness of breath / dyspnea or wheezing   allopurinol (ZYLOPRIM) 100 MG tablet 8/8/2021 at AM Self No Yes   Sig: Take 1 tablet (100 mg) by mouth daily   blood glucose (NO BRAND SPECIFIED) lancets standard  Self No Yes   Sig: Use to test blood sugar 1 time daily or as directed.   blood glucose (NO BRAND SPECIFIED) test strip  Self No Yes   Sig: Use to test blood sugar 1 time daily or as directed.   blood glucose monitoring (NO BRAND SPECIFIED) meter device kit  Self No Yes   Sig: Use to test blood sugar 1 time daily or as directed.   cephALEXin (KEFLEX) 500 MG capsule  at PRN Self Yes Yes   Sig: TAKE 2 CAPSULES BY MOUTH ONE HOUR BEFORE DENTAL APPOINTMENT AND TAKE 2 CAPSULES BY MOUTH 8 HOURS AFTER DENTIST   fluticasone-salmeterol (ADVAIR) 250-50 MCG/DOSE inhaler 8/9/2021 at AM Self No Yes   Sig: Inhale 1 puff into the lungs every 12 hours   levothyroxine (SYNTHROID/LEVOTHROID) 112 MCG tablet  at AM Self No Yes   Sig: Take 1 tablet (112 mcg) by mouth daily   lisinopril (ZESTRIL) 5 MG tablet 8/8/2021 at AM Self No Yes   Sig: Take 1 tablet (5 mg) by mouth daily   metFORMIN (GLUCOPHAGE-XR) 500 MG 24 hr tablet 8/7/2021 Self No No   Sig: Take 2 tablets (1,000 mg) by mouth daily (with dinner)    oxybutynin ER (DITROPAN-XL) 10 MG 24 hr tablet 8/8/2021 at AM Self No Yes   Sig: Take 1 tablet by mouth once daily      Facility-Administered Medications: None     Allergies   Allergies   Allergen Reactions     Morphine Nausea     Severe vomiting     Baclofen GI Disturbance     Imitrex [Sumatriptan] Nausea     Tetracycline      Sores in mouth       Physical Exam   Vital Signs: Temp: 97  F (36.1  C) Temp src: Temporal BP: 136/74 Pulse: 85   Resp: 17 SpO2: 97 % O2 Device: Nasal cannula Oxygen Delivery: 2 LPM  Weight: 191 lbs 12.8 oz    Constitutional: Awake, alert, cooperative, no apparent distress.    ENT: Normocephalic, without obvious abnormality, atraumatic, oral pharynx with moist mucus membranes, tonsils without erythema or exudates.  Eyes pupils are equal, round and reactive to light; extra occular movements intact.  Normal sclera.    Neck: Supple, symmetrical, trachea midline, no adenopathy.  Pulmonary: No increased work of breathing, good air exchange, clear to auscultation bilaterally, no crackles or wheezing.  Cardiovascular: Regular rate and rhythm, normal S1 and S2, no S3 or S4, and 3/6 systolic murmur noted.  GI: Normal bowel sounds, soft, non-distended, non-tender.    Skin/Integumen: Clear.  Neuro: CN II-XII grossly intact.  Upper extremities strength, coordination and sensation intact bilaterally.  Lower extremity assessment deferred due to post-op state.  Dorsal and plantar flexion intact and even bilaterally.     Psych:  Alert and oriented x 3. Normal affect.  Extremities: No lower extremity edema noted, and calves are non-tender to palpation bilaterally.     Data   I personally reviewed no images or EKG's today.  Results for orders placed or performed during the hospital encounter of 08/09/21 (from the past 24 hour(s))   Potassium   Result Value Ref Range    Potassium 4.0 3.4 - 5.3 mmol/L   Glucose   Result Value Ref Range    Glucose 102 (H) 70 - 99 mg/dL    Patient Fasting > 8hrs? Yes     Creatinine   Result Value Ref Range    Creatinine 1.77 (H) 0.52 - 1.04 mg/dL    GFR Estimate 27 (L) >60 mL/min/1.73m2   XR Knee Port Right 1/2 Views    Narrative    XR KNEE PORT RIGHT 1/2 VIEWS 8/9/2021 12:20 PM     HISTORY: Post-Op Total Knee      Impression    IMPRESSION: Right total knee arthroplasty without apparent  complication. Surgical drain and skin staples are in place.    PATRICIA TRAMMELL MD         SYSTEM ID:  [id number]   Glucose by meter   Result Value Ref Range    GLUCOSE BY METER POCT 158 (H) 70 - 99 mg/dL

## 2021-08-09 NOTE — ANESTHESIA CARE TRANSFER NOTE
Patient: Maice Jorge    Procedure(s):  RIGHT TOTAL KNEE ARTHROPLASTY    Diagnosis: Knee pain [M25.569]  Osteoarthritis [M19.90]  Diagnosis Additional Information: No value filed.    Anesthesia Type:   Spinal     Note:    Oropharynx: oropharynx clear of all foreign objects and spontaneously breathing  Level of Consciousness: awake  Oxygen Supplementation: face mask  Level of Supplemental Oxygen (L/min / FiO2): 6  Independent Airway: airway patency satisfactory and stable  Dentition: dentition unchanged  Vital Signs Stable: post-procedure vital signs reviewed and stable  Report to RN Given: handoff report given  Patient transferred to: PACU  Comments: At end of procedure, spontaneous respirations, patient alert to voice, able to follow commands. Oxygen via facemask at 6 liters per minute to PACU. Oxygen tubing connected to wall O2 in PACU, SpO2, NiBP, and EKG monitors and alarms on and functioning, Rima Hugger warmer connected to patient gown, report on patient's clinical status given to PACU RN, RN questions answered.  Handoff Report: Identifed the Patient, Identified the Reponsible Provider, Reviewed the pertinent medical history, Discussed the surgical course, Reviewed Intra-OP anesthesia mangement and issues during anesthesia, Set expectations for post-procedure period and Allowed opportunity for questions and acknowledgement of understanding      Vitals:  Vitals Value Taken Time   BP     Temp     Pulse 73 08/09/21 1202   Resp 20 08/09/21 1202   SpO2 100 % 08/09/21 1202   Vitals shown include unvalidated device data.    Electronically Signed By: WILEY Levin CRNA  August 9, 2021  12:03 PM

## 2021-08-09 NOTE — ANESTHESIA PROCEDURE NOTES
Intrathecal Procedure Note  Pre-Procedure   Staff -        Anesthesiologist:  Chidi Yoon MD       Performed By: Anesthesiologist       Location: OR       Pre-Anesthestic Checklist: patient identified, IV checked, site marked, risks and benefits discussed, informed consent, monitors and equipment checked, pre-op evaluation, at physician/surgeon's request and post-op pain management  Timeout:       Correct Patient: Yes        Correct Procedure: Yes        Correct Site: Yes        Correct Position: Yes   Procedure Documentation  Procedure: intrathecal       Patient Position: sitting       Patient Prep/Sterile Barriers: sterile gloves, mask, patient draped       Skin prep: Betadine       Insertion Site: L3-4. (midline approach).       Needle Gauge: 24.        Spinal Needle Type: Pencan       Introducer used       Introducer: 20 G      # of attempts: 1 and  # of redirects: : 0.    Assessment/Narrative         Paresthesias: No.       CSF fluid: clear.    Medication(s) Administered   2% Mepivacaine PF (Intrathecal), 3 mL

## 2021-08-09 NOTE — PROGRESS NOTES
"   08/09/21 1616   Quick Adds   Type of Visit Initial PT Evaluation   Living Environment   People in home spouse   Current Living Arrangements house   Home Accessibility stairs to enter home   Number of Stairs, Main Entrance 2   Stair Railings, Main Entrance railings safe and in good condition   Transportation Anticipated family or friend will provide   Living Environment Comments Lives in 1 story house with raised toilet seats, tub/shower, 2 SANDRO with rail. Does not have FWW at home, spouse currently looking for 4WW   Self-Care   Usual Activity Tolerance moderate   Current Activity Tolerance fair   Regular Exercise No   Equipment Currently Used at Home none   Activity/Exercise/Self-Care Comment ind with self-cares and ADLs/IADLs normally. hx of L TKA 15 years ago, does not think she needs therapy this time \"I'll just walk a lot\", however did state doing therapy post last TKA    Disability/Function   Fall history within last six months no   General Information   Onset of Illness/Injury or Date of Surgery 08/09/21   Referring Physician Anand Main MD   Patient/Family Therapy Goals Statement (PT) return home, be able to kneel again on the knee    Pertinent History of Current Problem (include personal factors and/or comorbidities that impact the POC) R TKA, Past medical history significant for OA, Chronic pain (Cervical pain, Lumbar spinal stenosis with neurogenic claudication), HTN, DM2, CKD stage 3b, COPD with asthma, Hypothyroidism, Obesity, Urinary incontinence   Weight-Bearing Status - RLE weight-bearing as tolerated   Cognition   Orientation Status (Cognition) oriented x 4   Affect/Mental Status (Cognition) WFL   Follows Commands (Cognition) WFL   Safety Deficit (Cognition) minimal deficit;insight into deficits/self-awareness  (appears not ready for therapy/exercises post procedure)   Pain Assessment   Patient Currently in Pain Yes, see Vital Sign flowsheet  (high pain reports in R knee )   Bed Mobility "   Comment (Bed Mobility) Min A supine <> sit    Transfers   Transfer Safety Comments Min A sit <> stand with FWW    Gait/Stairs (Locomotion)   [place] Level (Gait) contact guard   Assistive Device (Gait) walker, front-wheeled   Distance in Feet (Required for LE Total Joints) 10' x 2    Clinical Impression   Criteria for Skilled Therapeutic Intervention yes, treatment indicated   PT Diagnosis (PT) impaired gait    Influenced by the following impairments decreased ROM, pain, impaired balance, weakness    Functional limitations due to impairments fall risk, decreased activity tolerance    Clinical Presentation Stable/Uncomplicated   Clinical Presentation Rationale clinical judgement    Clinical Decision Making (Complexity) low complexity   Therapy Frequency (PT) 2x/day   Predicted Duration of Therapy Intervention (days/wks) 3 days    Planned Therapy Interventions (PT) bed mobility training;balance training;gait training;home exercise program;stair training;strengthening;transfer training   Anticipated Equipment Needs at Discharge (PT) walker, rolling   Risk & Benefits of therapy have been explained evaluation/treatment results reviewed;care plan/treatment goals reviewed;risks/benefits reviewed;current/potential barriers reviewed;participants voiced agreement with care plan;participants included;patient   PT Discharge Planning    PT Rationale for DC Rec Pt would benefit from ongoing therapies, currently needing Min A x 1 person for all mobility and limited by high pain reports. Anticipate patient will be able to d/c home with spouse assist and use of FWW for safe ambulation.    PT Brief overview of current status  Min A for all mobility, limited to walking 10 feet d/t pain    Therapy Certification   Start of care date 08/09/21   Certification date from 08/09/21   Certification date to 08/12/21   Medical Diagnosis s/p R TKA    Total Evaluation Time   Total Evaluation Time (Minutes) 10

## 2021-08-09 NOTE — ANESTHESIA POSTPROCEDURE EVALUATION
Patient: Macie Jorge    Procedure(s):  RIGHT TOTAL KNEE ARTHROPLASTY    Diagnosis:Knee pain [M25.569]  Osteoarthritis [M19.90]  Diagnosis Additional Information: No value filed.    Anesthesia Type:  Spinal    Note:  Disposition: Inpatient   Postop Pain Control: Uneventful            Sign Out: Well controlled pain   PONV: No   Neuro/Psych: Uneventful            Sign Out: Acceptable/Baseline neuro status   Airway/Respiratory: Uneventful            Sign Out: Acceptable/Baseline resp. status   CV/Hemodynamics: Uneventful            Sign Out: Acceptable CV status; No obvious hypovolemia; No obvious fluid overload   Other NRE: NONE   DID A NON-ROUTINE EVENT OCCUR? No           Last vitals:  Vitals Value Taken Time   /70 08/09/21 1400   Temp 36.1  C (97  F) 08/09/21 1345   Pulse 86 08/09/21 1400   Resp 23 08/09/21 1400   SpO2 96 % 08/09/21 1401   Vitals shown include unvalidated device data.    Electronically Signed By: Chidi Yoon MD  August 9, 2021  3:07 PM

## 2021-08-09 NOTE — PLAN OF CARE
[facility]      OUTPATIENT PHYSICAL THERAPY EVALUATION  PLAN OF TREATMENT FOR OUTPATIENT REHABILITATION  (COMPLETE FOR INITIAL CLAIMS ONLY)  Patient's Last Name, First Name, M.I.  YOB: 1940  AniaMacie  ANN                        Provider's Name  [facility] Medical Record No.  [id number]                               Onset Date:  08/09/21   Start of Care Date:  08/09/21      Type:     _X_PT   ___OT   ___SLP Medical Diagnosis:  s/p R TKA                         PT Diagnosis:  impaired gait    Visits from SOC:  1   _________________________________________________________________________________  Plan of Treatment/Functional Goals    Planned Interventions: bed mobility training, balance training, gait training, home exercise program, stair training, strengthening, transfer training     Goals: See Physical Therapy Goals on Care Plan in [employer] electronic health record.    Therapy Frequency: 2x/day  Predicted Duration of Therapy Intervention: 3 days   _________________________________________________________________________________    I CERTIFY THE NEED FOR THESE SERVICES FURNISHED UNDER        THIS PLAN OF TREATMENT AND WHILE UNDER MY CARE     (Physician co-signature of this document indicates review and certification of the therapy plan).              Certification date from: 08/09/21, Certification date to: 08/12/21    Referring Physician: Anand Main MD            Initial Assessment        See Physical Therapy evaluation dated 08/09/21 in Epic electronic health record.

## 2021-08-09 NOTE — ANESTHESIA PREPROCEDURE EVALUATION
Prior to Admission medications    Medication Sig Start Date End Date Taking? Authorizing Provider   albuterol (PROAIR HFA/PROVENTIL HFA/VENTOLIN HFA) 108 (90 Base) MCG/ACT inhaler Inhale 2 puffs into the lungs every 6 hours as needed for shortness of breath / dyspnea or wheezing 9/14/20  Yes Haleigh Smallwood APRN CNP   allopurinol (ZYLOPRIM) 100 MG tablet Take 1 tablet (100 mg) by mouth daily 8/2/21  Yes Haleigh Smallwood APRN CNP   blood glucose (NO BRAND SPECIFIED) lancets standard Use to test blood sugar 1 time daily or as directed. 1/15/20  Yes Marjan Pa MD   blood glucose (NO BRAND SPECIFIED) test strip Use to test blood sugar 1 time daily or as directed. 1/15/20  Yes Marjan Pa MD   blood glucose monitoring (NO BRAND SPECIFIED) meter device kit Use to test blood sugar 1 time daily or as directed. 1/15/20  Yes Marjan Pa MD   cephALEXin (KEFLEX) 500 MG capsule TAKE 2 CAPSULES BY MOUTH ONE HOUR BEFORE DENTAL APPOINTMENT AND TAKE 2 CAPSULES BY MOUTH 8 HOURS AFTER DENTIST 4/20/21  Yes Reported, Patient   DULoxetine (CYMBALTA) 30 MG capsule Take 1 capsule (30 mg) by mouth 2 times daily 8/2/21  Yes Haleigh Smallwood APRN CNP   fluticasone-salmeterol (ADVAIR) 250-50 MCG/DOSE inhaler Inhale 1 puff into the lungs every 12 hours 4/19/21  Yes Juve Bruner MD   HYDROcodone-acetaminophen (NORCO) 5-325 MG tablet Take 1-2 tablets by mouth every 4 hours as needed for moderate to severe pain 8/2/21  Yes Haleigh Smallwood APRN CNP   levothyroxine (SYNTHROID/LEVOTHROID) 112 MCG tablet Take 1 tablet (112 mcg) by mouth daily 3/18/21  Yes Juve Bruner MD   lisinopril (ZESTRIL) 5 MG tablet Take 1 tablet (5 mg) by mouth daily 3/22/21  Yes Juve Bruner MD   oxybutynin ER (DITROPAN-XL) 10 MG 24 hr tablet Take 1 tablet by mouth once daily 5/14/21  Yes Elias Meyer MD   SUMAtriptan (IMITREX) 25 MG tablet Take 1 tablet (25 mg) by mouth at onset of headache for  migraine May repeat in 2 hours. Max 8 tablets/24 hours. 20  Yes Korin Vaca APRN CNP   metFORMIN (GLUCOPHAGE-XR) 500 MG 24 hr tablet Take 2 tablets (1,000 mg) by mouth daily (with dinner) 21   Haleigh Smallwood APRN CNP     Current Facility-Administered Medications Ordered in Epic   Medication Dose Route Frequency Last Rate Last Admin     ceFAZolin (ANCEF) intermittent infusion 2 g in 100 mL dextrose PRE-MIX  2 g Intravenous Pre-Op/Pre-procedure x 1 dose         ceFAZolin (ANCEF) intermittent infusion 2 g in 100 mL dextrose PRE-MIX  2 g Intravenous See Admin Instructions         lactated ringers infusion   Intravenous Continuous         lidocaine 1 % 0.1-1 mL  0.1-1 mL Other Q1H PRN         ropivacaine (NAROPIN) 300 mg, ketorolac (TORADOL) 30 mg, EPINEPHrine (ADRENALIN) 0.6 mg in sodium chloride 0.9 % 100 mL (ORTHO ROXANA STANDARD DOSE)   INTRA-ARTICULAR On Call to OR         tranexamic acid (LYSTEDA) tablet 1,950 mg  1,950 mg Oral Once         No current [facility]-ordered outpatient medications on file.       lactated ringers       No results for input(s): ABO, RH in the last 27785 hours.  No results for input(s): HCG in the last 69768 hours.  No results found for this or any previous visit (from the past 744 hour(s)).Anesthesia Pre-Procedure Evaluation    Patient: Macie Jorge   MRN: [id number] : 1940        Preoperative Diagnosis: Knee pain [M25.569]  Osteoarthritis [M19.90]   Procedure : Procedure(s):  RIGHT TOTAL KNEE ARTHROPLASTY     Past Medical History:   Diagnosis Date     Calculus of left ureter     dr Arriaga - ureteroscopy and lithitripsy     Cataract      Chronic low back pain     Dr Villagran at [facility] in 2015- not surgical      Complication of anesthesia      COPD with asthma (H)      followed with pulmonary DR LENNOX 60-pack-year history of smoking     Esophagitis, reflux 2012    SOME EOSINOPHILIA NO BARRETS     Gastro-oesophageal reflux disease      has seen GI      Hydronephrosis, left     chronic , in 2017 Dr Arriaga plan was observaton     Hyperlipidaemia LDL goal < 100      Hypothyroid      Kidney stone 3/28/2019     Migraine headaches 9/1/2011     Moderate persistent asthma      lifelong, saw Pulm 2013     Obesity (BMI 30-39.9)      Panic attacks      PONV (postoperative nausea and vomiting)      Renal stones 2016    kidney stones, multiple small stones high risk recurrent ureteral stones. , Dr Arriaga in 2016 rec 24 hour urine.     Type 2 diabetes mellitus without complication (H)       Past Surgical History:   Procedure Laterality Date     ARTHROPLASTY HIP  3/13/2013    Procedure: ARTHROPLASTY HIP;  LEFT TOTAL HIP ARTHROPLASTY (BIOMET)^ ;  Surgeon: Anand Main MD;  Location:  OR     ARTHROPLASTY PATELLO-FEMORAL (KNEE)  2005ish    Left     CHOLECYSTECTOMY, OPEN  27 yo     COMBINED CYSTOSCOPY, INSERT STENT URETER(S)  8/5/2014    Procedure: COMBINED CYSTOSCOPY, INSERT STENT URETER(S);  Surgeon: Sam Arriaga MD;  Location:  OR     COMBINED CYSTOSCOPY, RETROGRADES, URETEROSCOPY, LASER HOLMIUM LITHOTRIPSY URETER(S), INSERT STENT Left 6/14/2016    Procedure: COMBINED CYSTOSCOPY, RETROGRADES, URETEROSCOPY, LASER HOLMIUM LITHOTRIPSY URETER(S), INSERT STENT;  Surgeon: Thomas Edmondson MD;  Location:  OR     COMBINED CYSTOSCOPY, RETROGRADES, URETEROSCOPY, LASER HOLMIUM LITHOTRIPSY URETER(S), INSERT STENT Left 3/28/2019    Procedure: CYSTOSCOPY, LEFT RETROGRADE PYLEOGRAM, LEFT URETEROSCOPY, HOLMIUM LASER LITHOTRIPSY, LEFT URETERAL STENT EXCHANGE;  Surgeon: Derrek Rivera MD;  Location:  OR     CYSTOSCOPY, RETROGRADES, INSERT STENT URETER(S), COMBINED Left 3/8/2019    Procedure: CYSTOSCOPY,LEFT RETROGRADE, LEFT STENT PLACEMENT;  Surgeon: Derrek Rivera MD;  Location:  OR     GENITOURINARY SURGERY       LAMINECT/DISCECTOMY, LUMBAR      Laminectomy/Discectomy Lumbar     LASER HOLMIUM LITHOTRIPSY URETER(S), INSERT STENT, COMBINED Left 8/20/2014     Procedure: COMBINED CYSTOSCOPY, URETEROSCOPY, LASER HOLMIUM LITHOTRIPSY URETER(S), INSERT STENT;  Surgeon: Sam Arriaga MD;  Location:  OR     LASER HOLMIUM LITHOTRIPSY URETER(S), INSERT STENT, COMBINED Left 2019    Procedure: CYSTOSCOPY, LEFT RETROGRADE, LEFT URETEROSCOPY, HOLMIUM LASER LITHOTRIPSY, STONE REMOVAL, LEFT STENT EXCHANGE;  Surgeon: Derrek Rivera MD;  Location:  OR     LASER HOLMIUM LITHOTRIPSY URETER(S), INSERT STENT, COMBINED Right 2019    Procedure: CYSTOSCOPY; RIGHT URETEROSCOPY WITH HOLMIUM LASER LITHOTRIPSY; RIGHT STENT PLACEMENT (DIGITAL FLEXIBLE URETEROSCOPE);  Surgeon: Derrek Rivera MD;  Location:  OR     ORTHOPEDIC SURGERY      left ankle     RECESSION EYELID UPPER        Allergies   Allergen Reactions     Morphine Nausea     Severe vomiting     Baclofen GI Disturbance     Imitrex [Sumatriptan] Nausea     Tetracycline      Sores in mouth      Social History     Tobacco Use     Smoking status: Former Smoker     Packs/day: 1.50     Years: 40.00     Pack years: 60.00     Quit date: 1995     Years since quittin.6     Smokeless tobacco: Never Used   Substance Use Topics     Alcohol use: Yes     Comment: occ.      Wt Readings from Last 1 Encounters:   21 87.9 kg (193 lb 12.8 oz)        Anesthesia Evaluation   Pt has had prior anesthetic.     History of anesthetic complications  - PONV.      ROS/MED HX  ENT/Pulmonary:     (+) tobacco use, Past use, Intermittent, asthma moderate,  COPD,     Neurologic:       Cardiovascular:       METS/Exercise Tolerance:     Hematologic:       Musculoskeletal:       GI/Hepatic:     (+) GERD, Asymptomatic on medication,     Renal/Genitourinary:     (+) renal disease,     Endo:     (+) type II DM, thyroid problem, hypothyroidism, Obesity,     Psychiatric/Substance Use:       Infectious Disease:       Malignancy:       Other:            Physical Exam    Airway        Mallampati: I    Neck ROM: full     Respiratory Devices  and Support         Dental  no notable dental history     (+) caps      Cardiovascular   cardiovascular exam normal          Pulmonary   pulmonary exam normal                OUTSIDE LABS:  CBC:   Lab Results   Component Value Date    WBC 4.5 08/02/2021    WBC 5.2 01/20/2020    HGB 10.9 (A) 08/02/2021    HGB 11.6 (A) 01/20/2020    HCT 30.9 (A) 08/02/2021    HCT 35.5 01/20/2020     08/02/2021     01/20/2020     BMP:   Lab Results   Component Value Date     08/02/2021     07/13/2020    POTASSIUM 4.5 08/02/2021    POTASSIUM 4.7 07/13/2020    CHLORIDE 102 08/02/2021    CHLORIDE 103 07/13/2020    CO2 26 10/01/2015    CO2 29 08/07/2014    BUN 23 08/02/2021    BUN 15 08/02/2021    CR 1.55 (H) 08/02/2021    CR 1.57 (H) 07/13/2020     (H) 08/02/2021    GLC 84 07/13/2020     COAGS:   Lab Results   Component Value Date    PTT 31 02/15/2006    INR 1.02 08/04/2014     POC:   Lab Results   Component Value Date     (H) 06/13/2019     HEPATIC:   Lab Results   Component Value Date    ALBUMIN 4.2 01/20/2020    PROTTOTAL 7.8 01/20/2020    ALT 12 01/20/2020    AST 14 01/20/2020    ALKPHOS 51 01/20/2020    BILITOTAL 0.5 01/20/2020     OTHER:   Lab Results   Component Value Date    LACT 1.5 10/01/2015    A1C 5.8 08/02/2021    WARD 9.0 08/02/2021    LIPASE 154 08/04/2014    T4 1.39 07/13/2020    T3 81 03/04/2019       Anesthesia Plan    ASA Status:  3   NPO Status:  NPO Appropriate    Anesthesia Type: Spinal.              Consents    Anesthesia Plan(s) and associated risks, benefits, and realistic alternatives discussed. Questions answered and patient/representative(s) expressed understanding.     - Discussed with:  Patient         Postoperative Care    Pain management: Peripheral nerve block (Single Shot), IV analgesics.   PONV prophylaxis: Ondansetron (or other 5HT-3)     Comments:                Chidi Yoon MD

## 2021-08-09 NOTE — ANESTHESIA PROCEDURE NOTES
Adductor canal and Femoral Procedure Note  Pre-Procedure   Staff -        Anesthesiologist:  Chidi Yoon MD       Performed By: Anesthesiologist       Location: pre-op       Pre-Anesthestic Checklist: patient identified, IV checked, site marked, risks and benefits discussed, informed consent, monitors and equipment checked, pre-op evaluation, at physician/surgeon's request and post-op pain management  Timeout:       Correct Patient: Yes        Correct Procedure: Yes        Correct Site: Yes        Correct Position: Yes        Correct Laterality: Yes        Site Marked: Yes  Procedure Documentation  Procedure: Adductor canal, Femoral       Patient Position: supine       Patient Prep/Sterile Barriers: mask, no-touch technique utilized       Skin prep: Chloraprep       Local skin infiltrated with 3 mL of 1% lidocaine.        Needle Type: insulated and short bevel       Needle Gauge: 20.        Needle Length (millimeters): 100        Ultrasound guided       1. Ultrasound was used to identify targeted nerve, plexus, vascular marker, or fascial plane and place a needle adjacent to it in real-time.       2. Ultrasound was used to visualize the spread of anesthetic in close proximity to the above referenced structure.       3. A permanent image is entered into the patient's record.    Assessment/Narrative         The placement was negative for: blood aspirated, painful injection and site bleeding       Paresthesias: No.    Test dose of mL at.         Test dose negative, 3 minutes after injection, for signs of intravascular, subdural, or intrathecal injection.     Bolus given via needle..        Secured via.        Insertion/Infusion Method: Single Shot       Complications: none    Medication(s) Administered   Ropivacaine 0.5% w/ 1:400K Epi (Injection), 20 mL  Comments:  20 cc of 0.5% Ropivacaine with epinephrine (1:400K) injected on the anterior side of the femoral artery, in close proximity to the femoral nerve branches  via the adductor canal approach.      Ultrasound Interpretation, Peripheral Nerve Block    1. Under ultrasound guidance, the needle was inserted and placed in close proximity to the target nerve(s).  2. Ultrasound was also used to visualize the spread of the anesthetic in close proximity to the nerve(s) being blocked.  Local anesthetic was administered in incremental doses, with intermittent negative aspiration.    3. The nerve(s) appeared anatomically normal.  4. There were no apparent abnormal pathological findings.  5. A permanent ultrasound image was saved in the patient's record.    Pt tolerated well.    No complications.      The surgeon has given a verbal order transferring care of this patient to me for the performance of a regional analgesia block for post-op pain control. It is requested of me because I am uniquely trained and qualified to perform this block and the surgeon is neither trained nor qualified to perform this procedure.

## 2021-08-09 NOTE — BRIEF OP NOTE
[facility]    Brief Operative Note    Pre-operative diagnosis: Knee pain [M25.569]  Osteoarthritis [M19.90]  Post-operative diagnosis Same as pre-operative diagnosis    Procedure: Procedure(s):  RIGHT TOTAL KNEE ARTHROPLASTY  Surgeon: Surgeon(s) and Role:     * Anand Main MD - Primary amanda Brooks  Anesthesia: Spinal   Estimated blood loss: Less than 50 ml  Drains: Hemovac  Specimens: * No specimens in log *  Findings:   None.  Complications: None.  Implants: * No implants in log *

## 2021-08-10 ENCOUNTER — APPOINTMENT (OUTPATIENT)
Dept: PHYSICAL THERAPY | Facility: CLINIC | Age: 81
End: 2021-08-10
Attending: ORTHOPAEDIC SURGERY
Payer: COMMERCIAL

## 2021-08-10 VITALS
RESPIRATION RATE: 16 BRPM | WEIGHT: 191.8 LBS | OXYGEN SATURATION: 96 % | HEIGHT: 66 IN | BODY MASS INDEX: 30.82 KG/M2 | TEMPERATURE: 98.6 F | HEART RATE: 76 BPM | SYSTOLIC BLOOD PRESSURE: 109 MMHG | DIASTOLIC BLOOD PRESSURE: 52 MMHG

## 2021-08-10 LAB
ANION GAP SERPL CALCULATED.3IONS-SCNC: 6 MMOL/L (ref 3–14)
BUN SERPL-MCNC: 23 MG/DL (ref 7–30)
CALCIUM SERPL-MCNC: 8.7 MG/DL (ref 8.5–10.1)
CHLORIDE BLD-SCNC: 104 MMOL/L (ref 94–109)
CO2 SERPL-SCNC: 25 MMOL/L (ref 20–32)
CREAT SERPL-MCNC: 1.91 MG/DL (ref 0.52–1.04)
GFR SERPL CREATININE-BSD FRML MDRD: 24 ML/MIN/1.73M2
GLUCOSE BLD-MCNC: 103 MG/DL (ref 70–99)
GLUCOSE BLDC GLUCOMTR-MCNC: 117 MG/DL (ref 70–99)
GLUCOSE BLDC GLUCOMTR-MCNC: 144 MG/DL (ref 70–99)
HGB BLD-MCNC: 8.5 G/DL (ref 11.7–15.7)
POTASSIUM BLD-SCNC: 4.6 MMOL/L (ref 3.4–5.3)
SODIUM SERPL-SCNC: 135 MMOL/L (ref 133–144)

## 2021-08-10 PROCEDURE — 999N000111 HC STATISTIC OT IP EVAL DEFER: Performed by: OCCUPATIONAL THERAPIST

## 2021-08-10 PROCEDURE — 99207 PR CDG-CODE CATEGORY CHANGED: CPT | Performed by: PHYSICIAN ASSISTANT

## 2021-08-10 PROCEDURE — 250N000013 HC RX MED GY IP 250 OP 250 PS 637: Performed by: ORTHOPAEDIC SURGERY

## 2021-08-10 PROCEDURE — 97110 THERAPEUTIC EXERCISES: CPT | Mod: GP | Performed by: PHYSICAL THERAPIST

## 2021-08-10 PROCEDURE — 97116 GAIT TRAINING THERAPY: CPT | Mod: GP | Performed by: PHYSICAL THERAPIST

## 2021-08-10 PROCEDURE — 85018 HEMOGLOBIN: CPT | Performed by: ORTHOPAEDIC SURGERY

## 2021-08-10 PROCEDURE — 80048 BASIC METABOLIC PNL TOTAL CA: CPT | Performed by: PHYSICIAN ASSISTANT

## 2021-08-10 PROCEDURE — 99213 OFFICE O/P EST LOW 20 MIN: CPT | Performed by: PHYSICIAN ASSISTANT

## 2021-08-10 PROCEDURE — 250N000013 HC RX MED GY IP 250 OP 250 PS 637: Performed by: INTERNAL MEDICINE

## 2021-08-10 PROCEDURE — 97530 THERAPEUTIC ACTIVITIES: CPT | Mod: GP | Performed by: PHYSICAL THERAPIST

## 2021-08-10 PROCEDURE — 250N000011 HC RX IP 250 OP 636: Performed by: ORTHOPAEDIC SURGERY

## 2021-08-10 PROCEDURE — 36415 COLL VENOUS BLD VENIPUNCTURE: CPT | Performed by: ORTHOPAEDIC SURGERY

## 2021-08-10 RX ORDER — OXYCODONE HYDROCHLORIDE 5 MG/1
5 TABLET ORAL EVERY 4 HOURS PRN
Qty: 40 TABLET | Refills: 0 | Status: SHIPPED | OUTPATIENT
Start: 2021-08-10 | End: 2021-08-10

## 2021-08-10 RX ORDER — ONDANSETRON 4 MG/1
4 TABLET, ORALLY DISINTEGRATING ORAL EVERY 6 HOURS PRN
Qty: 4 TABLET | Refills: 0 | Status: SHIPPED | OUTPATIENT
Start: 2021-08-10 | End: 2021-08-10

## 2021-08-10 RX ORDER — OXYCODONE HYDROCHLORIDE 5 MG/1
5-10 TABLET ORAL
Qty: 30 TABLET | Refills: 0 | Status: SHIPPED | OUTPATIENT
Start: 2021-08-10 | End: 2021-10-14

## 2021-08-10 RX ADMIN — OXYCODONE HYDROCHLORIDE 5 MG: 5 TABLET ORAL at 08:17

## 2021-08-10 RX ADMIN — DULOXETINE HYDROCHLORIDE 30 MG: 30 CAPSULE, DELAYED RELEASE ORAL at 08:08

## 2021-08-10 RX ADMIN — POLYETHYLENE GLYCOL 3350 17 G: 17 POWDER, FOR SOLUTION ORAL at 08:08

## 2021-08-10 RX ADMIN — ASPIRIN 325 MG: 325 TABLET, COATED ORAL at 08:08

## 2021-08-10 RX ADMIN — SENNOSIDES AND DOCUSATE SODIUM 1 TABLET: 8.6; 5 TABLET ORAL at 08:08

## 2021-08-10 RX ADMIN — CEFAZOLIN SODIUM 2 G: 2 INJECTION, SOLUTION INTRAVENOUS at 01:15

## 2021-08-10 RX ADMIN — ACETAMINOPHEN 975 MG: 325 TABLET, FILM COATED ORAL at 01:12

## 2021-08-10 RX ADMIN — FAMOTIDINE 10 MG: 10 TABLET ORAL at 08:08

## 2021-08-10 RX ADMIN — OXYBUTYNIN CHLORIDE 10 MG: 10 TABLET, EXTENDED RELEASE ORAL at 08:08

## 2021-08-10 RX ADMIN — OXYCODONE HYDROCHLORIDE 5 MG: 5 TABLET ORAL at 01:13

## 2021-08-10 NOTE — PROGRESS NOTES
[facility]    Hospitalist Progress Note    Assessment & Plan   Macie Jorge is a 80 year old female who was admitted on 8/9/2021.     Past medical history significant for OA, Chronic pain (Cervical pain, Lumbar spinal stenosis with neurogenic claudication), HTN, DM2, CKD stage 3b, COPD with asthma, Hypothyroidism, Obesity, Urinary incontinence, History of Uric acid kidney stone who under an elective right TKA.       OA with degenerative changes of the right knee s/p right TKA  POD# 1.   - Orthopedic Service is managing.               --Defer analgesic management, DVT prophylaxis, PT/OT.                 --Defer resumption of ASA therapy.    - HGB check in the morning.    - Encourage utilization of incentive spirometer.      Chronic pain   (Cervical pain, Lumbar spinal stenosis with neurogenic claudication, migrines)  - Analgesic management per Ortho.    - Resumed on PTA Cymbalta 30 mg BID.    - PRN Imitrex available.       HTN  - Hold PTA lisinopril 5 mg/d.     --Will not resume at discharge due to XANDER.  Discussed with patient and advised follow up later this week with PCP with BMP and further discussion.    - PRN IV hydralazine 10 mg every 4 hours for SBP > 170.       DM2  Notably, patient received 4 mg of IV dexamethasone intra-op and anticipate seeing some hyperglycemia.    - Hold PTA metformin 1000 mg/d (with dinner).   ----Will not resume at discharge due to XANDER.  Discussed with patient and advised follow up later this week with PCP with BMP and further discussion.    - Sliding scale insulin, medium intensity.    - Glucose checks QID.    - Hypoglycemic protocol.       XANDER in setting of CKD stage 3b  Baseline creatinine recently around 1.50-1.60 with GFR in the 30's.  Creatinine increased to 1.91 with GFR of 24.  Discussed renal function with the patient as well as PTA medications that should be stopped until seen by PCP.    - Monitor.       COPD with asthma  - Resumed on PTA  Advair BID; please use patient's own inhaler.    - PRN Albuterol inhaler available.    - Monitor O2 saturations.    - Wean O2 as able.    - Encourage use of IS.       Hypothyroidism  - Hold PTA levothyroxine 112 mcg/d.  Resume at discharge.       Urinary incontinence  - Resumed on PTA Ditropan-XL 10 mg/d.       Obesity  Body mass index is 30.96 kg/m .  Increase in all-cause morbidity and mortality.   - Encourage weight loss.  - Follow up with PCP regarding ongoing management.    - Continue CPAP with home settings.       Anemia, chronic  Patient with ongoing anemia with baseline HGB in the 11's.  Recent decrease pre-op (10.9).  Patient was started on Iron supplementation.    - Hold iron supplementation and resume at discharge.       History of Uric acid kidney stone  - Hold PTA Allopurinol and resume at discharge.         Clinically Significant Risk Factors Present on Admission                     Diet: Advance Diet as Tolerated: Regular Diet Adult  Regular Diet Adult     DVT Prophylaxis: Defer to primary service   Claudio Catheter: Not present  Code Status: Full Code     Disposition Plan    Expected discharge: Per Ortho.    Entered: Corona Shrestha PA-C 08/10/2021, 10:36 AM        The patient's care was discussed with the Bedside Nurse, Patient and Ortho.      The patient has been discussed with Dr. Valentine, who agrees with the assessment and plan at this time.    Corona Shrestha PA-C  [facility]  Securely message with the Vocera Web Console (learn more here)  Text page via [employer] Paging/Directory      Interval History   Patient was resting in bed upon arrival.  She had worked with PT earlier in the morning.  She denied fever, chills, chest pain, shortness of breath or abdominal pain.  We discussed her kidney function (creatinine and GFR) and holding her PTA lisinopril and metformin.      When asked about staying 1 more night or returning home due to kidney function she  requested to return home.  She was advised to see her PCP on Friday this week and she indicated that she would make her own appointment.  Patient understood that she would not be taking her lisinopril or metformin when she returned home and would discuss restarting them with her PCP.        -Data reviewed today: I reviewed all new labs and imaging results over the last 24 hours. I personally reviewed no images or EKG's today.    Physical Exam   Temp: 98.6  F (37  C) Temp src: Oral BP: 109/52 Pulse: 76   Resp: 16 SpO2: 96 % O2 Device: None (Room air) Oxygen Delivery: 2 LPM    Vitals:    08/09/21 0811   Weight: 87 kg (191 lb 12.8 oz)     Vital Signs with Ranges  Temp:  [97  F (36.1  C)-99.4  F (37.4  C)] 98  F (36.7  C)  Pulse:  [68-89] 68  Resp:  [11-20] 16  BP: (105-147)/(51-77) 129/66  SpO2:  [91 %-100 %] 94 %  I/O last 3 completed shifts:  In: 1100 [I.V.:1100]  Out: 170 [Drains:150; Blood:20]      Constitutional: Awake, alert, cooperative, no apparent distress.    ENT: Normocephalic, without obvious abnormality, atraumatic, oral pharynx with moist mucus membranes, tonsils without erythema or exudates.  Neck: Supple, symmetrical, trachea midline, no adenopathy.  Pulmonary: No increased work of breathing, good air exchange, clear to auscultation bilaterally, no crackles or wheezing.  Cardiovascular: Regular rate and rhythm, normal S1 and S2, no S3 or S4, and 3/6o murmur noted.  GI: Normal bowel sounds, soft, non-distended, non-tender.  Skin/Integumen: Clear.  Neuro: CN II-XII grossly intact.  Psych:  Alert and oriented x 3. Normal affect.  Extremities: No lower extremity edema noted, and calves are non-TTP bilaterally.       Medications     lactated ringers 100 mL/hr at 08/09/21 1501       acetaminophen  975 mg Oral Q8H     aspirin  325 mg Oral Daily     DULoxetine  30 mg Oral BID     famotidine  10 mg Oral BID     fluticasone-vilanterol  1 puff Inhalation Daily     insulin aspart  1-7 Units Subcutaneous TID AC      insulin aspart  1-5 Units Subcutaneous At Bedtime     melatonin  5 mg Oral At Bedtime     oxybutynin ER  10 mg Oral Daily     polyethylene glycol  17 g Oral Daily     senna-docusate  1 tablet Oral BID     sodium chloride (PF)  3 mL Intracatheter Q8H       Data   Recent Labs   Lab 08/10/21  0612 08/10/21  0609 08/10/21  0218 08/09/21  0808   HGB 8.5*  --   --   --      --   --   --    POTASSIUM 4.6  --   --  4.0   CHLORIDE 104  --   --   --    CO2 25  --   --   --    BUN 23  --   --   --    CR 1.91*  --   --  1.77*   ANIONGAP 6  --   --   --    WARD 8.7  --   --   --    * 117* 144* 102*       Recent Results (from the past 24 hour(s))   XR Knee Port Right 1/2 Views    Narrative    XR KNEE PORT RIGHT 1/2 VIEWS 8/9/2021 12:20 PM     HISTORY: Post-Op Total Knee      Impression    IMPRESSION: Right total knee arthroplasty without apparent  complication. Surgical drain and skin staples are in place.    PATRICIA TRAMMELL MD         SYSTEM ID:  [id number]

## 2021-08-10 NOTE — PLAN OF CARE
Physical Therapy Discharge Summary    Reason for therapy discharge:    Discharged to home.    Progress towards therapy goal(s). See goals on Care Plan in [facility] electronic health record for goal details.  Goals partially met.  Barriers to achieving goals:   limited tolerance for therapy.    Therapy recommendation(s):    Continue home exercise program.Pt progress to CGA/Min A for mobiliy however continues to decline need for OP PT beyond hospital stay - edu for walking and exercise to assist with R knee rehab. Pt reports she feels comfortable going home with spouse assist and has walker at home for safe ambulation.

## 2021-08-10 NOTE — PROVIDER NOTIFICATION
Brief update:    Paged re: heartburn med and sleep med    Added 5mg melatonin HS and pepcid BID    Sky Lopez MD  10:21 PM

## 2021-08-10 NOTE — PLAN OF CARE
Patient vital signs are at baseline: Yes  Patient able to ambulate as they were prior to admission or with assist devices provided by therapies during their stay:  Yes  Patient MUST void prior to discharge:  Yes  Patient able to tolerate oral intake:  Yes  Pain has adequate pain control using Oral analgesics:  Yes     POD#1 total rt knee replacement, AOx4, VSS on RA, DTV this shift, A1 with GB & walker, on intermittent abx, PRN oxycodone and scheduled Tylenol given for pain, CMS intact, hemovac output from right knee this shift 125 mL of dark red blood, discharge pending.

## 2021-08-10 NOTE — PLAN OF CARE
Patient vital signs are at baseline: Yes  Patient able to ambulate as they were prior to admission or with assist devices provided by therapies during their stay:  Yes  Patient MUST void prior to discharge:  Yes  Patient able to tolerate oral intake:  Yes  Pain has adequate pain control using Oral analgesics:  Yes    Pt is A&Ox4. VSS on RA. Up w/ 1 assist to BR. Voided x2. Tolerating PO, good appetite. Prn Oxy/ Atarax for pain. CMS intact. RLE wrapped w/ ace bandage. Hemovac to R knee, draining bright red. IV infusing. Cooperative/ pleasant. Melatonin/ Pepcid given at HS per request.

## 2021-08-10 NOTE — PROGRESS NOTES
"ORTHOPEDIC LOWER EXTREMITY PROGRESS NOTE    POD#1  Patient is a 80 year old female who underwent Procedure(s):  RIGHT TOTAL KNEE ARTHROPLASTY on 2021. Pain is well controlled. Tolerating medication well, no nausea or vomiting.    Vitals:   Blood pressure 109/52, pulse 76, temperature 98.6  F (37  C), temperature source Oral, resp. rate 16, height 1.676 m (5' 6\"), weight 87 kg (191 lb 12.8 oz), SpO2 96 %, not currently breastfeeding.  Temp (24hrs), Av  F (36.7  C), Min:97  F (36.1  C), Max:99.4  F (37.4  C)      Drains: hemovac with 125 ml bloody drainage    EXAM   The patient is awake and alert.  Calves are soft and non-tender.   Sensation is intact.  Dorsiflexion and plantar flexion is intact.  Foot warm with nl cap refill.  The incision is covered.     Labs:   Recent Labs   Lab Test 08/10/21  0612 21  1102 20  0915 14  1721   HGB 8.5* 10.9* 11.6* 13.5   INR  --   --   --  1.02       ASSESSMENT  S/p R TKA   PLAN  1. DVT prophylaxis: aspirin  2. Weight Bearing: WBAT (Weight bearing as tolerated).  3. Anticipated discharge date today . Discharge to Home with Outpatient Treatment.  4. Cont Pain Control Oxycodone and Tylenol    Jessi Velasco PA-C  [facility]        "

## 2021-08-10 NOTE — PLAN OF CARE
Occupational Therapy: Orders received. Chart reviewed and discussed with care team.? Occupational Therapy not indicated due to patient declining OT. In discussion with patient, she reports she has raised toilet with side support, shower chair for tub/shower, and AE for LE dressing if needed.  Educated pt on seated tub/shower transfer, pt verbalized understanding, declined to practice.? Pt reports spouse can assist as needed. Defer discharge recommendations to ortho team.? Will complete orders.

## 2021-08-10 NOTE — PLAN OF CARE
Patient is A&O x4. Up with one assist/gb/walker to bathroom, voiding, passing flatus. Denies chest pain or SOB. Complained of feeling tired, not getting enough rest @ night. Pain well managed with oxycodone and ice application. Dressing changed, Aquacel dressing, CDI. Hemovac discontinued. IV access discontinued. Reviewed AVS with pt. No further questions asked. Teach back completed. Discharging home with medications and belonging.

## 2021-08-10 NOTE — PROGRESS NOTES
Patient refused tylenol, aspirin 325 mg, miralax, senna. Pt already has supplies @ home. Updated discharge pharmacy.

## 2021-08-13 NOTE — OP NOTE
Procedure Date: 08/09/2021    PREOPERATIVE DIAGNOSIS:  End-stage tricompartmental osteoarthrosis of the right knee.    POSTOPERATIVE DIAGNOSIS:  End-stage tricompartmental osteoarthrosis of the right knee.    PROCEDURE:  Right total knee arthroplasty.    SURGEON:  Anand Main MD    ASSISTANT:  James Urban PA-C    ANESTHESIA:  Spinal plus adductor canal block.    ESTIMATED BLOOD LOSS:  10-20 mL    COMPLICATIONS:  None.    IMPLANT:  Biomet Vanguard knee.  Posterior stabilized cemented.  Size 60 femur, 75 tibial tray, 35 mm button, 60 mm posterior stabilized plus size stem insert.  In the locking clip, 2 pegs.    MEDICATIONS:  Ancef given preoperatively appropriately.  DVT prophylaxis postop.  Sequential calf compression devices, JEEVAN hose, and aspirin therapy.  Risks, options, alternatives had been gone over with the patient.  She had a thorough understanding and desires to proceed ahead.  Prepped and draped in the usual fashion.  Timeout was requested.  All team members agreed, we proceeded then to inflate the tourniquet to 300.  Midline incision made medial parapatellar arthrotomy.  She had a fixed varus contracture and a 10-degree flexion contracture.  We proceeded then to identify landmarks.  Femoral cut was followed along the epicondylar axis using intramedullary guide.  I do know this cut was made.  Irrigated out copiously along the way and proceeded to anterior and posterior chamfers box cut.  Removed the extra osteophytes particularly posterior condyles.  Wide release was made in the tibia medially and the mid coronal plane laterally.  The popliteus was frayed, so we debrided that out.  Tibia is drawered forward.  Using preoperative templating, extramedullary device and centering over the talus, we removed the wedge of bone.    Irrigated copiously, cleaned things out.  Drilled cement anchoring holes.     Trials were then placed in there.  A 75 tibia, 60 femur, no notching.  Interposed formulated and  drilled in there.  At that point, began injected the pain cocktail regimen into the posterior capsule area.  I think we put 30-40 mL back there.  The rest was periarticularly during the course of the case.    Irrigated along the way.    Trials were then cemented in simultaneously with 2 batches of high viscosity cement.    I removed the extra cement once felt cured.  Double and triple checked, the alignment was good.  Flexion and extension space balance was best with 16 after a wide release and taking +2 mm in the distal femur.  Excellent balance was noted.  It locked it in place, irrigated copiously, and then proceeded to close in physiologic flexed position.  Interrupted 0 Ethibond and a running #1 Ethibond.  Subcutaneous 0 Vicryl, 2-0 Vicryl, staples.  Drain was brought out.  Pressure applied from toes to groin.  Toes pinked up immediately.    PLAN:  Routine PT, OT.  Sponge and needle counts were correct x2.    Anand Main MD        D: 2021   T: 2021   MT: SANJIV    Name:     ALLYN MAURI GALINAMarilin  MRN:      [id number]        Account:        [id number]   :      1940           Procedure Date: 2021     Document: [id number]

## 2021-08-16 ENCOUNTER — TELEPHONE (OUTPATIENT)
Dept: FAMILY MEDICINE | Facility: CLINIC | Age: 81
End: 2021-08-16

## 2021-08-16 DIAGNOSIS — E03.4 HYPOTHYROIDISM DUE TO ACQUIRED ATROPHY OF THYROID: Primary | ICD-10-CM

## 2021-08-16 RX ORDER — LEVOTHYROXINE SODIUM 125 UG/1
125 TABLET ORAL DAILY
Qty: 90 TABLET | Refills: 1 | Status: SHIPPED | OUTPATIENT
Start: 2021-08-16 | End: 2022-04-08

## 2021-08-16 NOTE — TELEPHONE ENCOUNTER
Called patient with lab results. ( Patient had been in the hospital for total knee replacement:: Informed her of low hemoglobin.  Patient says she is on an iron supplement that was given  to her in the hospital. TSH was abnormal and recommended is for her to increase her levothyroxine to 125 mcg.  Prescription sent to pharmacy.  Patient will isaiah TSH in 8-9 weeks. Patient will be having a follow up appointment next week with Dr Meyer.

## 2021-08-16 NOTE — TELEPHONE ENCOUNTER
----- Message from WILEY Steinberg CNP sent at 8/4/2021  1:00 PM CDT -----  Please call patient with the following results/message:    TSH elevated indicating Levothyroxine dose too low. Recommend increasing dose to 125 mcg daily and rechecking TSH in 6-8 weeks.  Uric acid level = 8.4. The goal for this is to have level < 6. If not having gout attacks, then she can just stop Allopurinol. Otherwise, I would increase to 200 mg daily and recheck when she has TSH rechecked.  Hemoglobin low (10.9), which is lower than usual. Recommend having repeated with other labs.  Kidney function stable.  Electrolytes, cholesterol levels normal.    WILEY Sunshine CNP on 8/4/2021 at 12:57 PM     Principal Discharge DX:	Influenza  Assessment and plan of treatment:	Tamiflu  Supportive treatment  Follow up with PMD as needed

## 2021-08-17 DIAGNOSIS — N20.0 URIC ACID KIDNEY STONE: ICD-10-CM

## 2021-08-17 DIAGNOSIS — Z76.0 ENCOUNTER FOR MEDICATION REFILL: ICD-10-CM

## 2021-08-17 RX ORDER — OXYBUTYNIN CHLORIDE 10 MG/1
10 TABLET, EXTENDED RELEASE ORAL DAILY
Qty: 90 TABLET | Refills: 0 | Status: SHIPPED | OUTPATIENT
Start: 2021-08-17 | End: 2022-04-07

## 2021-08-17 NOTE — CONFIDENTIAL NOTE
Desi, PT with [facility] [phone], opened patient to home PT today s/p left TKR on 8/9/21. Desi reports patient has run out of oxybutynin ER tabs and asks if patient should continue taking? If so, needs refill sent to pharmacy.     Also metformin and lisinopril were stopped in hospital and patient was instructed not to resume until sees MD for labs and gets okay to resume.     Per 8/10/21 hospital discharge note:  HTN  - Hold PTA lisinopril 5 mg/d.                 --Will not resume at discharge due to XANDER.  Discussed with patient and advised follow up later this week with PCP with BMP and further discussion.    - PRN IV hydralazine 10 mg every 4 hours for SBP > 170.      Patient has hospital follow up appt with Dr. Meyer scheduled for Wednesday 8/18/21.   Oxybutynin refill request routed to Dr. Meyer today.  Vickie Potts RN

## 2021-08-20 ENCOUNTER — TRANSFERRED RECORDS (OUTPATIENT)
Dept: FAMILY MEDICINE | Facility: CLINIC | Age: 81
End: 2021-08-20

## 2021-08-27 ENCOUNTER — TRANSFERRED RECORDS (OUTPATIENT)
Dept: FAMILY MEDICINE | Facility: CLINIC | Age: 81
End: 2021-08-27

## 2021-09-07 ENCOUNTER — OFFICE VISIT (OUTPATIENT)
Dept: FAMILY MEDICINE | Facility: CLINIC | Age: 81
End: 2021-09-07

## 2021-09-07 VITALS
SYSTOLIC BLOOD PRESSURE: 112 MMHG | BODY MASS INDEX: 28.69 KG/M2 | DIASTOLIC BLOOD PRESSURE: 54 MMHG | OXYGEN SATURATION: 96 % | HEART RATE: 96 BPM | HEIGHT: 66 IN | RESPIRATION RATE: 16 BRPM | WEIGHT: 178.5 LBS

## 2021-09-07 DIAGNOSIS — R53.83 FATIGUE, UNSPECIFIED TYPE: ICD-10-CM

## 2021-09-07 DIAGNOSIS — D62 ANEMIA DUE TO BLOOD LOSS, ACUTE: Primary | ICD-10-CM

## 2021-09-07 DIAGNOSIS — S81.011D LACERATION OF RIGHT KNEE, SUBSEQUENT ENCOUNTER: ICD-10-CM

## 2021-09-07 LAB
% GRANULOCYTES: 73 % (ref 42.2–75.2)
HCT VFR BLD AUTO: 26.6 % (ref 35–46)
HEMOGLOBIN: 9.6 G/DL (ref 11.8–15.5)
LYMPHOCYTES NFR BLD AUTO: 20.1 % (ref 20.5–51.1)
MCH RBC QN AUTO: 30.1 PG (ref 27–31)
MCHC RBC AUTO-ENTMCNC: 36.2 G/DL (ref 33–37)
MCV RBC AUTO: 83.2 FL (ref 80–100)
MONOCYTES NFR BLD AUTO: 6.9 % (ref 1.7–9.3)
PLATELET # BLD AUTO: 225 K/UL (ref 140–450)
RBC # BLD AUTO: 3.2 X10/CMM (ref 3.7–5.2)
WBC # BLD AUTO: 4.6 X10/CMM (ref 3.8–11)

## 2021-09-07 PROCEDURE — 36415 COLL VENOUS BLD VENIPUNCTURE: CPT | Performed by: FAMILY MEDICINE

## 2021-09-07 PROCEDURE — 99214 OFFICE O/P EST MOD 30 MIN: CPT | Performed by: FAMILY MEDICINE

## 2021-09-07 PROCEDURE — 85025 COMPLETE CBC W/AUTO DIFF WBC: CPT | Performed by: FAMILY MEDICINE

## 2021-09-07 ASSESSMENT — ASTHMA QUESTIONNAIRES
QUESTION_1 LAST FOUR WEEKS HOW MUCH OF THE TIME DID YOUR ASTHMA KEEP YOU FROM GETTING AS MUCH DONE AT WORK, SCHOOL OR AT HOME: NONE OF THE TIME
QUESTION_5 LAST FOUR WEEKS HOW WOULD YOU RATE YOUR ASTHMA CONTROL: WELL CONTROLLED
ACT_TOTALSCORE: 24
QUESTION_4 LAST FOUR WEEKS HOW OFTEN HAVE YOU USED YOUR RESCUE INHALER OR NEBULIZER MEDICATION (SUCH AS ALBUTEROL): NOT AT ALL
QUESTION_3 LAST FOUR WEEKS HOW OFTEN DID YOUR ASTHMA SYMPTOMS (WHEEZING, COUGHING, SHORTNESS OF BREATH, CHEST TIGHTNESS OR PAIN) WAKE YOU UP AT NIGHT OR EARLIER THAN USUAL IN THE MORNING: NOT AT ALL
QUESTION_2 LAST FOUR WEEKS HOW OFTEN HAVE YOU HAD SHORTNESS OF BREATH: NOT AT ALL

## 2021-09-07 ASSESSMENT — MIFFLIN-ST. JEOR: SCORE: 1296.42

## 2021-09-07 NOTE — PATIENT INSTRUCTIONS
Make sure you are taking the right dose of the thyroid medicine.    Anemia is improving but a little iron in the diet will help.

## 2021-09-07 NOTE — PROGRESS NOTES
Problem(s) Oriented visit        SUBJECTIVE:                                                    Macie Jorge is a 80 year old female who presents to clinic today for the following health issues :       1. Anemia due to blood loss, acute  Post surgery   - CBC with Diff/Plt ([facility])  - VENOUS COLLECTION     Staples removed from knee     Fatigue.  Reports nonspecific fatigue of a generalized nature for the last few months.  Denies intestinal symptoms (i.e. No diarrhea, no constipation, no irregular BMs), Denies chest pain, shortness of breath, or dyspnea on exertion.  Denies fevers, unintended weight loss, unexplained abdominal pain, unexplained joint or muscle pain,  recent travels, or  history of autoimmune disease.   The patient does not feel that they are aware of any specific cause for this fatigue.     Problem list, Medication list, Allergies, and Medical/Social/Surgical histories reviewed in EPIC and updated as appropriate.   Additional history: as documented    ROS:  General and Resp. completed and negative except as noted above    Histories:   Patient Active Problem List   Diagnosis     Panic attacks     Advanced directives, counseling/discussion     Hyperlipidemia with target LDL less than 100     History of hip replacement, total     Health Care Home     Class 1 obesity due to excess calories with serious comorbidity and body mass index (BMI) of 31.0 to 31.9 in adult     Urgency incontinence     Stage 3b chronic kidney disease     Nonintractable migraine, unspecified migraine type     Type 2 diabetes mellitus with stage 3 chronic kidney disease, without long-term current use of insulin (H)     COPD with asthma (H)     Altered mental status     History of colonic polyps     Chronic midline low back pain without sciatica     Physical deconditioning     Spinal stenosis of lumbar region with neurogenic claudication     Hypothyroidism due to acquired atrophy of thyroid     Total knee replacement status,  right     Past Surgical History:   Procedure Laterality Date     ARTHROPLASTY HIP  3/13/2013    Procedure: ARTHROPLASTY HIP;  LEFT TOTAL HIP ARTHROPLASTY (BIOMET)^ ;  Surgeon: Anand Main MD;  Location:  OR     ARTHROPLASTY KNEE Right 8/9/2021    Procedure: RIGHT TOTAL KNEE ARTHROPLASTY;  Surgeon: Anand Main MD;  Location:  OR     ARTHROPLASTY PATELLO-FEMORAL (KNEE)  2005ish    Left     CHOLECYSTECTOMY, OPEN  27 yo     COMBINED CYSTOSCOPY, INSERT STENT URETER(S)  8/5/2014    Procedure: COMBINED CYSTOSCOPY, INSERT STENT URETER(S);  Surgeon: Sam Arriaga MD;  Location:  OR     COMBINED CYSTOSCOPY, RETROGRADES, URETEROSCOPY, LASER HOLMIUM LITHOTRIPSY URETER(S), INSERT STENT Left 6/14/2016    Procedure: COMBINED CYSTOSCOPY, RETROGRADES, URETEROSCOPY, LASER HOLMIUM LITHOTRIPSY URETER(S), INSERT STENT;  Surgeon: Thomas Edmondson MD;  Location:  OR     COMBINED CYSTOSCOPY, RETROGRADES, URETEROSCOPY, LASER HOLMIUM LITHOTRIPSY URETER(S), INSERT STENT Left 3/28/2019    Procedure: CYSTOSCOPY, LEFT RETROGRADE PYLEOGRAM, LEFT URETEROSCOPY, HOLMIUM LASER LITHOTRIPSY, LEFT URETERAL STENT EXCHANGE;  Surgeon: Derrek Rivera MD;  Location:  OR     CYSTOSCOPY, RETROGRADES, INSERT STENT URETER(S), COMBINED Left 3/8/2019    Procedure: CYSTOSCOPY,LEFT RETROGRADE, LEFT STENT PLACEMENT;  Surgeon: Derrek Rivera MD;  Location:  OR     GENITOURINARY SURGERY       LAMINECT/DISCECTOMY, LUMBAR      Laminectomy/Discectomy Lumbar     LASER HOLMIUM LITHOTRIPSY URETER(S), INSERT STENT, COMBINED Left 8/20/2014    Procedure: COMBINED CYSTOSCOPY, URETEROSCOPY, LASER HOLMIUM LITHOTRIPSY URETER(S), INSERT STENT;  Surgeon: Sam Arriaga MD;  Location:  OR     LASER HOLMIUM LITHOTRIPSY URETER(S), INSERT STENT, COMBINED Left 5/2/2019    Procedure: CYSTOSCOPY, LEFT RETROGRADE, LEFT URETEROSCOPY, HOLMIUM LASER LITHOTRIPSY, STONE REMOVAL, LEFT STENT EXCHANGE;  Surgeon: Derrek Rivera MD;  Location:   "OR     LASER HOLMIUM LITHOTRIPSY URETER(S), INSERT STENT, COMBINED Right 2019    Procedure: CYSTOSCOPY; RIGHT URETEROSCOPY WITH HOLMIUM LASER LITHOTRIPSY; RIGHT STENT PLACEMENT (DIGITAL FLEXIBLE URETEROSCOPE);  Surgeon: Derrek Rivera MD;  Location:  OR     ORTHOPEDIC SURGERY      left ankle     RECESSION EYELID UPPER         Social History     Tobacco Use     Smoking status: Former Smoker     Packs/day: 1.50     Years: 40.00     Pack years: 60.00     Quit date: 1995     Years since quittin.7     Smokeless tobacco: Never Used   Substance Use Topics     Alcohol use: Yes     Comment: occ.     No family history on file.        OBJECTIVE:                                                    /54   Pulse 96   Resp 16   Ht 1.676 m (5' 6\")   Wt 81 kg (178 lb 8 oz)   SpO2 96%   BMI 28.81 kg/m    Body mass index is 28.81 kg/m .   APPEARANCE: = Relaxed and in no distress  Conj/Eyelids = noninjected and lids and lashes are without inflammation  PERRLA/Irises = Pupils Round Reactive to Light and Irisis without inflammation  Neck = No anterior or posterior adenopathy appreciated.  Thyroid = Not enlarged and no masses felt  Resp effort = Calm regular breathing  Breath Sounds = Good air movement with no rales or rhonchi in any lung fields  Heart Rate, Rhythm, & sounds (no Murm)  = Regular rate and rhythm with no S3, S4, or murmur appreciated.  Carotid Art's = Pulses full and equal and no bruits appreciated  Abdomen = Soft, nontender, no masses, & bowel sounds in all quadrants  Liver/Spleen = Normal span and no splenomegaly noted  Digits and Nails = FROM in all finger joints, no nail dystrophy  Ext (edema) = No pretibial edema noted or elsewhere  Musculsktl =  Strength and ROM of major joints are within normal limits  SKIN = absent significant rashes or lesions   Recent/Remote Memory = Alert and Oriented x 3  Mood/Affect = Cooperative and interested     ASSESSMENT/PLAN:                                 "                        Macie was seen today for recheck medication and suture removal.    Diagnoses and all orders for this visit:    Anemia due to blood loss, acute  -     CBC with Diff/Plt ([facility])  -     VENOUS COLLECTION    Laceration of right knee, subsequent encounter    Fatigue, unspecified type    taking the wrong dose of the synthroid, the lower.....    See Patient Instructions  Patient Instructions   Make sure you are taking the right dose of the thyroid medicine.    Anemia is improving but a little iron in the diet will help.      The following health maintenance items are reviewed in Epic and correct as of today:  Health Maintenance   Topic Date Due     ZOSTER IMMUNIZATION (1 of 2) Never done     MEDICARE ANNUAL WELLNESS VISIT  01/20/2017     MICROALBUMIN  01/15/2021     URINE DRUG SCREEN  01/17/2021     COLORECTAL CANCER SCREENING  04/05/2021     INFLUENZA VACCINE (1) 09/01/2021     EYE EXAM  10/15/2021     A1C  02/02/2022     LIPID  08/02/2022     TSH W/FREE T4 REFLEX  08/02/2022     DIABETIC FOOT EXAM  08/02/2022     FALL RISK ASSESSMENT  08/02/2022     BMP  08/10/2022     DTAP/TDAP/TD IMMUNIZATION (2 - Td or Tdap) 11/15/2022     ADVANCE CARE PLANNING  08/02/2026     DEXA  05/07/2030     SPIROMETRY  Completed     COPD ACTION PLAN  Completed     PHQ-2  Completed     Pneumococcal Vaccine: 65+ Years  Completed     COVID-19 Vaccine  Completed     IPV IMMUNIZATION  Aged Out     MENINGITIS IMMUNIZATION  Aged Out       Juve Bruner MD  [facility]  Family Practice  [facility]  [phone]    For any issues my office # is [phone]

## 2021-09-08 ASSESSMENT — ASTHMA QUESTIONNAIRES: ACT_TOTALSCORE: 24

## 2021-10-04 DIAGNOSIS — J44.9 CHRONIC OBSTRUCTIVE PULMONARY DISEASE, UNSPECIFIED COPD TYPE (H): ICD-10-CM

## 2021-10-04 RX ORDER — ALBUTEROL SULFATE 90 UG/1
2 AEROSOL, METERED RESPIRATORY (INHALATION) EVERY 6 HOURS PRN
Qty: 9 G | Refills: 1 | Status: SHIPPED | OUTPATIENT
Start: 2021-10-04 | End: 2022-10-17

## 2021-10-14 ENCOUNTER — OFFICE VISIT (OUTPATIENT)
Dept: FAMILY MEDICINE | Facility: CLINIC | Age: 81
End: 2021-10-14

## 2021-10-14 VITALS
DIASTOLIC BLOOD PRESSURE: 68 MMHG | BODY MASS INDEX: 28.73 KG/M2 | OXYGEN SATURATION: 99 % | WEIGHT: 178 LBS | HEART RATE: 86 BPM | SYSTOLIC BLOOD PRESSURE: 130 MMHG

## 2021-10-14 DIAGNOSIS — M54.50 CHRONIC MIDLINE LOW BACK PAIN WITHOUT SCIATICA: ICD-10-CM

## 2021-10-14 DIAGNOSIS — M48.062 SPINAL STENOSIS OF LUMBAR REGION WITH NEUROGENIC CLAUDICATION: ICD-10-CM

## 2021-10-14 DIAGNOSIS — G89.29 CHRONIC MIDLINE LOW BACK PAIN WITHOUT SCIATICA: ICD-10-CM

## 2021-10-14 DIAGNOSIS — Z23 NEED FOR INFLUENZA VACCINATION: ICD-10-CM

## 2021-10-14 DIAGNOSIS — Z79.899 ENCOUNTER FOR LONG-TERM (CURRENT) USE OF MEDICATIONS: ICD-10-CM

## 2021-10-14 DIAGNOSIS — R42 DIZZINESS: Primary | ICD-10-CM

## 2021-10-14 DIAGNOSIS — F11.90 CHRONIC, CONTINUOUS USE OF OPIOIDS: ICD-10-CM

## 2021-10-14 DIAGNOSIS — M54.2 CERVICAL PAIN: ICD-10-CM

## 2021-10-14 DIAGNOSIS — N20.0 URIC ACID KIDNEY STONE: ICD-10-CM

## 2021-10-14 LAB
% GRANULOCYTES: 73.2 % (ref 42.2–75.2)
HCT VFR BLD AUTO: 27.9 % (ref 35–46)
HEMOGLOBIN: 9.9 G/DL (ref 11.8–15.5)
LYMPHOCYTES NFR BLD AUTO: 19.2 % (ref 20.5–51.1)
MCH RBC QN AUTO: 28.8 PG (ref 27–31)
MCHC RBC AUTO-ENTMCNC: 35.4 G/DL (ref 33–37)
MCV RBC AUTO: 81.4 FL (ref 80–100)
MONOCYTES NFR BLD AUTO: 7.6 % (ref 1.7–9.3)
PLATELET # BLD AUTO: 193 K/UL (ref 140–450)
RBC # BLD AUTO: 3.43 X10/CMM (ref 3.7–5.2)
WBC # BLD AUTO: 3.8 X10/CMM (ref 3.8–11)

## 2021-10-14 PROCEDURE — 36415 COLL VENOUS BLD VENIPUNCTURE: CPT | Performed by: NURSE PRACTITIONER

## 2021-10-14 PROCEDURE — 85025 COMPLETE CBC W/AUTO DIFF WBC: CPT | Performed by: NURSE PRACTITIONER

## 2021-10-14 PROCEDURE — G0008 ADMIN INFLUENZA VIRUS VAC: HCPCS | Mod: 59 | Performed by: NURSE PRACTITIONER

## 2021-10-14 PROCEDURE — 90662 IIV NO PRSV INCREASED AG IM: CPT | Performed by: NURSE PRACTITIONER

## 2021-10-14 PROCEDURE — 99214 OFFICE O/P EST MOD 30 MIN: CPT | Mod: 25 | Performed by: NURSE PRACTITIONER

## 2021-10-14 RX ORDER — HYDROCODONE BITARTRATE AND ACETAMINOPHEN 5; 325 MG/1; MG/1
1-2 TABLET ORAL EVERY 4 HOURS PRN
Qty: 30 TABLET | Refills: 0 | Status: SHIPPED | OUTPATIENT
Start: 2021-10-14 | End: 2022-05-05

## 2021-10-14 RX ORDER — ALLOPURINOL 100 MG/1
100 TABLET ORAL DAILY
Qty: 90 TABLET | Refills: 3
Start: 2021-10-14 | End: 2022-04-07

## 2021-10-14 ASSESSMENT — ANXIETY QUESTIONNAIRES
5. BEING SO RESTLESS THAT IT IS HARD TO SIT STILL: NOT AT ALL
1. FEELING NERVOUS, ANXIOUS, OR ON EDGE: NOT AT ALL
3. WORRYING TOO MUCH ABOUT DIFFERENT THINGS: NOT AT ALL
7. FEELING AFRAID AS IF SOMETHING AWFUL MIGHT HAPPEN: NOT AT ALL
GAD7 TOTAL SCORE: 0
2. NOT BEING ABLE TO STOP OR CONTROL WORRYING: NOT AT ALL
6. BECOMING EASILY ANNOYED OR IRRITABLE: NOT AT ALL

## 2021-10-14 ASSESSMENT — PATIENT HEALTH QUESTIONNAIRE - PHQ9
5. POOR APPETITE OR OVEREATING: NOT AT ALL
SUM OF ALL RESPONSES TO PHQ QUESTIONS 1-9: 0

## 2021-10-14 NOTE — LETTER
[facility]  [address] [place]  [address]  Phone: [phone]    October 15, 2021      Macie Jorge  [address] [address]              Dear Macie,     It was so nice to see you at your recent appointment! I hope you had a nice birthday dinner.   Your ferritin, measure of iron stores, was normal.     Please let us know if you have questions or concerns.     Sincerely,   WILEY Sunshine CNP/bmb      Results for orders placed or performed in visit on 10/14/21   CBC with Diff/Plt ([facility])     Status: Abnormal   Result Value Ref Range    WBC x10/cmm 3.8 3.8 - 11.0 x10/cmm    % Lymphocytes 19.2 (A) 20.5 - 51.1 %    % Monocytes 7.6 1.7 - 9.3 %    % Granulocytes 73.2 42.2 - 75.2 %    RBC x10/cmm 3.43 (A) 3.7 - 5.2 x10/cmm    Hemoglobin 9.9 (A) 11.8 - 15.5 g/dl    Hematocrit 27.9 (A) 35 - 46 %    MCV 81.4 80 - 100 fL    MCH 28.8 27.0 - 31.0 pg    MCHC 35.4 33.0 - 37.0 g/dL    Platelet Count 193 140 - 450 K/uL   Ferritin  Serum ([facility])     Status: None   Result Value Ref Range    Ferritin 54 15 - 150 ng/mL    Narrative    Performed at:  01 - [facility] [address], [place], [place]  [id number]  : Riley Rachel MD, Phone:  [phone]

## 2021-10-14 NOTE — PROGRESS NOTES
Problem(s) Oriented visit        SUBJECTIVE:                                                    Macie Jorge is a 81 year old female who presents to clinic today for the following health issues :    Chronic back pain. Seen at [facility] earlier this year and surgery was recommended. Patient decided not to do this once she read over all the potential complications. Requesting refill of Norco. Goes through 30 tablets every 2-3 months.     Has been feeling dizzy. Thinks that it may be that her hemoglobin is low since she has had that problem in the past. Dizziness described as feeling off balance. Occurs most often when she turns her head or changes position. No head injury or trauma. Denies vision changes or headaches.     Problem list, Medication list, Allergies, and Medical/Social/Surgical histories reviewed in EPIC and updated as appropriate.   Additional history: as documented    ROS:  5 point ROS completed and negative except noted above, including Gen, CV, Resp, MS, Neuro    OBJECTIVE:                                                    /68   Pulse 86   Wt 80.7 kg (178 lb)   SpO2 99%   BMI 28.73 kg/m    Body mass index is 28.73 kg/m .   GENERAL: Elderly female, appears healthy, no acute distress  EYES: Eyes grossly normal to inspection  RESP: lungs clear to auscultation - no rales, rhonchi or wheezes  CV: regular rates and rhythm, normal S1 S2, no S3 or S4, no murmur, click or rub  MS: extremities normal- no gross deformities noted  SKIN: no suspicious lesions or rashes  NEURO: Normal strength and tone, sensory exam grossly normal and mentation intact  PSYCH: normal affect & mood    CBC RESULTS: Recent Labs   Lab Test 10/14/21  1428 05/18/16  1444 10/01/15  0900   WBC 3.8   < > 7.8   RBC 3.43*   < > 4.24   HGB 9.9*   < > 12.8   HCT 27.9*   < > 36.7   MCV 81.4   < > 87   MCH 28.8   < > 30.2   MCHC 35.4   < > 34.9   RDW  --   --  13.8      < > 167    < > = values in this interval not displayed.  "         ASSESSMENT/PLAN:                                                      BMI:   Estimated body mass index is 28.73 kg/m  as calculated from the following:    Height as of 9/7/21: 1.676 m (5' 6\").    Weight as of this encounter: 80.7 kg (178 lb).   Weight management plan: Discussed healthy diet and exercise guidelines      Macie was seen today for dizziness and recheck medication.    Diagnoses and all orders for this visit:    Dizziness  -     CBC with Diff/Plt ([facility])  -     Ferritin  Serum ([facility])  -     VENOUS COLLECTION    Cervical pain  -     HYDROcodone-acetaminophen (NORCO) 5-325 MG tablet; Take 1-2 tablets by mouth every 4 hours as needed for moderate to severe pain    Spinal stenosis of lumbar region with neurogenic claudication  -     HYDROcodone-acetaminophen (NORCO) 5-325 MG tablet; Take 1-2 tablets by mouth every 4 hours as needed for moderate to severe pain    Chronic midline low back pain without sciatica    Uric acid kidney stone  -     allopurinol (ZYLOPRIM) 100 MG tablet; Take 1 tablet (100 mg) by mouth daily  -     VENOUS COLLECTION    Encounter for long-term (current) use of medications  -     Microalbumin ([facility]); Future  -     UScreen Toxisure ([facility]); Future    Chronic, continuous use of opioids  Comments:  intermittent  Orders:  -     Cancel: UScreen Toxisure ([facility])  -     Cancel: Microalbumin ([facility])  -     Microalbumin ([facility]); Future  -     UScreen Toxisure ([facility]); Future    Need for influenza vaccination  -     [place] FLU VACCINE, INCREASED ANTIGEN, PRESV FREE  -     ADMIN INFLUENZA VIRUS VAC    Prescription for opioid pain medication sent to pharmacy. Safety precautions discussed with patient. Controlled substance agreement signed and PHQ-9 completed. Patient unable to leave urine for Utox screening but will return to leave sample.  PDMP Review       Value Time User    State PDMP site checked  Yes 10/19/2021  8:08 PM Haleigh Smallwood APRN CNP        Dizziness intermittent and could be " related to low hemoglobin. Recommend patient increase iron in diet and follow-up if symptoms ongoing.    See Patient Instructions  Patient Instructions   Norco - use sparingly for severe pain. Take medication as directed. Do not take medication while driving, operating heavy machinery or while drinking alcohol.     *If unable to leave urine today then please return tomorrow to do so*        WILEY Sunshine CNP  [facility]  Family Practice  [facility]  [phone]    For any issues my office # is [phone]

## 2021-10-14 NOTE — PATIENT INSTRUCTIONS
Norco - use sparingly for severe pain. Take medication as directed. Do not take medication while driving, operating heavy machinery or while drinking alcohol.     *If unable to leave urine today then please return tomorrow to do so*

## 2021-10-15 LAB — FERRITIN SERPL-MCNC: 54 NG/ML (ref 15–150)

## 2021-10-15 ASSESSMENT — ANXIETY QUESTIONNAIRES: GAD7 TOTAL SCORE: 0

## 2021-10-19 DIAGNOSIS — F11.90 CHRONIC, CONTINUOUS USE OF OPIOIDS: ICD-10-CM

## 2021-10-19 PROCEDURE — 82044 UR ALBUMIN SEMIQUANTITATIVE: CPT | Performed by: NURSE PRACTITIONER

## 2021-10-20 LAB
A/C RATIO MG/G: ABNORMAL MG/G
ALBUMIN (URINE) MG/L: 80 MG/L
INTERPRETATION: ABNORMAL
URINE CREATININE MG/DL - QUEST: 200 MG/DL

## 2021-11-01 DIAGNOSIS — F11.90 CHRONIC, CONTINUOUS USE OF OPIOIDS: ICD-10-CM

## 2021-11-01 DIAGNOSIS — E11.22 TYPE 2 DIABETES MELLITUS WITH STAGE 3B CHRONIC KIDNEY DISEASE, WITHOUT LONG-TERM CURRENT USE OF INSULIN (H): ICD-10-CM

## 2021-11-01 DIAGNOSIS — N18.32 TYPE 2 DIABETES MELLITUS WITH STAGE 3B CHRONIC KIDNEY DISEASE, WITHOUT LONG-TERM CURRENT USE OF INSULIN (H): ICD-10-CM

## 2021-11-01 LAB
AMPHETAMINES (AMP) UR: NEGATIVE
BARBITURATES (BAR) UR: NEGATIVE
BENZODIAZEPINES (BZO) UR: NEGATIVE
BUPRENORPHINE (BUP) UR: NEGATIVE
CANNABINOIDS (THC) UR: NEGATIVE
COCAINE (COC) UR: NEGATIVE
MDMA UR: NEGATIVE
METHADONE (MTD) UR: NEGATIVE
METHAMPHETAMINE (METHA) UR: NEGATIVE
MORPH/OPIATES (MOP) UR: NEGATIVE
OXYCODONE (OXYCO) UR: NEGATIVE
PCP UR: NEGATIVE
SPECIMEN VALIDITY INTERNAL QC UR: NORMAL
SPECIMEN VALIDITY TEMPERATURE UR: NORMAL
SPECIMEN VALIDITY UR CREATININE: NORMAL MG/DL (ref 20–200)
SPECIMEN VALIDITY UR PH: 5 (ref 4–9)
SPECIMEN VALIDITY UR SPECIFIC GRAVITY: 1.02 (ref 1–1.02)

## 2021-11-01 PROCEDURE — 80306 DRUG TEST PRSMV INSTRMNT: CPT | Performed by: FAMILY MEDICINE

## 2021-11-01 PROCEDURE — 82044 UR ALBUMIN SEMIQUANTITATIVE: CPT | Performed by: NURSE PRACTITIONER

## 2021-11-03 LAB
A/C RATIO MG/G: <30 MG/G
ALBUMIN (URINE) MG/L: 30 MG/L
INTERPRETATION: NORMAL
URINE CREATININE MG/DL - QUEST: 100 MG/DL

## 2021-11-17 ENCOUNTER — TRANSFERRED RECORDS (OUTPATIENT)
Dept: FAMILY MEDICINE | Facility: CLINIC | Age: 81
End: 2021-11-17

## 2021-12-27 ENCOUNTER — TRANSFERRED RECORDS (OUTPATIENT)
Dept: FAMILY MEDICINE | Facility: CLINIC | Age: 81
End: 2021-12-27

## 2022-01-18 DIAGNOSIS — E55.9 VITAMIN D DEFICIENCY: Primary | ICD-10-CM

## 2022-01-25 ENCOUNTER — TELEPHONE (OUTPATIENT)
Dept: FAMILY MEDICINE | Facility: CLINIC | Age: 82
End: 2022-01-25

## 2022-01-25 ENCOUNTER — LAB (OUTPATIENT)
Dept: URGENT CARE | Facility: URGENT CARE | Age: 82
End: 2022-01-25
Attending: FAMILY MEDICINE
Payer: COMMERCIAL

## 2022-01-25 DIAGNOSIS — M79.10 MYALGIA: ICD-10-CM

## 2022-01-25 DIAGNOSIS — Z20.822 SUSPECTED 2019 NOVEL CORONAVIRUS INFECTION: ICD-10-CM

## 2022-01-25 DIAGNOSIS — R05.9 COUGH: Primary | ICD-10-CM

## 2022-01-25 DIAGNOSIS — J02.0 STREPTOCOCCAL SORE THROAT: ICD-10-CM

## 2022-01-25 DIAGNOSIS — J02.9 SORE THROAT: ICD-10-CM

## 2022-01-25 DIAGNOSIS — R05.9 COUGH: ICD-10-CM

## 2022-01-25 PROCEDURE — U0005 INFEC AGEN DETEC AMPLI PROBE: HCPCS

## 2022-01-25 PROCEDURE — U0003 INFECTIOUS AGENT DETECTION BY NUCLEIC ACID (DNA OR RNA); SEVERE ACUTE RESPIRATORY SYNDROME CORONAVIRUS 2 (SARS-COV-2) (CORONAVIRUS DISEASE [COVID-19]), AMPLIFIED PROBE TECHNIQUE, MAKING USE OF HIGH THROUGHPUT TECHNOLOGIES AS DESCRIBED BY CMS-2020-01-R: HCPCS

## 2022-01-25 NOTE — TELEPHONE ENCOUNTER
Patient calls reporting feeling ill since 1/20/22 with cough, myalgias, congestion, sore throat. Has asthma and takes Advair daily. Does not feel asthma is flaring currently. No SOB, tight chest or wheezing. Has not needed to use her albuterol at all.   Has been triple vaxxed for COVID.   Offered telemed visit this afternoon with Dr. Bruner. Patient states primarily just wants to get tested for COVID. Does not want to spread COVID to her family and grand kids. Asks if we can order COVID testing to be done at [facility]. If test is negative will observe and call for eval if symptoms worsen or fail to resolve.   Plan: okay per Dr. Bruner to order COVID testing. Order placed and patient will call [facility] to schedule.  Vickie Potts RN

## 2022-01-26 ENCOUNTER — TELEPHONE (OUTPATIENT)
Dept: NURSING | Facility: CLINIC | Age: 82
End: 2022-01-26
Payer: COMMERCIAL

## 2022-01-26 LAB — SARS-COV-2 RNA RESP QL NAA+PROBE: POSITIVE

## 2022-01-26 NOTE — TELEPHONE ENCOUNTER
Patient classified as COVID treatment eligible by Epic high risk algorithm:  Yes     Coronavirus (COVID-19) Notification    Reason for call  Notify of POSITIVE COVID-19 lab result, assess symptoms,  review [facility] recommendations    Lab Result   Lab test for 2019-nCoV rRt-PCR or SARS-COV-2 PCR  Oropharyngeal AND/OR nasopharyngeal swabs were POSITIVE for 2019-nCoV RNA [OR] SARS-COV-2 RNA (COVID-19) RNA     We have been unable to reach patient by phone at this time to notify of their Positive COVID-19 result.    Left voicemail message requesting a call back to [phone] [facility] for results.        A Positive COVID-19 letter will be sent via [employer] or the mail. (Exception, no letters sent to Presurgerical/Preprocedure Patients)    Mulu Christina

## 2022-01-27 ENCOUNTER — VIRTUAL VISIT (OUTPATIENT)
Dept: FAMILY MEDICINE | Facility: CLINIC | Age: 82
End: 2022-01-27

## 2022-01-27 DIAGNOSIS — N18.32 STAGE 3B CHRONIC KIDNEY DISEASE (H): ICD-10-CM

## 2022-01-27 DIAGNOSIS — J44.89 COPD WITH ASTHMA (H): ICD-10-CM

## 2022-01-27 DIAGNOSIS — E11.22 TYPE 2 DIABETES MELLITUS WITH STAGE 3B CHRONIC KIDNEY DISEASE, WITHOUT LONG-TERM CURRENT USE OF INSULIN (H): ICD-10-CM

## 2022-01-27 DIAGNOSIS — N18.32 TYPE 2 DIABETES MELLITUS WITH STAGE 3B CHRONIC KIDNEY DISEASE, WITHOUT LONG-TERM CURRENT USE OF INSULIN (H): ICD-10-CM

## 2022-01-27 DIAGNOSIS — U07.1 INFECTION DUE TO 2019 NOVEL CORONAVIRUS: Primary | ICD-10-CM

## 2022-01-27 PROCEDURE — 99213 OFFICE O/P EST LOW 20 MIN: CPT | Mod: GT | Performed by: FAMILY MEDICINE

## 2022-01-27 NOTE — PROGRESS NOTES
"    Video Problem(s) Oriented visit      Video Start Time: 9:09 AM    The patient has been notified of following:     \"This video visit will be conducted via a call between you and your physician/provider. We have found that certain health care needs can be provided without the need for an in-person physical exam.  This service lets us provide the care you need with a video conversation.  If a prescription is necessary we can send it directly to your pharmacy.  If lab work is needed we can place an order for that and you can then stop by our lab to have the test done at a later time.    Video visits are billed at different rates depending on your insurance coverage.  Please reach out to your insurance provider with any questions.    If during the course of the call the physician/provider feels a video visit is not appropriate, you will not be charged for this service.\"    Patient has given verbal consent for Video visit? Yes    How would you like to obtain your AVS? MyChart    Will anyone else be joining your video visit? No  SUBJECTIVE:                                                    Macie Jorge is a 81 year old female who presents to clinic today for the following health issues :        Concern for COVID-19  About how many days ago did these symptoms start? 8 days ago  Is this your first visit for this illness? No   How would you describe your symptoms since your last visit? My symptoms have stayed the same  In the 14 days before your symptoms started, have you had close contact with someone with COVID-19 (Coronavirus)? I do not know.  Do you have a fever or chills? Yes, I felt feverish or had chills.  Are you having new or worsening difficulty breathing? Yes  Please describe what kind of difficulty you are having breathing:Mild dyspnea (able to do ADLs without difficulty, mild shortness of breath with activities such as climbing one or two flights of stairs or walking briskly)  Do you have new or " worsening cough? Yes, I am coughing up mucus.  Have you had any new or unexplained body aches? YES    Have you experienced any of the following NEW symptoms?    Headache: No    Sore throat: YES    Loss of taste or smell: No    Chest pain: No    Diarrhea: No    Rash: No  What treatments have you tried? OTC  Who do you live with? Pedrito  Are you, or a household member, a healthcare worker or a ? No  Do you live in a nursing home, group home, or shelter? No  Do you have a way to get food/medications if quarantined? Yes, I have a friend or family member who can help me.          Problem list, Medication list, Allergies, and Medical/Social/Surgical histories reviewed in [facility] and updated as appropriate.   Additional history: as documented    ROS:  General and CV completed and negative except as noted above    Histories:   Patient Active Problem List   Diagnosis     Panic attacks     Advanced directives, counseling/discussion     Hyperlipidemia with target LDL less than 100     History of hip replacement, total     Health Care Home     Class 1 obesity due to excess calories with serious comorbidity and body mass index (BMI) of 31.0 to 31.9 in adult     Urgency incontinence     Stage 3b chronic kidney disease (H)     Nonintractable migraine, unspecified migraine type     Type 2 diabetes mellitus with stage 3 chronic kidney disease, without long-term current use of insulin (H)     COPD with asthma (H)     Altered mental status     History of colonic polyps     Chronic midline low back pain without sciatica     Physical deconditioning     Spinal stenosis of lumbar region with neurogenic claudication     Hypothyroidism due to acquired atrophy of thyroid     Total knee replacement status, right     Past Surgical History:   Procedure Laterality Date     ARTHROPLASTY HIP  3/13/2013    Procedure: ARTHROPLASTY HIP;  LEFT TOTAL HIP ARTHROPLASTY (BIOMET)^ ;  Surgeon: Anand Main MD;  Location:  OR      ARTHROPLASTY KNEE Right 8/9/2021    Procedure: RIGHT TOTAL KNEE ARTHROPLASTY;  Surgeon: Anand Main MD;  Location:  OR     ARTHROPLASTY PATELLO-FEMORAL (KNEE)  2005ish    Left     CHOLECYSTECTOMY, OPEN  25 yo     COMBINED CYSTOSCOPY, INSERT STENT URETER(S)  8/5/2014    Procedure: COMBINED CYSTOSCOPY, INSERT STENT URETER(S);  Surgeon: Sam Arriaga MD;  Location:  OR     COMBINED CYSTOSCOPY, RETROGRADES, URETEROSCOPY, LASER HOLMIUM LITHOTRIPSY URETER(S), INSERT STENT Left 6/14/2016    Procedure: COMBINED CYSTOSCOPY, RETROGRADES, URETEROSCOPY, LASER HOLMIUM LITHOTRIPSY URETER(S), INSERT STENT;  Surgeon: Thomas Edmondson MD;  Location:  OR     COMBINED CYSTOSCOPY, RETROGRADES, URETEROSCOPY, LASER HOLMIUM LITHOTRIPSY URETER(S), INSERT STENT Left 3/28/2019    Procedure: CYSTOSCOPY, LEFT RETROGRADE PYLEOGRAM, LEFT URETEROSCOPY, HOLMIUM LASER LITHOTRIPSY, LEFT URETERAL STENT EXCHANGE;  Surgeon: Derrek Rivera MD;  Location:  OR     CYSTOSCOPY, RETROGRADES, INSERT STENT URETER(S), COMBINED Left 3/8/2019    Procedure: CYSTOSCOPY,LEFT RETROGRADE, LEFT STENT PLACEMENT;  Surgeon: Derrek Rivera MD;  Location:  OR     GENITOURINARY SURGERY       LAMINECT/DISCECTOMY, LUMBAR      Laminectomy/Discectomy Lumbar     LASER HOLMIUM LITHOTRIPSY URETER(S), INSERT STENT, COMBINED Left 8/20/2014    Procedure: COMBINED CYSTOSCOPY, URETEROSCOPY, LASER HOLMIUM LITHOTRIPSY URETER(S), INSERT STENT;  Surgeon: Sam Arriaga MD;  Location:  OR     LASER HOLMIUM LITHOTRIPSY URETER(S), INSERT STENT, COMBINED Left 5/2/2019    Procedure: CYSTOSCOPY, LEFT RETROGRADE, LEFT URETEROSCOPY, HOLMIUM LASER LITHOTRIPSY, STONE REMOVAL, LEFT STENT EXCHANGE;  Surgeon: Derrek Rivera MD;  Location:  OR     LASER HOLMIUM LITHOTRIPSY URETER(S), INSERT STENT, COMBINED Right 6/13/2019    Procedure: CYSTOSCOPY; RIGHT URETEROSCOPY WITH HOLMIUM LASER LITHOTRIPSY; RIGHT STENT PLACEMENT (DIGITAL FLEXIBLE URETEROSCOPE);   Surgeon: Derrek Rivera MD;  Location:  OR     ORTHOPEDIC SURGERY      left ankle     RECESSION EYELID UPPER         Social History     Tobacco Use     Smoking status: Former Smoker     Packs/day: 1.50     Years: 40.00     Pack years: 60.00     Quit date: 1995     Years since quittin.0     Smokeless tobacco: Never Used   Substance Use Topics     Alcohol use: Yes     Comment: occ.     No family history on file.        OBJECTIVE:                                                    There were no vitals taken for this visit.  There is no height or weight on file to calculate BMI.   APPEARANCE: = Relaxed and in no distress     ASSESSMENT/PLAN:                                                        Macie was seen today for covid concern and asthma.    Diagnoses and all orders for this visit:    Infection due to 2019 novel coronavirus    COPD with asthma (H)    Type 2 diabetes mellitus with stage 3b chronic kidney disease, without long-term current use of insulin (H)    Stage 3b chronic kidney disease (H)        See Patient Instructions  Patient Instructions   Instructions for Patients  Your symptoms could be due to COVID-19, but it also could be due to a number of other respiratory illnesses.  We are not doing testing at this time for COVID-19 unless symptoms are severe enough to require hospitalization.  It is recommended that you stay home to prevent the spread of your illness.  To do this follow these instructions:    Regardless of if you have been tested or not:  Patient who have symptoms (cough, fever, or shortness of breath), need to isolate for 7 days from when symptoms started AND 72 hours after fever resolves (without fever reducing medications) AND improvement of respiratory symptoms (whichever is longer).      Isolate yourself at home (in own room/own bathroom if possible)    Do Not allow any visitors    Do Not go to work or school    Do Not go to Spiritism,  centers, shopping, or other  public places.    Do Not shake hands.    Avoid close and intimate contact with others (hugging, kissing).    Follow CDC recommendations for household cleaning of frequently touched services.     After the initial 7 days, continue to isolate yourself from household members as much as possible. To continue decrease the risk of community spread and exposure, you and any members of your household should limit activities in public for 14 days after starting home isolation.     You can reference the following CDC link for helpful home isolation/care tips:  https://www.cdc.gov/coronavirus/2019-ncov/downloads/10Things.pdf    Protect Others:    Cover your mouth and nose with a mask, disposable tissue or wash cloth to avoid spreading germs to others.    Wash your hands and face frequently with soap and water.    Fever Medicines:    For fever relief, take acetaminophen or ibuprofen.    Treat fevers above 101  F (38.3  C) to lower fevers and make you more comfortable.     Acetaminophen (e.g., Tylenol): Take 650 mg (two 325 mg pills) by mouth every 4-6 hours as needed of regular strength Tylenol or 1,000 mg (two 500 mg pills) every 8 hours as needed of Extra Strength Tylenol.     Ibuprofen (e.g., Motrin, Advil): Take 400 mg (two 200 mg pills) by mouth every 6 hours as needed.     Acetaminophen is thought to be safer than ibuprofen or naproxen for people over 65 years old. Acetaminophen is in many OTC and prescription medicines. It might be in more than one medicine that you are taking. You need to be careful and not take an overdose. Before taking any medicine, read all the instructions on the package.    Caution - NSAIDs (e.g., ibuprofen, naproxen): Do not take nonsteroidal anti-inflammatory drugs (NSAIDs) if you have stomach problems, kidney disease, heart failure, or other contraindications to using this type of medicine. Do not take NSAID medicines for over 7 days without consulting your PCP. Do not take NSAID medicines if  you are pregnant. Do not take NSAID medicines if you are also taking blood thinners.     Call Back If: Breathing difficulty develops or you become worse.    Additional General Information About COVID-19    You can reference the following CDC link for helpful home isolation/care tips:  https://www.cdc.gov/coronavirus/2019-ncov/downloads/10Things.pdf    COVID-19 - Symptoms:     The COVID-19 can cause a respiratory illness, such as bronchitis or pneumonia.    The most common symptoms are: cough, fever, and shortness of breath.     Other symptoms are: body aches, chills, diarrhea, fatigue, headache, runny nose, and sore throat     COVID-19 - Exposure Risk Factors:    Exposure to a person who has been diagnosed with COVID-19 .    Travel from an area with recent local transmission of COVID-19 .    The CDC (www.cdc.gov) has the most up-to-date list of where the COVID-19 outbreak is occurring.    COVID-19 - Spreading:     The virus likely spreads through respiratory droplets produced when a person coughs or sneezes. These respiratory droplets can travel approximately 6 feet and can remain on surfaces.  Common disinfectants will kill the virus.    The CDC currently does not recommend healthy people wearing masks.    COVID-19 - Protect Yourself:     Avoid close contact with people known to have this new COVID-19 infection.    Wash hands often with soap and water or alcohol-based hand .    Avoid touching the eyes, nose or mouth.       Thank you for limiting contact with others, wearing a simple mask to cover your cough, practice good hand hygiene habits and accessing our virtual services where possible to limit the spread of this virus.    For more information about COVID19 and options for caring for yourself at home, please visit the CDC website at https://www.cdc.gov/coronavirus/2019-ncov/about/steps-when-sick.html  For more options for care at [facility], please visit our website at  [email or web address]   Today's visit was done over a video connection to minimize patients exposure to others during the COVID-19 outbreak.            The following health maintenance items are reviewed in Epic and correct as of today:  Health Maintenance   Topic Date Due     PARATHYROID  Never done     PHOSPHORUS  Never done     ZOSTER IMMUNIZATION (1 of 2) Never done     MEDICARE ANNUAL WELLNESS VISIT  01/20/2017     EYE EXAM  10/15/2021     PHQ-2  01/01/2022     A1C  02/02/2022     MICROALBUMIN  05/01/2022     LIPID  08/02/2022     TSH W/FREE T4 REFLEX  08/02/2022     DIABETIC FOOT EXAM  08/02/2022     FALL RISK ASSESSMENT  08/02/2022     BMP  08/10/2022     HEMOGLOBIN  10/14/2022     URINE DRUG SCREEN  11/01/2022     DTAP/TDAP/TD IMMUNIZATION (2 - Td or Tdap) 11/15/2022     ADVANCE CARE PLANNING  08/02/2026     DEXA  05/07/2030     SPIROMETRY  Completed     COPD ACTION PLAN  Completed     INFLUENZA VACCINE  Completed     Pneumococcal Vaccine: 65+ Years  Completed     URINALYSIS  Completed     ALK PHOS  Completed     COVID-19 Vaccine  Completed     IPV IMMUNIZATION  Aged Out     MENINGITIS IMMUNIZATION  Aged Out       Video-Visit Details    This visit is being conducted as a virtual visit due to the emphasis on mitigation of the COVID-19 virus pandemic.   Type of service:  Video Visit    Originating Location (pt. Location): Home    Distant Location (provider location):  [facility]     Platform used for Video Visit: Unable to connect with video, audio only available    Juve Bruner MD  [facility]  Family Practice  [facility]  [phone]    Video End Time:9:21 AM  For any issues my office # is [phone]

## 2022-01-27 NOTE — PATIENT INSTRUCTIONS
Instructions for Patients  Your symptoms could be due to COVID-19, but it also could be due to a number of other respiratory illnesses.  We are not doing testing at this time for COVID-19 unless symptoms are severe enough to require hospitalization.  It is recommended that you stay home to prevent the spread of your illness.  To do this follow these instructions:    Regardless of if you have been tested or not:  Patient who have symptoms (cough, fever, or shortness of breath), need to isolate for 7 days from when symptoms started AND 72 hours after fever resolves (without fever reducing medications) AND improvement of respiratory symptoms (whichever is longer).      Isolate yourself at home (in own room/own bathroom if possible)    Do Not allow any visitors    Do Not go to work or school    Do Not go to Yazidi,  centers, shopping, or other public places.    Do Not shake hands.    Avoid close and intimate contact with others (hugging, kissing).    Follow CDC recommendations for household cleaning of frequently touched services.     After the initial 7 days, continue to isolate yourself from household members as much as possible. To continue decrease the risk of community spread and exposure, you and any members of your household should limit activities in public for 14 days after starting home isolation.     You can reference the following CDC link for helpful home isolation/care tips:  https://www.cdc.gov/coronavirus/2019-ncov/downloads/10Things.pdf    Protect Others:    Cover your mouth and nose with a mask, disposable tissue or wash cloth to avoid spreading germs to others.    Wash your hands and face frequently with soap and water.    Fever Medicines:    For fever relief, take acetaminophen or ibuprofen.    Treat fevers above 101  F (38.3  C) to lower fevers and make you more comfortable.     Acetaminophen (e.g., Tylenol): Take 650 mg (two 325 mg pills) by mouth every 4-6 hours as needed of regular  strength Tylenol or 1,000 mg (two 500 mg pills) every 8 hours as needed of Extra Strength Tylenol.     Ibuprofen (e.g., Motrin, Advil): Take 400 mg (two 200 mg pills) by mouth every 6 hours as needed.     Acetaminophen is thought to be safer than ibuprofen or naproxen for people over 65 years old. Acetaminophen is in many OTC and prescription medicines. It might be in more than one medicine that you are taking. You need to be careful and not take an overdose. Before taking any medicine, read all the instructions on the package.    Caution - NSAIDs (e.g., ibuprofen, naproxen): Do not take nonsteroidal anti-inflammatory drugs (NSAIDs) if you have stomach problems, kidney disease, heart failure, or other contraindications to using this type of medicine. Do not take NSAID medicines for over 7 days without consulting your PCP. Do not take NSAID medicines if you are pregnant. Do not take NSAID medicines if you are also taking blood thinners.     Call Back If: Breathing difficulty develops or you become worse.    Additional General Information About COVID-19    You can reference the following CDC link for helpful home isolation/care tips:  https://www.cdc.gov/coronavirus/2019-ncov/downloads/10Things.pdf    COVID-19 - Symptoms:     The COVID-19 can cause a respiratory illness, such as bronchitis or pneumonia.    The most common symptoms are: cough, fever, and shortness of breath.     Other symptoms are: body aches, chills, diarrhea, fatigue, headache, runny nose, and sore throat     COVID-19 - Exposure Risk Factors:    Exposure to a person who has been diagnosed with COVID-19 .    Travel from an area with recent local transmission of COVID-19 .    The CDC (www.cdc.gov) has the most up-to-date list of where the COVID-19 outbreak is occurring.    COVID-19 - Spreading:     The virus likely spreads through respiratory droplets produced when a person coughs or sneezes. These respiratory droplets can travel approximately 6 feet  and can remain on surfaces.  Common disinfectants will kill the virus.    The CDC currently does not recommend healthy people wearing masks.    COVID-19 - Protect Yourself:     Avoid close contact with people known to have this new COVID-19 infection.    Wash hands often with soap and water or alcohol-based hand .    Avoid touching the eyes, nose or mouth.       Thank you for limiting contact with others, wearing a simple mask to cover your cough, practice good hand hygiene habits and accessing our virtual services where possible to limit the spread of this virus.    For more information about COVID19 and options for caring for yourself at home, please visit the CDC website at https://www.cdc.gov/coronavirus/2019-ncov/about/steps-when-sick.html  For more options for care at [facility], please visit our website at [email or web address]   Today's visit was done over a video connection to minimize patients exposure to others during the COVID-19 outbreak.

## 2022-01-31 ENCOUNTER — TELEPHONE (OUTPATIENT)
Dept: FAMILY MEDICINE | Facility: CLINIC | Age: 82
End: 2022-01-31

## 2022-01-31 DIAGNOSIS — U07.1 INFECTION DUE TO 2019 NOVEL CORONAVIRUS: ICD-10-CM

## 2022-01-31 DIAGNOSIS — J44.89 COPD WITH ASTHMA (H): Primary | ICD-10-CM

## 2022-01-31 DIAGNOSIS — J44.1 COPD EXACERBATION (H): ICD-10-CM

## 2022-01-31 RX ORDER — METHYLPREDNISOLONE 4 MG
TABLET, DOSE PACK ORAL
Qty: 21 TABLET | Refills: 0 | Status: SHIPPED | OUTPATIENT
Start: 2022-01-31 | End: 2022-02-06

## 2022-01-31 NOTE — TELEPHONE ENCOUNTER
Patient calls with update on COVID and COPD/asthma since virtual visit with Dr. Bruner on 1/27. Reports not much better with breathing/asthma. Not worse. Is using her Advair BID as usual and been using albuterol nebs TID. Reports chest tight and congested. Some productive cough - unsure color. No fever/chills, chest or back pain, hemoptysis.   Thinks needs steroid pack as has had in past with asthma flares and was helpful.   Plan: per Dr. Franc bonds for Medrol Dosepak. Call if symptoms worsen or fail to resolve. Patient agrees.  Vickie Potts RN

## 2022-02-18 DIAGNOSIS — E55.9 VITAMIN D DEFICIENCY: ICD-10-CM

## 2022-02-18 PROCEDURE — 36415 COLL VENOUS BLD VENIPUNCTURE: CPT | Performed by: FAMILY MEDICINE

## 2022-02-18 NOTE — LETTER
[place] [id number] [place]  [address]  Phone: [phone]    February 21, 2022      Macie Jorge  [address] [facility] [address]              Dear Macie,    I am writing to report that your included test results are as expected.    Many lab panels contain some results that are outside of the normal range,   I have reviewed each of these results and they require no changes at this time.          Sincerely,     Juve Bruner M.D./sandoval    Results for orders placed or performed in visit on 02/18/22   Vitamin D  25-Hydroxy ([facility])     Status: None   Result Value Ref Range    Vitamin D,25-Hydroxy 32.8 30.0 - 100.0 ng/mL    Narrative    Performed at:  01 - [facility] [address], [place], [place]  [id number]  : Riley Rachel MD, Phone:  [phone]

## 2022-02-20 LAB — VITAMIN D, 25-HYDROXY: 32.8 NG/ML (ref 30–100)

## 2022-02-21 NOTE — RESULT ENCOUNTER NOTE
Dear Macie,   I am writing to report that your included test results are as expected.  Many lab panels contain some results that are outside of the normal range, I have reviewed each of these results and they require no changes at this time.    Juve Bruner MD

## 2022-02-25 ENCOUNTER — OFFICE VISIT (OUTPATIENT)
Dept: FAMILY MEDICINE | Facility: CLINIC | Age: 82
End: 2022-02-25

## 2022-02-25 VITALS
DIASTOLIC BLOOD PRESSURE: 70 MMHG | TEMPERATURE: 97.8 F | HEART RATE: 79 BPM | OXYGEN SATURATION: 98 % | SYSTOLIC BLOOD PRESSURE: 128 MMHG

## 2022-02-25 DIAGNOSIS — N18.30 TYPE 2 DIABETES MELLITUS WITH STAGE 3 CHRONIC KIDNEY DISEASE, WITHOUT LONG-TERM CURRENT USE OF INSULIN, UNSPECIFIED WHETHER STAGE 3A OR 3B CKD (H): ICD-10-CM

## 2022-02-25 DIAGNOSIS — E11.22 TYPE 2 DIABETES MELLITUS WITH STAGE 3 CHRONIC KIDNEY DISEASE, WITHOUT LONG-TERM CURRENT USE OF INSULIN, UNSPECIFIED WHETHER STAGE 3A OR 3B CKD (H): ICD-10-CM

## 2022-02-25 DIAGNOSIS — J44.89 COPD WITH ASTHMA (H): Primary | ICD-10-CM

## 2022-02-25 DIAGNOSIS — J44.1 COPD EXACERBATION (H): ICD-10-CM

## 2022-02-25 PROCEDURE — 99214 OFFICE O/P EST MOD 30 MIN: CPT | Performed by: FAMILY MEDICINE

## 2022-02-25 RX ORDER — TRAMADOL HYDROCHLORIDE 50 MG/1
TABLET ORAL
COMMUNITY
Start: 2022-02-04 | End: 2022-02-25

## 2022-02-25 RX ORDER — PREDNISONE 20 MG/1
20 TABLET ORAL DAILY
Qty: 5 TABLET | Refills: 0 | Status: SHIPPED | OUTPATIENT
Start: 2022-02-25 | End: 2022-03-02

## 2022-02-25 NOTE — PROGRESS NOTES
"SUBJECTIVE:    Macie Jorge, is a 81 year old female presenting for the below:     1. \"I think I'm getting bronchitis\".  Has mixed COPD and asthma. Cough for 1 week with associated feeling of chest congestion, but not productive (not expectorating). No chest pain. Mild sensation of chest tightness associated with cough. No fevers or chills. Using albuterol with some relief with cough. Continues to take Advair 250/50 1 puff 2 times daily.  Recently recovered from COVID 19.     Approximately 1 exacerbation per year as per patient report. Ex smoker. Quit 28 years ago.       OBJECTIVE:  Vitals:    02/25/22 1455   BP: 128/70   Pulse: 79   Temp: 97.8  F (36.6  C)   SpO2: 98%    There is no height or weight on file to calculate BMI.  General: no acute distress, cooperative with exam, on toxic appearing  Eyes: no injection or drainage.  Neck: supple, no thyroid nodules or enlargement.  Lungs: decreased air entry at bases, but with no added sounds. No wheeze or crackles, normal respiratory effort. Coughed once during appointment. Talking in full sentences.   Heart: regular rate, 3/6 systolic murmur present.   Extremities: warm, perfused, no swelling or edema.  Psych: mental status normal, mood and affect appropriate.    Lab Results   Component Value Date    A1C 5.8 08/02/2021    A1C 5.9 07/13/2020    A1C 5.9 01/20/2020    A1C 5.9 02/05/2019    A1C 5.9 08/01/2018       ASSESSMENT / PLAN:        COPD exacerbation (H)  COPD with asthma (H)  Type 2 diabetes mellitus with stage 3 chronic kidney disease, without long-term current use of insulin, unspecified whether stage 3a or 3b CKD (H)  In keeping with mild COPD exacerbation.  No evidence of infective process at present. Continue prn use of albuterol inhaler. After discussion will add in short burst of prednisone. Prior Hx of labile blood glucose with high doses of prednisone. As exacerbation is mild will proceed with lower dose of prednisone 20 mg for 5 days burst.  " Reasons to RTC discussed.   -     predniSONE (DELTASONE) 20 MG tablet; Take 1 tablet (20 mg) by mouth daily for 5 days

## 2022-04-06 DIAGNOSIS — N20.0 URIC ACID KIDNEY STONE: ICD-10-CM

## 2022-04-06 DIAGNOSIS — Z76.0 ENCOUNTER FOR MEDICATION REFILL: ICD-10-CM

## 2022-04-06 DIAGNOSIS — R80.9 MICROALBUMINURIA: ICD-10-CM

## 2022-04-07 RX ORDER — OXYBUTYNIN CHLORIDE 10 MG/1
TABLET, EXTENDED RELEASE ORAL
Qty: 90 TABLET | Refills: 0 | Status: SHIPPED | OUTPATIENT
Start: 2022-04-07 | End: 2022-09-06

## 2022-04-07 RX ORDER — ALLOPURINOL 100 MG/1
TABLET ORAL
Qty: 180 TABLET | Refills: 0 | Status: SHIPPED | OUTPATIENT
Start: 2022-04-07 | End: 2022-11-28

## 2022-04-07 RX ORDER — LISINOPRIL 10 MG/1
TABLET ORAL
Qty: 45 TABLET | Refills: 0 | Status: SHIPPED | OUTPATIENT
Start: 2022-04-07 | End: 2022-12-19

## 2022-04-08 DIAGNOSIS — E03.4 HYPOTHYROIDISM DUE TO ACQUIRED ATROPHY OF THYROID: ICD-10-CM

## 2022-04-08 RX ORDER — LEVOTHYROXINE SODIUM 125 UG/1
125 TABLET ORAL DAILY
Qty: 90 TABLET | Refills: 1 | Status: SHIPPED | OUTPATIENT
Start: 2022-04-08 | End: 2022-07-21

## 2022-05-01 ENCOUNTER — OFFICE VISIT (OUTPATIENT)
Dept: URGENT CARE | Facility: URGENT CARE | Age: 82
End: 2022-05-01
Payer: COMMERCIAL

## 2022-05-01 VITALS
OXYGEN SATURATION: 98 % | SYSTOLIC BLOOD PRESSURE: 115 MMHG | TEMPERATURE: 98.5 F | RESPIRATION RATE: 18 BRPM | DIASTOLIC BLOOD PRESSURE: 58 MMHG | HEART RATE: 83 BPM

## 2022-05-01 DIAGNOSIS — R05.9 COUGH: Primary | ICD-10-CM

## 2022-05-01 PROCEDURE — 99213 OFFICE O/P EST LOW 20 MIN: CPT | Performed by: INTERNAL MEDICINE

## 2022-05-01 RX ORDER — OXYCODONE HYDROCHLORIDE 10 MG/1
TABLET ORAL
COMMUNITY
Start: 2022-03-08 | End: 2022-07-19

## 2022-05-01 NOTE — PROGRESS NOTES
Assessment & Plan     Cough  Patient Instructions   Your cough appears to be related to continued post-nasal drainage that has lingered after the cold virus that passed through your system.    Your lungs are nice and clear.  No pneumonia or bronchitis.    Try loratadine (Claritin) 10 mg twice daily to see if you can dry up this drainage.       - XR Chest 2 Views; Future    Micheal Cristina MD  [facility]    Subjective     HPI   Chief complaint of respiratory symptoms. Noting one week of increasing congestion and cough. Congestion seems to be in the chest.  Has a history of asthma.  Some chest tightness. Getting thick yellow rhinorrhea.  Denies fever or chills. Denies foot or ankle edema.  Denies chest pain, sinus pain or pressure. She uses Advair BID and albuterol.  Has not been using her albuterol as she is not feeling short of breath.      Review of Systems   Constitutional, HEENT, cardiovascular, pulmonary, gi and gu systems are negative, except as otherwise noted.      Objective    /58   Pulse 83   Temp 98.5  F (36.9  C) (Tympanic)   Resp 18   SpO2 98%   There is no height or weight on file to calculate BMI.  Physical Exam   GENERAL APPEARANCE: elderly female, not in acute distress, comfortable  HENT: ear canals and TM's normal and nose and mouth without ulcers or lesions  NECK: no adenopathy, no asymmetry, masses, or scars and thyroid normal to palpation  RESP: comfortable respiratory mechanics, no wheeze or rales, good air movement throughout  CV: regular rates and rhythm, normal S1 S2, no S3 or S4 and grade 2-3/6 systolic ejection murmur heard best over the upper right sternal border and radiating to the carotids.    CXR, which I have personally reviewed and interpreted, is free of infiltrate or effusion.  Hyperexpansion is noted.    =

## 2022-05-01 NOTE — PATIENT INSTRUCTIONS
Your cough appears to be related to continued post-nasal drainage that has lingered after the cold virus that passed through your system.    Your lungs are nice and clear.  No pneumonia or bronchitis.    Try loratadine (Claritin) 10 mg twice daily to see if you can dry up this drainage.

## 2022-05-05 ENCOUNTER — OFFICE VISIT (OUTPATIENT)
Dept: FAMILY MEDICINE | Facility: CLINIC | Age: 82
End: 2022-05-05

## 2022-05-05 ENCOUNTER — LAB (OUTPATIENT)
Dept: URGENT CARE | Facility: URGENT CARE | Age: 82
End: 2022-05-05
Attending: FAMILY MEDICINE
Payer: COMMERCIAL

## 2022-05-05 VITALS
TEMPERATURE: 97.5 F | SYSTOLIC BLOOD PRESSURE: 118 MMHG | BODY MASS INDEX: 28.06 KG/M2 | HEIGHT: 66 IN | HEART RATE: 88 BPM | WEIGHT: 174.6 LBS | OXYGEN SATURATION: 98 % | DIASTOLIC BLOOD PRESSURE: 78 MMHG

## 2022-05-05 DIAGNOSIS — R05.9 COUGH: ICD-10-CM

## 2022-05-05 DIAGNOSIS — J44.89 COPD WITH ASTHMA (H): ICD-10-CM

## 2022-05-05 DIAGNOSIS — J44.89 COPD WITH ASTHMA (H): Primary | ICD-10-CM

## 2022-05-05 PROCEDURE — 99213 OFFICE O/P EST LOW 20 MIN: CPT | Performed by: FAMILY MEDICINE

## 2022-05-05 PROCEDURE — U0005 INFEC AGEN DETEC AMPLI PROBE: HCPCS

## 2022-05-05 PROCEDURE — U0003 INFECTIOUS AGENT DETECTION BY NUCLEIC ACID (DNA OR RNA); SEVERE ACUTE RESPIRATORY SYNDROME CORONAVIRUS 2 (SARS-COV-2) (CORONAVIRUS DISEASE [COVID-19]), AMPLIFIED PROBE TECHNIQUE, MAKING USE OF HIGH THROUGHPUT TECHNOLOGIES AS DESCRIBED BY CMS-2020-01-R: HCPCS

## 2022-05-05 NOTE — PROGRESS NOTES
"SUBJECTIVE:    Macie Jorge, is a 81 year old female with mixed COPD/asthma, presenting for the below:     1. 2 week Hx of cough. Affecting sleep. Not productive. Albuterol offers some relief. Denies chest pain. No post tussive emesis. Denies fevers or chills. Denies shortness of breath. Mild sore throat initially.  Seen by internal medicine 4 days ago at which time CXR was unremarkable (chronic hyperexpansion only). She uses Advair BID and albuterol.  She has not yet tested for COVID 19. Did have COVID in January.     Has completed initial COVID 19 vaccine series and x 1 booster.     Ex smoker. Stopped over 20 years ago.     OBJECTIVE:  Vitals:    05/05/22 0941   BP: 118/78   Pulse: 88   Temp: 97.5  F (36.4  C)   SpO2: 98%   Weight: 79.2 kg (174 lb 9.6 oz)   Height: 1.676 m (5' 6\")    Body mass index is 28.18 kg/m .  General: no acute distress, cooperative with exam, coughed x 1 during appointment.   Neck: supple, no thyroid nodules or enlargement.  Lungs: decreased air entry bilaterally,but with no added wheeze or crackles. normal respiratory effort.  Heart: regular rate, normal S1 and S2,regular rate, 3/6 systolic murmur present.     ASSESSMENT / PLAN:      COPD with asthma (H)  Cough  Vitally stable, saturating well on room air with normal respiratory rate. CXR 4 days ago with no consolidation. No worsening of symptoms since then with no concerning clinical finding on respiratory exam today. Importance of screening for underlying aetiology being COVID 19 discussed. Patient expressed understanding and agreement with the plan.  Red flag symptoms that should prompt emergent evaluation discussed and understood.  -     Symptomatic; Yes; 4/21/2022 COVID-19 Virus (Coronavirus) by PCR; Future          "

## 2022-05-06 LAB — SARS-COV-2 RNA RESP QL NAA+PROBE: NEGATIVE

## 2022-05-10 ENCOUNTER — TELEPHONE (OUTPATIENT)
Dept: FAMILY MEDICINE | Facility: CLINIC | Age: 82
End: 2022-05-10

## 2022-05-10 DIAGNOSIS — J44.1 COPD EXACERBATION (H): Primary | ICD-10-CM

## 2022-05-10 RX ORDER — BENZONATATE 100 MG/1
100 CAPSULE ORAL 3 TIMES DAILY PRN
Qty: 21 CAPSULE | Refills: 0 | Status: SHIPPED | OUTPATIENT
Start: 2022-05-10 | End: 2022-07-19

## 2022-05-10 RX ORDER — PREDNISONE 20 MG/1
20 TABLET ORAL DAILY
Qty: 5 TABLET | Refills: 0 | Status: SHIPPED | OUTPATIENT
Start: 2022-05-10 | End: 2022-05-15

## 2022-05-10 NOTE — TELEPHONE ENCOUNTER
Patient calls frustrated with persisting cough. No better or worse since visit last week. Coughing all night despite robitussin. Requests some sort of med to alleviate.   Plan:per Dr. Tejada: Ok to give 40 mg prednisone for 5 days for presumed COPD exacerbation. Can also offer tessalon pearls. If no improvement 3 days into prednisone treatment (or getting worse despite it) needs to be seen in clinic    Called patient with above plan. Patient would like to try the prednisone but states hx of glucoses >600 with prednisone 60mg so is hesitant with higher doses. Would like to try 20mg QD. Okay with Dr. Tejada. Rx for this and benzonatate sent to pharmacy.  Vickie Potts RN

## 2022-05-31 ENCOUNTER — OFFICE VISIT (OUTPATIENT)
Dept: FAMILY MEDICINE | Facility: CLINIC | Age: 82
End: 2022-05-31

## 2022-05-31 VITALS
WEIGHT: 173.8 LBS | HEART RATE: 82 BPM | OXYGEN SATURATION: 95 % | SYSTOLIC BLOOD PRESSURE: 116 MMHG | DIASTOLIC BLOOD PRESSURE: 68 MMHG | BODY MASS INDEX: 28.05 KG/M2

## 2022-05-31 DIAGNOSIS — H61.21 HEARING LOSS DUE TO CERUMEN IMPACTION, RIGHT: Primary | ICD-10-CM

## 2022-05-31 DIAGNOSIS — R42 DIZZINESS: ICD-10-CM

## 2022-05-31 PROCEDURE — 99214 OFFICE O/P EST MOD 30 MIN: CPT | Mod: 25 | Performed by: FAMILY MEDICINE

## 2022-05-31 PROCEDURE — 69210 REMOVE IMPACTED EAR WAX UNI: CPT | Mod: RT | Performed by: FAMILY MEDICINE

## 2022-05-31 NOTE — PROGRESS NOTES
Rui Quijano is a 81 year old patient who presents to clinic for evaluation.  Reports feeling pressure in right ear about 1 week.  Also notes decreased hearing on that side and intermittent dizziness with certain movements.  Mild nausea when dizziness occurs.  No other stroke symptoms such as dysarthria, dysphagia or unilateral weakness.  No recent URI, fever, chills.  No ear drainage.        Review of Systems   Constitutional, HEENT, cardiovascular, pulmonary, GI, , musculoskeletal, neuro, skin, endocrine and psych systems are negative, except as otherwise noted.      Objective    /68   Pulse 82   Wt 78.8 kg (173 lb 12.8 oz)   SpO2 95%   BMI 28.05 kg/m      General: Well appearing, NAD  HEENT: Left canal and TM clear.  Right canal occluded with cerumen  Heart: RRR, no murmur  Neuro: CN 2-12 intact.  Grossly normal exam.  Psych: normal mood and affect        After verbal consent was obtained, the right canal was cleared of cerumen using manual curette.  After removal, the TM appeared clear without erythema.  The patient tolerated the procedure well without complications.      Hearing loss due to cerumen impaction, right  Hearing reportedly back to normal after cerumen removal    Dizziness  Uncertain cause.  Would like to see if resolves with cerumen gone.  Phone follow up tomorrow.  If not improved refer to PT for vestibular rehab.  Low suspicion for stroke given otherwise normal exam.  Meniere's possible but less likely with resolution of hearing after cerumen removal.    Close follow up.    Patient is agreement with the assessment and plan as outlined above.  All questions answered.  Red flag symptoms that should prompt emergent evaluation discussed and understood.

## 2022-06-21 DIAGNOSIS — R42 DIZZINESS: Primary | ICD-10-CM

## 2022-07-14 NOTE — PROGRESS NOTES
COVID-19 PCR test completed. Patient handout For Patients Who Have Been Tested for Covid-19 (Coronavirus) was given to the patient, which includes test result notification process.    
4

## 2022-07-19 ENCOUNTER — OFFICE VISIT (OUTPATIENT)
Dept: FAMILY MEDICINE | Facility: CLINIC | Age: 82
End: 2022-07-19

## 2022-07-19 VITALS
DIASTOLIC BLOOD PRESSURE: 60 MMHG | HEIGHT: 66 IN | HEART RATE: 72 BPM | RESPIRATION RATE: 16 BRPM | BODY MASS INDEX: 28.93 KG/M2 | SYSTOLIC BLOOD PRESSURE: 125 MMHG | OXYGEN SATURATION: 95 % | WEIGHT: 180 LBS

## 2022-07-19 DIAGNOSIS — E11.22 TYPE 2 DIABETES MELLITUS WITH STAGE 3B CHRONIC KIDNEY DISEASE, WITHOUT LONG-TERM CURRENT USE OF INSULIN (H): Primary | ICD-10-CM

## 2022-07-19 DIAGNOSIS — N18.32 STAGE 3B CHRONIC KIDNEY DISEASE (H): ICD-10-CM

## 2022-07-19 DIAGNOSIS — M48.062 SPINAL STENOSIS OF LUMBAR REGION WITH NEUROGENIC CLAUDICATION: ICD-10-CM

## 2022-07-19 DIAGNOSIS — N18.32 TYPE 2 DIABETES MELLITUS WITH STAGE 3B CHRONIC KIDNEY DISEASE, WITHOUT LONG-TERM CURRENT USE OF INSULIN (H): Primary | ICD-10-CM

## 2022-07-19 LAB
% GRANULOCYTES: 79.7 % (ref 42.2–75.2)
HBA1C MFR BLD: 5.7 % (ref 4–6)
HCT VFR BLD AUTO: 29.6 % (ref 35–46)
HEMOGLOBIN: 10.6 G/DL (ref 11.8–15.5)
LYMPHOCYTES NFR BLD AUTO: 15.7 % (ref 20.5–51.1)
MCH RBC QN AUTO: 30.5 PG (ref 27–31)
MCHC RBC AUTO-ENTMCNC: 35.7 G/DL (ref 33–37)
MCV RBC AUTO: 85.3 FL (ref 80–100)
MONOCYTES NFR BLD AUTO: 4.6 % (ref 1.7–9.3)
PLATELET # BLD AUTO: 185 K/UL (ref 140–450)
RBC # BLD AUTO: 3.47 X10/CMM (ref 3.7–5.2)
WBC # BLD AUTO: 4.6 X10/CMM (ref 3.8–11)

## 2022-07-19 PROCEDURE — 99214 OFFICE O/P EST MOD 30 MIN: CPT | Performed by: FAMILY MEDICINE

## 2022-07-19 PROCEDURE — 82044 UR ALBUMIN SEMIQUANTITATIVE: CPT | Performed by: FAMILY MEDICINE

## 2022-07-19 PROCEDURE — 36415 COLL VENOUS BLD VENIPUNCTURE: CPT | Performed by: FAMILY MEDICINE

## 2022-07-19 PROCEDURE — 99207 PR FOOT EXAM NO CHARGE: CPT | Performed by: FAMILY MEDICINE

## 2022-07-19 PROCEDURE — 83036 HEMOGLOBIN GLYCOSYLATED A1C: CPT | Performed by: FAMILY MEDICINE

## 2022-07-19 PROCEDURE — 85025 COMPLETE CBC W/AUTO DIFF WBC: CPT | Performed by: FAMILY MEDICINE

## 2022-07-19 RX ORDER — HYDROCODONE BITARTRATE AND ACETAMINOPHEN 5; 325 MG/1; MG/1
1 TABLET ORAL EVERY 6 HOURS PRN
Qty: 18 TABLET | Refills: 0 | Status: SHIPPED | OUTPATIENT
Start: 2022-07-19 | End: 2022-12-19

## 2022-07-19 RX ORDER — LIDOCAINE 4 G/G
1 PATCH TOPICAL EVERY 24 HOURS
Qty: 30 PATCH | Refills: 3 | Status: SHIPPED | OUTPATIENT
Start: 2022-07-19 | End: 2023-06-03

## 2022-07-19 NOTE — PROGRESS NOTES
Problem(s) Oriented visit        SUBJECTIVE:                                                    Macie Jorge is a 81 year old female who presents to clinic today for the following health issues :      1. Type 2 diabetes mellitus with stage 3b chronic kidney disease, without long-term current use of insulin (H)  Diabetes  she  reports no new symptoms.  Tolerating these current diabetic meds:  Medications: yes:       No significnat or regular episodes of hypo or hyperglycemia  Medication compliance: compliant most of the time  Diabetic diet compliance: compliant most of the time  Diabetic ROS: no polyuria or polydipsia, no chest pain, dyspnea or TIA's, no numbness, tingling or pain in extremities    Home blood sugar monitoring: are performed regularly, Review of patients self blood glucose monitoring shows fasting most always < 130 and post prandial average under 170.  As a direct cause of their history of diabetes they have the following Diabetic complications: nephropathy    Most  recent A1C: Smoking: BP:   The last 5 available A1C values from [facility]:  No components found for: [id number]     Lab Results   Component Value Date    A1C 5.8 08/02/2021    A1C 5.9 07/13/2020    A1C 5.9 01/20/2020    History   Smoking Status     Former Smoker     Packs/day: 1.50     Years: 40.00     Quit date: 1/1/1995   Smokeless Tobacco     Never Used    BP Readings from Last 1 Encounters:   07/19/22 125/60        Kidney studies:  Creatinine   Date Value Ref Range Status   08/10/2021 1.91 (H) 0.52 - 1.04 mg/dL Final   08/09/2021 1.77 (H) 0.52 - 1.04 mg/dL Final   08/02/2021 1.55 (H) 0.57 - 1.00 mg/dL Final   07/13/2020 1.57 (H) 0.57 - 1.00 mg/dL Final   01/20/2020 1.33 (H) 0.57 - 1.00 mg/dL Final   ]    GFR Estimate   Date Value Ref Range Status   08/10/2021 24 (L) >60 mL/min/1.73m2 Final     Comment:     As of July 11, 2021, eGFR is calculated by the CKD-EPI creatinine equation, without race adjustment.  eGFR can be influenced by muscle mass, exercise, and diet. The reported eGFR is an estimation only and is only applicable if the renal function is stable.   10/01/2015 37 (L) >60 mL/min/1.7m2 Final     Comment:     Non  GFR Calc                No components found for: MICORALBUMINLC      GFR Estimate If Black   Date Value Ref Range Status   10/01/2015 45 (L) >60 mL/min/1.7m2 Final     Comment:      GFR Calc     Medication (Note: This includes the hypertensive combination subclass to make sure to show all ACEI/ARB's.)     ACE Inhibitors       lisinopril (ZESTRIL) 10 MG tablet    Take 1/2 (one-half) tablet by mouth once daily     Patient not taking: Reported on 7/19/2022                     - Microalbumin ([facility])  - Hemoglobin A1C ([facility])  - ME FOOT EXAM NO CHARGE  - TSH ([facility])    2. Spinal stenosis of lumbar region with neurogenic claudication  Pain is terrible if stands for too long, has to fly to [place] and not sure how she will tolerate it.    Has not used lidocaine patch   Had injection and nerve cuts from [facility]    - HYDROcodone-acetaminophen (NORCO) 5-325 MG tablet; Take 1 tablet by mouth every 6 hours as needed for pain  Dispense: 18 tablet; Refill: 0  - Lidocaine (LIDOCARE) 4 % Patch; Place 1 patch onto the skin every 24 hours To prevent lidocaine toxicity, patient should be patch free for 12 hrs daily.  Dispense: 30 patch; Refill: 3       Problem list, Medication list, Allergies, and Medical/Social/Surgical histories reviewed in EPIC and updated as appropriate.   Additional history: as documented    ROS:  General and Resp. completed and negative except as noted above    Histories:   Patient Active Problem List   Diagnosis     Panic attacks     Advanced directives, counseling/discussion     Hyperlipidemia with target LDL less than 100     History of hip replacement, total     Health Care Home     Class 1 obesity due to excess calories with serious comorbidity and body mass index  (BMI) of 31.0 to 31.9 in adult     Urgency incontinence     Stage 3b chronic kidney disease (H)     Nonintractable migraine, unspecified migraine type     Type 2 diabetes mellitus with stage 3 chronic kidney disease, without long-term current use of insulin (H)     COPD with asthma (H)     Altered mental status     History of colonic polyps     Chronic midline low back pain without sciatica     Physical deconditioning     Spinal stenosis of lumbar region with neurogenic claudication     Hypothyroidism due to acquired atrophy of thyroid     Total knee replacement status, right     Past Surgical History:   Procedure Laterality Date     ARTHROPLASTY HIP  3/13/2013    Procedure: ARTHROPLASTY HIP;  LEFT TOTAL HIP ARTHROPLASTY (BIOMET)^ ;  Surgeon: Anand Main MD;  Location:  OR     ARTHROPLASTY KNEE Right 8/9/2021    Procedure: RIGHT TOTAL KNEE ARTHROPLASTY;  Surgeon: Anand Main MD;  Location:  OR     ARTHROPLASTY PATELLO-FEMORAL (KNEE)  2005ish    Left     CHOLECYSTECTOMY, OPEN  25 yo     COMBINED CYSTOSCOPY, INSERT STENT URETER(S)  8/5/2014    Procedure: COMBINED CYSTOSCOPY, INSERT STENT URETER(S);  Surgeon: Sam Arriaga MD;  Location:  OR     COMBINED CYSTOSCOPY, RETROGRADES, URETEROSCOPY, LASER HOLMIUM LITHOTRIPSY URETER(S), INSERT STENT Left 6/14/2016    Procedure: COMBINED CYSTOSCOPY, RETROGRADES, URETEROSCOPY, LASER HOLMIUM LITHOTRIPSY URETER(S), INSERT STENT;  Surgeon: Thomas Edmondson MD;  Location:  OR     COMBINED CYSTOSCOPY, RETROGRADES, URETEROSCOPY, LASER HOLMIUM LITHOTRIPSY URETER(S), INSERT STENT Left 3/28/2019    Procedure: CYSTOSCOPY, LEFT RETROGRADE PYLEOGRAM, LEFT URETEROSCOPY, HOLMIUM LASER LITHOTRIPSY, LEFT URETERAL STENT EXCHANGE;  Surgeon: Derrek Rivera MD;  Location:  OR     CYSTOSCOPY, RETROGRADES, INSERT STENT URETER(S), COMBINED Left 3/8/2019    Procedure: CYSTOSCOPY,LEFT RETROGRADE, LEFT STENT PLACEMENT;  Surgeon: Derrek Rivera MD;  Location:   "OR     GENITOURINARY SURGERY       LAMINECT/DISCECTOMY, LUMBAR      Laminectomy/Discectomy Lumbar     LASER HOLMIUM LITHOTRIPSY URETER(S), INSERT STENT, COMBINED Left 2014    Procedure: COMBINED CYSTOSCOPY, URETEROSCOPY, LASER HOLMIUM LITHOTRIPSY URETER(S), INSERT STENT;  Surgeon: Sam Arriaga MD;  Location:  OR     LASER HOLMIUM LITHOTRIPSY URETER(S), INSERT STENT, COMBINED Left 2019    Procedure: CYSTOSCOPY, LEFT RETROGRADE, LEFT URETEROSCOPY, HOLMIUM LASER LITHOTRIPSY, STONE REMOVAL, LEFT STENT EXCHANGE;  Surgeon: Derrek Rivera MD;  Location:  OR     LASER HOLMIUM LITHOTRIPSY URETER(S), INSERT STENT, COMBINED Right 2019    Procedure: CYSTOSCOPY; RIGHT URETEROSCOPY WITH HOLMIUM LASER LITHOTRIPSY; RIGHT STENT PLACEMENT (DIGITAL FLEXIBLE URETEROSCOPE);  Surgeon: Derrek Rivera MD;  Location:  OR     ORTHOPEDIC SURGERY      left ankle     RECESSION EYELID UPPER         Social History     Tobacco Use     Smoking status: Former Smoker     Packs/day: 1.50     Years: 40.00     Pack years: 60.00     Quit date: 1995     Years since quittin.5     Smokeless tobacco: Never Used   Substance Use Topics     Alcohol use: Yes     Comment: occ.     No family history on file.        OBJECTIVE:                                                    /60   Pulse 72   Resp 16   Ht 1.676 m (5' 6\")   Wt 81.6 kg (180 lb)   SpO2 95%   BMI 29.05 kg/m    Body mass index is 29.05 kg/m .   APPEARANCE: = Relaxed and in no distress  Neck = No anterior or posterior adenopathy appreciated.  Musculsktl: decreased range of motion in the spine  Recent/Remote Memory = Alert and Oriented x 3     ASSESSMENT/PLAN:                                                        Macie was seen today for back pain.    Diagnoses and all orders for this visit:    Type 2 diabetes mellitus with stage 3b chronic kidney disease, without long-term current use of insulin (H)  -     Microalbumin ([facility])  -     Hemoglobin " A1C ([facility])  -     SC FOOT EXAM NO CHARGE  -     TSH ([facility])    Spinal stenosis of lumbar region with neurogenic claudication  -     HYDROcodone-acetaminophen (NORCO) 5-325 MG tablet; Take 1 tablet by mouth every 6 hours as needed for pain  -     Lidocaine (LIDOCARE) 4 % Patch; Place 1 patch onto the skin every 24 hours To prevent lidocaine toxicity, patient should be patch free for 12 hrs daily.        Work on weight loss  Regular exercise  There are no Patient Instructions on file for this visit.    The following health maintenance items are reviewed in Epic and correct as of today:  Health Maintenance   Topic Date Due     PARATHYROID  Never done     PHOSPHORUS  Never done     ZOSTER IMMUNIZATION (1 of 2) Never done     MEDICARE ANNUAL WELLNESS VISIT  01/20/2017     EYE EXAM  10/15/2021     A1C  02/02/2022     COVID-19 Vaccine (4 - Booster for Pfizer series) 02/19/2022     MICROALBUMIN  05/01/2022     LIPID  08/02/2022     TSH W/FREE T4 REFLEX  08/02/2022     FALL RISK ASSESSMENT  08/02/2022     BMP  08/10/2022     HEMOGLOBIN  10/14/2022     INFLUENZA VACCINE (1) 09/01/2022     URINE DRUG SCREEN  11/01/2022     DTAP/TDAP/TD IMMUNIZATION (2 - Td or Tdap) 11/15/2022     DIABETIC FOOT EXAM  07/19/2023     ADVANCE CARE PLANNING  08/02/2026     DEXA  05/07/2030     SPIROMETRY  Completed     COPD ACTION PLAN  Completed     PHQ-2 (once per calendar year)  Completed     Pneumococcal Vaccine: 65+ Years  Completed     URINALYSIS  Completed     ALK PHOS  Completed     IPV IMMUNIZATION  Aged Out     MENINGITIS IMMUNIZATION  Aged Out       Juve Bruner MD  [facility]  Family Practice  [facility]  [phone]    For any issues my office # is [phone]

## 2022-07-19 NOTE — LETTER
[facility] [place] [id number] [place]  [address]  Phone: [phone]    July 22, 2022      Macie Jorge  [address] [address]              Dear Macie,     I am writing to report that your included test results are as expected.     We do need to adjust your thyroid dose and I have called a new prescription in.     Most labs contain some results that are slightly outside of the normal range, I have reviewed each of these results and they require no changes at this time.     Juve Bruner MD      Results for orders placed or performed in visit on 07/19/22   Microalbumin ([facility])     Status: Abnormal   Result Value Ref Range    Albumin mg/L 30 (A) mg/L    Urine Creatinine Mg/Dl 200 mg/dL    A/C Ratio mg/g <30 mg/g    Interpretation Normal    Hemoglobin A1C ([facility])     Status: None   Result Value Ref Range    Hemoglobin A1C POCT 5.7 4.0 - 6.0 %   TSH ([facility])     Status: Abnormal   Result Value Ref Range    TSH 6.340 (H) 0.450 - 4.500 uIU/mL    Narrative    Performed at:  01 - [facility] [address], [place], [place]  [id number]  : Riley Rachel MD, Phone:  [phone]   CBC with Diff/Plt ([facility]     Status: Abnormal   Result Value Ref Range    WBC x10/cmm 4.6 3.8 - 11.0 x10/cmm    % Lymphocytes 15.7 (A) 20.5 - 51.1 %    % Monocytes 4.6 1.7 - 9.3 %    % Granulocytes 79.7 (A) 42.2 - 75.2 %    RBC x10/cmm 3.47 (A) 3.7 - 5.2 x10/cmm    Hemoglobin 10.6 (A) 11.8 - 15.5 g/dl    Hematocrit 29.6 (A) 35 - 46 %    MCV 85.3 80 - 100 fL    MCH 30.5 27.0 - 31.0 pg    MCHC 35.7 33.0 - 37.0 g/dL    Platelet Count 185 140 - 450 K/uL   Comp. Metabolic Panel (14) ([facility])     Status: Abnormal   Result Value Ref Range    Glucose 137 (H) 65 - 99 mg/dL    Urea Nitrogen 25 8 - 27 mg/dL    Creatinine 1.57 (H) 0.57 - 1.00 mg/dL    eGFR  33 (L) >59 mL/min/1.73    BUN/Creatinine Ratio 16 12 - 28    Sodium 141 134 - 144 mmol/L    Potassium 4.4 3.5 - 5.2 mmol/L    Chloride 105 96 -  106 mmol/L    Total CO2 22 20 - 29 mmol/L    Calcium 9.2 8.7 - 10.3 mg/dL    Protein Total 7.8 6.0 - 8.5 g/dL    Albumin 4.2 3.6 - 4.6 g/dL    Globulin, Total 3.6 1.5 - 4.5 g/dL    A/G Ratio 1.2 1.2 - 2.2    Bilirubin Total 0.3 0.0 - 1.2 mg/dL    Alkaline Phosphatase 59 44 - 121 IU/L    AST 17 0 - 40 IU/L    ALT 8 0 - 32 IU/L    Narrative    Performed at:  01 - [facility]  [address] [place], [place]  [id number]  : Riley Rachel MD, Phone:  [phone]

## 2022-07-20 LAB
A/C RATIO MG/G: <30 MG/G
ALBUMIN (URINE) MG/L: 30 MG/L
INTERPRETATION: NORMAL
URINE CREATININE MG/DL - QUEST: 200 MG/DL

## 2022-07-21 DIAGNOSIS — E03.4 HYPOTHYROIDISM DUE TO ACQUIRED ATROPHY OF THYROID: ICD-10-CM

## 2022-07-21 LAB
ALBUMIN SERPL-MCNC: 4.2 G/DL (ref 3.6–4.6)
ALBUMIN/GLOB SERPL: 1.2 {RATIO} (ref 1.2–2.2)
ALP SERPL-CCNC: 59 IU/L (ref 44–121)
ALT SERPL-CCNC: 8 IU/L (ref 0–32)
AST SERPL-CCNC: 17 IU/L (ref 0–40)
BILIRUB SERPL-MCNC: 0.3 MG/DL (ref 0–1.2)
BUN SERPL-MCNC: 25 MG/DL (ref 8–27)
BUN/CREATININE RATIO: 16 (ref 12–28)
CALCIUM SERPL-MCNC: 9.2 MG/DL (ref 8.7–10.3)
CHLORIDE SERPLBLD-SCNC: 105 MMOL/L (ref 96–106)
CREAT SERPL-MCNC: 1.57 MG/DL (ref 0.57–1)
EGFR: 33 ML/MIN/1.73
GLOBULIN, TOTAL: 3.6 G/DL (ref 1.5–4.5)
GLUCOSE SERPL-MCNC: 137 MG/DL (ref 65–99)
POTASSIUM SERPL-SCNC: 4.4 MMOL/L (ref 3.5–5.2)
PROT SERPL-MCNC: 7.8 G/DL (ref 6–8.5)
SODIUM SERPL-SCNC: 141 MMOL/L (ref 134–144)
TOTAL CO2: 22 MMOL/L (ref 20–29)
TSH BLD-ACNC: 6.34 UIU/ML (ref 0.45–4.5)

## 2022-07-21 RX ORDER — LEVOTHYROXINE SODIUM 137 UG/1
125 TABLET ORAL DAILY
Qty: 90 TABLET | Refills: 1 | Status: SHIPPED | OUTPATIENT
Start: 2022-07-21 | End: 2023-03-17

## 2022-07-21 NOTE — RESULT ENCOUNTER NOTE
Dear Macie,     I am writing to report that your included test results are as expected.    We do need to adjust your thyroid dose and I have called a new prescription in.    Most labs contain some results that are slightly outside of the normal range, I have reviewed each of these results and they require no changes at this time.    Juve Bruner MD

## 2022-09-06 DIAGNOSIS — N20.0 URIC ACID KIDNEY STONE: ICD-10-CM

## 2022-09-06 DIAGNOSIS — Z76.0 ENCOUNTER FOR MEDICATION REFILL: ICD-10-CM

## 2022-09-06 RX ORDER — OXYBUTYNIN CHLORIDE 10 MG/1
TABLET, EXTENDED RELEASE ORAL
Qty: 90 TABLET | Refills: 0 | Status: SHIPPED | OUTPATIENT
Start: 2022-09-06 | End: 2022-11-28

## 2022-10-03 ENCOUNTER — TRANSFERRED RECORDS (OUTPATIENT)
Dept: FAMILY MEDICINE | Facility: CLINIC | Age: 82
End: 2022-10-03

## 2022-10-03 LAB — RETINOPATHY: NORMAL

## 2022-10-04 NOTE — NURSING NOTE
Problem: Fatigue  Goal: Improved Activity Tolerance  Intervention: Promote Improved Energy  Flowsheets (Taken 10/4/2022 0849)  Fatigue Management: frequent rest breaks encouraged  Activity Management: Ambulated -L4      Patient brought in her EKG readings from [facility] - done prior to her left wrist surgery  To be scanned into chart  Gaby Overton MA March 21, 2017 3:45 PM

## 2022-10-17 ENCOUNTER — TRANSFERRED RECORDS (OUTPATIENT)
Dept: FAMILY MEDICINE | Facility: CLINIC | Age: 82
End: 2022-10-17

## 2022-10-17 DIAGNOSIS — J44.9 CHRONIC OBSTRUCTIVE PULMONARY DISEASE, UNSPECIFIED COPD TYPE (H): ICD-10-CM

## 2022-10-17 RX ORDER — ALBUTEROL SULFATE 90 UG/1
AEROSOL, METERED RESPIRATORY (INHALATION)
Qty: 180 G | Refills: 0 | Status: SHIPPED | OUTPATIENT
Start: 2022-10-17 | End: 2024-06-06

## 2022-10-18 DIAGNOSIS — J44.89 COPD WITH ASTHMA (H): ICD-10-CM

## 2022-10-18 RX ORDER — FLUTICASONE PROPIONATE AND SALMETEROL 250; 50 UG/1; UG/1
POWDER RESPIRATORY (INHALATION)
Qty: 3 EACH | Refills: 3 | Status: SHIPPED | OUTPATIENT
Start: 2022-10-18 | End: 2023-05-23

## 2022-11-05 ENCOUNTER — HOSPITAL ENCOUNTER (EMERGENCY)
Facility: CLINIC | Age: 82
Discharge: HOME | End: 2022-11-05
Attending: EMERGENCY MEDICINE | Admitting: EMERGENCY MEDICINE
Payer: COMMERCIAL

## 2022-11-05 ENCOUNTER — APPOINTMENT (OUTPATIENT)
Dept: CT IMAGING | Facility: CLINIC | Age: 82
End: 2022-11-05
Attending: EMERGENCY MEDICINE
Payer: COMMERCIAL

## 2022-11-05 VITALS
RESPIRATION RATE: 18 BRPM | OXYGEN SATURATION: 97 % | DIASTOLIC BLOOD PRESSURE: 71 MMHG | HEART RATE: 94 BPM | SYSTOLIC BLOOD PRESSURE: 149 MMHG | TEMPERATURE: 99 F

## 2022-11-05 DIAGNOSIS — J06.9 ACUTE URI: ICD-10-CM

## 2022-11-05 DIAGNOSIS — J98.01 ACUTE BRONCHOSPASM: ICD-10-CM

## 2022-11-05 DIAGNOSIS — R91.8 PULMONARY NODULES: ICD-10-CM

## 2022-11-05 LAB
ANION GAP SERPL CALCULATED.3IONS-SCNC: 10 MMOL/L (ref 7–15)
BASOPHILS # BLD AUTO: 0 10E3/UL (ref 0–0.2)
BASOPHILS NFR BLD AUTO: 0 %
BUN SERPL-MCNC: 17.7 MG/DL (ref 8–23)
CALCIUM SERPL-MCNC: 9 MG/DL (ref 8.8–10.2)
CHLORIDE SERPL-SCNC: 103 MMOL/L (ref 98–107)
CREAT SERPL-MCNC: 1.42 MG/DL (ref 0.51–0.95)
D DIMER PPP FEU-MCNC: 2.86 UG/ML FEU (ref 0–0.5)
DEPRECATED HCO3 PLAS-SCNC: 24 MMOL/L (ref 22–29)
EOSINOPHIL # BLD AUTO: 0.1 10E3/UL (ref 0–0.7)
EOSINOPHIL NFR BLD AUTO: 2 %
ERYTHROCYTE [DISTWIDTH] IN BLOOD BY AUTOMATED COUNT: 13.8 % (ref 10–15)
FLUAV RNA SPEC QL NAA+PROBE: NEGATIVE
FLUBV RNA RESP QL NAA+PROBE: NEGATIVE
GFR SERPL CREATININE-BSD FRML MDRD: 37 ML/MIN/1.73M2
GLUCOSE SERPL-MCNC: 146 MG/DL (ref 70–99)
HCT VFR BLD AUTO: 30 % (ref 35–47)
HGB BLD-MCNC: 9.4 G/DL (ref 11.7–15.7)
HOLD SPECIMEN: NORMAL
IMM GRANULOCYTES # BLD: 0 10E3/UL
IMM GRANULOCYTES NFR BLD: 1 %
LYMPHOCYTES # BLD AUTO: 0.3 10E3/UL (ref 0.8–5.3)
LYMPHOCYTES NFR BLD AUTO: 8 %
MCH RBC QN AUTO: 28.7 PG (ref 26.5–33)
MCHC RBC AUTO-ENTMCNC: 31.3 G/DL (ref 31.5–36.5)
MCV RBC AUTO: 92 FL (ref 78–100)
MONOCYTES # BLD AUTO: 0.5 10E3/UL (ref 0–1.3)
MONOCYTES NFR BLD AUTO: 11 %
NEUTROPHILS # BLD AUTO: 3.1 10E3/UL (ref 1.6–8.3)
NEUTROPHILS NFR BLD AUTO: 78 %
NRBC # BLD AUTO: 0 10E3/UL
NRBC BLD AUTO-RTO: 0 /100
NT-PROBNP SERPL-MCNC: 274 PG/ML (ref 0–1800)
PLATELET # BLD AUTO: 185 10E3/UL (ref 150–450)
POTASSIUM SERPL-SCNC: 4.3 MMOL/L (ref 3.4–5.3)
RBC # BLD AUTO: 3.28 10E6/UL (ref 3.8–5.2)
RSV RNA SPEC NAA+PROBE: NEGATIVE
SARS-COV-2 RNA RESP QL NAA+PROBE: NEGATIVE
SODIUM SERPL-SCNC: 137 MMOL/L (ref 136–145)
TROPONIN T SERPL HS-MCNC: 14 NG/L
WBC # BLD AUTO: 4 10E3/UL (ref 4–11)

## 2022-11-05 PROCEDURE — 83880 ASSAY OF NATRIURETIC PEPTIDE: CPT | Performed by: EMERGENCY MEDICINE

## 2022-11-05 PROCEDURE — 80048 BASIC METABOLIC PNL TOTAL CA: CPT | Performed by: EMERGENCY MEDICINE

## 2022-11-05 PROCEDURE — 71275 CT ANGIOGRAPHY CHEST: CPT

## 2022-11-05 PROCEDURE — 87637 SARSCOV2&INF A&B&RSV AMP PRB: CPT | Performed by: EMERGENCY MEDICINE

## 2022-11-05 PROCEDURE — 250N000011 HC RX IP 250 OP 636: Performed by: EMERGENCY MEDICINE

## 2022-11-05 PROCEDURE — 99285 EMERGENCY DEPT VISIT HI MDM: CPT | Mod: 25

## 2022-11-05 PROCEDURE — 85025 COMPLETE CBC W/AUTO DIFF WBC: CPT | Performed by: EMERGENCY MEDICINE

## 2022-11-05 PROCEDURE — C9803 HOPD COVID-19 SPEC COLLECT: HCPCS

## 2022-11-05 PROCEDURE — 84484 ASSAY OF TROPONIN QUANT: CPT | Performed by: EMERGENCY MEDICINE

## 2022-11-05 PROCEDURE — 250N000012 HC RX MED GY IP 250 OP 636 PS 637: Performed by: EMERGENCY MEDICINE

## 2022-11-05 PROCEDURE — 36415 COLL VENOUS BLD VENIPUNCTURE: CPT | Performed by: EMERGENCY MEDICINE

## 2022-11-05 PROCEDURE — 93005 ELECTROCARDIOGRAM TRACING: CPT

## 2022-11-05 PROCEDURE — 85379 FIBRIN DEGRADATION QUANT: CPT | Performed by: EMERGENCY MEDICINE

## 2022-11-05 RX ORDER — PREDNISONE 20 MG/1
20 TABLET ORAL DAILY
Qty: 5 TABLET | Refills: 0 | Status: SHIPPED | OUTPATIENT
Start: 2022-11-06 | End: 2022-11-10

## 2022-11-05 RX ORDER — IOPAMIDOL 755 MG/ML
500 INJECTION, SOLUTION INTRAVASCULAR ONCE
Status: COMPLETED | OUTPATIENT
Start: 2022-11-05 | End: 2022-11-05

## 2022-11-05 RX ADMIN — PREDNISONE 30 MG: 20 TABLET ORAL at 19:39

## 2022-11-05 RX ADMIN — IOPAMIDOL 63 ML: 755 INJECTION, SOLUTION INTRAVENOUS at 18:39

## 2022-11-05 ASSESSMENT — ACTIVITIES OF DAILY LIVING (ADL)
ADLS_ACUITY_SCORE: 35
ADLS_ACUITY_SCORE: 35

## 2022-11-05 NOTE — ED PROVIDER NOTES
History   Chief Complaint:  Cough and shortness of breath    HPI   Macie Jorge is a 82 year old female with history of COPD, asthma, hyperlipidemia, and CKD who presents with cough and shortness of breath.  Describes approximately 1 week of cough, shortness of breath, and malaise.  Symptoms developed as she was driving 1 week ago down to her daughter's [place].  Due to symptoms that were ongoing, she returned the next day but symptoms of improved.  She has been using her inhaler every 2-4 hours with little improvement.  She notes symptoms feel similar to asthma.  She denies chest pain, shortness of breath, fevers, chills, or any other concerns.        Review of Systems  Gen: + as per above  Resp: + as per above  All other systems reviewed and negative      Allergies:  Morphine  Prednisone  Baclofen  Imitrex   Tetracycline    Medications:  Albuterol   Zyloprim   Advair   Synthroid   Zestril   Ditropan     Past Medical History:     Hyperlipidemia   Panic attacks   Obesity   CKD stage 3b   Migraine   Diabetes type 2   COPD w/ asthma   Spinal stenosis   Hypothyroidism     Past Surgical History:    Hip arthroplasty   Knee arthroplasty bilateral   Cholecystectomy   Cystoscopy with stent x6  Lumbar laminectomy   Left ankle surgery   Upper eyelid surgery     Social History:  The patient presents to the ED with her spouse.   PCP: Juve Bruner     Physical Exam     Patient Vitals for the past 24 hrs:   BP Temp Temp src Pulse Resp SpO2   11/1940 (!) 149/71 -- -- 94 18 97 %   11/05/22 1830 (!) 146/68 -- -- 89 -- 97 %   11/05/22 1815 128/65 -- -- 90 -- 98 %   11/05/22 1810 -- -- -- -- -- 96 %   11/05/22 1805 124/89 -- -- 89 -- --   11/05/22 1512 (!) 166/82 99  F (37.2  C) Temporal 100 28 96 %       Physical Exam  Constitutional: Alert, attentive  HENT:    Nose: Nose normal.    Mouth/Throat: Oropharynx is clear, mucous membranes are moist   Eyes: EOM are normal.   CV: regular rate and rhythm; no murmurs,  rubs or gallups  Chest: frequent coughing; effort normal and breath sounds normal. bronchospams noted while coughing, no other apparent wheezing  GI:  There is no tenderness. No distension. Normal bowel sounds  MSK: Normal range of motion.   Neurological: Alert, attentive  Skin: Skin is warm and dry.      Emergency Department Course     EKG: NSR, rightward axis, no significant change from 8/2/21    Imaging:  CT Chest Pulmonary Embolism w Contrast   Final Result   Addendum (preliminary) 1 of 1   EXAM: CT CHEST PULMONARY EMBOLISM W CONTRAST   LOCATION: [facility]   DATE/TIME: 11/5/2022 6:46 PM      INDICATION: chest pain, dyspnea, + dimer   COMPARISON: None.   TECHNIQUE: CT chest pulmonary angiogram during arterial phase injection of    IV contrast. Multiplanar reformats and MIP reconstructions were performed.    Dose reduction techniques were used.    CONTRAST: 63 mL Isovue 370      FINDINGS:   ANGIOGRAM CHEST: Pulmonary arteries are normal caliber and negative for    pulmonary emboli. Thoracic aorta is negative for dissection. No CT    evidence of right heart strain.      LUNGS AND PLEURA: 10 x 5.6 mm solid right lower lobe pulmonary nodule (7,    206). Moderate emphysematous change. No pneumothorax.      MEDIASTINUM/AXILLAE: Moderate hiatal hernia. Normal size heart. No    pericardial effusion. No hilar or mediastinal lymphadenopathy.      CORONARY ARTERY CALCIFICATION: Mild.      UPPER ABDOMEN: Normal.      MUSCULOSKELETAL: Chronic appearing mild T8 compression deformity. Diffuse    osteopenia.      IMPRESSION:   1.  No pulmonary embolus, aortic dissection or aneurysm.   2.  Moderate pulmonary emphysema.   3.  10 x 5.6 mm right solid lower lobe pulmonary nodule. Recommend    follow-up as below.      REFERENCE:   Guidelines for Management of Incidental Pulmonary Nodules Detected on CT    Images: From the Fleischner Society 2017.    Guidelines apply to incidental nodules in patients who are 35  years or    older.   Guidelines do not apply to lung cancer screening, patients with    immunosuppression, or patients with known primary cancer.      SINGLE NODULE      Nodule size 6-8 mm   Low-risk patients: Follow-up CT at 6-12 months, then consider CT at 18-24    months.   High-risk patients: Follow-up CT at 6-12 months, then at 18-24 months if    no change.      Consider referral to lung nodule clinic.         Final   IMPRESSION:   1.  No pulmonary embolus, aortic dissection or aneurysm.   2.  Moderate pulmonary embolus.   3.  10 x 5.6 cm right solid lower lobe pulmonary nodule. Recommend follow-up as below.      REFERENCE:   Guidelines for Management of Incidental Pulmonary Nodules Detected on CT Images: From the Fleischner Society 2017.    Guidelines apply to incidental nodules in patients who are 35 years or older.   Guidelines do not apply to lung cancer screening, patients with immunosuppression, or patients with known primary cancer.      SINGLE NODULE      Nodule size 6-8 mm   Low-risk patients: Follow-up CT at 6-12 months, then consider CT at 18-24 months.   High-risk patients: Follow-up CT at 6-12 months, then at 18-24 months if no change.            Consider referral to lung nodule clinic.           Report per radiology    Laboratory:  Labs Ordered and Resulted from Time of ED Arrival to Time of ED Departure   BASIC METABOLIC PANEL - Abnormal       Result Value    Sodium 137      Potassium 4.3      Chloride 103      Carbon Dioxide (CO2) 24      Anion Gap 10      Urea Nitrogen 17.7      Creatinine 1.42 (*)     Calcium 9.0      Glucose 146 (*)     GFR Estimate 37 (*)    D DIMER QUANTITATIVE - Abnormal    D-Dimer Quantitative 2.86 (*)    CBC WITH PLATELETS AND DIFFERENTIAL - Abnormal    WBC Count 4.0      RBC Count 3.28 (*)     Hemoglobin 9.4 (*)     Hematocrit 30.0 (*)     MCV 92      MCH 28.7      MCHC 31.3 (*)     RDW 13.8      Platelet Count 185      % Neutrophils 78      % Lymphocytes 8      %  Monocytes 11      % Eosinophils 2      % Basophils 0      % Immature Granulocytes 1      NRBCs per 100 WBC 0      Absolute Neutrophils 3.1      Absolute Lymphocytes 0.3 (*)     Absolute Monocytes 0.5      Absolute Eosinophils 0.1      Absolute Basophils 0.0      Absolute Immature Granulocytes 0.0      Absolute NRBCs 0.0     INFLUENZA A/B & SARS-COV2 PCR MULTIPLEX - Normal    Influenza A PCR Negative      Influenza B PCR Negative      RSV PCR Negative      SARS CoV2 PCR Negative     NT PROBNP INPATIENT - Normal    N terminal Pro BNP Inpatient 274     TROPONIN T, HIGH SENSITIVITY - Normal    Troponin T, High Sensitivity 14          Procedures    Emergency Department Course:       Reviewed:  I reviewed nursing notes, vitals and past medical history    Assessments:  1513 I obtained history and examined the patient as noted above.      I rechecked the patient and explained findings.     Interventions:  Medications   sodium chloride (PF) 0.9% PF flush 100 mL (82 mLs Intravenous Given 11/5/22 1839)   iopamidol (ISOVUE-370) solution 500 mL (63 mLs Intravenous Given 11/5/22 1839)   predniSONE (DELTASONE) tablet 30 mg (30 mg Oral Given 11/5/22 1939)      Disposition:  The patient was discharged to home.     Impression & Plan     Medical Decision Making:  This is an 82-year-old female with history of asthma who presents for evaluation of 1 week of URI symptoms, coughing, and reported frequent inhaler use.  Initial exam shows mild bronchospasm but no obvious expiratory wheezes.  She is not hypoxic and has no chest pain or shortness of breath otherwise suggest AMI.  The above work-up was performed given advanced age and symptoms that appears atypical for asthma.  Fortunately, CT shows no evidence of pneumonia or PE.  No exam evidence to suggest CHF exacerbation.  Discussed pulmonary nodule.  Global work-up appears most consistent with URI and bronchospasm.  Plan Short course of prednisone at low-dose given history of  hypoglycemia with the same.  We had an extended shared his main conversation to the fact that she will take prednisone for 2 to 3 days and discontinue if blood sugars are increasing.  Plan primary care follow-up in 3 to 5 days, continue use of albuterol, and return cautions for chest pain, shortness of breath, hyperglycemia, or any other concerns.        Diagnosis:    ICD-10-CM    1. Acute bronchospasm  J98.01       2. Acute URI  J06.9       3. Pulmonary nodules  R91.8           Discharge Medications:  Discharge Medication List as of 11/5/2022  7:34 PM      START taking these medications    Details   predniSONE (DELTASONE) 20 MG tablet Take 1 tablet (20 mg) by mouth daily for 4 days, Disp-5 tablet, R-0, E-Prescribe             Scribe Disclosure:  I, Lizet Callahan, am serving as a scribe at 6:23 PM on 11/5/2022 to document services personally performed by Karson Matute MD based on my observations and the provider's statements to me.              Karson Matute MD  11/05/22 3725

## 2022-11-05 NOTE — ED TRIAGE NOTES
Patient presents to the ED reporting shortness of breath. States has asthma, but notes this is worse than a usual asthma exacerbation. States feels very short of breath with any exertion. Also reports cough and runny nose. Symptoms worsening x 1 week.

## 2022-11-07 LAB
ATRIAL RATE - MUSE: 88 BPM
DIASTOLIC BLOOD PRESSURE - MUSE: NORMAL MMHG
INTERPRETATION ECG - MUSE: NORMAL
P AXIS - MUSE: 46 DEGREES
PR INTERVAL - MUSE: 184 MS
QRS DURATION - MUSE: 76 MS
QT - MUSE: 354 MS
QTC - MUSE: 428 MS
R AXIS - MUSE: 100 DEGREES
SYSTOLIC BLOOD PRESSURE - MUSE: NORMAL MMHG
T AXIS - MUSE: 32 DEGREES
VENTRICULAR RATE- MUSE: 88 BPM

## 2022-11-15 ENCOUNTER — OFFICE VISIT (OUTPATIENT)
Dept: FAMILY MEDICINE | Facility: CLINIC | Age: 82
End: 2022-11-15

## 2022-11-15 VITALS — SYSTOLIC BLOOD PRESSURE: 136 MMHG | HEART RATE: 69 BPM | OXYGEN SATURATION: 94 % | DIASTOLIC BLOOD PRESSURE: 66 MMHG

## 2022-11-15 DIAGNOSIS — D64.9 ANEMIA, UNSPECIFIED TYPE: ICD-10-CM

## 2022-11-15 DIAGNOSIS — Z23 NEED FOR INFLUENZA VACCINATION: ICD-10-CM

## 2022-11-15 DIAGNOSIS — N18.32 STAGE 3B CHRONIC KIDNEY DISEASE (H): ICD-10-CM

## 2022-11-15 DIAGNOSIS — N18.32 TYPE 2 DIABETES MELLITUS WITH STAGE 3B CHRONIC KIDNEY DISEASE, WITHOUT LONG-TERM CURRENT USE OF INSULIN (H): ICD-10-CM

## 2022-11-15 DIAGNOSIS — J44.1 COPD EXACERBATION (H): Primary | ICD-10-CM

## 2022-11-15 DIAGNOSIS — J44.89 COPD WITH ASTHMA (H): ICD-10-CM

## 2022-11-15 DIAGNOSIS — E11.22 TYPE 2 DIABETES MELLITUS WITH STAGE 3B CHRONIC KIDNEY DISEASE, WITHOUT LONG-TERM CURRENT USE OF INSULIN (H): ICD-10-CM

## 2022-11-15 DIAGNOSIS — E03.4 HYPOTHYROIDISM DUE TO ACQUIRED ATROPHY OF THYROID: ICD-10-CM

## 2022-11-15 DIAGNOSIS — R91.1 PULMONARY NODULE: ICD-10-CM

## 2022-11-15 PROCEDURE — G0008 ADMIN INFLUENZA VIRUS VAC: HCPCS | Mod: 59 | Performed by: FAMILY MEDICINE

## 2022-11-15 PROCEDURE — 99215 OFFICE O/P EST HI 40 MIN: CPT | Mod: 25 | Performed by: FAMILY MEDICINE

## 2022-11-15 PROCEDURE — 36415 COLL VENOUS BLD VENIPUNCTURE: CPT | Performed by: FAMILY MEDICINE

## 2022-11-15 PROCEDURE — 90694 VACC AIIV4 NO PRSRV 0.5ML IM: CPT | Performed by: FAMILY MEDICINE

## 2022-11-15 RX ORDER — AZITHROMYCIN 250 MG/1
TABLET, FILM COATED ORAL
Qty: 6 TABLET | Refills: 0 | Status: SHIPPED | OUTPATIENT
Start: 2022-11-15 | End: 2022-11-20

## 2022-11-15 RX ORDER — PREDNISONE 20 MG/1
20 TABLET ORAL 2 TIMES DAILY
Qty: 10 TABLET | Refills: 0 | Status: SHIPPED | OUTPATIENT
Start: 2022-11-15 | End: 2022-11-20

## 2022-11-15 NOTE — PROGRESS NOTES
Rui Quijano is a 82 year old patient who presents to clinic for hospital follow up.  Seen in ER 11/5/22.  Workup reassuring and discharged with prednisone for a few days.  Breathing did improve but has regressed.  Has ongoing cough and SOB.  Has history of asthma and COPD.  Chest CT showed emphysematous changes as well as a RLL pulmonary nodule.  She is a former smoker.  No fever, chills or chest pain.      Also ongoing fatigue with chronic anemia.  Hgb: 11.6-->10.9-->8.5-->9.6-->9.9-->10.6-->9.4    Reports was on iron in past but intolerant due to constipation.  No melena or hematochezia or other signs of bleeding.        Review of Systems   Constitutional, HEENT, cardiovascular, pulmonary, GI, , musculoskeletal, neuro, skin, endocrine and psych systems are negative, except as otherwise noted.      Objective    /66   Pulse 69   SpO2 94%     General: Well appearing, NAD  Heart: RRR, no murmur  Chest: Lungs CTAB  Skin: Clear  Psych: normal mood and affect        No results found for this or any previous visit (from the past 24 hour(s)).    COPD exacerbation (H)  pred burst and azithro and close follow up.  Monitor blood sugars.  A1c excellent.  Patient is agreement with the assessment and plan as outlined above.  All questions answered.  Red flag symptoms that should prompt emergent evaluation discussed and understood.  - predniSONE (DELTASONE) 20 MG tablet; Take 1 tablet (20 mg) by mouth 2 times daily for 5 days  - azithromycin (ZITHROMAX) 250 MG tablet; Take 2 tablets (500 mg) by mouth daily for 1 day, THEN 1 tablet (250 mg) daily for 4 days.    Need for influenza vaccination  - FLUAD QUADRIVALENT 65+ ([facility] ONLY)    COPD with asthma (H)  See above    Anemia, unspecified type  Uncertain etiology.  ?CKD or multifactorial.  Workup as noted with nephrology referral to consider iron treatment  - Vitamin B12 ([facility])  - TSH ([facility])  - Reticulocyte Count ([facility])  - Ferritin  Serum  ([facility])  - Iron and TIBC ([facility])  - Adult Nephrology  Referral; Future    Type 2 diabetes mellitus with stage 3b chronic kidney disease, without long-term current use of insulin (H)  a1c at goal.  Cont Lisinopril.  Avoid NSAIDs    Stage 3b chronic kidney disease (H)  Given degree of anemia recommend nephrology referral  - Adult Nephrology  Referral; Future  - PTH  Intact ([facility])  - Phosphorus  Serum ([facility])    Hypothyroidism due to acquired atrophy of thyroid  recheck    Pulmonary nodule  Surveillance CT ordered  - CT Chest w/o Contrast; Future

## 2022-11-15 NOTE — LETTER
[facility] [address] [place]  [address]  Phone: [phone]    November 18, 2022      Macie Jorge  [address] [address]            Dear Macie,     Your labs do not reveal a cause for your anemia.  Please follow up with the kidney specialist as recommended.     Your B12 level is slightly elevated and it would be reasonable to reduce your dose.     It was very nice seeing you.  If you have any questions, please contact our office.         Sincerely,     Elias Meyer MD       Results for orders placed or performed in visit on 11/15/22   Vitamin B12 ([facility])     Status: Abnormal   Result Value Ref Range    Vitamin B12 1,676 (H) 232 - 1,245 pg/mL    Narrative    Performed at:  01 - [facility] [address], [place], [place]  [id number]  : Riley Rachel MD, Phone:  [phone]   TSH ([facility])     Status: None   Result Value Ref Range    TSH 0.620 0.450 - 4.500 uIU/mL    Narrative    Performed at:  01 - [facility] [address], [place], [place]  [id number]  : Riley Rachel MD, Phone:  [phone]   Reticulocyte Count ([facility])     Status: None   Result Value Ref Range    % Retic 1.3 0.6 - 2.6 %    Narrative    Performed at:  01 - [facility] [address], [place], [place]  [id number]  : Riley Rachel MD, Phone:  [phone]   Ferritin  Serum ([facility])     Status: None   Result Value Ref Range    Ferritin 76 15 - 150 ng/mL    Narrative    Performed at:  01 - [facility] [address], [place], [place]  [id number]  : Riley Rachel MD, Phone:  [phone]   Iron and TIBC ([facility])     Status: None   Result Value Ref Range    Iron Binding Cap 267 250 - 450 ug/dL    UIBC 212 118 - 369 ug/dL    Iron 55 27 - 139 ug/dL    Iron Saturation 21 15 - 55 %    Narrative    Performed at:  01 - [facility] [address], [place], [place]  [id number]  : Riley Rachel MD, Phone:  [phone]    Phosphorus  Serum ([facility])     Status: None   Result Value Ref Range    Phosphorus 3.6 3.0 - 4.3 mg/dL    Narrative    Performed at:  01 - [facility] [address], [place], [place]  [id number]  : Riley Rachel MD, Phone:  [phone]

## 2022-11-16 LAB
% RETIC: 1.3 % (ref 0.6–2.6)
FERRITIN SERPL-MCNC: 76 NG/ML (ref 15–150)
IRON BINDING CAP: 267 UG/DL (ref 250–450)
IRON SATURATION: 21 % (ref 15–55)
IRON: 55 UG/DL (ref 27–139)
PHOSPHATE SERPL-MCNC: 3.6 MG/DL (ref 3–4.3)
TSH BLD-ACNC: 0.62 UIU/ML (ref 0.45–4.5)
UIBC: 212 UG/DL (ref 118–369)
VIT B12 SERPL-MCNC: 1676 PG/ML (ref 232–1245)

## 2022-11-22 ENCOUNTER — TRANSFERRED RECORDS (OUTPATIENT)
Dept: HEALTH INFORMATION MANAGEMENT | Facility: CLINIC | Age: 82
End: 2022-11-22

## 2022-11-28 DIAGNOSIS — N20.0 URIC ACID KIDNEY STONE: ICD-10-CM

## 2022-11-28 DIAGNOSIS — Z76.0 ENCOUNTER FOR MEDICATION REFILL: ICD-10-CM

## 2022-11-28 RX ORDER — ALLOPURINOL 100 MG/1
TABLET ORAL
Qty: 180 TABLET | Refills: 0 | Status: SHIPPED | OUTPATIENT
Start: 2022-11-28 | End: 2023-06-03

## 2022-11-28 RX ORDER — OXYBUTYNIN CHLORIDE 10 MG/1
TABLET, EXTENDED RELEASE ORAL
Qty: 90 TABLET | Refills: 0 | Status: SHIPPED | OUTPATIENT
Start: 2022-11-28 | End: 2023-04-25

## 2022-12-01 ENCOUNTER — MEDICAL CORRESPONDENCE (OUTPATIENT)
Dept: HEALTH INFORMATION MANAGEMENT | Facility: CLINIC | Age: 82
End: 2022-12-01

## 2022-12-06 ENCOUNTER — ANCILLARY PROCEDURE (OUTPATIENT)
Dept: ULTRASOUND IMAGING | Facility: CLINIC | Age: 82
End: 2022-12-06
Attending: INTERNAL MEDICINE
Payer: COMMERCIAL

## 2022-12-06 DIAGNOSIS — N18.30 CHRONIC KIDNEY DISEASE, STAGE III (MODERATE) (H): ICD-10-CM

## 2022-12-06 DIAGNOSIS — N20.0 NEPHROLITHIASIS: ICD-10-CM

## 2022-12-06 PROCEDURE — 76770 US EXAM ABDO BACK WALL COMP: CPT

## 2022-12-07 DIAGNOSIS — N18.4 CHRONIC RENAL DISEASE, STAGE IV (H): ICD-10-CM

## 2022-12-07 DIAGNOSIS — D50.9 IRON DEFICIENCY ANEMIA, UNSPECIFIED IRON DEFICIENCY ANEMIA TYPE: ICD-10-CM

## 2022-12-07 DIAGNOSIS — D50.9 IRON DEFICIENCY ANEMIA, UNSPECIFIED: Primary | ICD-10-CM

## 2022-12-07 RX ORDER — EPINEPHRINE 1 MG/ML
0.3 INJECTION, SOLUTION INTRAMUSCULAR; SUBCUTANEOUS EVERY 5 MIN PRN
Status: CANCELLED | OUTPATIENT
Start: 2022-12-13

## 2022-12-07 RX ORDER — METHYLPREDNISOLONE SODIUM SUCCINATE 125 MG/2ML
125 INJECTION, POWDER, LYOPHILIZED, FOR SOLUTION INTRAMUSCULAR; INTRAVENOUS
Status: CANCELLED
Start: 2022-12-13

## 2022-12-07 RX ORDER — ALBUTEROL SULFATE 0.83 MG/ML
2.5 SOLUTION RESPIRATORY (INHALATION)
Status: CANCELLED | OUTPATIENT
Start: 2022-12-13

## 2022-12-07 RX ORDER — HEPARIN SODIUM,PORCINE 10 UNIT/ML
5 VIAL (ML) INTRAVENOUS
Status: CANCELLED | OUTPATIENT
Start: 2022-12-13

## 2022-12-07 RX ORDER — DIPHENHYDRAMINE HYDROCHLORIDE 50 MG/ML
50 INJECTION INTRAMUSCULAR; INTRAVENOUS
Status: CANCELLED
Start: 2022-12-13

## 2022-12-07 RX ORDER — MEPERIDINE HYDROCHLORIDE 25 MG/ML
25 INJECTION INTRAMUSCULAR; INTRAVENOUS; SUBCUTANEOUS EVERY 30 MIN PRN
Status: CANCELLED | OUTPATIENT
Start: 2022-12-13

## 2022-12-07 RX ORDER — HEPARIN SODIUM (PORCINE) LOCK FLUSH IV SOLN 100 UNIT/ML 100 UNIT/ML
5 SOLUTION INTRAVENOUS
Status: CANCELLED | OUTPATIENT
Start: 2022-12-13

## 2022-12-07 RX ORDER — ALBUTEROL SULFATE 90 UG/1
1-2 AEROSOL, METERED RESPIRATORY (INHALATION)
Status: CANCELLED
Start: 2022-12-13

## 2022-12-13 ENCOUNTER — INFUSION THERAPY VISIT (OUTPATIENT)
Dept: INFUSION THERAPY | Facility: CLINIC | Age: 82
End: 2022-12-13
Attending: INTERNAL MEDICINE
Payer: COMMERCIAL

## 2022-12-13 VITALS
TEMPERATURE: 98 F | HEART RATE: 83 BPM | RESPIRATION RATE: 20 BRPM | DIASTOLIC BLOOD PRESSURE: 93 MMHG | SYSTOLIC BLOOD PRESSURE: 148 MMHG | OXYGEN SATURATION: 96 %

## 2022-12-13 DIAGNOSIS — N18.4 CHRONIC RENAL DISEASE, STAGE IV (H): ICD-10-CM

## 2022-12-13 DIAGNOSIS — D50.9 IRON DEFICIENCY ANEMIA, UNSPECIFIED IRON DEFICIENCY ANEMIA TYPE: Primary | ICD-10-CM

## 2022-12-13 PROCEDURE — 250N000011 HC RX IP 250 OP 636: Performed by: INTERNAL MEDICINE

## 2022-12-13 PROCEDURE — 258N000003 HC RX IP 258 OP 636: Performed by: INTERNAL MEDICINE

## 2022-12-13 PROCEDURE — 96365 THER/PROPH/DIAG IV INF INIT: CPT

## 2022-12-13 RX ORDER — METHYLPREDNISOLONE SODIUM SUCCINATE 125 MG/2ML
125 INJECTION, POWDER, LYOPHILIZED, FOR SOLUTION INTRAMUSCULAR; INTRAVENOUS
Status: CANCELLED
Start: 2022-12-15

## 2022-12-13 RX ORDER — ALBUTEROL SULFATE 0.83 MG/ML
2.5 SOLUTION RESPIRATORY (INHALATION)
Status: CANCELLED | OUTPATIENT
Start: 2022-12-15

## 2022-12-13 RX ORDER — MEPERIDINE HYDROCHLORIDE 25 MG/ML
25 INJECTION INTRAMUSCULAR; INTRAVENOUS; SUBCUTANEOUS EVERY 30 MIN PRN
Status: CANCELLED | OUTPATIENT
Start: 2022-12-15

## 2022-12-13 RX ORDER — EPINEPHRINE 1 MG/ML
0.3 INJECTION, SOLUTION INTRAMUSCULAR; SUBCUTANEOUS EVERY 5 MIN PRN
Status: CANCELLED | OUTPATIENT
Start: 2022-12-15

## 2022-12-13 RX ORDER — HEPARIN SODIUM,PORCINE 10 UNIT/ML
5 VIAL (ML) INTRAVENOUS
Status: CANCELLED | OUTPATIENT
Start: 2022-12-15

## 2022-12-13 RX ORDER — DIPHENHYDRAMINE HYDROCHLORIDE 50 MG/ML
50 INJECTION INTRAMUSCULAR; INTRAVENOUS
Status: CANCELLED
Start: 2022-12-15

## 2022-12-13 RX ORDER — HEPARIN SODIUM (PORCINE) LOCK FLUSH IV SOLN 100 UNIT/ML 100 UNIT/ML
5 SOLUTION INTRAVENOUS
Status: CANCELLED | OUTPATIENT
Start: 2022-12-15

## 2022-12-13 RX ORDER — ALBUTEROL SULFATE 90 UG/1
1-2 AEROSOL, METERED RESPIRATORY (INHALATION)
Status: CANCELLED
Start: 2022-12-15

## 2022-12-13 RX ADMIN — FERUMOXYTOL 510 MG: 510 INJECTION INTRAVENOUS at 13:19

## 2022-12-13 RX ADMIN — SODIUM CHLORIDE 250 ML: 9 INJECTION, SOLUTION INTRAVENOUS at 13:18

## 2022-12-13 ASSESSMENT — PAIN SCALES - GENERAL: PAINLEVEL: NO PAIN (0)

## 2022-12-13 NOTE — PROGRESS NOTES
Infusion Nursing Note:  Macie Jorge presents today for feraheme 1/2.    Patient seen by provider today: No   present during visit today: Not Applicable.    Note: Oriented to infusion are as this is her first visit. Reviewed feraheme and side effects as she has never received IV iron before.    Intravenous Access:  Peripheral IV placed.    Treatment Conditions:  Not Applicable.    Post Infusion Assessment:  Patient tolerated infusion without incident.  Patient observed for 30 minutes post feraheme per protocol.  Site patent and intact, free from redness, edema or discomfort.  No evidence of extravasations.  Access discontinued per protocol.     Discharge Plan:   [facility]  Patient will return next week  for next appointment.   Patient discharged in stable condition accompanied by: .  Departure Mode: Ambulatory.      Konstantin De Luna RN

## 2022-12-19 ENCOUNTER — OFFICE VISIT (OUTPATIENT)
Dept: FAMILY MEDICINE | Facility: CLINIC | Age: 82
End: 2022-12-19

## 2022-12-19 VITALS — HEART RATE: 65 BPM | OXYGEN SATURATION: 99 % | DIASTOLIC BLOOD PRESSURE: 74 MMHG | SYSTOLIC BLOOD PRESSURE: 136 MMHG

## 2022-12-19 DIAGNOSIS — R80.9 MICROALBUMINURIA: ICD-10-CM

## 2022-12-19 DIAGNOSIS — M48.062 SPINAL STENOSIS OF LUMBAR REGION WITH NEUROGENIC CLAUDICATION: ICD-10-CM

## 2022-12-19 DIAGNOSIS — G89.29 CHRONIC MIDLINE LOW BACK PAIN WITHOUT SCIATICA: ICD-10-CM

## 2022-12-19 DIAGNOSIS — J44.89 COPD WITH ASTHMA (H): Primary | ICD-10-CM

## 2022-12-19 DIAGNOSIS — M54.50 CHRONIC MIDLINE LOW BACK PAIN WITHOUT SCIATICA: ICD-10-CM

## 2022-12-19 PROCEDURE — 94010 BREATHING CAPACITY TEST: CPT | Performed by: FAMILY MEDICINE

## 2022-12-19 PROCEDURE — 99214 OFFICE O/P EST MOD 30 MIN: CPT | Mod: 25 | Performed by: FAMILY MEDICINE

## 2022-12-19 RX ORDER — HYDROCODONE BITARTRATE AND ACETAMINOPHEN 5; 325 MG/1; MG/1
1 TABLET ORAL EVERY 6 HOURS PRN
Qty: 20 TABLET | Refills: 0 | Status: SHIPPED | OUTPATIENT
Start: 2022-12-19 | End: 2023-01-23

## 2022-12-19 RX ORDER — FLUTICASONE FUROATE, UMECLIDINIUM BROMIDE AND VILANTEROL TRIFENATATE 200; 62.5; 25 UG/1; UG/1; UG/1
1 POWDER RESPIRATORY (INHALATION) DAILY
Qty: 28 EACH | Refills: 1 | Status: SHIPPED | OUTPATIENT
Start: 2022-12-19

## 2022-12-19 RX ORDER — LISINOPRIL 5 MG/1
5 TABLET ORAL DAILY
Qty: 90 TABLET | Refills: 1 | Status: SHIPPED | OUTPATIENT
Start: 2022-12-19 | End: 2023-06-21

## 2022-12-19 NOTE — PROGRESS NOTES
Rui Quijano is a 82 year old patient who presents to clinic for evaluation.    Breathing has not been good since hospitalization beginning of November.  Has history of asthma and COPD.  Chest CT showed emphysematous changes as well as a RLL pulmonary nodule.  She is a former smoker.  She is on advair which she does not feel helps.  Albuterol helps briefly.  No chest pain but does have SOB with exertion.  Mild coronary calcification noted on CT.      CT is ordered for follow up of pulmonary nodule.    She also has CKD3b/4 and anemia.  Seen by Dr Domínguez who felt likely multifactorial but in light of relative iron deficiency iron infusions have been initiated.      Also chronic back pain requiring intermittent norco use.  Tramadol not effective.  Denies side effects or habituation on norco.      Review of Systems   Constitutional, HEENT, cardiovascular, pulmonary, GI, , musculoskeletal, neuro, skin, endocrine and psych systems are negative, except as otherwise noted.      Objective    /74   Pulse 65   SpO2 99%     General: Well appearing, NAD  Heart: RRR, no murmur  Chest: Lungs CTAB  Skin: Clear  Psych: normal mood and affect        Spirometry shows restrictive pattern but effort is questionable.    No results found for this or any previous visit (from the past 24 hour(s)).    COPD with asthma (H)  Symptoms not controlled.  Trial of switching advair to trelegy and pulm referral for further evaluation.  Consider cardiac eval if not improving but less likely heart related  - Fluticasone-Umeclidin-Vilanterol (TRELEGY ELLIPTA) 200-62.5-25 MCG/ACT oral inhaler; Inhale 1 puff into the lungs daily  - Spirometry, Breathing Capacity  - Adult Pulmonary Medicine Referral; Future    Microalbuminuria  Advised to continue lisinopril due to CKD despite no HTN dx  - lisinopril (ZESTRIL) 5 MG tablet; Take 1 tablet (5 mg) by mouth daily    Spinal stenosis of lumbar region with neurogenic claudication  Reports  alternatives have been exhausted.  Cautious use of norco.  Proper use and potential benefits, harms and side effects discussed in detail.  - HYDROcodone-acetaminophen (NORCO) 5-325 MG tablet; Take 1 tablet by mouth every 6 hours as needed for pain    Chronic midline low back pain without sciatica  See above      Follow up in 1-2 months, sooner if worse

## 2022-12-20 ENCOUNTER — INFUSION THERAPY VISIT (OUTPATIENT)
Dept: INFUSION THERAPY | Facility: CLINIC | Age: 82
End: 2022-12-20
Attending: INTERNAL MEDICINE
Payer: COMMERCIAL

## 2022-12-20 VITALS
RESPIRATION RATE: 18 BRPM | DIASTOLIC BLOOD PRESSURE: 81 MMHG | SYSTOLIC BLOOD PRESSURE: 123 MMHG | HEART RATE: 80 BPM | TEMPERATURE: 97.4 F | OXYGEN SATURATION: 96 %

## 2022-12-20 DIAGNOSIS — D50.9 IRON DEFICIENCY ANEMIA, UNSPECIFIED IRON DEFICIENCY ANEMIA TYPE: Primary | ICD-10-CM

## 2022-12-20 DIAGNOSIS — N18.4 CHRONIC RENAL DISEASE, STAGE IV (H): ICD-10-CM

## 2022-12-20 LAB
FEF 25/75: NORMAL
FEV-1: NORMAL
FEV1/FVC: NORMAL
FVC: NORMAL

## 2022-12-20 PROCEDURE — 96365 THER/PROPH/DIAG IV INF INIT: CPT

## 2022-12-20 PROCEDURE — 258N000003 HC RX IP 258 OP 636: Performed by: INTERNAL MEDICINE

## 2022-12-20 PROCEDURE — 250N000011 HC RX IP 250 OP 636: Performed by: INTERNAL MEDICINE

## 2022-12-20 RX ORDER — DIPHENHYDRAMINE HYDROCHLORIDE 50 MG/ML
50 INJECTION INTRAMUSCULAR; INTRAVENOUS
Start: 2022-12-20

## 2022-12-20 RX ORDER — HEPARIN SODIUM (PORCINE) LOCK FLUSH IV SOLN 100 UNIT/ML 100 UNIT/ML
5 SOLUTION INTRAVENOUS
OUTPATIENT
Start: 2022-12-20

## 2022-12-20 RX ORDER — HEPARIN SODIUM,PORCINE 10 UNIT/ML
5 VIAL (ML) INTRAVENOUS
OUTPATIENT
Start: 2022-12-20

## 2022-12-20 RX ORDER — EPINEPHRINE 1 MG/ML
0.3 INJECTION, SOLUTION INTRAMUSCULAR; SUBCUTANEOUS EVERY 5 MIN PRN
OUTPATIENT
Start: 2022-12-20

## 2022-12-20 RX ORDER — MEPERIDINE HYDROCHLORIDE 25 MG/ML
25 INJECTION INTRAMUSCULAR; INTRAVENOUS; SUBCUTANEOUS EVERY 30 MIN PRN
OUTPATIENT
Start: 2022-12-20

## 2022-12-20 RX ORDER — ALBUTEROL SULFATE 0.83 MG/ML
2.5 SOLUTION RESPIRATORY (INHALATION)
OUTPATIENT
Start: 2022-12-20

## 2022-12-20 RX ORDER — METHYLPREDNISOLONE SODIUM SUCCINATE 125 MG/2ML
125 INJECTION, POWDER, LYOPHILIZED, FOR SOLUTION INTRAMUSCULAR; INTRAVENOUS
Start: 2022-12-20

## 2022-12-20 RX ORDER — ALBUTEROL SULFATE 90 UG/1
1-2 AEROSOL, METERED RESPIRATORY (INHALATION)
Start: 2022-12-20

## 2022-12-20 RX ADMIN — SODIUM CHLORIDE 250 ML: 9 INJECTION, SOLUTION INTRAVENOUS at 14:17

## 2022-12-20 RX ADMIN — FERUMOXYTOL 510 MG: 510 INJECTION INTRAVENOUS at 14:15

## 2022-12-20 NOTE — PROGRESS NOTES
Infusion Nursing Note:  Macie Jorge presents today for Feraheme.    Patient seen by provider today: No   present during visit today: Not Applicable.    Note: N/A.    Intravenous Access:  Peripheral IV placed.    Treatment Conditions:  Not Applicable.    Post Infusion Assessment:  Patient tolerated infusion without incident.  Blood return noted pre and post infusion.  Site patent and intact, free from redness, edema or discomfort.  No evidence of extravasations.  Access discontinued per protocol.     Discharge Plan:   Patient declined prescription refills.  Discharge instructions reviewed with: Patient.  Patient and/or family verbalized understanding of discharge instructions and all questions answered.  AVS to patient via [facility].  Patient will return when scheduled for next appointment.   Patient discharged in stable condition accompanied by: self.  Departure Mode: Ambulatory.      Andrew Dey RN

## 2023-01-13 ENCOUNTER — TRANSFERRED RECORDS (OUTPATIENT)
Dept: FAMILY MEDICINE | Facility: CLINIC | Age: 83
End: 2023-01-13

## 2023-01-23 ENCOUNTER — OFFICE VISIT (OUTPATIENT)
Dept: FAMILY MEDICINE | Facility: CLINIC | Age: 83
End: 2023-01-23

## 2023-01-23 VITALS
HEIGHT: 66 IN | BODY MASS INDEX: 29.51 KG/M2 | HEART RATE: 52 BPM | OXYGEN SATURATION: 99 % | RESPIRATION RATE: 16 BRPM | DIASTOLIC BLOOD PRESSURE: 64 MMHG | SYSTOLIC BLOOD PRESSURE: 127 MMHG | WEIGHT: 183.6 LBS

## 2023-01-23 DIAGNOSIS — N18.32 STAGE 3B CHRONIC KIDNEY DISEASE (H): ICD-10-CM

## 2023-01-23 DIAGNOSIS — J44.89 COPD WITH ASTHMA (H): Primary | ICD-10-CM

## 2023-01-23 DIAGNOSIS — M54.50 CHRONIC MIDLINE LOW BACK PAIN WITHOUT SCIATICA: ICD-10-CM

## 2023-01-23 DIAGNOSIS — M48.062 SPINAL STENOSIS OF LUMBAR REGION WITH NEUROGENIC CLAUDICATION: ICD-10-CM

## 2023-01-23 DIAGNOSIS — N18.32 TYPE 2 DIABETES MELLITUS WITH STAGE 3B CHRONIC KIDNEY DISEASE, WITHOUT LONG-TERM CURRENT USE OF INSULIN (H): ICD-10-CM

## 2023-01-23 DIAGNOSIS — G89.29 CHRONIC MIDLINE LOW BACK PAIN WITHOUT SCIATICA: ICD-10-CM

## 2023-01-23 DIAGNOSIS — E11.22 TYPE 2 DIABETES MELLITUS WITH STAGE 3B CHRONIC KIDNEY DISEASE, WITHOUT LONG-TERM CURRENT USE OF INSULIN (H): ICD-10-CM

## 2023-01-23 LAB
% GRANULOCYTES: 77.4 % (ref 42.2–75.2)
HBA1C MFR BLD: 5.9 % (ref 4–6)
HCT VFR BLD AUTO: 34.5 % (ref 35–46)
HEMOGLOBIN: 11.9 G/DL (ref 11.8–15.5)
LYMPHOCYTES NFR BLD AUTO: 15.7 % (ref 20.5–51.1)
MCH RBC QN AUTO: 30.6 PG (ref 27–31)
MCHC RBC AUTO-ENTMCNC: 34.6 G/DL (ref 33–37)
MCV RBC AUTO: 88.3 FL (ref 80–100)
MONOCYTES NFR BLD AUTO: 6.9 % (ref 1.7–9.3)
PLATELET # BLD AUTO: 141 K/UL (ref 140–450)
RBC # BLD AUTO: 3.9 X10/CMM (ref 3.7–5.2)
WBC # BLD AUTO: 4.1 X10/CMM (ref 3.8–11)

## 2023-01-23 PROCEDURE — 83036 HEMOGLOBIN GLYCOSYLATED A1C: CPT | Performed by: FAMILY MEDICINE

## 2023-01-23 PROCEDURE — 85025 COMPLETE CBC W/AUTO DIFF WBC: CPT | Performed by: FAMILY MEDICINE

## 2023-01-23 PROCEDURE — 99214 OFFICE O/P EST MOD 30 MIN: CPT | Performed by: FAMILY MEDICINE

## 2023-01-23 PROCEDURE — 36415 COLL VENOUS BLD VENIPUNCTURE: CPT | Performed by: FAMILY MEDICINE

## 2023-01-23 RX ORDER — HYDROCODONE BITARTRATE AND ACETAMINOPHEN 10; 325 MG/1; MG/1
1 TABLET ORAL EVERY 6 HOURS PRN
Qty: 20 TABLET | Refills: 0 | Status: SHIPPED | OUTPATIENT
Start: 2023-01-23 | End: 2023-06-03

## 2023-01-23 NOTE — PROGRESS NOTES
"  Subjective     Macie is a 82 year old patient who presents to clinic for follow up.    COPD with asthma:  Chest CT showed emphysematous changes as well as a RLL pulmonary nodule.  She is a former smoker.  On Trelegy.  Went to consult at [facility], saw Dr Law.  Did not feel it was a good visit and interested in alternative specialist.  No chest pain but does have SOB with exertion.  Mild coronary calcification noted on CT.       Pulmonary nodule: due for repeat surveillance in May.     She also has CKD3b/4 and anemia.  Seen by Dr Domínguez who felt likely multifactorial but in light of relative iron deficiency iron infusions were given.       Also chronic back pain requiring intermittent norco use.  Has spinal stenosis.  Tramadol not effective.  Denies side effects or habituation on norco.  Interested in new pain clinic      DM2: A1c has been at goal without medication.      Review of Systems   Constitutional, HEENT, cardiovascular, pulmonary, GI, , musculoskeletal, neuro, skin, endocrine and psych systems are negative, except as otherwise noted.      Objective    /64   Pulse 52   Resp 16   Ht 1.676 m (5' 6\")   Wt 83.3 kg (183 lb 9.6 oz)   SpO2 99%   BMI 29.63 kg/m      General: Well appearing, NAD  Psych: normal mood and affect        Results for orders placed or performed in visit on 01/23/23 (from the past 24 hour(s))   Hemoglobin A1C ([facility])   Result Value Ref Range    Hemoglobin A1C 5.9 4.0 - 6.0 %   CBC with Diff/Plt ([facility])   Result Value Ref Range    WBC x10/cmm 4.1 3.8 - 11.0 x10/cmm    % Lymphocytes 15.7 (A) 20.5 - 51.1 %    % Monocytes 6.9 1.7 - 9.3 %    % Granulocytes 77.4 (A) 42.2 - 75.2 %    RBC x10/cmm 3.90 3.7 - 5.2 x10/cmm    Hemoglobin 11.9 11.8 - 15.5 g/dl    Hematocrit 34.5 (A) 35 - 46 %    MCV 88.3 80 - 100 fL    MCH 30.6 27.0 - 31.0 pg    MCHC 34.6 33.0 - 37.0 g/dL    Platelet Count 141 140 - 450 K/uL       COPD with asthma (H)  Cont trelegy, referral to new pulm clinic for further " evaluation  - Adult Pulmonary Medicine Referral; Future  - VENOUS COLLECTION    Stage 3b chronic kidney disease (H)  Stable, cont to monitor.  Blood pressure control, blood sugar control, vitamin D supplementation and avoidance of NSAIDs discussed.  - CBC with Diff/Plt ([facility])  - Basic Metabolic Panel (8) ([facility])  - VENOUS COLLECTION  - Microalbumin ([facility]); Future    Type 2 diabetes mellitus with stage 3b chronic kidney disease, without long-term current use of insulin (H)  At goal without medication  - Hemoglobin A1C ([facility])  - VENOUS COLLECTION    Spinal stenosis of lumbar region with neurogenic claudication  Recommend further pain clinic evaluation to limit need for ongoing opiates and improve pain and quality of life  - HYDROcodone-acetaminophen (NORCO)  MG per tablet; Take 1 tablet by mouth every 6 hours as needed for severe pain (7-10)  - Pain Management Referral - To a [facility] Location (Use POS/Location); Future    Chronic midline low back pain without sciatica  See above  - HYDROcodone-acetaminophen (NORCO)  MG per tablet; Take 1 tablet by mouth every 6 hours as needed for severe pain (7-10)  - Pain Management Referral - To a [facility] Location (Use POS/Location); Future

## 2023-01-23 NOTE — LETTER
[facility]  [address] [place]  [address]  Phone: [phone]    January 25, 2023      Macie Jorge  [address] [id number]              Dear Macie,     Your kidney function is mildly reduced but remains stable.     Your hemoglobin has improved back to normal.     Your A1c remains stable in the prediabetes range.     It was very nice seeing you.  If you have any questions, please contact our office.         Sincerely,     Elias eMyer MD       Results for orders placed or performed in visit on 01/23/23   Hemoglobin A1C ([facility])     Status: None   Result Value Ref Range    Hemoglobin A1C 5.9 4.0 - 6.0 %   CBC with Diff/Plt ([facility])     Status: Abnormal   Result Value Ref Range    WBC x10/cmm 4.1 3.8 - 11.0 x10/cmm    % Lymphocytes 15.7 (A) 20.5 - 51.1 %    % Monocytes 6.9 1.7 - 9.3 %    % Granulocytes 77.4 (A) 42.2 - 75.2 %    RBC x10/cmm 3.90 3.7 - 5.2 x10/cmm    Hemoglobin 11.9 11.8 - 15.5 g/dl    Hematocrit 34.5 (A) 35 - 46 %    MCV 88.3 80 - 100 fL    MCH 30.6 27.0 - 31.0 pg    MCHC 34.6 33.0 - 37.0 g/dL    Platelet Count 141 140 - 450 K/uL   Basic Metabolic Panel (8) ([facility])     Status: Abnormal   Result Value Ref Range    Glucose 102 (H) 70 - 99 mg/dL    Urea Nitrogen 25 8 - 27 mg/dL    Creatinine 1.48 (H) 0.57 - 1.00 mg/dL    eGFR  35 (L) >59 mL/min/1.73    BUN/Creatinine Ratio 17 12 - 28    Sodium 139 134 - 144 mmol/L    Potassium 4.3 3.5 - 5.2 mmol/L    Chloride 105 96 - 106 mmol/L    Total CO2 21 20 - 29 mmol/L    Calcium 9.3 8.7 - 10.3 mg/dL    Narrative    Performed at:  01 - [facility] [address], [place], [place]  [id number]  : Riley Rachel MD, Phone:  [phone]

## 2023-01-25 LAB
BUN SERPL-MCNC: 25 MG/DL (ref 8–27)
BUN/CREATININE RATIO: 17 (ref 12–28)
CALCIUM SERPL-MCNC: 9.3 MG/DL (ref 8.7–10.3)
CHLORIDE SERPLBLD-SCNC: 105 MMOL/L (ref 96–106)
CREAT SERPL-MCNC: 1.48 MG/DL (ref 0.57–1)
EGFR: 35 ML/MIN/1.73
GLUCOSE SERPL-MCNC: 102 MG/DL (ref 70–99)
POTASSIUM SERPL-SCNC: 4.3 MMOL/L (ref 3.5–5.2)
SODIUM SERPL-SCNC: 139 MMOL/L (ref 134–144)
TOTAL CO2: 21 MMOL/L (ref 20–29)

## 2023-03-15 DIAGNOSIS — E03.4 HYPOTHYROIDISM DUE TO ACQUIRED ATROPHY OF THYROID: ICD-10-CM

## 2023-03-16 NOTE — TELEPHONE ENCOUNTER
levothyroxine      LOV 1/23/23  Component      Latest Ref Rng & Units 1/23/2023   Glucose      70 - 99 mg/dL 102 (H)   Urea Nitrogen      8 - 27 mg/dL 25   Creatinine      0.57 - 1.00 mg/dL 1.48 (H)   eGFR       >59 mL/min/1.73 35 (L)   BUN/Creatinine Ratio      12 - 28 17   Sodium      134 - 144 mmol/L 139   Potassium      3.5 - 5.2 mmol/L 4.3   Chloride      96 - 106 mmol/L 105   Total CO2      20 - 29 mmol/L 21   Calcium      8.7 - 10.3 mg/dL 9.3

## 2023-03-17 RX ORDER — LEVOTHYROXINE SODIUM 137 UG/1
TABLET ORAL
Qty: 90 TABLET | Refills: 0 | Status: SHIPPED | OUTPATIENT
Start: 2023-03-17 | End: 2023-06-19

## 2023-04-25 DIAGNOSIS — Z76.0 ENCOUNTER FOR MEDICATION REFILL: ICD-10-CM

## 2023-04-25 DIAGNOSIS — N20.0 URIC ACID KIDNEY STONE: ICD-10-CM

## 2023-04-25 RX ORDER — OXYBUTYNIN CHLORIDE 10 MG/1
TABLET, EXTENDED RELEASE ORAL
Qty: 90 TABLET | Refills: 0 | Status: SHIPPED | OUTPATIENT
Start: 2023-04-25 | End: 2023-07-24

## 2023-04-25 NOTE — TELEPHONE ENCOUNTER
oxybutynin ER (DITROPAN XL) 10 MG 24 hr tablet    LOV & last labs 1/23/23    Last notes  1/23/23    Stable, cont to monitor.  Blood pressure control, blood sugar control, vitamin D supplementation and avoidance of NSAIDs discussed.  - CBC with Diff/Plt (G)  - Basic Metabolic Panel (8) ([facility])  - VENOUS COLLECTION  - Microalbumin ([facility]); Future     Type 2 diabetes mellitus with stage 3b chronic kidney disease, without long-term current use of insulin (H)  At goal without medication  - Hemoglobin A1C ([facility])  Lab Results   Component Value Date    A1C 5.9 01/23/2023    A1C 5.7 07/19/2022    A1C 5.8 08/02/2021    A1C 5.9 07/13/2020    A1C 5.9 01/20/2020         No future appt with [facility]  Has imaging appt 5/15/23

## 2023-05-08 ENCOUNTER — TRANSFERRED RECORDS (OUTPATIENT)
Dept: FAMILY MEDICINE | Facility: CLINIC | Age: 83
End: 2023-05-08

## 2023-05-15 ENCOUNTER — HOSPITAL ENCOUNTER (OUTPATIENT)
Dept: CT IMAGING | Facility: CLINIC | Age: 83
Discharge: HOME | End: 2023-05-15
Attending: INTERNAL MEDICINE | Admitting: INTERNAL MEDICINE
Payer: COMMERCIAL

## 2023-05-15 DIAGNOSIS — R91.1 SOLITARY PULMONARY NODULE: ICD-10-CM

## 2023-05-15 PROCEDURE — 71250 CT THORAX DX C-: CPT

## 2023-05-16 PROBLEM — I25.10 CORONARY ARTERY DISEASE INVOLVING NATIVE CORONARY ARTERY OF NATIVE HEART WITHOUT ANGINA PECTORIS: Status: ACTIVE | Noted: 2023-05-16

## 2023-05-22 ENCOUNTER — TRANSFERRED RECORDS (OUTPATIENT)
Dept: FAMILY MEDICINE | Facility: CLINIC | Age: 83
End: 2023-05-22

## 2023-05-23 ENCOUNTER — OFFICE VISIT (OUTPATIENT)
Dept: FAMILY MEDICINE | Facility: CLINIC | Age: 83
End: 2023-05-23

## 2023-05-23 VITALS
DIASTOLIC BLOOD PRESSURE: 62 MMHG | HEART RATE: 73 BPM | OXYGEN SATURATION: 98 % | BODY MASS INDEX: 30.44 KG/M2 | SYSTOLIC BLOOD PRESSURE: 109 MMHG | WEIGHT: 188.6 LBS

## 2023-05-23 DIAGNOSIS — I25.10 CORONARY ARTERY DISEASE INVOLVING NATIVE CORONARY ARTERY OF NATIVE HEART WITHOUT ANGINA PECTORIS: ICD-10-CM

## 2023-05-23 DIAGNOSIS — J44.89 COPD WITH ASTHMA (H): ICD-10-CM

## 2023-05-23 DIAGNOSIS — N18.32 STAGE 3B CHRONIC KIDNEY DISEASE (H): ICD-10-CM

## 2023-05-23 DIAGNOSIS — N28.9 RENAL LESION: Primary | ICD-10-CM

## 2023-05-23 PROCEDURE — 99214 OFFICE O/P EST MOD 30 MIN: CPT | Performed by: FAMILY MEDICINE

## 2023-05-23 RX ORDER — GABAPENTIN 100 MG/1
200 CAPSULE ORAL EVERY EVENING
COMMUNITY
Start: 2023-05-04 | End: 2023-07-18

## 2023-05-23 RX ORDER — ASPIRIN 81 MG/1
81 TABLET ORAL DAILY
Qty: 90 TABLET | Refills: 3 | Status: SHIPPED | OUTPATIENT
Start: 2023-05-23 | End: 2023-07-24

## 2023-05-23 RX ORDER — HYDROCODONE BITARTRATE AND ACETAMINOPHEN 10; 325 MG/1; MG/1
1 TABLET ORAL 3 TIMES DAILY PRN
Status: ON HOLD | COMMUNITY
Start: 2023-05-04 | End: 2024-07-27

## 2023-05-23 RX ORDER — ROSUVASTATIN CALCIUM 20 MG/1
20 TABLET, COATED ORAL DAILY
Qty: 90 TABLET | Refills: 3 | Status: SHIPPED | OUTPATIENT
Start: 2023-05-23 | End: 2024-06-06

## 2023-05-23 NOTE — PROGRESS NOTES
"  Rui Quijano is a 82 year old patient who presents to clinic for follow up of CT.    COPD/asthma and pulmonary nodules: saw Dr Kaminski, note not yet available.  Nodules stable.  CT incidentally showed right renal lesion, likely a cyst, ultrasound recommended.    CAD: noted on CT, \"extensive\".  Asymptomatic.    She also has CKD3b/4 and anemia.  Seen by Dr Domínguez who felt likely multifactorial but in light of relative iron deficiency iron infusions were given        Review of Systems   Constitutional, HEENT, cardiovascular, pulmonary, GI, , musculoskeletal, neuro, skin, endocrine and psych systems are negative, except as otherwise noted.      Objective    /62   Pulse 73   Wt 85.5 kg (188 lb 9.6 oz)   SpO2 98%   BMI 30.44 kg/m      General: Well appearing, NAD  Psych: normal mood and affect        No results found for this or any previous visit (from the past 24 hour(s)).    Renal lesion  Further evaluate with ultrasound  - US Kidney Right; Future    Coronary artery disease involving native coronary artery of native heart without angina pectoris  Discussed findings. Start statin and asa  - rosuvastatin (CRESTOR) 20 MG tablet; Take 1 tablet (20 mg) by mouth daily  - aspirin 81 MG EC tablet; Take 1 tablet (81 mg) by mouth daily    COPD with asthma (H)  Stable, cont meds and follow up with pulm    Stage 3b chronic kidney disease (H)  Cont current meds and nephrology follow up  Cont ACEi          "

## 2023-05-23 NOTE — LETTER
Opioid / Opioid Plus Controlled Substance Agreement    This is an agreement between you and your provider about the safe and appropriate use of controlled substance/opioids prescribed by your care team. Controlled substances are medicines that can cause physical and mental dependence (abuse).    There are strict laws about having and using these medicines. We here at [facility] are committing to working with you in your efforts to get better. To support you in this work, we ll help you schedule regular office appointments for medicine refills. If we must cancel or change your appointment for any reason, we ll make sure you have enough medicine to last until your next appointment.     As a Provider, I will:  Listen carefully to your concerns and treat you with respect.   Recommend a treatment plan that I believe is in your best interest. This plan may involve therapies other than opioid pain medication.   Talk with you often about the possible benefits, and the risk of harm of any medicine that we prescribe for you.   Provide a plan on how to taper (discontinue or go off) using this medicine if the decision is made to stop its use.    As a Patient, I understand that opioid(s):   Are a controlled substance prescribed by my care team to help me function or work and manage my condition(s).   Are strong medicines and can cause serious side effects such as:  Drowsiness, which can seriously affect my driving ability  A lower breathing rate, enough to cause death  Harm to my thinking ability   Depression   Abuse of and addiction to this medicine  Need to be taken exactly as prescribed. Combining opioids with certain medicines or chemicals (such as illegal drugs, sedatives, sleeping pills, and benzodiazepines) can be dangerous or even fatal. If I stop opioids suddenly, I may have severe withdrawal symptoms.  Do not work for all types of pain nor for all patients. If they re not helpful, I may be asked to stop  them.        The risks, benefits and side effects of these medicine(s) were explained to me. I agree that:  I will take part in other treatments as advised by my care team. This may be psychiatry or counseling, physical therapy, behavioral therapy, group treatment or a referral to a specialist.     I will keep all my appointments. I understand that this is part of the monitoring of opioids. My care team may require an office visit for EVERY opioid/controlled substance refill. If I miss appointments or don t follow instructions, my care team may stop my medicine.    I will take my medicines as prescribed. I will not change the dose or schedule unless my care team tells me to. There will be no refills if I run out early.     I may be asked to come to the clinic and complete a urine drug test or complete a pill count at any time. If I don t give a urine sample or participate in a pill count, the care team may stop my medicine.    I will only receive prescriptions from this clinic for chronic pain. If I am treated by another provider for acute pain issues, I will tell them that I am taking opioid pain medication for chronic pain and that I have a treatment agreement with this provider. I will inform my [facility] care team within one business day if I am given a prescription for any pain medication by another healthcare provider. My [facility] care team can contact other providers and pharmacists about my use of any medicines.    It is up to me to make sure that I don t run out of my medicines on weekends or holidays. If my care team is willing to refill my opioid prescription without a visit, I must request refills only during office hours. Refills may take up to 3 business days to process. I will use one pharmacy to fill all my opioid and other controlled substance prescriptions. I will notify the clinic about any changes to my insurance or medication availability.    I am responsible for my  prescriptions. If the medicine/prescription is lost, stolen or destroyed, it will not be replaced. I also agree not to share controlled substance medicines with anyone.    I am aware I should not use any illegal or recreational drugs. I agree not to drink alcohol unless my care team says I can.       If I enroll in the [place] [facility] program, I will tell my care team prior to my next refill.     I will tell my care team right away if I become pregnant, have a new medical problem treated outside of my regular clinic, or have a change in my medications.    I understand that this medicine can affect my thinking, judgment and reaction time. Alcohol and drugs affect the brain and body, which can affect the safety of my driving. Being under the influence of alcohol or drugs can affect my decision-making, behaviors, personal safety, and the safety of others. Driving while impaired (DWI) can occur if a person is driving, operating, or in physical control of a car, motorcycle, boat, snowmobile, ATV, motorbike, off-road vehicle, or any other motor vehicle ([place] Statute 169A.20). I understand the risk if I choose to drive or operate any vehicle or machinery.    I understand that if I do not follow any of the conditions above, my prescriptions or treatment may be stopped or changed.          Opioids  What You Need to Know    What are opioids?   Opioids are pain medicines that must be prescribed by a doctor. They are also known as narcotics.     Examples are:   morphine (MS Contin, Mary)  oxycodone (Oxycontin)  oxycodone and acetaminophen (Percocet)  hydrocodone and acetaminophen (Vicodin, Norco)   fentanyl patch (Duragesic)   hydromorphone (Dilaudid)   methadone  codeine (Tylenol #3)     What do opioids do well?   Opioids are best for severe short-term pain such as after a surgery or injury. They may work well for cancer pain. They may help some people with long-lasting (chronic) pain.     What do opioids NOT do  well?   Opioids never get rid of pain entirely, and they don t work well for most patients with chronic pain. Opioids don t reduce swelling, one of the causes of pain.                                    Other ways to manage chronic pain and improve function include:     Treat the health problem that may be causing pain  Anti-inflammation medicines, which reduce swelling and tenderness, such as ibuprofen (Advil, Motrin) or naproxen (Aleve)  Acetaminophen (Tylenol)  Antidepressants and anti-seizure medicines, especially for nerve pain  Topical treatments such as patches or creams  Injections or nerve blocks  Chiropractic or osteopathic treatment  Acupuncture, massage, deep breathing, meditation, visual imagery, aromatherapy  Use heat or ice at the pain site  Physical therapy   Exercise  Stop smoking  Take part in therapy       Risks and side effects     Talk to your doctor before you start or decide to keep taking opioids. Possible side effects include:    Lowering your breathing rate enough to cause death  Overdose, including death, especially if taking higher than prescribed doses  Worse depression symptoms; less pleasure in things you usually enjoy  Feeling tired or sluggish  Slower thoughts or cloudy thinking  Being more sensitive to pain over time; pain is harder to control  Trouble sleeping or restless sleep  Changes in hormone levels (for example, less testosterone)  Changes in sex drive or ability to have sex  Constipation  Unsafe driving  Itching and sweating  Dizziness  Nausea, throwing up and dry mouth    What else should I know about opioids?    Opioids may lead to dependence, tolerance, or addiction.    Dependence means that if you stop or reduce the medicine too quickly, you will have withdrawal symptoms. These include loose poop (diarrhea), jitters, flu-like symptoms, nervousness and tremors. Dependence is not the same as addiction.                     Tolerance means needing higher doses over time to  get the same effect. This may increase the chance of serious side effects.    Addiction is when people improperly use a substance that harms their body, their mind or their relations with others. Use of opiates can cause a relapse of addiction if you have a history of drug or alcohol abuse.    People who have used opioids for a long time may have a lower quality of life, worse depression, higher levels of pain and more visits to doctors.    You can overdose on opioids. Take these steps to lower your risk of overdose:    Recognize the signs:  Signs of overdose include decrease or loss of consciousness (blackout), slowed breathing, trouble waking up and blue lips. If someone is worried about overdose, they should call 911.    Talk to your doctor about Narcan (naloxone).   If you are at risk for overdose, you may be given a prescription for Narcan. This medicine very quickly reverses the effects of opioids.   If you overdose, a friend or family member can give you Narcan while waiting for the ambulance. They need to know the signs of overdose and how to give Narcan.     Don't use alcohol or street drugs.   Taking them with opioids can cause death.    Do not take any of these medicines unless your doctor says it s OK. Taking these with opioids can cause death:  Benzodiazepines, such as lorazepam (Ativan), alprazolam (Xanax) or diazepam (Valium)  Muscle relaxers, such as cyclobenzaprine (Flexeril)  Sleeping pills like zolpidem (Ambien)   Other opioids      How to keep you and other people safe while taking opioids:    Never share your opioids with others.  Opioid medicines are regulated by the Drug Enforcement Agency (DAVID). Selling or sharing medications is a criminal act.    2. Be sure to store opioids in a secure place, locked up if possible. Young children can easily swallow them and overdose.    3. When you are traveling with your medicines, keep them in the original bottles. If you use a pill box, be sure you also  carry a copy of your medicine list from your clinic or pharmacy.    4. Safe disposal of opioids    Most pharmacies have places to get rid of medicine, called disposal kiosks. Medicine disposal options are also available in every [facility]. Search your county and  medication disposal  to find more options. You can find more details at:  [email or web address]://[email or web address]-green/[email or web address]-[email or web address]-medications     I agree that my provider, clinic care team, and pharmacy may work with any city, state or federal law enforcement agency that investigates the misuse, sale, or other diversion of my controlled medicine. I will allow my provider to discuss my care with, or share a copy of, this agreement with any other treating provider, pharmacy or emergency room where I receive care.    I have read this agreement and have asked questions about anything I did not understand.    _______________________________________________________  Patient Signature - Macie Jorge _____________________                   Date     _______________________________________________________  Provider Signature - Elias Meyer MD   _____________________                   Date     _______________________________________________________  Witness Signature (required if provider not present while patient signing)   _____________________                   Date

## 2023-05-26 ENCOUNTER — ANCILLARY PROCEDURE (OUTPATIENT)
Dept: ULTRASOUND IMAGING | Facility: CLINIC | Age: 83
End: 2023-05-26
Attending: FAMILY MEDICINE
Payer: COMMERCIAL

## 2023-05-26 DIAGNOSIS — N28.9 RENAL LESION: ICD-10-CM

## 2023-05-26 PROCEDURE — 76775 US EXAM ABDO BACK WALL LIM: CPT

## 2023-05-31 ENCOUNTER — TELEPHONE (OUTPATIENT)
Dept: FAMILY MEDICINE | Facility: CLINIC | Age: 83
End: 2023-05-31

## 2023-05-31 DIAGNOSIS — I25.10 CORONARY ARTERY DISEASE INVOLVING NATIVE CORONARY ARTERY OF NATIVE HEART WITHOUT ANGINA PECTORIS: Primary | ICD-10-CM

## 2023-05-31 NOTE — CONFIDENTIAL NOTE
"Patient calls requesting referral to cardiologist to discuss coronary artery calcification noted on 5/15/23 CT Chest (noted as \"extensive\" on report) and 11/2022 CT Chest (noted as \"mild\" on report).   Dr. Meyer had discussed results with patient at 5/23/23 visit and started patient on rosuvastatin and ASA. Patient states is not symptomatic but is worried and would feel better if also discussed with a cardiologist. Would like to see Dr. Mayur Dumont at [facility] - her  sees him.     Plan: okay per Dr. Meyer for referral. Referral placed in EPIC. Patient plans to call them tomorrow to schedule.   Vickie Potts RN        "

## 2023-06-03 ENCOUNTER — APPOINTMENT (OUTPATIENT)
Dept: GENERAL RADIOLOGY | Facility: CLINIC | Age: 83
End: 2023-06-03
Attending: EMERGENCY MEDICINE
Payer: COMMERCIAL

## 2023-06-03 ENCOUNTER — HOSPITAL ENCOUNTER (OUTPATIENT)
Facility: CLINIC | Age: 83
Setting detail: OBSERVATION
Discharge: HOME | End: 2023-06-03
Attending: EMERGENCY MEDICINE | Admitting: INTERNAL MEDICINE
Payer: COMMERCIAL

## 2023-06-03 VITALS
OXYGEN SATURATION: 95 % | SYSTOLIC BLOOD PRESSURE: 122 MMHG | HEART RATE: 71 BPM | RESPIRATION RATE: 13 BRPM | WEIGHT: 185 LBS | HEIGHT: 66 IN | DIASTOLIC BLOOD PRESSURE: 60 MMHG | BODY MASS INDEX: 29.73 KG/M2 | TEMPERATURE: 98.1 F

## 2023-06-03 DIAGNOSIS — I24.9 ACS (ACUTE CORONARY SYNDROME) (H): ICD-10-CM

## 2023-06-03 DIAGNOSIS — I25.10 CORONARY ARTERY DISEASE INVOLVING NATIVE CORONARY ARTERY OF NATIVE HEART WITHOUT ANGINA PECTORIS: ICD-10-CM

## 2023-06-03 DIAGNOSIS — R07.9 CHEST PAIN, UNSPECIFIED TYPE: Primary | ICD-10-CM

## 2023-06-03 LAB
ALBUMIN SERPL BCG-MCNC: 3.8 G/DL (ref 3.5–5.2)
ALP SERPL-CCNC: 46 U/L (ref 35–104)
ALT SERPL W P-5'-P-CCNC: 13 U/L (ref 10–35)
ANION GAP SERPL CALCULATED.3IONS-SCNC: 12 MMOL/L (ref 7–15)
AST SERPL W P-5'-P-CCNC: 19 U/L (ref 10–35)
ATRIAL RATE - MUSE: 74 BPM
BASOPHILS # BLD AUTO: 0 10E3/UL (ref 0–0.2)
BASOPHILS NFR BLD AUTO: 1 %
BILIRUB SERPL-MCNC: 0.5 MG/DL
BUN SERPL-MCNC: 34.5 MG/DL (ref 8–23)
CALCIUM SERPL-MCNC: 9.1 MG/DL (ref 8.8–10.2)
CHLORIDE SERPL-SCNC: 105 MMOL/L (ref 98–107)
CHOLEST SERPL-MCNC: 106 MG/DL
CREAT SERPL-MCNC: 2.08 MG/DL (ref 0.51–0.95)
DEPRECATED HCO3 PLAS-SCNC: 20 MMOL/L (ref 22–29)
DIASTOLIC BLOOD PRESSURE - MUSE: NORMAL MMHG
EOSINOPHIL # BLD AUTO: 0.2 10E3/UL (ref 0–0.7)
EOSINOPHIL NFR BLD AUTO: 5 %
ERYTHROCYTE [DISTWIDTH] IN BLOOD BY AUTOMATED COUNT: 13.2 % (ref 10–15)
GFR SERPL CREATININE-BSD FRML MDRD: 23 ML/MIN/1.73M2
GLUCOSE SERPL-MCNC: 148 MG/DL (ref 70–99)
HCT VFR BLD AUTO: 32.3 % (ref 35–47)
HDLC SERPL-MCNC: 45 MG/DL
HGB BLD-MCNC: 10.5 G/DL (ref 11.7–15.7)
IMM GRANULOCYTES # BLD: 0 10E3/UL
IMM GRANULOCYTES NFR BLD: 1 %
INTERPRETATION ECG - MUSE: NORMAL
LDLC SERPL CALC-MCNC: 47 MG/DL
LYMPHOCYTES # BLD AUTO: 0.6 10E3/UL (ref 0.8–5.3)
LYMPHOCYTES NFR BLD AUTO: 17 %
MCH RBC QN AUTO: 31.3 PG (ref 26.5–33)
MCHC RBC AUTO-ENTMCNC: 32.5 G/DL (ref 31.5–36.5)
MCV RBC AUTO: 96 FL (ref 78–100)
MONOCYTES # BLD AUTO: 0.4 10E3/UL (ref 0–1.3)
MONOCYTES NFR BLD AUTO: 11 %
NEUTROPHILS # BLD AUTO: 2.4 10E3/UL (ref 1.6–8.3)
NEUTROPHILS NFR BLD AUTO: 65 %
NONHDLC SERPL-MCNC: 61 MG/DL
NRBC # BLD AUTO: 0 10E3/UL
NRBC BLD AUTO-RTO: 0 /100
P AXIS - MUSE: 64 DEGREES
PLATELET # BLD AUTO: 127 10E3/UL (ref 150–450)
POTASSIUM SERPL-SCNC: 4.4 MMOL/L (ref 3.4–5.3)
PR INTERVAL - MUSE: 196 MS
PROT SERPL-MCNC: 7.4 G/DL (ref 6.4–8.3)
QRS DURATION - MUSE: 70 MS
QT - MUSE: 380 MS
QTC - MUSE: 421 MS
R AXIS - MUSE: 113 DEGREES
RBC # BLD AUTO: 3.35 10E6/UL (ref 3.8–5.2)
SODIUM SERPL-SCNC: 137 MMOL/L (ref 136–145)
SYSTOLIC BLOOD PRESSURE - MUSE: NORMAL MMHG
T AXIS - MUSE: 62 DEGREES
TRIGL SERPL-MCNC: 71 MG/DL
TROPONIN T SERPL HS-MCNC: 13 NG/L
TROPONIN T SERPL HS-MCNC: 13 NG/L
TROPONIN T SERPL HS-MCNC: 14 NG/L
TROPONIN T SERPL HS-MCNC: 16 NG/L
VENTRICULAR RATE- MUSE: 74 BPM
WBC # BLD AUTO: 3.7 10E3/UL (ref 4–11)

## 2023-06-03 PROCEDURE — 250N000013 HC RX MED GY IP 250 OP 250 PS 637: Performed by: EMERGENCY MEDICINE

## 2023-06-03 PROCEDURE — 99222 1ST HOSP IP/OBS MODERATE 55: CPT | Performed by: INTERNAL MEDICINE

## 2023-06-03 PROCEDURE — G0378 HOSPITAL OBSERVATION PER HR: HCPCS

## 2023-06-03 PROCEDURE — 71046 X-RAY EXAM CHEST 2 VIEWS: CPT

## 2023-06-03 PROCEDURE — 250N000013 HC RX MED GY IP 250 OP 250 PS 637: Performed by: INTERNAL MEDICINE

## 2023-06-03 PROCEDURE — 85004 AUTOMATED DIFF WBC COUNT: CPT | Performed by: EMERGENCY MEDICINE

## 2023-06-03 PROCEDURE — 99285 EMERGENCY DEPT VISIT HI MDM: CPT | Mod: 25

## 2023-06-03 PROCEDURE — 93005 ELECTROCARDIOGRAM TRACING: CPT

## 2023-06-03 PROCEDURE — 80053 COMPREHEN METABOLIC PANEL: CPT | Performed by: EMERGENCY MEDICINE

## 2023-06-03 PROCEDURE — 84484 ASSAY OF TROPONIN QUANT: CPT | Performed by: INTERNAL MEDICINE

## 2023-06-03 PROCEDURE — 36415 COLL VENOUS BLD VENIPUNCTURE: CPT | Performed by: INTERNAL MEDICINE

## 2023-06-03 PROCEDURE — 36415 COLL VENOUS BLD VENIPUNCTURE: CPT | Performed by: EMERGENCY MEDICINE

## 2023-06-03 PROCEDURE — 84484 ASSAY OF TROPONIN QUANT: CPT | Performed by: EMERGENCY MEDICINE

## 2023-06-03 PROCEDURE — 80061 LIPID PANEL: CPT | Performed by: INTERNAL MEDICINE

## 2023-06-03 RX ORDER — NITROGLYCERIN 0.4 MG/1
0.4 TABLET SUBLINGUAL EVERY 5 MIN PRN
Status: DISCONTINUED | OUTPATIENT
Start: 2023-06-03 | End: 2023-06-03 | Stop reason: HOSPADM

## 2023-06-03 RX ORDER — MAGNESIUM HYDROXIDE/ALUMINUM HYDROXICE/SIMETHICONE 120; 1200; 1200 MG/30ML; MG/30ML; MG/30ML
30 SUSPENSION ORAL EVERY 4 HOURS PRN
Status: DISCONTINUED | OUTPATIENT
Start: 2023-06-03 | End: 2023-06-03 | Stop reason: HOSPADM

## 2023-06-03 RX ORDER — ASPIRIN 81 MG/1
162 TABLET, CHEWABLE ORAL ONCE
Status: COMPLETED | OUTPATIENT
Start: 2023-06-03 | End: 2023-06-03

## 2023-06-03 RX ORDER — ACETAMINOPHEN 325 MG/1
975 TABLET ORAL EVERY 8 HOURS PRN
Status: DISCONTINUED | OUTPATIENT
Start: 2023-06-03 | End: 2023-06-03 | Stop reason: HOSPADM

## 2023-06-03 RX ORDER — NITROGLYCERIN 0.4 MG/1
TABLET SUBLINGUAL
Qty: 3 TABLET | Refills: 1 | Status: SHIPPED | OUTPATIENT
Start: 2023-06-03 | End: 2024-02-28

## 2023-06-03 RX ORDER — GABAPENTIN 300 MG/1
300 CAPSULE ORAL 2 TIMES DAILY
COMMUNITY
End: 2024-07-19

## 2023-06-03 RX ORDER — ASPIRIN 81 MG/1
81 TABLET ORAL DAILY
Status: DISCONTINUED | OUTPATIENT
Start: 2023-06-04 | End: 2023-06-03 | Stop reason: HOSPADM

## 2023-06-03 RX ORDER — ASPIRIN 81 MG/1
324 TABLET, CHEWABLE ORAL ONCE
Status: COMPLETED | OUTPATIENT
Start: 2023-06-03 | End: 2023-06-03

## 2023-06-03 RX ADMIN — ASPIRIN 81 MG CHEWABLE TABLET 324 MG: 81 TABLET CHEWABLE at 06:23

## 2023-06-03 RX ADMIN — ASPIRIN 81 MG CHEWABLE TABLET 162 MG: 81 TABLET CHEWABLE at 13:22

## 2023-06-03 ASSESSMENT — ACTIVITIES OF DAILY LIVING (ADL)
ADLS_ACUITY_SCORE: 35

## 2023-06-03 NOTE — ED TRIAGE NOTES
Mid-sternal chest pain that woke her up from sleep (9/10) about 45 mins ago. Pain shoots to the back. (pain is 0/10 at this time).     Triage Assessment       Row Name 06/03/23 0555       Triage Assessment (Adult)    Airway WDL WDL       Respiratory WDL    Respiratory WDL WDL       Skin Circulation/Temperature WDL    Skin Circulation/Temperature WDL WDL       Cardiac WDL    Cardiac WDL X;chest pain       Chest Pain Assessment    Chest Pain Location midsternal    Chest Pain Radiation back    Character --  shooting    Chest Pain Intervention 12-lead ECG obtained       Peripheral/Neurovascular WDL    Peripheral Neurovascular WDL WDL       Cognitive/Neuro/Behavioral WDL    Cognitive/Neuro/Behavioral WDL WDL

## 2023-06-03 NOTE — PHARMACY-ADMISSION MEDICATION HISTORY
Pharmacy Intern Admission Medication History    Admission medication history is complete. The information provided in this note is only as accurate as the sources available at the time of the update.    Medication reconciliation/reorder completed by provider prior to medication history? No    Information Source(s): Patient and CareEverywhere/SureScripts via in-person    Pertinent Information: Updated med list and last doses per patient's testimony.   Patient is unsure if she takes Lisinopril. Last fill was on 3/15/23, 90 days supply.     Changes made to PTA medication list:    Added: None    Deleted: allopurinol, norco  mg, lidocaine patch    Changed: None    Allergies reviewed with patient and updates made in EHR: yes    Medication History Completed By: Robert Cuello 6/3/2023 9:57 AM    Prior to Admission medications    Medication Sig Last Dose Taking? Auth Provider Long Term End Date   albuterol (PROAIR HFA/PROVENTIL HFA/VENTOLIN HFA) 108 (90 Base) MCG/ACT inhaler INHALE 1 DOSE BY MOUTH EVERY 12 HOURS More than a month Yes Juve Bruner MD Yes    aspirin 81 MG EC tablet Take 1 tablet (81 mg) by mouth daily 6/2/2023 at am Yes Elias Meyer MD     Fluticasone-Umeclidin-Vilanterol (TRELEGY ELLIPTA) 200-62.5-25 MCG/ACT oral inhaler Inhale 1 puff into the lungs daily 6/2/2023 at am Yes Elias Meyer MD     gabapentin (NEURONTIN) 100 MG capsule Take 200 mg by mouth every evening 6/2/2023 at pm Yes Reported, Patient Yes    gabapentin (NEURONTIN) 300 MG capsule Take 300 mg by mouth every morning 6/2/2023 at am Yes Unknown, Entered By History Yes    HYDROcodone-acetaminophen (NORCO) 7.5-325 MG per tablet Take 1 tablet by mouth 2 times daily (Up to 2 times a day as needed for severe pain) 6/2/2023 at am Yes Reported, Patient     levothyroxine (SYNTHROID/LEVOTHROID) 137 MCG tablet Take 1 tablet by mouth once daily 6/2/2023 at am Yes Elias Meyer MD Yes    lisinopril (ZESTRIL) 5 MG  tablet Take 1 tablet (5 mg) by mouth daily  Yes Elias Meyer MD Yes    oxybutynin ER (DITROPAN XL) 10 MG 24 hr tablet Take 1 tablet by mouth once daily 6/2/2023 Yes Elias Meyer MD     rosuvastatin (CRESTOR) 20 MG tablet Take 1 tablet (20 mg) by mouth daily 6/2/2023 at am Yes Elias Meyer MD Yes    blood glucose (NO BRAND SPECIFIED) lancets standard Use to test blood sugar 1 time daily or as directed.   Marjan Pa MD     blood glucose (NO BRAND SPECIFIED) test strip Use to test blood sugar 1 time daily or as directed.   Marjan Pa MD     blood glucose monitoring (NO BRAND SPECIFIED) meter device kit Use to test blood sugar 1 time daily or as directed.   Marjan Pa MD

## 2023-06-03 NOTE — ED PROVIDER NOTES
"    History     Chief Complaint:  Chest Pain       The history is provided by the patient.      Macie Jorge is a 82 year old female with history of CAD who presents to the ED via car with her  for evaluation of chest pain. Patient reports she was awoken around 0500 this morning with sharp medial chest pain radiating to her back that lasted about 30 minutes. She describes the pain as similar to gallbladder attack but notes that she has received cholecystectomy. Patient denies fever, cough, chills, shortness of breath, nausea, vomiting, abdominal pain, leg pain or swelling, or no blood clots to the legs or lungs. She states she is not on blood thinners, besides recently starting baby aspirin, and does not have a history of heart attack. She has a history of smoking but does not smoke currently. No family history of heart disease or problems. The patient does have an appointment scheduled on 6/13 with cardiology. She has an allergy to morphine and tetracycline.     Independent Historian:    None.    Review of External Notes:  I reviewed the 5/15/23 CT chest that shows extensive coronary artery calcification.    Medications:    Albuterol  Zyloprim  Neurontin  Norco  Levothyroxine  Lidocaine  Zestril  Ditropan   Crestor    Past Medical History:    Panic attacks   Hyperlipidemia   CKD stage III   COPD with asthma  Spinal stenosis   Hypothyroidism   Anemia   CAD   Cataract   GERD  Kidney stones   Diabetes Mellitus type II     Past Surgical History:    Arthroplasty hip   Arthroplasty knee   Arthroplasty patello-femoral   Cholecystectomy   Cystoscopy, insert stents x2  Cystoscopy, ureteroscopy    surgery   Laminectomy/Discectomy   Lithotripsy x3  Orthopedic surgery   Recession eyelid      Physical Exam     Patient Vitals for the past 24 hrs:   BP Temp Temp src Pulse Resp SpO2 Height Weight   06/03/23 0657 -- -- -- -- -- -- 1.676 m (5' 6\") 83.9 kg (185 lb)   06/03/23 0551 (!) 140/52 98.1  F (36.7  C) Oral 75 " 18 94 % -- --        Physical Exam  Nursing note and vitals reviewed.  Constitutional:  Appears well-developed and well-nourished.   HENT:   Head:    Atraumatic.   Mouth/Throat:   Oropharynx is clear and moist. No oropharyngeal exudate.   Eyes:    Pupils are equal, round, and reactive to light.   Neck:    Normal range of motion. Neck supple.      No tracheal deviation present. No thyromegaly present.   Cardiovascular:  Normal rate, regular rhythm, no murmur   Pulmonary/Chest: Breath sounds are clear and equal without wheezes or crackles.  Abdominal:   Soft. Bowel sounds are normal. Exhibits no distension and      no mass. There is no tenderness.      There is no rebound and no guarding.   Musculoskeletal:  Exhibits no edema.   Lymphadenopathy:  No cervical adenopathy.   Neurological:   Alert and oriented to person, place, and time.   Skin:    Skin is warm and dry. No rash noted. No pallor.     Emergency Department Course   ECG  ECG taken at 0550, ECG read at 0609  Normal sinus rhythm  Right axis deviation  Low voltage QRS  Abnormal ECG   When compared with prior ECG, dated 11/05/22, No significant change  Rate 74 bpm. NY interval 196 ms. QRS duration 70 ms. QT/QTc 380/421 ms. P-R-T axes 64 113 62.     Imaging:  XR Chest 2 Views   Preliminary Result   IMPRESSION: PA and lateral views of the chest were obtained. Cardiomediastinal silhouette is within normal limits. Right basilar pulmonary opacities, could represent atelectasis versus infection. No significant pleural effusion or pneumothorax.           Report per radiology    Laboratory:  Labs Ordered and Resulted from Time of ED Arrival to Time of ED Departure   COMPREHENSIVE METABOLIC PANEL - Abnormal       Result Value    Sodium 137      Potassium 4.4      Chloride 105      Carbon Dioxide (CO2) 20 (*)     Anion Gap 12      Urea Nitrogen 34.5 (*)     Creatinine 2.08 (*)     Calcium 9.1      Glucose 148 (*)     Alkaline Phosphatase 46      AST 19      ALT 13       Protein Total 7.4      Albumin 3.8      Bilirubin Total 0.5      GFR Estimate 23 (*)    CBC WITH PLATELETS AND DIFFERENTIAL - Abnormal    WBC Count 3.7 (*)     RBC Count 3.35 (*)     Hemoglobin 10.5 (*)     Hematocrit 32.3 (*)     MCV 96      MCH 31.3      MCHC 32.5      RDW 13.2      Platelet Count 127 (*)     % Neutrophils 65      % Lymphocytes 17      % Monocytes 11      % Eosinophils 5      % Basophils 1      % Immature Granulocytes 1      NRBCs per 100 WBC 0      Absolute Neutrophils 2.4      Absolute Lymphocytes 0.6 (*)     Absolute Monocytes 0.4      Absolute Eosinophils 0.2      Absolute Basophils 0.0      Absolute Immature Granulocytes 0.0      Absolute NRBCs 0.0     TROPONIN T, HIGH SENSITIVITY - Normal    Troponin T, High Sensitivity 14     TROPONIN T, HIGH SENSITIVITY      Emergency Department Course & Assessments:       Interventions:  Medications   aspirin (ASA) chewable tablet 324 mg (324 mg Oral $Given 6/3/23 0623)      Assessments:  0611 I obtained history and examined the patient as noted above.  0809 I rechecked the patient and explained findings.  0839 I rechecked the patient and explained findings. We discussed plans for admission and the patient is comfortable with this plan.    Independent Interpretation (X-rays, CTs, rhythm strip):  I reviewed her chest x-ray images I feel the x-ray images are consistent with atelectasis rather than pneumonia.    Consultations/Discussion of Management or Tests:  0806 I spoke with Dr. Harrington of the hospitalist team regarding the patient, who accepted the patient for admission.    Social Determinants of Health affecting care:  Healthcare Access/Compliance     Disposition:  The patient was admitted to the hospital under the care of Dr. Harrington.     Impression & Plan    CMS Diagnoses: None    Medical Decision Making:  I am concerned about the possibly of acute coronary syndrome as a cause of her chest pain especially since she had a recent CT chest which showed  extensive coronary artery calcifications and a past history of smoking as well as hypertension and hyperlipidemia which combined with her age would make her at significant risk for coronary artery disease and myocardial infarction.  She was given aspirin here and was pain-free.  There was no sign of myocardial infarction based on her initial troponin and EKG.  She was admitted to the care of the hospitalist service for further cardiac work-up to rule out for acute myocardial infarction since I feel she is very high risk based on her heart score.  I did not feel there was any sign of pulmonary embolism, pneumonia, aortic dissection or pneumothorax.    Diagnosis:    ICD-10-CM    1. ACS (acute coronary syndrome) (H)  I24.9          Scribe Disclosure:  I, Sofi Olguin, am serving as a scribe at 7:04 AM on 6/3/2023 to document services personally performed by Manasa Zamora MD based on my observations and the provider's statements to me.    INena, am serving as a scribe at 7:18 AM on 6/3/2023 to document services personally performed by Manasa Zamora MD based on my observations and the provider's statements to me.      6/3/2023   Manasa Zamora MD [place], Cheri Lee, MD  06/03/23 1458       Manasa Zamora MD  06/03/23 1455

## 2023-06-03 NOTE — H&P
"[facility]    History and Physical - Hospitalist Service       Date of Admission:  6/3/2023    Assessment & Plan    Macie Jorge is a 82 year old female with hypertension, dyslipidemia, CKD stage 4, anemia of chronic disease and iron deficiency, hypothyroidism, COPD admitted on 6/3/2023 for chest pain.    Atypical chest pain  Hypertension  Dyslipidemia  Abrupt onset central chest pain at rest (while sleeping), resolved without intervention and pain free since arrival to Emergency Department. Initial troponin negative. Chest xray with R base atelectasis. Normal oxygenation. EKG with sinus rhythm, no ischemia, right axis deviation (not new). Recent outpatient chest CT revealed \"extensive\" coronary calcifications and recent initiation of statin and aspirin, as well as referral to cardiology.   -admit to observation with telemetry to rule out myocardial infarction  -serial troponins  -check lipid profile  -TTE  -resume PTA statin, aspirin, lisinopril   -cardiology consultation for next test decision. Has stage 4 CKD so need to consider risk vs benefit of angiography vs other testing modality. Lexiscan not available over the weekend so potentially could discharge and return for outpatient stress testing.    CKD stage 4  Follows with [facility]    COPD, asthma  Prior tobacco abuse (60 pack years). No home oxygen use.   -resume PTA inhaler or formulary equivalent when verified    Chronic, stable conditions:  Anemia of chronic disease and EMERY  Thrombocytopenia, mild  Hypothyroidism  -resume synthroid  Overactive bladder  Gout  Neuropathic pain       Diet:  regular  DVT Prophylaxis: Pneumatic Compression Devices and Ambulate every shift  Claudio Catheter: Not present  Lines: None     Cardiac Monitoring: None  Code Status:   full    Clinically Significant Risk Factors Present on Admission                # Drug Induced Platelet Defect: home medication list includes an antiplatelet " "medication   # Hypertension: Home medication list includes antihypertensive(s)      # Overweight: Estimated body mass index is 29.86 kg/m  as calculated from the following:    Height as of this encounter: 1.676 m (5' 6\").    Weight as of this encounter: 83.9 kg (185 lb).            Disposition Plan      Expected Discharge Date: 06/04/2023                  Minerva Harrington MD  Hospitalist Service  [facility]  Securely message with [employer] (more info)  Text page via [employer] Paging/Directory     ______________________________________________________________________    Chief Complaint   Chest pain    History is obtained from the patient, electronic health record and emergency department physician    History of Present Illness   Macie Jorge is a 82 year old female who presented with abrupt onset central chest pain at rest (while sleeping), resolved without intervention and pain free since arrival to Emergency Department. Initial troponin negative. Chest xray with R base atelectasis. Normal oxygenation. EKG with sinus rhythm, no ischemia, right axis deviation (not new). Recent outpatient chest CT revealed \"extensive\" coronary calcifications and recent initiation of statin and aspirin, as well as referral to cardiology.     Case discussed with Dr. Christianson from the Emergency Department. Labs and available imaging reviewed.     Patient seen and examined in ED 3. Lengthy discussion about her second negative/normal troponin and her ongoing lack of chest pain. She is eager to return home to resume labeling her garage sale items and promises to return if her chest pain returns. Arranged for outpatient stress testing (cardiac MRI) within 1 week.     Past Medical History    Past Medical History:   Diagnosis Date     Calculus of left ureter 2014    dr Arriaga - ureteroscopy and lithitripsy     Cataract      Chronic low back pain     Dr Villagran at [facility] in 2015- not surgical      Complication of anesthesia  "     COPD with asthma (H)      followed with pulmonary DR LENNOX 60-pack-year history of smoking     Esophagitis, reflux 9/2012    SOME EOSINOPHILIA NO BARRETS     Gastro-oesophageal reflux disease      has seen GI     Hydronephrosis, left     chronic , in 2017 Dr Arriaga plan was observaton     Hyperlipidaemia LDL goal < 100      Hypothyroid      Kidney stone 3/28/2019     Migraine headaches 9/1/2011     Moderate persistent asthma      lifelong, saw Pulm 2013     Obesity (BMI 30-39.9)      Panic attacks      PONV (postoperative nausea and vomiting)      Renal stones 2016    kidney stones, multiple small stones high risk recurrent ureteral stones. , Dr Arriaga in 2016 rec 24 hour urine.     Type 2 diabetes mellitus without complication (H)        Past Surgical History   Past Surgical History:   Procedure Laterality Date     ARTHROPLASTY HIP  3/13/2013    Procedure: ARTHROPLASTY HIP;  LEFT TOTAL HIP ARTHROPLASTY (BIOMET)^ ;  Surgeon: Anand Main MD;  Location:  OR     ARTHROPLASTY KNEE Right 8/9/2021    Procedure: RIGHT TOTAL KNEE ARTHROPLASTY;  Surgeon: Anand Main MD;  Location:  OR     ARTHROPLASTY PATELLO-FEMORAL (KNEE)  2005ish    Left     CHOLECYSTECTOMY, OPEN  25 yo     COMBINED CYSTOSCOPY, INSERT STENT URETER(S)  8/5/2014    Procedure: COMBINED CYSTOSCOPY, INSERT STENT URETER(S);  Surgeon: Sam Arriaga MD;  Location:  OR     COMBINED CYSTOSCOPY, RETROGRADES, URETEROSCOPY, LASER HOLMIUM LITHOTRIPSY URETER(S), INSERT STENT Left 6/14/2016    Procedure: COMBINED CYSTOSCOPY, RETROGRADES, URETEROSCOPY, LASER HOLMIUM LITHOTRIPSY URETER(S), INSERT STENT;  Surgeon: Thomas Edmondson MD;  Location:  OR     COMBINED CYSTOSCOPY, RETROGRADES, URETEROSCOPY, LASER HOLMIUM LITHOTRIPSY URETER(S), INSERT STENT Left 3/28/2019    Procedure: CYSTOSCOPY, LEFT RETROGRADE PYLEOGRAM, LEFT URETEROSCOPY, HOLMIUM LASER LITHOTRIPSY, LEFT URETERAL STENT EXCHANGE;  Surgeon: Derrek Rivera MD;  Location:  OR      CYSTOSCOPY, RETROGRADES, INSERT STENT URETER(S), COMBINED Left 3/8/2019    Procedure: CYSTOSCOPY,LEFT RETROGRADE, LEFT STENT PLACEMENT;  Surgeon: Derrek Rivera MD;  Location:  OR     GENITOURINARY SURGERY       LAMINECT/DISCECTOMY, LUMBAR      Laminectomy/Discectomy Lumbar     LASER HOLMIUM LITHOTRIPSY URETER(S), INSERT STENT, COMBINED Left 8/20/2014    Procedure: COMBINED CYSTOSCOPY, URETEROSCOPY, LASER HOLMIUM LITHOTRIPSY URETER(S), INSERT STENT;  Surgeon: Sam Arriaga MD;  Location:  OR     LASER HOLMIUM LITHOTRIPSY URETER(S), INSERT STENT, COMBINED Left 5/2/2019    Procedure: CYSTOSCOPY, LEFT RETROGRADE, LEFT URETEROSCOPY, HOLMIUM LASER LITHOTRIPSY, STONE REMOVAL, LEFT STENT EXCHANGE;  Surgeon: Derrek Rivera MD;  Location:  OR     LASER HOLMIUM LITHOTRIPSY URETER(S), INSERT STENT, COMBINED Right 6/13/2019    Procedure: CYSTOSCOPY; RIGHT URETEROSCOPY WITH HOLMIUM LASER LITHOTRIPSY; RIGHT STENT PLACEMENT (DIGITAL FLEXIBLE URETEROSCOPE);  Surgeon: Derrek Rivera MD;  Location:  OR     ORTHOPEDIC SURGERY      left ankle     RECESSION EYELID UPPER         Prior to Admission Medications   Prior to Admission Medications   Prescriptions Last Dose Informant Patient Reported? Taking?   Fluticasone-Umeclidin-Vilanterol (TRELEGY ELLIPTA) 200-62.5-25 MCG/ACT oral inhaler   No No   Sig: Inhale 1 puff into the lungs daily   HYDROcodone-acetaminophen (NORCO)  MG per tablet   No No   Sig: Take 1 tablet by mouth every 6 hours as needed for severe pain (7-10)   HYDROcodone-acetaminophen (NORCO) 7.5-325 MG per tablet   Yes No   Sig: TAKE 1 TABLET BY MOUTH UP TO TWICE DAILY FOR SEVERE PAIN   Patient not taking: Reported on 5/23/2023   Lidocaine (LIDOCARE) 4 % Patch   No No   Sig: Place 1 patch onto the skin every 24 hours To prevent lidocaine toxicity, patient should be patch free for 12 hrs daily.   Patient not taking: Reported on 1/23/2023   albuterol (PROAIR HFA/PROVENTIL HFA/VENTOLIN HFA) 108  (90 Base) MCG/ACT inhaler   No No   Sig: INHALE 1 DOSE BY MOUTH EVERY 12 HOURS   allopurinol (ZYLOPRIM) 100 MG tablet   No No   Sig: Take 2 tablets by mouth once daily   aspirin 81 MG EC tablet   No No   Sig: Take 1 tablet (81 mg) by mouth daily   blood glucose (NO BRAND SPECIFIED) lancets standard  Self No No   Sig: Use to test blood sugar 1 time daily or as directed.   blood glucose (NO BRAND SPECIFIED) test strip  Self No No   Sig: Use to test blood sugar 1 time daily or as directed.   blood glucose monitoring (NO BRAND SPECIFIED) meter device kit  Self No No   Sig: Use to test blood sugar 1 time daily or as directed.   gabapentin (NEURONTIN) 100 MG capsule   Yes No   Sig: TAKE 3 CAPSULES BY MOUTH IN THE MORNING AND 2 CAPSULES AT BEDTIME   levothyroxine (SYNTHROID/LEVOTHROID) 137 MCG tablet   No No   Sig: Take 1 tablet by mouth once daily   lisinopril (ZESTRIL) 5 MG tablet   No No   Sig: Take 1 tablet (5 mg) by mouth daily   oxybutynin ER (DITROPAN XL) 10 MG 24 hr tablet   No No   Sig: Take 1 tablet by mouth once daily   rosuvastatin (CRESTOR) 20 MG tablet   No No   Sig: Take 1 tablet (20 mg) by mouth daily      Facility-Administered Medications: None        Review of Systems    The 10 point Review of Systems is negative other than noted in the HPI or here.      Physical Exam   Vital Signs: Temp: 98.1  F (36.7  C) Temp src: Oral BP: (!) 140/52 Pulse: 75   Resp: 18 SpO2: 94 % O2 Device: None (Room air)    Weight: 185 lbs 0 oz    General Appearance: alert, NAD, conversant, pleasant  Eyes: sclera anicteric, normal external eye area  HEENT: supple neck, mmm  Respiratory: CTAB, no wheezing  Cardiovascular: regular, no tenderness to palpation of anterior chest wall, no LE edema  GI: protuberant, soft, non-tender  Skin: solar elastosis present, dry, well-perfused  Musculoskeletal: normal muscle tone and bulk  Neurologic: AOx3, no tremors, face symmetric, speech normal  Psychiatric: calm, cooperative, excellent fund of  knowledge    Medical Decision Making       MANAGEMENT DISCUSSED with the following over the past 24 hours: ED physician, ED nurse   NOTE(S)/MEDICAL RECORDS REVIEWED over the past 24 hours: clinic notes, prior labs, medications, prior EKG  Tests ORDERED & REVIEWED in the past 24 hours:  - See lab/imaging results included in the data section of the note  Tests personally interpreted in the past 24 hours:  - EKG tracing showing no ST changes, RAD unchanged from previous      Data     I have personally reviewed the following data over the past 24 hrs:    3.7 (L)  \   10.5 (L)   / 127 (L)     137 105 34.5 (H) /  148 (H)   4.4 20 (L) 2.08 (H) \       ALT: 13 AST: 19 AP: 46 TBILI: 0.5   ALB: 3.8 TOT PROTEIN: 7.4 LIPASE: N/A       Trop: 13 BNP: N/A       Imaging results reviewed over the past 24 hrs:   Recent Results (from the past 24 hour(s))   XR Chest 2 Views    Narrative    EXAM: XR CHEST 2 VIEWS  LOCATION: [facility]  DATE/TIME: 06/03/2023, 7:02 AM CDT    INDICATION: Chest pain.  COMPARISON: Chest CT on 05/15/2023.      Impression    IMPRESSION: PA and lateral views of the chest were obtained. Cardiomediastinal silhouette is within normal limits. Right basilar pulmonary opacities, could represent atelectasis versus infection. No significant pleural effusion or pneumothorax.

## 2023-06-03 NOTE — ED NOTES
"[facility]  ED Nurse Handoff Report    ED Chief complaint: Chest Pain      ED Diagnosis:   Final diagnoses:   None       Code Status: TBD    Allergies:   Allergies   Allergen Reactions     Morphine Nausea     Severe vomiting     Prednisone      Elevated blood glucose  weakness     Baclofen GI Disturbance     Imitrex [Sumatriptan] Nausea     Tetracycline      Sores in mouth       Patient Story: woke up with chest pain this AM. Pain radiates to the back.   Focused Assessment:  troponins are in normal range. By the time patient got to the hospital her pain was gone. X-ray and ekg show nothing concerning and are within normal limits.     Treatments and/or interventions provided: aspirin was given.   Patient's response to treatments and/or interventions: tolerated    To be done/followed up on inpatient unit:  observation.    Does this patient have any cognitive concerns?: none    Activity level - Baseline/Home:  Independent  Activity Level - Current:   Independent    Patient's Preferred language: English   Needed?: No    Isolation: None  Infection: Not Applicable  Patient tested for COVID 19 prior to admission: NO  Bariatric?: No    Vital Signs:   Vitals:    06/03/23 0551 06/03/23 0657   BP: (!) 140/52    BP Location: Right arm    Pulse: 75    Resp: 18    Temp: 98.1  F (36.7  C)    TempSrc: Oral    SpO2: 94%    Weight:  83.9 kg (185 lb)   Height:  1.676 m (5' 6\")       Cardiac Rhythm:Cardiac Rhythm: Normal sinus rhythm    Was the PSS-3 completed:   Yes       Family Comments:  at bedside    For the majority of the shift this patient's behavior was Green.   Behavioral interventions performed were none.    ED NURSE PHONE NUMBER: *[phone]         "

## 2023-06-05 ENCOUNTER — PATIENT OUTREACH (OUTPATIENT)
Dept: FAMILY MEDICINE | Facility: CLINIC | Age: 83
End: 2023-06-05

## 2023-06-05 NOTE — TELEPHONE ENCOUNTER
Social Work Care Coordination initial contact:      Reason for referral:  Hospital follow up    Medical concerns:  Chest pain with past dx of :  Panic attacks   Hyperlipidemia   CKD stage III   COPD with asthma  Spinal stenosis   Hypothyroidism   Anemia   CAD   Cataract   GERD  Kidney stones   Diabetes Mellitus type II         Behavioral concerns: hx of panic attacks      Psychosocial Concerns:  PC to pt post hospital discharge for chest pain. Pt has a follow up with cardiology on 6/13 but would like to see a different provider, Dr. Mayur Dumont, as her  sees him. Pt has follow up with [facility] on 6/13. [facility] called clinic but unable to switch appointment to anything. [facility] offered to scheduled follow up with pcp however pt was not interested.     She reports she lives with her  in their own home. They manage everything on their own. Pt reports she has They have children in the area who are supportive. Pt denies any immediate needs      Immediate needs: follow up with cardiology      Questions pertaining to care plan:      Resources shared: NA      Plan:  Pt to follow up with cardiology on 6/13    SIVAKUMAR Mcmillan  , Care Coordination  [facility]  [phone]  [email or web address]

## 2023-06-13 ENCOUNTER — OFFICE VISIT (OUTPATIENT)
Dept: CARDIOLOGY | Facility: CLINIC | Age: 83
End: 2023-06-13
Payer: COMMERCIAL

## 2023-06-13 VITALS
SYSTOLIC BLOOD PRESSURE: 125 MMHG | BODY MASS INDEX: 30.25 KG/M2 | HEART RATE: 73 BPM | HEIGHT: 66 IN | OXYGEN SATURATION: 95 % | DIASTOLIC BLOOD PRESSURE: 80 MMHG | WEIGHT: 188.2 LBS

## 2023-06-13 DIAGNOSIS — I25.84 CORONARY ARTERY DISEASE DUE TO CALCIFIED CORONARY LESION: Primary | ICD-10-CM

## 2023-06-13 DIAGNOSIS — R01.1 HEART MURMUR: ICD-10-CM

## 2023-06-13 DIAGNOSIS — E78.00 HYPERCHOLESTEROLEMIA: ICD-10-CM

## 2023-06-13 DIAGNOSIS — E11.22 TYPE 2 DIABETES MELLITUS WITH STAGE 3B CHRONIC KIDNEY DISEASE, WITHOUT LONG-TERM CURRENT USE OF INSULIN (H): ICD-10-CM

## 2023-06-13 DIAGNOSIS — N18.4 CHRONIC RENAL DISEASE, STAGE IV (H): ICD-10-CM

## 2023-06-13 DIAGNOSIS — I25.10 CORONARY ARTERY DISEASE DUE TO CALCIFIED CORONARY LESION: Primary | ICD-10-CM

## 2023-06-13 DIAGNOSIS — N18.32 TYPE 2 DIABETES MELLITUS WITH STAGE 3B CHRONIC KIDNEY DISEASE, WITHOUT LONG-TERM CURRENT USE OF INSULIN (H): ICD-10-CM

## 2023-06-13 PROCEDURE — 99204 OFFICE O/P NEW MOD 45 MIN: CPT | Performed by: INTERNAL MEDICINE

## 2023-06-13 NOTE — LETTER
2023    Elias Meyer MD  [address]  [place] [address]    RE: Macie Mercedes Ania       Dear Colleague,     I had the pleasure of seeing Macie Jorge in the [facility].  HPI and Plan:   Macie is a very nice 82-year-old woman referred because of heavily calcified coronary arteries noted on her recent CT scan.    Macie has no family history of coronary disease.  Both parents  of cancer at about age 67.  Macie has diabetes mellitus for the last 6 to 7 years, she is a former smoker having quit 28 years ago prior to that 1-1/2 packs/day.  She has hypercholesterolemia although the highest LDL I find in the chart is 128.  She does not have hypertension.  She occasionally uses alcohol  .  Noncardiac issues include chronic renal insufficiency usually stage IIIb but most recent creatinine is stage IV.  She has chronic pancytopenia.  She has obesity, asthma and COPD.    She has multiple orthopedic issues including bilateral knee surgeries and chronic back problems that limit her ability to exercise. Patient has no chest, arm, neck, jaw or shoulder discomfort.  No dyspnea on exertion, orthopnea, or PND.  No palpitations, lightheadedness, dizziness, syncope, or near syncope.  No peripheral edema.    She states she has been started on rosuvastatin and aspirin because of her CT scan.  She had a Lexiscan ordered which has not been scheduled yet.    Review of the records show a normal echocardiogram in  and a normal stress nuclear scan in 2014.  EKG demonstrates normal sinus rhythm, low voltage right axis deviation.    Assessment and plan.  Renu and her  and daughter and I had a nice discussion about coronary artery disease.  At this time she is high risk for coronary disease and we have to figure out the details.  I agree with starting with a Lexiscan to see if there is any ischemia and if so how much.  She clearly has an aortic flow murmur and I will set up an  echocardiogram as well.    Depending on the above results we may be talking about a coronary angiogram and possible intervention/bypass or a calcium score.  Further evaluation treatment will depend upon the above results.    As stated most recent creatinine is over 2.  We will need to recheck this prior to proceeding to coronary angiography if that is the direction we head.    I defer her pancytopenia to her primary care doctor.    We discussed all these issues in detail and I think I adequately answered their questions.    Thank you for allow me to participate in this patient's care.  Sincerely,                               Honorio Andino MD [facility]      Today's clinic visit entailed:  Review of the result(s) of each unique test - CT scan, lab work  Ordering of each unique test  Prescription drug management  47 minutes spent by me on the date of the encounter doing chart review, history and exam, documentation and further activities per the note  Provider  Link to [facility] Help Grid     The level of medical decision making during this visit was of moderate complexity.      Orders Placed This Encounter   Procedures    Follow-Up with Cardiology    Echocardiogram Complete       No orders of the defined types were placed in this encounter.      There are no discontinued medications.      Encounter Diagnoses   Name Primary?    Coronary artery disease due to calcified coronary lesion Yes    Hypercholesterolemia     Type 2 diabetes mellitus with stage 3b chronic kidney disease, without long-term current use of insulin (H)     Chronic renal disease, stage IV (H)        CURRENT MEDICATIONS:  Current Outpatient Medications   Medication Sig Dispense Refill    albuterol (PROAIR HFA/PROVENTIL HFA/VENTOLIN HFA) 108 (90 Base) MCG/ACT inhaler INHALE 1 DOSE BY MOUTH EVERY 12 HOURS 180 g 0    aspirin 81 MG EC tablet Take 1 tablet (81 mg) by mouth daily 90 tablet 3    blood glucose (NO BRAND SPECIFIED) lancets standard Use to test blood  sugar 1 time daily or as directed. 100 each 3    blood glucose (NO BRAND SPECIFIED) test strip Use to test blood sugar 1 time daily or as directed. 100 strip 3    blood glucose monitoring (NO BRAND SPECIFIED) meter device kit Use to test blood sugar 1 time daily or as directed. 1 kit 0    Fluticasone-Umeclidin-Vilanterol (TRELEGY ELLIPTA) 200-62.5-25 MCG/ACT oral inhaler Inhale 1 puff into the lungs daily 28 each 1    gabapentin (NEURONTIN) 300 MG capsule Take 300 mg by mouth 2 times daily      HYDROcodone-acetaminophen (NORCO) 7.5-325 MG per tablet Take 1 tablet by mouth 2 times daily (Up to 2 times a day as needed for severe pain)      levothyroxine (SYNTHROID/LEVOTHROID) 137 MCG tablet Take 1 tablet by mouth once daily 90 tablet 0    lisinopril (ZESTRIL) 5 MG tablet Take 1 tablet (5 mg) by mouth daily 90 tablet 1    nitroGLYcerin (NITROSTAT) 0.4 MG sublingual tablet For chest pain place 1 tablet under the tongue every 5 minutes for 3 doses. If symptoms persist 5 minutes after 1st dose call 911. 3 tablet 1    oxybutynin ER (DITROPAN XL) 10 MG 24 hr tablet Take 1 tablet by mouth once daily 90 tablet 0    rosuvastatin (CRESTOR) 20 MG tablet Take 1 tablet (20 mg) by mouth daily 90 tablet 3    gabapentin (NEURONTIN) 100 MG capsule Take 200 mg by mouth every evening (Patient not taking: Reported on 6/13/2023)         ALLERGIES     Allergies   Allergen Reactions    Morphine Nausea     Severe vomiting    Prednisone      Elevated blood glucose  weakness    Baclofen GI Disturbance    Imitrex [Sumatriptan] Nausea    Tetracycline      Sores in mouth       PAST MEDICAL HISTORY:  Past Medical History:   Diagnosis Date    Calculus of left ureter 2014    dr Arriaga - ureteroscopy and lithitripsy    Cataract     Chronic low back pain     Dr Villagran at [facility] in 2015- not surgical     Complication of anesthesia     COPD with asthma (H)      followed with pulmonary DR LENNOX 60-pack-year history of smoking    Esophagitis, reflux 9/2012     SOME EOSINOPHILIA NO BARRETS    Gastro-oesophageal reflux disease      has seen GI    Hydronephrosis, left     chronic , in 2017 Dr Arriaga plan was observaton    Hyperlipidaemia LDL goal < 100     Hypothyroid     Kidney stone 3/28/2019    Migraine headaches 9/1/2011    Moderate persistent asthma      lifelong, saw Pulm 2013    Obesity (BMI 30-39.9)     Panic attacks     PONV (postoperative nausea and vomiting)     Renal stones 2016    kidney stones, multiple small stones high risk recurrent ureteral stones. , Dr Arriaga in 2016 rec 24 hour urine.    Type 2 diabetes mellitus without complication (H)        PAST SURGICAL HISTORY:  Past Surgical History:   Procedure Laterality Date    ARTHROPLASTY HIP  3/13/2013    Procedure: ARTHROPLASTY HIP;  LEFT TOTAL HIP ARTHROPLASTY (BIOMET)^ ;  Surgeon: Anand Main MD;  Location:  OR    ARTHROPLASTY KNEE Right 8/9/2021    Procedure: RIGHT TOTAL KNEE ARTHROPLASTY;  Surgeon: Anand Main MD;  Location:  OR    ARTHROPLASTY PATELLO-FEMORAL (KNEE)  2005ish    Left    CHOLECYSTECTOMY, OPEN  25 yo    COMBINED CYSTOSCOPY, INSERT STENT URETER(S)  8/5/2014    Procedure: COMBINED CYSTOSCOPY, INSERT STENT URETER(S);  Surgeon: Sam Arriaga MD;  Location:  OR    COMBINED CYSTOSCOPY, RETROGRADES, URETEROSCOPY, LASER HOLMIUM LITHOTRIPSY URETER(S), INSERT STENT Left 6/14/2016    Procedure: COMBINED CYSTOSCOPY, RETROGRADES, URETEROSCOPY, LASER HOLMIUM LITHOTRIPSY URETER(S), INSERT STENT;  Surgeon: Thomas Edmondson MD;  Location:  OR    COMBINED CYSTOSCOPY, RETROGRADES, URETEROSCOPY, LASER HOLMIUM LITHOTRIPSY URETER(S), INSERT STENT Left 3/28/2019    Procedure: CYSTOSCOPY, LEFT RETROGRADE PYLEOGRAM, LEFT URETEROSCOPY, HOLMIUM LASER LITHOTRIPSY, LEFT URETERAL STENT EXCHANGE;  Surgeon: Derrek Rivera MD;  Location:  OR    CYSTOSCOPY, RETROGRADES, INSERT STENT URETER(S), COMBINED Left 3/8/2019    Procedure: CYSTOSCOPY,LEFT RETROGRADE, LEFT STENT PLACEMENT;  Surgeon:  Derrek Rivera MD;  Location:  OR    GENITOURINARY SURGERY      LAMINECT/DISCECTOMY, LUMBAR      Laminectomy/Discectomy Lumbar    LASER HOLMIUM LITHOTRIPSY URETER(S), INSERT STENT, COMBINED Left 2014    Procedure: COMBINED CYSTOSCOPY, URETEROSCOPY, LASER HOLMIUM LITHOTRIPSY URETER(S), INSERT STENT;  Surgeon: Sam Arriaga MD;  Location:  OR    LASER HOLMIUM LITHOTRIPSY URETER(S), INSERT STENT, COMBINED Left 2019    Procedure: CYSTOSCOPY, LEFT RETROGRADE, LEFT URETEROSCOPY, HOLMIUM LASER LITHOTRIPSY, STONE REMOVAL, LEFT STENT EXCHANGE;  Surgeon: Derrek Rivera MD;  Location:  OR    LASER HOLMIUM LITHOTRIPSY URETER(S), INSERT STENT, COMBINED Right 2019    Procedure: CYSTOSCOPY; RIGHT URETEROSCOPY WITH HOLMIUM LASER LITHOTRIPSY; RIGHT STENT PLACEMENT (DIGITAL FLEXIBLE URETEROSCOPE);  Surgeon: Derrek Rivera MD;  Location:  OR    ORTHOPEDIC SURGERY      left ankle    RECESSION EYELID UPPER         FAMILY HISTORY:  No family history on file.    SOCIAL HISTORY:  Social History     Socioeconomic History    Marital status:      Spouse name: None    Number of children: None    Years of education: None    Highest education level: None   Tobacco Use    Smoking status: Former     Packs/day: 1.50     Years: 40.00     Pack years: 60.00     Types: Cigarettes     Quit date: 1995     Years since quittin.4    Smokeless tobacco: Never   Substance and Sexual Activity    Alcohol use: Yes     Comment: occ. very rare once a year    Drug use: No       Review of Systems:  Skin:  Negative       Eyes:  Positive for cataracts    ENT:  Negative      Respiratory:    shortness of breath;dyspnea on exertion;cough has asthma   Cardiovascular:  edema;dizziness;lightheadedness;Negative chest pain;fatigue;Positive for    Gastroenterology: Positive for heartburn;reflux    Genitourinary:  Positive for kidney stone;incontinence chronic kidney disease  Musculoskeletal:  Positive for back pain;joint  "swelling chronic back  pain, left chronic ankle swelling  Neurologic:  Positive for migraine headaches    Psychiatric:  Negative      Heme/Lymph/Imm:  Positive for allergies    Endocrine:  Positive for thyroid disorder;diabetes      Physical Exam:  Vitals: /80   Pulse 73   Ht 1.676 m (5' 6\")   Wt 85.4 kg (188 lb 3.2 oz)   SpO2 95%   BMI 30.38 kg/m      Constitutional:  cooperative, alert and oriented, well developed, well nourished, in no acute distress obese      Skin:  warm and dry to the touch, no apparent skin lesions or masses noted          Head:  normocephalic, no masses or lesions        Eyes:  pupils equal and round, conjunctivae and lids unremarkable, sclera white, no xanthalasma, EOMS intact, no nystagmus        Lymph:      ENT:  no pallor or cyanosis, dentition good        Neck:  no carotid bruit        Respiratory:  normal breath sounds, clear to auscultation, normal A-P diameter, normal symmetry, normal respiratory excursion, no use of accessory muscles         Cardiac: regular rhythm;no murmurs, gallops or rubs detected                pulses full and equal                                        GI:           Extremities and Muscular Skeletal:  no edema;no spinal abnormalities noted;normal muscle strength and tone              Neurological:  no gross motor deficits        Psych:  affect appropriate, oriented to time, person and place          Elias Meyer MD  [address] [place] [address]      Thank you for allowing me to participate in the care of your patient.      Sincerely,     Honorio Andino MD     [facility]  "

## 2023-06-13 NOTE — PROGRESS NOTES
HPI and Plan:   Macie is a very nice 82-year-old woman referred because of heavily calcified coronary arteries noted on her recent CT scan.    Macie has no family history of coronary disease.  Both parents  of cancer at about age 67.  Macie has diabetes mellitus for the last 6 to 7 years, she is a former smoker having quit 28 years ago prior to that 1-1/2 packs/day.  She has hypercholesterolemia although the highest LDL I find in the chart is 128.  She does not have hypertension.  She occasionally uses alcohol  .  Noncardiac issues include chronic renal insufficiency usually stage IIIb but most recent creatinine is stage IV.  She has chronic pancytopenia.  She has obesity, asthma and COPD.    She has multiple orthopedic issues including bilateral knee surgeries and chronic back problems that limit her ability to exercise. Patient has no chest, arm, neck, jaw or shoulder discomfort.  No dyspnea on exertion, orthopnea, or PND.  No palpitations, lightheadedness, dizziness, syncope, or near syncope.  No peripheral edema.    She states she has been started on rosuvastatin and aspirin because of her CT scan.  She had a Lexiscan ordered which has not been scheduled yet.    Review of the records show a normal echocardiogram in 2013 and a normal stress nuclear scan in 2014.  EKG demonstrates normal sinus rhythm, low voltage right axis deviation.    Assessment and plan.  Renu and her  and daughter and I had a nice discussion about coronary artery disease.  At this time she is high risk for coronary disease and we have to figure out the details.  I agree with starting with a Lexiscan to see if there is any ischemia and if so how much.  She clearly has an aortic flow murmur and I will set up an echocardiogram as well.    Depending on the above results we may be talking about a coronary angiogram and possible intervention/bypass or a calcium score.  Further evaluation treatment will depend upon the above  results.    As stated most recent creatinine is over 2.  We will need to recheck this prior to proceeding to coronary angiography if that is the direction we head.    I defer her pancytopenia to her primary care doctor.    We discussed all these issues in detail and I think I adequately answered their questions.    Thank you for allow me to participate in this patient's care.  Sincerely,                               Honorio Andino MD [facility]      Today's clinic visit entailed:  Review of the result(s) of each unique test - CT scan, lab work  Ordering of each unique test  Prescription drug management  47 minutes spent by me on the date of the encounter doing chart review, history and exam, documentation and further activities per the note  Provider  Link to MDM Help Grid     The level of medical decision making during this visit was of moderate complexity.      Orders Placed This Encounter   Procedures     Follow-Up with Cardiology     Echocardiogram Complete       No orders of the defined types were placed in this encounter.      There are no discontinued medications.      Encounter Diagnoses   Name Primary?     Coronary artery disease due to calcified coronary lesion Yes     Hypercholesterolemia      Type 2 diabetes mellitus with stage 3b chronic kidney disease, without long-term current use of insulin (H)      Chronic renal disease, stage IV (H)        CURRENT MEDICATIONS:  Current Outpatient Medications   Medication Sig Dispense Refill     albuterol (PROAIR HFA/PROVENTIL HFA/VENTOLIN HFA) 108 (90 Base) MCG/ACT inhaler INHALE 1 DOSE BY MOUTH EVERY 12 HOURS 180 g 0     aspirin 81 MG EC tablet Take 1 tablet (81 mg) by mouth daily 90 tablet 3     blood glucose (NO BRAND SPECIFIED) lancets standard Use to test blood sugar 1 time daily or as directed. 100 each 3     blood glucose (NO BRAND SPECIFIED) test strip Use to test blood sugar 1 time daily or as directed. 100 strip 3     blood glucose monitoring (NO BRAND  SPECIFIED) meter device kit Use to test blood sugar 1 time daily or as directed. 1 kit 0     Fluticasone-Umeclidin-Vilanterol (TRELEGY ELLIPTA) 200-62.5-25 MCG/ACT oral inhaler Inhale 1 puff into the lungs daily 28 each 1     gabapentin (NEURONTIN) 300 MG capsule Take 300 mg by mouth 2 times daily       HYDROcodone-acetaminophen (NORCO) 7.5-325 MG per tablet Take 1 tablet by mouth 2 times daily (Up to 2 times a day as needed for severe pain)       levothyroxine (SYNTHROID/LEVOTHROID) 137 MCG tablet Take 1 tablet by mouth once daily 90 tablet 0     lisinopril (ZESTRIL) 5 MG tablet Take 1 tablet (5 mg) by mouth daily 90 tablet 1     nitroGLYcerin (NITROSTAT) 0.4 MG sublingual tablet For chest pain place 1 tablet under the tongue every 5 minutes for 3 doses. If symptoms persist 5 minutes after 1st dose call 911. 3 tablet 1     oxybutynin ER (DITROPAN XL) 10 MG 24 hr tablet Take 1 tablet by mouth once daily 90 tablet 0     rosuvastatin (CRESTOR) 20 MG tablet Take 1 tablet (20 mg) by mouth daily 90 tablet 3     gabapentin (NEURONTIN) 100 MG capsule Take 200 mg by mouth every evening (Patient not taking: Reported on 6/13/2023)         ALLERGIES     Allergies   Allergen Reactions     Morphine Nausea     Severe vomiting     Prednisone      Elevated blood glucose  weakness     Baclofen GI Disturbance     Imitrex [Sumatriptan] Nausea     Tetracycline      Sores in mouth       PAST MEDICAL HISTORY:  Past Medical History:   Diagnosis Date     Calculus of left ureter 2014    dr Arriaga - ureteroscopy and lithitripsy     Cataract      Chronic low back pain     Dr Villagran at [facility] in 2015- not surgical      Complication of anesthesia      COPD with asthma (H)      followed with pulmonary DR LENNOX 60-pack-year history of smoking     Esophagitis, reflux 9/2012    SOME EOSINOPHILIA NO BARRETS     Gastro-oesophageal reflux disease      has seen GI     Hydronephrosis, left     chronic , in 2017 Dr Arriaga plan was observaton     Hyperlipidaemia  LDL goal < 100      Hypothyroid      Kidney stone 3/28/2019     Migraine headaches 9/1/2011     Moderate persistent asthma      lifelong, saw Pulm 2013     Obesity (BMI 30-39.9)      Panic attacks      PONV (postoperative nausea and vomiting)      Renal stones 2016    kidney stones, multiple small stones high risk recurrent ureteral stones. , Dr Arriaga in 2016 rec 24 hour urine.     Type 2 diabetes mellitus without complication (H)        PAST SURGICAL HISTORY:  Past Surgical History:   Procedure Laterality Date     ARTHROPLASTY HIP  3/13/2013    Procedure: ARTHROPLASTY HIP;  LEFT TOTAL HIP ARTHROPLASTY (BIOMET)^ ;  Surgeon: Anand Main MD;  Location:  OR     ARTHROPLASTY KNEE Right 8/9/2021    Procedure: RIGHT TOTAL KNEE ARTHROPLASTY;  Surgeon: Anand Main MD;  Location:  OR     ARTHROPLASTY PATELLO-FEMORAL (KNEE)  2005ish    Left     CHOLECYSTECTOMY, OPEN  27 yo     COMBINED CYSTOSCOPY, INSERT STENT URETER(S)  8/5/2014    Procedure: COMBINED CYSTOSCOPY, INSERT STENT URETER(S);  Surgeon: Sam Arriaga MD;  Location:  OR     COMBINED CYSTOSCOPY, RETROGRADES, URETEROSCOPY, LASER HOLMIUM LITHOTRIPSY URETER(S), INSERT STENT Left 6/14/2016    Procedure: COMBINED CYSTOSCOPY, RETROGRADES, URETEROSCOPY, LASER HOLMIUM LITHOTRIPSY URETER(S), INSERT STENT;  Surgeon: Thomas Edmondson MD;  Location:  OR     COMBINED CYSTOSCOPY, RETROGRADES, URETEROSCOPY, LASER HOLMIUM LITHOTRIPSY URETER(S), INSERT STENT Left 3/28/2019    Procedure: CYSTOSCOPY, LEFT RETROGRADE PYLEOGRAM, LEFT URETEROSCOPY, HOLMIUM LASER LITHOTRIPSY, LEFT URETERAL STENT EXCHANGE;  Surgeon: Derrek Rivera MD;  Location:  OR     CYSTOSCOPY, RETROGRADES, INSERT STENT URETER(S), COMBINED Left 3/8/2019    Procedure: CYSTOSCOPY,LEFT RETROGRADE, LEFT STENT PLACEMENT;  Surgeon: Derrek Rivera MD;  Location:  OR     GENITOURINARY SURGERY       LAMINECT/DISCECTOMY, LUMBAR      Laminectomy/Discectomy Lumbar     LASER HOLMIUM  LITHOTRIPSY URETER(S), INSERT STENT, COMBINED Left 2014    Procedure: COMBINED CYSTOSCOPY, URETEROSCOPY, LASER HOLMIUM LITHOTRIPSY URETER(S), INSERT STENT;  Surgeon: Sam Arriaga MD;  Location:  OR     LASER HOLMIUM LITHOTRIPSY URETER(S), INSERT STENT, COMBINED Left 2019    Procedure: CYSTOSCOPY, LEFT RETROGRADE, LEFT URETEROSCOPY, HOLMIUM LASER LITHOTRIPSY, STONE REMOVAL, LEFT STENT EXCHANGE;  Surgeon: Derrek Rivera MD;  Location:  OR     LASER HOLMIUM LITHOTRIPSY URETER(S), INSERT STENT, COMBINED Right 2019    Procedure: CYSTOSCOPY; RIGHT URETEROSCOPY WITH HOLMIUM LASER LITHOTRIPSY; RIGHT STENT PLACEMENT (DIGITAL FLEXIBLE URETEROSCOPE);  Surgeon: Derrek Rivera MD;  Location:  OR     ORTHOPEDIC SURGERY      left ankle     RECESSION EYELID UPPER         FAMILY HISTORY:  No family history on file.    SOCIAL HISTORY:  Social History     Socioeconomic History     Marital status:      Spouse name: None     Number of children: None     Years of education: None     Highest education level: None   Tobacco Use     Smoking status: Former     Packs/day: 1.50     Years: 40.00     Pack years: 60.00     Types: Cigarettes     Quit date: 1995     Years since quittin.4     Smokeless tobacco: Never   Substance and Sexual Activity     Alcohol use: Yes     Comment: occ. very rare once a year     Drug use: No       Review of Systems:  Skin:  Negative       Eyes:  Positive for cataracts    ENT:  Negative      Respiratory:    shortness of breath;dyspnea on exertion;cough has asthma   Cardiovascular:  edema;dizziness;lightheadedness;Negative chest pain;fatigue;Positive for    Gastroenterology: Positive for heartburn;reflux    Genitourinary:  Positive for kidney stone;incontinence chronic kidney disease  Musculoskeletal:  Positive for back pain;joint swelling chronic back  pain, left chronic ankle swelling  Neurologic:  Positive for migraine headaches    Psychiatric:  Negative     "  Heme/Lymph/Imm:  Positive for allergies    Endocrine:  Positive for thyroid disorder;diabetes      Physical Exam:  Vitals: /80   Pulse 73   Ht 1.676 m (5' 6\")   Wt 85.4 kg (188 lb 3.2 oz)   SpO2 95%   BMI 30.38 kg/m      Constitutional:  cooperative, alert and oriented, well developed, well nourished, in no acute distress obese      Skin:  warm and dry to the touch, no apparent skin lesions or masses noted          Head:  normocephalic, no masses or lesions        Eyes:  pupils equal and round, conjunctivae and lids unremarkable, sclera white, no xanthalasma, EOMS intact, no nystagmus        Lymph:      ENT:  no pallor or cyanosis, dentition good        Neck:  no carotid bruit        Respiratory:  normal breath sounds, clear to auscultation, normal A-P diameter, normal symmetry, normal respiratory excursion, no use of accessory muscles         Cardiac: regular rhythm;no murmurs, gallops or rubs detected                pulses full and equal                                        GI:           Extremities and Muscular Skeletal:  no edema;no spinal abnormalities noted;normal muscle strength and tone              Neurological:  no gross motor deficits        Psych:  affect appropriate, oriented to time, person and place        CC  Elias Meeyr MD  [address] [place] [address]              "

## 2023-06-14 ENCOUNTER — HOSPITAL ENCOUNTER (OUTPATIENT)
Dept: CARDIOLOGY | Facility: CLINIC | Age: 83
Discharge: HOME | End: 2023-06-14
Attending: INTERNAL MEDICINE | Admitting: INTERNAL MEDICINE
Payer: COMMERCIAL

## 2023-06-14 ENCOUNTER — TELEPHONE (OUTPATIENT)
Dept: CARDIOLOGY | Facility: CLINIC | Age: 83
End: 2023-06-14

## 2023-06-14 LAB — LVEF ECHO: NORMAL

## 2023-06-14 PROCEDURE — 93306 TTE W/DOPPLER COMPLETE: CPT

## 2023-06-14 PROCEDURE — 93306 TTE W/DOPPLER COMPLETE: CPT | Mod: 26 | Performed by: INTERNAL MEDICINE

## 2023-06-14 NOTE — TELEPHONE ENCOUNTER
Echo 6-14-23  Severe Aortic Stenosis. Mean 31mmHg, Max 59mmHg, KAREN index 0.46cm2/m2, Stroke  Volume Index borderline low at 38ml/m2  Left ventricular systolic function is normal.  The visual ejection fraction is 60-65%.  The right ventricle is normal in structure, function and size.  Compared to prior study, changes are noted.    Nuclear scheduled 6-15-23     Next Dr. Andino OV 7-18-23.     Last Dr. Andino OV 6-13-23 -

## 2023-06-15 ENCOUNTER — HOSPITAL ENCOUNTER (OUTPATIENT)
Dept: CARDIOLOGY | Facility: CLINIC | Age: 83
Discharge: HOME | End: 2023-06-15
Attending: PHYSICIAN ASSISTANT
Payer: COMMERCIAL

## 2023-06-15 VITALS
OXYGEN SATURATION: 98 % | SYSTOLIC BLOOD PRESSURE: 138 MMHG | DIASTOLIC BLOOD PRESSURE: 68 MMHG | WEIGHT: 186.6 LBS | HEART RATE: 70 BPM | HEIGHT: 66 IN | BODY MASS INDEX: 29.99 KG/M2

## 2023-06-15 DIAGNOSIS — I25.10 CORONARY ARTERY DISEASE INVOLVING NATIVE CORONARY ARTERY OF NATIVE HEART WITHOUT ANGINA PECTORIS: ICD-10-CM

## 2023-06-15 DIAGNOSIS — R07.9 CHEST PAIN, UNSPECIFIED TYPE: ICD-10-CM

## 2023-06-15 LAB
CV STRESS MAX HR HE: 73
NUC STRESS EJECTION FRACTION: 82 %
RATE PRESSURE PRODUCT: NORMAL
STRESS ECHO BASELINE DIASTOLIC HE: 68
STRESS ECHO BASELINE HR: 70 BPM
STRESS ECHO BASELINE SYSTOLIC BP: 138
STRESS ECHO CALCULATED PERCENT HR: 53 %
STRESS ECHO LAST STRESS DIASTOLIC BP: 68
STRESS ECHO LAST STRESS SYSTOLIC BP: 142
STRESS ECHO TARGET HR: 138

## 2023-06-15 PROCEDURE — A9502 TC99M TETROFOSMIN: HCPCS | Performed by: PHYSICIAN ASSISTANT

## 2023-06-15 PROCEDURE — 343N000001 HC RX 343: Performed by: PHYSICIAN ASSISTANT

## 2023-06-15 PROCEDURE — 250N000011 HC RX IP 250 OP 636: Performed by: PHYSICIAN ASSISTANT

## 2023-06-15 PROCEDURE — 2894A VOIDCORRECT: CPT | Performed by: INTERNAL MEDICINE

## 2023-06-15 PROCEDURE — 93018 CV STRESS TEST I&R ONLY: CPT | Performed by: INTERNAL MEDICINE

## 2023-06-15 PROCEDURE — 78452 HT MUSCLE IMAGE SPECT MULT: CPT | Mod: 26 | Performed by: INTERNAL MEDICINE

## 2023-06-15 PROCEDURE — 93017 CV STRESS TEST TRACING ONLY: CPT

## 2023-06-15 RX ORDER — ACYCLOVIR 200 MG/1
0-1 CAPSULE ORAL
Status: DISCONTINUED | OUTPATIENT
Start: 2023-06-15 | End: 2023-06-16 | Stop reason: HOSPADM

## 2023-06-15 RX ORDER — AMINOPHYLLINE 25 MG/ML
50-100 INJECTION, SOLUTION INTRAVENOUS
Status: DISCONTINUED | OUTPATIENT
Start: 2023-06-15 | End: 2023-06-16 | Stop reason: HOSPADM

## 2023-06-15 RX ORDER — ALBUTEROL SULFATE 90 UG/1
2 AEROSOL, METERED RESPIRATORY (INHALATION) EVERY 5 MIN PRN
Status: DISCONTINUED | OUTPATIENT
Start: 2023-06-15 | End: 2023-06-16 | Stop reason: HOSPADM

## 2023-06-15 RX ORDER — REGADENOSON 0.08 MG/ML
0.4 INJECTION, SOLUTION INTRAVENOUS ONCE
Status: COMPLETED | OUTPATIENT
Start: 2023-06-15 | End: 2023-06-15

## 2023-06-15 RX ORDER — CAFFEINE CITRATE 20 MG/ML
60 SOLUTION INTRAVENOUS
Status: DISCONTINUED | OUTPATIENT
Start: 2023-06-15 | End: 2023-06-16 | Stop reason: HOSPADM

## 2023-06-15 RX ADMIN — TETROFOSMIN 3.53 MILLICURIE: 1.38 INJECTION, POWDER, LYOPHILIZED, FOR SOLUTION INTRAVENOUS at 13:05

## 2023-06-15 RX ADMIN — REGADENOSON 0.4 MG: 0.08 INJECTION, SOLUTION INTRAVENOUS at 14:28

## 2023-06-15 NOTE — TELEPHONE ENCOUNTER
Reply from Dr. Andino:  Aortic stenosis is approaching severe.  We will see what her nuclear scan shows and then make a decision as to next steps     0900 spoke with patient to review that her valve is getting tighter but that Dr. Andino wants to use both the echo and today's nuclear study to decide on next steps. Patient verbalized understanding and agreed with plan.

## 2023-06-16 ENCOUNTER — TELEPHONE (OUTPATIENT)
Dept: CARDIOLOGY | Facility: CLINIC | Age: 83
End: 2023-06-16
Payer: COMMERCIAL

## 2023-06-16 DIAGNOSIS — I25.10 CORONARY ARTERY DISEASE DUE TO CALCIFIED CORONARY LESION: Primary | ICD-10-CM

## 2023-06-16 DIAGNOSIS — I25.84 CORONARY ARTERY DISEASE DUE TO CALCIFIED CORONARY LESION: Primary | ICD-10-CM

## 2023-06-16 DIAGNOSIS — R94.39 ABNORMAL CARDIOVASCULAR STRESS TEST: ICD-10-CM

## 2023-06-16 NOTE — TELEPHONE ENCOUNTER
Message from Dr. Andino re: Now she has CAD and AORTIC STENOSIS. She needs a coronary angiogram then sit down with tavr and surgeons to discuss options. She will need an APT to set up,  can be ME or JULIETTE.       Nuclear study 6/15/2023:     The nuclear stress test is abnormal.     There is moderate ischemia in the inferior and inferoseptal segment(s) of the left ventricle.     Left ventricular function is hyperdynamic.     The left ventricular ejection fraction at stress is 82%.     A prior study was conducted on 4/7/2014.  This study has changes noted when compared with the prior study.     Ordering physician notified.    Patient is scheduled to see Dr. Andino on 7/18/2023.    Called in-clinic scheduling for next available spot - can offer Dr. Cruz on 6/21/2023 @ 2:15 for pre-cath H&P. No JULIETTE visits left in June.    Left a message for patient to call back to review abnormal nuclear study and set up H&P next week and then angiogram date.    Order placed for coronary angiogram w/PCI possible.

## 2023-06-17 ENCOUNTER — HEALTH MAINTENANCE LETTER (OUTPATIENT)
Age: 83
End: 2023-06-17

## 2023-06-17 DIAGNOSIS — E03.4 HYPOTHYROIDISM DUE TO ACQUIRED ATROPHY OF THYROID: ICD-10-CM

## 2023-06-19 RX ORDER — LEVOTHYROXINE SODIUM 137 UG/1
TABLET ORAL
Qty: 90 TABLET | Refills: 0 | Status: SHIPPED | OUTPATIENT
Start: 2023-06-19 | End: 2023-10-23

## 2023-06-19 NOTE — TELEPHONE ENCOUNTER
Levothyroxine. LOV 5/23/23.    Hypothyroidism    T4 Free   Date Value Ref Range Status   07/13/2020 1.39 0.82 - 1.77 ng/dL Final

## 2023-06-19 NOTE — TELEPHONE ENCOUNTER
Spoke with patient to review her nuclear study and the echo again. She is will willing to see Dr. Cruz on Wednesday.    Patient would like to set up the coronary angiogram asap as she does not want to wait. She will bring her daughter with her to the appointments.

## 2023-06-20 ENCOUNTER — TELEPHONE (OUTPATIENT)
Dept: CARDIOLOGY | Facility: CLINIC | Age: 83
End: 2023-06-20
Payer: COMMERCIAL

## 2023-06-20 NOTE — TELEPHONE ENCOUNTER
Patient to see Dr. Cruz on 6/21/2023 for H&P for angiogram. Has new findings of aortic stenosis and abnormal stress test.    Coronary angiogram is scheduled for 7/3/2023.    Will message Dr. Andino to see if additional fluid time is recommended.      6/20/2023 reply from Dr. Andino:  Yes.  She will need extra fluids.  Thank you       Message sent to scheduling team.      1400 message from scheduling team:  Minerva Cruz Barbara E, RN  Please tell the pt she needs to arrive at 630am (instead of 730am) and now Dion will do it. (Teddy was OG)     I will resend orders     Minerva     2nd message - patient needs to be moved to a new day.   Called patient to explain that she needs extra hydration for her kidneys.    My chart message to patient:    Rene Ms. Jorge,    We were cut off but I wanted to explain that your kidney function is weaker than normal. To decrease the stress of using the dye for your procedure, we are having you come in for a longer prep time that day.    We will be giving you longer time getting IV fluids to bathe and flush your kidneys with longer hydration.    Thank you for being flexible.  Team 2 RNs  [phone]

## 2023-06-21 ENCOUNTER — OFFICE VISIT (OUTPATIENT)
Dept: CARDIOLOGY | Facility: CLINIC | Age: 83
End: 2023-06-21
Payer: COMMERCIAL

## 2023-06-21 VITALS
SYSTOLIC BLOOD PRESSURE: 124 MMHG | HEIGHT: 66 IN | DIASTOLIC BLOOD PRESSURE: 70 MMHG | OXYGEN SATURATION: 98 % | WEIGHT: 187 LBS | HEART RATE: 70 BPM | BODY MASS INDEX: 30.05 KG/M2

## 2023-06-21 DIAGNOSIS — I25.118 CORONARY ARTERY DISEASE OF NATIVE HEART WITH STABLE ANGINA PECTORIS, UNSPECIFIED VESSEL OR LESION TYPE (H): Primary | ICD-10-CM

## 2023-06-21 PROCEDURE — 99214 OFFICE O/P EST MOD 30 MIN: CPT | Performed by: INTERNAL MEDICINE

## 2023-06-21 RX ORDER — METOPROLOL SUCCINATE 25 MG/1
25 TABLET, EXTENDED RELEASE ORAL DAILY
Qty: 60 TABLET | Refills: 3 | Status: SHIPPED | OUTPATIENT
Start: 2023-06-21 | End: 2024-02-15

## 2023-06-21 NOTE — LETTER
6/21/2023    Elias Meyer MD  [address]  [facility] [address]    RE: Macie Jorge       Dear Colleague,     I had the pleasure of seeing Macie Jorge in the [facility].  CARDIOLOGY CLINIC CONSULTATION    PRIMARY CARE PHYSICIAN:  Elias Meyer    HISTORY OF PRESENT ILLNESS:  This is a very pleasant 82-year-old female who recently saw Dr. Honeycutt for chest discomfort.  Subsequently an echocardiogram and stress test was ordered.  Stress test showed inferior ischemia and echocardiogram showed severe aortic stenosis with normal LV function.  She has chronic kidney disease.  Baseline creatinine has been around 1.5 but more recently last creatinine was 2.1.  She is on lisinopril.  She is not diabetic.  She has been set up for a coronary angiogram on July 5.  She is seeing me today for history and physical prior to her angiogram.  She is here with her  and her daughter.  Denies any new cardiovascular symptoms.    PAST MEDICAL HISTORY:  Past Medical History:   Diagnosis Date    Calculus of left ureter 2014    dr Arriaga - ureteroscopy and lithitripsy    Cataract     Chronic low back pain     Dr Villagran at [facility] in 2015- not surgical     Complication of anesthesia     COPD with asthma (H)      followed with pulmonary DR LENNOX 60-pack-year history of smoking    Esophagitis, reflux 9/2012    SOME EOSINOPHILIA NO BARRETS    Gastro-oesophageal reflux disease      has seen GI    Hydronephrosis, left     chronic , in 2017 Dr Arriaga plan was observaton    Hyperlipidaemia LDL goal < 100     Hypothyroid     Kidney stone 3/28/2019    Migraine headaches 9/1/2011    Moderate persistent asthma      lifelong, saw Pulm 2013    Obesity (BMI 30-39.9)     Panic attacks     PONV (postoperative nausea and vomiting)     Renal stones 2016    kidney stones, multiple small stones high risk recurrent ureteral stones. , Dr Arriaga in 2016 rec 24 hour urine.    Type 2 diabetes mellitus without complication (H)         MEDICATIONS:  Current Outpatient Medications   Medication    albuterol (PROAIR HFA/PROVENTIL HFA/VENTOLIN HFA) 108 (90 Base) MCG/ACT inhaler    aspirin 81 MG EC tablet    blood glucose (NO BRAND SPECIFIED) lancets standard    blood glucose (NO BRAND SPECIFIED) test strip    blood glucose monitoring (NO BRAND SPECIFIED) meter device kit    Fluticasone-Umeclidin-Vilanterol (TRELEGY ELLIPTA) 200-62.5-25 MCG/ACT oral inhaler    gabapentin (NEURONTIN) 300 MG capsule    HYDROcodone-acetaminophen (NORCO) 7.5-325 MG per tablet    levothyroxine (SYNTHROID/LEVOTHROID) 137 MCG tablet    metoprolol succinate ER (TOPROL XL) 25 MG 24 hr tablet    nitroGLYcerin (NITROSTAT) 0.4 MG sublingual tablet    oxybutynin ER (DITROPAN XL) 10 MG 24 hr tablet    rosuvastatin (CRESTOR) 20 MG tablet    gabapentin (NEURONTIN) 100 MG capsule     No current facility-administered medications for this visit.       SOCIAL HISTORY:  I have reviewed this patient's social history and updated it with pertinent information if needed. Macie Jorge  reports that she quit smoking about 28 years ago. Her smoking use included cigarettes. She has a 60.00 pack-year smoking history. She has never used smokeless tobacco. She reports that she does not currently use alcohol. She reports that she does not use drugs.    PHYSICAL EXAM:  Pulse:  [70] 70  BP: (124)/(70) 124/70  SpO2:  [98 %] 98 %  187 lbs 0 oz    Constitutional: alert, no distress  Respiratory: Good bilateral air entry  Cardiovascular: Normal regular heart sounds systolic murmur soft S2 no edema JVP normal  GI: nondistended  Neuropsychiatric: appropriate affact    ASSESSMENT: Pertinent issues addressed/ reviewed during this cardiology visit  Severe symptomatic aortic stenosis  Suspected CAD with positive stress test  Chronic kidney disease    RECOMMENDATIONS:  Patient has no new cardiovascular symptoms at this time.  Risk and benefits of coronary angiography in particular risk of progressive  kidney disease including risk of dialysis explained to the patient.  Stop lisinopril.  Start metoprolol succinate 25 mg daily.  Continue aspirin and statin.  She understands depending on coronary angiogram results, she will likely be set up for surgical versus transcatheter aortic valve replacement depending on the need for coronary revascularization.  I have told the patient not to exert self in the interim.  If any resting symptoms he needs to immediately seek medical attention.  Keep appointment as scheduled with extra hydration and fluids as planned for her coronary angiogram.  After that she is scheduled to see her primary cardiologist Dr. Honeycutt in clinic.    It was a pleasure seeing this patient in clinic today. Please do not hesitate to contact me with any future questions.  I am happy to see the patient in follow-up as needed.    GARRICK Mullen, [facility]  Cardiology - [facility]  June 21, 2023    Review of the result(s) of each unique test - Last echocardiogram Labs ECG creatinine     The level of medical decision making during this visit was of moderate complexity.    This note was completed in part using dictation via the Dragon voice recognition software. Some word and grammatical errors may occur and must be interpreted in the appropriate clinical context.  If there are any questions pertaining to this issue, please contact me for further clarification.      Thank you for allowing me to participate in the care of your patient.      Sincerely,     Batsheva Cruz MD   [facility]  cc: No referring provider defined for this encounter.

## 2023-06-21 NOTE — PROGRESS NOTES
CARDIOLOGY CLINIC CONSULTATION    PRIMARY CARE PHYSICIAN:  Elias Meyer    HISTORY OF PRESENT ILLNESS:  This is a very pleasant 82-year-old female who recently saw Dr. Honeycutt for chest discomfort.  Subsequently an echocardiogram and stress test was ordered.  Stress test showed inferior ischemia and echocardiogram showed severe aortic stenosis with normal LV function.  She has chronic kidney disease.  Baseline creatinine has been around 1.5 but more recently last creatinine was 2.1.  She is on lisinopril.  She is not diabetic.  She has been set up for a coronary angiogram on July 5.  She is seeing me today for history and physical prior to her angiogram.  She is here with her  and her daughter.  Denies any new cardiovascular symptoms.    PAST MEDICAL HISTORY:  Past Medical History:   Diagnosis Date     Calculus of left ureter 2014    dr Arriaga - ureteroscopy and lithitripsy     Cataract      Chronic low back pain     Dr Villagran at [facility] in 2015- not surgical      Complication of anesthesia      COPD with asthma (H)      followed with pulmonary DR LENNOX 60-pack-year history of smoking     Esophagitis, reflux 9/2012    SOME EOSINOPHILIA NO BARRETS     Gastro-oesophageal reflux disease      has seen GI     Hydronephrosis, left     chronic , in 2017 Dr Arriaga plan was observaton     Hyperlipidaemia LDL goal < 100      Hypothyroid      Kidney stone 3/28/2019     Migraine headaches 9/1/2011     Moderate persistent asthma      lifelong, saw Pulm 2013     Obesity (BMI 30-39.9)      Panic attacks      PONV (postoperative nausea and vomiting)      Renal stones 2016    kidney stones, multiple small stones high risk recurrent ureteral stones. , Dr Arriaga in 2016 rec 24 hour urine.     Type 2 diabetes mellitus without complication (H)        MEDICATIONS:  Current Outpatient Medications   Medication     albuterol (PROAIR HFA/PROVENTIL HFA/VENTOLIN HFA) 108 (90 Base) MCG/ACT inhaler     aspirin 81 MG EC tablet     blood glucose (NO  BRAND SPECIFIED) lancets standard     blood glucose (NO BRAND SPECIFIED) test strip     blood glucose monitoring (NO BRAND SPECIFIED) meter device kit     Fluticasone-Umeclidin-Vilanterol (TRELEGY ELLIPTA) 200-62.5-25 MCG/ACT oral inhaler     gabapentin (NEURONTIN) 300 MG capsule     HYDROcodone-acetaminophen (NORCO) 7.5-325 MG per tablet     levothyroxine (SYNTHROID/LEVOTHROID) 137 MCG tablet     metoprolol succinate ER (TOPROL XL) 25 MG 24 hr tablet     nitroGLYcerin (NITROSTAT) 0.4 MG sublingual tablet     oxybutynin ER (DITROPAN XL) 10 MG 24 hr tablet     rosuvastatin (CRESTOR) 20 MG tablet     gabapentin (NEURONTIN) 100 MG capsule     No current facility-administered medications for this visit.       SOCIAL HISTORY:  I have reviewed this patient's social history and updated it with pertinent information if needed. Macie Jorge  reports that she quit smoking about 28 years ago. Her smoking use included cigarettes. She has a 60.00 pack-year smoking history. She has never used smokeless tobacco. She reports that she does not currently use alcohol. She reports that she does not use drugs.    PHYSICAL EXAM:  Pulse:  [70] 70  BP: (124)/(70) 124/70  SpO2:  [98 %] 98 %  187 lbs 0 oz    Constitutional: alert, no distress  Respiratory: Good bilateral air entry  Cardiovascular: Normal regular heart sounds systolic murmur soft S2 no edema JVP normal  GI: nondistended  Neuropsychiatric: appropriate affact    ASSESSMENT: Pertinent issues addressed/ reviewed during this cardiology visit  Severe symptomatic aortic stenosis  Suspected CAD with positive stress test  Chronic kidney disease    RECOMMENDATIONS:  1. Patient has no new cardiovascular symptoms at this time.  2. Risk and benefits of coronary angiography in particular risk of progressive kidney disease including risk of dialysis explained to the patient.  3. Stop lisinopril.  Start metoprolol succinate 25 mg daily.  Continue aspirin and statin.  4. She  understands depending on coronary angiogram results, she will likely be set up for surgical versus transcatheter aortic valve replacement depending on the need for coronary revascularization.  I have told the patient not to exert self in the interim.  If any resting symptoms he needs to immediately seek medical attention.  5. Keep appointment as scheduled with extra hydration and fluids as planned for her coronary angiogram.  After that she is scheduled to see her primary cardiologist Dr. Honeycutt in clinic.    It was a pleasure seeing this patient in clinic today. Please do not hesitate to contact me with any future questions.  I am happy to see the patient in follow-up as needed.    GARRICK Mullen, [facility]  Cardiology - [facility]  June 21, 2023    Review of the result(s) of each unique test - Last echocardiogram Labs ECG creatinine     The level of medical decision making during this visit was of moderate complexity.    This note was completed in part using dictation via the Dragon voice recognition software. Some word and grammatical errors may occur and must be interpreted in the appropriate clinical context.  If there are any questions pertaining to this issue, please contact me for further clarification.

## 2023-06-27 ENCOUNTER — TELEPHONE (OUTPATIENT)
Dept: CARDIOLOGY | Facility: CLINIC | Age: 83
End: 2023-06-27
Payer: COMMERCIAL

## 2023-06-27 DIAGNOSIS — R07.9 CHEST PAIN: ICD-10-CM

## 2023-06-27 DIAGNOSIS — R94.39 ABNORMAL CARDIOVASCULAR STRESS TEST: Primary | ICD-10-CM

## 2023-06-27 DIAGNOSIS — I35.0 AORTIC VALVE STENOSIS: ICD-10-CM

## 2023-06-27 RX ORDER — POTASSIUM CHLORIDE 1500 MG/1
20 TABLET, EXTENDED RELEASE ORAL
Status: CANCELLED | OUTPATIENT
Start: 2023-06-27

## 2023-06-27 RX ORDER — SODIUM CHLORIDE 9 MG/ML
INJECTION, SOLUTION INTRAVENOUS CONTINUOUS
Status: CANCELLED | OUTPATIENT
Start: 2023-06-27

## 2023-06-27 RX ORDER — LIDOCAINE 40 MG/G
CREAM TOPICAL
Status: CANCELLED | OUTPATIENT
Start: 2023-06-27

## 2023-06-27 RX ORDER — ASPIRIN 325 MG
325 TABLET ORAL ONCE
Status: CANCELLED | OUTPATIENT
Start: 2023-06-27 | End: 2023-06-27

## 2023-06-27 RX ORDER — ASPIRIN 81 MG/1
243 TABLET, CHEWABLE ORAL ONCE
Status: CANCELLED | OUTPATIENT
Start: 2023-06-27

## 2023-06-27 NOTE — TELEPHONE ENCOUNTER
Coronary angiogram/PCI/Right Heart Cath prep instructions.     Patient is scheduled for a Coronary Angio w/possible PCI at [facility] - [address], [place] [address] - Main Entrance of the Hospital on 7/5/2023.  Check in time is at 0630 and procedure to follow.    Patient instructed to remain NPO for solid foods 8 hours prior to arrival and may have clear liquids up to 2 hours prior to arrival.    Patient Patient does require extra fluids prior to procedure and has been instructed to arrive at 0630    Patient is not diabetic.    Patient is not on anticoagulation.    Patient is not on diuretics.     Patient is taking ASA 81mg daily and will take 4 tabs (324mg) the morning of the procedure.    Pt is not on a SGLT2 inhibitor.    Patient advised to take their other daily medications the morning of the procedure with small sips of water.     Verified patient does not have a contrast allergy.    Verified patient has someone available to drive them home from the hospital and can stay with them for 24 hours after the procedure.     Patient advised to notify care team with any new COVID like symptoms prior to procedure.    Patient will check their temperature the morning of procedure and call [facility] at [phone] if temp is >100.0.    Patient is aware of visitor policy.    Patient expresses understanding of above instructions and denies further questions at this time.

## 2023-06-30 ENCOUNTER — TELEPHONE (OUTPATIENT)
Dept: CARDIOLOGY | Facility: CLINIC | Age: 83
End: 2023-06-30
Payer: COMMERCIAL

## 2023-06-30 NOTE — TELEPHONE ENCOUNTER
"Patient was already called for angiogram prep on 6/27 and orders placed at that time. Left a message for patient that no callback is needed unless she has questions.     Addendum 1200: Pt called back, said she would like to review the instructions again as she has \"no idea\" what was discussed then as she was busy juggling her husbands healthcare needs. Reviewed prep again. All patient's questions answered.   "

## 2023-06-30 NOTE — TELEPHONE ENCOUNTER
**  Orders to be entered after speaking with Patient.   Message left to return call regarding Heart cath 7-5-23 - Wed. with prep and instructions.

## 2023-07-03 NOTE — PROGRESS NOTES
CSE, orders in epic under sign & heal, no blood thin, current H&P, no pertinent allergies-Macie is intolerant of morphine.

## 2023-07-05 ENCOUNTER — HOSPITAL ENCOUNTER (OUTPATIENT)
Facility: CLINIC | Age: 83
Discharge: HOME | End: 2023-07-05
Admitting: INTERNAL MEDICINE
Payer: COMMERCIAL

## 2023-07-05 VITALS
BODY MASS INDEX: 29.73 KG/M2 | HEART RATE: 64 BPM | TEMPERATURE: 97.9 F | SYSTOLIC BLOOD PRESSURE: 157 MMHG | RESPIRATION RATE: 16 BRPM | OXYGEN SATURATION: 97 % | WEIGHT: 185 LBS | DIASTOLIC BLOOD PRESSURE: 58 MMHG | HEIGHT: 66 IN

## 2023-07-05 DIAGNOSIS — I25.84 CORONARY ARTERY DISEASE DUE TO CALCIFIED CORONARY LESION: ICD-10-CM

## 2023-07-05 DIAGNOSIS — I25.10 CORONARY ARTERY DISEASE DUE TO CALCIFIED CORONARY LESION: ICD-10-CM

## 2023-07-05 DIAGNOSIS — R94.39 ABNORMAL CARDIOVASCULAR STRESS TEST: ICD-10-CM

## 2023-07-05 DIAGNOSIS — R07.9 CHEST PAIN: ICD-10-CM

## 2023-07-05 DIAGNOSIS — I35.0 AORTIC VALVE STENOSIS: ICD-10-CM

## 2023-07-05 LAB
ACT BLD: 264 SECONDS (ref 74–150)
ACT BLD: 368 SECONDS (ref 74–150)
ANION GAP SERPL CALCULATED.3IONS-SCNC: 15 MMOL/L (ref 7–15)
APTT PPP: 30 SECONDS (ref 22–38)
BUN SERPL-MCNC: 31.6 MG/DL (ref 8–23)
CALCIUM SERPL-MCNC: 9 MG/DL (ref 8.8–10.2)
CHLORIDE SERPL-SCNC: 102 MMOL/L (ref 98–107)
CREAT SERPL-MCNC: 1.88 MG/DL (ref 0.51–0.95)
DEPRECATED HCO3 PLAS-SCNC: 22 MMOL/L (ref 22–29)
ERYTHROCYTE [DISTWIDTH] IN BLOOD BY AUTOMATED COUNT: 13.3 % (ref 10–15)
GFR SERPL CREATININE-BSD FRML MDRD: 26 ML/MIN/1.73M2
GLUCOSE SERPL-MCNC: 113 MG/DL (ref 70–99)
HCT VFR BLD AUTO: 32.9 % (ref 35–47)
HGB BLD-MCNC: 10.7 G/DL (ref 11.7–15.7)
INR PPP: 1.03 (ref 0.85–1.15)
MCH RBC QN AUTO: 31.4 PG (ref 26.5–33)
MCHC RBC AUTO-ENTMCNC: 32.5 G/DL (ref 31.5–36.5)
MCV RBC AUTO: 97 FL (ref 78–100)
PLATELET # BLD AUTO: 134 10E3/UL (ref 150–450)
POTASSIUM SERPL-SCNC: 4 MMOL/L (ref 3.4–5.3)
RBC # BLD AUTO: 3.41 10E6/UL (ref 3.8–5.2)
SODIUM SERPL-SCNC: 139 MMOL/L (ref 136–145)
WBC # BLD AUTO: 4.5 10E3/UL (ref 4–11)

## 2023-07-05 PROCEDURE — 92978 ENDOLUMINL IVUS OCT C 1ST: CPT | Mod: LD | Performed by: INTERNAL MEDICINE

## 2023-07-05 PROCEDURE — C1769 GUIDE WIRE: HCPCS | Performed by: INTERNAL MEDICINE

## 2023-07-05 PROCEDURE — C9600 PERC DRUG-EL COR STENT SING: HCPCS | Performed by: INTERNAL MEDICINE

## 2023-07-05 PROCEDURE — 999N000054 HC STATISTIC EKG NON-CHARGEABLE

## 2023-07-05 PROCEDURE — 93454 CORONARY ARTERY ANGIO S&I: CPT | Mod: 26 | Performed by: INTERNAL MEDICINE

## 2023-07-05 PROCEDURE — 93010 ELECTROCARDIOGRAM REPORT: CPT | Performed by: INTERNAL MEDICINE

## 2023-07-05 PROCEDURE — 250N000011 HC RX IP 250 OP 636: Mod: JZ | Performed by: INTERNAL MEDICINE

## 2023-07-05 PROCEDURE — 36415 COLL VENOUS BLD VENIPUNCTURE: CPT | Performed by: INTERNAL MEDICINE

## 2023-07-05 PROCEDURE — 85730 THROMBOPLASTIN TIME PARTIAL: CPT | Performed by: INTERNAL MEDICINE

## 2023-07-05 PROCEDURE — 250N000011 HC RX IP 250 OP 636: Performed by: INTERNAL MEDICINE

## 2023-07-05 PROCEDURE — C1887 CATHETER, GUIDING: HCPCS | Performed by: INTERNAL MEDICINE

## 2023-07-05 PROCEDURE — 250N000009 HC RX 250: Performed by: INTERNAL MEDICINE

## 2023-07-05 PROCEDURE — 99153 MOD SED SAME PHYS/QHP EA: CPT | Performed by: INTERNAL MEDICINE

## 2023-07-05 PROCEDURE — 999N000184 HC STATISTIC TELEMETRY

## 2023-07-05 PROCEDURE — 999N000071 HC STATISTIC HEART CATH LAB OR EP LAB

## 2023-07-05 PROCEDURE — 92928 PRQ TCAT PLMT NTRAC ST 1 LES: CPT | Mod: LD | Performed by: INTERNAL MEDICINE

## 2023-07-05 PROCEDURE — 92978 ENDOLUMINL IVUS OCT C 1ST: CPT | Mod: 26 | Performed by: INTERNAL MEDICINE

## 2023-07-05 PROCEDURE — 85014 HEMATOCRIT: CPT | Performed by: INTERNAL MEDICINE

## 2023-07-05 PROCEDURE — 93005 ELECTROCARDIOGRAM TRACING: CPT

## 2023-07-05 PROCEDURE — 36591 DRAW BLOOD OFF VENOUS DEVICE: CPT

## 2023-07-05 PROCEDURE — 99152 MOD SED SAME PHYS/QHP 5/>YRS: CPT | Performed by: INTERNAL MEDICINE

## 2023-07-05 PROCEDURE — C1894 INTRO/SHEATH, NON-LASER: HCPCS | Performed by: INTERNAL MEDICINE

## 2023-07-05 PROCEDURE — 250N000013 HC RX MED GY IP 250 OP 250 PS 637: Performed by: INTERNAL MEDICINE

## 2023-07-05 PROCEDURE — 258N000003 HC RX IP 258 OP 636: Performed by: INTERNAL MEDICINE

## 2023-07-05 PROCEDURE — C1874 STENT, COATED/COV W/DEL SYS: HCPCS | Performed by: INTERNAL MEDICINE

## 2023-07-05 PROCEDURE — 82310 ASSAY OF CALCIUM: CPT | Performed by: INTERNAL MEDICINE

## 2023-07-05 PROCEDURE — C1725 CATH, TRANSLUMIN NON-LASER: HCPCS | Performed by: INTERNAL MEDICINE

## 2023-07-05 PROCEDURE — 85610 PROTHROMBIN TIME: CPT | Performed by: INTERNAL MEDICINE

## 2023-07-05 PROCEDURE — C1753 CATH, INTRAVAS ULTRASOUND: HCPCS | Performed by: INTERNAL MEDICINE

## 2023-07-05 PROCEDURE — 272N000001 HC OR GENERAL SUPPLY STERILE: Performed by: INTERNAL MEDICINE

## 2023-07-05 PROCEDURE — 93454 CORONARY ARTERY ANGIO S&I: CPT | Performed by: INTERNAL MEDICINE

## 2023-07-05 PROCEDURE — 85347 COAGULATION TIME ACTIVATED: CPT

## 2023-07-05 DEVICE — STENT CORONARY DES SYNERGY XD MR US 2.75X28MM H7493941828270: Type: IMPLANTABLE DEVICE | Status: FUNCTIONAL

## 2023-07-05 RX ORDER — ASPIRIN 81 MG/1
243 TABLET, CHEWABLE ORAL ONCE
Status: COMPLETED | OUTPATIENT
Start: 2023-07-05 | End: 2023-07-05

## 2023-07-05 RX ORDER — NALOXONE HYDROCHLORIDE 0.4 MG/ML
0.4 INJECTION, SOLUTION INTRAMUSCULAR; INTRAVENOUS; SUBCUTANEOUS
Status: DISCONTINUED | OUTPATIENT
Start: 2023-07-05 | End: 2023-07-05 | Stop reason: HOSPADM

## 2023-07-05 RX ORDER — FENTANYL CITRATE 50 UG/ML
INJECTION, SOLUTION INTRAMUSCULAR; INTRAVENOUS
Status: DISCONTINUED | OUTPATIENT
Start: 2023-07-05 | End: 2023-07-05 | Stop reason: HOSPADM

## 2023-07-05 RX ORDER — HYDRALAZINE HYDROCHLORIDE 20 MG/ML
10 INJECTION INTRAMUSCULAR; INTRAVENOUS EVERY 4 HOURS PRN
Status: DISCONTINUED | OUTPATIENT
Start: 2023-07-05 | End: 2023-07-05 | Stop reason: HOSPADM

## 2023-07-05 RX ORDER — ONDANSETRON 2 MG/ML
4 INJECTION INTRAMUSCULAR; INTRAVENOUS EVERY 6 HOURS PRN
Status: DISCONTINUED | OUTPATIENT
Start: 2023-07-05 | End: 2023-07-05 | Stop reason: HOSPADM

## 2023-07-05 RX ORDER — HEPARIN SODIUM 1000 [USP'U]/ML
INJECTION, SOLUTION INTRAVENOUS; SUBCUTANEOUS
Status: DISCONTINUED | OUTPATIENT
Start: 2023-07-05 | End: 2023-07-05 | Stop reason: HOSPADM

## 2023-07-05 RX ORDER — SODIUM CHLORIDE 9 MG/ML
INJECTION, SOLUTION INTRAVENOUS CONTINUOUS
Status: DISCONTINUED | OUTPATIENT
Start: 2023-07-05 | End: 2023-07-05 | Stop reason: HOSPADM

## 2023-07-05 RX ORDER — ATROPINE SULFATE 0.1 MG/ML
0.5 INJECTION INTRAVENOUS
Status: DISCONTINUED | OUTPATIENT
Start: 2023-07-05 | End: 2023-07-05 | Stop reason: HOSPADM

## 2023-07-05 RX ORDER — POTASSIUM CHLORIDE 1500 MG/1
20 TABLET, EXTENDED RELEASE ORAL
Status: DISCONTINUED | OUTPATIENT
Start: 2023-07-05 | End: 2023-07-05 | Stop reason: HOSPADM

## 2023-07-05 RX ORDER — FLUMAZENIL 0.1 MG/ML
0.2 INJECTION, SOLUTION INTRAVENOUS
Status: DISCONTINUED | OUTPATIENT
Start: 2023-07-05 | End: 2023-07-05 | Stop reason: HOSPADM

## 2023-07-05 RX ORDER — NALOXONE HYDROCHLORIDE 0.4 MG/ML
0.2 INJECTION, SOLUTION INTRAMUSCULAR; INTRAVENOUS; SUBCUTANEOUS
Status: DISCONTINUED | OUTPATIENT
Start: 2023-07-05 | End: 2023-07-05 | Stop reason: HOSPADM

## 2023-07-05 RX ORDER — ASPIRIN 325 MG
325 TABLET ORAL ONCE
Status: COMPLETED | OUTPATIENT
Start: 2023-07-05 | End: 2023-07-05

## 2023-07-05 RX ORDER — ASPIRIN 81 MG/1
81 TABLET ORAL DAILY
Qty: 30 TABLET | Refills: 3 | Status: SHIPPED | OUTPATIENT
Start: 2023-07-06 | End: 2023-07-18

## 2023-07-05 RX ORDER — ONDANSETRON 4 MG/1
4 TABLET, ORALLY DISINTEGRATING ORAL EVERY 6 HOURS PRN
Status: DISCONTINUED | OUTPATIENT
Start: 2023-07-05 | End: 2023-07-05 | Stop reason: HOSPADM

## 2023-07-05 RX ORDER — ASPIRIN 81 MG/1
81 TABLET ORAL DAILY
Status: DISCONTINUED | OUTPATIENT
Start: 2023-07-06 | End: 2023-07-05 | Stop reason: HOSPADM

## 2023-07-05 RX ORDER — LIDOCAINE 40 MG/G
CREAM TOPICAL
Status: DISCONTINUED | OUTPATIENT
Start: 2023-07-05 | End: 2023-07-05 | Stop reason: HOSPADM

## 2023-07-05 RX ORDER — SODIUM CHLORIDE 9 MG/ML
INJECTION, SOLUTION INTRAVENOUS CONTINUOUS
Status: ACTIVE | OUTPATIENT
Start: 2023-07-05 | End: 2023-07-05

## 2023-07-05 RX ORDER — NITROGLYCERIN 0.4 MG/1
0.4 TABLET SUBLINGUAL EVERY 5 MIN PRN
Status: DISCONTINUED | OUTPATIENT
Start: 2023-07-05 | End: 2023-07-05 | Stop reason: HOSPADM

## 2023-07-05 RX ORDER — OXYCODONE HYDROCHLORIDE 5 MG/1
5 TABLET ORAL EVERY 4 HOURS PRN
Status: DISCONTINUED | OUTPATIENT
Start: 2023-07-05 | End: 2023-07-05 | Stop reason: HOSPADM

## 2023-07-05 RX ORDER — ACETAMINOPHEN 325 MG/1
650 TABLET ORAL EVERY 4 HOURS PRN
Status: DISCONTINUED | OUTPATIENT
Start: 2023-07-05 | End: 2023-07-05 | Stop reason: HOSPADM

## 2023-07-05 RX ORDER — CLOPIDOGREL BISULFATE 75 MG/1
TABLET ORAL
Status: DISCONTINUED | OUTPATIENT
Start: 2023-07-05 | End: 2023-07-05 | Stop reason: HOSPADM

## 2023-07-05 RX ORDER — CLOPIDOGREL BISULFATE 75 MG/1
75 TABLET ORAL DAILY
Status: DISCONTINUED | OUTPATIENT
Start: 2023-07-06 | End: 2023-07-05 | Stop reason: HOSPADM

## 2023-07-05 RX ORDER — FENTANYL CITRATE 50 UG/ML
25 INJECTION, SOLUTION INTRAMUSCULAR; INTRAVENOUS
Status: DISCONTINUED | OUTPATIENT
Start: 2023-07-05 | End: 2023-07-05 | Stop reason: HOSPADM

## 2023-07-05 RX ORDER — OXYCODONE HYDROCHLORIDE 5 MG/1
10 TABLET ORAL EVERY 4 HOURS PRN
Status: DISCONTINUED | OUTPATIENT
Start: 2023-07-05 | End: 2023-07-05 | Stop reason: HOSPADM

## 2023-07-05 RX ORDER — VERAPAMIL HYDROCHLORIDE 2.5 MG/ML
INJECTION, SOLUTION INTRAVENOUS
Status: DISCONTINUED | OUTPATIENT
Start: 2023-07-05 | End: 2023-07-05 | Stop reason: HOSPADM

## 2023-07-05 RX ORDER — NITROGLYCERIN 5 MG/ML
VIAL (ML) INTRAVENOUS
Status: DISCONTINUED | OUTPATIENT
Start: 2023-07-05 | End: 2023-07-05 | Stop reason: HOSPADM

## 2023-07-05 RX ORDER — IOPAMIDOL 755 MG/ML
INJECTION, SOLUTION INTRAVASCULAR
Status: DISCONTINUED | OUTPATIENT
Start: 2023-07-05 | End: 2023-07-05 | Stop reason: HOSPADM

## 2023-07-05 RX ORDER — METOPROLOL TARTRATE 1 MG/ML
5 INJECTION, SOLUTION INTRAVENOUS
Status: DISCONTINUED | OUTPATIENT
Start: 2023-07-05 | End: 2023-07-05 | Stop reason: HOSPADM

## 2023-07-05 RX ORDER — ROSUVASTATIN CALCIUM 20 MG/1
20 TABLET, COATED ORAL DAILY
Qty: 90 TABLET | Refills: 3 | Status: SHIPPED | OUTPATIENT
Start: 2023-07-05 | End: 2023-07-18

## 2023-07-05 RX ORDER — CLOPIDOGREL BISULFATE 75 MG/1
75 TABLET ORAL DAILY
Qty: 90 TABLET | Refills: 3 | Status: SHIPPED | OUTPATIENT
Start: 2023-07-06 | End: 2024-06-06

## 2023-07-05 RX ADMIN — SODIUM CHLORIDE: 9 INJECTION, SOLUTION INTRAVENOUS at 06:51

## 2023-07-05 ASSESSMENT — ACTIVITIES OF DAILY LIVING (ADL)
ADLS_ACUITY_SCORE: 35

## 2023-07-05 NOTE — PROGRESS NOTES
Care Suites Admission Nursing Note    Patient Information  Name: Macie Jorge  Age: 82 year old  Reason for admission: Heart Cath  Care Suites arrival time: ~0630       Patient Admission/Assessment   Pre-procedure assessment complete: Yes  If abnormal assessment/labs, provider notified: N/A  NPO: Yes  Medications held per instructions/orders: Yes  Consent: deferred  Patient oriented to room: Yes  Education/questions answered: Yes  Plan/other: Procedure ~1030    Discharge Planning  Discharge name/phone number: [place] [phone]  Overnight post sedation caregiver: Spouse   Discharge location: home    Sarahi Khan RN

## 2023-07-05 NOTE — PROGRESS NOTES
Care Suites Discharge Nursing Note    Patient Information  Name: Macie Jorge  Age: 82 year old    Discharge Education:  Discharge instructions reviewed: Yes  Additional education/resources provided: NA  Patient/patient representative verbalizes understanding: Yes  Patient discharging on new medications: Yes  Medication education completed: Yes    Discharge Plans:   Discharge location: home  Discharge ride contacted: Yes  Approximate discharge time: 1600    Discharge Criteria:  Discharge criteria met and vital signs stable: Yes    Patient Belongs:  Patient belongings returned to patient: Yes    Tomy Lucero RN

## 2023-07-05 NOTE — PROGRESS NOTES
Care Suites Post Procedure Note    Patient Information  Name: Macie Jorge  Age: 82 year old    Post Procedure  Time patient returned to Care Suites: 1123  Concerns/abnormal assessment: No immediate  If abnormal assessment, provider notified: N/A  Plan/Other: Continue post procedure plan of care.  Anticipate to be discharged later today when all discharge criteria is met.  Family is well informed and at the bedside.    Tomy Lucero RN        Verbalized Understanding/Simple: Patient demonstrates quick and easy understanding

## 2023-07-05 NOTE — Clinical Note
Stent deployed in the left anterior descending. Max pressure = 12 jonathan. Total duration = 20 seconds.

## 2023-07-05 NOTE — Clinical Note
The first balloon was inserted into the left anterior descending.Max pressure = 12 jonathan. Total duration = 14 seconds.

## 2023-07-05 NOTE — Clinical Note
The first balloon was inserted into the left anterior descending and proximal left anterior descending.Max pressure = 12 jonathan. Total duration = 18 seconds.

## 2023-07-05 NOTE — DISCHARGE INSTRUCTIONS
Cardiac Angioplasty/Stent Discharge Instructions - Radial    After you go home:    Have an adult stay with you until tomorrow.  Drink extra fluids for 2 days.  You may resume your normal diet.  No smoking       For 24 hours - due to the sedation you received:  Relax and take it easy.  Do NOT make any important or legal decisions.  Do NOT drive or operate machines at home or at work.  Do NOT drink alcohol.    Care of Wrist Puncture Site:    For the first 24 hrs - check the puncture site every 1-2 hours while awake.  It is normal to have soreness at the puncture site and mild tingling in your hand for up to 3 days.  Remove the bandaid after 24 hours. If there is minor oozing, apply another bandaid and remove it after 12 hours.  You may shower tomorrow.  Do NOT take a bath, or use a hot tub or pool for at least 3 days. Do NOT scrub the site. Do not use lotion or powder near the puncture site.           Activity:          For 2 days:   do not use your hand or arm to support your weight (such as rising from a chair)   do not bend your wrist (such as lifting a garage door).  do not lift more than 5 pounds or exercise your arm (such as tennis, golf or bowling).  Do NOT do any heavy activity such as exercise, lifting, or straining.     Bleeding:    If you start bleeding from the site in your wrist, sit down and press firmly on/above the site for 10 minutes.   Once bleeding stops, keep arm still for 2 hours.   Call [facility] as soon as you can.       Call 911 right away if you have heavy bleeding or bleeding that does not stop.      Medicines:    If you are taking an antiplatelet medication such as Plavix, Brilinta or Effient, do not stop taking it until you talk to your cardiologist.        Take your medications, including blood thinners, unless your provider tells you not to.      If you have stopped any medicines, check with your provider about when to restart them.    Follow Up Appointments:    Follow up with [facility]  Nurse Practitioner at [facility] of patient preference in 7-10 days.  Cardiac Rehab will contact you for follow up care.    Call the clinic if:    You have a large or growing hard lump around the site.  The site is red, swollen, hot or tender.  Blood or fluid is draining from the site.  You have chills or a fever greater than 101 F (38 C).  Your arm feels numb, cool or changes color.  You have hives, a rash or unusual itching.  Any questions or concerns.    Other Instructions:    If you received a stent - carry your stent card with you at all times.      [facility] at [place]:    [phone] [facility] (7 days a week)

## 2023-07-05 NOTE — Clinical Note
Max pressure = 12 jonathan. Total duration = 14 seconds.     Max pressure = 12 jonathan. Total duration = 20 seconds.    Balloon reinflated a second time: Max pressure = 12 jonathan. Total duration = 20 seconds.

## 2023-07-05 NOTE — PRE-PROCEDURE
GENERAL PRE-PROCEDURE:   Procedure:  Coronary Angiography with possible PCI  Date/Time:  7/5/2023 9:34 AM    Written consent obtained?: Yes    Risks and benefits: Risks, benefits and alternatives were discussed    DC Plan: Appropriate discharge home plan in place for patients who are going home after procedure   Consent given by:  Patient  Patient states understanding of procedure being performed: Yes    Patient's understanding of procedure matches consent: Yes    Procedure consent matches procedure scheduled: Yes    Expected level of sedation:  Moderate  Appropriately NPO:  Yes  ASA Class:  3  Mallampati  :  Grade 3- soft palate visible, posterior pharyngeal wall not visible  Lungs:  Lungs clear with good breath sounds bilaterally  Heart:  Normal heart sounds and rate  History & Physical reviewed:  History and physical reviewed and no updates needed  Statement of review:  I have reviewed the lab findings, diagnostic data, medications, and the plan for sedation

## 2023-07-07 LAB
ATRIAL RATE - MUSE: 58 BPM
ATRIAL RATE - MUSE: 62 BPM
DIASTOLIC BLOOD PRESSURE - MUSE: NORMAL MMHG
DIASTOLIC BLOOD PRESSURE - MUSE: NORMAL MMHG
INTERPRETATION ECG - MUSE: NORMAL
INTERPRETATION ECG - MUSE: NORMAL
P AXIS - MUSE: 48 DEGREES
P AXIS - MUSE: 52 DEGREES
PR INTERVAL - MUSE: 216 MS
PR INTERVAL - MUSE: 222 MS
QRS DURATION - MUSE: 80 MS
QRS DURATION - MUSE: 80 MS
QT - MUSE: 410 MS
QT - MUSE: 422 MS
QTC - MUSE: 414 MS
QTC - MUSE: 416 MS
R AXIS - MUSE: 102 DEGREES
R AXIS - MUSE: 103 DEGREES
SYSTOLIC BLOOD PRESSURE - MUSE: NORMAL MMHG
SYSTOLIC BLOOD PRESSURE - MUSE: NORMAL MMHG
T AXIS - MUSE: 35 DEGREES
T AXIS - MUSE: 46 DEGREES
VENTRICULAR RATE- MUSE: 58 BPM
VENTRICULAR RATE- MUSE: 62 BPM

## 2023-07-13 DIAGNOSIS — N20.0 URIC ACID KIDNEY STONE: ICD-10-CM

## 2023-07-17 RX ORDER — ALLOPURINOL 100 MG/1
TABLET ORAL
Qty: 90 TABLET | Refills: 0 | Status: SHIPPED | OUTPATIENT
Start: 2023-07-17 | End: 2023-10-23

## 2023-07-17 NOTE — TELEPHONE ENCOUNTER
ALLOPURINOL     LOV: 5/23/23    LAST ADDRESSED: never, we have not prescribed this medication to this patient before

## 2023-07-18 ENCOUNTER — OFFICE VISIT (OUTPATIENT)
Dept: CARDIOLOGY | Facility: CLINIC | Age: 83
End: 2023-07-18
Attending: INTERNAL MEDICINE
Payer: COMMERCIAL

## 2023-07-18 ENCOUNTER — LAB (OUTPATIENT)
Dept: LAB | Facility: CLINIC | Age: 83
End: 2023-07-18
Payer: COMMERCIAL

## 2023-07-18 VITALS
SYSTOLIC BLOOD PRESSURE: 120 MMHG | HEART RATE: 60 BPM | WEIGHT: 182 LBS | OXYGEN SATURATION: 97 % | DIASTOLIC BLOOD PRESSURE: 66 MMHG | HEIGHT: 66 IN | BODY MASS INDEX: 29.25 KG/M2

## 2023-07-18 DIAGNOSIS — I25.84 CORONARY ARTERY DISEASE DUE TO CALCIFIED CORONARY LESION: ICD-10-CM

## 2023-07-18 DIAGNOSIS — N18.32 STAGE 3B CHRONIC KIDNEY DISEASE (H): ICD-10-CM

## 2023-07-18 DIAGNOSIS — I35.0 NONRHEUMATIC AORTIC VALVE STENOSIS: Primary | ICD-10-CM

## 2023-07-18 DIAGNOSIS — I25.10 CORONARY ARTERY DISEASE DUE TO CALCIFIED CORONARY LESION: ICD-10-CM

## 2023-07-18 DIAGNOSIS — D61.818 PANCYTOPENIA (H): ICD-10-CM

## 2023-07-18 DIAGNOSIS — E78.00 HYPERCHOLESTEROLEMIA: ICD-10-CM

## 2023-07-18 LAB
ANION GAP SERPL CALCULATED.3IONS-SCNC: 11 MMOL/L (ref 7–15)
BUN SERPL-MCNC: 38.4 MG/DL (ref 8–23)
CALCIUM SERPL-MCNC: 9.7 MG/DL (ref 8.8–10.2)
CHLORIDE SERPL-SCNC: 103 MMOL/L (ref 98–107)
CREAT SERPL-MCNC: 1.94 MG/DL (ref 0.51–0.95)
DEPRECATED HCO3 PLAS-SCNC: 23 MMOL/L (ref 22–29)
ERYTHROCYTE [DISTWIDTH] IN BLOOD BY AUTOMATED COUNT: 13.5 % (ref 10–15)
GFR SERPL CREATININE-BSD FRML MDRD: 25 ML/MIN/1.73M2
GLUCOSE SERPL-MCNC: 92 MG/DL (ref 70–99)
HCT VFR BLD AUTO: 34.3 % (ref 35–47)
HGB BLD-MCNC: 10.9 G/DL (ref 11.7–15.7)
MCH RBC QN AUTO: 31.3 PG (ref 26.5–33)
MCHC RBC AUTO-ENTMCNC: 31.8 G/DL (ref 31.5–36.5)
MCV RBC AUTO: 99 FL (ref 78–100)
PLATELET # BLD AUTO: 156 10E3/UL (ref 150–450)
POTASSIUM SERPL-SCNC: 5.3 MMOL/L (ref 3.4–5.3)
RBC # BLD AUTO: 3.48 10E6/UL (ref 3.8–5.2)
SODIUM SERPL-SCNC: 137 MMOL/L (ref 136–145)
WBC # BLD AUTO: 3.9 10E3/UL (ref 4–11)

## 2023-07-18 PROCEDURE — 36415 COLL VENOUS BLD VENIPUNCTURE: CPT | Performed by: INTERNAL MEDICINE

## 2023-07-18 PROCEDURE — 99215 OFFICE O/P EST HI 40 MIN: CPT | Performed by: INTERNAL MEDICINE

## 2023-07-18 PROCEDURE — 85027 COMPLETE CBC AUTOMATED: CPT | Performed by: INTERNAL MEDICINE

## 2023-07-18 PROCEDURE — 80048 BASIC METABOLIC PNL TOTAL CA: CPT | Performed by: INTERNAL MEDICINE

## 2023-07-18 NOTE — PROGRESS NOTES
HPI and Plan:   Macie is a very nice 82-year-old woman originally referred because of heavily calcified coronary arteries noted on her recent CT scan.    Macie has chronic renal insufficiency stage IIIb, chronic pancytopenia, obesity, asthma, COPD, coronary artery disease, diabetes mellitus, severe aortic stenosis.     Macie has no family history of coronary disease.  Both parents  of cancer at about age 67.  Macie has diabetes mellitus for the last 6 to 7 years, she is a former smoker having quit 28 years ago prior to that 1-1/2 packs/day.  She has hypercholesterolemia although the highest LDL I find in the chart is 128.  She does not have hypertension.  She occasionally uses alcohol     She has multiple orthopedic issues including bilateral knee surgeries and spinal stenosis that primarily limits her ability to exercise.     She has no chest, arm, neck, jaw or shoulder discomfort.  No dyspnea on exertion, orthopnea, or PND.  No palpitations, lightheadedness, dizziness, syncope, or near syncope.  No peripheral edema.     She was started on rosuvastatin and aspirin because of her CT scan.     An abnormal Lexiscan led to IVUS directed stenting of her proximal left anterior descending artery performed on .  Her echocardiogram demonstrated normal left ventricular function of 60 to 65% with severe aortic stenosis with a mean gradient of 31, aortic valve of 0.88 with an index of 0.46.    She returns to clinic today stating she has had 1 episode of chest pain since his stent was placed and took 2 nitroglycerin with relief.  Her main complaint is that of increased bruising.    As stated above her activity is primarily limited by her spinal stenosis so she is noted no significant change.    Assessment and plan.  Macie has had 1 episode of chest discomfort since discharge for which she took 2 nitroglycerin otherwise has been doing fine.  As stated she never had an anginal symptom prior to this due to her  very limited activity.    She does have severe aortic stenosis for which I will refer her to the structural lab for further evaluation.  Again at this time it is unclear that she is very symptomatic as her activity is limited by orthopedic problems.    She has other complicating issues.  Preprocedure creatinine is 1.88 giving her a GFR of 26.  I will send off a BMP today to see what her postprocedure creatinine is.    She also has chronic pancytopenia.  Preprocedure white count was 4.5 with a hemoglobin of 10.7 and a platelet count of 134,000.  I will repeat those today as well.    We talked about her increased bruisability is multifactorial due to dual antiplatelet therapy chronic thrombocytopenia, and probably long-term sun damage done to her forearms over the years.  I will continue dual antiplatelet but consider stopping aspirin at 3 months.    Fasting lipid profile is excellent with total cholesterol 106, HDL 45 LDL of 47 and triglycerides of 71.    Thank you for allow me to participate in her care.Thank you for allow me to participate in this patient's care.  Sincerely,                               Honorio Andino MD [facility]             Today's clinic visit entailed:  Review of the result(s) of each unique test - Coronary angiogram, echocardiogram, Lexiscan, lab work  Ordering of each unique test  Prescription drug management  45 minutes spent by me on the date of the encounter doing chart review, history and exam, documentation and further activities per the note  Provider  Link to [facility] Help Grid     The level of medical decision making during this visit was of high complexity.      Orders Placed This Encounter   Procedures     Basic metabolic panel     CBC with platelets     Follow-Up with Cardiology Structural/Valve       No orders of the defined types were placed in this encounter.      Medications Discontinued During This Encounter   Medication Reason     aspirin 81 MG EC tablet Therapy completed (No AVS)      gabapentin (NEURONTIN) 100 MG capsule Therapy completed (No AVS)     rosuvastatin (CRESTOR) 20 MG tablet Therapy completed (No AVS)         Encounter Diagnoses   Name Primary?     Nonrheumatic aortic valve stenosis Yes     Coronary artery disease due to calcified coronary lesion      Hypercholesterolemia      Stage 3b chronic kidney disease (H)      Pancytopenia (H)        CURRENT MEDICATIONS:  Current Outpatient Medications   Medication Sig Dispense Refill     albuterol (PROAIR HFA/PROVENTIL HFA/VENTOLIN HFA) 108 (90 Base) MCG/ACT inhaler INHALE 1 DOSE BY MOUTH EVERY 12 HOURS 180 g 0     allopurinol (ZYLOPRIM) 100 MG tablet Take 2 tablets by mouth once daily (Patient taking differently: Take 100 mg by mouth daily) 90 tablet 0     blood glucose (NO BRAND SPECIFIED) lancets standard Use to test blood sugar 1 time daily or as directed. 100 each 3     blood glucose (NO BRAND SPECIFIED) test strip Use to test blood sugar 1 time daily or as directed. 100 strip 3     blood glucose monitoring (NO BRAND SPECIFIED) meter device kit Use to test blood sugar 1 time daily or as directed. 1 kit 0     clopidogrel (PLAVIX) 75 MG tablet Take 1 tablet (75 mg) by mouth daily 90 tablet 3     Fluticasone-Umeclidin-Vilanterol (TRELEGY ELLIPTA) 200-62.5-25 MCG/ACT oral inhaler Inhale 1 puff into the lungs daily 28 each 1     gabapentin (NEURONTIN) 300 MG capsule Take 300 mg by mouth 2 times daily       HYDROcodone-acetaminophen (NORCO) 7.5-325 MG per tablet Take 1 tablet by mouth 2 times daily (Up to 2 times a day as needed for severe pain)       levothyroxine (SYNTHROID/LEVOTHROID) 137 MCG tablet Take 1 tablet by mouth once daily 90 tablet 0     metoprolol succinate ER (TOPROL XL) 25 MG 24 hr tablet Take 1 tablet (25 mg) by mouth daily 60 tablet 3     nitroGLYcerin (NITROSTAT) 0.4 MG sublingual tablet For chest pain place 1 tablet under the tongue every 5 minutes for 3 doses. If symptoms persist 5 minutes after 1st dose call 251. 3  tablet 1     oxybutynin ER (DITROPAN XL) 10 MG 24 hr tablet Take 1 tablet by mouth once daily 90 tablet 0     rosuvastatin (CRESTOR) 20 MG tablet Take 1 tablet (20 mg) by mouth daily 90 tablet 3     aspirin 81 MG EC tablet Take 1 tablet (81 mg) by mouth daily 90 tablet 3       ALLERGIES     Allergies   Allergen Reactions     Morphine Nausea     Severe vomiting     Prednisone      Elevated blood glucose  weakness     Baclofen GI Disturbance     Imitrex [Sumatriptan] Nausea     Tetracycline      Sores in mouth       PAST MEDICAL HISTORY:  Past Medical History:   Diagnosis Date     Calculus of left ureter 2014    dr Arriaga - ureteroscopy and lithitripsy     Cataract      Chronic low back pain     Dr Villagran at [facility] in 2015- not surgical      Complication of anesthesia      COPD with asthma (H)      followed with pulmonary DR LENNOX 60-pack-year history of smoking     Esophagitis, reflux 9/2012    SOME EOSINOPHILIA NO BARRETS     Gastro-oesophageal reflux disease      has seen GI     Hydronephrosis, left     chronic , in 2017 Dr Arriaga plan was observaton     Hyperlipidaemia LDL goal < 100      Hypothyroid      Kidney stone 3/28/2019     Migraine headaches 9/1/2011     Moderate persistent asthma      lifelong, saw Pulm 2013     Obesity (BMI 30-39.9)      Panic attacks      PONV (postoperative nausea and vomiting)      Renal stones 2016    kidney stones, multiple small stones high risk recurrent ureteral stones. , Dr Arriaga in 2016 rec 24 hour urine.     Type 2 diabetes mellitus without complication (H)        PAST SURGICAL HISTORY:  Past Surgical History:   Procedure Laterality Date     ARTHROPLASTY HIP  3/13/2013    Procedure: ARTHROPLASTY HIP;  LEFT TOTAL HIP ARTHROPLASTY (BIOMET)^ ;  Surgeon: Anand Main MD;  Location: [facility] OR     ARTHROPLASTY KNEE Right 8/9/2021    Procedure: RIGHT TOTAL KNEE ARTHROPLASTY;  Surgeon: Anand Main MD;  Location: [facility] OR     ARTHROPLASTY PATELLO-FEMORAL (KNEE)  2005ish    Left      CHOLECYSTECTOMY, OPEN  25 yo     COMBINED CYSTOSCOPY, INSERT STENT URETER(S)  8/5/2014    Procedure: COMBINED CYSTOSCOPY, INSERT STENT URETER(S);  Surgeon: Sam Arriaga MD;  Location:  OR     COMBINED CYSTOSCOPY, RETROGRADES, URETEROSCOPY, LASER HOLMIUM LITHOTRIPSY URETER(S), INSERT STENT Left 6/14/2016    Procedure: COMBINED CYSTOSCOPY, RETROGRADES, URETEROSCOPY, LASER HOLMIUM LITHOTRIPSY URETER(S), INSERT STENT;  Surgeon: Thomas Edmondson MD;  Location:  OR     COMBINED CYSTOSCOPY, RETROGRADES, URETEROSCOPY, LASER HOLMIUM LITHOTRIPSY URETER(S), INSERT STENT Left 3/28/2019    Procedure: CYSTOSCOPY, LEFT RETROGRADE PYLEOGRAM, LEFT URETEROSCOPY, HOLMIUM LASER LITHOTRIPSY, LEFT URETERAL STENT EXCHANGE;  Surgeon: Derrek Rivera MD;  Location:  OR     CV CORONARY ANGIOGRAM N/A 7/5/2023    Procedure: Coronary Angiogram;  Surgeon: Fausto Espinal MD;  Location:  [facility]     CV INTRAVASULAR ULTRASOUND N/A 7/5/2023    Procedure: Intravascular Ultrasound;  Surgeon: Fausto Espinal MD;  Location:  HE[facility] C[facility] LAB     CV PCI N/A 7/5/2023    Procedure: Percutaneous Coronary Intervention;  Surgeon: Fausto Espinal MD;  Location:  HEART [facility]ARDIAC CATH LAB     CYSTOSCOPY, RETROGRADES, INSERT STENT URETER(S), COMBINED Left 3/8/2019    Procedure: CYSTOSCOPY,LEFT RETROGRADE, LEFT STENT PLACEMENT;  Surgeon: Derrek Rivera MD;  Location:  OR     GENITOURINARY SURGERY       LAMINECT/DISCECTOMY, LUMBAR      Laminectomy/Discectomy Lumbar     LASER HOLMIUM LITHOTRIPSY URETER(S), INSERT STENT, COMBINED Left 8/20/2014    Procedure: COMBINED CYSTOSCOPY, URETEROSCOPY, LASER HOLMIUM LITHOTRIPSY URETER(S), INSERT STENT;  Surgeon: Sam Arriaga MD;  Location:  OR     LASER HOLMIUM LITHOTRIPSY URETER(S), INSERT STENT, COMBINED Left 5/2/2019    Procedure: CYSTOSCOPY, LEFT RETROGRADE, LEFT URETEROSCOPY, HOLMIUM LASER LITHOTRIPSY, STONE REMOVAL, LEFT STENT EXCHANGE;  Surgeon: Miguel  "Derrek Newton MD;  Location:  OR     LASER HOLMIUM LITHOTRIPSY URETER(S), INSERT STENT, COMBINED Right 2019    Procedure: CYSTOSCOPY; RIGHT URETEROSCOPY WITH HOLMIUM LASER LITHOTRIPSY; RIGHT STENT PLACEMENT (DIGITAL FLEXIBLE URETEROSCOPE);  Surgeon: Derrek Rivera MD;  Location:  OR     ORTHOPEDIC SURGERY      left ankle     RECESSION EYELID UPPER         FAMILY HISTORY:  No family history on file.    SOCIAL HISTORY:  Social History     Socioeconomic History     Marital status:    Tobacco Use     Smoking status: Former     Packs/day: 1.50     Years: 40.00     Pack years: 60.00     Types: Cigarettes     Quit date: 1995     Years since quittin.5     Smokeless tobacco: Never   Substance and Sexual Activity     Alcohol use: Not Currently     Comment: occ. very rare once a year     Drug use: No       Review of Systems:  Skin:  Negative       Eyes:  Negative      ENT:  Negative      Respiratory:  Positive for dyspnea on exertion     Cardiovascular:  Negative Positive for;fatigue;chest pain    Gastroenterology: Negative      Genitourinary:  Negative      Musculoskeletal:  Negative      Neurologic:  Positive for      Psychiatric:  Negative      Heme/Lymph/Imm:  Positive for allergies    Endocrine:  Positive for thyroid disorder;diabetes      Physical Exam:  Vitals: /66   Pulse 60   Ht 1.676 m (5' 6\")   Wt 82.6 kg (182 lb)   SpO2 97%   BMI 29.38 kg/m      Constitutional:  cooperative, alert and oriented, well developed, well nourished, in no acute distress obese      Skin:  warm and dry to the touch, no apparent skin lesions or masses noted          Head:  normocephalic, no masses or lesions        Eyes:  pupils equal and round, conjunctivae and lids unremarkable, sclera white, no xanthalasma, EOMS intact, no nystagmus        Lymph:      ENT:  no pallor or cyanosis, dentition good        Neck:  no carotid bruit        Respiratory:  normal breath sounds, clear to auscultation, normal " A-P diameter, normal symmetry, normal respiratory excursion, no use of accessory muscles         Cardiac: regular rhythm         grade 2;systolic murmur;RUSB;radiation to the carotid      pulses full and equal                                        GI:           Extremities and Muscular Skeletal:  no edema;no spinal abnormalities noted;normal muscle strength and tone              Neurological:  no gross motor deficits        Psych:  affect appropriate, oriented to time, person and place        CC  Honorio Andino MD  [id number]  [place] [id number]

## 2023-07-18 NOTE — LETTER
2023    Elias Meyer MD  [address]  [facility] [address]    RE: Macie Mercedes Ania       Dear Colleague,     I had the pleasure of seeing Macie Jorge in the [facility].  HPI and Plan:   Macie is a very nice 82-year-old woman originally referred because of heavily calcified coronary arteries noted on her recent CT scan.    Macie has chronic renal insufficiency stage IIIb, chronic pancytopenia, obesity, asthma, COPD, coronary artery disease, diabetes mellitus, severe aortic stenosis.     Macie has no family history of coronary disease.  Both parents  of cancer at about age 67.  Macie has diabetes mellitus for the last 6 to 7 years, she is a former smoker having quit 28 years ago prior to that 1-1/2 packs/day.  She has hypercholesterolemia although the highest LDL I find in the chart is 128.  She does not have hypertension.  She occasionally uses alcohol     She has multiple orthopedic issues including bilateral knee surgeries and spinal stenosis that primarily limits her ability to exercise.     She has no chest, arm, neck, jaw or shoulder discomfort.  No dyspnea on exertion, orthopnea, or PND.  No palpitations, lightheadedness, dizziness, syncope, or near syncope.  No peripheral edema.     She was started on rosuvastatin and aspirin because of her CT scan.     An abnormal Lexiscan led to IVUS directed stenting of her proximal left anterior descending artery performed on .  Her echocardiogram demonstrated normal left ventricular function of 60 to 65% with severe aortic stenosis with a mean gradient of 31, aortic valve of 0.88 with an index of 0.46.    She returns to clinic today stating she has had 1 episode of chest pain since his stent was placed and took 2 nitroglycerin with relief.  Her main complaint is that of increased bruising.    As stated above her activity is primarily limited by her spinal stenosis so she is noted no significant  change.    Assessment and plan.  Macie has had 1 episode of chest discomfort since discharge for which she took 2 nitroglycerin otherwise has been doing fine.  As stated she never had an anginal symptom prior to this due to her very limited activity.    She does have severe aortic stenosis for which I will refer her to the structural lab for further evaluation.  Again at this time it is unclear that she is very symptomatic as her activity is limited by orthopedic problems.    She has other complicating issues.  Preprocedure creatinine is 1.88 giving her a GFR of 26.  I will send off a BMP today to see what her postprocedure creatinine is.    She also has chronic pancytopenia.  Preprocedure white count was 4.5 with a hemoglobin of 10.7 and a platelet count of 134,000.  I will repeat those today as well.    We talked about her increased bruisability is multifactorial due to dual antiplatelet therapy chronic thrombocytopenia, and probably long-term sun damage done to her forearms over the years.  I will continue dual antiplatelet but consider stopping aspirin at 3 months.    Fasting lipid profile is excellent with total cholesterol 106, HDL 45 LDL of 47 and triglycerides of 71.    Thank you for allow me to participate in her care.Thank you for allow me to participate in this patient's care.  Sincerely,                               Honorio Andino MD [facility]             Today's clinic visit entailed:  Review of the result(s) of each unique test - Coronary angiogram, echocardiogram, Lexiscan, lab work  Ordering of each unique test  Prescription drug management  45 minutes spent by me on the date of the encounter doing chart review, history and exam, documentation and further activities per the note  Provider  Link to [facility] Help Grid     The level of medical decision making during this visit was of high complexity.      Orders Placed This Encounter   Procedures    Basic metabolic panel    CBC with platelets    Follow-Up  with Cardiology Structural/Valve       No orders of the defined types were placed in this encounter.      Medications Discontinued During This Encounter   Medication Reason    aspirin 81 MG EC tablet Therapy completed (No AVS)    gabapentin (NEURONTIN) 100 MG capsule Therapy completed (No AVS)    rosuvastatin (CRESTOR) 20 MG tablet Therapy completed (No AVS)         Encounter Diagnoses   Name Primary?    Nonrheumatic aortic valve stenosis Yes    Coronary artery disease due to calcified coronary lesion     Hypercholesterolemia     Stage 3b chronic kidney disease (H)     Pancytopenia (H)        CURRENT MEDICATIONS:  Current Outpatient Medications   Medication Sig Dispense Refill    albuterol (PROAIR HFA/PROVENTIL HFA/VENTOLIN HFA) 108 (90 Base) MCG/ACT inhaler INHALE 1 DOSE BY MOUTH EVERY 12 HOURS 180 g 0    allopurinol (ZYLOPRIM) 100 MG tablet Take 2 tablets by mouth once daily (Patient taking differently: Take 100 mg by mouth daily) 90 tablet 0    blood glucose (NO BRAND SPECIFIED) lancets standard Use to test blood sugar 1 time daily or as directed. 100 each 3    blood glucose (NO BRAND SPECIFIED) test strip Use to test blood sugar 1 time daily or as directed. 100 strip 3    blood glucose monitoring (NO BRAND SPECIFIED) meter device kit Use to test blood sugar 1 time daily or as directed. 1 kit 0    clopidogrel (PLAVIX) 75 MG tablet Take 1 tablet (75 mg) by mouth daily 90 tablet 3    Fluticasone-Umeclidin-Vilanterol (TRELEGY ELLIPTA) 200-62.5-25 MCG/ACT oral inhaler Inhale 1 puff into the lungs daily 28 each 1    gabapentin (NEURONTIN) 300 MG capsule Take 300 mg by mouth 2 times daily      HYDROcodone-acetaminophen (NORCO) 7.5-325 MG per tablet Take 1 tablet by mouth 2 times daily (Up to 2 times a day as needed for severe pain)      levothyroxine (SYNTHROID/LEVOTHROID) 137 MCG tablet Take 1 tablet by mouth once daily 90 tablet 0    metoprolol succinate ER (TOPROL XL) 25 MG 24 hr tablet Take 1 tablet (25 mg) by  mouth daily 60 tablet 3    nitroGLYcerin (NITROSTAT) 0.4 MG sublingual tablet For chest pain place 1 tablet under the tongue every 5 minutes for 3 doses. If symptoms persist 5 minutes after 1st dose call 911. 3 tablet 1    oxybutynin ER (DITROPAN XL) 10 MG 24 hr tablet Take 1 tablet by mouth once daily 90 tablet 0    rosuvastatin (CRESTOR) 20 MG tablet Take 1 tablet (20 mg) by mouth daily 90 tablet 3    aspirin 81 MG EC tablet Take 1 tablet (81 mg) by mouth daily 90 tablet 3       ALLERGIES     Allergies   Allergen Reactions    Morphine Nausea     Severe vomiting    Prednisone      Elevated blood glucose  weakness    Baclofen GI Disturbance    Imitrex [Sumatriptan] Nausea    Tetracycline      Sores in mouth       PAST MEDICAL HISTORY:  Past Medical History:   Diagnosis Date    Calculus of left ureter 2014    dr Arriaga - ureteroscopy and lithitripsy    Cataract     Chronic low back pain     Dr Villagran at [facility] in 2015- not surgical     Complication of anesthesia     COPD with asthma (H)      followed with pulmonary DR LENNOX 60-pack-year history of smoking    Esophagitis, reflux 9/2012    SOME EOSINOPHILIA NO BARRETS    Gastro-oesophageal reflux disease      has seen GI    Hydronephrosis, left     chronic , in 2017 Dr Arriaga plan was observaton    Hyperlipidaemia LDL goal < 100     Hypothyroid     Kidney stone 3/28/2019    Migraine headaches 9/1/2011    Moderate persistent asthma      lifelong, saw Pulm 2013    Obesity (BMI 30-39.9)     Panic attacks     PONV (postoperative nausea and vomiting)     Renal stones 2016    kidney stones, multiple small stones high risk recurrent ureteral stones. , Dr Arriaga in 2016 rec 24 hour urine.    Type 2 diabetes mellitus without complication (H)        PAST SURGICAL HISTORY:  Past Surgical History:   Procedure Laterality Date    ARTHROPLASTY HIP  3/13/2013    Procedure: ARTHROPLASTY HIP;  LEFT TOTAL HIP ARTHROPLASTY (BIOMET)^ ;  Surgeon: Anand Main MD;  Location: [facility] OR    ARTHROPLASTY  KNEE Right 8/9/2021    Procedure: RIGHT TOTAL KNEE ARTHROPLASTY;  Surgeon: Anand Main MD;  Location:  OR    ARTHROPLASTY PATELLO-FEMORAL (KNEE)  2005ish    Left    CHOLECYSTECTOMY, OPEN  27 yo    COMBINED CYSTOSCOPY, INSERT STENT URETER(S)  8/5/2014    Procedure: COMBINED CYSTOSCOPY, INSERT STENT URETER(S);  Surgeon: Sam Arriaga MD;  Location:  OR    COMBINED CYSTOSCOPY, RETROGRADES, URETEROSCOPY, LASER HOLMIUM LITHOTRIPSY URETER(S), INSERT STENT Left 6/14/2016    Procedure: COMBINED CYSTOSCOPY, RETROGRADES, URETEROSCOPY, LASER HOLMIUM LITHOTRIPSY URETER(S), INSERT STENT;  Surgeon: Thomas Edmondson MD;  Location:  OR    COMBINED CYSTOSCOPY, RETROGRADES, URETEROSCOPY, LASER HOLMIUM LITHOTRIPSY URETER(S), INSERT STENT Left 3/28/2019    Procedure: CYSTOSCOPY, LEFT RETROGRADE PYLEOGRAM, LEFT URETEROSCOPY, HOLMIUM LASER LITHOTRIPSY, LEFT URETERAL STENT EXCHANGE;  Surgeon: Derrke Rivera MD;  Location:  OR    CV CORONARY ANGIOGRAM N/A 7/5/2023    Procedure: Coronary Angiogram;  Surgeon: Fausto Espinal MD;  Location:  HEART CARDIAC CATH LAB    CV INTRAVASULAR ULTRASOUND N/A 7/5/2023    Procedure: Intravascular Ultrasound;  Surgeon: Fausto Espinal MD;  Location:  HEART CARDIAC CATH LAB    CV PCI N/A 7/5/2023    Procedure: Percutaneous Coronary Intervention;  Surgeon: Fausto Espinal MD;  Location:  HEART CARDIAC CATH LAB    CYSTOSCOPY, RETROGRADES, INSERT STENT URETER(S), COMBINED Left 3/8/2019    Procedure: CYSTOSCOPY,LEFT RETROGRADE, LEFT STENT PLACEMENT;  Surgeon: Derrek Rivera MD;  Location:  OR    GENITOURINARY SURGERY      LAMINECT/DISCECTOMY, LUMBAR      Laminectomy/Discectomy Lumbar    LASER HOLMIUM LITHOTRIPSY URETER(S), INSERT STENT, COMBINED Left 8/20/2014    Procedure: COMBINED CYSTOSCOPY, URETEROSCOPY, LASER HOLMIUM LITHOTRIPSY URETER(S), INSERT STENT;  Surgeon: Sam Arriaga MD;  Location:  OR    LASER HOLMIUM LITHOTRIPSY URETER(S), INSERT STENT,  "COMBINED Left 2019    Procedure: CYSTOSCOPY, LEFT RETROGRADE, LEFT URETEROSCOPY, HOLMIUM LASER LITHOTRIPSY, STONE REMOVAL, LEFT STENT EXCHANGE;  Surgeon: Derrek Rivera MD;  Location:  OR    LASER HOLMIUM LITHOTRIPSY URETER(S), INSERT STENT, COMBINED Right 2019    Procedure: CYSTOSCOPY; RIGHT URETEROSCOPY WITH HOLMIUM LASER LITHOTRIPSY; RIGHT STENT PLACEMENT (DIGITAL FLEXIBLE URETEROSCOPE);  Surgeon: Derrek Rivera MD;  Location:  OR    ORTHOPEDIC SURGERY      left ankle    RECESSION EYELID UPPER         FAMILY HISTORY:  No family history on file.    SOCIAL HISTORY:  Social History     Socioeconomic History    Marital status:    Tobacco Use    Smoking status: Former     Packs/day: 1.50     Years: 40.00     Pack years: 60.00     Types: Cigarettes     Quit date: 1995     Years since quittin.5    Smokeless tobacco: Never   Substance and Sexual Activity    Alcohol use: Not Currently     Comment: occ. very rare once a year    Drug use: No       Review of Systems:  Skin:  Negative       Eyes:  Negative      ENT:  Negative      Respiratory:  Positive for dyspnea on exertion     Cardiovascular:  Negative Positive for;fatigue;chest pain    Gastroenterology: Negative      Genitourinary:  Negative      Musculoskeletal:  Negative      Neurologic:  Positive for      Psychiatric:  Negative      Heme/Lymph/Imm:  Positive for allergies    Endocrine:  Positive for thyroid disorder;diabetes      Physical Exam:  Vitals: /66   Pulse 60   Ht 1.676 m (5' 6\")   Wt 82.6 kg (182 lb)   SpO2 97%   BMI 29.38 kg/m      Constitutional:  cooperative, alert and oriented, well developed, well nourished, in no acute distress obese      Skin:  warm and dry to the touch, no apparent skin lesions or masses noted          Head:  normocephalic, no masses or lesions        Eyes:  pupils equal and round, conjunctivae and lids unremarkable, sclera white, no xanthalasma, EOMS intact, no nystagmus    "     Lymph:      ENT:  no pallor or cyanosis, dentition good        Neck:  no carotid bruit        Respiratory:  normal breath sounds, clear to auscultation, normal A-P diameter, normal symmetry, normal respiratory excursion, no use of accessory muscles         Cardiac: regular rhythm         grade 2;systolic murmur;RUSB;radiation to the carotid      pulses full and equal                                        GI:           Extremities and Muscular Skeletal:  no edema;no spinal abnormalities noted;normal muscle strength and tone              Neurological:  no gross motor deficits        Psych:  affect appropriate, oriented to time, person and place        CC  Honorio Andino MD  [address] [facility] [place] [address] [address]      Thank you for allowing me to participate in the care of your patient.      Sincerely,     Honorio Andino MD     [facility]

## 2023-07-19 ENCOUNTER — TELEPHONE (OUTPATIENT)
Dept: CARDIOLOGY | Facility: CLINIC | Age: 83
End: 2023-07-19
Payer: COMMERCIAL

## 2023-07-19 NOTE — TELEPHONE ENCOUNTER
Labs were already collected yesterday, do not need to be re-ordered. See separate encounter regarding results, Dr Andino to review.

## 2023-07-19 NOTE — TELEPHONE ENCOUNTER
Honorio Andino MD Hiljus, Audrey G RN  Caller: Unspecified (Today,  9:06 AM)    Okay looks good.  She just did not have any post procedure lab and as she is going to valve clinic next I thought we should have the data.       Spoke to patient, reviewed labs and message from Dr Andino. No questions. Patient expressed fear thinking about the TAVR surgery, emotional support provided. She will see Dr Espinal on 8/10/23.

## 2023-07-19 NOTE — TELEPHONE ENCOUNTER
[facility] Call Center    Phone Message    May a detailed message be left on voicemail: yes     Reason for Call: Other: Pt needs lab order with us as she was unable to get at primary clinic     Action Taken: Other: Cardio    Travel Screening: Not Applicable   Thank you!  [facility]

## 2023-07-19 NOTE — TELEPHONE ENCOUNTER
Structural Heart Clinic - [facility]    TAVR referral received from: Dr. Andino    Contacted patient to introduce self, provide education on aortic stenosis    Chart and recent lab results reviewed.  TAVR CT: Will wait until office visit to order and schedule  Contrast allergy: No known  Last dental visit: February 2023.  Support/living situation: Lives in her home in [place] with her spouse, Pedrito.   Arranged TAVR consult with Dr. Espinal  Provided direct contact information. Will mail TAVR information packet today so patient can review prior to structural office visit.     Annemarie Escudero RN  Structural Heart Coordinator  [facility]

## 2023-07-19 NOTE — TELEPHONE ENCOUNTER
BMP/CBC added on after visit yesterday with Dr Andino to reassess renal function and pancytopenia after angiogram 7/5/23. Routed to provider to review.       Component      Latest Ref Rng 7/18/2023  3:04 PM   Sodium      136 - 145 mmol/L 137    Potassium      3.4 - 5.3 mmol/L 5.3    Chloride      98 - 107 mmol/L 103    Carbon Dioxide (CO2)      22 - 29 mmol/L 23    Anion Gap      7 - 15 mmol/L 11    Urea Nitrogen      8.0 - 23.0 mg/dL 38.4 (H)    Creatinine      0.51 - 0.95 mg/dL 1.94 (H)    Calcium      8.8 - 10.2 mg/dL 9.7    Glucose      70 - 99 mg/dL 92    GFR Estimate      >60 mL/min/1.73m2 25 (L)    WBC      4.0 - 11.0 10e3/uL 3.9 (L)    RBC Count      3.80 - 5.20 10e6/uL 3.48 (L)    Hemoglobin      11.7 - 15.7 g/dL 10.9 (L)    Hematocrit      35.0 - 47.0 % 34.3 (L)    MCV      78 - 100 fL 99    MCH      26.5 - 33.0 pg 31.3    MCHC      31.5 - 36.5 g/dL 31.8    RDW      10.0 - 15.0 % 13.5    Platelet Count      150 - 450 10e3/uL 156

## 2023-07-24 ENCOUNTER — TELEPHONE (OUTPATIENT)
Dept: CARDIOLOGY | Facility: CLINIC | Age: 83
End: 2023-07-24
Payer: COMMERCIAL

## 2023-07-24 DIAGNOSIS — N20.0 URIC ACID KIDNEY STONE: ICD-10-CM

## 2023-07-24 DIAGNOSIS — I25.10 CORONARY ARTERY DISEASE INVOLVING NATIVE CORONARY ARTERY OF NATIVE HEART WITHOUT ANGINA PECTORIS: ICD-10-CM

## 2023-07-24 RX ORDER — OXYBUTYNIN CHLORIDE 10 MG/1
TABLET, EXTENDED RELEASE ORAL
Qty: 90 TABLET | Refills: 0 | Status: SHIPPED | OUTPATIENT
Start: 2023-07-24 | End: 2023-10-23

## 2023-07-24 RX ORDER — ASPIRIN 81 MG/1
TABLET ORAL
Qty: 90 TABLET | Refills: 3 | COMMUNITY
Start: 2023-07-24 | End: 2024-07-22

## 2023-07-24 NOTE — TELEPHONE ENCOUNTER
Macie called to discuss her blood thinners    Macie states she is having more black and blue spots showing up on her arms, legs, shoulder... since her appt with him .   States it's summer time and she is embarrassed about the bruising.   Wondering if medications can be adjusted.     Currently on ASA 81 mg and plavix 75 mg daily.     Heart angiogram 7-5-23 -   High grade proximal-mid LAD stenosis s/p IVUS guided PCI with single RAMILA placement     Last 7-18-23 - We talked about her increased bruisability is multifactorial due to dual antiplatelet therapy chronic thrombocytopenia, and probably long-term sun damage done to her forearms over the years.  I will continue dual antiplatelet but consider stopping aspirin at 3 months.

## 2023-07-24 NOTE — TELEPHONE ENCOUNTER
[facility] Call Center    Phone Message    May a detailed message be left on voicemail: yes     Reason for Call: Other: Macie called to discuss her blood thinners with Dr. Andino based on the information she provided him at her last offiice visit with him . Macie states she is having more black and blue spots showing up on her arms since her appt with him . Please reach out to Macie to discuss her medication. Thank you!     Action Taken: Other: Cardiology    Travel Screening: Not Applicable     Thank you!  [facility]

## 2023-07-24 NOTE — TELEPHONE ENCOUNTER
Reply from Dr. Andino:   Decrease aspirin to Monday Wednesday and Friday. She should be on it at least a month.     7/24/2023 1610 spoke with patient and she will try the [facility] schedule for her ASA 81mg.  Med list updated

## 2023-07-31 ENCOUNTER — TELEPHONE (OUTPATIENT)
Dept: CARDIOLOGY | Facility: CLINIC | Age: 83
End: 2023-07-31
Payer: COMMERCIAL

## 2023-07-31 NOTE — TELEPHONE ENCOUNTER
"Patient just had a stent placed to the LAD on 7/5/23, on DAPT with aspirin and plavix.     Spoke to patient, said she has a front tooth that broke off last night eating dinner. She saw her dentist today who was going to pull the tooth but then didn't when they found out she was on antiplatelets. She is seeing an oral surgeon on 8/4 but wonders what she can do about the meds. She notes she is still bruised \"all over,\" aspirin was decreased to MWF on 7/24 by Dr Andino, said she needs to be on it at least 1mo post-cath. Routed to provider.   "

## 2023-08-01 NOTE — TELEPHONE ENCOUNTER
Reply from Dr. Andino:  No change.  I am willing to consider stopping aspirin after 1 month.  But not P2 Y12 inhibitor.  She can have the tooth pulled on meds or leave it as is.       Patient had RAMILA to LAD on 7/5/2023. She is taking ASA 81mg on MWF and plavix 75mg daily.  Called patient to review that she needs to continue her medications without stopping. She will discuss this with the oral surgeon.

## 2023-08-10 ENCOUNTER — OFFICE VISIT (OUTPATIENT)
Dept: CARDIOLOGY | Facility: CLINIC | Age: 83
End: 2023-08-10
Payer: COMMERCIAL

## 2023-08-10 VITALS
WEIGHT: 189 LBS | OXYGEN SATURATION: 96 % | HEIGHT: 66 IN | SYSTOLIC BLOOD PRESSURE: 124 MMHG | DIASTOLIC BLOOD PRESSURE: 74 MMHG | BODY MASS INDEX: 30.37 KG/M2 | HEART RATE: 63 BPM

## 2023-08-10 DIAGNOSIS — I35.0 NONRHEUMATIC AORTIC VALVE STENOSIS: ICD-10-CM

## 2023-08-10 PROCEDURE — 99215 OFFICE O/P EST HI 40 MIN: CPT | Performed by: INTERNAL MEDICINE

## 2023-08-10 PROCEDURE — 93000 ELECTROCARDIOGRAM COMPLETE: CPT | Performed by: INTERNAL MEDICINE

## 2023-08-10 NOTE — LETTER
8/10/2023    Elias Meyer MD  [address]  [facility] [address]    RE: Macie Jorge       Dear Colleague,     I had the pleasure of seeing Macie Jorge in the [facility].  REASON FOR CONSULTATION: Aortic stenosis    HISTORY OF PRESENT ILLNESS: Macie Jorge is a pleasant 82-year-old female with history of coronary disease, aortic stenosis, stage IV chronic kidney disease, severe spinal stenosis, chronic pancytopenia, obesity, asthma and COPD here for further evaluation regarding aortic stenosis.    She was seen by Dr. Honeycutt in June of this year for evaluation of chest pain.  She had a stress test which suggested anterior ischemia.  She subsequently underwent coronary angiography which revealed high-grade proximal/mid LAD stenosis which was stented successfully.  She had few episodes of chest pain since her PCI relieved with nitroglycerin.    The patient also recently had an echocardiogram to assess her aortic stenosis.  The echocardiogram which I personally reviewed showed preserved LV function with an EF of 60 to 65%, aortic stenosis with mean gradient of 31 mmHg, DI of 0.26, and valve area of 0.82 and stroke-volume index of 38 mL/m2.     He is currently limited from functional point of view by severe spinal stenosis.  However, she is able to walk as far she would like to if she is using a walker or a cart.  No shortness of breath or chest pain with exertion.  No palpitations, presyncope or syncope.  She gets lightheaded spells at night especially when she lays down in bed.      ASSESSMENT AND PLAN: I reviewed the patient's recent coronary angiography, electrocardiogram and echocardiography.  The aortic valve was not well visualized due to difficult images however the valve leaflets appear restricted.  It is hard to tell at this point whether the stenosis is severe.  She does not endorse any overwhelming cardiopulmonary symptoms at this point.  Given lack of  significant symptoms and advanced kidney disease, we agreed on continuing close surveillance with echocardiography.  If she develops symptoms, will consider pursuing CT TAVR.  She would like to avoid any tests that could further deteriorate her kidney function.  She does not want to be on dialysis.    She will let us let us know if she has recurrent chest pain episodes.    It was pleasure seeing Macie in the clinic this afternoon.  I appreciate the opportunity to participate in her care.      Fausto Espinal MD    Today's clinic visit entailed:  45 minutes spent by me on the date of the encounter doing chart review, history and exam, documentation and further activities per the note  Provider  Link to [facility] Help Grid       Orders Placed This Encounter   Procedures    EKG 12-lead complete w/read - Clinics (performed today)       No orders of the defined types were placed in this encounter.      There are no discontinued medications.      Encounter Diagnosis   Name Primary?    Nonrheumatic aortic valve stenosis        CURRENT MEDICATIONS:  Current Outpatient Medications   Medication Sig Dispense Refill    albuterol (PROAIR HFA/PROVENTIL HFA/VENTOLIN HFA) 108 (90 Base) MCG/ACT inhaler INHALE 1 DOSE BY MOUTH EVERY 12 HOURS 180 g 0    allopurinol (ZYLOPRIM) 100 MG tablet Take 2 tablets by mouth once daily (Patient taking differently: Take 100 mg by mouth daily) 90 tablet 0    aspirin 81 MG EC tablet Take 1 tab (81mg) on MWF 90 tablet 3    blood glucose (NO BRAND SPECIFIED) lancets standard Use to test blood sugar 1 time daily or as directed. 100 each 3    blood glucose (NO BRAND SPECIFIED) test strip Use to test blood sugar 1 time daily or as directed. 100 strip 3    blood glucose monitoring (NO BRAND SPECIFIED) meter device kit Use to test blood sugar 1 time daily or as directed. 1 kit 0    clopidogrel (PLAVIX) 75 MG tablet Take 1 tablet (75 mg) by mouth daily 90 tablet 3    Fluticasone-Umeclidin-Vilanterol (TRELEGY  ELLIPTA) 200-62.5-25 MCG/ACT oral inhaler Inhale 1 puff into the lungs daily 28 each 1    gabapentin (NEURONTIN) 300 MG capsule Take 300 mg by mouth 2 times daily      HYDROcodone-acetaminophen (NORCO) 7.5-325 MG per tablet Take 1 tablet by mouth 2 times daily (Up to 2 times a day as needed for severe pain)      levothyroxine (SYNTHROID/LEVOTHROID) 137 MCG tablet Take 1 tablet by mouth once daily 90 tablet 0    metoprolol succinate ER (TOPROL XL) 25 MG 24 hr tablet Take 1 tablet (25 mg) by mouth daily 60 tablet 3    oxybutynin ER (DITROPAN XL) 10 MG 24 hr tablet Take 1 tablet by mouth once daily 90 tablet 0    rosuvastatin (CRESTOR) 20 MG tablet Take 1 tablet (20 mg) by mouth daily 90 tablet 3    nitroGLYcerin (NITROSTAT) 0.4 MG sublingual tablet For chest pain place 1 tablet under the tongue every 5 minutes for 3 doses. If symptoms persist 5 minutes after 1st dose call 911. (Patient not taking: Reported on 8/10/2023) 3 tablet 1       ALLERGIES     Allergies   Allergen Reactions    Morphine Nausea     Severe vomiting    Prednisone      Elevated blood glucose  weakness    Baclofen GI Disturbance    Imitrex [Sumatriptan] Nausea    Tetracycline      Sores in mouth       PAST MEDICAL HISTORY:  Past Medical History:   Diagnosis Date    Calculus of left ureter 2014    dr Arriaga - ureteroscopy and lithitripsy    Cataract     Chronic low back pain     Dr Villagran at [facility] in 2015- not surgical     Complication of anesthesia     COPD with asthma (H)      followed with pulmonary DR LENNOX 60-pack-year history of smoking    Esophagitis, reflux 9/2012    SOME EOSINOPHILIA NO BARRETS    Gastro-oesophageal reflux disease      has seen GI    Hydronephrosis, left     chronic , in 2017 Dr Arriaga plan was observaton    Hyperlipidaemia LDL goal < 100     Hypothyroid     Kidney stone 3/28/2019    Migraine headaches 9/1/2011    Moderate persistent asthma      lifelong, saw Pulm 2013    Obesity (BMI 30-39.9)     Panic attacks     PONV (postoperative  nausea and vomiting)     Renal stones 2016    kidney stones, multiple small stones high risk recurrent ureteral stones. , Dr Arriaga in 2016 rec 24 hour urine.    Type 2 diabetes mellitus without complication (H)        PAST SURGICAL HISTORY:  Past Surgical History:   Procedure Laterality Date    ARTHROPLASTY HIP  3/13/2013    Procedure: ARTHROPLASTY HIP;  LEFT TOTAL HIP ARTHROPLASTY (BIOMET)^ ;  Surgeon: Anand Main MD;  Location:  OR    ARTHROPLASTY KNEE Right 8/9/2021    Procedure: RIGHT TOTAL KNEE ARTHROPLASTY;  Surgeon: Anand Main MD;  Location:  OR    ARTHROPLASTY PATELLO-FEMORAL (KNEE)  2005ish    Left    CHOLECYSTECTOMY, OPEN  25 yo    COMBINED CYSTOSCOPY, INSERT STENT URETER(S)  8/5/2014    Procedure: COMBINED CYSTOSCOPY, INSERT STENT URETER(S);  Surgeon: Sam Arriaga MD;  Location:  OR    COMBINED CYSTOSCOPY, RETROGRADES, URETEROSCOPY, LASER HOLMIUM LITHOTRIPSY URETER(S), INSERT STENT Left 6/14/2016    Procedure: COMBINED CYSTOSCOPY, RETROGRADES, URETEROSCOPY, LASER HOLMIUM LITHOTRIPSY URETER(S), INSERT STENT;  Surgeon: Thomas Edmondson MD;  Location:  OR    COMBINED CYSTOSCOPY, RETROGRADES, URETEROSCOPY, LASER HOLMIUM LITHOTRIPSY URETER(S), INSERT STENT Left 3/28/2019    Procedure: CYSTOSCOPY, LEFT RETROGRADE PYLEOGRAM, LEFT URETEROSCOPY, HOLMIUM LASER LITHOTRIPSY, LEFT URETERAL STENT EXCHANGE;  Surgeon: Derrek Rivera MD;  Location:  OR    CV CORONARY ANGIOGRAM N/A 7/5/2023    Procedure: Coronary Angiogram;  Surgeon: Fausto Espinal MD;  Location:  HEART CARDIAC CATH LAB    CV INTRAVASULAR ULTRASOUND N/A 7/5/2023    Procedure: Intravascular Ultrasound;  Surgeon: Fausto Espinal MD;  Location:  HEART CARDIAC CATH LAB    CV PCI N/A 7/5/2023    Procedure: Percutaneous Coronary Intervention;  Surgeon: Fausto Espinal MD;  Location:  HEART CARDIAC CATH LAB    CYSTOSCOPY, RETROGRADES, INSERT STENT URETER(S), COMBINED Left 3/8/2019    Procedure: CYSTOSCOPY,LEFT  RETROGRADE, LEFT STENT PLACEMENT;  Surgeon: Derrek Rivera MD;  Location:  OR    GENITOURINARY SURGERY      LAMINECT/DISCECTOMY, LUMBAR      Laminectomy/Discectomy Lumbar    LASER HOLMIUM LITHOTRIPSY URETER(S), INSERT STENT, COMBINED Left 2014    Procedure: COMBINED CYSTOSCOPY, URETEROSCOPY, LASER HOLMIUM LITHOTRIPSY URETER(S), INSERT STENT;  Surgeon: Sam Arriaga MD;  Location:  OR    LASER HOLMIUM LITHOTRIPSY URETER(S), INSERT STENT, COMBINED Left 2019    Procedure: CYSTOSCOPY, LEFT RETROGRADE, LEFT URETEROSCOPY, HOLMIUM LASER LITHOTRIPSY, STONE REMOVAL, LEFT STENT EXCHANGE;  Surgeon: Derrek Rivera MD;  Location:  OR    LASER HOLMIUM LITHOTRIPSY URETER(S), INSERT STENT, COMBINED Right 2019    Procedure: CYSTOSCOPY; RIGHT URETEROSCOPY WITH HOLMIUM LASER LITHOTRIPSY; RIGHT STENT PLACEMENT (DIGITAL FLEXIBLE URETEROSCOPE);  Surgeon: Derrek Rivera MD;  Location:  OR    ORTHOPEDIC SURGERY      left ankle    RECESSION EYELID UPPER         FAMILY HISTORY:  History reviewed. No pertinent family history.    SOCIAL HISTORY:  Social History     Socioeconomic History    Marital status:      Spouse name: None    Number of children: None    Years of education: None    Highest education level: None   Tobacco Use    Smoking status: Former     Packs/day: 1.50     Years: 40.00     Pack years: 60.00     Types: Cigarettes     Quit date: 1995     Years since quittin.6    Smokeless tobacco: Never   Substance and Sexual Activity    Alcohol use: Yes     Comment: couple drinks per year    Drug use: No       Review of Systems:  Skin:          Eyes:         ENT:         Respiratory:  Negative       Cardiovascular:  Negative for;palpitations;lightheadedness;syncope or near-syncope;edema;fatigue chest pain;Positive for;dizziness    Gastroenterology:        Genitourinary:         Musculoskeletal:         Neurologic:         Psychiatric:         Heme/Lymph/Imm:  Negative     "  Endocrine:  Negative        Physical Exam:  Vitals: /74   Pulse 63   Ht 1.676 m (5' 6\")   Wt 85.7 kg (189 lb)   SpO2 96%   BMI 30.51 kg/m      Constitutional:           Skin:             Head:           Eyes:           Lymph:      ENT:           Neck:           Respiratory:            Cardiac:                                                           GI:           Extremities and Muscular Skeletal:                 Neurological:           Psych:               CC  Honorio Andino MD  [address]  [place] [address]                Thank you for allowing me to participate in the care of your patient.      Sincerely,     Fausto Espinal MD, MD     [facility]    "

## 2023-08-10 NOTE — PATIENT INSTRUCTIONS
- Echocardiogram and visit with Cherelle Thrasher in 3 months.    We will call you with the result(s) of the test(s) and to discuss the plan going forward.    It was wonderful to see you! Please call with any questions or concerns: [phone]    Jadyn Escobar RN  Structural Heart Coordinator  [facility]

## 2023-08-10 NOTE — PROGRESS NOTES
REASON FOR CONSULTATION: Aortic stenosis    HISTORY OF PRESENT ILLNESS: Macie Jorge is a pleasant 82-year-old female with history of coronary disease, aortic stenosis, stage IV chronic kidney disease, severe spinal stenosis, chronic pancytopenia, obesity, asthma and COPD here for further evaluation regarding aortic stenosis.    She was seen by Dr. Honeycutt in June of this year for evaluation of chest pain.  She had a stress test which suggested anterior ischemia.  She subsequently underwent coronary angiography which revealed high-grade proximal/mid LAD stenosis which was stented successfully.  She had few episodes of chest pain since her PCI relieved with nitroglycerin.    The patient also recently had an echocardiogram to assess her aortic stenosis.  The echocardiogram which I personally reviewed showed preserved LV function with an EF of 60 to 65%, aortic stenosis with mean gradient of 31 mmHg, DI of 0.26, and valve area of 0.82 and stroke-volume index of 38 mL/m2.     He is currently limited from functional point of view by severe spinal stenosis.  However, she is able to walk as far she would like to if she is using a walker or a cart.  No shortness of breath or chest pain with exertion.  No palpitations, presyncope or syncope.  She gets lightheaded spells at night especially when she lays down in bed.      ASSESSMENT AND PLAN: I reviewed the patient's recent coronary angiography, electrocardiogram and echocardiography.  The aortic valve was not well visualized due to difficult images however the valve leaflets appear restricted.  It is hard to tell at this point whether the stenosis is severe.  She does not endorse any overwhelming cardiopulmonary symptoms at this point.  Given lack of significant symptoms and advanced kidney disease, we agreed on continuing close surveillance with echocardiography.  If she develops symptoms, will consider pursuing CT TAVR.  She would like to avoid any tests that could  further deteriorate her kidney function.  She does not want to be on dialysis.    She will let us let us know if she has recurrent chest pain episodes.    It was pleasure seeing Macie in the clinic this afternoon.  I appreciate the opportunity to participate in her care.      Fausto Espinal MD    Today's clinic visit entailed:  45 minutes spent by me on the date of the encounter doing chart review, history and exam, documentation and further activities per the note  Provider  Link to [facility] Help Grid       Orders Placed This Encounter   Procedures    EKG 12-lead complete w/read - Clinics (performed today)       No orders of the defined types were placed in this encounter.      There are no discontinued medications.      Encounter Diagnosis   Name Primary?    Nonrheumatic aortic valve stenosis        CURRENT MEDICATIONS:  Current Outpatient Medications   Medication Sig Dispense Refill    albuterol (PROAIR HFA/PROVENTIL HFA/VENTOLIN HFA) 108 (90 Base) MCG/ACT inhaler INHALE 1 DOSE BY MOUTH EVERY 12 HOURS 180 g 0    allopurinol (ZYLOPRIM) 100 MG tablet Take 2 tablets by mouth once daily (Patient taking differently: Take 100 mg by mouth daily) 90 tablet 0    aspirin 81 MG EC tablet Take 1 tab (81mg) on MWF 90 tablet 3    blood glucose (NO BRAND SPECIFIED) lancets standard Use to test blood sugar 1 time daily or as directed. 100 each 3    blood glucose (NO BRAND SPECIFIED) test strip Use to test blood sugar 1 time daily or as directed. 100 strip 3    blood glucose monitoring (NO BRAND SPECIFIED) meter device kit Use to test blood sugar 1 time daily or as directed. 1 kit 0    clopidogrel (PLAVIX) 75 MG tablet Take 1 tablet (75 mg) by mouth daily 90 tablet 3    Fluticasone-Umeclidin-Vilanterol (TRELEGY ELLIPTA) 200-62.5-25 MCG/ACT oral inhaler Inhale 1 puff into the lungs daily 28 each 1    gabapentin (NEURONTIN) 300 MG capsule Take 300 mg by mouth 2 times daily      HYDROcodone-acetaminophen (NORCO) 7.5-325 MG per tablet Take  1 tablet by mouth 2 times daily (Up to 2 times a day as needed for severe pain)      levothyroxine (SYNTHROID/LEVOTHROID) 137 MCG tablet Take 1 tablet by mouth once daily 90 tablet 0    metoprolol succinate ER (TOPROL XL) 25 MG 24 hr tablet Take 1 tablet (25 mg) by mouth daily 60 tablet 3    oxybutynin ER (DITROPAN XL) 10 MG 24 hr tablet Take 1 tablet by mouth once daily 90 tablet 0    rosuvastatin (CRESTOR) 20 MG tablet Take 1 tablet (20 mg) by mouth daily 90 tablet 3    nitroGLYcerin (NITROSTAT) 0.4 MG sublingual tablet For chest pain place 1 tablet under the tongue every 5 minutes for 3 doses. If symptoms persist 5 minutes after 1st dose call 911. (Patient not taking: Reported on 8/10/2023) 3 tablet 1       ALLERGIES     Allergies   Allergen Reactions    Morphine Nausea     Severe vomiting    Prednisone      Elevated blood glucose  weakness    Baclofen GI Disturbance    Imitrex [Sumatriptan] Nausea    Tetracycline      Sores in mouth       PAST MEDICAL HISTORY:  Past Medical History:   Diagnosis Date    Calculus of left ureter 2014    dr Arriaga - ureteroscopy and lithitripsy    Cataract     Chronic low back pain     Dr Villagran at [facility] [place] in 2015- not surgical     Complication of anesthesia     COPD with asthma (H)      followed with pulmonary DR LENNOX 60-pack-year history of smoking    Esophagitis, reflux 9/2012    SOME EOSINOPHILIA NO BARRETS    Gastro-oesophageal reflux disease      has seen GI    Hydronephrosis, left     chronic , in 2017 Dr Arriaga plan was observaton    Hyperlipidaemia LDL goal < 100     Hypothyroid     Kidney stone 3/28/2019    Migraine headaches 9/1/2011    Moderate persistent asthma      lifelong, saw Pulm 2013    Obesity (BMI 30-39.9)     Panic attacks     PONV (postoperative nausea and vomiting)     Renal stones 2016    kidney stones, multiple small stones high risk recurrent ureteral stones. , Dr Arriaga in 2016 rec 24 hour urine.    Type 2 diabetes mellitus without complication (H)        PAST  SURGICAL HISTORY:  Past Surgical History:   Procedure Laterality Date    ARTHROPLASTY HIP  3/13/2013    Procedure: ARTHROPLASTY HIP;  LEFT TOTAL HIP ARTHROPLASTY (BIOMET)^ ;  Surgeon: Anand Main MD;  Location:  OR    ARTHROPLASTY KNEE Right 8/9/2021    Procedure: RIGHT TOTAL KNEE ARTHROPLASTY;  Surgeon: Anand Main MD;  Location:  OR    ARTHROPLASTY PATELLO-FEMORAL (KNEE)  2005ish    Left    CHOLECYSTECTOMY, OPEN  27 yo    COMBINED CYSTOSCOPY, INSERT STENT URETER(S)  8/5/2014    Procedure: COMBINED CYSTOSCOPY, INSERT STENT URETER(S);  Surgeon: Sam Arriaga MD;  Location:  OR    COMBINED CYSTOSCOPY, RETROGRADES, URETEROSCOPY, LASER HOLMIUM LITHOTRIPSY URETER(S), INSERT STENT Left 6/14/2016    Procedure: COMBINED CYSTOSCOPY, RETROGRADES, URETEROSCOPY, LASER HOLMIUM LITHOTRIPSY URETER(S), INSERT STENT;  Surgeon: Thomas Edmondson MD;  Location:  OR    COMBINED CYSTOSCOPY, RETROGRADES, URETEROSCOPY, LASER HOLMIUM LITHOTRIPSY URETER(S), INSERT STENT Left 3/28/2019    Procedure: CYSTOSCOPY, LEFT RETROGRADE PYLEOGRAM, LEFT URETEROSCOPY, HOLMIUM LASER LITHOTRIPSY, LEFT URETERAL STENT EXCHANGE;  Surgeon: Derrek Rivera MD;  Location:  OR    CV CORONARY ANGIOGRAM N/A 7/5/2023    Procedure: Coronary Angiogram;  Surgeon: Fausto Espinal MD;  Location:  HEART CARDIAC CATH LAB    CV INTRAVASULAR ULTRASOUND N/A 7/5/2023    Procedure: Intravascular Ultrasound;  Surgeon: Fausto Espinal MD;  Location:  HEART CARDIAC CATH LAB    CV PCI N/A 7/5/2023    Procedure: Percutaneous Coronary Intervention;  Surgeon: Fausto Espinal MD;  Location:  HEART CARDIAC CATH LAB    CYSTOSCOPY, RETROGRADES, INSERT STENT URETER(S), COMBINED Left 3/8/2019    Procedure: CYSTOSCOPY,LEFT RETROGRADE, LEFT STENT PLACEMENT;  Surgeon: Derrek Rivera MD;  Location:  OR    GENITOURINARY SURGERY      LAMINECT/DISCECTOMY, LUMBAR      Laminectomy/Discectomy Lumbar    LASER HOLMIUM LITHOTRIPSY URETER(S),  "INSERT STENT, COMBINED Left 2014    Procedure: COMBINED CYSTOSCOPY, URETEROSCOPY, LASER HOLMIUM LITHOTRIPSY URETER(S), INSERT STENT;  Surgeon: Sam Arriaga MD;  Location:  OR    LASER HOLMIUM LITHOTRIPSY URETER(S), INSERT STENT, COMBINED Left 2019    Procedure: CYSTOSCOPY, LEFT RETROGRADE, LEFT URETEROSCOPY, HOLMIUM LASER LITHOTRIPSY, STONE REMOVAL, LEFT STENT EXCHANGE;  Surgeon: Derrek Rivrea MD;  Location:  OR    LASER HOLMIUM LITHOTRIPSY URETER(S), INSERT STENT, COMBINED Right 2019    Procedure: CYSTOSCOPY; RIGHT URETEROSCOPY WITH HOLMIUM LASER LITHOTRIPSY; RIGHT STENT PLACEMENT (DIGITAL FLEXIBLE URETEROSCOPE);  Surgeon: Derrek Rivera MD;  Location:  OR    ORTHOPEDIC SURGERY      left ankle    RECESSION EYELID UPPER         FAMILY HISTORY:  History reviewed. No pertinent family history.    SOCIAL HISTORY:  Social History     Socioeconomic History    Marital status:      Spouse name: None    Number of children: None    Years of education: None    Highest education level: None   Tobacco Use    Smoking status: Former     Packs/day: 1.50     Years: 40.00     Pack years: 60.00     Types: Cigarettes     Quit date: 1995     Years since quittin.6    Smokeless tobacco: Never   Substance and Sexual Activity    Alcohol use: Yes     Comment: couple drinks per year    Drug use: No       Review of Systems:  Skin:          Eyes:         ENT:         Respiratory:  Negative       Cardiovascular:  Negative for;palpitations;lightheadedness;syncope or near-syncope;edema;fatigue chest pain;Positive for;dizziness    Gastroenterology:        Genitourinary:         Musculoskeletal:         Neurologic:         Psychiatric:         Heme/Lymph/Imm:  Negative      Endocrine:  Negative        Physical Exam:  Vitals: /74   Pulse 63   Ht 1.676 m (5' 6\")   Wt 85.7 kg (189 lb)   SpO2 96%   BMI 30.51 kg/m      Constitutional:           Skin:             Head:           Eyes:       "     Lymph:      ENT:           Neck:           Respiratory:            Cardiac:                                                           GI:           Extremities and Muscular Skeletal:                 Neurological:           Psych:           CC  Honorio Anidno MD  [address]  [place] [id number]

## 2023-08-15 DIAGNOSIS — N18.32 STAGE 3B CHRONIC KIDNEY DISEASE (H): ICD-10-CM

## 2023-08-15 DIAGNOSIS — I35.0 NONRHEUMATIC AORTIC VALVE STENOSIS: Primary | ICD-10-CM

## 2023-08-15 DIAGNOSIS — I25.10 CORONARY ARTERY DISEASE INVOLVING NATIVE CORONARY ARTERY OF NATIVE HEART WITHOUT ANGINA PECTORIS: ICD-10-CM

## 2023-08-15 DIAGNOSIS — E78.00 HYPERCHOLESTEROLEMIA: ICD-10-CM

## 2023-08-15 NOTE — PROGRESS NOTES
Message from Dr Andino, patient should follow up with him in 9mos with a fasting lipid profile, ALT, BMP and echocardiogram. Orders placed.

## 2023-08-26 ENCOUNTER — HEALTH MAINTENANCE LETTER (OUTPATIENT)
Age: 83
End: 2023-08-26

## 2023-09-11 ENCOUNTER — TELEPHONE (OUTPATIENT)
Dept: CARDIOLOGY | Facility: CLINIC | Age: 83
End: 2023-09-11
Payer: COMMERCIAL

## 2023-09-11 NOTE — TELEPHONE ENCOUNTER
[facility] Call Center    Phone Message    May a detailed message be left on voicemail: yes     Reason for Call: Other: Patient called requesting to speak with a member of her care team. Patient's brother in law passed away, and patient would like to know is it ok for her to fly to [place]. Please call patient back to further discuss, thank you.     Action Taken: Message routed to:  Other: Cardiology    Travel Screening: Not Applicable    Thank you!  [facility]

## 2023-09-11 NOTE — TELEPHONE ENCOUNTER
Spoke with Patient - Patient's brother in law passed away, and patient would like to know is it ok for her to fly to [facility]..   Would need to go tomorrow or Wednesday.     Next JULIETTE OV with echo 12-13-23. Return TAVR.     Last Dr. Espinal OV 8-10-23 -   ASSESSMENT AND PLAN: I reviewed the patient's recent coronary angiography, electrocardiogram and echocardiography.  The aortic valve was not well visualized due to difficult images however the valve leaflets appear restricted.  It is hard to tell at this point whether the stenosis is severe.  She does not endorse any overwhelming cardiopulmonary symptoms at this point.  Given lack of significant symptoms and advanced kidney disease, we agreed on continuing close surveillance with echocardiography.  If she develops symptoms, will consider pursuing CT TAVR.  She would like to avoid any tests that could further deteriorate her kidney function.  She does not want to be on dialysis.     Last Dr. Andino OV 7-18-23 -   Assessment and plan.  Macie has had 1 episode of chest discomfort since discharge for which she took 2 nitroglycerin otherwise has been doing fine.  As stated she never had an anginal symptom prior to this due to her very limited activity.     She does have severe aortic stenosis for which I will refer her to the structural lab for further evaluation.  Again at this time it is unclear that she is very symptomatic as her activity is limited by orthopedic problems.     She has other complicating issues.  Preprocedure creatinine is 1.88 giving her a GFR of 26.  I will send off a BMP today to see what her postprocedure creatinine is.     She also has chronic pancytopenia.  Preprocedure white count was 4.5 with a hemoglobin of 10.7 and a platelet count of 134,000.  I will repeat those today as well.     We talked about her increased bruisability is multifactorial due to dual antiplatelet therapy chronic thrombocytopenia, and probably long-term sun damage  done to her forearms over the years.  I will continue dual antiplatelet but consider stopping aspirin at 3 months.     Fasting lipid profile is excellent with total cholesterol 106, HDL 45 LDL of 47 and triglycerides of 71.

## 2023-09-11 NOTE — TELEPHONE ENCOUNTER
Spoke with Patient and Dr. Andino reply read to Patient.  Patient will rethink if she will be going and verbalizes understanding of her limitations and her valve. .     Dr. Andino's reply - She is probably okay to go.  TAVR clinic thought as she was asymptomatic to continue watchful waiting.  Although I suspect she may have significant problems given orthopedic limitations and her aortic stenosis.  In the end there is no contraindication for her to go.

## 2023-10-04 ENCOUNTER — HOSPITAL ENCOUNTER (OUTPATIENT)
Dept: CT IMAGING | Facility: CLINIC | Age: 83
Discharge: HOME | End: 2023-10-04
Attending: FAMILY MEDICINE | Admitting: FAMILY MEDICINE
Payer: COMMERCIAL

## 2023-10-04 DIAGNOSIS — R91.1 SOLITARY PULMONARY NODULE: ICD-10-CM

## 2023-10-04 DIAGNOSIS — F17.291 NICOTINE DEPENDENCE, OTHER TOBACCO PRODUCT, IN REMISSION: ICD-10-CM

## 2023-10-04 DIAGNOSIS — R91.8 OTHER NONSPECIFIC ABNORMAL FINDING OF LUNG FIELD: ICD-10-CM

## 2023-10-04 PROCEDURE — 71250 CT THORAX DX C-: CPT

## 2023-10-06 ENCOUNTER — TELEPHONE (OUTPATIENT)
Dept: FAMILY MEDICINE | Facility: CLINIC | Age: 83
End: 2023-10-06

## 2023-10-06 NOTE — TELEPHONE ENCOUNTER
Spoke with patient regarding CT chest results per Dr. Meyer, patient expressed understanding and had no further questions or concerns.  Madelaine Rangel LPN on 10/6/2023 at 9:52 AM

## 2023-10-06 NOTE — TELEPHONE ENCOUNTER
Left message for patient to call clinic back regarding CT chest results.  Madelaine Rangel LPN on 10/6/2023 at 8:51 AM

## 2023-10-19 DIAGNOSIS — E03.4 HYPOTHYROIDISM DUE TO ACQUIRED ATROPHY OF THYROID: ICD-10-CM

## 2023-10-19 NOTE — TELEPHONE ENCOUNTER
Med: levothyroxine (SYNTHROID/LEVOTHROID) 137 MCG     LOV (related): 11/15/22    Last Lab: 11/15/22      Due for F/U around: 11/15/22    Next Appt: none for [facility]- telephone busy x 3  Message given to pharmacy    11 mo ago  (11/15/22) 1 yr ago  (7/19/22) 2 yr ago  (8/2/21) 3 yr ago  (7/13/20) 3 yr ago  (1/20/20) 4 yr ago  (3/4/19) 4 yr ago  (2/5/19)      TSH  0.450 - 4.500 uIU/mL 0.620 6.340 High  7.530 High  3.660 4.520 High  3.650 5.120 High

## 2023-10-23 DIAGNOSIS — N20.0 URIC ACID KIDNEY STONE: ICD-10-CM

## 2023-10-23 RX ORDER — ALLOPURINOL 100 MG/1
TABLET ORAL
Qty: 90 TABLET | Refills: 0 | Status: SHIPPED | OUTPATIENT
Start: 2023-10-23 | End: 2024-01-04

## 2023-10-23 RX ORDER — LEVOTHYROXINE SODIUM 137 UG/1
TABLET ORAL
Qty: 90 TABLET | Refills: 0 | Status: SHIPPED | OUTPATIENT
Start: 2023-10-23 | End: 2024-01-19

## 2023-10-23 RX ORDER — OXYBUTYNIN CHLORIDE 10 MG/1
TABLET, EXTENDED RELEASE ORAL
Qty: 90 TABLET | Refills: 0 | Status: SHIPPED | OUTPATIENT
Start: 2023-10-23 | End: 2024-01-19

## 2023-10-23 NOTE — TELEPHONE ENCOUNTER
Med: Oxybutynin   Allopurinol    LOV (related): 5/23/23      Due for F/U around: None       Next Appt: None

## 2023-11-07 ENCOUNTER — TRANSFERRED RECORDS (OUTPATIENT)
Dept: HEALTH INFORMATION MANAGEMENT | Facility: CLINIC | Age: 83
End: 2023-11-07
Payer: COMMERCIAL

## 2023-11-07 ENCOUNTER — TELEPHONE (OUTPATIENT)
Dept: CARDIOLOGY | Facility: CLINIC | Age: 83
End: 2023-11-07
Payer: COMMERCIAL

## 2023-11-07 ENCOUNTER — TRANSFERRED RECORDS (OUTPATIENT)
Dept: FAMILY MEDICINE | Facility: CLINIC | Age: 83
End: 2023-11-07

## 2023-11-07 NOTE — LETTER
To [facility]  Fax # [phone].       November 8, 2023    RE:  Macie Jorge 1940  [id number] [id number]    Macie is okay to proceed with procedure.  However her stenosis makes her somewhat tenuous.     Probably would be better to have procedure done in a medical facility,    Clearly we want this done before any valve surgery.      Should you have any questions, please call [phone]. Team phone number.     Sincerely,          Dr. Honorio Andino.   [facility]

## 2023-11-08 NOTE — TELEPHONE ENCOUNTER
Letter for procedure faxed to S after Dr. Andino reviewed and signed.     Dr. Andino reply - 11-8-23    I think she is okay to proceed.  However her aortic stenosis makes her somewhat tenuous.  Probably would be safest to be done in a medical facility.  Clearly we want this done before any valve is placed.

## 2023-12-13 ENCOUNTER — OFFICE VISIT (OUTPATIENT)
Dept: CARDIOLOGY | Facility: CLINIC | Age: 83
End: 2023-12-13
Attending: INTERNAL MEDICINE
Payer: COMMERCIAL

## 2023-12-13 ENCOUNTER — HOSPITAL ENCOUNTER (OUTPATIENT)
Dept: CARDIOLOGY | Facility: CLINIC | Age: 83
Discharge: HOME | End: 2023-12-13
Attending: INTERNAL MEDICINE | Admitting: INTERNAL MEDICINE
Payer: COMMERCIAL

## 2023-12-13 VITALS
HEIGHT: 66 IN | BODY MASS INDEX: 29.46 KG/M2 | OXYGEN SATURATION: 97 % | DIASTOLIC BLOOD PRESSURE: 70 MMHG | HEART RATE: 67 BPM | SYSTOLIC BLOOD PRESSURE: 128 MMHG | WEIGHT: 183.3 LBS

## 2023-12-13 DIAGNOSIS — I35.0 NONRHEUMATIC AORTIC VALVE STENOSIS: ICD-10-CM

## 2023-12-13 DIAGNOSIS — I35.0 NONRHEUMATIC AORTIC VALVE STENOSIS: Primary | ICD-10-CM

## 2023-12-13 LAB — LVEF ECHO: NORMAL

## 2023-12-13 PROCEDURE — 93306 TTE W/DOPPLER COMPLETE: CPT

## 2023-12-13 PROCEDURE — 99214 OFFICE O/P EST MOD 30 MIN: CPT | Performed by: NURSE PRACTITIONER

## 2023-12-13 PROCEDURE — 93306 TTE W/DOPPLER COMPLETE: CPT | Mod: 26 | Performed by: INTERNAL MEDICINE

## 2023-12-13 NOTE — PROGRESS NOTES
Cardiology Clinic Progress Note  Macie Jorge MRN# [id number]   YOB: 1940 Age: 83 year old     Primary cardiologist: Dr. Andino    Reason for visit: aortic stenosis    History of presenting illness:    Macie Jorge is a pleasant 83 year old with history of coronary disease, aortic stenosis, stage IV chronic kidney disease, severe spinal stenosis, chronic pancytopenia, obesity, asthma and COPD here for follow-up.     She was seen by Dr. Andino in June of this year for evaluation of chest pain.  She had a stress test which suggested anterior ischemia.  She subsequently underwent coronary angiography which revealed high-grade proximal/mid LAD stenosis which was stented successfully.       The patient also had an echocardiogram to assess her aortic stenosis that showed preserved LVEF of 60 to 65%, aortic stenosis with mean gradient of 31 mmHg, DI of 0.26, and valve area of 0.82 and stroke-volume index of 38 mL/m2.  She was then seen by Dr. Espinal in our valve clinic for further evaluation of her aortic stenosis.  Given her lack of symptoms and her advanced kidney disease, plan was for repeat echocardiogram in 3 months.    Today she continues to do well from a cardiovascular standpoint.  She endorses exertional dyspnea at baseline due to asthma and COPD.  Her biggest functional limitation is from severe spinal stenosis that is a result of polio as a child.  She denies any chest pain, syncope, near syncope, or palpitations.    Repeat echocardiogram today again is consistent with moderate aortic stenosis with a valve area of 1.0 cm , mean gradient of 25 mmHg, V-max 3.5 m/s, dimensionless index of 0.28.  Her LV function remains preserved with an EF of 60%.           Assessment and Plan:     ASSESSMENT:    Moderate aortic stenosis.  Her most recent echo as noted above, she remains asymptomatic from a valvular standpoint.  CAD s/p PCI to LAD on 7/5/2023.  No exertional symptoms.  She remains  "on DAPT with ASA and Plavix, BB, and statin.  Stage IV CKD.  Follows with outpatient nephrology, baseline creatinine appears to be 1.5-1.9.      PLAN:     Patient remains relatively asymptomatic from a valvular standpoint.  Given her lack of symptoms, recommend repeat echocardiogram and follow-up with me in 6 months.  If she develops symptoms, will consider pursuing CT TAVR, but we will have to be mindful of her kidney function as she does not want to be on dialysis.         Cherelle Thrasher DNP, APRN, CNP  Page: [phone] (8a-5p M-F)    Orders this Visit:  Orders Placed This Encounter   Procedures    Follow-Up with Cardiology JULIETTE    Echocardiogram Complete     No orders of the defined types were placed in this encounter.    There are no discontinued medications.    Today's clinic visit entailed:  Review of the result(s) of each unique test - echo, labs  Ordering of each unique test  Prescription drug management  35 minutes spent by me on the date of the encounter doing chart review, review of test results, interpretation of tests, patient visit, documentation, and discussion with family   Provider  Link to [facility] Help Grid     The level of medical decision making during this visit was of moderate complexity.           Review of Systems:     Review of Systems:  Skin:        Eyes:       ENT:       Respiratory:  Negative shortness of breath;dyspnea on exertion;cough;wheezing;sleep apnea  Cardiovascular:  Negative for;palpitations;lightheadedness;syncope or near-syncope;edema;fatigue;chest pain;dizziness Positive for;fatigue  Gastroenterology:      Genitourinary:       Musculoskeletal:       Neurologic:       Psychiatric:       Heme/Lymph/Imm:  Positive for allergies  Endocrine:  Positive for thyroid disorder            Physical Exam:   Vitals: /70   Pulse 67   Ht 1.676 m (5' 6\")   Wt 83.1 kg (183 lb 4.8 oz)   SpO2 97%   BMI 29.59 kg/m    Constitutional:  cooperative        Skin:  warm and dry to the touch   "      Head:  normocephalic        Eyes:  pupils equal and round        ENT:  not assessed this visit        Neck:  JVP normal        Chest:  normal breath sounds, clear to auscultation, normal A-P diameter, normal symmetry, normal respiratory excursion, no use of accessory muscles        Cardiac: regular rhythm;normal S1 and S2       systolic ejection murmur;grade 2          Abdomen:  abdomen soft        Vascular: pulses full and equal                                      Extremities and Back:  no edema        Neurological:  no gross motor deficits;affect appropriate             Medications:     Current Outpatient Medications   Medication Sig Dispense Refill    albuterol (PROAIR HFA/PROVENTIL HFA/VENTOLIN HFA) 108 (90 Base) MCG/ACT inhaler INHALE 1 DOSE BY MOUTH EVERY 12 HOURS 180 g 0    allopurinol (ZYLOPRIM) 100 MG tablet Take 2 tablets by mouth once daily 90 tablet 0    aspirin 81 MG EC tablet Take 1 tab (81mg) on MWF 90 tablet 3    blood glucose (NO BRAND SPECIFIED) lancets standard Use to test blood sugar 1 time daily or as directed. 100 each 3    blood glucose (NO BRAND SPECIFIED) test strip Use to test blood sugar 1 time daily or as directed. 100 strip 3    blood glucose monitoring (NO BRAND SPECIFIED) meter device kit Use to test blood sugar 1 time daily or as directed. 1 kit 0    clopidogrel (PLAVIX) 75 MG tablet Take 1 tablet (75 mg) by mouth daily 90 tablet 3    Fluticasone-Umeclidin-Vilanterol (TRELEGY ELLIPTA) 200-62.5-25 MCG/ACT oral inhaler Inhale 1 puff into the lungs daily 28 each 1    gabapentin (NEURONTIN) 300 MG capsule Take 300 mg by mouth 2 times daily      HYDROcodone-acetaminophen (NORCO) 7.5-325 MG per tablet Take 1 tablet by mouth 2 times daily (Up to 2 times a day as needed for severe pain)      levothyroxine (SYNTHROID/LEVOTHROID) 137 MCG tablet Take 1 tablet by mouth once daily 90 tablet 0    metoprolol succinate ER (TOPROL XL) 25 MG 24 hr tablet Take 1 tablet (25 mg) by mouth daily 60  tablet 3    nitroGLYcerin (NITROSTAT) 0.4 MG sublingual tablet For chest pain place 1 tablet under the tongue every 5 minutes for 3 doses. If symptoms persist 5 minutes after 1st dose call 911. 3 tablet 1    oxyBUTYnin ER (DITROPAN XL) 10 MG 24 hr tablet Take 1 tablet by mouth once daily 90 tablet 0    rosuvastatin (CRESTOR) 20 MG tablet Take 1 tablet (20 mg) by mouth daily 90 tablet 3       No family history on file.    Social History     Socioeconomic History    Marital status:      Spouse name: Not on file    Number of children: Not on file    Years of education: Not on file    Highest education level: Not on file   Occupational History    Not on file   Tobacco Use    Smoking status: Former     Packs/day: 1.50     Years: 40.00     Additional pack years: 0.00     Total pack years: 60.00     Types: Cigarettes     Quit date: 1995     Years since quittin.9    Smokeless tobacco: Never   Substance and Sexual Activity    Alcohol use: Yes     Comment: couple drinks per year    Drug use: No    Sexual activity: Not on file   Other Topics Concern    Parent/sibling w/ CABG, MI or angioplasty before 65F 55M? Not Asked   Social History Narrative    Not on file     Social Determinants of Health     Financial Resource Strain: Not on file   Food Insecurity: Not on file   Transportation Needs: Not on file   Physical Activity: Not on file   Stress: Not on file   Social Connections: Not on file   Interpersonal Safety: Not on file   Housing Stability: Not on file            Past Medical History:     Past Medical History:   Diagnosis Date    Calculus of left ureter     dr Arriaga - ureteroscopy and lithitripsy    Cataract     Chronic low back pain     Dr Villagran at [facility] in - not surgical     Complication of anesthesia     COPD with asthma      followed with pulmonary DR LENNOX 60-pack-year history of smoking    Esophagitis, reflux 2012    SOME EOSINOPHILIA NO BARRETS    Gastro-oesophageal reflux disease      has seen  GI    Hydronephrosis, left     chronic , in 2017 Dr Arriaga plan was observaton    Hyperlipidaemia LDL goal < 100     Hypothyroid     Kidney stone 3/28/2019    Migraine headaches 9/1/2011    Moderate persistent asthma      lifelong, saw Pulm 2013    Obesity (BMI 30-39.9)     Panic attacks     PONV (postoperative nausea and vomiting)     Renal stones 2016    kidney stones, multiple small stones high risk recurrent ureteral stones. , Dr Arriaga in 2016 rec 24 hour urine.    Type 2 diabetes mellitus without complication (H)               Past Surgical History:     Past Surgical History:   Procedure Laterality Date    ARTHROPLASTY HIP  3/13/2013    Procedure: ARTHROPLASTY HIP;  LEFT TOTAL HIP ARTHROPLASTY (BIOMET)^ ;  Surgeon: Anand Main MD;  Location:  OR    ARTHROPLASTY KNEE Right 8/9/2021    Procedure: RIGHT TOTAL KNEE ARTHROPLASTY;  Surgeon: Anand Main MD;  Location:  OR    ARTHROPLASTY PATELLO-FEMORAL (KNEE)  2005ish    Left    CHOLECYSTECTOMY, OPEN  25 yo    COMBINED CYSTOSCOPY, INSERT STENT URETER(S)  8/5/2014    Procedure: COMBINED CYSTOSCOPY, INSERT STENT URETER(S);  Surgeon: Sam Arriaga MD;  Location:  OR    COMBINED CYSTOSCOPY, RETROGRADES, URETEROSCOPY, LASER HOLMIUM LITHOTRIPSY URETER(S), INSERT STENT Left 6/14/2016    Procedure: COMBINED CYSTOSCOPY, RETROGRADES, URETEROSCOPY, LASER HOLMIUM LITHOTRIPSY URETER(S), INSERT STENT;  Surgeon: Thomas Edmondson MD;  Location:  OR    COMBINED CYSTOSCOPY, RETROGRADES, URETEROSCOPY, LASER HOLMIUM LITHOTRIPSY URETER(S), INSERT STENT Left 3/28/2019    Procedure: CYSTOSCOPY, LEFT RETROGRADE PYLEOGRAM, LEFT URETEROSCOPY, HOLMIUM LASER LITHOTRIPSY, LEFT URETERAL STENT EXCHANGE;  Surgeon: Derrek Rivera MD;  Location:  OR    CV CORONARY ANGIOGRAM N/A 7/5/2023    Procedure: Coronary Angiogram;  Surgeon: Fausto Espinal MD;  Location:  HEART [facility]ARD[facility] CATH LAB    CV INTRAVASULAR ULTRASOUND N/A 7/5/2023    Procedure: Intravascular  Ultrasound;  Surgeon: Fausto Espinal MD;  Location:  HE[facility] C[facility] LAB    CV PCI N/A 7/5/2023    Procedure: Percutaneous Coronary Intervention;  Surgeon: Fausto Espinal MD;  Location:  HEART [facility]ARDI[facility] CATH LAB    CYSTOSCOPY, RETROGRADES, INSERT STENT URETER(S), COMBINED Left 3/8/2019    Procedure: CYSTOSCOPY,LEFT RETROGRADE, LEFT STENT PLACEMENT;  Surgeon: Derrek Rivera MD;  Location:  OR    GENITOURINARY SURGERY      LAMINECT/DISCECTOMY, LUMBAR      Laminectomy/Discectomy Lumbar    LASER HOLMIUM LITHOTRIPSY URETER(S), INSERT STENT, COMBINED Left 8/20/2014    Procedure: COMBINED CYSTOSCOPY, URETEROSCOPY, LASER HOLMIUM LITHOTRIPSY URETER(S), INSERT STENT;  Surgeon: Sam Arriaga MD;  Location:  OR    LASER HOLMIUM LITHOTRIPSY URETER(S), INSERT STENT, COMBINED Left 5/2/2019    Procedure: CYSTOSCOPY, LEFT RETROGRADE, LEFT URETEROSCOPY, HOLMIUM LASER LITHOTRIPSY, STONE REMOVAL, LEFT STENT EXCHANGE;  Surgeon: Derrek Rivera MD;  Location:  OR    LASER HOLMIUM LITHOTRIPSY URETER(S), INSERT STENT, COMBINED Right 6/13/2019    Procedure: CYSTOSCOPY; RIGHT URETEROSCOPY WITH HOLMIUM LASER LITHOTRIPSY; RIGHT STENT PLACEMENT (DIGITAL FLEXIBLE URETEROSCOPE);  Surgeon: Derrek Rivera MD;  Location:  OR    ORTHOPEDIC SURGERY      left ankle    RECESSION EYELID UPPER                Allergies:   Morphine, Prednisone, Baclofen, Imitrex [sumatriptan], and Tetracycline       Data:   All laboratory data reviewed:    Recent Labs   Lab Test 06/03/23  0632 11/15/22  1445 10/14/21  1430 08/02/21  1045 01/20/20  1110   LDL 47  --   --  106* 113*   HDL 45*  --   --  44 60   NHDL 61  --   --   --   --    CHOL 106  --   --  175 187   TRIG 71  --   --  138 70   IRON  --  55  --   --   --    FEB  --  267  --   --   --    VINI  --  76   < >  --   --     < > = values in this interval not displayed.       Lab Results   Component Value Date    WBC 3.9 (L) 07/18/2023    WBC 4.1 01/23/2023    WBC 7.8 10/01/2015     RBC 3.48 (L) 07/18/2023    RBC 3.90 01/23/2023    RBC 4.24 10/01/2015    HGB 10.9 (L) 07/18/2023    HGB 11.9 01/23/2023    HCT 34.3 (L) 07/18/2023    HCT 34.5 (A) 01/23/2023    MCV 99 07/18/2023    MCV 88.3 01/23/2023    MCH 31.3 07/18/2023    MCH 30.6 01/23/2023    MCHC 31.8 07/18/2023    MCHC 34.6 01/23/2023    RDW 13.5 07/18/2023    RDW 13.8 10/01/2015     07/18/2023     01/23/2023       Lab Results   Component Value Date     07/18/2023     01/23/2023    POTASSIUM 5.3 07/18/2023    POTASSIUM 4.3 01/23/2023    CHLORIDE 103 07/18/2023    CHLORIDE 105 01/23/2023    CO2 23 07/18/2023    CO2 25 08/10/2021    CO2 26 10/01/2015    ANIONGAP 11 07/18/2023    ANIONGAP 6 08/10/2021    ANIONGAP 7 10/01/2015    GLC 92 07/18/2023     (H) 01/23/2023    BUN 38.4 (H) 07/18/2023    BUN 25 01/23/2023    BUN 17 01/23/2023    CR 1.94 (H) 07/18/2023    CR 1.48 (H) 01/23/2023    GFRESTIMATED 25 (L) 07/18/2023    GFRESTIMATED 37 (L) 10/01/2015    GFRESTBLACK 45 (L) 10/01/2015    WARD 9.7 07/18/2023    WARD 9.3 01/23/2023      Lab Results   Component Value Date    AST 19 06/03/2023    AST 17 07/19/2022    ALT 13 06/03/2023    ALT 8 07/19/2022       Lab Results   Component Value Date    A1C 5.9 01/23/2023       Lab Results   Component Value Date    INR 1.03 07/05/2023    INR 1.02 08/04/2014    INR 2.39 (H) 03/16/2013

## 2023-12-13 NOTE — LETTER
12/13/2023    Elias Meyer MD  [address]  [facility] [place] [address]    RE: Macie Jorge       Dear Colleague,     I had the pleasure of seeing Macie Jorge in the [facility].  Cardiology Clinic Progress Note  Macie Jorge MRN# [id number]   YOB: 1940 Age: 83 year old     Primary cardiologist: Dr. Andino    Reason for visit: aortic stenosis    History of presenting illness:    Macie Jorge is a pleasant 83 year old with history of coronary disease, aortic stenosis, stage IV chronic kidney disease, severe spinal stenosis, chronic pancytopenia, obesity, asthma and COPD here for follow-up.     She was seen by Dr. Andino in June of this year for evaluation of chest pain.  She had a stress test which suggested anterior ischemia.  She subsequently underwent coronary angiography which revealed high-grade proximal/mid LAD stenosis which was stented successfully.       The patient also had an echocardiogram to assess her aortic stenosis that showed preserved LVEF of 60 to 65%, aortic stenosis with mean gradient of 31 mmHg, DI of 0.26, and valve area of 0.82 and stroke-volume index of 38 mL/m2.  She was then seen by Dr. Espinal in our valve clinic for further evaluation of her aortic stenosis.  Given her lack of symptoms and her advanced kidney disease, plan was for repeat echocardiogram in 3 months.    Today she continues to do well from a cardiovascular standpoint.  She endorses exertional dyspnea at baseline due to asthma and COPD.  Her biggest functional limitation is from severe spinal stenosis that is a result of polio as a child.  She denies any chest pain, syncope, near syncope, or palpitations.    Repeat echocardiogram today again is consistent with moderate aortic stenosis with a valve area of 1.0 cm , mean gradient of 25 mmHg, V-max 3.5 m/s, dimensionless index of 0.28.  Her LV function remains preserved with an EF of 60%.           Assessment  and Plan:     ASSESSMENT:    Moderate aortic stenosis.  Her most recent echo as noted above, she remains asymptomatic from a valvular standpoint.  CAD s/p PCI to LAD on 7/5/2023.  No exertional symptoms.  She remains on DAPT with ASA and Plavix, BB, and statin.  Stage IV CKD.  Follows with outpatient nephrology, baseline creatinine appears to be 1.5-1.9.      PLAN:     Patient remains relatively asymptomatic from a valvular standpoint.  Given her lack of symptoms, recommend repeat echocardiogram and follow-up with me in 6 months.  If she develops symptoms, will consider pursuing CT TAVR, but we will have to be mindful of her kidney function as she does not want to be on dialysis.         Cherelle Thrasher, GALE, APRN, CNP  Page: [phone] (8a-5p M-F)    Orders this Visit:  Orders Placed This Encounter   Procedures    Follow-Up with Cardiology JULIETTE    Echocardiogram Complete     No orders of the defined types were placed in this encounter.    There are no discontinued medications.    Today's clinic visit entailed:  Review of the result(s) of each unique test - echo, labs  Ordering of each unique test  Prescription drug management  35 minutes spent by me on the date of the encounter doing chart review, review of test results, interpretation of tests, patient visit, documentation, and discussion with family   Provider  Link to [facility] Help Grid     The level of medical decision making during this visit was of moderate complexity.           Review of Systems:     Review of Systems:  Skin:        Eyes:       ENT:       Respiratory:  Negative shortness of breath;dyspnea on exertion;cough;wheezing;sleep apnea  Cardiovascular:  Negative for;palpitations;lightheadedness;syncope or near-syncope;edema;fatigue;chest pain;dizziness Positive for;fatigue  Gastroenterology:      Genitourinary:       Musculoskeletal:       Neurologic:       Psychiatric:       Heme/Lymph/Imm:  Positive for allergies  Endocrine:  Positive for thyroid  "disorder            Physical Exam:   Vitals: /70   Pulse 67   Ht 1.676 m (5' 6\")   Wt 83.1 kg (183 lb 4.8 oz)   SpO2 97%   BMI 29.59 kg/m    Constitutional:  cooperative        Skin:  warm and dry to the touch        Head:  normocephalic        Eyes:  pupils equal and round        ENT:  not assessed this visit        Neck:  JVP normal        Chest:  normal breath sounds, clear to auscultation, normal A-P diameter, normal symmetry, normal respiratory excursion, no use of accessory muscles        Cardiac: regular rhythm;normal S1 and S2       systolic ejection murmur;grade 2          Abdomen:  abdomen soft        Vascular: pulses full and equal                                      Extremities and Back:  no edema        Neurological:  no gross motor deficits;affect appropriate             Medications:     Current Outpatient Medications   Medication Sig Dispense Refill    albuterol (PROAIR HFA/PROVENTIL HFA/VENTOLIN HFA) 108 (90 Base) MCG/ACT inhaler INHALE 1 DOSE BY MOUTH EVERY 12 HOURS 180 g 0    allopurinol (ZYLOPRIM) 100 MG tablet Take 2 tablets by mouth once daily 90 tablet 0    aspirin 81 MG EC tablet Take 1 tab (81mg) on MWF 90 tablet 3    blood glucose (NO BRAND SPECIFIED) lancets standard Use to test blood sugar 1 time daily or as directed. 100 each 3    blood glucose (NO BRAND SPECIFIED) test strip Use to test blood sugar 1 time daily or as directed. 100 strip 3    blood glucose monitoring (NO BRAND SPECIFIED) meter device kit Use to test blood sugar 1 time daily or as directed. 1 kit 0    clopidogrel (PLAVIX) 75 MG tablet Take 1 tablet (75 mg) by mouth daily 90 tablet 3    Fluticasone-Umeclidin-Vilanterol (TRELEGY ELLIPTA) 200-62.5-25 MCG/ACT oral inhaler Inhale 1 puff into the lungs daily 28 each 1    gabapentin (NEURONTIN) 300 MG capsule Take 300 mg by mouth 2 times daily      HYDROcodone-acetaminophen (NORCO) 7.5-325 MG per tablet Take 1 tablet by mouth 2 times daily (Up to 2 times a day as " needed for severe pain)      levothyroxine (SYNTHROID/LEVOTHROID) 137 MCG tablet Take 1 tablet by mouth once daily 90 tablet 0    metoprolol succinate ER (TOPROL XL) 25 MG 24 hr tablet Take 1 tablet (25 mg) by mouth daily 60 tablet 3    nitroGLYcerin (NITROSTAT) 0.4 MG sublingual tablet For chest pain place 1 tablet under the tongue every 5 minutes for 3 doses. If symptoms persist 5 minutes after 1st dose call 911. 3 tablet 1    oxyBUTYnin ER (DITROPAN XL) 10 MG 24 hr tablet Take 1 tablet by mouth once daily 90 tablet 0    rosuvastatin (CRESTOR) 20 MG tablet Take 1 tablet (20 mg) by mouth daily 90 tablet 3       No family history on file.    Social History     Socioeconomic History    Marital status:      Spouse name: Not on file    Number of children: Not on file    Years of education: Not on file    Highest education level: Not on file   Occupational History    Not on file   Tobacco Use    Smoking status: Former     Packs/day: 1.50     Years: 40.00     Additional pack years: 0.00     Total pack years: 60.00     Types: Cigarettes     Quit date: 1995     Years since quittin.9    Smokeless tobacco: Never   Substance and Sexual Activity    Alcohol use: Yes     Comment: couple drinks per year    Drug use: No    Sexual activity: Not on file   Other Topics Concern    Parent/sibling w/ CABG, MI or angioplasty before 65F 55M? Not Asked   Social History Narrative    Not on file     Social Determinants of Health     Financial Resource Strain: Not on file   Food Insecurity: Not on file   Transportation Needs: Not on file   Physical Activity: Not on file   Stress: Not on file   Social Connections: Not on file   Interpersonal Safety: Not on file   Housing Stability: Not on file            Past Medical History:     Past Medical History:   Diagnosis Date    Calculus of left ureter     dr Arriaga - ureteroscopy and lithitripsy    Cataract     Chronic low back pain     Dr Villagran at [facility] in 2015- not surgical      Complication of anesthesia     COPD with asthma      followed with pulmonary DR LENNOX 60-pack-year history of smoking    Esophagitis, reflux 9/2012    SOME EOSINOPHILIA NO BARRETS    Gastro-oesophageal reflux disease      has seen GI    Hydronephrosis, left     chronic , in 2017 Dr Arriaga plan was observaton    Hyperlipidaemia LDL goal < 100     Hypothyroid     Kidney stone 3/28/2019    Migraine headaches 9/1/2011    Moderate persistent asthma      lifelong, saw Pulm 2013    Obesity (BMI 30-39.9)     Panic attacks     PONV (postoperative nausea and vomiting)     Renal stones 2016    kidney stones, multiple small stones high risk recurrent ureteral stones. , Dr Arriaga in 2016 rec 24 hour urine.    Type 2 diabetes mellitus without complication (H)               Past Surgical History:     Past Surgical History:   Procedure Laterality Date    ARTHROPLASTY HIP  3/13/2013    Procedure: ARTHROPLASTY HIP;  LEFT TOTAL HIP ARTHROPLASTY (BIOMET)^ ;  Surgeon: Anand Main MD;  Location:  OR    ARTHROPLASTY KNEE Right 8/9/2021    Procedure: RIGHT TOTAL KNEE ARTHROPLASTY;  Surgeon: Anand Main MD;  Location:  OR    ARTHROPLASTY PATELLO-FEMORAL (KNEE)  2005ish    Left    CHOLECYSTECTOMY, OPEN  27 yo    COMBINED CYSTOSCOPY, INSERT STENT URETER(S)  8/5/2014    Procedure: COMBINED CYSTOSCOPY, INSERT STENT URETER(S);  Surgeon: Sam Arriaga MD;  Location:  OR    COMBINED CYSTOSCOPY, RETROGRADES, URETEROSCOPY, LASER HOLMIUM LITHOTRIPSY URETER(S), INSERT STENT Left 6/14/2016    Procedure: COMBINED CYSTOSCOPY, RETROGRADES, URETEROSCOPY, LASER HOLMIUM LITHOTRIPSY URETER(S), INSERT STENT;  Surgeon: Thomas Edmondson MD;  Location:  OR    COMBINED CYSTOSCOPY, RETROGRADES, URETEROSCOPY, LASER HOLMIUM LITHOTRIPSY URETER(S), INSERT STENT Left 3/28/2019    Procedure: CYSTOSCOPY, LEFT RETROGRADE PYLEOGRAM, LEFT URETEROSCOPY, HOLMIUM LASER LITHOTRIPSY, LEFT URETERAL STENT EXCHANGE;  Surgeon: Derrek Rivera MD;   Location:  OR    CV CORONARY ANGIOGRAM N/A 7/5/2023    Procedure: Coronary Angiogram;  Surgeon: Fausto Espinal MD;  Location:  HEART CARDIAC CATH LAB    CV INTRAVASULAR ULTRASOUND N/A 7/5/2023    Procedure: Intravascular Ultrasound;  Surgeon: Fausto Espinal MD;  Location:  HEART CARDIAC CATH LAB    CV PCI N/A 7/5/2023    Procedure: Percutaneous Coronary Intervention;  Surgeon: Fausto Espinal MD;  Location:  HEART CARDIAC CATH LAB    CYSTOSCOPY, RETROGRADES, INSERT STENT URETER(S), COMBINED Left 3/8/2019    Procedure: CYSTOSCOPY,LEFT RETROGRADE, LEFT STENT PLACEMENT;  Surgeon: Derrek Rivera MD;  Location:  OR    GENITOURINARY SURGERY      LAMINECT/DISCECTOMY, LUMBAR      Laminectomy/Discectomy Lumbar    LASER HOLMIUM LITHOTRIPSY URETER(S), INSERT STENT, COMBINED Left 8/20/2014    Procedure: COMBINED CYSTOSCOPY, URETEROSCOPY, LASER HOLMIUM LITHOTRIPSY URETER(S), INSERT STENT;  Surgeon: Sam Arriaga MD;  Location:  OR    LASER HOLMIUM LITHOTRIPSY URETER(S), INSERT STENT, COMBINED Left 5/2/2019    Procedure: CYSTOSCOPY, LEFT RETROGRADE, LEFT URETEROSCOPY, HOLMIUM LASER LITHOTRIPSY, STONE REMOVAL, LEFT STENT EXCHANGE;  Surgeon: Derrek Rivera MD;  Location:  OR    LASER HOLMIUM LITHOTRIPSY URETER(S), INSERT STENT, COMBINED Right 6/13/2019    Procedure: CYSTOSCOPY; RIGHT URETEROSCOPY WITH HOLMIUM LASER LITHOTRIPSY; RIGHT STENT PLACEMENT (DIGITAL FLEXIBLE URETEROSCOPE);  Surgeon: Derrek Rivera MD;  Location:  OR    ORTHOPEDIC SURGERY      left ankle    RECESSION EYELID UPPER                Allergies:   Morphine, Prednisone, Baclofen, Imitrex [sumatriptan], and Tetracycline       Data:   All laboratory data reviewed:    Recent Labs   Lab Test 06/03/23  0632 11/15/22  1445 10/14/21  1430 08/02/21  1045 01/20/20  1110   LDL 47  --   --  106* 113*   HDL 45*  --   --  44 60   NHDL 61  --   --   --   --    CHOL 106  --   --  175 187   TRIG 71  --   --  138 70   IRON  --  55  --    --   --    FEB  --  267  --   --   --    VINI  --  76   < >  --   --     < > = values in this interval not displayed.       Lab Results   Component Value Date    WBC 3.9 (L) 07/18/2023    WBC 4.1 01/23/2023    WBC 7.8 10/01/2015    RBC 3.48 (L) 07/18/2023    RBC 3.90 01/23/2023    RBC 4.24 10/01/2015    HGB 10.9 (L) 07/18/2023    HGB 11.9 01/23/2023    HCT 34.3 (L) 07/18/2023    HCT 34.5 (A) 01/23/2023    MCV 99 07/18/2023    MCV 88.3 01/23/2023    MCH 31.3 07/18/2023    MCH 30.6 01/23/2023    MCHC 31.8 07/18/2023    MCHC 34.6 01/23/2023    RDW 13.5 07/18/2023    RDW 13.8 10/01/2015     07/18/2023     01/23/2023       Lab Results   Component Value Date     07/18/2023     01/23/2023    POTASSIUM 5.3 07/18/2023    POTASSIUM 4.3 01/23/2023    CHLORIDE 103 07/18/2023    CHLORIDE 105 01/23/2023    CO2 23 07/18/2023    CO2 25 08/10/2021    CO2 26 10/01/2015    ANIONGAP 11 07/18/2023    ANIONGAP 6 08/10/2021    ANIONGAP 7 10/01/2015    GLC 92 07/18/2023     (H) 01/23/2023    BUN 38.4 (H) 07/18/2023    BUN 25 01/23/2023    BUN 17 01/23/2023    CR 1.94 (H) 07/18/2023    CR 1.48 (H) 01/23/2023    GFRESTIMATED 25 (L) 07/18/2023    GFRESTIMATED 37 (L) 10/01/2015    GFRESTBLACK 45 (L) 10/01/2015    WARD 9.7 07/18/2023    WARD 9.3 01/23/2023      Lab Results   Component Value Date    AST 19 06/03/2023    AST 17 07/19/2022    ALT 13 06/03/2023    ALT 8 07/19/2022       Lab Results   Component Value Date    A1C 5.9 01/23/2023       Lab Results   Component Value Date    INR 1.03 07/05/2023    INR 1.02 08/04/2014    INR 2.39 (H) 03/16/2013         Thank you for allowing me to participate in the care of your patient.      Sincerely,     Cherelle Thrasher, [facility]  cc:   Fausto Espinal MD  [address]  [place] [address]

## 2023-12-13 NOTE — PATIENT INSTRUCTIONS
Today's Plan:     - Follow-up with me in 6 months with repeat echocardiogram to assess aortic valve.   - Recommend establishing care new primary care doctor.     Referrals   Primary care doctors: Dr. Fleming, Dr. Bolanos, Dr. Javed, Dr. Norma Kwok     Dental: Dr. Ayush Sher      Nephrologist: Dr. Dwaine Sharpe RN ([phone]), Jadyn RN ([phone]), and Fariha JONES ([phone])    After Hours: [phone], option #2, ask for cardiologist on-call    Scheduling phone number: [phone]    Reminder: Please bring in all current medications, over the counter supplements and vitamin bottles to your next appointment.-    It was a pleasure seeing you today!     Cherelle Thrasher, SELENA  12/13/2023    -

## 2024-01-04 DIAGNOSIS — N20.0 URIC ACID KIDNEY STONE: ICD-10-CM

## 2024-01-04 RX ORDER — ALLOPURINOL 100 MG/1
TABLET ORAL
Qty: 90 TABLET | Refills: 0 | Status: SHIPPED | OUTPATIENT
Start: 2024-01-04 | End: 2024-04-04

## 2024-01-05 ENCOUNTER — TRANSFERRED RECORDS (OUTPATIENT)
Dept: FAMILY MEDICINE | Facility: CLINIC | Age: 84
End: 2024-01-05

## 2024-01-19 DIAGNOSIS — E03.4 HYPOTHYROIDISM DUE TO ACQUIRED ATROPHY OF THYROID: ICD-10-CM

## 2024-01-19 DIAGNOSIS — N20.0 URIC ACID KIDNEY STONE: ICD-10-CM

## 2024-01-19 RX ORDER — OXYBUTYNIN CHLORIDE 10 MG/1
TABLET, EXTENDED RELEASE ORAL
Qty: 90 TABLET | Refills: 0 | Status: SHIPPED | OUTPATIENT
Start: 2024-01-19 | End: 2024-06-06

## 2024-01-19 RX ORDER — LEVOTHYROXINE SODIUM 137 UG/1
137 TABLET ORAL DAILY
Qty: 90 TABLET | Refills: 0 | Status: ON HOLD | OUTPATIENT
Start: 2024-01-19 | End: 2024-07-27

## 2024-01-19 NOTE — CONFIDENTIAL NOTE
Med: OXYBUTYNIN, LEVOTHYROXINE    LOV (related): 5/23/23      Due for F/U around: OVERDUE FOR AWV    Next Appt: NONE

## 2024-01-31 ENCOUNTER — TELEPHONE (OUTPATIENT)
Dept: CARDIOLOGY | Facility: CLINIC | Age: 84
End: 2024-01-31
Payer: COMMERCIAL

## 2024-01-31 NOTE — TELEPHONE ENCOUNTER
Received a form from [facility] requesting a medical update and a plan for holding plavix. Patient is planning several extractions with implants to follow.    Patient has a history of aortic stenosis and CAD with a new stent on 7/5/2023: RAMILA to LAD.    Will message Dr. Andino to comment on the plavix use and ASA 81mg MWF.      [id number] reply from Dr. Anidno:  Honorio Andino MD [place], [place] PARNELL, RN  Caller: Unspecified (Today, 10:35 AM)  Okay to hold Plavix 5 days prior to extraction.  If necessary aspirin can be held too    Updated form to Dr. Andino to sign.      2/1/2024 5227 signed form faxed to [facility] at [phone]. Copy sent to scan

## 2024-02-01 ENCOUNTER — TELEPHONE (OUTPATIENT)
Dept: CARDIOLOGY | Facility: CLINIC | Age: 84
End: 2024-02-01
Payer: COMMERCIAL

## 2024-02-01 NOTE — TELEPHONE ENCOUNTER
[facility] Call Center    Phone Message    May a detailed message be left on voicemail: yes     Reason for Call: Other: Pt would like a call back to discuss upcoming dental procedure, medications and other questions, please reach out to further discuss.     Action Taken: Other: Cardiology    Travel Screening: Not Applicable    Thank you!  [facility]

## 2024-02-01 NOTE — TELEPHONE ENCOUNTER
Spoke with patient. Reviewed that her dental form was received yesterday and faxed back this AM. She states the dental office told her their fax is broken. She cannot set up her dental work until they have the paperwork in hand. Patient will come in to the clinic and  a copy of her paperwork today and deliver it to her dental office.    Form and office note to  for .

## 2024-02-14 ENCOUNTER — TRANSFERRED RECORDS (OUTPATIENT)
Dept: FAMILY MEDICINE | Facility: CLINIC | Age: 84
End: 2024-02-14

## 2024-02-15 DIAGNOSIS — I25.118 CORONARY ARTERY DISEASE OF NATIVE HEART WITH STABLE ANGINA PECTORIS, UNSPECIFIED VESSEL OR LESION TYPE (H): ICD-10-CM

## 2024-02-15 RX ORDER — METOPROLOL SUCCINATE 25 MG/1
25 TABLET, EXTENDED RELEASE ORAL DAILY
Qty: 90 TABLET | Refills: 3 | Status: SHIPPED | OUTPATIENT
Start: 2024-02-15

## 2024-02-28 DIAGNOSIS — R07.9 CHEST PAIN, UNSPECIFIED TYPE: ICD-10-CM

## 2024-02-28 DIAGNOSIS — I25.10 CORONARY ARTERY DISEASE INVOLVING NATIVE CORONARY ARTERY OF NATIVE HEART WITHOUT ANGINA PECTORIS: ICD-10-CM

## 2024-02-28 RX ORDER — NITROGLYCERIN 0.4 MG/1
TABLET SUBLINGUAL
Qty: 25 TABLET | Refills: 1 | Status: SHIPPED | OUTPATIENT
Start: 2024-02-28

## 2024-03-08 NOTE — LETTER
My Asthma Action Plan  Name: Macie Jorge   YOB: 1940  Date: 3/21/2017   My doctor: Eric Henry MD   My clinic: [facility]        My Control Medicine: Fluticasone + salmeterol (Advair) -  Diskus 250/50 mcg BID  My Rescue Medicine: Albuterol (Proair/Ventolin/Proventil) inhaler prn  My Oral Steroid Medicine: prednisone My Asthma Severity: intermittent  Avoid your asthma triggers: dust mites and pollens               GREEN ZONE     Good Control    I feel good    No cough or wheeze    Can work, sleep and play without asthma symptoms       Take your asthma control medicine every day.     1. If exercise triggers your asthma, take your rescue medication    15 minutes before exercise or sports, and    During exercise if you have asthma symptoms  2. Spacer to use with inhaler: If you have a spacer, make sure to use it with your inhaler             YELLOW ZONE     Getting Worse  I have ANY of these:    I do not feel good    Cough or wheeze    Chest feels tight    Wake up at night   1. Keep taking your Green Zone medications  2. Start taking your rescue medicine:    every 20 minutes for up to 1 hour. Then every 4 hours for 24-48 hours.  3. If you stay in the Yellow Zone for more than 12-24 hours, contact your doctor.  4. If you do not return to the Green Zone in 12-24 hours or you get worse, start taking your oral steroid medicine if prescribed by your provider.           RED ZONE     Medical Alert - Get Help  I have ANY of these:    I feel awful    Medicine is not helping    Breathing getting harder    Trouble walking or talking    Nose opens wide to breathe       1. Take your rescue medicine NOW  2. If your provider has prescribed an oral steroid medicine, start taking it NOW  3. Call your doctor NOW  4. If you are still in the Red Zone after 20 minutes and you have not reached your doctor:    Take your rescue medicine again and    Call 911 or go to the emergency room right  away    See your regular doctor within 2 weeks of an Emergency Room or Urgent Care visit for follow-up treatment.        Electronically signed by: Gaby Overton, March 21, 2017    Annual Reminders:  Meet with Asthma Educator,  Flu Shot in the Fall, consider Pneumonia Vaccination for patients with asthma (aged 19 and older).    Pharmacy:    [facility] - MAIL ORDER MAINT MEDS - NON-EPRESCRIBE  [facility] [address] [place] [address] [address] [place] [address]                    Asthma Triggers  How To Control Things That Make Your Asthma Worse    Triggers are things that make your asthma worse.  Look at the list below to help you find your triggers and what you can do about them.  You can help prevent asthma flare-ups by staying away from your triggers.      Trigger                                                          What you can do   Cigarette Smoke  Tobacco smoke can make asthma worse. Do not allow smoking in your home, car or around you.  Be sure no one smokes at a child s day care or school.  If you smoke, ask your health care provider for ways to help you quit.  Ask family members to quit too.  Ask your health care provider for a referral to Quit Plan to help you quit smoking, or call 5-990-028-PLAN.     Colds, Flu, Bronchitis  These are common triggers of asthma. Wash your hands often.  Don t touch your eyes, nose or mouth.  Get a flu shot every year.     Dust Mites  These are tiny bugs that live in cloth or carpet. They are too small to see. Wash sheets and blankets in hot water every week.   Encase pillows and mattress in dust mite proof covers.  Avoid having carpet if you can. If you have carpet, vacuum weekly.   Use a dust mask and HEPA vacuum.   Pollen and Outdoor Mold  Some people are allergic to trees, grass, or weed pollen, or molds. Try to keep your windows closed.  Limit time out doors when pollen count is high.   Ask you health  care provider about taking medicine during allergy season.     Animal Dander  Some people are allergic to skin flakes, urine or saliva from pets with fur or feathers. Keep pets with fur or feathers out of your home.    If you can t keep the pet outdoors, then keep the pet out of your bedroom.  Keep the bedroom door closed.  Keep pets off cloth furniture and away from stuffed toys.     Mice, Rats, and Cockroaches  Some people are allergic to the waste from these pests.   Cover food and garbage.  Clean up spills and food crumbs.  Store grease in the refrigerator.   Keep food out of the bedroom.   Indoor Mold  This can be a trigger if your home has high moisture. Fix leaking faucets, pipes, or other sources of water.   Clean moldy surfaces.  Dehumidify basement if it is damp and smelly.   Smoke, Strong Odors, and Sprays  These can reduce air quality. Stay away from strong odors and sprays, such as perfume, powder, hair spray, paints, smoke incense, paint, cleaning products, candles and new carpet.   Exercise or Sports  Some people with asthma have this trigger. Be active!  Ask your doctor about taking medicine before sports or exercise to prevent symptoms.    Warm up for 5-10 minutes before and after sports or exercise.     Other Triggers of Asthma  Cold air:  Cover your nose and mouth with a scarf.  Sometimes laughing or crying can be a trigger.  Some medicines and food can trigger asthma.      0

## 2024-03-11 ENCOUNTER — APPOINTMENT (OUTPATIENT)
Dept: CT IMAGING | Facility: CLINIC | Age: 84
End: 2024-03-11
Attending: PHYSICIAN ASSISTANT
Payer: COMMERCIAL

## 2024-03-11 ENCOUNTER — HOSPITAL ENCOUNTER (EMERGENCY)
Facility: CLINIC | Age: 84
Discharge: HOME | End: 2024-03-11
Attending: PHYSICIAN ASSISTANT | Admitting: PHYSICIAN ASSISTANT
Payer: COMMERCIAL

## 2024-03-11 VITALS
RESPIRATION RATE: 16 BRPM | HEART RATE: 83 BPM | SYSTOLIC BLOOD PRESSURE: 152 MMHG | TEMPERATURE: 97 F | DIASTOLIC BLOOD PRESSURE: 65 MMHG | OXYGEN SATURATION: 98 %

## 2024-03-11 DIAGNOSIS — T14.8XXA BRUISING: ICD-10-CM

## 2024-03-11 LAB
ANION GAP SERPL CALCULATED.3IONS-SCNC: 12 MMOL/L (ref 7–15)
APTT PPP: 25 SECONDS (ref 22–38)
BASOPHILS # BLD AUTO: 0 10E3/UL (ref 0–0.2)
BASOPHILS NFR BLD AUTO: 0 %
BUN SERPL-MCNC: 41.9 MG/DL (ref 8–23)
CALCIUM SERPL-MCNC: 9.2 MG/DL (ref 8.8–10.2)
CHLORIDE SERPL-SCNC: 102 MMOL/L (ref 98–107)
CREAT SERPL-MCNC: 1.52 MG/DL (ref 0.51–0.95)
DEPRECATED HCO3 PLAS-SCNC: 20 MMOL/L (ref 22–29)
EGFRCR SERPLBLD CKD-EPI 2021: 34 ML/MIN/1.73M2
EOSINOPHIL # BLD AUTO: 0 10E3/UL (ref 0–0.7)
EOSINOPHIL NFR BLD AUTO: 0 %
ERYTHROCYTE [DISTWIDTH] IN BLOOD BY AUTOMATED COUNT: 14.8 % (ref 10–15)
GLUCOSE SERPL-MCNC: 182 MG/DL (ref 70–99)
HCT VFR BLD AUTO: 28.2 % (ref 35–47)
HGB BLD-MCNC: 9.5 G/DL (ref 11.7–15.7)
IMM GRANULOCYTES # BLD: 0.1 10E3/UL
IMM GRANULOCYTES NFR BLD: 1 %
INR PPP: 1.03 (ref 0.85–1.15)
LYMPHOCYTES # BLD AUTO: 0.8 10E3/UL (ref 0.8–5.3)
LYMPHOCYTES NFR BLD AUTO: 8 %
MCH RBC QN AUTO: 31 PG (ref 26.5–33)
MCHC RBC AUTO-ENTMCNC: 33.7 G/DL (ref 31.5–36.5)
MCV RBC AUTO: 92 FL (ref 78–100)
MONOCYTES # BLD AUTO: 0.7 10E3/UL (ref 0–1.3)
MONOCYTES NFR BLD AUTO: 7 %
NEUTROPHILS # BLD AUTO: 8.5 10E3/UL (ref 1.6–8.3)
NEUTROPHILS NFR BLD AUTO: 84 %
NRBC # BLD AUTO: 0 10E3/UL
NRBC BLD AUTO-RTO: 0 /100
PLATELET # BLD AUTO: 225 10E3/UL (ref 150–450)
POTASSIUM SERPL-SCNC: 4.5 MMOL/L (ref 3.4–5.3)
RBC # BLD AUTO: 3.06 10E6/UL (ref 3.8–5.2)
SODIUM SERPL-SCNC: 134 MMOL/L (ref 135–145)
WBC # BLD AUTO: 10.2 10E3/UL (ref 4–11)

## 2024-03-11 PROCEDURE — 85004 AUTOMATED DIFF WBC COUNT: CPT | Performed by: PHYSICIAN ASSISTANT

## 2024-03-11 PROCEDURE — 258N000003 HC RX IP 258 OP 636: Performed by: PHYSICIAN ASSISTANT

## 2024-03-11 PROCEDURE — 70491 CT SOFT TISSUE NECK W/DYE: CPT

## 2024-03-11 PROCEDURE — 85610 PROTHROMBIN TIME: CPT | Performed by: PHYSICIAN ASSISTANT

## 2024-03-11 PROCEDURE — 250N000009 HC RX 250: Performed by: PHYSICIAN ASSISTANT

## 2024-03-11 PROCEDURE — 96360 HYDRATION IV INFUSION INIT: CPT | Mod: 59

## 2024-03-11 PROCEDURE — 80048 BASIC METABOLIC PNL TOTAL CA: CPT | Performed by: PHYSICIAN ASSISTANT

## 2024-03-11 PROCEDURE — 85730 THROMBOPLASTIN TIME PARTIAL: CPT | Performed by: PHYSICIAN ASSISTANT

## 2024-03-11 PROCEDURE — 250N000011 HC RX IP 250 OP 636: Performed by: PHYSICIAN ASSISTANT

## 2024-03-11 PROCEDURE — 36415 COLL VENOUS BLD VENIPUNCTURE: CPT | Performed by: PHYSICIAN ASSISTANT

## 2024-03-11 PROCEDURE — 99285 EMERGENCY DEPT VISIT HI MDM: CPT | Mod: 25

## 2024-03-11 RX ORDER — IOPAMIDOL 755 MG/ML
90 INJECTION, SOLUTION INTRAVASCULAR ONCE
Status: COMPLETED | OUTPATIENT
Start: 2024-03-11 | End: 2024-03-11

## 2024-03-11 RX ADMIN — SODIUM CHLORIDE 1000 ML: 9 INJECTION, SOLUTION INTRAVENOUS at 17:37

## 2024-03-11 RX ADMIN — IOPAMIDOL 90 ML: 755 INJECTION, SOLUTION INTRAVENOUS at 19:11

## 2024-03-11 RX ADMIN — SODIUM CHLORIDE 60 ML: 9 INJECTION, SOLUTION INTRAVENOUS at 19:11

## 2024-03-11 ASSESSMENT — COLUMBIA-SUICIDE SEVERITY RATING SCALE - C-SSRS
1. IN THE PAST MONTH, HAVE YOU WISHED YOU WERE DEAD OR WISHED YOU COULD GO TO SLEEP AND NOT WAKE UP?: NO
6. HAVE YOU EVER DONE ANYTHING, STARTED TO DO ANYTHING, OR PREPARED TO DO ANYTHING TO END YOUR LIFE?: NO
2. HAVE YOU ACTUALLY HAD ANY THOUGHTS OF KILLING YOURSELF IN THE PAST MONTH?: NO

## 2024-03-11 ASSESSMENT — ACTIVITIES OF DAILY LIVING (ADL)
ADLS_ACUITY_SCORE: 38

## 2024-03-11 NOTE — ED TRIAGE NOTES
Pt had teeth pulled on wednsday, takes a blood thinner and was off of it for 5 days prior to procedure.  Same day of surgery starting having bruising to entire chin and down her neck.  Bruising continues to spread.     Triage Assessment (Adult)       Row Name 03/11/24 1237          Triage Assessment    Airway WDL WDL        Respiratory WDL    Respiratory WDL WDL        Skin Circulation/Temperature WDL    Skin Circulation/Temperature WDL --  bruising to entire chin and down neck and chest        Cardiac WDL    Cardiac WDL WDL        Peripheral/Neurovascular WDL    Peripheral Neurovascular WDL WDL        Cognitive/Neuro/Behavioral WDL    Cognitive/Neuro/Behavioral WDL WDL

## 2024-03-11 NOTE — ED PROVIDER NOTES
History     Chief Complaint:  Mouth Problem       The history is provided by the patient.      Macie Jorge is a 83 year old female anticoagulated on Plavix, who presents to the ED for bruising on her lower chin and down her neck that began immediately after a dental procedure on Wednesday (3/6) where she had 5 dental implants and some teeth pulled. The patient reports she was discontinued her Plavix 5 days before her procedure and restarted it on Wednesday after the procedure. She states the bruising is spreading, prompting her to the ED today. She denies difficulty breathing, difficulty swallowing or pain in the neck. Denies taking any other blood thinners. She notes taking other routine medications.    Independent Historian:   None - Patient Only    Review of External Notes:         Medications:    Zyloprim  Asa  Plavix  Neurontin  Synthroid  Toprol  Nitrostat  Ditropan  Crestor    Past Medical History:    Calculus of left ureter  Cataract  Complication of anesthesia  COPD with asthma  GERD  Hydronephrosis, left  Hyperlipidemia  Hypothyroid  Kidney stone  Migraines  Obesity  Panic attacks  Type 2 diabetes    Past Surgical History:    Left total hip arthroplasty  Right total knee arthroplasty  Left arthroplasty knee  Cholecystectomy  Combined cystoscopy, ureteroscopy, laser lithotripsy with stent - left x 5  Laser lithotripsy ureteroscopy insert stent - right  Coronary angiogram  CV percutaneous coronary intervention  Recession upper eyelid  Orthopedic surgery     Physical Exam   Patient Vitals for the past 24 hrs:   BP Temp Pulse Resp SpO2   03/11/24 1643 -- -- -- 16 --   03/11/24 1239 (!) 152/65 -- -- -- --   03/11/24 1237 -- 97  F (36.1  C) 83 -- 94 %      Physical Exam  Vitals reviewed.   Constitutional:       Appearance: She is not diaphoretic.   HENT:      Head:      Jaw: Tenderness (slight floor of mouth tenderness without palpable fluctuance, swelling or mass) present. No trismus, swelling or  pain on movement.        Comments: Purple= Bruising     Nose: Nose normal.      Mouth/Throat:      Lips: Pink.      Mouth: Mucous membranes are moist. No oral lesions.      Dentition: No gingival swelling.      Tongue: No lesions. Tongue does not deviate from midline.      Pharynx: Oropharynx is clear. Uvula midline. No pharyngeal swelling, oropharyngeal exudate, posterior oropharyngeal erythema or uvula swelling.      Comments: Tolerating secretions. Normal voice and phonation.  Eyes:      General: No scleral icterus.     Conjunctiva/sclera: Conjunctivae normal.   Neck:      Trachea: Trachea and phonation normal. No tracheal tenderness or tracheal deviation.        Comments: Purple=Bruising, No palpable fluctuance, mass or crepitus.   Chest:      Chest wall: No deformity, tenderness or crepitus.   Musculoskeletal:      Cervical back: Neck supple. No erythema or crepitus. No pain with movement, spinous process tenderness or muscular tenderness. Normal range of motion.   Lymphadenopathy:      Cervical:      Right cervical: No superficial or deep cervical adenopathy.     Left cervical: No superficial or deep cervical adenopathy.   Neurological:      Mental Status: She is alert and oriented to person, place, and time. Mental status is at baseline.      Cranial Nerves: No cranial nerve deficit, dysarthria or facial asymmetry.   Psychiatric:         Attention and Perception: Attention and perception normal.         Mood and Affect: Mood and affect normal.       Emergency Department Course     Imaging:  Soft tissue neck CT w contrast   Final Result   IMPRESSION:    1.  Moderate soft tissue swelling in the region of the chin and upper neck.   2.  No soft tissue collection.         Laboratory:  Labs Ordered and Resulted from Time of ED Arrival to Time of ED Departure   CBC WITH PLATELETS AND DIFFERENTIAL - Abnormal       Result Value    WBC Count 10.2      RBC Count 3.06 (*)     Hemoglobin 9.5 (*)     Hematocrit 28.2 (*)      MCV 92      MCH 31.0      MCHC 33.7      RDW 14.8      Platelet Count 225      % Neutrophils 84      % Lymphocytes 8      % Monocytes 7      % Eosinophils 0      % Basophils 0      % Immature Granulocytes 1      NRBCs per 100 WBC 0      Absolute Neutrophils 8.5 (*)     Absolute Lymphocytes 0.8      Absolute Monocytes 0.7      Absolute Eosinophils 0.0      Absolute Basophils 0.0      Absolute Immature Granulocytes 0.1      Absolute NRBCs 0.0     BASIC METABOLIC PANEL - Abnormal    Sodium 134 (*)     Potassium 4.5      Chloride 102      Carbon Dioxide (CO2) 20 (*)     Anion Gap 12      Urea Nitrogen 41.9 (*)     Creatinine 1.52 (*)     GFR Estimate 34 (*)     Calcium 9.2      Glucose 182 (*)    INR - Normal    INR 1.03     PARTIAL THROMBOPLASTIN TIME - Normal    aPTT 25        Procedures     Emergency Department Course & Assessments:    Interventions:  Medications   sodium chloride 0.9% BOLUS 1,000 mL (0 mLs Intravenous Stopped 3/11/24 1835)   Saline (60 mLs As instructed $Given 3/11/24 1911)   iopamidol (ISOVUE-370) solution 90 mL (90 mLs Intravenous $Given 3/11/24 1911)      Assessments:  1650 I obtained history and examined the patient as noted above.  1951 I rechecked the patient and explained findings. We discussed plan for discharge and patient is in agreement with plan. Patient prefers to check with her cardiologist before holding her Plavix.     Independent Interpretation (X-rays, CTs, rhythm strip):      Consultations/Discussion of Management or Tests:  None        Social Determinants of Health affecting care:   None    Disposition:  The patient was discharged.     Impression & Plan    CMS Diagnoses: None    Medical Decision Making:  This is an 83-year-old female that presents with increased bruising of her chin and frontal anterior neck after recent dental procedure.  Patient is currently on Plavix by her cardiologist.  Fortunately patient's without difficulty breathing or swallowing or significant pain  in the neck.  Her vital signs aside from mildly high per tensive is normal.  She is able to tolerate her secretions.  Labs were obtained with noted hemoglobin of 9.5.  Patient's noted baseline hemoglobin is anywhere from 9.4-10.9 with history of underlying chronic anemia.  Currently she is not feeling lightheaded or syncopal.  PTT and INR normal.  Patient's kidney function is at her baseline.  CT imaging of her neck was obtained with no fluid collections consistent with concerns for expanding hematoma.  She is soft tissue swelling consistent with bruising.  Shared decision-making was performed with the patient I did suggest that she stop her Plavix for a few days however the patient is nervous about doing this as she has been told by her cardiologist to continue her Plavix.  Thus the patient has decided to contact her cardiologist tomorrow to discuss stopping her Plavix.  I am in favor of a short duration of with holding the Plavix given the increasing bruising however I think discussing this with her cardiologist is reasonable.  I would recommend close primary care follow-up in the next couple days for hemoglobin recheck and reevaluation.  At this time I feel the patient safe to discharge home return back to the emergency department if she develops any difficulty breathing, swallowing, increasing pain swelling of the neck or worsening condition.      Diagnosis:    ICD-10-CM    1. Bruising  T14.8XXA                  Scribe Disclosure:  I, Ama Moya, am serving as a scribe at 4:55 PM on 3/11/2024 to document services personally performed by Adriel Rodrigues PA-C based on my observations and the provider's statements to me.   3/11/2024   Adriel Rodrigues PA-C Kruger, Jacob C, PA-C  03/12/24 0958

## 2024-03-12 ENCOUNTER — TELEPHONE (OUTPATIENT)
Dept: CARDIOLOGY | Facility: CLINIC | Age: 84
End: 2024-03-12
Payer: COMMERCIAL

## 2024-03-12 NOTE — DISCHARGE INSTRUCTIONS
I recommend holding your Plavix for 2 to 3 days however you expressed that you prefer to discuss this with your cardiologist before doing so and I think this is reasonable.

## 2024-03-12 NOTE — TELEPHONE ENCOUNTER
Spoke with Patient -    Patient had some severe dental work last week. She had a severe infection in her jaw and went to the hospital yesterday. She is so bruised that the hospital suggested she goes off of her plavix for now.     ED Visit 3-11-23 -  ED advise to hold plavix for a few days but to contact Dr. Andino's office for recommendations.   Hx:     History      Chief Complaint:  Mouth Problem        The history is provided by the patient.      Macie Jorge is a 83 year old female anticoagulated on Plavix, who presents to the ED for bruising on her lower chin and down her neck that began immediately after a dental procedure on Wednesday (3/6) where she had 5 dental implants and some teeth pulled. The patient reports she was discontinued her Plavix 5 days before her procedure and restarted it on Wednesday after the procedure         Next JULIETTE OV 5-31-24.     Last JULIETTE OV 12-13-23 - aortic stenosis     Last Dr. Espinal OV 8-10-23 - If she develops symptoms, will consider pursuing CT TAVR.      Last Dr. Andino OV 7-18-23- We talked about her increased bruisability is multifactorial due to dual antiplatelet therapy chronic thrombocytopenia, and probably long-term sun damage done to her forearms over the years.  I will continue dual antiplatelet but consider stopping aspirin at 3 months.

## 2024-03-12 NOTE — TELEPHONE ENCOUNTER
Health Call Center    Phone Message    May a detailed message be left on voicemail: yes     Reason for Call: Other: Patient had some severe dental work last week. She had a severe infection in her jaw and went to the hospital yesterday. She is so bruised that the hospital suggested she goes off of her plavix for now. They asked her to check in with Dr. Andino. Please call the patient back to discuss.      Action Taken: Other: cardiology    Travel Screening: Not Applicable  Thank you!  [facility]

## 2024-03-12 NOTE — TELEPHONE ENCOUNTER
Spoke with Patient and Dr. Andino's reply reviewed.  Patient agrees with plan. Is not taking ASA, No NSAID's or fish oil.    Patient will call back with any questions or concerns.     Dr. Andino's reply -   Okay to hold Plavix temporarily.  But reinitiate ASAP.  I assume she is off aspirin.  She should also not be on fish oil, NSAIDs which can contribute to bleeding tendencies.  Looks like her thrombocytopenia is doing better on her latest lab.

## 2024-03-19 ENCOUNTER — OFFICE VISIT (OUTPATIENT)
Dept: FAMILY MEDICINE | Facility: CLINIC | Age: 84
End: 2024-03-19

## 2024-03-19 VITALS
WEIGHT: 165.4 LBS | HEIGHT: 65 IN | OXYGEN SATURATION: 97 % | BODY MASS INDEX: 27.56 KG/M2 | HEART RATE: 65 BPM | SYSTOLIC BLOOD PRESSURE: 100 MMHG | DIASTOLIC BLOOD PRESSURE: 50 MMHG

## 2024-03-19 DIAGNOSIS — R23.3 ABNORMAL BRUISING: Primary | ICD-10-CM

## 2024-03-19 DIAGNOSIS — N18.4 CHRONIC RENAL DISEASE, STAGE IV (H): ICD-10-CM

## 2024-03-19 DIAGNOSIS — I25.118 CORONARY ARTERY DISEASE OF NATIVE HEART WITH STABLE ANGINA PECTORIS, UNSPECIFIED VESSEL OR LESION TYPE (H): ICD-10-CM

## 2024-03-19 DIAGNOSIS — I70.0 ATHEROSCLEROSIS OF AORTA (H): ICD-10-CM

## 2024-03-19 DIAGNOSIS — N18.4 TYPE 2 DIABETES MELLITUS WITH STAGE 4 CHRONIC KIDNEY DISEASE, WITHOUT LONG-TERM CURRENT USE OF INSULIN (H): ICD-10-CM

## 2024-03-19 DIAGNOSIS — D61.818 PANCYTOPENIA (H): ICD-10-CM

## 2024-03-19 DIAGNOSIS — D50.9 IRON DEFICIENCY ANEMIA, UNSPECIFIED IRON DEFICIENCY ANEMIA TYPE: ICD-10-CM

## 2024-03-19 DIAGNOSIS — E11.22 TYPE 2 DIABETES MELLITUS WITH STAGE 4 CHRONIC KIDNEY DISEASE, WITHOUT LONG-TERM CURRENT USE OF INSULIN (H): ICD-10-CM

## 2024-03-19 DIAGNOSIS — J44.89 COPD WITH ASTHMA (H): ICD-10-CM

## 2024-03-19 PROBLEM — R41.82 ALTERED MENTAL STATUS: Status: RESOLVED | Noted: 2017-12-01 | Resolved: 2024-03-19

## 2024-03-19 PROBLEM — R01.1 HEART MURMUR: Status: RESOLVED | Noted: 2023-06-13 | Resolved: 2024-03-19

## 2024-03-19 PROBLEM — R07.9 CHEST PAIN: Status: RESOLVED | Noted: 2023-06-03 | Resolved: 2024-03-19

## 2024-03-19 PROBLEM — Z86.0100 HISTORY OF COLONIC POLYPS: Status: RESOLVED | Noted: 2018-02-27 | Resolved: 2024-03-19

## 2024-03-19 PROBLEM — I24.9 ACS (ACUTE CORONARY SYNDROME) (H): Status: RESOLVED | Noted: 2023-06-03 | Resolved: 2024-03-19

## 2024-03-19 PROBLEM — Z96.651 TOTAL KNEE REPLACEMENT STATUS, RIGHT: Status: RESOLVED | Noted: 2021-08-09 | Resolved: 2024-03-19

## 2024-03-19 LAB
% GRANULOCYTES: 86.3 % (ref 42.2–75.2)
HBA1C MFR BLD: 6.3 % (ref 4–6)
HCT VFR BLD AUTO: 27.2 % (ref 35–46)
HEMOGLOBIN: 9.4 G/DL (ref 11.8–15.5)
LYMPHOCYTES NFR BLD AUTO: 8.7 % (ref 20.5–51.1)
MCH RBC QN AUTO: 30.9 PG (ref 27–31)
MCHC RBC AUTO-ENTMCNC: 34.7 G/DL (ref 33–37)
MCV RBC AUTO: 89 FL (ref 80–100)
MONOCYTES NFR BLD AUTO: 5 % (ref 1.7–9.3)
PLATELET # BLD AUTO: 198 K/UL (ref 140–450)
RBC # BLD AUTO: 3.05 X10/CMM (ref 3.7–5.2)
WBC # BLD AUTO: 5.8 X10/CMM (ref 3.8–11)

## 2024-03-19 PROCEDURE — 92227 IMG RTA DETCJ/MNTR DS STAFF: CPT | Performed by: FAMILY MEDICINE

## 2024-03-19 PROCEDURE — 83036 HEMOGLOBIN GLYCOSYLATED A1C: CPT | Performed by: FAMILY MEDICINE

## 2024-03-19 PROCEDURE — 85025 COMPLETE CBC W/AUTO DIFF WBC: CPT | Performed by: FAMILY MEDICINE

## 2024-03-19 PROCEDURE — 99214 OFFICE O/P EST MOD 30 MIN: CPT | Mod: 25 | Performed by: FAMILY MEDICINE

## 2024-03-19 PROCEDURE — 36415 COLL VENOUS BLD VENIPUNCTURE: CPT | Performed by: FAMILY MEDICINE

## 2024-03-19 RX ORDER — FERROUS SULFATE 325(65) MG
325 TABLET ORAL DAILY PRN
COMMUNITY
End: 2024-07-22

## 2024-03-19 NOTE — LETTER
March 20, 2024        Macie Jorge  [address] [address]        Dear Macie,     It was dell to see you at your recent appointment.     Here are your lab results.     Your hemoglobin is stable at 9.4. The anemia is likely related to your kidney disease as the kidney plays a role in red blood cell production. This is something your kidney specialist will be monitoring also.     Your A1c (or the measure of your glucose control over the last 3 months) is less than 7% which indicates excellent glucose control.     Please let us know if you have any questions.     Sincerely,     Dr. Deedee Bui     Resulted Orders   Hemoglobin A1C ([facility])   Result Value Ref Range    Hemoglobin A1C 6.3 (A) 4.0 - 6.0 %   CBC with Diff/Plt ([facility])   Result Value Ref Range    WBC x10/cmm 5.8 3.8 - 11.0 x10/cmm    % Lymphocytes 8.7 (A) 20.5 - 51.1 %    % Monocytes 5.0 1.7 - 9.3 %    % Granulocytes 86.3 (A) 42.2 - 75.2 %    RBC x10/cmm 3.05 (A) 3.7 - 5.2 x10/cmm    Hemoglobin 9.4 (A) 11.8 - 15.5 g/dl    Hematocrit 27.2 (A) 35 - 46 %    MCV 89.0 80 - 100 fL    MCH 30.9 27.0 - 31.0 pg    MCHC 34.7 33.0 - 37.0 g/dL    Platelet Count 198 140 - 450 K/uL

## 2024-03-19 NOTE — PROGRESS NOTES
"  SUBJECTIVE:    Macie Jorge, is a 83 year old female presenting for the below:     1. T2DM : A1c 6.3% . Diet controlled. Dx based on 1 x A1c >6.5% (7.5) in 2016. No longer taking metformin.   2. COPD / asthma. Former smoker. Trelergy ellipta with EVELYN prn.  Followed with pulmonary DR LENNOX 60-pack-year history of smoking   3. CKD stage 4 : follows with nephrology: [facility] . Progressive renal disease. Taking low dose ACEi.   4. Atherosclerosis of aorta : extensive atherosclerotic calcification abdo aorta chest CT 5/15/2023. + severe CA calcification CT chest 10/4/23. Taking ASA, plavix, statin.   5. Recent ER presentation 3/11/2024 facial bruising that began immediately after a dental procedure on Wednesday 3/6. 'ed on ASA and plavix (stent placed to the LAD on 7/5/23, on DAPT with aspirin and plavix) held 5 days prior to procedure).  Hemoglobin 9.5 (baseline 9.4-10.9 with history of underlying chronic anemia).  CT imaging of her neck with no evidence of expanding hematoma.   Patient spoke with cardiologist (Tianna Andino) : held plavix for further 4 days. Restarted 3/15.   Ecchymosis much improved with no new areas.   6. Chornic h/o iron deficiency anemia / pancytopenia.  : taking daily iron supplementation.     OBJECTIVE:  Vitals:    03/19/24 1430   BP: 100/50   Pulse: 65   SpO2: 97%   Weight: 75 kg (165 lb 6.4 oz)   Height: 1.657 m (5' 5.25\")    Body mass index is 27.31 kg/m .  General: no acute distress, cooperative with exam.  Skin: resolving ecchymosis to chin and anterior neck.   Diabetic foot check: skin and nails within normal limits, palpable pedal pulses, intact sensation to monofilament.     Hemoglobin A1C   Date Value Ref Range Status   03/19/2024 6.3 (A) 4.0 - 6.0 % Final   01/23/2023 5.9 4.0 - 6.0 % Final   07/19/2022 5.7 4.0 - 6.0 % Final     CBC RESULTS:   Recent Labs   Lab Test 03/19/24  1440 03/11/24  1653   WBC 5.8 10.2   RBC 3.05* 3.06*   HGB 9.4* 9.5*   HCT 27.2* 28.2* "   MCV 89.0 92   MCH 30.9 31.0   MCHC 34.7 33.7   RDW  --  14.8    225       ASSESSMENT / PLAN:      Abnormal bruising  Held plavix for further 4 days after ER presentation (guided by cardiology : taking DAPT : stent placed to the LAD on 7/5/23, . Restarted 3/15 plavix. Ecchymosis improving.  Recheck hgb.   -     CBC with Diff/Plt ([facility])    Chronic renal disease, stage IV (H)  Follows with nephrology: [facility] . Progressive renal disease. Taking low dose ACEi.     COPD with asthma  Former smoker. Trelergy ellipta with EVELYN prn.  Followed with pulmonary DR LENNOX 60-pack-year history of smoking     Iron deficiency anemia, unspecified.   Likely related to CKD. Following with nephrology. Stable on recheck today. Baseline 9.4-10.9.     Type 2 diabetes mellitus with stage 4 chronic kidney disease, without long-term current use of insulin (H)  Diet controlled. Dx based on 1 x A1c >6.5% (7.5) in 2016. No longer taking metformin.   -     Hemoglobin A1C ([facility])  -     ID REMOTE IMAGE RETINA, DETECTION DISEASE  -     FOOT EXAM  -     VENOUS COLLECTION  -     Microalbumin ([facility]); Future    Coronary artery disease of native heart with stable angina pectoris, unspecified vessel or lesion type (H24)  Taking DAPT : stent placed to the LAD on 7/5/23, follows with cardiology.     Atherosclerosis of aorta (H24)  Extensive atherosclerotic calcification abdo aorta chest CT 5/15/2023. + severe CA calcification CT chest 10/4/23. Taking ASA, plavix, statin.     Follow up : overdue MAW.

## 2024-03-29 DIAGNOSIS — N20.0 URIC ACID KIDNEY STONE: ICD-10-CM

## 2024-04-01 NOTE — CONFIDENTIAL NOTE
Med: allopurinol (ZYLOPRIM) 100 MG tablet      LOV (related): 2021          Uric Acid   Date Value Ref Range Status   08/02/2021 8.4 (H) 3.1 - 7.9 mg/dL Final       Comment:                  Therapeutic target for gout patients: <6.0            Due for F/U around: 8/2/21 over due for MAW     Next Appt: none with [facility]- has cardiology appt in May 2024

## 2024-04-04 RX ORDER — ALLOPURINOL 100 MG/1
TABLET ORAL
Qty: 90 TABLET | Refills: 1 | Status: SHIPPED | OUTPATIENT
Start: 2024-04-04 | End: 2024-08-26

## 2024-04-23 ENCOUNTER — TRANSFERRED RECORDS (OUTPATIENT)
Dept: FAMILY MEDICINE | Facility: CLINIC | Age: 84
End: 2024-04-23

## 2024-05-31 ENCOUNTER — OFFICE VISIT (OUTPATIENT)
Dept: CARDIOLOGY | Facility: CLINIC | Age: 84
End: 2024-05-31
Attending: NURSE PRACTITIONER
Payer: COMMERCIAL

## 2024-05-31 ENCOUNTER — HOSPITAL ENCOUNTER (OUTPATIENT)
Dept: CARDIOLOGY | Facility: CLINIC | Age: 84
Discharge: HOME | End: 2024-05-31
Attending: NURSE PRACTITIONER | Admitting: NURSE PRACTITIONER
Payer: COMMERCIAL

## 2024-05-31 ENCOUNTER — LAB (OUTPATIENT)
Dept: LAB | Facility: CLINIC | Age: 84
End: 2024-05-31
Payer: COMMERCIAL

## 2024-05-31 VITALS
DIASTOLIC BLOOD PRESSURE: 71 MMHG | HEIGHT: 65 IN | WEIGHT: 158.1 LBS | HEART RATE: 66 BPM | BODY MASS INDEX: 26.34 KG/M2 | SYSTOLIC BLOOD PRESSURE: 125 MMHG

## 2024-05-31 DIAGNOSIS — I35.0 NONRHEUMATIC AORTIC VALVE STENOSIS: ICD-10-CM

## 2024-05-31 DIAGNOSIS — E78.00 HYPERCHOLESTEROLEMIA: ICD-10-CM

## 2024-05-31 DIAGNOSIS — I25.10 CORONARY ARTERY DISEASE INVOLVING NATIVE CORONARY ARTERY OF NATIVE HEART WITHOUT ANGINA PECTORIS: ICD-10-CM

## 2024-05-31 DIAGNOSIS — N18.32 STAGE 3B CHRONIC KIDNEY DISEASE (H): ICD-10-CM

## 2024-05-31 DIAGNOSIS — I35.0 NONRHEUMATIC AORTIC VALVE STENOSIS: Primary | ICD-10-CM

## 2024-05-31 LAB
ALT SERPL W P-5'-P-CCNC: 13 U/L (ref 0–50)
ANION GAP SERPL CALCULATED.3IONS-SCNC: 12 MMOL/L (ref 7–15)
BUN SERPL-MCNC: 24 MG/DL (ref 8–23)
CALCIUM SERPL-MCNC: 9.3 MG/DL (ref 8.8–10.2)
CHLORIDE SERPL-SCNC: 104 MMOL/L (ref 98–107)
CREAT SERPL-MCNC: 1.64 MG/DL (ref 0.51–0.95)
DEPRECATED HCO3 PLAS-SCNC: 21 MMOL/L (ref 22–29)
EGFRCR SERPLBLD CKD-EPI 2021: 31 ML/MIN/1.73M2
GLUCOSE SERPL-MCNC: 100 MG/DL (ref 70–99)
LVEF ECHO: NORMAL
POTASSIUM SERPL-SCNC: 4.4 MMOL/L (ref 3.4–5.3)
SODIUM SERPL-SCNC: 137 MMOL/L (ref 135–145)

## 2024-05-31 PROCEDURE — 93306 TTE W/DOPPLER COMPLETE: CPT | Mod: 26 | Performed by: INTERNAL MEDICINE

## 2024-05-31 PROCEDURE — 80048 BASIC METABOLIC PNL TOTAL CA: CPT | Performed by: INTERNAL MEDICINE

## 2024-05-31 PROCEDURE — 36415 COLL VENOUS BLD VENIPUNCTURE: CPT | Performed by: INTERNAL MEDICINE

## 2024-05-31 PROCEDURE — 84460 ALANINE AMINO (ALT) (SGPT): CPT | Performed by: INTERNAL MEDICINE

## 2024-05-31 PROCEDURE — 80061 LIPID PANEL: CPT | Performed by: INTERNAL MEDICINE

## 2024-05-31 PROCEDURE — 99214 OFFICE O/P EST MOD 30 MIN: CPT | Performed by: NURSE PRACTITIONER

## 2024-05-31 PROCEDURE — 93306 TTE W/DOPPLER COMPLETE: CPT

## 2024-05-31 NOTE — PATIENT INSTRUCTIONS
Today's Plan:     - Recommend follow-up with primary care doctor.   - Repeat echocardiogram in 6 months.   - Follow-up with Dr. Andino in 6 months, sooner if needed.     If you have questions or concerns please call my nurse team:  Nora RN ([phone]) Annemarie RN ([phone]) and Fariha RN ([phone])    After Hours: [phone], option #2, ask for cardiologist on-call    Scheduling phone number: [phone]    Reminder: Please bring in all current medications, over the counter supplements and vitamin bottles to your next appointment.-    It was a pleasure seeing you today!     Cherelle Thrasher, SELENA  5/31/2024    -

## 2024-05-31 NOTE — PROGRESS NOTES
Cardiology Clinic Progress Note  Macie Jorge MRN# [id number]   YOB: 1940 Age: 83 year old     Primary cardiologist: Dr. Andino    Reason for visit: aortic stenosis    History of presenting illness:    Macie Jorge is a pleasant 83 year old with history of coronary disease, aortic stenosis, stage IV chronic kidney disease, severe spinal stenosis, chronic pancytopenia, obesity, asthma and COPD here for follow-up.     She was seen by Dr. Andino in June of this year for evaluation of chest pain.  She had a stress test which suggested anterior ischemia.  She subsequently underwent coronary angiography which revealed high-grade proximal/mid LAD stenosis which was stented successfully.       The patient also had an echocardiogram to assess her aortic stenosis that showed preserved LVEF of 60 to 65%, aortic stenosis with mean gradient of 31 mmHg, DI of 0.26, and valve area of 0.82 and stroke-volume index of 38 mL/m2.  She was then seen by Dr. Espinal in our valve clinic for further evaluation of her aortic stenosis.  Given her lack of symptoms and her advanced kidney disease, plan was for repeat echocardiogram in 3 months.    Today she continues to do well from a cardiovascular standpoint. She is quite limited from a functional point of view by severe spinal stenosis as well as other orthopedic issues including knees and shoulders. She denies any chest pain, syncope, near syncope, or palpitations.  No PND or orthopnea.  She does report worsening fatigue, but has been out of her levothyroxine for 2 months and has not seen her PCP.    Repeat echocardiogram today shows mild to moderate aortic stenosis with a mean gradient of 15 mmHg and valve area 1.6 cm2.     I reviewed her most recent CBC that shows hemoglobin of 9.4 which is stable.         Assessment and Plan:     ASSESSMENT:    Moderate aortic stenosis.  Her most recent echo as noted above. It is possible the severity of her aortic  stenosis is underestimated as previous imaging has noted at least moderate aortic stenosis with a valve area around 1 cm .  Nonetheless, she remains relatively asymptomatic.   CAD s/p PCI to LAD on 7/5/2023.  No exertional symptoms.  She remains on DAPT with ASA and Plavix, BB, and statin.  Stage IV CKD.  Follows with outpatient nephrology, baseline creatinine appears to be 1.5-1.9.  Hypothyroidism.  Not currently on levothyroxine, has had no recent follow-up with PCP, likely the cause of her fatigue.      PLAN:     Patient remains relatively asymptomatic from a valvular standpoint.  Encouraged PCP follow-up for ongoing treatment of her hypothyroidism which is likely contributing to her fatigue.  Given her lack of symptoms, recommend repeat echocardiogram and follow-up with Dr. Andino in 6 months.  If she develops symptoms, will consider pursuing CT TAVR, but we will have to be mindful of her kidney function as she does not want to be on dialysis.         Cherelle Thrasher, GALE, APRN, CNP  Page: [phone] (8a-5p M-F)    Orders this Visit:  Orders Placed This Encounter   Procedures    Follow-Up with Cardiology    Echocardiogram Complete     No orders of the defined types were placed in this encounter.    There are no discontinued medications.    Today's clinic visit entailed:  Review of the result(s) of each unique test - echo, labs  Ordering of each unique test  Prescription drug management  35 minutes spent by me on the date of the encounter doing chart review, review of test results, interpretation of tests, patient visit, documentation, and discussion with family   Provider  Link to [facility] Help Grid     The level of medical decision making during this visit was of moderate complexity.           Review of Systems:     Review of Systems:  Skin:        Eyes:       ENT:       Respiratory:  Negative    Cardiovascular:  Negative;palpitations;chest pain;syncope or near-syncope;dizziness;lightheadedness Positive  "for;fatigue  Gastroenterology: Positive for nausea;poor appetite  Genitourinary:       Musculoskeletal:  Positive for joint pain  Neurologic:       Psychiatric:       Heme/Lymph/Imm:       Endocrine:  Positive for thyroid disorder            Physical Exam:   Vitals: /71   Pulse 66   Ht 1.657 m (5' 5.25\")   Wt 71.7 kg (158 lb 1.6 oz)   BMI 26.11 kg/m    Constitutional:  cooperative frail      Skin:  warm and dry to the touch        Head:  normocephalic        Eyes:  pupils equal and round        ENT:  not assessed this visit        Neck:  JVP normal        Chest:  normal breath sounds, clear to auscultation, normal A-P diameter, normal symmetry, normal respiratory excursion, no use of accessory muscles        Cardiac: regular rhythm;normal S1 and S2       systolic ejection murmur;grade 1          Abdomen:  abdomen soft        Vascular: pulses full and equal                                      Extremities and Back:  no edema        Neurological:  no gross motor deficits;affect appropriate             Medications:     Current Outpatient Medications   Medication Sig Dispense Refill    albuterol (PROAIR HFA/PROVENTIL HFA/VENTOLIN HFA) 108 (90 Base) MCG/ACT inhaler INHALE 1 DOSE BY MOUTH EVERY 12 HOURS 180 g 0    allopurinol (ZYLOPRIM) 100 MG tablet Take 2 tablets by mouth once daily 90 tablet 1    aspirin 81 MG EC tablet Take 1 tab (81mg) on MWF 90 tablet 3    blood glucose (NO BRAND SPECIFIED) lancets standard Use to test blood sugar 1 time daily or as directed. 100 each 3    blood glucose (NO BRAND SPECIFIED) test strip Use to test blood sugar 1 time daily or as directed. 100 strip 3    blood glucose monitoring (NO BRAND SPECIFIED) meter device kit Use to test blood sugar 1 time daily or as directed. 1 kit 0    clopidogrel (PLAVIX) 75 MG tablet Take 1 tablet (75 mg) by mouth daily 90 tablet 3    ferrous sulfate (FEROSUL) 325 (65 Fe) MG tablet Take 325 mg by mouth daily as needed      " Fluticasone-Umeclidin-Vilanterol (TRELEGY ELLIPTA) 200-62.5-25 MCG/ACT oral inhaler Inhale 1 puff into the lungs daily 28 each 1    HYDROcodone-acetaminophen (NORCO) 7.5-325 MG per tablet Take 1 tablet by mouth 2 times daily (Up to 2 times a day as needed for severe pain)      metoprolol succinate ER (TOPROL XL) 25 MG 24 hr tablet Take 1 tablet (25 mg) by mouth daily 90 tablet 3    nitroGLYcerin (NITROSTAT) 0.4 MG sublingual tablet For chest pain place 1 tablet under the tongue every 5 minutes for 3 doses. If symptoms persist 5 minutes after 1st dose call 911. 25 tablet 1    oxyBUTYnin ER (DITROPAN XL) 10 MG 24 hr tablet Take 1 tablet by mouth once daily 90 tablet 0    rosuvastatin (CRESTOR) 20 MG tablet Take 1 tablet (20 mg) by mouth daily 90 tablet 3    gabapentin (NEURONTIN) 300 MG capsule Take 300 mg by mouth 2 times daily (Patient not taking: Reported on 2024)      levothyroxine (SYNTHROID/LEVOTHROID) 137 MCG tablet Take 1 tablet (137 mcg) by mouth daily (Patient not taking: Reported on 2024) 90 tablet 0       No family history on file.    Social History     Socioeconomic History    Marital status:      Spouse name: Not on file    Number of children: Not on file    Years of education: Not on file    Highest education level: Not on file   Occupational History    Not on file   Tobacco Use    Smoking status: Former     Current packs/day: 0.00     Average packs/day: 1.5 packs/day for 40.0 years (60.0 ttl pk-yrs)     Types: Cigarettes     Start date: 1955     Quit date: 1995     Years since quittin.4    Smokeless tobacco: Never   Substance and Sexual Activity    Alcohol use: Yes     Comment: couple drinks per year    Drug use: No    Sexual activity: Not on file   Other Topics Concern    Parent/sibling w/ CABG, MI or angioplasty before 65F 55M? Not Asked   Social History Narrative    Not on file     Social Determinants of Health     Financial Resource Strain: Not on file   Food  Insecurity: Not on file   Transportation Needs: Not on file   Physical Activity: Not on file   Stress: Not on file   Social Connections: Not on file   Interpersonal Safety: Not on file   Housing Stability: Not on file            Past Medical History:     Past Medical History:   Diagnosis Date    Calculus of left ureter 2014    dr Arriaga - ureteroscopy and lithitripsy    Cataract     Chronic low back pain     Dr Villagran at [facility] in 2015- not surgical     Complication of anesthesia     COPD with asthma (H)      followed with pulmonary DR LENNOX 60-pack-year history of smoking    Esophagitis, reflux 9/2012    SOME EOSINOPHILIA NO BARRETS    Gastro-oesophageal reflux disease      has seen GI    Hydronephrosis, left     chronic , in 2017 Dr Arriaga plan was observaton    Hyperlipidaemia LDL goal < 100     Hypothyroid     Kidney stone 3/28/2019    Migraine headaches 9/1/2011    Moderate persistent asthma      lifelong, saw Pulm 2013    Obesity (BMI 30-39.9)     Panic attacks     PONV (postoperative nausea and vomiting)     Renal stones 2016    kidney stones, multiple small stones high risk recurrent ureteral stones. , Dr Arriaga in 2016 rec 24 hour urine.    Type 2 diabetes mellitus without complication (H)               Past Surgical History:     Past Surgical History:   Procedure Laterality Date    ARTHROPLASTY HIP  3/13/2013    Procedure: ARTHROPLASTY HIP;  LEFT TOTAL HIP ARTHROPLASTY (BIOMET)^ ;  Surgeon: Anand Main MD;  Location:  OR    ARTHROPLASTY KNEE Right 8/9/2021    Procedure: RIGHT TOTAL KNEE ARTHROPLASTY;  Surgeon: Anand Main MD;  Location:  OR    ARTHROPLASTY PATELLO-FEMORAL (KNEE)  2005ish    Left    CHOLECYSTECTOMY, OPEN  27 yo    COMBINED CYSTOSCOPY, INSERT STENT URETER(S)  8/5/2014    Procedure: COMBINED CYSTOSCOPY, INSERT STENT URETER(S);  Surgeon: Sam Arriaga MD;  Location:  OR    COMBINED CYSTOSCOPY, RETROGRADES, URETEROSCOPY, LASER HOLMIUM LITHOTRIPSY URETER(S), INSERT STENT Left 6/14/2016     Procedure: COMBINED CYSTOSCOPY, RETROGRADES, URETEROSCOPY, LASER HOLMIUM LITHOTRIPSY URETER(S), INSERT STENT;  Surgeon: Thomas Edmondson MD;  Location:  OR    COMBINED CYSTOSCOPY, RETROGRADES, URETEROSCOPY, LASER HOLMIUM LITHOTRIPSY URETER(S), INSERT STENT Left 3/28/2019    Procedure: CYSTOSCOPY, LEFT RETROGRADE PYLEOGRAM, LEFT URETEROSCOPY, HOLMIUM LASER LITHOTRIPSY, LEFT URETERAL STENT EXCHANGE;  Surgeon: Derrek Rivera MD;  Location:  OR    CV CORONARY ANGIOGRAM N/A 7/5/2023    Procedure: Coronary Angiogram;  Surgeon: Fausto Espinal MD;  Location:  HEART CARDIAC CATH LAB    CV INTRAVASULAR ULTRASOUND N/A 7/5/2023    Procedure: Intravascular Ultrasound;  Surgeon: Fausto Espinal MD;  Location:  HEART CARDIAC CATH LAB    CV PCI N/A 7/5/2023    Procedure: Percutaneous Coronary Intervention;  Surgeon: Fausto Espinal MD;  Location: [facility] CARDIAC C[facility] LAB    CYSTOSCOPY, RETROGRADES, INSERT STENT URETER(S), COMBINED Left 3/8/2019    Procedure: CYSTOSCOPY,LEFT RETROGRADE, LEFT STENT PLACEMENT;  Surgeon: Derrek Rivera MD;  Location:  OR    GENITOURINARY SURGERY      LAMINECT/DISCECTOMY, LUMBAR      Laminectomy/Discectomy Lumbar    LASER HOLMIUM LITHOTRIPSY URETER(S), INSERT STENT, COMBINED Left 8/20/2014    Procedure: COMBINED CYSTOSCOPY, URETEROSCOPY, LASER HOLMIUM LITHOTRIPSY URETER(S), INSERT STENT;  Surgeon: Sam Arriaga MD;  Location:  OR    LASER HOLMIUM LITHOTRIPSY URETER(S), INSERT STENT, COMBINED Left 5/2/2019    Procedure: CYSTOSCOPY, LEFT RETROGRADE, LEFT URETEROSCOPY, HOLMIUM LASER LITHOTRIPSY, STONE REMOVAL, LEFT STENT EXCHANGE;  Surgeon: Derrek Rivera MD;  Location:  OR    LASER HOLMIUM LITHOTRIPSY URETER(S), INSERT STENT, COMBINED Right 6/13/2019    Procedure: CYSTOSCOPY; RIGHT URETEROSCOPY WITH HOLMIUM LASER LITHOTRIPSY; RIGHT STENT PLACEMENT (DIGITAL FLEXIBLE URETEROSCOPE);  Surgeon: Derrek Rivera MD;  Location:  OR    ORTHOPEDIC SURGERY       left ankle    RECESSION EYELID UPPER                Allergies:   Morphine, Prednisone, Baclofen, Imitrex [sumatriptan], and Tetracycline       Data:   All laboratory data reviewed:    Recent Labs   Lab Test 06/03/23  0632 11/15/22  1445 10/14/21  1430 08/02/21  1045 01/20/20  1110   LDL 47  --   --  106* 113*   HDL 45*  --   --  44 60   NHDL 61  --   --   --   --    CHOL 106  --   --  175 187   TRIG 71  --   --  138 70   IRON  --  55  --   --   --    FEB  --  267  --   --   --    VINI  --  76   < >  --   --     < > = values in this interval not displayed.       Lab Results   Component Value Date    WBC 5.8 03/19/2024    WBC 7.8 10/01/2015    RBC 3.05 (A) 03/19/2024    RBC 4.24 10/01/2015    HGB 9.4 (A) 03/19/2024    HCT 27.2 (A) 03/19/2024    MCV 89.0 03/19/2024    MCH 30.9 03/19/2024    MCHC 34.7 03/19/2024    RDW 14.8 03/11/2024    RDW 13.8 10/01/2015     03/19/2024       Lab Results   Component Value Date     05/31/2024     01/23/2023    POTASSIUM 4.4 05/31/2024    POTASSIUM 4.3 01/23/2023    CHLORIDE 104 05/31/2024    CHLORIDE 105 01/23/2023    CO2 21 (L) 05/31/2024    CO2 25 08/10/2021    CO2 26 10/01/2015    ANIONGAP 12 05/31/2024    ANIONGAP 6 08/10/2021    ANIONGAP 7 10/01/2015     (H) 05/31/2024     (H) 01/23/2023    BUN 24.0 (H) 05/31/2024    BUN 25 01/23/2023    BUN 17 01/23/2023    CR 1.64 (H) 05/31/2024    CR 1.48 (H) 01/23/2023    GFRESTIMATED 31 (L) 05/31/2024    GFRESTIMATED 37 (L) 10/01/2015    GFRESTBLACK 45 (L) 10/01/2015    WARD 9.3 05/31/2024    WARD 9.3 01/23/2023      Lab Results   Component Value Date    AST 19 06/03/2023    AST 17 07/19/2022    ALT 13 05/31/2024    ALT 8 07/19/2022       Lab Results   Component Value Date    A1C 6.3 (A) 03/19/2024       Lab Results   Component Value Date    INR 1.03 03/11/2024    INR 1.03 07/05/2023    INR 1.02 08/04/2014    INR 2.39 (H) 03/16/2013

## 2024-05-31 NOTE — LETTER
5/31/2024    Deedee Bui MD  [address]  [facility] [address]    RE: Macie Jorge       Dear Colleague,     I had the pleasure of seeing Macie Jorge in the [facility].  Cardiology Clinic Progress Note  Macie Jorge MRN# [id number]   YOB: 1940 Age: 83 year old     Primary cardiologist: Dr. Andino    Reason for visit: aortic stenosis    History of presenting illness:    Macie Jorge is a pleasant 83 year old with history of coronary disease, aortic stenosis, stage IV chronic kidney disease, severe spinal stenosis, chronic pancytopenia, obesity, asthma and COPD here for follow-up.     She was seen by Dr. Andino in June of this year for evaluation of chest pain.  She had a stress test which suggested anterior ischemia.  She subsequently underwent coronary angiography which revealed high-grade proximal/mid LAD stenosis which was stented successfully.       The patient also had an echocardiogram to assess her aortic stenosis that showed preserved LVEF of 60 to 65%, aortic stenosis with mean gradient of 31 mmHg, DI of 0.26, and valve area of 0.82 and stroke-volume index of 38 mL/m2.  She was then seen by Dr. Espinal in our valve clinic for further evaluation of her aortic stenosis.  Given her lack of symptoms and her advanced kidney disease, plan was for repeat echocardiogram in 3 months.    Today she continues to do well from a cardiovascular standpoint. She is quite limited from a functional point of view by severe spinal stenosis as well as other orthopedic issues including knees and shoulders. She denies any chest pain, syncope, near syncope, or palpitations.  No PND or orthopnea.  She does report worsening fatigue, but has been out of her levothyroxine for 2 months and has not seen her PCP.    Repeat echocardiogram today shows mild to moderate aortic stenosis with a mean gradient of 15 mmHg and valve area 1.6 cm2.     I reviewed her most  recent CBC that shows hemoglobin of 9.4 which is stable.         Assessment and Plan:     ASSESSMENT:    Moderate aortic stenosis.  Her most recent echo as noted above. It is possible the severity of her aortic stenosis is underestimated as previous imaging has noted at least moderate aortic stenosis with a valve area around 1 cm .  Nonetheless, she remains relatively asymptomatic.   CAD s/p PCI to LAD on 7/5/2023.  No exertional symptoms.  She remains on DAPT with ASA and Plavix, BB, and statin.  Stage IV CKD.  Follows with outpatient nephrology, baseline creatinine appears to be 1.5-1.9.  Hypothyroidism.  Not currently on levothyroxine, has had no recent follow-up with PCP, likely the cause of her fatigue.      PLAN:     Patient remains relatively asymptomatic from a valvular standpoint.  Encouraged PCP follow-up for ongoing treatment of her hypothyroidism which is likely contributing to her fatigue.  Given her lack of symptoms, recommend repeat echocardiogram and follow-up with Dr. Andino in 6 months.  If she develops symptoms, will consider pursuing CT TAVR, but we will have to be mindful of her kidney function as she does not want to be on dialysis.         Cherelle Thrasher, DNP, APRN, CNP  Page: [phone] (8a-5p M-F)    Orders this Visit:  Orders Placed This Encounter   Procedures    Follow-Up with Cardiology    Echocardiogram Complete     No orders of the defined types were placed in this encounter.    There are no discontinued medications.    Today's clinic visit entailed:  Review of the result(s) of each unique test - echo, labs  Ordering of each unique test  Prescription drug management  35 minutes spent by me on the date of the encounter doing chart review, review of test results, interpretation of tests, patient visit, documentation, and discussion with family   Provider  Link to [facility] Help Grid     The level of medical decision making during this visit was of moderate complexity.           Review of  "Systems:     Review of Systems:  Skin:        Eyes:       ENT:       Respiratory:  Negative    Cardiovascular:  Negative;palpitations;chest pain;syncope or near-syncope;dizziness;lightheadedness Positive for;fatigue  Gastroenterology: Positive for nausea;poor appetite  Genitourinary:       Musculoskeletal:  Positive for joint pain  Neurologic:       Psychiatric:       Heme/Lymph/Imm:       Endocrine:  Positive for thyroid disorder            Physical Exam:   Vitals: /71   Pulse 66   Ht 1.657 m (5' 5.25\")   Wt 71.7 kg (158 lb 1.6 oz)   BMI 26.11 kg/m    Constitutional:  cooperative frail      Skin:  warm and dry to the touch        Head:  normocephalic        Eyes:  pupils equal and round        ENT:  not assessed this visit        Neck:  JVP normal        Chest:  normal breath sounds, clear to auscultation, normal A-P diameter, normal symmetry, normal respiratory excursion, no use of accessory muscles        Cardiac: regular rhythm;normal S1 and S2       systolic ejection murmur;grade 1          Abdomen:  abdomen soft        Vascular: pulses full and equal                                      Extremities and Back:  no edema        Neurological:  no gross motor deficits;affect appropriate             Medications:     Current Outpatient Medications   Medication Sig Dispense Refill    albuterol (PROAIR HFA/PROVENTIL HFA/VENTOLIN HFA) 108 (90 Base) MCG/ACT inhaler INHALE 1 DOSE BY MOUTH EVERY 12 HOURS 180 g 0    allopurinol (ZYLOPRIM) 100 MG tablet Take 2 tablets by mouth once daily 90 tablet 1    aspirin 81 MG EC tablet Take 1 tab (81mg) on MWF 90 tablet 3    blood glucose (NO BRAND SPECIFIED) lancets standard Use to test blood sugar 1 time daily or as directed. 100 each 3    blood glucose (NO BRAND SPECIFIED) test strip Use to test blood sugar 1 time daily or as directed. 100 strip 3    blood glucose monitoring (NO BRAND SPECIFIED) meter device kit Use to test blood sugar 1 time daily or as directed. 1 kit 0 "    clopidogrel (PLAVIX) 75 MG tablet Take 1 tablet (75 mg) by mouth daily 90 tablet 3    ferrous sulfate (FEROSUL) 325 (65 Fe) MG tablet Take 325 mg by mouth daily as needed      Fluticasone-Umeclidin-Vilanterol (TRELEGY ELLIPTA) 200-62.5-25 MCG/ACT oral inhaler Inhale 1 puff into the lungs daily 28 each 1    HYDROcodone-acetaminophen (NORCO) 7.5-325 MG per tablet Take 1 tablet by mouth 2 times daily (Up to 2 times a day as needed for severe pain)      metoprolol succinate ER (TOPROL XL) 25 MG 24 hr tablet Take 1 tablet (25 mg) by mouth daily 90 tablet 3    nitroGLYcerin (NITROSTAT) 0.4 MG sublingual tablet For chest pain place 1 tablet under the tongue every 5 minutes for 3 doses. If symptoms persist 5 minutes after 1st dose call 911. 25 tablet 1    oxyBUTYnin ER (DITROPAN XL) 10 MG 24 hr tablet Take 1 tablet by mouth once daily 90 tablet 0    rosuvastatin (CRESTOR) 20 MG tablet Take 1 tablet (20 mg) by mouth daily 90 tablet 3    gabapentin (NEURONTIN) 300 MG capsule Take 300 mg by mouth 2 times daily (Patient not taking: Reported on 2024)      levothyroxine (SYNTHROID/LEVOTHROID) 137 MCG tablet Take 1 tablet (137 mcg) by mouth daily (Patient not taking: Reported on 2024) 90 tablet 0       No family history on file.    Social History     Socioeconomic History    Marital status:      Spouse name: Not on file    Number of children: Not on file    Years of education: Not on file    Highest education level: Not on file   Occupational History    Not on file   Tobacco Use    Smoking status: Former     Current packs/day: 0.00     Average packs/day: 1.5 packs/day for 40.0 years (60.0 ttl pk-yrs)     Types: Cigarettes     Start date: 1955     Quit date: 1995     Years since quittin.4    Smokeless tobacco: Never   Substance and Sexual Activity    Alcohol use: Yes     Comment: couple drinks per year    Drug use: No    Sexual activity: Not on file   Other Topics Concern    Parent/sibling w/ CABG,  MI or angioplasty before 65F 55M? Not Asked   Social History Narrative    Not on file     Social Determinants of Health     Financial Resource Strain: Not on file   Food Insecurity: Not on file   Transportation Needs: Not on file   Physical Activity: Not on file   Stress: Not on file   Social Connections: Not on file   Interpersonal Safety: Not on file   Housing Stability: Not on file            Past Medical History:     Past Medical History:   Diagnosis Date    Calculus of left ureter 2014    dr Arriaga - ureteroscopy and lithitripsy    Cataract     Chronic low back pain     Dr Villagran at [facility] in 2015- not surgical     Complication of anesthesia     COPD with asthma (H)      followed with pulmonary DR LENNOX 60-pack-year history of smoking    Esophagitis, reflux 9/2012    SOME EOSINOPHILIA NO BARRETS    Gastro-oesophageal reflux disease      has seen GI    Hydronephrosis, left     chronic , in 2017 Dr Arriaga plan was observaton    Hyperlipidaemia LDL goal < 100     Hypothyroid     Kidney stone 3/28/2019    Migraine headaches 9/1/2011    Moderate persistent asthma      lifelong, saw Pulm 2013    Obesity (BMI 30-39.9)     Panic attacks     PONV (postoperative nausea and vomiting)     Renal stones 2016    kidney stones, multiple small stones high risk recurrent ureteral stones. , Dr Arriaga in 2016 rec 24 hour urine.    Type 2 diabetes mellitus without complication (H)               Past Surgical History:     Past Surgical History:   Procedure Laterality Date    ARTHROPLASTY HIP  3/13/2013    Procedure: ARTHROPLASTY HIP;  LEFT TOTAL HIP ARTHROPLASTY (BIOMET)^ ;  Surgeon: Anand Main MD;  Location:  OR    ARTHROPLASTY KNEE Right 8/9/2021    Procedure: RIGHT TOTAL KNEE ARTHROPLASTY;  Surgeon: Anand Main MD;  Location: [facility] OR    ARTHROPLASTY PATELLO-FEMORAL (KNEE)  2005ish    Left    CHOLECYSTECTOMY, OPEN  25 yo    COMBINED CYSTOSCOPY, INSERT STENT URETER(S)  8/5/2014    Procedure: COMBINED CYSTOSCOPY, INSERT STENT  URETER(S);  Surgeon: Sam Arriaga MD;  Location:  OR    COMBINED CYSTOSCOPY, RETROGRADES, URETEROSCOPY, LASER HOLMIUM LITHOTRIPSY URETER(S), INSERT STENT Left 6/14/2016    Procedure: COMBINED CYSTOSCOPY, RETROGRADES, URETEROSCOPY, LASER HOLMIUM LITHOTRIPSY URETER(S), INSERT STENT;  Surgeon: Thomas Edmondson MD;  Location:  OR    COMBINED CYSTOSCOPY, RETROGRADES, URETEROSCOPY, LASER HOLMIUM LITHOTRIPSY URETER(S), INSERT STENT Left 3/28/2019    Procedure: CYSTOSCOPY, LEFT RETROGRADE PYLEOGRAM, LEFT URETEROSCOPY, HOLMIUM LASER LITHOTRIPSY, LEFT URETERAL STENT EXCHANGE;  Surgeon: Derrek Rivera MD;  Location:  OR     CORONARY ANGIOGRAM N/A 7/5/2023    Procedure: Coronary Angiogram;  Surgeon: Fausto Espinal MD;  Location:  HEART CARDIAC CATH LAB    CV INTRAVASULAR ULTRASOUND N/A 7/5/2023    Procedure: Intravascular Ultrasound;  Surgeon: Fausto Espinal MD;  Location:  HEART CARDIAC CATH LAB    CV PCI N/A 7/5/2023    Procedure: Percutaneous Coronary Intervention;  Surgeon: Fausto Espinal MD;  Location:  HEART CARDIAC CATH LAB    CYSTOSCOPY, RETROGRADES, INSERT STENT URETER(S), COMBINED Left 3/8/2019    Procedure: CYSTOSCOPY,LEFT RETROGRADE, LEFT STENT PLACEMENT;  Surgeon: Derrek Rivera MD;  Location:  OR    GENITOURINARY SURGERY      LAMINECT/DISCECTOMY, LUMBAR      Laminectomy/Discectomy Lumbar    LASER HOLMIUM LITHOTRIPSY URETER(S), INSERT STENT, COMBINED Left 8/20/2014    Procedure: COMBINED CYSTOSCOPY, URETEROSCOPY, LASER HOLMIUM LITHOTRIPSY URETER(S), INSERT STENT;  Surgeon: Sam Arriaga MD;  Location:  OR    LASER HOLMIUM LITHOTRIPSY URETER(S), INSERT STENT, COMBINED Left 5/2/2019    Procedure: CYSTOSCOPY, LEFT RETROGRADE, LEFT URETEROSCOPY, HOLMIUM LASER LITHOTRIPSY, STONE REMOVAL, LEFT STENT EXCHANGE;  Surgeon: Derrek Rivera MD;  Location:  OR    LASER HOLMIUM LITHOTRIPSY URETER(S), INSERT STENT, COMBINED Right 6/13/2019    Procedure: CYSTOSCOPY; RIGHT  URETEROSCOPY WITH HOLMIUM LASER LITHOTRIPSY; RIGHT STENT PLACEMENT (DIGITAL FLEXIBLE URETEROSCOPE);  Surgeon: Derrek Rivera MD;  Location: [facility] OR    ORTHOPEDIC SURGERY      left ankle    RECESSION EYELID UPPER                Allergies:   Morphine, Prednisone, Baclofen, Imitrex [sumatriptan], and Tetracycline       Data:   All laboratory data reviewed:    Recent Labs   Lab Test 06/03/23  0632 11/15/22  1445 10/14/21  1430 08/02/21  1045 01/20/20  1110   LDL 47  --   --  106* 113*   HDL 45*  --   --  44 60   NHDL 61  --   --   --   --    CHOL 106  --   --  175 187   TRIG 71  --   --  138 70   IRON  --  55  --   --   --    FEB  --  267  --   --   --    VINI  --  76   < >  --   --     < > = values in this interval not displayed.       Lab Results   Component Value Date    WBC 5.8 03/19/2024    WBC 7.8 10/01/2015    RBC 3.05 (A) 03/19/2024    RBC 4.24 10/01/2015    HGB 9.4 (A) 03/19/2024    HCT 27.2 (A) 03/19/2024    MCV 89.0 03/19/2024    MCH 30.9 03/19/2024    MCHC 34.7 03/19/2024    RDW 14.8 03/11/2024    RDW 13.8 10/01/2015     03/19/2024       Lab Results   Component Value Date     05/31/2024     01/23/2023    POTASSIUM 4.4 05/31/2024    POTASSIUM 4.3 01/23/2023    CHLORIDE 104 05/31/2024    CHLORIDE 105 01/23/2023    CO2 21 (L) 05/31/2024    CO2 25 08/10/2021    CO2 26 10/01/2015    ANIONGAP 12 05/31/2024    ANIONGAP 6 08/10/2021    ANIONGAP 7 10/01/2015     (H) 05/31/2024     (H) 01/23/2023    BUN 24.0 (H) 05/31/2024    BUN 25 01/23/2023    BUN 17 01/23/2023    CR 1.64 (H) 05/31/2024    CR 1.48 (H) 01/23/2023    GFRESTIMATED 31 (L) 05/31/2024    GFRESTIMATED 37 (L) 10/01/2015    GFRESTBLACK 45 (L) 10/01/2015    WARD 9.3 05/31/2024    WARD 9.3 01/23/2023      Lab Results   Component Value Date    AST 19 06/03/2023    AST 17 07/19/2022    ALT 13 05/31/2024    ALT 8 07/19/2022       Lab Results   Component Value Date    A1C 6.3 (A) 03/19/2024       Lab Results   Component Value  Date    INR 1.03 03/11/2024    INR 1.03 07/05/2023    INR 1.02 08/04/2014    INR 2.39 (H) 03/16/2013       Thank you for allowing me to participate in the care of your patient.      Sincerely,     Cherelle Thrasher CNP     [facility]  cc:   Cherelle Thrasher CNP  [address] [place] [address]

## 2024-06-01 LAB
CHOLEST SERPL-MCNC: 100 MG/DL
FASTING STATUS PATIENT QL REPORTED: YES
HDLC SERPL-MCNC: 42 MG/DL
LDLC SERPL CALC-MCNC: 39 MG/DL
NONHDLC SERPL-MCNC: 58 MG/DL
TRIGL SERPL-MCNC: 94 MG/DL

## 2024-06-06 ENCOUNTER — OFFICE VISIT (OUTPATIENT)
Dept: FAMILY MEDICINE | Facility: CLINIC | Age: 84
End: 2024-06-06

## 2024-06-06 VITALS
DIASTOLIC BLOOD PRESSURE: 79 MMHG | OXYGEN SATURATION: 96 % | SYSTOLIC BLOOD PRESSURE: 132 MMHG | BODY MASS INDEX: 25.93 KG/M2 | WEIGHT: 157 LBS | HEART RATE: 80 BPM

## 2024-06-06 DIAGNOSIS — N18.4 CHRONIC RENAL DISEASE, STAGE IV (H): Primary | ICD-10-CM

## 2024-06-06 DIAGNOSIS — R73.03 PREDIABETES: ICD-10-CM

## 2024-06-06 DIAGNOSIS — N20.0 URIC ACID KIDNEY STONE: ICD-10-CM

## 2024-06-06 DIAGNOSIS — J44.9 CHRONIC OBSTRUCTIVE PULMONARY DISEASE, UNSPECIFIED COPD TYPE (H): ICD-10-CM

## 2024-06-06 DIAGNOSIS — I25.10 CORONARY ARTERY DISEASE INVOLVING NATIVE CORONARY ARTERY OF NATIVE HEART WITHOUT ANGINA PECTORIS: ICD-10-CM

## 2024-06-06 DIAGNOSIS — E03.4 HYPOTHYROIDISM DUE TO ACQUIRED ATROPHY OF THYROID: ICD-10-CM

## 2024-06-06 DIAGNOSIS — I25.10 CORONARY ARTERY DISEASE DUE TO CALCIFIED CORONARY LESION: ICD-10-CM

## 2024-06-06 DIAGNOSIS — I25.84 CORONARY ARTERY DISEASE DUE TO CALCIFIED CORONARY LESION: ICD-10-CM

## 2024-06-06 LAB
HBA1C MFR BLD: 5.8 %
PTH-INTACT SERPL-MCNC: 47 PG/ML (ref 15–65)
TSH SERPL DL<=0.005 MIU/L-ACNC: 1.1 UIU/ML (ref 0.3–4.2)

## 2024-06-06 PROCEDURE — 84443 ASSAY THYROID STIM HORMONE: CPT

## 2024-06-06 PROCEDURE — 83036 HEMOGLOBIN GLYCOSYLATED A1C: CPT

## 2024-06-06 PROCEDURE — 36415 COLL VENOUS BLD VENIPUNCTURE: CPT

## 2024-06-06 PROCEDURE — 99214 OFFICE O/P EST MOD 30 MIN: CPT

## 2024-06-06 PROCEDURE — 83970 ASSAY OF PARATHORMONE: CPT

## 2024-06-06 RX ORDER — ROSUVASTATIN CALCIUM 20 MG/1
20 TABLET, COATED ORAL DAILY
Qty: 90 TABLET | Refills: 3 | Status: SHIPPED | OUTPATIENT
Start: 2024-06-06

## 2024-06-06 RX ORDER — OXYBUTYNIN CHLORIDE 10 MG/1
10 TABLET, EXTENDED RELEASE ORAL DAILY
Qty: 90 TABLET | Refills: 0 | Status: SHIPPED | OUTPATIENT
Start: 2024-06-06 | End: 2024-10-07

## 2024-06-06 RX ORDER — ALBUTEROL SULFATE 90 UG/1
1-2 AEROSOL, METERED RESPIRATORY (INHALATION) EVERY 4 HOURS PRN
Qty: 36 G | Refills: 2 | Status: SHIPPED | OUTPATIENT
Start: 2024-06-06

## 2024-06-06 RX ORDER — LEVOTHYROXINE SODIUM 137 UG/1
137 TABLET ORAL DAILY
Qty: 90 TABLET | Refills: 0 | Status: CANCELLED | OUTPATIENT
Start: 2024-06-06

## 2024-06-06 RX ORDER — CLOPIDOGREL BISULFATE 75 MG/1
75 TABLET ORAL DAILY
Qty: 90 TABLET | Refills: 3 | Status: ON HOLD | OUTPATIENT
Start: 2024-06-06 | End: 2024-07-27

## 2024-06-06 NOTE — PROGRESS NOTES
"  Assessment & Plan     Coronary artery disease involving native coronary artery of native heart without angina pectoris  - rosuvastatin (CRESTOR) 20 MG tablet  Dispense: 90 tablet; Refill: 3  - refill provided, stable on rosuvastatin, cholesterol checked recently at cardiology and looks good     Uric acid kidney stone  -refill provided, stable on oxybuynin   - oxyBUTYnin ER (DITROPAN XL) 10 MG 24 hr tablet  Dispense: 90 tablet; Refill: 0    Hypothyroidism due to acquired atrophy of thyroid  - will recheck level and base refill dose on level  - TSH with free T4 reflex  - VENOUS COLLECTION  - TSH with free T4 reflex    Coronary artery disease due to calcified coronary lesion  - stable on plavix   - clopidogrel (PLAVIX) 75 MG tablet  Dispense: 90 tablet; Refill: 3    Chronic obstructive pulmonary disease, unspecified COPD type (H)  - albuterol (PROAIR HFA/PROVENTIL HFA/VENTOLIN HFA) 108 (90 Base) MCG/ACT inhaler  Dispense: 36 g; Refill: 2    Chronic renal disease, stage IV (H)  - discussed to avoid NSAIDs, drink plenty of water, have good blood pressure and diabetes control - follows with nephrology as well   - Parathyroid Hormone Intact  - Hemoglobin A1c  - Parathyroid Hormone Intact  - Hemoglobin A1c    Prediabetes  - rechecking levels today  - diet and exercise discussed   - Hemoglobin A1c  - Hemoglobin A1c            BMI  Estimated body mass index is 25.93 kg/m  as calculated from the following:    Height as of 5/31/24: 1.657 m (5' 5.25\").    Weight as of this encounter: 71.2 kg (157 lb).             No follow-ups on file.    Rui Quijano is a 83 year old, presenting for the following health issues:  Hypertension and Consult    HPI     1.) HTN: just saw cardiologist on 5/31/24 - Echo to be done for nonrheumatic aortic valve stenosis     2.) shoulder pain: had 2 MRIs done with ortho - working up with them on this issue     3.) back pain: managing this pain with [facility] - done last " summer due this summer     Review of Systems  Constitutional, HEENT, cardiovascular, pulmonary, gi and gu systems are negative, except as otherwise noted.      Objective    /79   Pulse 80   Wt 71.2 kg (157 lb)   SpO2 96%   BMI 25.93 kg/m    Body mass index is 25.93 kg/m .  Physical Exam   GENERAL: alert and no distress  EYES: Eyes grossly normal to inspection, PERRL and conjunctivae and sclerae normal  RESP: lungs clear to auscultation - no rales, rhonchi or wheezes  CV: regular rate and rhythm, normal S1 S2, no S3 or S4, no murmur, click or rub, no peripheral edema  PSYCH: mentation appears normal, affect normal/bright    Results for orders placed or performed in visit on 06/06/24 (from the past 24 hour(s))   TSH with free T4 reflex   Result Value Ref Range    TSH 1.10 0.30 - 4.20 uIU/mL   Parathyroid Hormone Intact   Result Value Ref Range    Parathyroid Hormone Intact 47 15 - 65 pg/mL    Narrative    This result was obtained with the Roche Elecsys PTH STAT assay.   This reference range differs from PTH assays used in other [facility] laboratories.   Hemoglobin A1c   Result Value Ref Range    Hemoglobin A1C 5.8 (H) <5.7 %           Signed Electronically by: WILEY Lee CNP

## 2024-06-14 ENCOUNTER — TRANSFERRED RECORDS (OUTPATIENT)
Dept: FAMILY MEDICINE | Facility: CLINIC | Age: 84
End: 2024-06-14

## 2024-06-27 ENCOUNTER — TRANSFERRED RECORDS (OUTPATIENT)
Dept: HEALTH INFORMATION MANAGEMENT | Facility: CLINIC | Age: 84
End: 2024-06-27

## 2024-06-27 ENCOUNTER — LAB REQUISITION (OUTPATIENT)
Dept: LAB | Facility: CLINIC | Age: 84
End: 2024-06-27
Payer: COMMERCIAL

## 2024-06-27 DIAGNOSIS — R10.9 UNSPECIFIED ABDOMINAL PAIN: ICD-10-CM

## 2024-06-27 LAB
ALBUMIN SERPL BCG-MCNC: 4.4 G/DL (ref 3.5–5.2)
ALP SERPL-CCNC: 46 U/L (ref 40–150)
ALT SERPL W P-5'-P-CCNC: 8 U/L (ref 0–50)
ANION GAP SERPL CALCULATED.3IONS-SCNC: 11 MMOL/L (ref 7–15)
AST SERPL W P-5'-P-CCNC: 27 U/L (ref 0–45)
BILIRUB SERPL-MCNC: 0.9 MG/DL
BUN SERPL-MCNC: 23 MG/DL (ref 8–23)
CALCIUM SERPL-MCNC: 9.5 MG/DL (ref 8.8–10.2)
CHLORIDE SERPL-SCNC: 103 MMOL/L (ref 98–107)
CREAT SERPL-MCNC: 1.69 MG/DL (ref 0.51–0.95)
DEPRECATED HCO3 PLAS-SCNC: 22 MMOL/L (ref 22–29)
EGFRCR SERPLBLD CKD-EPI 2021: 30 ML/MIN/1.73M2
ERYTHROCYTE [DISTWIDTH] IN BLOOD BY AUTOMATED COUNT: 16.8 % (ref 10–15)
GLUCOSE SERPL-MCNC: 102 MG/DL (ref 70–99)
HCT VFR BLD AUTO: 34.6 % (ref 35–47)
HGB BLD-MCNC: 10.9 G/DL (ref 11.7–15.7)
MCH RBC QN AUTO: 30.2 PG (ref 26.5–33)
MCHC RBC AUTO-ENTMCNC: 31.5 G/DL (ref 31.5–36.5)
MCV RBC AUTO: 96 FL (ref 78–100)
PLATELET # BLD AUTO: 204 10E3/UL (ref 150–450)
POTASSIUM SERPL-SCNC: 4.7 MMOL/L (ref 3.4–5.3)
PROT SERPL-MCNC: 7.9 G/DL (ref 6.4–8.3)
RBC # BLD AUTO: 3.61 10E6/UL (ref 3.8–5.2)
SODIUM SERPL-SCNC: 136 MMOL/L (ref 135–145)
WBC # BLD AUTO: 5.5 10E3/UL (ref 4–11)

## 2024-06-27 PROCEDURE — 85027 COMPLETE CBC AUTOMATED: CPT | Mod: ORL | Performed by: OBSTETRICS & GYNECOLOGY

## 2024-06-27 PROCEDURE — 80053 COMPREHEN METABOLIC PANEL: CPT | Mod: ORL | Performed by: OBSTETRICS & GYNECOLOGY

## 2024-07-03 ENCOUNTER — ANCILLARY PROCEDURE (OUTPATIENT)
Dept: ULTRASOUND IMAGING | Facility: CLINIC | Age: 84
End: 2024-07-03
Attending: OBSTETRICS & GYNECOLOGY
Payer: COMMERCIAL

## 2024-07-03 DIAGNOSIS — Z84.2 FAMILY HISTORY OF OTHER DISEASES OF THE GENITOURINARY SYSTEM: ICD-10-CM

## 2024-07-03 PROCEDURE — 76856 US EXAM PELVIC COMPLETE: CPT | Performed by: OBSTETRICS & GYNECOLOGY

## 2024-07-03 PROCEDURE — 76830 TRANSVAGINAL US NON-OB: CPT | Performed by: OBSTETRICS & GYNECOLOGY

## 2024-07-07 ENCOUNTER — OFFICE VISIT (OUTPATIENT)
Dept: URGENT CARE | Facility: URGENT CARE | Age: 84
End: 2024-07-07
Payer: COMMERCIAL

## 2024-07-07 VITALS
TEMPERATURE: 97.7 F | SYSTOLIC BLOOD PRESSURE: 121 MMHG | DIASTOLIC BLOOD PRESSURE: 70 MMHG | OXYGEN SATURATION: 93 % | HEART RATE: 67 BPM

## 2024-07-07 DIAGNOSIS — S51.811A SKIN TEAR OF RIGHT FOREARM WITHOUT COMPLICATION, INITIAL ENCOUNTER: Primary | ICD-10-CM

## 2024-07-07 PROCEDURE — 99213 OFFICE O/P EST LOW 20 MIN: CPT | Performed by: PHYSICIAN ASSISTANT

## 2024-07-07 NOTE — PATIENT INSTRUCTIONS
(Z64.116S) Skin tear of right forearm without complication, initial encounter  (primary encounter diagnosis)  Comment:   Plan:   Wound was cleaned and dressed with nonstick dressing.  You may leave this Tegaderm dressing in place for 5 days.  Return to clinic on Friday for removal or if you feel comfortable enough removing it yourself you may do so.

## 2024-07-07 NOTE — PROGRESS NOTES
Patient presents with:  Urgent Care: Cut on right forearm.  Hasn't stopped bleeding      (S52.125R) Skin tear of right forearm without complication, initial encounter  (primary encounter diagnosis)  Comment:   Plan:   Wound was cleaned and dressed with nonstick dressing.  You may leave this Tegaderm dressing in place for 5 days.  Return to clinic on Friday for removal or if you feel comfortable enough removing it yourself you may do so.    At the end of the encounter, I discussed results, diagnosis, medications. Discussed red flags for immediate return to clinic/ER, as well as indications for follow up if no improvement. Patient understood and agreed to plan. Patient was stable for discharge           SUBJECTIVE:   Macie Jorge is a 83 year old female who presents today with persistent bleeding from a skin tear on her left forearm sustained 7/4/24.  Denies any fevers.  Denies any pain.    Patient Active Problem List   Diagnosis    Hypercholesterolemia    Urgency incontinence    Nonintractable migraine, unspecified migraine type    Type 2 diabetes mellitus with stage 4 chronic kidney disease, without long-term current use of insulin (H)    COPD with asthma (H)    Chronic midline low back pain without sciatica    Physical deconditioning    Spinal stenosis of lumbar region with neurogenic claudication    Hypothyroidism due to acquired atrophy of thyroid    Iron deficiency anemia, unspecified    Chronic renal disease, stage IV (H)    Coronary artery disease of native heart with stable angina pectoris, unspecified vessel or lesion type (H24)    Nonrheumatic aortic valve stenosis         Past Medical History:   Diagnosis Date    Calculus of left ureter 2014    dr Arriaga - ureteroscopy and lithitripsy    Cataract     Chronic low back pain     Dr Villagran at [facility] in 2015- not surgical     Complication of anesthesia     COPD with asthma (H)      followed with pulmonary DR LENNOX 60-pack-year history of smoking     Esophagitis, reflux 9/2012    SOME EOSINOPHILIA NO BARRETS    Gastro-oesophageal reflux disease      has seen GI    Hydronephrosis, left     chronic , in 2017 Dr Arriaga plan was observaton    Hyperlipidaemia LDL goal < 100     Hypothyroid     Kidney stone 3/28/2019    Migraine headaches 9/1/2011    Moderate persistent asthma      lifelong, saw Pulm 2013    Obesity (BMI 30-39.9)     Panic attacks     PONV (postoperative nausea and vomiting)     Renal stones 2016    kidney stones, multiple small stones high risk recurrent ureteral stones. , Dr Arriaga in 2016 rec 24 hour urine.    Type 2 diabetes mellitus without complication (H)          Current Outpatient Medications   Medication Sig Dispense Refill    Multiple Vitamins-Iron (DAILY-ABEL/IRON/BETA-CAROTENE) TABS TAKE 1 TABLET BY MOUTH DAILY. (Patient not taking: Reported on 10/19/2020) 30 tablet 7     Social History     Tobacco Use    Smoking status: Never Smoker    Smokeless tobacco: Never Used   Substance Use Topics    Alcohol use: Not on file     Family History   Problem Relation Age of Onset    Diabetes Mother     Diabetes Father          ROS:    10 point ROS of systems including Constitutional, Eyes, Respiratory, Cardiovascular, Gastroenterology, Genitourinary, Integumentary, Muscularskeletal, Psychiatric ,neurological were all negative except for pertinent positives noted in my HPI       OBJECTIVE:  /70   Pulse 67   Temp 97.7  F (36.5  C) (Tympanic)   SpO2 93%   Physical Exam:  GENERAL APPEARANCE: healthy, alert and no distress  Extremities: Right forearm: No bony deformity or bony tenderness.  NEURO: Normal strength and tone, sensory exam grossly normal,  normal speech and mentation  SKIN: Right forearm: Skin avulsion approximately 1 cm of each edge, 2 edges with a triangular base.  Each edge of the triangle is approximately 1 cm.  Small amount of bleeding persisting.    Procedure the wound was cleaned with surgical and solution, rinsed and patted dry  dressed with bacitracin Telfa and Tegaderm.   And wrapped with coban for gentle compression.

## 2024-07-09 ENCOUNTER — ANCILLARY PROCEDURE (OUTPATIENT)
Dept: CT IMAGING | Facility: CLINIC | Age: 84
End: 2024-07-09
Attending: OBSTETRICS & GYNECOLOGY
Payer: COMMERCIAL

## 2024-07-09 ENCOUNTER — OFFICE VISIT (OUTPATIENT)
Dept: URGENT CARE | Facility: URGENT CARE | Age: 84
End: 2024-07-09
Payer: COMMERCIAL

## 2024-07-09 ENCOUNTER — TELEPHONE (OUTPATIENT)
Dept: INTERNAL MEDICINE | Facility: CLINIC | Age: 84
End: 2024-07-09
Payer: COMMERCIAL

## 2024-07-09 VITALS
HEART RATE: 69 BPM | OXYGEN SATURATION: 97 % | TEMPERATURE: 97.1 F | SYSTOLIC BLOOD PRESSURE: 122 MMHG | BODY MASS INDEX: 24.94 KG/M2 | DIASTOLIC BLOOD PRESSURE: 70 MMHG | RESPIRATION RATE: 16 BRPM | WEIGHT: 151 LBS

## 2024-07-09 DIAGNOSIS — R63.4 ABNORMAL WEIGHT LOSS: ICD-10-CM

## 2024-07-09 DIAGNOSIS — Z51.89 VISIT FOR WOUND CHECK: Primary | ICD-10-CM

## 2024-07-09 LAB
CREAT BLD-MCNC: 2 MG/DL (ref 0.5–1)
EGFRCR SERPLBLD CKD-EPI 2021: 24 ML/MIN/1.73M2

## 2024-07-09 PROCEDURE — 82565 ASSAY OF CREATININE: CPT

## 2024-07-09 PROCEDURE — 99207 PR NO CHARGE NURSE ONLY: CPT | Performed by: PHYSICIAN ASSISTANT

## 2024-07-09 PROCEDURE — 74176 CT ABD & PELVIS W/O CONTRAST: CPT

## 2024-07-09 RX ORDER — IOPAMIDOL 755 MG/ML
90 INJECTION, SOLUTION INTRAVASCULAR ONCE
Status: COMPLETED | OUTPATIENT
Start: 2024-07-09 | End: 2024-07-09

## 2024-07-09 NOTE — PROGRESS NOTES
Patient presents with:  Dressing Change: Onset: Last Thursday: Original injury was last Thursday, pt just needs wound re-dressed.     (Z51.89) Visit for wound check  (primary encounter diagnosis)  Comment:   Plan: Skin avulsion.  Tegaderm dressing removed.  Skin avulsion gently cleaned by nursing and Mepilex dressing applied.    Patient may remove and reapply the Mepilex dressing every 48 hours until healed.

## 2024-07-09 NOTE — NURSING NOTE
Discussed wound with provider, pt will cover injury with Mepilex or similar style bandage and change Q/48hrs until injury is resolved. Pt understands and will follow plan.     Isaac Tuttle RN

## 2024-07-09 NOTE — PATIENT INSTRUCTIONS
(Z51.89) Visit for wound check  (primary encounter diagnosis)  Comment:   Plan: Skin avulsion.  Tegaderm dressing removed.  Skin avulsion gently cleaned.  Mepilex dressing applied.    Patient may remove and reapply the Mepilex dressing every 48 hours until healed.

## 2024-07-09 NOTE — TELEPHONE ENCOUNTER
Patient called the clinic and stated that she was in urgent care yesterday. She had a wound dressing placed and was suppose to keep it on until Friday. She took a shower today and accidentally got it wet. Advised patient to go back to urgent care to get it redressed. She agreed with this plan and had no further questions.     Vianca ANDRADE [facility] Triage Team

## 2024-07-19 ENCOUNTER — OFFICE VISIT (OUTPATIENT)
Dept: FAMILY MEDICINE | Facility: CLINIC | Age: 84
End: 2024-07-19

## 2024-07-19 VITALS
HEART RATE: 67 BPM | DIASTOLIC BLOOD PRESSURE: 84 MMHG | BODY MASS INDEX: 25 KG/M2 | WEIGHT: 151.4 LBS | SYSTOLIC BLOOD PRESSURE: 121 MMHG | OXYGEN SATURATION: 97 %

## 2024-07-19 DIAGNOSIS — K59.00 CONSTIPATION, UNSPECIFIED CONSTIPATION TYPE: ICD-10-CM

## 2024-07-19 DIAGNOSIS — K57.32 DIVERTICULITIS OF COLON: Primary | ICD-10-CM

## 2024-07-19 PROCEDURE — 99214 OFFICE O/P EST MOD 30 MIN: CPT

## 2024-07-19 PROCEDURE — G2211 COMPLEX E/M VISIT ADD ON: HCPCS

## 2024-07-19 RX ORDER — CIPROFLOXACIN 500 MG/1
500 TABLET, FILM COATED ORAL DAILY
Qty: 7 TABLET | Refills: 0 | Status: CANCELLED | OUTPATIENT
Start: 2024-07-19 | End: 2024-07-26

## 2024-07-19 RX ORDER — CIPROFLOXACIN 500 MG/1
500 TABLET, FILM COATED ORAL 2 TIMES DAILY
Qty: 14 TABLET | Refills: 0 | Status: CANCELLED | OUTPATIENT
Start: 2024-07-19 | End: 2024-07-26

## 2024-07-19 RX ORDER — AMOXICILLIN AND CLAVULANATE POTASSIUM 500; 125 MG/1; MG/1
1 TABLET, FILM COATED ORAL 2 TIMES DAILY
Qty: 14 TABLET | Refills: 0 | Status: ON HOLD | OUTPATIENT
Start: 2024-07-19 | End: 2024-07-27

## 2024-07-19 RX ORDER — LISINOPRIL 2.5 MG/1
2.5 TABLET ORAL DAILY
COMMUNITY

## 2024-07-19 NOTE — PROGRESS NOTES
"  Assessment & Plan     Diverticulitis of colon  History and exam consistent with likely diverticulitis. Recent CT with differential including an early or resolving acute diverticulitis versus sequela from prior diverticulitis. With ongoing lower abdominal pain will treat today. We discussed pathophysiology and natural history related to diverticulosis/diverticulitis. Rx for Augmentin, side effects reviewed. Reviewed AVS. Recommend clear liquid diet with slow advancement as tolerated.  Red flags that warrant emergent evaluation discussed. Follow up in 1-2 days, sooner if symptoms worsen. Patient agreeable to plan. All questions answered.    - Adult GI  Referral - Consult Only - To a [facility] Location (Use POS/Location)  - amoxicillin-clavulanate (AUGMENTIN) 500-125 MG tablet  Dispense: 14 tablet; Refill: 0    Constipation, unspecified constipation type  Reviewed constipation in detail. Norco use likely contributing. Reviewed OTC symptomatic management with Miralax, fluids, high-fiber foods, plenty of exercise, and fiber supplements, such as Citrucel or Metamucil. Patient also requesting GI consult. Referral placed. Red flags that warrant emergent evaluation discussed. Follow up reviewed. Patient agreeable to plan. All questions answered.   - Adult GI  Referral - Consult Only - To a [facility] Location (Use POS/Location)            BMI  Estimated body mass index is 25 kg/m  as calculated from the following:    Height as of 5/31/24: 1.657 m (5' 5.25\").    Weight as of this encounter: 68.7 kg (151 lb 6.4 oz).   Weight management plan: Discussed healthy diet and exercise guidelines      See Patient Instructions    Return if symptoms worsen or fail to improve, for Follow up.    Rui Quijano is a 83 year old, presenting for the following health issues:  Abdominal Pain (Stomach pain since January, /Lost 50 pounds due to lack of appetite and pain/CT scan was ordered and is " in chart /The only thing helping her is the Norco she gets for her back pain) and Constipation (Constipated for about a month, milk of magnesia is no longer working /No bowel movements since having diarrhea 3-4 days ago due to using the milk of magnesia consistently /)    HPI       Concern - Abdominal pain   Onset: Started in January  Description: Takes pain pills given by [facility] since on blood thinners. Hx of bad valve. Since January has seen 3 ortho surgeons and gynecologist. Was lifting a walker and tore her rotator cuff. Pain pills have minimized stomach pain. Takign 3 a day. A month ago started getting constipated. Taking milk of Mag without improvement. Gyn order CT scan. Has had a decreased appetite and losing weight.   Intensity: 5-6/10  Progression of Symptoms:  worsening  Accompanying Signs & Symptoms: Abdominal pain. No nausea or vomiting. Constipation. 4-5 days ago had some diarrhea.   Previous history of similar problem: none  Precipitating factors:        Worsened by: none  Alleviating factors:        Improved by: none  Therapies tried and outcome: Milk of mag for constipation. In the past miralax hasn't worked. No stools softeners.       Review of Systems  Constitutional, HEENT, cardiovascular, pulmonary, GI, , musculoskeletal, neuro, skin, endocrine and psych systems are negative, except as otherwise noted.      Objective    /84   Pulse 67   Wt 68.7 kg (151 lb 6.4 oz)   SpO2 97%   BMI 25.00 kg/m    Body mass index is 25 kg/m .  Physical Exam   GENERAL: alert and no distress  RESP: lungs clear to auscultation - no rales, rhonchi or wheezes  CV: regular rate and rhythm, normal S1 S2, no S3 or S4, no murmur, click or rub, no peripheral edema  ABDOMEN: tenderness RLQ and LLQ and bowel sounds normal  PSYCH: mentation appears normal, affect normal/bright    CT results from 7/9/24    IMPRESSION:   1.  Atrophic left kidney with three nonobstructing stones in the ureteropelvic junction and  proximal ureter.  2.  Diffuse colonic diverticulosis with mild pericolonic fat stranding along the descending colon. The differential includes an early or resolving acute diverticulitis versus sequela from prior diverticulitis.  3.  Lung base emphysematous changes with stable nodules. Follow-up was previously recommended to occur between 10/2025-10/2026.    Signed Electronically by: WILEY Brumfield CNP

## 2024-07-19 NOTE — PATIENT INSTRUCTIONS
- Aim for 1-2 soft (like toothpaste) bowel movements per day  - Drink plenty of water (aim for 1.5 L per day)  - Continue eating a high fiber diet  - Consider adding a daily over counter fiber supplement (such as Metamucil or Citrucel). Use one tablespoon in eight ounces of liquid by mouth twice a day. Adjust as needed to maintain 1-2 soft bowel movements per day.  - If you are not able to tolerate a fiber supplement use Miralax; 17 g (capful) in 8 oz water orally, twice daily. Adjust as needed to maintain 1-2 soft bowel movements per day.   Tranexamic Acid Counseling:  Patient advised of the small risk of bleeding problems with tranexamic acid. They were also instructed to call if they developed any nausea, vomiting or diarrhea. All of the patient's questions and concerns were addressed.

## 2024-07-22 ENCOUNTER — TELEPHONE (OUTPATIENT)
Dept: FAMILY MEDICINE | Facility: CLINIC | Age: 84
End: 2024-07-22

## 2024-07-22 ENCOUNTER — APPOINTMENT (OUTPATIENT)
Dept: CT IMAGING | Facility: CLINIC | Age: 84
DRG: 660 | End: 2024-07-22
Attending: EMERGENCY MEDICINE
Payer: COMMERCIAL

## 2024-07-22 ENCOUNTER — HOSPITAL ENCOUNTER (INPATIENT)
Facility: CLINIC | Age: 84
LOS: 4 days | Discharge: HOME HEALTH | DRG: 660 | End: 2024-07-27
Attending: EMERGENCY MEDICINE | Admitting: INTERNAL MEDICINE
Payer: COMMERCIAL

## 2024-07-22 DIAGNOSIS — E03.9 HYPOTHYROIDISM, UNSPECIFIED TYPE: ICD-10-CM

## 2024-07-22 DIAGNOSIS — K59.00 CONSTIPATION, UNSPECIFIED CONSTIPATION TYPE: ICD-10-CM

## 2024-07-22 DIAGNOSIS — B96.5 PSEUDOMONAS URINARY TRACT INFECTION: ICD-10-CM

## 2024-07-22 DIAGNOSIS — I35.0 NONRHEUMATIC AORTIC VALVE STENOSIS: ICD-10-CM

## 2024-07-22 DIAGNOSIS — N20.1 LEFT URETERAL STONE: Primary | ICD-10-CM

## 2024-07-22 DIAGNOSIS — I25.118 CORONARY ARTERY DISEASE OF NATIVE HEART WITH STABLE ANGINA PECTORIS, UNSPECIFIED VESSEL OR LESION TYPE (H): ICD-10-CM

## 2024-07-22 DIAGNOSIS — E53.8 B12 DEFICIENCY: ICD-10-CM

## 2024-07-22 DIAGNOSIS — K22.10 ULCER OF ESOPHAGUS WITHOUT BLEEDING: ICD-10-CM

## 2024-07-22 DIAGNOSIS — N39.0 ACUTE UTI: ICD-10-CM

## 2024-07-22 DIAGNOSIS — N39.0 PSEUDOMONAS URINARY TRACT INFECTION: ICD-10-CM

## 2024-07-22 LAB
ALBUMIN SERPL BCG-MCNC: 4.1 G/DL (ref 3.5–5.2)
ALBUMIN UR-MCNC: NEGATIVE MG/DL
ALP SERPL-CCNC: 46 U/L (ref 40–150)
ALT SERPL W P-5'-P-CCNC: 13 U/L (ref 0–50)
ANION GAP SERPL CALCULATED.3IONS-SCNC: 8 MMOL/L (ref 7–15)
APPEARANCE UR: CLEAR
AST SERPL W P-5'-P-CCNC: 27 U/L (ref 0–45)
BACTERIA #/AREA URNS HPF: ABNORMAL /HPF
BASOPHILS # BLD AUTO: 0 10E3/UL (ref 0–0.2)
BASOPHILS NFR BLD AUTO: 0 %
BILIRUB SERPL-MCNC: 0.7 MG/DL
BILIRUB UR QL STRIP: NEGATIVE
BUN SERPL-MCNC: 19.6 MG/DL (ref 8–23)
CALCIUM SERPL-MCNC: 9.9 MG/DL (ref 8.8–10.4)
CHLORIDE SERPL-SCNC: 104 MMOL/L (ref 98–107)
COLOR UR AUTO: ABNORMAL
CREAT SERPL-MCNC: 1.59 MG/DL (ref 0.51–0.95)
EGFRCR SERPLBLD CKD-EPI 2021: 32 ML/MIN/1.73M2
EOSINOPHIL # BLD AUTO: 0.1 10E3/UL (ref 0–0.7)
EOSINOPHIL NFR BLD AUTO: 1 %
ERYTHROCYTE [DISTWIDTH] IN BLOOD BY AUTOMATED COUNT: 16 % (ref 10–15)
GLUCOSE SERPL-MCNC: 122 MG/DL (ref 70–99)
GLUCOSE UR STRIP-MCNC: NEGATIVE MG/DL
HCO3 SERPL-SCNC: 25 MMOL/L (ref 22–29)
HCT VFR BLD AUTO: 32 % (ref 35–47)
HGB BLD-MCNC: 10.4 G/DL (ref 11.7–15.7)
HGB UR QL STRIP: NEGATIVE
IMM GRANULOCYTES # BLD: 0 10E3/UL
IMM GRANULOCYTES NFR BLD: 0 %
KETONES UR STRIP-MCNC: NEGATIVE MG/DL
LACTATE SERPL-SCNC: 0.7 MMOL/L (ref 0.7–2)
LEUKOCYTE ESTERASE UR QL STRIP: ABNORMAL
LIPASE SERPL-CCNC: 51 U/L (ref 13–60)
LYMPHOCYTES # BLD AUTO: 0.6 10E3/UL (ref 0.8–5.3)
LYMPHOCYTES NFR BLD AUTO: 12 %
MCH RBC QN AUTO: 30.5 PG (ref 26.5–33)
MCHC RBC AUTO-ENTMCNC: 32.5 G/DL (ref 31.5–36.5)
MCV RBC AUTO: 94 FL (ref 78–100)
MONOCYTES # BLD AUTO: 0.5 10E3/UL (ref 0–1.3)
MONOCYTES NFR BLD AUTO: 10 %
MUCOUS THREADS #/AREA URNS LPF: PRESENT /LPF
NEUTROPHILS # BLD AUTO: 3.8 10E3/UL (ref 1.6–8.3)
NEUTROPHILS NFR BLD AUTO: 77 %
NITRATE UR QL: POSITIVE
NRBC # BLD AUTO: 0 10E3/UL
NRBC BLD AUTO-RTO: 0 /100
PH UR STRIP: 7 [PH] (ref 5–7)
PLATELET # BLD AUTO: 142 10E3/UL (ref 150–450)
POTASSIUM SERPL-SCNC: 4.7 MMOL/L (ref 3.4–5.3)
PROT SERPL-MCNC: 7.7 G/DL (ref 6.4–8.3)
RBC # BLD AUTO: 3.41 10E6/UL (ref 3.8–5.2)
RBC URINE: 3 /HPF
SODIUM SERPL-SCNC: 137 MMOL/L (ref 135–145)
SP GR UR STRIP: 1.01 (ref 1–1.03)
SQUAMOUS EPITHELIAL: 8 /HPF
UROBILINOGEN UR STRIP-MCNC: NORMAL MG/DL
WBC # BLD AUTO: 4.9 10E3/UL (ref 4–11)
WBC URINE: 24 /HPF

## 2024-07-22 PROCEDURE — G0378 HOSPITAL OBSERVATION PER HR: HCPCS

## 2024-07-22 PROCEDURE — 96361 HYDRATE IV INFUSION ADD-ON: CPT

## 2024-07-22 PROCEDURE — 250N000011 HC RX IP 250 OP 636: Performed by: EMERGENCY MEDICINE

## 2024-07-22 PROCEDURE — 96365 THER/PROPH/DIAG IV INF INIT: CPT

## 2024-07-22 PROCEDURE — 83605 ASSAY OF LACTIC ACID: CPT | Performed by: EMERGENCY MEDICINE

## 2024-07-22 PROCEDURE — 81001 URINALYSIS AUTO W/SCOPE: CPT | Performed by: EMERGENCY MEDICINE

## 2024-07-22 PROCEDURE — 93005 ELECTROCARDIOGRAM TRACING: CPT

## 2024-07-22 PROCEDURE — 96375 TX/PRO/DX INJ NEW DRUG ADDON: CPT

## 2024-07-22 PROCEDURE — 99222 1ST HOSP IP/OBS MODERATE 55: CPT | Performed by: INTERNAL MEDICINE

## 2024-07-22 PROCEDURE — 87086 URINE CULTURE/COLONY COUNT: CPT | Performed by: EMERGENCY MEDICINE

## 2024-07-22 PROCEDURE — 36415 COLL VENOUS BLD VENIPUNCTURE: CPT | Performed by: EMERGENCY MEDICINE

## 2024-07-22 PROCEDURE — 258N000003 HC RX IP 258 OP 636: Performed by: INTERNAL MEDICINE

## 2024-07-22 PROCEDURE — 87186 SC STD MICRODIL/AGAR DIL: CPT | Performed by: EMERGENCY MEDICINE

## 2024-07-22 PROCEDURE — 82040 ASSAY OF SERUM ALBUMIN: CPT | Performed by: EMERGENCY MEDICINE

## 2024-07-22 PROCEDURE — 258N000003 HC RX IP 258 OP 636: Performed by: EMERGENCY MEDICINE

## 2024-07-22 PROCEDURE — 85049 AUTOMATED PLATELET COUNT: CPT | Performed by: EMERGENCY MEDICINE

## 2024-07-22 PROCEDURE — 74176 CT ABD & PELVIS W/O CONTRAST: CPT

## 2024-07-22 PROCEDURE — 99291 CRITICAL CARE FIRST HOUR: CPT | Mod: 25

## 2024-07-22 PROCEDURE — 83690 ASSAY OF LIPASE: CPT | Performed by: EMERGENCY MEDICINE

## 2024-07-22 RX ORDER — NALOXONE HYDROCHLORIDE 0.4 MG/ML
0.4 INJECTION, SOLUTION INTRAMUSCULAR; INTRAVENOUS; SUBCUTANEOUS
Status: DISCONTINUED | OUTPATIENT
Start: 2024-07-22 | End: 2024-07-27 | Stop reason: HOSPADM

## 2024-07-22 RX ORDER — HYDROMORPHONE HYDROCHLORIDE 1 MG/ML
0.3 INJECTION, SOLUTION INTRAMUSCULAR; INTRAVENOUS; SUBCUTANEOUS EVERY 30 MIN PRN
Status: DISCONTINUED | OUTPATIENT
Start: 2024-07-22 | End: 2024-07-22

## 2024-07-22 RX ORDER — OXYBUTYNIN CHLORIDE 10 MG/1
10 TABLET, EXTENDED RELEASE ORAL EVERY EVENING
Status: DISCONTINUED | OUTPATIENT
Start: 2024-07-22 | End: 2024-07-27 | Stop reason: HOSPADM

## 2024-07-22 RX ORDER — SODIUM CHLORIDE 9 MG/ML
INJECTION, SOLUTION INTRAVENOUS CONTINUOUS
Status: DISCONTINUED | OUTPATIENT
Start: 2024-07-22 | End: 2024-07-24

## 2024-07-22 RX ORDER — LEVOTHYROXINE SODIUM 137 UG/1
137 TABLET ORAL EVERY MORNING
Status: DISCONTINUED | OUTPATIENT
Start: 2024-07-23 | End: 2024-07-24

## 2024-07-22 RX ORDER — ONDANSETRON 4 MG/1
4 TABLET, ORALLY DISINTEGRATING ORAL EVERY 6 HOURS PRN
Status: DISCONTINUED | OUTPATIENT
Start: 2024-07-22 | End: 2024-07-27 | Stop reason: HOSPADM

## 2024-07-22 RX ORDER — CEFTRIAXONE 2 G/1
2 INJECTION, POWDER, FOR SOLUTION INTRAMUSCULAR; INTRAVENOUS ONCE
Status: COMPLETED | OUTPATIENT
Start: 2024-07-22 | End: 2024-07-22

## 2024-07-22 RX ORDER — METOPROLOL SUCCINATE 25 MG/1
25 TABLET, EXTENDED RELEASE ORAL EVERY EVENING
Status: DISCONTINUED | OUTPATIENT
Start: 2024-07-22 | End: 2024-07-27 | Stop reason: HOSPADM

## 2024-07-22 RX ORDER — NALOXONE HYDROCHLORIDE 0.4 MG/ML
0.2 INJECTION, SOLUTION INTRAMUSCULAR; INTRAVENOUS; SUBCUTANEOUS
Status: DISCONTINUED | OUTPATIENT
Start: 2024-07-22 | End: 2024-07-27 | Stop reason: HOSPADM

## 2024-07-22 RX ORDER — CEFTRIAXONE 1 G/1
1 INJECTION, POWDER, FOR SOLUTION INTRAMUSCULAR; INTRAVENOUS EVERY 24 HOURS
Status: DISCONTINUED | OUTPATIENT
Start: 2024-07-23 | End: 2024-07-24

## 2024-07-22 RX ORDER — HYDROCODONE BITARTRATE AND ACETAMINOPHEN 10; 325 MG/1; MG/1
1 TABLET ORAL 3 TIMES DAILY PRN
Status: DISCONTINUED | OUTPATIENT
Start: 2024-07-22 | End: 2024-07-24

## 2024-07-22 RX ORDER — AMOXICILLIN 250 MG
1 CAPSULE ORAL 2 TIMES DAILY
Status: DISCONTINUED | OUTPATIENT
Start: 2024-07-22 | End: 2024-07-27 | Stop reason: HOSPADM

## 2024-07-22 RX ORDER — TAMSULOSIN HYDROCHLORIDE 0.4 MG/1
0.4 CAPSULE ORAL DAILY
Status: DISCONTINUED | OUTPATIENT
Start: 2024-07-22 | End: 2024-07-27 | Stop reason: HOSPADM

## 2024-07-22 RX ORDER — CLOPIDOGREL BISULFATE 75 MG/1
75 TABLET ORAL EVERY EVENING
Status: DISCONTINUED | OUTPATIENT
Start: 2024-07-22 | End: 2024-07-23

## 2024-07-22 RX ORDER — ALBUTEROL SULFATE 0.83 MG/ML
2.5 SOLUTION RESPIRATORY (INHALATION)
Status: DISCONTINUED | OUTPATIENT
Start: 2024-07-22 | End: 2024-07-27 | Stop reason: HOSPADM

## 2024-07-22 RX ORDER — HYDROMORPHONE HYDROCHLORIDE 1 MG/ML
0.3 INJECTION, SOLUTION INTRAMUSCULAR; INTRAVENOUS; SUBCUTANEOUS
Status: DISCONTINUED | OUTPATIENT
Start: 2024-07-22 | End: 2024-07-27 | Stop reason: HOSPADM

## 2024-07-22 RX ORDER — ONDANSETRON 2 MG/ML
4 INJECTION INTRAMUSCULAR; INTRAVENOUS EVERY 6 HOURS PRN
Status: DISCONTINUED | OUTPATIENT
Start: 2024-07-22 | End: 2024-07-27 | Stop reason: HOSPADM

## 2024-07-22 RX ORDER — HYDROMORPHONE HYDROCHLORIDE 1 MG/ML
0.6 INJECTION, SOLUTION INTRAMUSCULAR; INTRAVENOUS; SUBCUTANEOUS
Status: DISCONTINUED | OUTPATIENT
Start: 2024-07-22 | End: 2024-07-27 | Stop reason: HOSPADM

## 2024-07-22 RX ORDER — AMOXICILLIN 250 MG
2 CAPSULE ORAL 2 TIMES DAILY PRN
Status: DISCONTINUED | OUTPATIENT
Start: 2024-07-22 | End: 2024-07-27 | Stop reason: HOSPADM

## 2024-07-22 RX ORDER — AMOXICILLIN 250 MG
1 CAPSULE ORAL 2 TIMES DAILY PRN
Status: DISCONTINUED | OUTPATIENT
Start: 2024-07-22 | End: 2024-07-27 | Stop reason: HOSPADM

## 2024-07-22 RX ORDER — AMOXICILLIN 250 MG
2 CAPSULE ORAL 2 TIMES DAILY
Status: DISCONTINUED | OUTPATIENT
Start: 2024-07-22 | End: 2024-07-27 | Stop reason: HOSPADM

## 2024-07-22 RX ADMIN — HYDROMORPHONE HYDROCHLORIDE 0.3 MG: 1 INJECTION, SOLUTION INTRAMUSCULAR; INTRAVENOUS; SUBCUTANEOUS at 15:28

## 2024-07-22 RX ADMIN — SODIUM CHLORIDE: 9 INJECTION, SOLUTION INTRAVENOUS at 21:25

## 2024-07-22 RX ADMIN — SODIUM CHLORIDE: 9 INJECTION, SOLUTION INTRAVENOUS at 23:01

## 2024-07-22 RX ADMIN — CEFTRIAXONE SODIUM 2 G: 2 INJECTION, POWDER, FOR SOLUTION INTRAMUSCULAR; INTRAVENOUS at 14:50

## 2024-07-22 RX ADMIN — SODIUM CHLORIDE 1000 ML: 9 INJECTION, SOLUTION INTRAVENOUS at 13:00

## 2024-07-22 ASSESSMENT — ACTIVITIES OF DAILY LIVING (ADL)
ADLS_ACUITY_SCORE: 38
ADLS_ACUITY_SCORE: 29
ADLS_ACUITY_SCORE: 38

## 2024-07-22 NOTE — H&P
[facility]    History and Physical - Hospitalist Service       Date of Admission:  7/22/2024    Assessment & Plan      Macie Jorge is an 83 year old female with CAD s/p RAMILA to LAD 7/2023, moderate aortic stenosis, CKD 4, severe spinal stenosis with chronic back pain, asthma/COPD, nephrolithiasis, who presents to ER on 7/22/2024 due to persistent left abdominal pain.      Reports left abdominal pain since February 2024.  Also reports constipation, decreased appetite and 16 kg weight loss since December 2023.     Recent CT A/P w/o contrast 7/9/2024 showed 3 nonobstructing stones in left ureteropelvic junction and proximal ureter, diffuse colonic diverticulosis with mild diverticulitis in descending colon, pulmonary emphysema.     She was felt to have acute diverticulitis and started on Augmentin which she has taken for 3 days without any improvement in abdominal pain.    No fever or chills, no nausea or vomiting, no dysuria/hematuria, no chest pain or shortness of breath.    Labs showed stable creatinine of 1.59, no leukocytosis. UA abnormal with 24 WBCs, large leukocyte esterase and positive nitrite.    CT A/P showed 8 mm proximal left ureteral stone at ureteropelvic junction with mild left hydronephrosis.  Other nonobstructing stones in left kidney.    Obstructing 8 mm proximal ureteral stone at left ureteropelvic junction with mild left hydronephrosis  Other nonobstructing stones in left kidney  History of nephrolithiasis with multiple previous procedures with Dr. Rivera at [facility].    -Consult urology.  Keep n.p.o. for now as she will likely need left ureteral stent.  -Currently on Plavix  -IV fluids-NS at 50 ml per hour  -Start on Flomax  -Pain control with IV Dilaudid and p.o. Norco as needed  -Renal function is normal.  UA is mildly abnormal but no urinary symptoms to indicate UTI    Probable UTI due to obstructing left ureteral stone  -UA abnormal with mild pyuria,  positive nitrite and leukocyte Estrace but no urinary symptoms of dysuria.  No fever or chills.  No leukocytosis  -Has been on Augmentin for past 3 days  -Will start on IV ceftriaxone.  Urine culture obtained in ER    Longstanding left abdominal pain  -Could be secondary to nephrolithiasis.  Per patient, pain has been present since 2/2024 and was being masked by narcotics that she takes for back pain  -Recent pelvic ultrasound showed no abnormalities  -Recent CT A/P 7/9/2024 showed possible descending colon diverticulitis for which she was started on Augmentin and has taken 3 days of medication.  No mention of diverticulitis on CT on 7/22/2024  -No other obvious cause to explain abdominal pain.  Further evaluation can be considered if pain fails to resolve with relief of ureteral obstruction    Moderate aortic stenosis  -most recent echo 5/31/2024 showed normal LVEF of 60-65%, no WMA, normal RV size and function, mild-moderate aortic stenosis with valve area 1.6-1.8.  On previous echo valve area was 1 cm2  -She was assessed in valve clinic.  Due to lack of symptoms and  CKD 4, plan was to follow-up with repeat echoes     Coronary artery disease, s/p PCI with RAMILA to LAD on 7/5/2023  -On Plavix, continue    Chronic kidney disease stage IV  -Baseline creatinine 1.5-1.9.  Follows with nephrology.  Creatinine currently stable and at baseline of 1.59.  Monitor    Benign essential hypertension  -Continue Toprol-XL 25 mg daily.  Hold lisinopril 2.5 mg for now.  Resume at discharge    Dyslipidemia  -Hold rosuvastatin while observation status    Hypothyroidism  -continue levothyroxine    COPD/asthma  60-pack-year history of tobacco use  -PTA-Trelegy inhaler as needed, albuterol inhaler as needed  -Will start on albuterol neb as needed while hospitalized  -No acute exacerbation    Spinal canal stenosis with chronic back pain  -Followed by [facility].  On hydrocodone-acetaminophen  mg 3 times daily as needed,  continue    Unintentional weight loss  -Patient has lost 16 KGs in past 8 months.  Weight was 83 kg in December 2023 and is now 68.5 kg.  -Should consider further workup as outpatient          Diet:  N.p.o. for now  DVT Prophylaxis: Pneumatic Compression Devices  Claudio Catheter: Not present  Lines: None     Cardiac Monitoring: None  Code Status:  Full code    Clinically Significant Risk Factors Present on Admission                # Drug Induced Platelet Defect: home medication list includes an antiplatelet medication   # Hypertension: Home medication list includes antihypertensive(s)    # Anemia: based on hgb <11                      Disposition Plan           Medically Ready for Discharge: Anticipated Tomorrow           Madelin Bell MD  Hospitalist Service  [facility]  Securely message with [employer] (more info)  Text page via [facility] Paging/Directory     ______________________________________________________________________    Chief Complaint     Abdominal pain    History is obtained from the patient    History of Present Illness   Macie Jorge is a 83 year old female with CAD, aortic stenosis, CKD 4, severe spinal stenosis with chronic back pain, chronic pancytopenia, asthma/COPD, nephrolithiasis who presents to ER due to persistent left abdominal pain.      Patient reports that she has had left abdominal pain since February 2024.  She normally takes narcotics because of back pain and reports that her pain was being masks with the narcotics.  She has also had constipation and poor appetite in past months and has lost 16 kg weight.    She was recently seen in clinic.  Pelvic ultrasound 7/3/2024 was normal.  CT A/P without contrast 7/9/2024 showed 3 nonobstructing stones in left ureteropelvic junction and proximal ureter, diffuse colonic diverticulosis with mild diverticulitis in descending colon, pulmonary emphysema.      She was felt to have acute diverticulitis and was prescribed  Augmentin which she has taken for 3 days without any improvement in abdominal pain.    She also reports constipation for which she has been taking medication.  Constipation has resolved.      She presented to ER on 7/22 as her abdominal pain has persisted after 3 days of antibiotics.    She denies any fever or chills, no nausea or vomiting, no dysuria, hematuria, no chest pain or shortness of breath.    Labs showed stable creatinine of 1.59, no leukocytosis    UA abnormal with 24 WBCs, large leukocyte esterase and positive nitrite.    CT A/P showed 8 mm proximal left ureteral stone at ureteropelvic junction with mild left hydronephrosis.  Other nonobstructing stones in left kidney.    Patient reports history of nephrolithiasis and is followed by Dr. Rivera at [facility].  She reports previous urological procedures for nephrolithiasis.        Past Medical History    Past Medical History:   Diagnosis Date    Calculus of left ureter 2014    dr Arriaga - ureteroscopy and lithitripsy    Cataract     Chronic low back pain     Dr Villagran at [facility] in 2015- not surgical     Complication of anesthesia     COPD with asthma (H)      followed with pulmonary DR LENNOX 60-pack-year history of smoking    Esophagitis, reflux 9/2012    SOME EOSINOPHILIA NO BARRETS    Gastro-oesophageal reflux disease      has seen GI    Hydronephrosis, left     chronic , in 2017 Dr Arriaga plan was observaton    Hyperlipidaemia LDL goal < 100     Hypothyroid     Kidney stone 3/28/2019    Migraine headaches 9/1/2011    Moderate persistent asthma      lifelong, saw Pulm 2013    Obesity (BMI 30-39.9)     Panic attacks     PONV (postoperative nausea and vomiting)     Renal stones 2016    kidney stones, multiple small stones high risk recurrent ureteral stones. , Dr Arriaga in 2016 rec 24 hour urine.    Type 2 diabetes mellitus without complication (H)        Past Surgical History   Past Surgical History:   Procedure Laterality Date    ARTHROPLASTY HIP  3/13/2013     Procedure: ARTHROPLASTY HIP;  LEFT TOTAL HIP ARTHROPLASTY (BIOMET)^ ;  Surgeon: Anand Main MD;  Location:  OR    ARTHROPLASTY KNEE Right 8/9/2021    Procedure: RIGHT TOTAL KNEE ARTHROPLASTY;  Surgeon: Anand Main MD;  Location:  OR    ARTHROPLASTY PATELLO-FEMORAL (KNEE)  2005ish    Left    CHOLECYSTECTOMY, OPEN  25 yo    COMBINED CYSTOSCOPY, INSERT STENT URETER(S)  8/5/2014    Procedure: COMBINED CYSTOSCOPY, INSERT STENT URETER(S);  Surgeon: Sam Arriaga MD;  Location:  OR    COMBINED CYSTOSCOPY, RETROGRADES, URETEROSCOPY, LASER HOLMIUM LITHOTRIPSY URETER(S), INSERT STENT Left 6/14/2016    Procedure: COMBINED CYSTOSCOPY, RETROGRADES, URETEROSCOPY, LASER HOLMIUM LITHOTRIPSY URETER(S), INSERT STENT;  Surgeon: Thomas Edmondson MD;  Location:  OR    COMBINED CYSTOSCOPY, RETROGRADES, URETEROSCOPY, LASER HOLMIUM LITHOTRIPSY URETER(S), INSERT STENT Left 3/28/2019    Procedure: CYSTOSCOPY, LEFT RETROGRADE PYLEOGRAM, LEFT URETEROSCOPY, HOLMIUM LASER LITHOTRIPSY, LEFT URETERAL STENT EXCHANGE;  Surgeon: Derrek Rivera MD;  Location:  OR    CV CORONARY ANGIOGRAM N/A 7/5/2023    Procedure: Coronary Angiogram;  Surgeon: Fausto Espinal MD;  Location:  HEART CARDIAC CATH LAB    CV INTRAVASULAR ULTRASOUND N/A 7/5/2023    Procedure: Intravascular Ultrasound;  Surgeon: Fausto Espinal MD;  Location:  HEART CARDIAC CATH LAB    CV PCI N/A 7/5/2023    Procedure: Percutaneous Coronary Intervention;  Surgeon: Fausto Espinal MD;  Location:  HEART CARDIAC CATH LAB    CYSTOSCOPY, RETROGRADES, INSERT STENT URETER(S), COMBINED Left 3/8/2019    Procedure: CYSTOSCOPY,LEFT RETROGRADE, LEFT STENT PLACEMENT;  Surgeon: Derrek Rivera MD;  Location:  OR    GENITOURINARY SURGERY      LAMINECT/DISCECTOMY, LUMBAR      Laminectomy/Discectomy Lumbar    LASER HOLMIUM LITHOTRIPSY URETER(S), INSERT STENT, COMBINED Left 8/20/2014    Procedure: COMBINED CYSTOSCOPY, URETEROSCOPY, LASER HOLMIUM  LITHOTRIPSY URETER(S), INSERT STENT;  Surgeon: Sam Arriaga MD;  Location:  OR    LASER HOLMIUM LITHOTRIPSY URETER(S), INSERT STENT, COMBINED Left 5/2/2019    Procedure: CYSTOSCOPY, LEFT RETROGRADE, LEFT URETEROSCOPY, HOLMIUM LASER LITHOTRIPSY, STONE REMOVAL, LEFT STENT EXCHANGE;  Surgeon: Derrek Rivera MD;  Location:  OR    LASER HOLMIUM LITHOTRIPSY URETER(S), INSERT STENT, COMBINED Right 6/13/2019    Procedure: CYSTOSCOPY; RIGHT URETEROSCOPY WITH HOLMIUM LASER LITHOTRIPSY; RIGHT STENT PLACEMENT (DIGITAL FLEXIBLE URETEROSCOPE);  Surgeon: Derrek Rivera MD;  Location:  OR    ORTHOPEDIC SURGERY      left ankle    RECESSION EYELID UPPER         Prior to Admission Medications   Prior to Admission Medications   Prescriptions Last Dose Informant Patient Reported? Taking?   Fluticasone-Umeclidin-Vilanterol (TRELEGY ELLIPTA) 200-62.5-25 MCG/ACT oral inhaler  at prn  No Yes   Sig: Inhale 1 puff into the lungs daily   Patient taking differently: Inhale 1 puff into the lungs daily as needed   HYDROcodone-acetaminophen (NORCO)  MG per tablet  at prn  Yes Yes   Sig: Take 1 tablet by mouth 3 times daily as needed   albuterol (PROAIR HFA/PROVENTIL HFA/VENTOLIN HFA) 108 (90 Base) MCG/ACT inhaler  at prn  No Yes   Sig: Inhale 1-2 puffs into the lungs every 4 hours as needed for shortness of breath, wheezing or cough   allopurinol (ZYLOPRIM) 100 MG tablet   No Yes   Sig: Take 2 tablets by mouth once daily   amoxicillin-clavulanate (AUGMENTIN) 500-125 MG tablet 7/21/2024 at pm  No Yes   Sig: Take 1 tablet by mouth 2 times daily for 7 days   blood glucose (NO BRAND SPECIFIED) lancets standard  Self No No   Sig: Use to test blood sugar 1 time daily or as directed.   Patient not taking: Reported on 6/6/2024   blood glucose (NO BRAND SPECIFIED) test strip  Self No No   Sig: Use to test blood sugar 1 time daily or as directed.   Patient not taking: Reported on 6/6/2024   blood glucose monitoring (NO BRAND  SPECIFIED) meter device kit  Self No No   Sig: Use to test blood sugar 1 time daily or as directed.   Patient not taking: Reported on 6/6/2024   clopidogrel (PLAVIX) 75 MG tablet 7/21/2024 at pm  No Yes   Sig: Take 1 tablet (75 mg) by mouth daily   levothyroxine (SYNTHROID/LEVOTHROID) 137 MCG tablet 7/22/2024 at am  No Yes   Sig: Take 1 tablet (137 mcg) by mouth daily   lisinopril (ZESTRIL) 2.5 MG tablet 7/21/2024  Yes Yes   Sig: Take 2.5 mg by mouth daily   metoprolol succinate ER (TOPROL XL) 25 MG 24 hr tablet 7/21/2024  No Yes   Sig: Take 1 tablet (25 mg) by mouth daily   nitroGLYcerin (NITROSTAT) 0.4 MG sublingual tablet  at prn  No Yes   Sig: For chest pain place 1 tablet under the tongue every 5 minutes for 3 doses. If symptoms persist 5 minutes after 1st dose call 911.   oxyBUTYnin ER (DITROPAN XL) 10 MG 24 hr tablet 7/21/2024  No Yes   Sig: Take 1 tablet (10 mg) by mouth daily   rosuvastatin (CRESTOR) 20 MG tablet 7/21/2024  No Yes   Sig: Take 1 tablet (20 mg) by mouth daily      Facility-Administered Medications: None        Review of Systems    The 10 point Review of Systems is negative other than noted in the HPI or here.      Physical Exam   Vital Signs: Temp: 97.7  F (36.5  C) Temp src: Temporal BP: 119/58 Pulse: 65   Resp: 18 SpO2: 93 % O2 Device: None (Room air)    Weight: 151 lbs 0 oz    Constitutional - alert, resting in bed, appears comfortable  Head - normocephalic, atraumatic  ENT - normal eye lids and lashes, no conjunctival hyperemia, no icterus, extraocular movements are normal, normal nose, no discharge, moist oral mucosa, no ulcers or exudates, normal external ear  Neck - no thyromegaly or lymphadenopathy. Tracheal is midline  CV - regular rate and rhythm, systolic murmurs, no edema  Pulmonary - lungs are clear to auscultation bilaterally, no wheezing or rhonchi  GI - abdomen is soft, non distended, mild tenderness in left lower quadrant, no organomegaly  Neurological - alert and oriented,  normal speech, no focal deficits  Musculoskeletal - no joint erythema or swelling, ROM is ok          Medical Decision Making       70 MINUTES SPENT BY ME on the date of service doing chart review, history, exam, documentation & further activities per the note.      Data     I have personally reviewed the following data over the past 24 hrs:    4.9  \   10.4 (L)   / 142 (L)     137 104 19.6 /  122 (H)   4.7 25 1.59 (H) \     ALT: 13 AST: 27 AP: 46 TBILI: 0.7   ALB: 4.1 TOT PROTEIN: 7.7 LIPASE: 51     Procal: N/A CRP: N/A Lactic Acid: 0.7         Imaging results reviewed over the past 24 hrs:   Recent Results (from the past 24 hour(s))   CT Abdomen Pelvis w/o Contrast    Narrative    CT ABDOMEN/PELVIS WITHOUT CONTRAST July 22, 2024 1:32 PM    CLINICAL HISTORY: Worsening abdominal pain and unexplained weight  loss, check for bowel perforation, abscess, diverticulitis,  malignancy.    TECHNIQUE: CT scan of the abdomen and pelvis was performed without IV  contrast. Multiplanar reformats were obtained. Dose reduction  techniques were used.  CONTRAST: None.    COMPARISON: CT 7/9/2024.    FINDINGS:   LOWER CHEST: Emphysematous changes. Small hiatal hernia is smaller in  size. Coronary artery calcifications.    HEPATOBILIARY: Cholecystectomy. Stable hepatic cysts.    PANCREAS: Fatty atrophy.    SPLEEN: Normal.    ADRENAL GLANDS: Stable mild nodular thickening of the bilateral  adrenals.    KIDNEYS/BLADDER: Proximal left ureter stone at the ureteropelvic  junction measuring 8 mm on coronal series 5 image 50. Mild left  hydronephrosis. Stable nonobstructing stones at the left lower kidney  with an example measuring 4 mm series 4 image 90. Renal cysts  suggested without specific imaging follow-up recommended. Bladder is  obscured.    BOWEL: No bowel obstruction. Normal appendix. No secondary signs of  appendicitis. Colonic diverticulosis. No convincing diverticulitis. No  abscess or free air.     LYMPH NODES:  Normal.    VASCULATURE: Scattered calcifications.    PELVIC ORGANS: No acute abnormality.    MUSCULOSKELETAL: Left hip arthroplasty and spine degenerative changes.  Scoliotic spine.      Impression    IMPRESSION:   1.  8 mm proximal left ureter stone at the ureteropelvic junction with  mild left hydronephrosis. Nonobstructing stones on the left again  noted.  2.  No other acute abnormality.  3.  Colonic diverticulosis.    MERNA BARNES MD         SYSTEM ID:  [id number]

## 2024-07-22 NOTE — ED TRIAGE NOTES
Evaluated by Family PCP on 07/19/24 - presumed Diverticulitis, sent home on oral Augmentin. Pt called triage line today worsening abdominal pain.      Triage Assessment (Adult)       Row Name 07/22/24 1116          Triage Assessment    Airway WDL WDL        Cognitive/Neuro/Behavioral WDL    Cognitive/Neuro/Behavioral WDL WDL

## 2024-07-22 NOTE — ED PROVIDER NOTES
Emergency Department Note      History of Present Illness     Chief Complaint   Abdominal Pain      HPI   Macie Jorge is a 83 year old female with a history of GERD, COPD, hyperlipidemia, Type II diabetes mellitus, and hypothyroidism who presents to the ED for an evaluation of abdominal pain. Patient reports seeing OBGYN on 7/9 where she did a CT scan. Subsequently, on 7/19 she was seen for elevated pain in her abdomen where she was put on Augmentin for suspected diverticulitis. Patient reports that she has been experiencing this abdominal since January or February that has gotten progressively worse. States the majority of her pain is the lower area of her abdomen on her left side and radiates slightly across abdomen. Further reports weight loss from 187 to 151 lbs. Also notes constipation. Denies nausea, vomiting, fevers, recent exposures to anyone sick, dysuria, pain when urinating, or back pain. Denies other abdominal surgeries besides gallbladder removal in 1967. Reports she still has her appendix. Denies allergy to IV contrast. Reports history of kidney problems. States her last colonoscopy was in 2017 but has upcoming appointment with [facility] in August.    Independent Historian   Daughter supplemented as above.    Review of External Notes   CT Abdomen Pelvis w/o Contrast from 7/02/2024:  CONTRAST: None.     FINDINGS: Portions of the pelvis are obscured by metallic streak artifact from a left hip arthroplasty.     LOWER CHEST: Stable 12 x 9 mm solid right lower lobe nodule with adjacent branching opacity (series 3, image 14). Lung base emphysematous changes and right middle lobe bronchiectasis with mucus plugging. Coronary artery and aortic valvular   calcifications. Moderate hiatal hernia.     HEPATOBILIARY: No suspicious liver lesion. Cholecystectomy.     PANCREAS: Regular atrophy without focal abnormality.     SPLEEN: Normal.     ADRENAL GLANDS: Stable indeterminant 1.6 cm right adrenal gland  nodule, likely an adenoma. No follow-up needed. Normal left adrenal gland.     KIDNEYS/BLADDER: Partially atrophic and anteriorly rotated left kidney containing cysts. There is a 5 mm stone at the ureteropelvic junction and two stones in the proximal ureter measuring 5 and 4 mm without evidence of upstream hydronephrosis. There are   a few other nonobstructing stones in the lower pole. The right kidney contains cysts and few technically indeterminate lesions which have also been previously characterized as cysts at sonography. No right-sided urolithiasis or hydronephrosis. Normal   bladder.     BOWEL: Nondistended. Normal appendix. Diffuse colonic diverticulosis with mild pericolonic fat stranding about the mid to distal descending colon without accompanying wall thickening. No intra-abdominal abscess or free air.     LYMPH NODES: Normal.     VASCULATURE: Moderate aortoiliac atherosclerosis without aneurysmal dilation.     PELVIC ORGANS: No suspicious pelvic mass.     MUSCULOSKELETAL: Bony demineralization without aggressive osseous lesion. Left hip arthroplasty.    IMPRESSION:  1. Atrophic left kidney with three nonobstructing stones in the ureteropelvic junction and proximal ureter.  2. Diffuse colonic diverticulosis with mild pericolonic fat stranding along the descending colon. The differential includes an early or resolving acute diverticulitis versus sequela from prior diverticulitis.  3. Lung base emphysematous changes with stable nodules. Follow-up was previously recommended to occur between 10/2025-10/2026.     Prescribed Augmentin for suspected diverticulitis.    Past Medical History     Medical History and Problem List   Asthma  Calculus of left ureter  Cataract  Chronic kidney disease  Chronic low back pain  Complication of anesthesia  COPD with asthma (H)  Esophagitis, reflux  Gastro-oesophageal reflux disease  Hydronephrosis, left  Hyperlipidaemia LDL goal < 100  Hypothyroid  Kidney stone  Migraine  headaches  Moderate persistent asthma  Obesity (BMI 30-39.9)  Panic attacks  PONV (postoperative nausea and vomiting)  Renal stones  Type 2 diabetes mellitus without complication (H)    Medications   Albuterol inhaler  Allopurinol  Plavix  Ferrous sulfate  Trelegy inhaler  Norco  Levothyroxine  Lisinopril  Metoprolol succinate  Nitroglycerin  Oxybutynin  Rosuvastatin  Gabapentin    Surgical History   Past Surgical History:   Procedure Laterality Date    ARTHROPLASTY HIP  3/13/2013    Procedure: ARTHROPLASTY HIP;  LEFT TOTAL HIP ARTHROPLASTY (BIOMET)^ ;  Surgeon: Anand Main MD;  Location:  OR    ARTHROPLASTY KNEE Right 8/9/2021    Procedure: RIGHT TOTAL KNEE ARTHROPLASTY;  Surgeon: Anand Main MD;  Location:  OR    ARTHROPLASTY PATELLO-FEMORAL (KNEE)  2005ish    Left    CHOLECYSTECTOMY, OPEN  25 yo    COMBINED CYSTOSCOPY, INSERT STENT URETER(S)  8/5/2014    Procedure: COMBINED CYSTOSCOPY, INSERT STENT URETER(S);  Surgeon: Sam Arriaga MD;  Location:  OR    COMBINED CYSTOSCOPY, RETROGRADES, URETEROSCOPY, LASER HOLMIUM LITHOTRIPSY URETER(S), INSERT STENT Left 6/14/2016    Procedure: COMBINED CYSTOSCOPY, RETROGRADES, URETEROSCOPY, LASER HOLMIUM LITHOTRIPSY URETER(S), INSERT STENT;  Surgeon: Thomas Edmondson MD;  Location:  OR    COMBINED CYSTOSCOPY, RETROGRADES, URETEROSCOPY, LASER HOLMIUM LITHOTRIPSY URETER(S), INSERT STENT Left 3/28/2019    Procedure: CYSTOSCOPY, LEFT RETROGRADE PYLEOGRAM, LEFT URETEROSCOPY, HOLMIUM LASER LITHOTRIPSY, LEFT URETERAL STENT EXCHANGE;  Surgeon: Derrek Rivera MD;  Location:  OR    CV CORONARY ANGIOGRAM N/A 7/5/2023    Procedure: Coronary Angiogram;  Surgeon: Fausto Espinal MD;  Location:  [facility]    CV INTRAVASULAR ULTRASOUND N/A 7/5/2023    Procedure: Intravascular Ultrasound;  Surgeon: Fausto Espinal MD;  Location:  [facility]    CV PCI N/A 7/5/2023    Procedure: Percutaneous Coronary Intervention;  Surgeon:  "Fausto Espinal MD;  Location:  HEART CARD[facility]AC CATH LAB    CYSTOSCOPY, RETROGRADES, INSERT STENT URETER(S), COMBINED Left 3/8/2019    Procedure: CYSTOSCOPY,LEFT RETROGRADE, LEFT STENT PLACEMENT;  Surgeon: Derrek Rivera MD;  Location:  OR    GENITOURINARY SURGERY      LAMINECT/DISCECTOMY, LUMBAR      Laminectomy/Discectomy Lumbar    LASER HOLMIUM LITHOTRIPSY URETER(S), INSERT STENT, COMBINED Left 8/20/2014    Procedure: COMBINED CYSTOSCOPY, URETEROSCOPY, LASER HOLMIUM LITHOTRIPSY URETER(S), INSERT STENT;  Surgeon: Sam Arriaga MD;  Location:  OR    LASER HOLMIUM LITHOTRIPSY URETER(S), INSERT STENT, COMBINED Left 5/2/2019    Procedure: CYSTOSCOPY, LEFT RETROGRADE, LEFT URETEROSCOPY, HOLMIUM LASER LITHOTRIPSY, STONE REMOVAL, LEFT STENT EXCHANGE;  Surgeon: Derrek Rivera MD;  Location:  OR    LASER HOLMIUM LITHOTRIPSY URETER(S), INSERT STENT, COMBINED Right 6/13/2019    Procedure: CYSTOSCOPY; RIGHT URETEROSCOPY WITH HOLMIUM LASER LITHOTRIPSY; RIGHT STENT PLACEMENT (DIGITAL FLEXIBLE URETEROSCOPE);  Surgeon: Derrek Rivera MD;  Location:  OR    ORTHOPEDIC SURGERY      left ankle    RECESSION EYELID UPPER         Physical Exam     Patient Vitals for the past 24 hrs:   BP Temp Temp src Pulse Resp SpO2 Height Weight   07/22/24 1506 119/58 -- -- -- -- 93 % -- --   07/22/24 1430 -- -- -- -- -- 96 % -- --   07/22/24 1415 -- -- -- -- -- 96 % -- --   07/22/24 1400 129/67 -- -- 65 -- 95 % -- --   07/22/24 1345 (!) 146/67 -- -- 67 -- 95 % -- --   07/22/24 1119 (!) 145/63 97.7  F (36.5  C) Temporal 76 18 94 % 1.676 m (5' 6\") 68.5 kg (151 lb)     Physical Exam  Nursing note and vitals reviewed.  Constitutional:  Appears well-developed and well-nourished.   HENT:   Head:    Atraumatic.   Mouth/Throat:   Oropharynx is clear and moist. No oropharyngeal exudate.   Eyes:    Pupils are equal, round, and reactive to light.   Neck:    Normal range of motion. Neck supple.      No tracheal deviation present. No " thyromegaly present.   Cardiovascular:  Normal rate, regular rhythm, no murmur   Pulmonary/Chest: Breath sounds are clear and equal without wheezes or crackles.  Abdominal:   Tenderness across lower abdomen and upper right   quadrant. Guarding but no rebound. Soft. Bowel   sounds are normal. Exhibits no distension and no mass.   Musculoskeletal:  Exhibits no edema.   Lymphadenopathy:  No cervical adenopathy.   Neurological:   Alert and oriented to person, place, and time.   Skin:    Skin is warm and dry. No rash noted. No pallor.       Diagnostics     Lab Results   Labs Ordered and Resulted from Time of ED Arrival to Time of ED Departure   COMPREHENSIVE METABOLIC PANEL - Abnormal       Result Value    Sodium 137      Potassium 4.7      Carbon Dioxide (CO2) 25      Anion Gap 8      Urea Nitrogen 19.6      Creatinine 1.59 (*)     GFR Estimate 32 (*)     Calcium 9.9      Chloride 104      Glucose 122 (*)     Alkaline Phosphatase 46      AST 27      ALT 13      Protein Total 7.7      Albumin 4.1      Bilirubin Total 0.7     CBC WITH PLATELETS AND DIFFERENTIAL - Abnormal    WBC Count 4.9      RBC Count 3.41 (*)     Hemoglobin 10.4 (*)     Hematocrit 32.0 (*)     MCV 94      MCH 30.5      MCHC 32.5      RDW 16.0 (*)     Platelet Count 142 (*)     % Neutrophils 77      % Lymphocytes 12      % Monocytes 10      % Eosinophils 1      % Basophils 0      % Immature Granulocytes 0      NRBCs per 100 WBC 0      Absolute Neutrophils 3.8      Absolute Lymphocytes 0.6 (*)     Absolute Monocytes 0.5      Absolute Eosinophils 0.1      Absolute Basophils 0.0      Absolute Immature Granulocytes 0.0      Absolute NRBCs 0.0     ROUTINE UA WITH MICROSCOPIC REFLEX TO CULTURE - Abnormal    Color Urine Light Yellow      Appearance Urine Clear      Glucose Urine Negative      Bilirubin Urine Negative      Ketones Urine Negative      Specific Gravity Urine 1.011      Blood Urine Negative      pH Urine 7.0      Protein Albumin Urine Negative       Urobilinogen Urine Normal      Nitrite Urine Positive (*)     Leukocyte Esterase Urine Large (*)     Bacteria Urine Few (*)     Mucus Urine Present (*)     RBC Urine 3 (*)     WBC Urine 24 (*)     Squamous Epithelials Urine 8 (*)    LIPASE - Normal    Lipase 51     LACTIC ACID WHOLE BLOOD - Normal    Lactic Acid 0.7     URINE CULTURE       Imaging   CT Abdomen Pelvis w/o Contrast   Final Result   IMPRESSION:    1.  8 mm proximal left ureter stone at the ureteropelvic junction with   mild left hydronephrosis. Nonobstructing stones on the left again   noted.   2.  No other acute abnormality.   3.  Colonic diverticulosis.      MERNA BARNES MD            SYSTEM ID:  [id number]          Independent Interpretation   None    ED Course      Medications Administered   Medications   HYDROmorphone (PF) (DILAUDID) injection 0.3 mg (0.3 mg Intravenous $Given 7/22/24 4210)   albuterol (PROVENTIL) neb solution 2.5 mg (has no administration in time range)   clopidogrel (PLAVIX) tablet 75 mg (has no administration in time range)   HYDROcodone-acetaminophen (NORCO)  MG per tablet 1 tablet (has no administration in time range)   levothyroxine (SYNTHROID/LEVOTHROID) tablet 137 mcg (has no administration in time range)   metoprolol succinate ER (TOPROL XL) 24 hr tablet 25 mg (has no administration in time range)   oxyBUTYnin ER (DITROPAN XL) 24 hr tablet 10 mg (has no administration in time range)   senna-docusate (SENOKOT-S/PERICOLACE) 8.6-50 MG per tablet 1 tablet (has no administration in time range)     Or   senna-docusate (SENOKOT-S/PERICOLACE) 8.6-50 MG per tablet 2 tablet (has no administration in time range)   ondansetron (ZOFRAN ODT) ODT tab 4 mg (has no administration in time range)     Or   ondansetron (ZOFRAN) injection 4 mg (has no administration in time range)   sodium chloride 0.9 % infusion (has no administration in time range)   HYDROmorphone (PF) (DILAUDID) injection 0.3 mg (has no administration in time range)    HYDROmorphone (DILAUDID) injection 0.6 mg (has no administration in time range)   senna-docusate (SENOKOT-S/PERICOLACE) 8.6-50 MG per tablet 1 tablet (has no administration in time range)     Or   senna-docusate (SENOKOT-S/PERICOLACE) 8.6-50 MG per tablet 2 tablet (has no administration in time range)   cefTRIAXone (ROCEPHIN) 1 g vial to attach to  mL bag for ADULTS or NS 50 mL bag for PEDS (has no administration in time range)   tamsulosin (FLOMAX) capsule 0.4 mg (has no administration in time range)   sodium chloride 0.9% BOLUS 1,000 mL (0 mLs Intravenous Stopped 7/22/24 1341)   cefTRIAXone (ROCEPHIN) 2 g vial to attach to  ml bag for ADULTS or NS 50 ml bag for PEDS (0 g Intravenous Stopped 7/22/24 1528)       Procedures   Procedures     Discussion of Management   Admitting Hospitalist, Dr. Bell    ED Course   ED Course as of 07/22/24 1659   Mon Jul 22, 2024   1210 I obtained history and examined the patient as noted above.     8500 I spoke with hospitalist Dr. Bell regarding the patient.       Optional/Additional Documentation  None    Medical Decision Making / Diagnosis     CMS Diagnoses: None    MIPS       None    MDM   Macie Jorge is a 83 year old female who arrives to the emergency department due to left flank and abdominal pain who I found to have a large 8 mm left ureteral calculus and urinalysis concerning for UTI.  She was given ceftriaxone IV for UTI and Dilaudid for treatment of her pain.  I consulted urology and she was admitted to the care of the hospitalist service for further pain control and urology consultation.  She has been on Augmentin for presumed diverticulitis exacerbation in light of having a CT scan earlier this month with findings concerning for diverticulitis, so I was initially concerned about the possibility of a diverticular abscess or bowel perforation, however CT did not show any current signs of diverticulitis at this time.    Disposition   The patient  was admitted to the hospital.    Diagnosis     ICD-10-CM    1. Left ureteral stone  N20.1       2. Acute UTI  N39.0            Discharge Medications   New Prescriptions    No medications on file         Scribe Disclosure:  I, Christie Gupta, am serving as a scribe at 12:07 PM on 7/22/2024 to document services personally performed by Manasa Zamora MD based on my observations and the provider's statements to me.        Manasa Zamora MD  07/22/24 1732       Manasa Zamora MD  07/22/24 1733       Manasa Zamora MD  07/22/24 1901

## 2024-07-22 NOTE — TELEPHONE ENCOUNTER
Daughter, Teri, calls reporting patient's abd pain has worsened since 7/19 visit with Lavern Romano CNP for presumed diverticulitis flare and Augmentin prescribed.     Teri reports patient called her this morning - crying, hasn't slept all night due to abd pain. Doesn't want to go to ER and wait in lobby for hours to be seen. Patient wants expedited GI eval.     Plan: called Teri and discussed ER is the most appropriate and expedient way to get eval and treated. Teri agrees and is leaving her home now to go to patient's and will bring her to [facility] ER.   Vickie Potts RN

## 2024-07-22 NOTE — PHARMACY-ADMISSION MEDICATION HISTORY
Pharmacist Admission Medication History    Admission medication history is complete. The information provided in this note is only as accurate as the sources available at the time of the update.    Information Source(s): Patient via in-person    Pertinent Information: Patient reported that they were unsure if they were currently taking allopurinol. Marked as taking due to recent fill history 90-45 6/20/24.    Patient states that they have taken 2 tablets from their Augmentin prescription, last taken yesterday 7/21/24.    Patient states that they took 4 nitroglycerin tablets last week due to having chest pain.    Patient states that they use their Trelegy inhaler as needed when having problems with asthma.  Last filled 180-90 for 1/4/24.    Changes made to PTA medication list:  Added: None  Deleted: Aspirin, Ferrous Sulfate  Changed: None    Allergies reviewed with patient and updates made in EHR: yes    Medication History Completed By: Pedro Guillen [facility] 7/22/2024 3:46 PM    PTA Med List   Medication Sig Last Dose    albuterol (PROAIR HFA/PROVENTIL HFA/VENTOLIN HFA) 108 (90 Base) MCG/ACT inhaler Inhale 1-2 puffs into the lungs every 4 hours as needed for shortness of breath, wheezing or cough  at prn    allopurinol (ZYLOPRIM) 100 MG tablet Take 2 tablets by mouth once daily     amoxicillin-clavulanate (AUGMENTIN) 500-125 MG tablet Take 1 tablet by mouth 2 times daily for 7 days 7/21/2024 at pm    clopidogrel (PLAVIX) 75 MG tablet Take 1 tablet (75 mg) by mouth daily 7/21/2024 at pm    Fluticasone-Umeclidin-Vilanterol (TRELEGY ELLIPTA) 200-62.5-25 MCG/ACT oral inhaler Inhale 1 puff into the lungs daily (Patient taking differently: Inhale 1 puff into the lungs daily as needed)  at prn    HYDROcodone-acetaminophen (NORCO)  MG per tablet Take 1 tablet by mouth 3 times daily as needed  at prn    levothyroxine (SYNTHROID/LEVOTHROID) 137 MCG tablet Take 1 tablet (137 mcg) by mouth daily 7/22/2024 at am    lisinopril  (ZESTRIL) 2.5 MG tablet Take 2.5 mg by mouth daily 7/21/2024    metoprolol succinate ER (TOPROL XL) 25 MG 24 hr tablet Take 1 tablet (25 mg) by mouth daily 7/21/2024    nitroGLYcerin (NITROSTAT) 0.4 MG sublingual tablet For chest pain place 1 tablet under the tongue every 5 minutes for 3 doses. If symptoms persist 5 minutes after 1st dose call 911.  at prn    oxyBUTYnin ER (DITROPAN XL) 10 MG 24 hr tablet Take 1 tablet (10 mg) by mouth daily 7/21/2024    rosuvastatin (CRESTOR) 20 MG tablet Take 1 tablet (20 mg) by mouth daily 7/21/2024

## 2024-07-22 NOTE — ED NOTES
[facility]  ED Nurse Handoff Report    ED Chief complaint: Abdominal Pain      ED Diagnosis:   Final diagnoses:   Left ureteral stone   Acute UTI       Code Status: not addressed     Allergies:   Allergies   Allergen Reactions    Morphine Nausea     Severe vomiting    Prednisone      Elevated blood glucose  weakness    Baclofen GI Disturbance    Imitrex [Sumatriptan] Nausea    Tetracycline      Sores in mouth       Patient Story: Evaluated by Family PCP on 07/19/24 - presumed Diverticulitis, sent home on oral Augmentin. Pt called triage line today worsening abdominal pain.     Focused Assessment:  abd pain since  February , as above, pain, kidney stone on ct    Treatments and/or interventions provided: rocephin, fluids   Patient's response to treatments and/or interventions:       To be done/followed up on inpatient unit:  unknown     Does this patient have any cognitive concerns?:  none      Activity level - Baseline/Home:  Independent  Activity Level - Current:   Unknown    Patient's Preferred language: English   Needed?: No    Isolation: None  Infection: Not Applicable  Patient tested for COVID 19 prior to admission: NO  Bariatric?: No    Vital Signs:   Vitals:    07/22/24 1345 07/22/24 1400 07/22/24 1415 07/22/24 1430   BP: (!) 146/67 129/67     Pulse: 67 65     Resp:       Temp:       TempSrc:       SpO2: 95% 95% 96% 96%   Weight:       Height:           Cardiac Rhythm:     Was the PSS-3 completed:     What interventions are required if any?               Family Comments:   OBS brochure/video discussed/provided to patient/family:               Name of person given brochure if not patient:               Relationship to patient:     For the majority of the shift this patient's behavior was .   Behavioral interventions performed were .    ED NURSE PHONE NUMBER: [phone]

## 2024-07-23 ENCOUNTER — ANESTHESIA (OUTPATIENT)
Dept: SURGERY | Facility: CLINIC | Age: 84
DRG: 660 | End: 2024-07-23
Payer: COMMERCIAL

## 2024-07-23 ENCOUNTER — APPOINTMENT (OUTPATIENT)
Dept: GENERAL RADIOLOGY | Facility: CLINIC | Age: 84
DRG: 660 | End: 2024-07-23
Attending: UROLOGY
Payer: COMMERCIAL

## 2024-07-23 ENCOUNTER — ANESTHESIA EVENT (OUTPATIENT)
Dept: SURGERY | Facility: CLINIC | Age: 84
DRG: 660 | End: 2024-07-23
Payer: COMMERCIAL

## 2024-07-23 LAB
ANION GAP SERPL CALCULATED.3IONS-SCNC: 7 MMOL/L (ref 7–15)
BASOPHILS # BLD AUTO: 0 10E3/UL (ref 0–0.2)
BASOPHILS NFR BLD AUTO: 0 %
BUN SERPL-MCNC: 16.8 MG/DL (ref 8–23)
CALCIUM SERPL-MCNC: 9 MG/DL (ref 8.8–10.4)
CHLORIDE SERPL-SCNC: 107 MMOL/L (ref 98–107)
CREAT SERPL-MCNC: 1.39 MG/DL (ref 0.51–0.95)
EGFRCR SERPLBLD CKD-EPI 2021: 37 ML/MIN/1.73M2
EOSINOPHIL # BLD AUTO: 0.1 10E3/UL (ref 0–0.7)
EOSINOPHIL NFR BLD AUTO: 1 %
ERYTHROCYTE [DISTWIDTH] IN BLOOD BY AUTOMATED COUNT: 15.9 % (ref 10–15)
ERYTHROCYTE [DISTWIDTH] IN BLOOD BY AUTOMATED COUNT: 15.9 % (ref 10–15)
FERRITIN SERPL-MCNC: 73 NG/ML (ref 11–328)
FOLATE SERPL-MCNC: 6.1 NG/ML (ref 4.6–34.8)
GLUCOSE BLDC GLUCOMTR-MCNC: 94 MG/DL (ref 70–99)
GLUCOSE SERPL-MCNC: 86 MG/DL (ref 70–99)
HCO3 SERPL-SCNC: 26 MMOL/L (ref 22–29)
HCT VFR BLD AUTO: 28.5 % (ref 35–47)
HCT VFR BLD AUTO: 29.2 % (ref 35–47)
HGB BLD-MCNC: 9.5 G/DL (ref 11.7–15.7)
HGB BLD-MCNC: 9.8 G/DL (ref 11.7–15.7)
IMM GRANULOCYTES # BLD: 0 10E3/UL
IMM GRANULOCYTES NFR BLD: 0 %
IRON BINDING CAPACITY (ROCHE): 250 UG/DL (ref 240–430)
IRON SATN MFR SERPL: 21 % (ref 15–46)
IRON SERPL-MCNC: 52 UG/DL (ref 37–145)
LACTATE SERPL-SCNC: 1.1 MMOL/L (ref 0.7–2)
LYMPHOCYTES # BLD AUTO: 0.3 10E3/UL (ref 0.8–5.3)
LYMPHOCYTES NFR BLD AUTO: 7 %
MCH RBC QN AUTO: 31.4 PG (ref 26.5–33)
MCH RBC QN AUTO: 31.8 PG (ref 26.5–33)
MCHC RBC AUTO-ENTMCNC: 33.3 G/DL (ref 31.5–36.5)
MCHC RBC AUTO-ENTMCNC: 33.6 G/DL (ref 31.5–36.5)
MCV RBC AUTO: 94 FL (ref 78–100)
MCV RBC AUTO: 95 FL (ref 78–100)
MONOCYTES # BLD AUTO: 0.2 10E3/UL (ref 0–1.3)
MONOCYTES NFR BLD AUTO: 3 %
NEUTROPHILS # BLD AUTO: 4 10E3/UL (ref 1.6–8.3)
NEUTROPHILS NFR BLD AUTO: 88 %
NRBC # BLD AUTO: 0 10E3/UL
NRBC BLD AUTO-RTO: 0 /100
PLATELET # BLD AUTO: 134 10E3/UL (ref 150–450)
PLATELET # BLD AUTO: 136 10E3/UL (ref 150–450)
POTASSIUM SERPL-SCNC: 3.8 MMOL/L (ref 3.4–5.3)
RBC # BLD AUTO: 3.03 10E6/UL (ref 3.8–5.2)
RBC # BLD AUTO: 3.08 10E6/UL (ref 3.8–5.2)
RETICS # AUTO: 0.04 10E6/UL (ref 0.03–0.1)
RETICS # AUTO: 0.04 10E6/UL (ref 0.03–0.1)
RETICS/RBC NFR AUTO: 1.5 % (ref 0.5–2)
RETICS/RBC NFR AUTO: 1.5 % (ref 0.5–2)
SODIUM SERPL-SCNC: 140 MMOL/L (ref 135–145)
T4 FREE SERPL-MCNC: 2.13 NG/DL (ref 0.9–1.7)
TSH SERPL DL<=0.005 MIU/L-ACNC: 0.2 UIU/ML (ref 0.3–4.2)
VIT B12 SERPL-MCNC: 223 PG/ML (ref 232–1245)
WBC # BLD AUTO: 3.4 10E3/UL (ref 4–11)
WBC # BLD AUTO: 4.6 10E3/UL (ref 4–11)

## 2024-07-23 PROCEDURE — 84443 ASSAY THYROID STIM HORMONE: CPT | Performed by: HOSPITALIST

## 2024-07-23 PROCEDURE — 120N000001 HC R&B MED SURG/OB

## 2024-07-23 PROCEDURE — 82746 ASSAY OF FOLIC ACID SERUM: CPT | Performed by: HOSPITALIST

## 2024-07-23 PROCEDURE — 272N000001 HC OR GENERAL SUPPLY STERILE: Performed by: UROLOGY

## 2024-07-23 PROCEDURE — G0378 HOSPITAL OBSERVATION PER HR: HCPCS

## 2024-07-23 PROCEDURE — 52332 CYSTOSCOPY AND TREATMENT: CPT | Performed by: ANESTHESIOLOGY

## 2024-07-23 PROCEDURE — 250N000011 HC RX IP 250 OP 636: Performed by: ANESTHESIOLOGY

## 2024-07-23 PROCEDURE — 250N000009 HC RX 250: Performed by: ANESTHESIOLOGY

## 2024-07-23 PROCEDURE — 99100 ANES PT EXTEME AGE<1 YR&>70: CPT | Performed by: NURSE ANESTHETIST, CERTIFIED REGISTERED

## 2024-07-23 PROCEDURE — 99232 SBSQ HOSP IP/OBS MODERATE 35: CPT | Performed by: HOSPITALIST

## 2024-07-23 PROCEDURE — 36415 COLL VENOUS BLD VENIPUNCTURE: CPT | Performed by: HOSPITALIST

## 2024-07-23 PROCEDURE — 370N000017 HC ANESTHESIA TECHNICAL FEE, PER MIN: Performed by: UROLOGY

## 2024-07-23 PROCEDURE — 250N000011 HC RX IP 250 OP 636: Performed by: INTERNAL MEDICINE

## 2024-07-23 PROCEDURE — 999N000141 HC STATISTIC PRE-PROCEDURE NURSING ASSESSMENT: Performed by: UROLOGY

## 2024-07-23 PROCEDURE — 36415 COLL VENOUS BLD VENIPUNCTURE: CPT | Performed by: INTERNAL MEDICINE

## 2024-07-23 PROCEDURE — 52332 CYSTOSCOPY AND TREATMENT: CPT | Performed by: NURSE ANESTHETIST, CERTIFIED REGISTERED

## 2024-07-23 PROCEDURE — 80048 BASIC METABOLIC PNL TOTAL CA: CPT | Performed by: INTERNAL MEDICINE

## 2024-07-23 PROCEDURE — 360N000075 HC SURGERY LEVEL 2, PER MIN: Performed by: UROLOGY

## 2024-07-23 PROCEDURE — 99223 1ST HOSP IP/OBS HIGH 75: CPT | Mod: FS

## 2024-07-23 PROCEDURE — 83550 IRON BINDING TEST: CPT | Performed by: HOSPITALIST

## 2024-07-23 PROCEDURE — 250N000011 HC RX IP 250 OP 636

## 2024-07-23 PROCEDURE — 85025 COMPLETE CBC W/AUTO DIFF WBC: CPT | Performed by: INTERNAL MEDICINE

## 2024-07-23 PROCEDURE — 82607 VITAMIN B-12: CPT | Performed by: HOSPITALIST

## 2024-07-23 PROCEDURE — 258N000003 HC RX IP 258 OP 636: Performed by: INTERNAL MEDICINE

## 2024-07-23 PROCEDURE — 85045 AUTOMATED RETICULOCYTE COUNT: CPT | Performed by: HOSPITALIST

## 2024-07-23 PROCEDURE — 85027 COMPLETE CBC AUTOMATED: CPT | Performed by: HOSPITALIST

## 2024-07-23 PROCEDURE — 0T778DZ DILATION OF LEFT URETER WITH INTRALUMINAL DEVICE, VIA NATURAL OR ARTIFICIAL OPENING ENDOSCOPIC: ICD-10-PCS | Performed by: UROLOGY

## 2024-07-23 PROCEDURE — 82728 ASSAY OF FERRITIN: CPT | Performed by: HOSPITALIST

## 2024-07-23 PROCEDURE — 96376 TX/PRO/DX INJ SAME DRUG ADON: CPT

## 2024-07-23 PROCEDURE — C2617 STENT, NON-COR, TEM W/O DEL: HCPCS | Performed by: UROLOGY

## 2024-07-23 PROCEDURE — 83605 ASSAY OF LACTIC ACID: CPT | Performed by: HOSPITALIST

## 2024-07-23 PROCEDURE — 710N000009 HC RECOVERY PHASE 1, LEVEL 1, PER MIN: Performed by: UROLOGY

## 2024-07-23 PROCEDURE — 250N000025 HC SEVOFLURANE, PER MIN: Performed by: UROLOGY

## 2024-07-23 PROCEDURE — 84439 ASSAY OF FREE THYROXINE: CPT | Performed by: HOSPITALIST

## 2024-07-23 PROCEDURE — 250N000009 HC RX 250: Performed by: UROLOGY

## 2024-07-23 PROCEDURE — 250N000013 HC RX MED GY IP 250 OP 250 PS 637: Performed by: INTERNAL MEDICINE

## 2024-07-23 PROCEDURE — 999N000179 XR SURGERY CARM FLUORO LESS THAN 5 MIN W STILLS: Mod: TC

## 2024-07-23 PROCEDURE — 258N000003 HC RX IP 258 OP 636: Performed by: ANESTHESIOLOGY

## 2024-07-23 DEVICE — URETERAL STENT
Type: IMPLANTABLE DEVICE | Site: URETHRA | Status: NON-FUNCTIONAL
Brand: POLARIS™ ULTRA
Removed: 2024-08-16

## 2024-07-23 RX ORDER — SODIUM CHLORIDE, SODIUM LACTATE, POTASSIUM CHLORIDE, CALCIUM CHLORIDE 600; 310; 30; 20 MG/100ML; MG/100ML; MG/100ML; MG/100ML
INJECTION, SOLUTION INTRAVENOUS CONTINUOUS PRN
Status: DISCONTINUED | OUTPATIENT
Start: 2024-07-23 | End: 2024-07-23

## 2024-07-23 RX ORDER — CEFAZOLIN SODIUM/WATER 2 G/20 ML
2 SYRINGE (ML) INTRAVENOUS
Status: COMPLETED | OUTPATIENT
Start: 2024-07-23 | End: 2024-07-23

## 2024-07-23 RX ORDER — SODIUM CHLORIDE, SODIUM LACTATE, POTASSIUM CHLORIDE, CALCIUM CHLORIDE 600; 310; 30; 20 MG/100ML; MG/100ML; MG/100ML; MG/100ML
INJECTION, SOLUTION INTRAVENOUS CONTINUOUS
Status: DISCONTINUED | OUTPATIENT
Start: 2024-07-23 | End: 2024-07-23 | Stop reason: HOSPADM

## 2024-07-23 RX ORDER — LABETALOL HYDROCHLORIDE 5 MG/ML
10 INJECTION, SOLUTION INTRAVENOUS
Status: DISCONTINUED | OUTPATIENT
Start: 2024-07-23 | End: 2024-07-23 | Stop reason: HOSPADM

## 2024-07-23 RX ORDER — DEXAMETHASONE SODIUM PHOSPHATE 4 MG/ML
INJECTION, SOLUTION INTRA-ARTICULAR; INTRALESIONAL; INTRAMUSCULAR; INTRAVENOUS; SOFT TISSUE PRN
Status: DISCONTINUED | OUTPATIENT
Start: 2024-07-23 | End: 2024-07-23

## 2024-07-23 RX ORDER — FENTANYL CITRATE 0.05 MG/ML
25 INJECTION, SOLUTION INTRAMUSCULAR; INTRAVENOUS EVERY 5 MIN PRN
Status: DISCONTINUED | OUTPATIENT
Start: 2024-07-23 | End: 2024-07-23 | Stop reason: HOSPADM

## 2024-07-23 RX ORDER — ONDANSETRON 2 MG/ML
4 INJECTION INTRAMUSCULAR; INTRAVENOUS EVERY 30 MIN PRN
Status: DISCONTINUED | OUTPATIENT
Start: 2024-07-23 | End: 2024-07-23 | Stop reason: HOSPADM

## 2024-07-23 RX ORDER — NALOXONE HYDROCHLORIDE 0.4 MG/ML
0.1 INJECTION, SOLUTION INTRAMUSCULAR; INTRAVENOUS; SUBCUTANEOUS
Status: DISCONTINUED | OUTPATIENT
Start: 2024-07-23 | End: 2024-07-23 | Stop reason: HOSPADM

## 2024-07-23 RX ORDER — PROPOFOL 10 MG/ML
INJECTION, EMULSION INTRAVENOUS PRN
Status: DISCONTINUED | OUTPATIENT
Start: 2024-07-23 | End: 2024-07-23

## 2024-07-23 RX ORDER — ONDANSETRON 4 MG/1
4 TABLET, ORALLY DISINTEGRATING ORAL EVERY 30 MIN PRN
Status: DISCONTINUED | OUTPATIENT
Start: 2024-07-23 | End: 2024-07-23 | Stop reason: HOSPADM

## 2024-07-23 RX ORDER — LIDOCAINE HYDROCHLORIDE 20 MG/ML
INJECTION, SOLUTION INFILTRATION; PERINEURAL PRN
Status: DISCONTINUED | OUTPATIENT
Start: 2024-07-23 | End: 2024-07-23

## 2024-07-23 RX ORDER — HYDROMORPHONE HCL IN WATER/PF 6 MG/30 ML
0.2 PATIENT CONTROLLED ANALGESIA SYRINGE INTRAVENOUS EVERY 5 MIN PRN
Status: DISCONTINUED | OUTPATIENT
Start: 2024-07-23 | End: 2024-07-23 | Stop reason: HOSPADM

## 2024-07-23 RX ORDER — HYDRALAZINE HYDROCHLORIDE 20 MG/ML
2.5-5 INJECTION INTRAMUSCULAR; INTRAVENOUS EVERY 10 MIN PRN
Status: DISCONTINUED | OUTPATIENT
Start: 2024-07-23 | End: 2024-07-23 | Stop reason: HOSPADM

## 2024-07-23 RX ORDER — CEFAZOLIN SODIUM/WATER 2 G/20 ML
2 SYRINGE (ML) INTRAVENOUS SEE ADMIN INSTRUCTIONS
Status: DISCONTINUED | OUTPATIENT
Start: 2024-07-23 | End: 2024-07-23 | Stop reason: HOSPADM

## 2024-07-23 RX ORDER — ASPIRIN 81 MG/1
81 TABLET ORAL DAILY
Status: DISCONTINUED | OUTPATIENT
Start: 2024-07-24 | End: 2024-07-27 | Stop reason: HOSPADM

## 2024-07-23 RX ORDER — FENTANYL CITRATE 50 UG/ML
INJECTION, SOLUTION INTRAMUSCULAR; INTRAVENOUS PRN
Status: DISCONTINUED | OUTPATIENT
Start: 2024-07-23 | End: 2024-07-23

## 2024-07-23 RX ADMIN — TAMSULOSIN HYDROCHLORIDE 0.4 MG: 0.4 CAPSULE ORAL at 00:55

## 2024-07-23 RX ADMIN — HYDROMORPHONE HYDROCHLORIDE 0.3 MG: 1 INJECTION, SOLUTION INTRAMUSCULAR; INTRAVENOUS; SUBCUTANEOUS at 10:30

## 2024-07-23 RX ADMIN — LEVOTHYROXINE SODIUM 137 MCG: 137 TABLET ORAL at 06:55

## 2024-07-23 RX ADMIN — METOPROLOL SUCCINATE 25 MG: 25 TABLET, EXTENDED RELEASE ORAL at 00:40

## 2024-07-23 RX ADMIN — HYDROMORPHONE HYDROCHLORIDE 0.3 MG: 1 INJECTION, SOLUTION INTRAMUSCULAR; INTRAVENOUS; SUBCUTANEOUS at 22:26

## 2024-07-23 RX ADMIN — FENTANYL CITRATE 50 MCG: 50 INJECTION INTRAMUSCULAR; INTRAVENOUS at 16:20

## 2024-07-23 RX ADMIN — HYDROMORPHONE HYDROCHLORIDE 0.3 MG: 1 INJECTION, SOLUTION INTRAMUSCULAR; INTRAVENOUS; SUBCUTANEOUS at 00:50

## 2024-07-23 RX ADMIN — Medication 2 G: at 16:20

## 2024-07-23 RX ADMIN — LIDOCAINE HYDROCHLORIDE 60 MG: 20 INJECTION, SOLUTION INFILTRATION; PERINEURAL at 16:20

## 2024-07-23 RX ADMIN — HYDROMORPHONE HYDROCHLORIDE 0.3 MG: 1 INJECTION, SOLUTION INTRAMUSCULAR; INTRAVENOUS; SUBCUTANEOUS at 20:12

## 2024-07-23 RX ADMIN — PHENYLEPHRINE HYDROCHLORIDE 100 MCG: 10 INJECTION INTRAVENOUS at 16:32

## 2024-07-23 RX ADMIN — FENTANYL CITRATE 25 MCG: 50 INJECTION, SOLUTION INTRAMUSCULAR; INTRAVENOUS at 17:16

## 2024-07-23 RX ADMIN — OXYBUTYNIN CHLORIDE 10 MG: 10 TABLET, EXTENDED RELEASE ORAL at 19:55

## 2024-07-23 RX ADMIN — SODIUM CHLORIDE, POTASSIUM CHLORIDE, SODIUM LACTATE AND CALCIUM CHLORIDE: 600; 310; 30; 20 INJECTION, SOLUTION INTRAVENOUS at 16:18

## 2024-07-23 RX ADMIN — DEXAMETHASONE SODIUM PHOSPHATE 4 MG: 4 INJECTION, SOLUTION INTRA-ARTICULAR; INTRALESIONAL; INTRAMUSCULAR; INTRAVENOUS; SOFT TISSUE at 16:20

## 2024-07-23 RX ADMIN — OXYBUTYNIN CHLORIDE 10 MG: 10 TABLET, EXTENDED RELEASE ORAL at 00:40

## 2024-07-23 RX ADMIN — PHENYLEPHRINE HYDROCHLORIDE 100 MCG: 10 INJECTION INTRAVENOUS at 16:28

## 2024-07-23 RX ADMIN — METOPROLOL SUCCINATE 25 MG: 25 TABLET, EXTENDED RELEASE ORAL at 19:55

## 2024-07-23 RX ADMIN — FENTANYL CITRATE 25 MCG: 50 INJECTION, SOLUTION INTRAMUSCULAR; INTRAVENOUS at 17:09

## 2024-07-23 RX ADMIN — CLOPIDOGREL BISULFATE 75 MG: 75 TABLET ORAL at 00:39

## 2024-07-23 RX ADMIN — SENNOSIDES AND DOCUSATE SODIUM 1 TABLET: 50; 8.6 TABLET ORAL at 19:56

## 2024-07-23 RX ADMIN — PHENYLEPHRINE HYDROCHLORIDE 50 MCG: 10 INJECTION INTRAVENOUS at 16:25

## 2024-07-23 RX ADMIN — FENTANYL CITRATE 50 MCG: 50 INJECTION INTRAMUSCULAR; INTRAVENOUS at 16:27

## 2024-07-23 RX ADMIN — SODIUM CHLORIDE: 9 INJECTION, SOLUTION INTRAVENOUS at 17:55

## 2024-07-23 RX ADMIN — PROPOFOL 140 MG: 10 INJECTION, EMULSION INTRAVENOUS at 16:20

## 2024-07-23 RX ADMIN — HYDROMORPHONE HYDROCHLORIDE 0.6 MG: 1 INJECTION, SOLUTION INTRAMUSCULAR; INTRAVENOUS; SUBCUTANEOUS at 05:32

## 2024-07-23 RX ADMIN — ONDANSETRON 4 MG: 2 INJECTION INTRAMUSCULAR; INTRAVENOUS at 16:20

## 2024-07-23 RX ADMIN — SENNOSIDES AND DOCUSATE SODIUM 1 TABLET: 50; 8.6 TABLET ORAL at 08:29

## 2024-07-23 RX ADMIN — SENNOSIDES AND DOCUSATE SODIUM 1 TABLET: 50; 8.6 TABLET ORAL at 00:39

## 2024-07-23 RX ADMIN — TAMSULOSIN HYDROCHLORIDE 0.4 MG: 0.4 CAPSULE ORAL at 08:29

## 2024-07-23 ASSESSMENT — ACTIVITIES OF DAILY LIVING (ADL)
ADLS_ACUITY_SCORE: 31
ADLS_ACUITY_SCORE: 30
ADLS_ACUITY_SCORE: 30
ADLS_ACUITY_SCORE: 28
ADLS_ACUITY_SCORE: 29
ADLS_ACUITY_SCORE: 30
ADLS_ACUITY_SCORE: 30
ADLS_ACUITY_SCORE: 28
ADLS_ACUITY_SCORE: 31
ADLS_ACUITY_SCORE: 28
ADLS_ACUITY_SCORE: 30
ADLS_ACUITY_SCORE: 31
ADLS_ACUITY_SCORE: 31
ADLS_ACUITY_SCORE: 28
ADLS_ACUITY_SCORE: 28
ADLS_ACUITY_SCORE: 30
ADLS_ACUITY_SCORE: 28
ADLS_ACUITY_SCORE: 30
ADLS_ACUITY_SCORE: 28

## 2024-07-23 ASSESSMENT — COPD QUESTIONNAIRES: COPD: 1

## 2024-07-23 NOTE — PROGRESS NOTES
Admission/Transfer from: ED  2 RN skin assessment completed. Yes. Benita JONES  Significant findings include: scattered bruise and scab  WOC Nurse Consult Ordered? No

## 2024-07-23 NOTE — ANESTHESIA PREPROCEDURE EVALUATION
Anesthesia Pre-Procedure Evaluation    Patient: Macie Jorge   MRN: [id number] : 1940        Procedure : Procedure(s):  Cystoscopy, retrogrades, insert stent ureter(s), combined          Past Medical History:   Diagnosis Date    Calculus of left ureter     dr Arriaga - ureteroscopy and lithitripsy    Cataract     Chronic low back pain     Dr Villagran at [facility] in - not surgical     Complication of anesthesia     COPD with asthma (H)      followed with pulmonary DR LENNOX 60-pack-year history of smoking    Esophagitis, reflux 2012    SOME EOSINOPHILIA NO BARRETS    Gastro-oesophageal reflux disease      has seen GI    Hydronephrosis, left     chronic , in  Dr Arriaga plan was observaton    Hyperlipidaemia LDL goal < 100     Hypothyroid     Kidney stone 3/28/2019    Migraine headaches 2011    Moderate persistent asthma      lifelong, saw Pulm     Obesity (BMI 30-39.9)     Panic attacks     PONV (postoperative nausea and vomiting)     Renal stones     kidney stones, multiple small stones high risk recurrent ureteral stones. , Dr Arriaga in  rec 24 hour urine.    Type 2 diabetes mellitus without complication (H)       Past Surgical History:   Procedure Laterality Date    ARTHROPLASTY HIP  3/13/2013    Procedure: ARTHROPLASTY HIP;  LEFT TOTAL HIP ARTHROPLASTY (BIOMET)^ ;  Surgeon: Anand Main MD;  Location:  OR    ARTHROPLASTY KNEE Right 2021    Procedure: RIGHT TOTAL KNEE ARTHROPLASTY;  Surgeon: Anand Main MD;  Location:  OR    ARTHROPLASTY PATELLO-FEMORAL (KNEE)  2005ish    Left    CHOLECYSTECTOMY, OPEN  25 yo    COMBINED CYSTOSCOPY, INSERT STENT URETER(S)  2014    Procedure: COMBINED CYSTOSCOPY, INSERT STENT URETER(S);  Surgeon: Sam Arriaga MD;  Location:  OR    COMBINED CYSTOSCOPY, RETROGRADES, URETEROSCOPY, LASER HOLMIUM LITHOTRIPSY URETER(S), INSERT STENT Left 2016    Procedure: COMBINED CYSTOSCOPY, RETROGRADES, URETEROSCOPY, LASER HOLMIUM LITHOTRIPSY  URETER(S), INSERT STENT;  Surgeon: Thomas Edmondson MD;  Location:  OR    COMBINED CYSTOSCOPY, RETROGRADES, URETEROSCOPY, LASER HOLMIUM LITHOTRIPSY URETER(S), INSERT STENT Left 3/28/2019    Procedure: CYSTOSCOPY, LEFT RETROGRADE PYLEOGRAM, LEFT URETEROSCOPY, HOLMIUM LASER LITHOTRIPSY, LEFT URETERAL STENT EXCHANGE;  Surgeon: Derrek Rivera MD;  Location:  OR     CORONARY ANGIOGRAM N/A 7/5/2023    Procedure: Coronary Angiogram;  Surgeon: Fausto Espinal MD;  Location:  HEART CARDIAC CATH LAB    CV INTRAVASULAR ULTRASOUND N/A 7/5/2023    Procedure: Intravascular Ultrasound;  Surgeon: Fausto Espinal MD;  Location:  HEART CARDIAC CATH LAB    CV PCI N/A 7/5/2023    Procedure: Percutaneous Coronary Intervention;  Surgeon: Fausto Espinal MD;  Location:  HEART CARDIAC CATH LAB    CYSTOSCOPY, RETROGRADES, INSERT STENT URETER(S), COMBINED Left 3/8/2019    Procedure: CYSTOSCOPY,LEFT RETROGRADE, LEFT STENT PLACEMENT;  Surgeon: Derrek Rivera MD;  Location:  OR    GENITOURINARY SURGERY      LAMINECT/DISCECTOMY, LUMBAR      Laminectomy/Discectomy Lumbar    LASER HOLMIUM LITHOTRIPSY URETER(S), INSERT STENT, COMBINED Left 8/20/2014    Procedure: COMBINED CYSTOSCOPY, URETEROSCOPY, LASER HOLMIUM LITHOTRIPSY URETER(S), INSERT STENT;  Surgeon: Sam Arriaga MD;  Location:  OR    LASER HOLMIUM LITHOTRIPSY URETER(S), INSERT STENT, COMBINED Left 5/2/2019    Procedure: CYSTOSCOPY, LEFT RETROGRADE, LEFT URETEROSCOPY, HOLMIUM LASER LITHOTRIPSY, STONE REMOVAL, LEFT STENT EXCHANGE;  Surgeon: Derrek Rivera MD;  Location:  OR    LASER HOLMIUM LITHOTRIPSY URETER(S), INSERT STENT, COMBINED Right 6/13/2019    Procedure: CYSTOSCOPY; RIGHT URETEROSCOPY WITH HOLMIUM LASER LITHOTRIPSY; RIGHT STENT PLACEMENT (DIGITAL FLEXIBLE URETEROSCOPE);  Surgeon: Derrek Rivera MD;  Location:  OR    ORTHOPEDIC SURGERY      left ankle    RECESSION EYELID UPPER        Allergies   Allergen Reactions     Morphine Nausea     Severe vomiting    Prednisone      Elevated blood glucose  weakness    Baclofen GI Disturbance    Imitrex [Sumatriptan] Nausea    Tetracycline      Sores in mouth      Social History     Tobacco Use    Smoking status: Former     Current packs/day: 0.00     Average packs/day: 1.5 packs/day for 40.0 years (60.0 ttl pk-yrs)     Types: Cigarettes     Start date: 1955     Quit date: 1995     Years since quittin.5    Smokeless tobacco: Never   Substance Use Topics    Alcohol use: Yes     Comment: couple drinks per year      Wt Readings from Last 1 Encounters:   24 68.5 kg (151 lb)        Anesthesia Evaluation   Pt has had prior anesthetic.     No history of anesthetic complications       ROS/MED HX  ENT/Pulmonary:     (+)                      asthma    COPD,              Neurologic:       Cardiovascular: Comment: Echocardiogram Complete  Order: [id number]  Status: Edited Result - FINAL       Visible to patient: Yes (seen)       Next appt: 2024 at 11:00 AM in Cardiology ([facility] ECHO ROOM 3)       Dx: Nonrheumatic aortic valve stenosis    0 Result Notes  Details      Reading Physician Reading Date Result Priority  Gianni Quispe MD  [phone] 2024     Narrative & Impression  [id number]  [id number]  [id number]  [id number]^PRAVIN^RANDY^LARON     [facility]  [facility]chocardi[facility]  [facility]  [place] [address]  Phone [phone]  Fax [phone]     Name: MAURI GRUBER  MRN: [id number]  : 1940  Study Date: 2024 11:03 AM  Age: 83 yrs  Gender: Female  Patient Location: [facility]  Reason For Study: Nonrheumatic aortic valve stenosis  Ordering Physician: RANDY COSTELLO  Referring Physician: RANDY COSTELLO  Performed By: Alice Adams     BSA: 1.8 m2  Height: 65 in  Weight: 165 lb  HR: 61  BP: 134/66  mmHg  ______________________________________________________________________________  Procedure  Complete Echo Adult.     ______________________________________________________________________________  Interpretation Summary     Left ventricular systolic function is normal.  The visual ejection fraction is 60-65%.  No regional wall motion abnormalities noted.  The right ventricle is normal in size and function.  Mild aortic valve stenosis with trace regurgitation.  Peak velocity 2.5 m/s, mean gradient 13-15 mmHg, valve area 1.6-1.8 cmÂ .  Normal stroke index of 53 mL/m2. Velocity ratio 0.39.     COMMENT: There have been significant variance in the reported severity of  aortic valve stenosis on previous echocardiograms ranging from moderate to  severe. On current study, aortic valve opening is clearly visualized in the  short axis view and is consistent with the hemodynamics of mild to moderate  stenosis.        (+)  - -  CAD -  - stent-7/2023.                           valvular problems/murmurs type: AS mild-mod.         METS/Exercise Tolerance:     Hematologic:       Musculoskeletal:       GI/Hepatic:    (-) GERD   Renal/Genitourinary:     (+) renal disease, type: CRI,     Nephrolithiasis ,       Endo:     (+)          thyroid problem, hypothyroidism,    Obesity,    (-) Type II DM   Psychiatric/Substance Use:       Infectious Disease:       Malignancy:       Other:            Physical Exam    Airway  airway exam normal      Mallampati: II   TM distance: > 3 FB   Neck ROM: full   Mouth opening: > 3 cm    Respiratory Devices and Support         Dental       (+) Minor Abnormalities - some fillings, tiny chips      Cardiovascular   cardiovascular exam normal          Pulmonary   pulmonary exam normal                OUTSIDE LABS:  CBC:   Lab Results   Component Value Date    WBC 3.4 (L) 07/23/2024    WBC 4.9 07/22/2024    HGB 9.5 (L) 07/23/2024    HGB 10.4 (L) 07/22/2024    HCT 28.5 (L) 07/23/2024    HCT 32.0 (L)  07/22/2024     (L) 07/23/2024     (L) 07/22/2024     BMP:   Lab Results   Component Value Date     07/23/2024     07/22/2024    POTASSIUM 3.8 07/23/2024    POTASSIUM 4.7 07/22/2024    CHLORIDE 107 07/23/2024    CHLORIDE 104 07/22/2024    CO2 26 07/23/2024    CO2 25 07/22/2024    BUN 16.8 07/23/2024    BUN 19.6 07/22/2024    CR 1.39 (H) 07/23/2024    CR 1.59 (H) 07/22/2024    GLC 86 07/23/2024     (H) 07/22/2024     COAGS:   Lab Results   Component Value Date    PTT 25 03/11/2024    INR 1.03 03/11/2024     POC:   Lab Results   Component Value Date     (H) 06/13/2019     HEPATIC:   Lab Results   Component Value Date    ALBUMIN 4.1 07/22/2024    PROTTOTAL 7.7 07/22/2024    ALT 13 07/22/2024    AST 27 07/22/2024    ALKPHOS 46 07/22/2024    BILITOTAL 0.7 07/22/2024     OTHER:   Lab Results   Component Value Date    LACT 0.7 07/22/2024    A1C 5.8 (H) 06/06/2024    WARD 9.0 07/23/2024    PHOS 3.6 11/15/2022    LIPASE 51 07/22/2024    TSH 0.20 (L) 07/23/2024    T4 2.13 (H) 07/23/2024    T3 81 03/04/2019       Anesthesia Plan    ASA Status:  3    NPO Status:  NPO Appropriate    Anesthesia Type: General.     - Airway: LMA   Induction: Intravenous.   Maintenance: Balanced.        Consents    Anesthesia Plan(s) and associated risks, benefits, and realistic alternatives discussed. Questions answered and patient/representative(s) expressed understanding.     - Discussed:     - Discussed with:  Patient            Postoperative Care    Pain management: IV analgesics, Oral pain medications.   PONV prophylaxis: Ondansetron (or other 5HT-3), Background Propofol Infusion     Comments:               Robert Forman MD    I have reviewed the pertinent notes and labs in the chart from the past 30 days and (re)examined the patient.  Any updates or changes from those notes are reflected in this note.             # Drug Induced Platelet Defect: home medication list includes an antiplatelet  medication

## 2024-07-23 NOTE — PROGRESS NOTES
PRIMARY Concern: admitted for abdominal pain and acute UTI  SAFETY RISK Concerns (fall risk, behaviors, etc.): fall risk   Isolation/Type: None   Tests/Procedures for NEXT shift:   Consults? (Pending/following, signed-off?) Urology following   Where is patient from? (Home, TCU, etc.): Home   Other Important info for NEXT shift: NPO  Anticipated DC date & active delays: TBD  SUMMARY NOTE:                Orientation/Cognitive: AXO4  Observation Goals (Met/ Not Met): OBS  Mobility Level/Assist Equipment: IND  Antibiotics & Plan (IV/po, length of tx left):   Pain Management:  IV Dilaudid given twice this shift  Tele/VS/O2: VSS on RA. BP slightly elevated   ABNL Lab/BG: See Lab  Diet: NPO  Bowel/Bladder: Cont   Skin Concerns: WNL  Drains/Devices: PIV infusion at 100ml/hr  Patient Stated Goal for Today: TBD

## 2024-07-23 NOTE — ANESTHESIA POSTPROCEDURE EVALUATION
Patient: Macie Jorge    Procedure: Procedure(s):  Cystoscopy, retrogrades, insert stent ureter(s), combined       Anesthesia Type:  General    Note:     Postop Pain Control: Uneventful            Sign Out: Well controlled pain   PONV: No   Neuro/Psych: Uneventful            Sign Out: Acceptable/Baseline neuro status   Airway/Respiratory: Uneventful            Sign Out: AIRWAY IN SITU/Resp. Support   CV/Hemodynamics: Uneventful            Sign Out: Acceptable CV status; No obvious hypovolemia; No obvious fluid overload   Other NRE: NONE   DID A NON-ROUTINE EVENT OCCUR? No           Last vitals:  Vitals Value Taken Time   /61 07/23/24 1715   Temp 36  C (96.8  F) 07/23/24 1645   Pulse 65 07/23/24 1721   Resp 17 07/23/24 1721   SpO2 95 % 07/23/24 1721   Vitals shown include unfiled device data.    Electronically Signed By: Roberto Ovalles MD  July 23, 2024  5:22 PM

## 2024-07-23 NOTE — UTILIZATION REVIEW
Admission Status; Secondary Review Determination         Under the authority of the Utilization Management Committee, the utilization review process indicated a secondary review on the above patient.  The review outcome is based on review of the medical records, discussions with staff, and applying clinical experience noted on the date of the review.          (x) Meets inpatient status    RATIONALE FOR DETERMINATION     The severity of illness, intensity of service provided, expected LOS and risk for adverse outcome make the care appropriate for further observation; however, doesn't meet criteria for hospital inpatient admission.   notified of this determination.    The patient is a 83-year-old female admitted on 7/22/2024.  Patient is noted to have an 8 mm left UPJ stone with hydronephrosis possible UTI.  Patient is receiving a procedure today for left ureteral stent placement.  Patient is scheduled for surgery at 4 PM today and likely will be staying an additional midnight stay to be changed to inpatient status.  Dr. Christy will be notified.      The information on this document is developed by the utilization review team in order for the business office to ensure compliance.  This only denotes the appropriateness of proper admission status and does not reflect the quality of care rendered.         The definitions of Inpatient Status and Observation Status used in making the determination above are those provided in the CMS Coverage Manual, Chapter 1 and Chapter 6, section 70.4.      Sincerely,     Nirmal Mcrae MD  Physician Advisor  Utilization Review/ Case Management  [facility].

## 2024-07-23 NOTE — PLAN OF CARE
Goal Outcome Evaluation:                 Observation goals  PRIOR TO DISCHARGE        Comments: -diagnostic tests and consults completed and resulted- not met  -vital signs normal or at patient baseline- not met  -tolerating oral intake to maintain hydration- not met  -adequate pain control on oral analgesics- not met  Nurse to notify provider when observation goals have been met and patient is ready for discharge.

## 2024-07-23 NOTE — PROGRESS NOTES
[facility]    Medicine Progress Note - Hospitalist Service    Date of Admission:  7/22/2024      Summary:   Macie Jorge is an 83 year old female with CAD s/p RAMILA to LAD 7/2023, moderate aortic stenosis, CKD 4, severe spinal stenosis with chronic back pain, asthma/COPD, nephrolithiasis, who presents to ER on 7/22/2024 due to persistent left abdominal pain.       Reports left abdominal pain since February 2024.  Also reports constipation, decreased appetite and 16 kg weight loss since December 2023.  Recent outpatient CT A/P w/o contrast 7/9/2024 showed 3 nonobstructing stones in left ureteropelvic junction and proximal ureter, diffuse colonic diverticulosis with mild diverticulitis in descending colon, pulmonary emphysema. She was started on Augmentin for presumed acute diverticulitis which she had taken for 3 days on admission without any improvement in abdominal pain.     No fever or chills, no nausea or vomiting, no dysuria/hematuria, no chest pain or shortness of breath.     Labs showed creatinine of 1.59 which is around her prior Cr, no leukocytosis. UA consistent with UTI. CT A/P showed 8 mm proximal left ureteral stone at ureteropelvic junction with mild left hydronephrosis. And other nonobstructing stones in left kidney.      Assessment & Plan      Obstructing 8 mm proximal ureteral stone at left ureteropelvic junction with mild left hydronephrosis  Other nonobstructing stones in left kidney  History of nephrolithiasis with multiple previous procedures with Dr. Rivera at [facility].    Suspected UTI  -Urology consulted, possible left ureteral stent placement today   -NPO, continue with IV fluids until diet resumed/tolerated  -Started on Flomax  -Pain control - IV Dilaudid and p.o. Norco as needed available  -IV Rocephin started on admission, continue, follow urine cultures (was on abx as above prior to admission)      Longstanding left abdominal pain  Patient reports  she has low back pain, and also ongoing abd pain which is worse for the last few days. Could be secondary to ureteronephrolithiasis.  Per patient, pain has been present since 2/2024 and feels it was being masked by narcotics that she takes for back pain  -Recent pelvic ultrasound showed no abnormalities. Has seen GYN.   -Recent CT A/P 7/9/2024 showed possible descending colon diverticulitis for which she was started on Augmentin and has taken 3 days of medication.  No mention of diverticulitis on CT on 7/22/2024  -No other obvious cause to explain abdominal pain. Further evaluation can be considered if pain fails to improve/resolve with relief of ureteral obstruction and treating possible UTI. This can be done as outpatient.      Moderate aortic stenosis  Coronary artery disease, s/p PCI with RAMILA to LAD on 7/5/2023  Most recent echo 5/31/2024 showed normal LVEF of 60-65%, no WMA, normal RV size and function, mild-moderate aortic stenosis with valve area 1.6-1.8.  On previous echo valve area was 1 cm2  -She was assessed in valve clinic.  Due to lack of symptoms and  CKD 4, plan was to follow-up with repeat echoes   -PTA is on plavix.  -Given planned intervention for the ureteric stone, cardiology consulted. Recommended no pre op intervention needed. Plan to change plavix to ASA 81 mg at discharge.     Chronic kidney disease stage IV  -Baseline creatinine 1.5-1.9.  Follows with nephrology.  Presented with creatinine of 1.59.  -Improved to 1.3 with IV hydration.     Benign essential hypertension  -Continue Toprol-XL 25 mg daily.  Hold lisinopril 2.5 mg for now.  Resume at discharge     Dyslipidemia  -Resume PTA rosuvastatin      Hypothyroidism  -continue PTA levothyroxine     COPD/asthma  60-pack-year history of tobacco use  -PTA-Trelegy inhaler as needed, albuterol inhaler as needed  -Started on albuterol neb as needed while hospitalized  -No acute exacerbation     Spinal canal stenosis with chronic back  pain  -Followed by spine clinic.  On hydrocodone-acetaminophen  mg 3 times daily as needed, continue     Unintentional weight loss  -Patient has lost 16 KGs in past 8 months.  Weight was 83 kg in December 2023 and is now 68.5 kg.  -recommend further workup as outpatient for etiologies including occult malignancy. Patient does have heavy smoking history     Pancytopenia  Chronic anemia, hemoglobin baseline around 9-10.  Now with leukopenia as well as thrombocytopenia.  -Iron panel, ferritin, B12, folate, peripheral smear ordered, follow  -As long as counts stable, will provide hematology referral at discharge.  If counts continue to worsen, will consult hematology       Observation Goals: -diagnostic tests and consults completed and resulted, -vital signs normal or at patient baseline, -tolerating oral intake to maintain hydration, -adequate pain control on oral analgesics, Nurse to notify provider when observation goals have been met and patient is ready for discharge.  Diet: NPO per Anesthesia Guidelines for Procedure/Surgery Except for: Meds    DVT Prophylaxis: Pneumatic Compression Devices  Claudio Catheter: Not present  Lines: None     Cardiac Monitoring: None  Code Status: Full Code      Clinically Significant Risk Factors Present on Admission                # Drug Induced Platelet Defect: home medication list includes an antiplatelet medication   # Hypertension: Home medication list includes antihypertensive(s)    # Anemia: based on hgb <11                      Disposition Plan     Medically Ready for Discharge: Anticipated Tomorrow     Julia Christy MD  Hospitalist Service  [facility]  Securely message with WrapMail (more info)  Text page via [facility] Paging/Directory   ______________________________________________________________________    Interval History   Chart reviewed and patient was seen this morning.  Reports left sided abdominal pain, constant, without nausea.  Denies  diarrhea but has had constipation.  No hematochezia or melena.  Does report some relief with pain medication, has received IV Dilaudid.    Physical Exam   Vital Signs: Temp: 98.3  F (36.8  C) Temp src: Oral BP: 128/67 Pulse: 61   Resp: 20 SpO2: 96 % O2 Device: None (Room air)    Weight: 151 lbs 0 oz    General: AAOx3, appears comfortable.  HEENT: PERRLA EOMI. Mucosa moist.   Lungs: Bilateral equal air entry. Clear to auscultation, normal work of breathing.   CVS: S1S2 regular, no tachycardia or murmur.   Abdomen: Soft, mildly tender Lt side of abdomen including LUQ/paraumbilical and LLQ. BS heard.  MSK: No edema or deformities.  Neuro: AAOX3. CN 2-12 normal. Strength symmetrical.  Skin: No rash.       Medical Decision Making       40 MINUTES SPENT BY ME on the date of service doing chart review, history, exam, documentation & further activities per the note.      Data     I have personally reviewed the following data over the past 24 hrs:    3.4 (L)  \   9.5 (L)   / 136 (L)     140 107 16.8 /  86   3.8 26 1.39 (H) \     ALT: N/A AST: N/A AP: N/A TBILI: N/A   ALB: N/A TOT PROTEIN: N/A LIPASE: N/A     Procal: N/A CRP: N/A Lactic Acid: N/A         Imaging results reviewed over the past 24 hrs:   Recent Results (from the past 24 hour(s))   CT Abdomen Pelvis w/o Contrast    Narrative    CT ABDOMEN/PELVIS WITHOUT CONTRAST July 22, 2024 1:32 PM    CLINICAL HISTORY: Worsening abdominal pain and unexplained weight  loss, check for bowel perforation, abscess, diverticulitis,  malignancy.    TECHNIQUE: CT scan of the abdomen and pelvis was performed without IV  contrast. Multiplanar reformats were obtained. Dose reduction  techniques were used.  CONTRAST: None.    COMPARISON: CT 7/9/2024.    FINDINGS:   LOWER CHEST: Emphysematous changes. Small hiatal hernia is smaller in  size. Coronary artery calcifications.    HEPATOBILIARY: Cholecystectomy. Stable hepatic cysts.    PANCREAS: Fatty atrophy.    SPLEEN: Normal.    ADRENAL  GLANDS: Stable mild nodular thickening of the bilateral  adrenals.    KIDNEYS/BLADDER: Proximal left ureter stone at the ureteropelvic  junction measuring 8 mm on coronal series 5 image 50. Mild left  hydronephrosis. Stable nonobstructing stones at the left lower kidney  with an example measuring 4 mm series 4 image 90. Renal cysts  suggested without specific imaging follow-up recommended. Bladder is  obscured.    BOWEL: No bowel obstruction. Normal appendix. No secondary signs of  appendicitis. Colonic diverticulosis. No convincing diverticulitis. No  abscess or free air.     LYMPH NODES: Normal.    VASCULATURE: Scattered calcifications.    PELVIC ORGANS: No acute abnormality.    MUSCULOSKELETAL: Left hip arthroplasty and spine degenerative changes.  Scoliotic spine.      Impression    IMPRESSION:   1.  8 mm proximal left ureter stone at the ureteropelvic junction with  mild left hydronephrosis. Nonobstructing stones on the left again  noted.  2.  No other acute abnormality.  3.  Colonic diverticulosis.    MERNA BARNES MD         SYSTEM ID:  [id number]

## 2024-07-23 NOTE — ANESTHESIA PROCEDURE NOTES
Airway       Patient location during procedure: OR  Staff -        Anesthesiologist:  Roberto Ovalles MD       CRNA: Yeni Dixon APRN CRNA       Other Anesthesia Staff: Jazmin Domingo       Performed By: SRNA and with CRNAs       Procedure performed by resident/fellow/CRNA in presence of a teaching physician.    Consent for Airway        Urgency: elective  Indications and Patient Condition       Indications for airway management: danielle-procedural       Induction type:intravenous       Mask difficulty assessment: 0 - not attempted    Final Airway Details       Final airway type: supraglottic airway    Supraglottic Airway Details        Type: LMA       Brand: I-Gel       LMA size: 4    Post intubation assessment        Placement verified by: capnometry, equal breath sounds and chest rise        Number of attempts at approach: 1       Secured with: tape       Ease of procedure: easy       Dentition: Unchanged

## 2024-07-23 NOTE — OP NOTE
OPERATIVE REPORT    PATIENT: Macie Jorge  : 1940, AGE: 83 year old  SSN: [id number]    SURGEON  Anand Hendricks MD    Circulator: Mariel Lion RN; Buster Dee RN; Leatha Melton RN  Scrub Person: Liza Mak; Marti Cuello; Adrianne Rai RN    PREOP DIAGNOSIS:  Obstructing left ureteral calculus    POSTOP DIAGNOSIS: Same    Procedure(s):  Cystoscopy, retrogrades, insert stent ureter(s), combined    ANESTHESIA  General    COMPLICATIONS:   None    FINDINGS   Moderate hydronephrosis. Stone within proximal ureter.    SPECIMEN  None    IMPLANT   5F x 26 cm stent    EBL 0 cc    TECHNIQUE  After informed consent was obtained within the preoperative care unit the patient was transferred to the operative theater in stable condition.  There she was transferred from her [facility] to the operative table and placed in supine position.  Bilateral lower extremity sequential compression devices were applied and perioperative antibiotic prophylaxis was accomplished with her standing ceftriaxone.  After appropriate induction of general anesthesia the patient was repositioned to the dorsal lithotomy position bilateral lower extremities in Main stirrups.  Her genitalia was then prepped and draped in the usual sterile fashion utilizing Betadine.  At this point a surgical timeout was performed with all those in attendance agreeing correct patient, procedure, and laterality.    A 22F rigid cystoscope was advanced to the level of the urinary bladder under direct visualization. No intraurethral masses or lesions were identified. Upon entry into the bladder, it was decompressed and re-evaluated under sterile saline irrigation. Bilateral ureteral orifices were identified within the orthotopic position. There were no trabeculations, no stones, and evidence of cystitis bullae throughout the mucosal.     I then turned my attention to the left ureteral orifice. A Sensor guidewire was advanced  proximally under fluoroscopic guidance. A open lumen catheter was then advanced and a retrograde pyelogram was performed. On  imaging the stone was visible as an opacification in the area of the proximal ureter. Upon instillation of contrast this was confirmed as a filling defect with moderate proximal hydronephrosis.    A 5F x 26 cm ureteral stent was then deployed using a combination of direct vision and fluoroscopic guidance so that a generous curl was created within the renal pelvis and distal curl within the urinary bladder. The bladder was decompressed thus concluding the case. Patient tolerated the procedure well and without complication. EBL was 0 ml and all surgical counts were correct.    The patient was repositioned to a supine position before being awoken from general anesthesia transferred back to her hospital [facility] and discharged to the postanesthesia care unit in stable condition.    PLAN  Patient to be monitored overnight on continued broad spectrum antibiotics. Patient should complete a 2 week antibiotic course before proceeding with staged ureteroscopy with laser lithotripsy.    Anand Hendricks MD  [facility]  Pager: [phone]  Office: [phone]  Surgical [phone]

## 2024-07-23 NOTE — PLAN OF CARE
Diagnosis: Abdominal pain, L Ureteral Stone, L Ureteral Stent placement  Mental Status: A&O x4  Activity/dangle: SB Ast  Diet: Regular diet  Pain: Managed with PRN IV Dilaudid  Claudio/Voiding: Voiding in the bathroom  Tele/Restraints/Iso: N/A  02/LDA: Room air. NS infusing at 50 mL/hr  D/C Date: TBD

## 2024-07-23 NOTE — CONSULTS
[facility]    ~Cardiology Consultation~  Primary Cardiologist: Dr. Andino    Date of Admission: 7/22/2024  Service Date: 07/23/24    Summary  Ms. Macie Jorge is a very pleasant 83 year old female with a past medical history of coronary disease (s/p PCI with RAMILA x1 to proximal-mid LAD 7/2023), mild-moderate aortic stenosis, chronic kidney disease, severe spinal stenosis, chronic pancytopenia, obesity, asthma and COPD  who was admitted on 7/22/2024 for persistent left abdominal pain.  Cardiology was asked to consult for preoperative evaluation in the setting of left ureteral stent placement this afternoon.    Patient states she has had left sided abdominal pain since February 2024. CT abdomen/pelvis noted a 8 mm proximal left ureteral stone at the ureteropelvic junction with mild hydronephrosis.     She denies chest pain, shortness of breath, lightheadedness, and palpitations. Her ASA was discontinued around March 2024 but she has remained on Plavix daily. Denies melena or epistaxis.            Assessment:     Coronary artery disease, s/p PCI to the proximal-mid LAD 7/2023, asymptomatic   Mild-moderate aortic stenosis   Echo 5/2024- mean gradient 13-15 mmHg   CKD, baseline creatinine 1.5-1.6  8 mm left UPJ stone with hydronephrosis and possible UTI              Plan:     From a cardiology standpoint no further work up indicated prior to procedure this afternoon   Recommend discontinuing plavix and transitioning patient to ASA 81 mg daily after she is stable from a urology and medicine standpoint.   Will coordinate outpatient cardiology follow up   Cardiology to sign off. Please contact with further questions.       Plan of care was formulated under direction and guidance of Dr. Carrington.    Kathrin Schneider PA-C   Physician Assistant   [facility]  Pager: [phone]    Code Status    Full Code    Reason for Consult   Reason for consult: I was asked by Dr. Christy to  evaluate this patient for perioperative follow up.    Primary Care Physician   Deedee Bui    History of Present Illness   Ms. Macie Jorge is a very pleasant 83 year old female with a past medical history of  coronary disease (s/p PCI with RAMILA x1 to proximal-mid LAD 7/2023), moderate aortic stenosis, stage IV chronic kidney disease, severe spinal stenosis, chronic pancytopenia, obesity, asthma and COPD  who was admitted on 7/22/2024 for persistent left abdominal pain.      Patient states she has had left sided abdominal pain since February 2024. CT abdomen/pelvis noted a 8 mm proximal left ureteral stone at the ureteropelvic junction with mild hydronephrosis.     In regards to cardiology care, patient follows with Dr. Andino. She was last seen May 2024. At that time she was doing well from a cardiovascular standpoint. Recommendation was to follow up in 6 months with echocardiogram prior.     She denies chest pain, shortness of breath, lightheadedness, and palpitations. Her ASA was discontinued around March 2024 but she has remained on Plavix daily. Denies melena or epistaxis.       Past Medical History   Past Medical History:   Diagnosis Date    Calculus of left ureter 2014    dr Arriaga - ureteroscopy and lithitripsy    Cataract     Chronic low back pain     Dr Villagran at [facility] in 2015- not surgical     Complication of anesthesia     COPD with asthma (H)      followed with pulmonary DR LENNOX 60-pack-year history of smoking    Esophagitis, reflux 9/2012    SOME EOSINOPHILIA NO BARRETS    Gastro-oesophageal reflux disease      has seen GI    Hydronephrosis, left     chronic , in 2017 Dr Arriaga plan was observaton    Hyperlipidaemia LDL goal < 100     Hypothyroid     Kidney stone 3/28/2019    Migraine headaches 9/1/2011    Moderate persistent asthma      lifelong, saw Pulm 2013    Obesity (BMI 30-39.9)     Panic attacks     PONV (postoperative nausea and vomiting)     Renal stones 2016    kidney stones, multiple  small stones high risk recurrent ureteral stones. , Dr Arriaga in 2016 rec 24 hour urine.    Type 2 diabetes mellitus without complication (H)        Past Surgical History   I have reviewed this patient's surgical history and updated it with pertinent information if needed.  Past Surgical History:   Procedure Laterality Date    ARTHROPLASTY HIP  3/13/2013    Procedure: ARTHROPLASTY HIP;  LEFT TOTAL HIP ARTHROPLASTY (BIOMET)^ ;  Surgeon: Anand Main MD;  Location:  OR    ARTHROPLASTY KNEE Right 8/9/2021    Procedure: RIGHT TOTAL KNEE ARTHROPLASTY;  Surgeon: Anand Main MD;  Location:  OR    ARTHROPLASTY PATELLO-FEMORAL (KNEE)  2005ish    Left    CHOLECYSTECTOMY, OPEN  27 yo    COMBINED CYSTOSCOPY, INSERT STENT URETER(S)  8/5/2014    Procedure: COMBINED CYSTOSCOPY, INSERT STENT URETER(S);  Surgeon: Sam Arriaga MD;  Location:  OR    COMBINED CYSTOSCOPY, RETROGRADES, URETEROSCOPY, LASER HOLMIUM LITHOTRIPSY URETER(S), INSERT STENT Left 6/14/2016    Procedure: COMBINED CYSTOSCOPY, RETROGRADES, URETEROSCOPY, LASER HOLMIUM LITHOTRIPSY URETER(S), INSERT STENT;  Surgeon: Thomas Edmondson MD;  Location:  OR    COMBINED CYSTOSCOPY, RETROGRADES, URETEROSCOPY, LASER HOLMIUM LITHOTRIPSY URETER(S), INSERT STENT Left 3/28/2019    Procedure: CYSTOSCOPY, LEFT RETROGRADE PYLEOGRAM, LEFT URETEROSCOPY, HOLMIUM LASER LITHOTRIPSY, LEFT URETERAL STENT EXCHANGE;  Surgeon: Derrek Rivera MD;  Location:  OR    CV CORONARY ANGIOGRAM N/A 7/5/2023    Procedure: Coronary Angiogram;  Surgeon: Fausto Espinal MD;  Location:  HEART CARDIAC CATH LAB    CV INTRAVASULAR ULTRASOUND N/A 7/5/2023    Procedure: Intravascular Ultrasound;  Surgeon: Fausto Espinal MD;  Location:  HEART CARDIAC CATH LAB    CV PCI N/A 7/5/2023    Procedure: Percutaneous Coronary Intervention;  Surgeon: Fausto Espinal MD;  Location:  HEART CARDIAC CATH LAB    CYSTOSCOPY, RETROGRADES, INSERT STENT URETER(S), COMBINED Left 3/8/2019     Procedure: CYSTOSCOPY,LEFT RETROGRADE, LEFT STENT PLACEMENT;  Surgeon: Derrek Rivera MD;  Location:  OR    GENITOURINARY SURGERY      LAMINECT/DISCECTOMY, LUMBAR      Laminectomy/Discectomy Lumbar    LASER HOLMIUM LITHOTRIPSY URETER(S), INSERT STENT, COMBINED Left 8/20/2014    Procedure: COMBINED CYSTOSCOPY, URETEROSCOPY, LASER HOLMIUM LITHOTRIPSY URETER(S), INSERT STENT;  Surgeon: Sam Arriaga MD;  Location:  OR    LASER HOLMIUM LITHOTRIPSY URETER(S), INSERT STENT, COMBINED Left 5/2/2019    Procedure: CYSTOSCOPY, LEFT RETROGRADE, LEFT URETEROSCOPY, HOLMIUM LASER LITHOTRIPSY, STONE REMOVAL, LEFT STENT EXCHANGE;  Surgeon: Derrek Rivera MD;  Location:  OR    LASER HOLMIUM LITHOTRIPSY URETER(S), INSERT STENT, COMBINED Right 6/13/2019    Procedure: CYSTOSCOPY; RIGHT URETEROSCOPY WITH HOLMIUM LASER LITHOTRIPSY; RIGHT STENT PLACEMENT (DIGITAL FLEXIBLE URETEROSCOPE);  Surgeon: Derrek Rivera MD;  Location:  OR    ORTHOPEDIC SURGERY      left ankle    RECESSION EYELID UPPER         Prior to Admission Medications   Prior to Admission Medications   Prescriptions Last Dose Informant Patient Reported? Taking?   Fluticasone-Umeclidin-Vilanterol (TRELEGY ELLIPTA) 200-62.5-25 MCG/ACT oral inhaler  at prn  No Yes   Sig: Inhale 1 puff into the lungs daily   Patient taking differently: Inhale 1 puff into the lungs daily as needed   HYDROcodone-acetaminophen (NORCO)  MG per tablet  at prn  Yes Yes   Sig: Take 1 tablet by mouth 3 times daily as needed   albuterol (PROAIR HFA/PROVENTIL HFA/VENTOLIN HFA) 108 (90 Base) MCG/ACT inhaler  at prn  No Yes   Sig: Inhale 1-2 puffs into the lungs every 4 hours as needed for shortness of breath, wheezing or cough   allopurinol (ZYLOPRIM) 100 MG tablet   No Yes   Sig: Take 2 tablets by mouth once daily   amoxicillin-clavulanate (AUGMENTIN) 500-125 MG tablet 7/21/2024 at pm  No Yes   Sig: Take 1 tablet by mouth 2 times daily for 7 days   blood glucose (NO  BRAND SPECIFIED) lancets standard  Self No No   Sig: Use to test blood sugar 1 time daily or as directed.   Patient not taking: Reported on 6/6/2024   blood glucose (NO BRAND SPECIFIED) test strip  Self No No   Sig: Use to test blood sugar 1 time daily or as directed.   Patient not taking: Reported on 6/6/2024   blood glucose monitoring (NO BRAND SPECIFIED) meter device kit  Self No No   Sig: Use to test blood sugar 1 time daily or as directed.   Patient not taking: Reported on 6/6/2024   clopidogrel (PLAVIX) 75 MG tablet 7/21/2024 at pm  No Yes   Sig: Take 1 tablet (75 mg) by mouth daily   levothyroxine (SYNTHROID/LEVOTHROID) 137 MCG tablet 7/22/2024 at am  No Yes   Sig: Take 1 tablet (137 mcg) by mouth daily   lisinopril (ZESTRIL) 2.5 MG tablet 7/21/2024  Yes Yes   Sig: Take 2.5 mg by mouth daily   metoprolol succinate ER (TOPROL XL) 25 MG 24 hr tablet 7/21/2024  No Yes   Sig: Take 1 tablet (25 mg) by mouth daily   nitroGLYcerin (NITROSTAT) 0.4 MG sublingual tablet  at prn  No Yes   Sig: For chest pain place 1 tablet under the tongue every 5 minutes for 3 doses. If symptoms persist 5 minutes after 1st dose call 911.   oxyBUTYnin ER (DITROPAN XL) 10 MG 24 hr tablet 7/21/2024  No Yes   Sig: Take 1 tablet (10 mg) by mouth daily   rosuvastatin (CRESTOR) 20 MG tablet 7/21/2024  No Yes   Sig: Take 1 tablet (20 mg) by mouth daily      Facility-Administered Medications: None     Current Facility-Administered Medications   Medication Dose Route Frequency Provider Last Rate Last Admin    albuterol (PROVENTIL) neb solution 2.5 mg  2.5 mg Nebulization Q2H PRN Madelin Bell MD        cefTRIAXone (ROCEPHIN) 1 g vial to attach to  mL bag for ADULTS or NS 50 mL bag for PEDS  1 g Intravenous Q24H Madelin Bell MD        clopidogrel (PLAVIX) tablet 75 mg  75 mg Oral QPM Madelin Bell MD   75 mg at 07/23/24 0039    HYDROcodone-acetaminophen (NORCO)  MG per tablet 1 tablet  1 tablet Oral TID Madelin Rodriguez MD         HYDROmorphone (PF) (DILAUDID) injection 0.3 mg  0.3 mg Intravenous Q2H PRN Madelin Bell MD   0.3 mg at 07/23/24 1030    HYDROmorphone (PF) (DILAUDID) injection 0.6 mg  0.6 mg Intravenous Q2H PRN Madelin Bell MD   0.6 mg at 07/23/24 0532    levothyroxine (SYNTHROID/LEVOTHROID) tablet 137 mcg  137 mcg Oral QAM Madelin Bell MD   137 mcg at 07/23/24 0655    metoprolol succinate ER (TOPROL XL) 24 hr tablet 25 mg  25 mg Oral QPM Madelin Bell MD   25 mg at 07/23/24 0040    naloxone (NARCAN) injection 0.2 mg  0.2 mg Intravenous Q2 Min PRN Madelin Bell MD        Or    naloxone (NARCAN) injection 0.4 mg  0.4 mg Intravenous Q2 Min PRN Madelin Bell MD        Or    naloxone (NARCAN) injection 0.2 mg  0.2 mg Intramuscular Q2 Min PRN Madelin Bell MD        Or    naloxone (NARCAN) injection 0.4 mg  0.4 mg Intramuscular Q2 Min PRN Madelin Bell MD        ondansetron (ZOFRAN ODT) ODT tab 4 mg  4 mg Oral Q6H PRN Madelin Bell MD        Or    ondansetron (ZOFRAN) injection 4 mg  4 mg Intravenous Q6H PRN Madelin Bell MD        oxyBUTYnin ER (DITROPAN XL) 24 hr tablet 10 mg  10 mg Oral QPM Madelin Bell MD   10 mg at 07/23/24 0040    senna-docusate (SENOKOT-S/PERICOLACE) 8.6-50 MG per tablet 1 tablet  1 tablet Oral BID PRN Madelin Bell MD        Or    senna-docusate (SENOKOT-S/PERICOLACE) 8.6-50 MG per tablet 2 tablet  2 tablet Oral BID PRN Madelin Bell MD        senna-docusate (SENOKOT-S/PERICOLACE) 8.6-50 MG per tablet 1 tablet  1 tablet Oral BID Dahiya, Madelin, MD   1 tablet at 07/23/24 0829    Or    senna-docusate (SENOKOT-S/PERICOLACE) 8.6-50 MG per tablet 2 tablet  2 tablet Oral BID Madelin Bell MD        sodium chloride 0.9 % infusion   Intravenous Continuous Madelin Bell MD 50 mL/hr at 07/22/24 2301 New Bag at 07/22/24 2301    tamsulosin (FLOMAX) capsule 0.4 mg  0.4 mg Oral Daily Madelin Bell MD   0.4 mg at 07/23/24 0829     Current Facility-Administered Medications   Medication  Dose Route Frequency Provider Last Rate Last Admin    albuterol (PROVENTIL) neb solution 2.5 mg  2.5 mg Nebulization Q2H PRN Madelin Bell MD        cefTRIAXone (ROCEPHIN) 1 g vial to attach to  mL bag for ADULTS or NS 50 mL bag for PEDS  1 g Intravenous Q24H Madelin Bell MD        clopidogrel (PLAVIX) tablet 75 mg  75 mg Oral QPM Madelin Bell MD   75 mg at 07/23/24 0039    HYDROcodone-acetaminophen (NORCO)  MG per tablet 1 tablet  1 tablet Oral TID PRN Madelin Bell MD        HYDROmorphone (PF) (DILAUDID) injection 0.3 mg  0.3 mg Intravenous Q2H PRN Madelin Bell MD   0.3 mg at 07/23/24 1030    HYDROmorphone (PF) (DILAUDID) injection 0.6 mg  0.6 mg Intravenous Q2H PRN Madelin Bell MD   0.6 mg at 07/23/24 0532    levothyroxine (SYNTHROID/LEVOTHROID) tablet 137 mcg  137 mcg Oral QAM Madelin Bell MD   137 mcg at 07/23/24 0655    metoprolol succinate ER (TOPROL XL) 24 hr tablet 25 mg  25 mg Oral QPM Madelin Bell MD   25 mg at 07/23/24 0040    naloxone (NARCAN) injection 0.2 mg  0.2 mg Intravenous Q2 Min PRN Madelin Bell MD        Or    naloxone (NARCAN) injection 0.4 mg  0.4 mg Intravenous Q2 Min PRN Madelin Bell MD        Or    naloxone (NARCAN) injection 0.2 mg  0.2 mg Intramuscular Q2 Min PRN Madelin Bell MD        Or    naloxone (NARCAN) injection 0.4 mg  0.4 mg Intramuscular Q2 Min PRN Madelin Bell MD        ondansetron (ZOFRAN ODT) ODT tab 4 mg  4 mg Oral Q6H PRN Madelin Bell MD        Or    ondansetron (ZOFRAN) injection 4 mg  4 mg Intravenous Q6H PRN Madelin Bell MD        oxyBUTYnin ER (DITROPAN XL) 24 hr tablet 10 mg  10 mg Oral QPM Madelin Bell MD   10 mg at 07/23/24 0040    senna-docusate (SENOKOT-S/PERICOLACE) 8.6-50 MG per tablet 1 tablet  1 tablet Oral BID PRN Madelin Bell MD        Or    senna-docusate (SENOKOT-S/PERICOLACE) 8.6-50 MG per tablet 2 tablet  2 tablet Oral BID PRMadelin Boone MD        sendemetrio-docusate (SENOKOT-S/PERICOLACE) 8.6-50  "MG per tablet 1 tablet  1 tablet Oral BID Madelin Bell MD   1 tablet at 07/23/24 0829    Or    senna-docusate (SENOKOT-S/PERICOLACE) 8.6-50 MG per tablet 2 tablet  2 tablet Oral BID Madelin Bell MD        sodium chloride 0.9 % infusion   Intravenous Continuous Madelin Bell MD 50 mL/hr at 07/22/24 2301 New Bag at 07/22/24 2301    tamsulosin (FLOMAX) capsule 0.4 mg  0.4 mg Oral Daily Madelin Bell MD   0.4 mg at 07/23/24 0829     Allergies   Allergies   Allergen Reactions    Morphine Nausea     Severe vomiting    Prednisone      Elevated blood glucose  weakness    Baclofen GI Disturbance    Imitrex [Sumatriptan] Nausea    Tetracycline      Sores in mouth       Social History    reports that she quit smoking about 29 years ago. Her smoking use included cigarettes. She started smoking about 69 years ago. She has a 60 pack-year smoking history. She has never used smokeless tobacco. She reports current alcohol use. She reports that she does not use drugs.    Review of Systems   A comprehensive review of system was performed and is negative other than that noted in the HPI or here.     Physical Exam   Vital Signs with Ranges  Temp:  [97.7  F (36.5  C)-98.3  F (36.8  C)] 98.3  F (36.8  C)  Pulse:  [58-76] 61  Resp:  [18-20] 20  BP: ()/(51-75) 128/67  SpO2:  [93 %-97 %] 96 %  151 lbs 0 oz  No intake/output data recorded.    Constitutional: Appears stated age, well nourished, NAD.  Neck: Supple. JVD not appreciated.  Respiratory: Non-labored. Lungs CTAB.  Cardiovascular: RRR with holosystolic murmur. Bilateral lower extremities with no edema.   GI: Soft, non-distended, non-tender.  Skin: Warm and dry.   Musculoskeletal/Extremities: Symmetrical movement.  Neurologic: No gross focal deficits. Alert, awake.  Psychiatric: Affect appropriate. Mentation normal.    Data   No lab results found in last 7 days.    Invalid input(s): \"TROPONINIES\"  Recent Labs   Lab 07/23/24  0539 07/22/24  1140   WBC 3.4* 4.9   HGB 9.5* " "10.4*   MCV 94 94   * 142*    137   POTASSIUM 3.8 4.7   CHLORIDE 107 104   CO2 26 25   BUN 16.8 19.6   CR 1.39* 1.59*   GFRESTIMATED 37* 32*   ANIONGAP 7 8   WARD 9.0 9.9   GLC 86 122*   ALBUMIN  --  4.1   PROTTOTAL  --  7.7   BILITOTAL  --  0.7   ALKPHOS  --  46   ALT  --  13   AST  --  27   LIPASE  --  51     Recent Labs   Lab Test 05/31/24  1213 06/03/23  0632   CHOL 100 106   HDL 42* 45*   LDL 39 47   TRIG 94 71     Recent Labs   Lab 07/23/24  0539 07/22/24  1140   WBC 3.4* 4.9   HGB 9.5* 10.4*   HCT 28.5* 32.0*   MCV 94 94   * 142*     No results for input(s): \"PH\", \"PHV\", \"PO2\", \"PO2V\", \"SAT\", \"PCO2\", \"PCO2V\", \"HCO3\", \"HCO3V\" in the last 168 hours.  No results for input(s): \"NTBNPI\", \"NTBNP\" in the last 168 hours.  No results for input(s): \"DD\" in the last 168 hours.  No results for input(s): \"SED\", \"CRP\" in the last 168 hours.  Recent Labs   Lab 07/23/24  0539 07/22/24  1140   * 142*     No results for input(s): \"TSH\" in the last 168 hours.  Recent Labs   Lab 07/22/24  1348   COLOR Light Yellow   APPEARANCE Clear   URINEGLC Negative   URINEBILI Negative   URINEKETONE Negative   SG 1.011   UBLD Negative   URINEPH 7.0   PROTEIN Negative   NITRITE Positive*   LEUKEST Large*   RBCU 3*   WBCU 24*       Imaging:  Recent Results (from the past 48 hour(s))   CT Abdomen Pelvis w/o Contrast    Narrative    CT ABDOMEN/PELVIS WITHOUT CONTRAST July 22, 2024 1:32 PM    CLINICAL HISTORY: Worsening abdominal pain and unexplained weight  loss, check for bowel perforation, abscess, diverticulitis,  malignancy.    TECHNIQUE: CT scan of the abdomen and pelvis was performed without IV  contrast. Multiplanar reformats were obtained. Dose reduction  techniques were used.  CONTRAST: None.    COMPARISON: CT 7/9/2024.    FINDINGS:   LOWER CHEST: Emphysematous changes. Small hiatal hernia is smaller in  size. Coronary artery calcifications.    HEPATOBILIARY: Cholecystectomy. Stable hepatic cysts.    PANCREAS: " Fatty atrophy.    SPLEEN: Normal.    ADRENAL GLANDS: Stable mild nodular thickening of the bilateral  adrenals.    KIDNEYS/BLADDER: Proximal left ureter stone at the ureteropelvic  junction measuring 8 mm on coronal series 5 image 50. Mild left  hydronephrosis. Stable nonobstructing stones at the left lower kidney  with an example measuring 4 mm series 4 image 90. Renal cysts  suggested without specific imaging follow-up recommended. Bladder is  obscured.    BOWEL: No bowel obstruction. Normal appendix. No secondary signs of  appendicitis. Colonic diverticulosis. No convincing diverticulitis. No  abscess or free air.     LYMPH NODES: Normal.    VASCULATURE: Scattered calcifications.    PELVIC ORGANS: No acute abnormality.    MUSCULOSKELETAL: Left hip arthroplasty and spine degenerative changes.  Scoliotic spine.      Impression    IMPRESSION:   1.  8 mm proximal left ureter stone at the ureteropelvic junction with  mild left hydronephrosis. Nonobstructing stones on the left again  noted.  2.  No other acute abnormality.  3.  Colonic diverticulosis.    MERNA BARNES MD         SYSTEM ID:  [id number]       Echo:  No results found for this or any previous visit (from the past 4320 hour(s)).    Clinically Significant Risk Factors Present on Admission                # Drug Induced Platelet Defect: home medication list includes an antiplatelet medication   # Hypertension: Home medication list includes antihypertensive(s)    # Anemia: based on hgb <11                   Endocarditis and heart valve disorders in diseases classified elsewhere    This note was completed in part using Dragon voice recognition software. Although reviewed after completion, some word and grammatical errors may occur.

## 2024-07-23 NOTE — CONSULTS
[facility] Inpatient Consultation Note    Macie Jorge MRN# [id number]   Age: 83 year old YOB: 1940     Date of Admission:  7/22/2024    Reason for consult: Obstructing stone       Requesting physician: Madelin Bell MD                    History of Present Illness:     84 yo F with h/o CAD s/p cardiac stent 2023, CKD4, chronic back pain, nephrolithiasis who is admitted to [facility] via ED with left abdominal pain for the last ~4 months.     Had recent CT 7/9 showing nonobstructing left UPJ stones. Updated CT here yesterday now shows 8 mm proximal left ureteral stone with mild hydronephrosis. She denies urinary symptoms, fever/chills. Cr at her baseline (1.39). UA suspicious for infection (nit +, large LE, 24 WBC, 3 RBC). She has been on Augmentin for several days for treatment of diverticulitis. Ceftriaxone initiated upon admission yesterday, Ucx pending.     Does have a h/o nephrolithiasis (mixed calcium and uric acid stones) with multiple surgeries in the past, most recently 2019. Previously surgeries complicated by left ureteral narrowing vs stricture.             Past Medical History:     Past Medical History:   Diagnosis Date    Calculus of left ureter 2014    dr Arriaga - ureteroscopy and lithitripsy    Cataract     Chronic low back pain     Dr Villagran at [facility] in 2015- not surgical     Complication of anesthesia     COPD with asthma (H)      followed with pulmonary DR LENNOX 60-pack-year history of smoking    Esophagitis, reflux 9/2012    SOME EOSINOPHILIA NO BARRETS    Gastro-oesophageal reflux disease      has seen GI    Hydronephrosis, left     chronic , in 2017 Dr Arriaga plan was observaton    Hyperlipidaemia LDL goal < 100     Hypothyroid     Kidney stone 3/28/2019    Migraine headaches 9/1/2011    Moderate persistent asthma      lifelong, saw Pulm 2013    Obesity (BMI 30-39.9)     Panic attacks     PONV (postoperative nausea and vomiting)     Renal stones 2016    kidney stones, multiple  small stones high risk recurrent ureteral stones. , Dr Arriaga in 2016 rec 24 hour urine.    Type 2 diabetes mellitus without complication (H)              Past Surgical History:     Past Surgical History:   Procedure Laterality Date    ARTHROPLASTY HIP  3/13/2013    Procedure: ARTHROPLASTY HIP;  LEFT TOTAL HIP ARTHROPLASTY (BIOMET)^ ;  Surgeon: Anand Main MD;  Location:  OR    ARTHROPLASTY KNEE Right 8/9/2021    Procedure: RIGHT TOTAL KNEE ARTHROPLASTY;  Surgeon: Anand Main MD;  Location:  OR    ARTHROPLASTY PATELLO-FEMORAL (KNEE)  2005ish    Left    CHOLECYSTECTOMY, OPEN  25 yo    COMBINED CYSTOSCOPY, INSERT STENT URETER(S)  8/5/2014    Procedure: COMBINED CYSTOSCOPY, INSERT STENT URETER(S);  Surgeon: Sam Arriaga MD;  Location:  OR    COMBINED CYSTOSCOPY, RETROGRADES, URETEROSCOPY, LASER HOLMIUM LITHOTRIPSY URETER(S), INSERT STENT Left 6/14/2016    Procedure: COMBINED CYSTOSCOPY, RETROGRADES, URETEROSCOPY, LASER HOLMIUM LITHOTRIPSY URETER(S), INSERT STENT;  Surgeon: Thomas Edmondson MD;  Location:  OR    COMBINED CYSTOSCOPY, RETROGRADES, URETEROSCOPY, LASER HOLMIUM LITHOTRIPSY URETER(S), INSERT STENT Left 3/28/2019    Procedure: CYSTOSCOPY, LEFT RETROGRADE PYLEOGRAM, LEFT URETEROSCOPY, HOLMIUM LASER LITHOTRIPSY, LEFT URETERAL STENT EXCHANGE;  Surgeon: Derrek Rivera MD;  Location:  OR    CV CORONARY ANGIOGRAM N/A 7/5/2023    Procedure: Coronary Angiogram;  Surgeon: Fausto Espinal MD;  Location:  HEART CARDIAC CATH LAB    CV INTRAVASULAR ULTRASOUND N/A 7/5/2023    Procedure: Intravascular Ultrasound;  Surgeon: Fausto Espinal MD;  Location:  HEART CARDIAC CATH LAB    CV PCI N/A 7/5/2023    Procedure: Percutaneous Coronary Intervention;  Surgeon: Fausto Espinal MD;  Location:  HEART CARDIAC CATH LAB    CYSTOSCOPY, RETROGRADES, INSERT STENT URETER(S), COMBINED Left 3/8/2019    Procedure: CYSTOSCOPY,LEFT RETROGRADE, LEFT STENT PLACEMENT;  Surgeon: Derrek Rivera  MD Aman;  Location:  OR    GENITOURINARY SURGERY      LAMINECT/DISCECTOMY, LUMBAR      Laminectomy/Discectomy Lumbar    LASER HOLMIUM LITHOTRIPSY URETER(S), INSERT STENT, COMBINED Left 8/20/2014    Procedure: COMBINED CYSTOSCOPY, URETEROSCOPY, LASER HOLMIUM LITHOTRIPSY URETER(S), INSERT STENT;  Surgeon: Sam Arriaga MD;  Location:  OR    LASER HOLMIUM LITHOTRIPSY URETER(S), INSERT STENT, COMBINED Left 5/2/2019    Procedure: CYSTOSCOPY, LEFT RETROGRADE, LEFT URETEROSCOPY, HOLMIUM LASER LITHOTRIPSY, STONE REMOVAL, LEFT STENT EXCHANGE;  Surgeon: Derrek Rivera MD;  Location:  OR    LASER HOLMIUM LITHOTRIPSY URETER(S), INSERT STENT, COMBINED Right 6/13/2019    Procedure: CYSTOSCOPY; RIGHT URETEROSCOPY WITH HOLMIUM LASER LITHOTRIPSY; RIGHT STENT PLACEMENT (DIGITAL FLEXIBLE URETEROSCOPE);  Surgeon: Derrek Rivera MD;  Location:  OR    ORTHOPEDIC SURGERY      left ankle    RECESSION EYELID UPPER               Family History:   No family history on file.          Immunizations:     Immunization History   Administered Date(s) Administered    COVID-19 MONOVALENT 12+ (Pfizer) 01/30/2021, 02/20/2021, 10/19/2021    HEPA 01/10/2006, 01/28/2013    Influenza (High Dose) 3 valent vaccine 10/08/2014, 11/03/2015, 09/21/2016, 12/01/2017, 10/26/2018, 10/15/2019    Influenza (IIV3) PF 01/18/2013, 10/28/2013    Influenza Vaccine 65+ (FLUAD) 11/15/2022    Influenza Vaccine 65+ (Fluzone HD) 10/09/2020, 10/14/2021    Influenza, seasonal, injectable, PF 10/28/2013    Pneumo Conj 13-V (2010&after) 12/18/2003, 11/03/2015    Pneumococcal 23 valent 01/28/2013    TD,PF 7+ (Tenivac) 11/15/2012             Allergies:     Allergies   Allergen Reactions    Morphine Nausea     Severe vomiting    Prednisone      Elevated blood glucose  weakness    Baclofen GI Disturbance    Imitrex [Sumatriptan] Nausea    Tetracycline      Sores in mouth             Review of Systems:   Comprehensive review of systems from the Admission note  "dated 7/22/24 at [facility] was reviewed with no changes except per HPI.     Examination:  /67 (BP Location: Right arm)   Pulse 61   Temp 98.3  F (36.8  C) (Oral)   Resp 20   Ht 1.676 m (5' 6\")   Wt 68.5 kg (151 lb)   SpO2 96%   BMI 24.37 kg/m    General: Alert and oriented, no distress  HEENT: Face symmetric, mucous membranes moist and pink  Eyes: No scleral icterus  Respiratory: Breathing unlabored, no audible wheezing  Cardiac: Extremities warm and well perfused  Back: No left CVAT  Extremities: No evidence of deformities or trauma  Neuro:Grossly non focal  Pysch: Normal mood and affect  Skin: No evident rashes or lesions    Imaging:    CT AP WO 7/22/24  KIDNEYS/BLADDER: Proximal left ureter stone at the ureteropelvic  junction measuring 8 mm on coronal series 5 image 50. Mild left  hydronephrosis. Stable nonobstructing stones at the left lower kidney  with an example measuring 4 mm series 4 image 90. Renal cysts  suggested without specific imaging follow-up recommended. Bladder is  obscured.                                                                    IMPRESSION:   1.  8 mm proximal left ureter stone at the ureteropelvic junction with  mild left hydronephrosis. Nonobstructing stones on the left again  noted.  2.  No other acute abnormality.  3.  Colonic diverticulosis.    Impression:  84 yo F with 8 mm left UPJ stone, hydronephrosis, possible UTI    Plan:  - Keep NPO for potential procedure today. Working with OR to coordinate for left ureteral stent placement, hopefully this afternoon.   - Discussed stent placement (including potential stent symptoms and need for second stage procedure in the coming weeks) with patient who is agreeable to proceed.   - Continue IVF, empiric antibiotics    D/w Dr Hendricks.     Addendum 10:00: Surgery scheduled for 4 pm today with Dr Hendricks.     Shira Núñez PA-C  [facility]   Pager: [phone]  Office: [phone]         "

## 2024-07-23 NOTE — ANESTHESIA CARE TRANSFER NOTE
Patient: Macie Jorge    Procedure: Procedure(s):  Cystoscopy, retrogrades, insert stent ureter(s), combined       Diagnosis: Urinary tract obstruction by kidney stone [N20.0, N13.8]  Diagnosis Additional Information: No value filed.    Anesthesia Type:   General     Note:    Oropharynx: oropharynx clear of all foreign objects and spontaneously breathing  Level of Consciousness: awake  Oxygen Supplementation: face mask  Level of Supplemental Oxygen (L/min / FiO2): 6  Independent Airway: airway patency satisfactory and stable  Dentition: dentition unchanged  Vital Signs Stable: post-procedure vital signs reviewed and stable  Report to RN Given: handoff report given  Patient transferred to: PACU  Comments: At end of procedure, spontaneous respirations, adequate tidal volumes, followed commands to voice, LMA removed atraumatically, oropharynx suctioned, airway patent after LMA removal. Oxygen via facemask at 6 liters per minute to PACU. Oxygen tubing connected to wall O2 in PACU, SpO2, NiBP, and EKG monitors and alarms on and functioning, Rima Hugger warmer connected to patient gown, report on patient's clinical status given to PACU RN, RN questions answered.      Handoff Report: Identifed the Patient, Identified the Reponsible Provider, Reviewed the pertinent medical history, Discussed the surgical course, Reviewed Intra-OP anesthesia mangement and issues during anesthesia, Set expectations for post-procedure period and Allowed opportunity for questions and acknowledgement of understanding      Vitals:  Vitals Value Taken Time   /64    Temp     Pulse 70 07/23/24 1646   Resp 19 07/23/24 1646   SpO2 99 % 07/23/24 1646   Vitals shown include unfiled device data.    Electronically Signed By: WILEY Levin CRNA  July 23, 2024  4:47 PM

## 2024-07-24 LAB
ANION GAP SERPL CALCULATED.3IONS-SCNC: 11 MMOL/L (ref 7–15)
BASOPHILS # BLD AUTO: 0 10E3/UL (ref 0–0.2)
BASOPHILS NFR BLD AUTO: 0 %
BUN SERPL-MCNC: 22.4 MG/DL (ref 8–23)
CALCIUM SERPL-MCNC: 9 MG/DL (ref 8.8–10.4)
CHLORIDE SERPL-SCNC: 104 MMOL/L (ref 98–107)
CREAT SERPL-MCNC: 1.34 MG/DL (ref 0.51–0.95)
EGFRCR SERPLBLD CKD-EPI 2021: 39 ML/MIN/1.73M2
EOSINOPHIL # BLD AUTO: 0 10E3/UL (ref 0–0.7)
EOSINOPHIL NFR BLD AUTO: 0 %
ERYTHROCYTE [DISTWIDTH] IN BLOOD BY AUTOMATED COUNT: 15.8 % (ref 10–15)
GLUCOSE SERPL-MCNC: 190 MG/DL (ref 70–99)
HCO3 SERPL-SCNC: 22 MMOL/L (ref 22–29)
HCT VFR BLD AUTO: 29.4 % (ref 35–47)
HGB BLD-MCNC: 9.5 G/DL (ref 11.7–15.7)
IMM GRANULOCYTES # BLD: 0 10E3/UL
IMM GRANULOCYTES NFR BLD: 0 %
LYMPHOCYTES # BLD AUTO: 0.3 10E3/UL (ref 0.8–5.3)
LYMPHOCYTES NFR BLD AUTO: 7 %
MCH RBC QN AUTO: 30.8 PG (ref 26.5–33)
MCHC RBC AUTO-ENTMCNC: 32.3 G/DL (ref 31.5–36.5)
MCV RBC AUTO: 96 FL (ref 78–100)
MONOCYTES # BLD AUTO: 0.3 10E3/UL (ref 0–1.3)
MONOCYTES NFR BLD AUTO: 6 %
NEUTROPHILS # BLD AUTO: 3.9 10E3/UL (ref 1.6–8.3)
NEUTROPHILS NFR BLD AUTO: 87 %
NRBC # BLD AUTO: 0 10E3/UL
NRBC BLD AUTO-RTO: 0 /100
PATH REPORT.COMMENTS IMP SPEC: NORMAL
PATH REPORT.COMMENTS IMP SPEC: NORMAL
PATH REPORT.FINAL DX SPEC: NORMAL
PATH REPORT.MICROSCOPIC SPEC OTHER STN: NORMAL
PATH REPORT.MICROSCOPIC SPEC OTHER STN: NORMAL
PATH REPORT.RELEVANT HX SPEC: NORMAL
PLATELET # BLD AUTO: 162 10E3/UL (ref 150–450)
POTASSIUM SERPL-SCNC: 4.3 MMOL/L (ref 3.4–5.3)
RBC # BLD AUTO: 3.08 10E6/UL (ref 3.8–5.2)
SODIUM SERPL-SCNC: 137 MMOL/L (ref 135–145)
WBC # BLD AUTO: 4.6 10E3/UL (ref 4–11)

## 2024-07-24 PROCEDURE — 250N000013 HC RX MED GY IP 250 OP 250 PS 637: Performed by: INTERNAL MEDICINE

## 2024-07-24 PROCEDURE — 250N000013 HC RX MED GY IP 250 OP 250 PS 637

## 2024-07-24 PROCEDURE — 85041 AUTOMATED RBC COUNT: CPT | Performed by: HOSPITALIST

## 2024-07-24 PROCEDURE — 250N000011 HC RX IP 250 OP 636: Performed by: HOSPITALIST

## 2024-07-24 PROCEDURE — 250N000011 HC RX IP 250 OP 636: Performed by: INTERNAL MEDICINE

## 2024-07-24 PROCEDURE — 120N000001 HC R&B MED SURG/OB

## 2024-07-24 PROCEDURE — 99232 SBSQ HOSP IP/OBS MODERATE 35: CPT | Performed by: HOSPITALIST

## 2024-07-24 PROCEDURE — 36415 COLL VENOUS BLD VENIPUNCTURE: CPT | Performed by: HOSPITALIST

## 2024-07-24 PROCEDURE — 85060 BLOOD SMEAR INTERPRETATION: CPT | Performed by: PATHOLOGY

## 2024-07-24 PROCEDURE — 80048 BASIC METABOLIC PNL TOTAL CA: CPT | Performed by: HOSPITALIST

## 2024-07-24 RX ORDER — CEFEPIME HYDROCHLORIDE 2 G/1
2 INJECTION, POWDER, FOR SOLUTION INTRAVENOUS EVERY 12 HOURS
Status: DISCONTINUED | OUTPATIENT
Start: 2024-07-24 | End: 2024-07-27 | Stop reason: HOSPADM

## 2024-07-24 RX ORDER — CYANOCOBALAMIN 1000 UG/ML
1000 INJECTION, SOLUTION INTRAMUSCULAR; SUBCUTANEOUS DAILY
Status: COMPLETED | OUTPATIENT
Start: 2024-07-24 | End: 2024-07-26

## 2024-07-24 RX ORDER — HYDROCODONE BITARTRATE AND ACETAMINOPHEN 5; 325 MG/1; MG/1
2 TABLET ORAL 3 TIMES DAILY PRN
Status: DISCONTINUED | OUTPATIENT
Start: 2024-07-24 | End: 2024-07-27 | Stop reason: HOSPADM

## 2024-07-24 RX ADMIN — ASPIRIN 81 MG: 81 TABLET, COATED ORAL at 09:21

## 2024-07-24 RX ADMIN — HYDROCODONE BITARTRATE AND ACETAMINOPHEN 2 TABLET: 5; 325 TABLET ORAL at 17:28

## 2024-07-24 RX ADMIN — HYDROMORPHONE HYDROCHLORIDE 0.3 MG: 1 INJECTION, SOLUTION INTRAMUSCULAR; INTRAVENOUS; SUBCUTANEOUS at 20:23

## 2024-07-24 RX ADMIN — HYDROCODONE BITARTRATE AND ACETAMINOPHEN 2 TABLET: 5; 325 TABLET ORAL at 09:21

## 2024-07-24 RX ADMIN — SENNOSIDES AND DOCUSATE SODIUM 2 TABLET: 50; 8.6 TABLET ORAL at 09:22

## 2024-07-24 RX ADMIN — CYANOCOBALAMIN 1000 MCG: 1000 INJECTION, SOLUTION INTRAMUSCULAR at 09:29

## 2024-07-24 RX ADMIN — HYDROCODONE BITARTRATE AND ACETAMINOPHEN 2 TABLET: 5; 325 TABLET ORAL at 03:19

## 2024-07-24 RX ADMIN — SENNOSIDES AND DOCUSATE SODIUM 2 TABLET: 50; 8.6 TABLET ORAL at 20:23

## 2024-07-24 RX ADMIN — OXYBUTYNIN CHLORIDE 10 MG: 10 TABLET, EXTENDED RELEASE ORAL at 20:23

## 2024-07-24 RX ADMIN — HYDROMORPHONE HYDROCHLORIDE 0.3 MG: 1 INJECTION, SOLUTION INTRAMUSCULAR; INTRAVENOUS; SUBCUTANEOUS at 13:10

## 2024-07-24 RX ADMIN — SENNOSIDES AND DOCUSATE SODIUM 2 TABLET: 50; 8.6 TABLET ORAL at 09:21

## 2024-07-24 RX ADMIN — CEFEPIME 2 G: 2 INJECTION, POWDER, FOR SOLUTION INTRAVENOUS at 13:15

## 2024-07-24 RX ADMIN — METOPROLOL SUCCINATE 25 MG: 25 TABLET, EXTENDED RELEASE ORAL at 20:23

## 2024-07-24 RX ADMIN — TAMSULOSIN HYDROCHLORIDE 0.4 MG: 0.4 CAPSULE ORAL at 09:21

## 2024-07-24 ASSESSMENT — ACTIVITIES OF DAILY LIVING (ADL)
ADLS_ACUITY_SCORE: 31
ADLS_ACUITY_SCORE: 30
ADLS_ACUITY_SCORE: 31
ADLS_ACUITY_SCORE: 30
ADLS_ACUITY_SCORE: 31

## 2024-07-24 NOTE — PROGRESS NOTES
Observation goals  PRIOR TO DISCHARGE        Comments: -diagnostic tests and consults completed and resulted-met.  -vital signs normal or at patient baseline-Met.   -tolerating oral intake to maintain hydration-met.   -adequate pain control on oral analgesics-met.   Nurse to notify provider when observation goals have been met and patient is ready for discharge

## 2024-07-24 NOTE — PROGRESS NOTES
"[facility]    Urology Progress Note     Assessment & Plan   Macie Jorge is a 82 yo F with 8 mm left UPJ stone, hydronephrosis, pseudomonas UTI now s/p placement left ureteral stent 7/23/24 (Dr Hendricks).     Interval History   Experienced stent discomfort / bladder spasms and hematuria with clots in the initial hours postoperatively but reports this is much improved today. PRN levsin available. No pain while laying in bed and urine clearing per patient. Still feeling constipated, getting stool softener this morning.     Prelim urine culture positive for 10-50k Pseudomonas aeruginosa. Currently on Rocephin.  WBC 4.6  AVSS     Plan:   - Continue stent until definitve stone surgery. Will schedule for ureteroscopy/laser, stent removal vs. exchange in 2-3 weeks; pt requesting this be performed at [facility]. Recommend 14 day antibiotic course prior to  this procedure.   - Discussed stent expectations. Will discharge with PRN levsin for bladder spasms. Encouraged to push fluids when seeing more blood in the urine -- this is expected with ureteral stent.     AVS updated. Urology will sign off for now. Please call with any questions or concerns.     Shira Núñez PA-C  [facility]  Pager: [phone]  Office: [phone]      OBJECTIVE:          /60 (BP Location: Left arm)   Pulse 63   Temp 97  F (36.1  C) (Oral)   Resp 16   Ht 1.676 m (5' 6\")   Wt 68.5 kg (151 lb)   SpO2 96%   BMI 24.37 kg/m             General appearance shows no deformaties and good grooming in no acute distress.  EYES: no icterus  HEAD, EARS, NOSE, MOUTH, AND THROAT: atraumatic, normocephalic  CARDIAC: skin well perfused  RESPIRATORY: breathing unlabored  SKIN/HAIR/NAILS: no visible rashes  NEUROLOGIC: no focal deficits  PSYCHIATRIC: Speech, mood, and affect normal      Shira Núñez PA-C  [facility]   Pager: [phone]  Office: [phone]    "

## 2024-07-24 NOTE — PROGRESS NOTES
[facility]    Medicine Progress Note - Hospitalist Service    Date of Admission:  7/22/2024      Summary:   Macie Jorge is an 83 year old female with CAD s/p RAMILA to LAD 7/2023, moderate aortic stenosis, CKD 4, severe spinal stenosis with chronic back pain, asthma/COPD, nephrolithiasis, who presents to ER on 7/22/2024 due to persistent left abdominal pain.       Reports left abdominal pain since February 2024.  Also reports constipation, decreased appetite and 16 kg weight loss since December 2023.  Recent outpatient CT A/P w/o contrast 7/9/2024 showed 3 nonobstructing stones in left ureteropelvic junction and proximal ureter, diffuse colonic diverticulosis with mild diverticulitis in descending colon, pulmonary emphysema. She was started on Augmentin for presumed acute diverticulitis which she had taken for 3 days on admission without any improvement in abdominal pain.     No fever or chills, no nausea or vomiting, no dysuria/hematuria, no chest pain or shortness of breath.     Labs showed creatinine of 1.59 which is around her prior Cr, no leukocytosis. UA consistent with UTI. CT A/P showed 8 mm proximal left ureteral stone at ureteropelvic junction with mild left hydronephrosis. And other nonobstructing stones in left kidney.      Assessment & Plan      Obstructing 8 mm proximal ureteral stone at left ureteropelvic junction with mild left hydronephrosis  Other nonobstructing stones in left kidney  History of nephrolithiasis with multiple previous procedures with Dr. Rivera at [facility].    Pseudomonas UTI  -Patient was started on Rocephin and IV fluid, kept n.p.o. and urology consulted, s/p left ureteral stent placement 7/23.  -Started on Flomax  -Pain control - IV Dilaudid and p.o. Norco as needed available.  Hyoscyamine added per urology.  -Urine culture grew Pseudomonas, sensitivity pending, given extent, plan is 2 weeks of antibiotic.     Longstanding left abdominal  pain  Patient reports she has low back pain, and also ongoing abd pain which is worse for the last few days. Could be secondary to ureteronephrolithiasis.  Per patient, pain has been present since 2/2024 and feels it was being masked by narcotics that she takes for back pain  -Recent pelvic ultrasound showed no abnormalities. Has seen GYN.   -Recent CT A/P 7/9/2024 showed possible descending colon diverticulitis for which she was started on Augmentin and has taken 3 days of medication.  No mention of diverticulitis on CT on 7/22/2024  -No other obvious cause to explain abdominal pain. Further evaluation can be considered if pain fails to improve/resolve with relief of ureteral obstruction and treating possible UTI. This can be done as outpatient.      Moderate aortic stenosis  Coronary artery disease, s/p PCI with RAMILA to LAD on 7/5/2023  Most recent echo 5/31/2024 showed normal LVEF of 60-65%, no WMA, normal RV size and function, mild-moderate aortic stenosis with valve area 1.6-1.8.  On previous echo valve area was 1 cm2  -She was assessed in valve clinic.  Due to lack of symptoms and  CKD 4, plan was to follow-up with repeat echoes   -Cardiology consulted this stay.  PTA on plavix, changed to aspirin per cardiology    Chronic kidney disease stage IV  -Baseline creatinine 1.5-1.9.  Follows with nephrology.  Presented with creatinine of 1.59.  -Improved to 1.3 with IV hydration and stable, DC IVF and encourage p.o.     Benign essential hypertension  -Continue Toprol-XL 25 mg daily.  Hold lisinopril 2.5 mg for now.  Resume at discharge as long as creatinine remains stable.     Dyslipidemia  -Resume PTA rosuvastatin      Hypothyroidism with iatrogenic hyperthyroidism  -PTA is on levothyroxine 137 mcg  -Noted suppressed TSH and elevated free T4.  Patient is unclear when the dose was increased from 125 mcg.  -Hold today and resume at 125 mcg daily from tomorrow, recheck thyroid function test in 4 weeks as  outpatient.    Vitamin B12 deficiency  -Level is low.  Will give B12 injection while in hospital and discharged on p.o. supplement     COPD/asthma  60-pack-year history of tobacco use  -PTA-Trelegy inhaler as needed, albuterol inhaler as needed  -Started on albuterol neb as needed while hospitalized  -No acute exacerbation     Spinal canal stenosis with chronic back pain  -Followed by spine clinic.  On hydrocodone-acetaminophen  mg 3 times daily as needed, continue     Unintentional weight loss  -Patient has lost 16 KGs in past 8 months.  Weight was 83 kg in December 2023 and is now 68.5 kg.  -recommend further workup as outpatient for etiologies including occult malignancy. Patient does have heavy smoking history     Pancytopenia  Chronic anemia, hemoglobin baseline around 9-10.  Now with leukopenia as well as thrombocytopenia.  -Iron panel, ferritin, folate normal, B12 low.  -Peripheral smear shows normocytic normochromic anemia, no abnormal cells noted.  -Platelet count and WBC count now normal.  Mild anemia, stable.          Diet: Regular Diet Adult    DVT Prophylaxis: Pneumatic Compression Devices  Claudio Catheter: Not present  Lines: None     Cardiac Monitoring: None  Code Status: Full Code      Clinically Significant Risk Factors                                            Disposition Plan     Medically Ready for Discharge: Anticipated Tomorrow pending culture sensitivity and final plan on antibiotic.     Julia Christy MD  Hospitalist Service  [facility]  Securely message with [employer] (more info)  Text page via [employer] Paging/Directory   ______________________________________________________________________    Interval History     Chart reviewed and patient was seen this morning.  Had increased pain immediately after procedure and hematuria with even clots but hematuria has improved, no clots and abdominal pain also has significantly improved.    -Noted hyperthyroid state,  patient unclear when her Synthroid dose was increased to 137 mcg.  Denies heat intolerance, palpitation, diarrhea.       Physical Exam   Vital Signs: Temp: 97  F (36.1  C) Temp src: Oral BP: 134/60 Pulse: 63   Resp: 16 SpO2: 96 % O2 Device: None (Room air) Oxygen Delivery: 2 LPM  Weight: 151 lbs 0 oz    General: AAOx3, appears comfortable.  HEENT: PERRLA EOMI. Mucosa moist.   Lungs: Bilateral equal air entry. Clear to auscultation, normal work of breathing.   CVS: S1S2 regular, no tachycardia or murmur.   Abdomen: Soft, mildly tender Lt side of abdomen including LUQ/paraumbilical and LLQ. BS heard.  MSK: No edema or deformities.  Neuro: AAOX3. CN 2-12 normal. Strength symmetrical.  Skin: No rash.       Medical Decision Making       45 MINUTES SPENT BY ME on the date of service doing chart review, history, exam, documentation & further activities per the note.      Data     I have personally reviewed the following data over the past 24 hrs:    4.6  \   9.5 (L)   / 162     137 104 22.4 /  190 (H)   4.3 22 1.34 (H) \     TSH: N/A T4: N/A A1C: N/A     Procal: N/A CRP: N/A Lactic Acid: 1.1       Ferritin:  N/A % Retic:  1.5 LDH:  N/A       Imaging results reviewed over the past 24 hrs:   Recent Results (from the past 24 hour(s))   XR Surgery MICHAEL L/T 5 Min Fluoro w Stills    Narrative    This exam was marked as non-reportable because it will not be read by a   radiologist or a [place] non-radiologist provider.

## 2024-07-24 NOTE — PROVIDER NOTIFICATION
MD Notification    Notified Person: MD    Notified Person Name: Anand Hendricks MD    Notification Date/Time: 7/24 0230    Notification Interaction: Phone call    Purpose of Notification: Pt experiencing increased abdominal pain overnight. Pt also experiencing hematuria and passing blood clots.     Orders Received: Provider stated symptoms are wnl post procedure and ordered Hyoscyamine for cramping    Comments:

## 2024-07-24 NOTE — PROGRESS NOTES
PRIMARY Concern: admitted for abdominal pain. Found 8mm ureteral stone. Ureteral stent placed 7/23.  SAFETY RISK Concerns (fall risk, behaviors, etc.): fall risk   Isolation/Type: None   Tests/Procedures for NEXT shift: AM labs   Consults? (Pending/following, signed-off?) Urology following   Where is patient from? (Home, TCU, etc.): Home   Other Important info for NEXT shift:  Anticipated DC date & active delays: TBD  SUMMARY NOTE:   Post ureteral stent placement. Pt experiencing increased abdominal pain overnight. Pt also experiencing hematuria and passing blood clots. Urology paged about this. Stated wnl post procedure. Ordered Hyoscyamine PRN for cramping.   Orientation/Cognitive: AXO4  Observation Goals (Met/ Not Met): Inp  Mobility Level/Assist Equipment: IND  Antibiotics & Plan (IV/po, length of tx left): IV Rocephin Q24  Pain Management:  PRN IV Dilaudid given x2. PO Norco given x1.  Tele/VS/O2: VSS on RA.   ABNL Lab/BG: UC pending  Diet: NPO  Bowel/Bladder: Cont   Skin Concerns: WNL  Drains/Devices: PIV infusion at 50ml/hr  Patient Stated Goal for Today: TBD

## 2024-07-24 NOTE — PROGRESS NOTES
PRIMARY Concern: admitted for abdominal pain. Found 8mm ureteral stone. Ureteral stent placed 7/23.  SAFETY RISK Concerns (fall risk, behaviors, etc.): fall risk   Isolation/Type: None   Tests/Procedures for NEXT shift: AM labs   Consults? (Pending/following, signed-off?) Urology following   Where is patient from? (Home, TCU, etc.): Home   Other Important info for NEXT shift:  Anticipated DC date & active delays: TBD  SUMMARY NOTE: Patient reports that hematuria has subsided, voiding independently. Norco given for pain, PRN senakot given as patient reports more than 4 days without bowel movement.     Orientation/Cognitive: AXO4  Observation Goals (Met/ Not Met): Inp  Mobility Level/Assist Equipment: IND  Antibiotics & Plan (IV/po, length of tx left): IV Rocephin discontinued, Cefepime ordered.   Pain Management:  PRN IV Dilaudid given x2. PO Norco given x1.  Tele/VS/O2: VSS on RA.   ABNL Lab/BG: UC resulted, provider waiting for sensitivity result.  Diet: NPO  Bowel/Bladder: Cont   Skin Concerns: WNL  Drains/Devices: PIV infusion at 50ml/hr  Patient Stated Goal for Today: TBD

## 2024-07-25 PROCEDURE — 120N000001 HC R&B MED SURG/OB

## 2024-07-25 PROCEDURE — 250N000013 HC RX MED GY IP 250 OP 250 PS 637

## 2024-07-25 PROCEDURE — 99232 SBSQ HOSP IP/OBS MODERATE 35: CPT | Performed by: HOSPITALIST

## 2024-07-25 PROCEDURE — 250N000013 HC RX MED GY IP 250 OP 250 PS 637: Performed by: INTERNAL MEDICINE

## 2024-07-25 PROCEDURE — 250N000013 HC RX MED GY IP 250 OP 250 PS 637: Performed by: HOSPITALIST

## 2024-07-25 PROCEDURE — 250N000011 HC RX IP 250 OP 636: Performed by: HOSPITALIST

## 2024-07-25 RX ORDER — BISACODYL 10 MG
10 SUPPOSITORY, RECTAL RECTAL DAILY PRN
Status: DISCONTINUED | OUTPATIENT
Start: 2024-07-25 | End: 2024-07-27 | Stop reason: HOSPADM

## 2024-07-25 RX ADMIN — SENNOSIDES AND DOCUSATE SODIUM 2 TABLET: 50; 8.6 TABLET ORAL at 19:35

## 2024-07-25 RX ADMIN — LEVOTHYROXINE SODIUM 125 MCG: 75 TABLET ORAL at 08:01

## 2024-07-25 RX ADMIN — HYDROCODONE BITARTRATE AND ACETAMINOPHEN 2 TABLET: 5; 325 TABLET ORAL at 10:44

## 2024-07-25 RX ADMIN — TAMSULOSIN HYDROCHLORIDE 0.4 MG: 0.4 CAPSULE ORAL at 08:01

## 2024-07-25 RX ADMIN — ASPIRIN 81 MG: 81 TABLET, COATED ORAL at 08:01

## 2024-07-25 RX ADMIN — CYANOCOBALAMIN 1000 MCG: 1000 INJECTION, SOLUTION INTRAMUSCULAR at 10:39

## 2024-07-25 RX ADMIN — OXYBUTYNIN CHLORIDE 10 MG: 10 TABLET, EXTENDED RELEASE ORAL at 19:35

## 2024-07-25 RX ADMIN — SENNOSIDES AND DOCUSATE SODIUM 2 TABLET: 50; 8.6 TABLET ORAL at 08:01

## 2024-07-25 RX ADMIN — CEFEPIME 2 G: 2 INJECTION, POWDER, FOR SOLUTION INTRAVENOUS at 00:47

## 2024-07-25 RX ADMIN — HYDROCODONE BITARTRATE AND ACETAMINOPHEN 2 TABLET: 5; 325 TABLET ORAL at 19:35

## 2024-07-25 RX ADMIN — CEFEPIME 2 G: 2 INJECTION, POWDER, FOR SOLUTION INTRAVENOUS at 13:34

## 2024-07-25 RX ADMIN — METOPROLOL SUCCINATE 25 MG: 25 TABLET, EXTENDED RELEASE ORAL at 19:35

## 2024-07-25 ASSESSMENT — ACTIVITIES OF DAILY LIVING (ADL)
ADLS_ACUITY_SCORE: 30

## 2024-07-25 NOTE — CONSULTS
[facility]    Infectious Disease Consultation     Date of Admission:  7/22/2024  Date of Consult (When I saw the patient): 07/25/24    Assessment & Plan   Macie Jorge is a 83 year old female who was admitted on 7/22/2024.     Impression:  84 yo female with a history of CKD, CAD, chronic back pain, and nephrolithiasis who presented with several months LLQ abdominal pain and left flank pain with CT showing 8 mm proximal left ureter stone at the UPJ with mild hydronephrosis, now s/p left ureteral stent placement and urine culture growing pseudomonas.    -Urine culture with pseudomonas. Susceptibilities pending.   -Afebrile. Normal WBC.   -On Cefepime.       Recommendations:   Continue Cefepime for now.   Following urine culture and susceptibilities.   Given her age, would not want to give fluoroquinolone and she will likely need to discharge on IV antibiotics.   Further recommendations to follow final culture results.       Patient and plan discussed with Dr. Villafuerte.     Magnolia Rosas PA-C    Reason for Consult   Reason for consult: Asked to evaluate this patient for obstructing ureteric stone/UTI, s/p stenting, planned 2 weeks abx, pseudomonas on culture possibly needs IV abx .    Primary Care Physician   Deedee Bui    Chief Complaint   Left abdominal pain and flank pain    History is obtained from the patient and medical records    History of Present Illness   Macie Jorge is a 83 year old female with a history of CKD, CAD s/p stenting, chronic back pain, and nephrolithiasis who since 2/2024 had left abdominal pain, constipation, and decreased appetite with weight loss. She had a CT 7/9/24 for her weight loss work-up and was noted to have 3 non-obstructing stones in the left UPJ and proximal ureter and possible resolving acute diverticulitis. She was evaluated on 7/19 due to ongoing abdominal pain and prescribed Augmentin for possible diverticulitis.     On 7/22, she  presented to the ED with ongoing abdominal pain and left flank pain. CT abdomen was repeated and showed a 8 mm proximal left ureter stone at the UPJ with mild hydronephrosis. UA had 24 WBC and urine culture is now growing 10-50K pseudomonas. She underwent cystoscopy with left ureteral stent placement 7/23/24. She is on IV Cefepime.     Plan per Urology is for her to complete a 2 week antibiotic course before proceeding with staged ureteroscopy with laser lithotripsy.     Past Medical History   I have reviewed this patient's medical history and updated it with pertinent information if needed.   Past Medical History:   Diagnosis Date    Calculus of left ureter 2014    dr Arriaga - ureteroscopy and lithitripsy    Cataract     Chronic low back pain     Dr Villagran at [facility] in 2015- not surgical     Complication of anesthesia     COPD with asthma (H)      followed with pulmonary DR LENNOX 60-pack-year history of smoking    Esophagitis, reflux 9/2012    SOME EOSINOPHILIA NO BARRETS    Gastro-oesophageal reflux disease      has seen GI    Hydronephrosis, left     chronic , in 2017 Dr Arriaga plan was observaton    Hyperlipidaemia LDL goal < 100     Hypothyroid     Kidney stone 3/28/2019    Migraine headaches 9/1/2011    Moderate persistent asthma      lifelong, saw Pulm 2013    Obesity (BMI 30-39.9)     Panic attacks     PONV (postoperative nausea and vomiting)     Renal stones 2016    kidney stones, multiple small stones high risk recurrent ureteral stones. , Dr Arriaga in 2016 rec 24 hour urine.    Type 2 diabetes mellitus without complication (H)        Past Surgical History   I have reviewed this patient's surgical history and updated it with pertinent information if needed.  Past Surgical History:   Procedure Laterality Date    ARTHROPLASTY HIP  3/13/2013    Procedure: ARTHROPLASTY HIP;  LEFT TOTAL HIP ARTHROPLASTY (BIOMET)^ ;  Surgeon: Anand Main MD;  Location: [facility] OR    ARTHROPLASTY KNEE Right 8/9/2021    Procedure: RIGHT TOTAL  KNEE ARTHROPLASTY;  Surgeon: Anand Main MD;  Location:  OR    ARTHROPLASTY PATELLO-FEMORAL (KNEE)  2005ish    Left    CHOLECYSTECTOMY, OPEN  27 yo    COMBINED CYSTOSCOPY, INSERT STENT URETER(S)  8/5/2014    Procedure: COMBINED CYSTOSCOPY, INSERT STENT URETER(S);  Surgeon: Sam Arriaga MD;  Location:  OR    COMBINED CYSTOSCOPY, RETROGRADES, URETEROSCOPY, LASER HOLMIUM LITHOTRIPSY URETER(S), INSERT STENT Left 6/14/2016    Procedure: COMBINED CYSTOSCOPY, RETROGRADES, URETEROSCOPY, LASER HOLMIUM LITHOTRIPSY URETER(S), INSERT STENT;  Surgeon: Thomas Edmondson MD;  Location:  OR    COMBINED CYSTOSCOPY, RETROGRADES, URETEROSCOPY, LASER HOLMIUM LITHOTRIPSY URETER(S), INSERT STENT Left 3/28/2019    Procedure: CYSTOSCOPY, LEFT RETROGRADE PYLEOGRAM, LEFT URETEROSCOPY, HOLMIUM LASER LITHOTRIPSY, LEFT URETERAL STENT EXCHANGE;  Surgeon: Derrek Rivera MD;  Location:  OR    CV CORONARY ANGIOGRAM N/A 7/5/2023    Procedure: Coronary Angiogram;  Surgeon: Fausto Espinal MD;  Location:  [facility]    CV INTRAVASULAR ULTRASOUND N/A 7/5/2023    Procedure: Intravascular Ultrasound;  Surgeon: Fausto Espinal MD;  Location:  HE[facility] C[facility] LAB    CV PCI N/A 7/5/2023    Procedure: Percutaneous Coronary Intervention;  Surgeon: Fausto Espinal MD;  Location:  HEART [facility]ARD[facility] CATH LAB    CYSTOSCOPY, RETROGRADES, INSERT STENT URETER(S), COMBINED Left 3/8/2019    Procedure: CYSTOSCOPY,LEFT RETROGRADE, LEFT STENT PLACEMENT;  Surgeon: Derrek Rivera MD;  Location:  OR    CYSTOSCOPY, RETROGRADES, INSERT STENT URETER(S), COMBINED Left 7/23/2024    Procedure: Cystoscopy, retrogrades, insert stent ureter(s), combined;  Surgeon: Anand Hendricks MD;  Location:  OR    GENITOURINARY SURGERY      LAMINECT/DISCECTOMY, LUMBAR      Laminectomy/Discectomy Lumbar    LASER HOLMIUM LITHOTRIPSY URETER(S), INSERT STENT, COMBINED Left 8/20/2014    Procedure: COMBINED CYSTOSCOPY, URETEROSCOPY,  LASER HOLMIUM LITHOTRIPSY URETER(S), INSERT STENT;  Surgeon: Sam Arriaga MD;  Location:  OR    LASER HOLMIUM LITHOTRIPSY URETER(S), INSERT STENT, COMBINED Left 5/2/2019    Procedure: CYSTOSCOPY, LEFT RETROGRADE, LEFT URETEROSCOPY, HOLMIUM LASER LITHOTRIPSY, STONE REMOVAL, LEFT STENT EXCHANGE;  Surgeon: Derrek Rivera MD;  Location:  OR    LASER HOLMIUM LITHOTRIPSY URETER(S), INSERT STENT, COMBINED Right 6/13/2019    Procedure: CYSTOSCOPY; RIGHT URETEROSCOPY WITH HOLMIUM LASER LITHOTRIPSY; RIGHT STENT PLACEMENT (DIGITAL FLEXIBLE URETEROSCOPE);  Surgeon: Derrek Rivera MD;  Location:  OR    ORTHOPEDIC SURGERY      left ankle    RECESSION EYELID UPPER         Prior to Admission Medications   Prior to Admission Medications   Prescriptions Last Dose Informant Patient Reported? Taking?   Fluticasone-Umeclidin-Vilanterol (TRELEGY ELLIPTA) 200-62.5-25 MCG/ACT oral inhaler  at prn  No Yes   Sig: Inhale 1 puff into the lungs daily   Patient taking differently: Inhale 1 puff into the lungs daily as needed   HYDROcodone-acetaminophen (NORCO)  MG per tablet  at prn  Yes Yes   Sig: Take 1 tablet by mouth 3 times daily as needed   albuterol (PROAIR HFA/PROVENTIL HFA/VENTOLIN HFA) 108 (90 Base) MCG/ACT inhaler  at prn  No Yes   Sig: Inhale 1-2 puffs into the lungs every 4 hours as needed for shortness of breath, wheezing or cough   allopurinol (ZYLOPRIM) 100 MG tablet   No Yes   Sig: Take 2 tablets by mouth once daily   amoxicillin-clavulanate (AUGMENTIN) 500-125 MG tablet 7/21/2024 at pm  No Yes   Sig: Take 1 tablet by mouth 2 times daily for 7 days   blood glucose (NO BRAND SPECIFIED) lancets standard  Self No No   Sig: Use to test blood sugar 1 time daily or as directed.   Patient not taking: Reported on 6/6/2024   blood glucose (NO BRAND SPECIFIED) test strip  Self No No   Sig: Use to test blood sugar 1 time daily or as directed.   Patient not taking: Reported on 6/6/2024   blood glucose monitoring  (NO BRAND SPECIFIED) meter device kit  Self No No   Sig: Use to test blood sugar 1 time daily or as directed.   Patient not taking: Reported on 6/6/2024   clopidogrel (PLAVIX) 75 MG tablet 7/21/2024 at pm  No Yes   Sig: Take 1 tablet (75 mg) by mouth daily   levothyroxine (SYNTHROID/LEVOTHROID) 137 MCG tablet 7/22/2024 at am  No Yes   Sig: Take 1 tablet (137 mcg) by mouth daily   lisinopril (ZESTRIL) 2.5 MG tablet 7/21/2024  Yes Yes   Sig: Take 2.5 mg by mouth daily   metoprolol succinate ER (TOPROL XL) 25 MG 24 hr tablet 7/21/2024  No Yes   Sig: Take 1 tablet (25 mg) by mouth daily   nitroGLYcerin (NITROSTAT) 0.4 MG sublingual tablet  at prn  No Yes   Sig: For chest pain place 1 tablet under the tongue every 5 minutes for 3 doses. If symptoms persist 5 minutes after 1st dose call 911.   oxyBUTYnin ER (DITROPAN XL) 10 MG 24 hr tablet 7/21/2024  No Yes   Sig: Take 1 tablet (10 mg) by mouth daily   rosuvastatin (CRESTOR) 20 MG tablet 7/21/2024  No Yes   Sig: Take 1 tablet (20 mg) by mouth daily      Facility-Administered Medications: None     Allergies   Allergies   Allergen Reactions    Morphine Nausea     Severe vomiting    Prednisone      Elevated blood glucose  weakness    Baclofen GI Disturbance    Imitrex [Sumatriptan] Nausea    Tetracycline      Sores in mouth       Immunization History   Immunization History   Administered Date(s) Administered    COVID-19 MONOVALENT 12+ (Pfizer) 01/30/2021, 02/20/2021, 10/19/2021    HEPA 01/10/2006, 01/28/2013    Influenza (High Dose) 3 valent vaccine 10/08/2014, 11/03/2015, 09/21/2016, 12/01/2017, 10/26/2018, 10/15/2019    Influenza (IIV3) PF 01/18/2013, 10/28/2013    Influenza Vaccine 65+ (FLUAD) 11/15/2022    Influenza Vaccine 65+ (Fluzone HD) 10/09/2020, 10/14/2021    Influenza, seasonal, injectable, PF 10/28/2013    Pneumo Conj 13-V (2010&after) 12/18/2003, 11/03/2015    Pneumococcal 23 valent 01/28/2013    TD,PF 7+ (Tenivac) 11/15/2012       Social History   I have  reviewed this patient's social history and updated it with pertinent information if needed. Macie Jorge  reports that she quit smoking about 29 years ago. Her smoking use included cigarettes. She started smoking about 69 years ago. She has a 60 pack-year smoking history. She has never used smokeless tobacco. She reports current alcohol use. She reports that she does not use drugs.    Family History   I have reviewed this patient's family history and updated it with pertinent information if needed.   No family history on file.    Review of Systems   The 10 point Review of Systems is negative    Physical Exam   Temp: 97.8  F (36.6  C) Temp src: Oral BP: 120/63 Pulse: 62   Resp: 16 SpO2: 97 % O2 Device: None (Room air)    Vital Signs with Ranges  Temp:  [97.6  F (36.4  C)-98.3  F (36.8  C)] 97.8  F (36.6  C)  Pulse:  [62-73] 62  Resp:  [16] 16  BP: ()/(44-69) 120/63  SpO2:  [94 %-97 %] 97 %  151 lbs 0 oz  Body mass index is 24.37 kg/m .    GENERAL APPEARANCE:  awake  EYES: Eyes grossly normal to inspection  NECK: no adenopathy  RESP: lungs clear   CV: regular rates and rhythm  ABDOMEN: soft, mild LLQ pain. No flank pain.   MS: extremities normal  SKIN: no suspicious lesions or rashes        Data   All laboratory data reviewed       Component      Latest Ref [facility] 7/22/2024  11:40 AM 7/23/2024  5:39 AM 7/24/2024  7:53 AM   WBC      4.0 - 11.0 10e3/uL 4.9  3.4 (L)  4.6    RBC Count      3.80 - 5.20 10e6/uL 3.41 (L)  3.03 (L)  3.08 (L)    Hemoglobin      11.7 - 15.7 g/dL 10.4 (L)  9.5 (L)  9.5 (L)    Hematocrit      35.0 - 47.0 % 32.0 (L)  28.5 (L)  29.4 (L)    MCV      78 - 100 fL 94  94  96    MCH      26.5 - 33.0 pg 30.5  31.4  30.8    MCHC      31.5 - 36.5 g/dL 32.5  33.3  32.3    RDW      10.0 - 15.0 % 16.0 (H)  15.9 (H)  15.8 (H)    Platelet Count      150 - 450 10e3/uL 142 (L)  136 (L)  162    % Neutrophils      % 77   87    % Lymphocytes      % 12   7    % Monocytes      % 10   6    % Eosinophils       % 1   0    % Basophils      % 0   0    % Immature Granulocytes      % 0   0    NRBCs per 100 WBC      <1 /100 0   0    Absolute Neutrophils      1.6 - 8.3 10e3/uL 3.8   3.9    Absolute Lymphocytes      0.8 - 5.3 10e3/uL 0.6 (L)   0.3 (L)    Absolute Monocytes      0.0 - 1.3 10e3/uL 0.5   0.3    Absolute Eosinophils      0.0 - 0.7 10e3/uL 0.1   0.0    Absolute Basophils      0.0 - 0.2 10e3/uL 0.0   0.0    Absolute Immature Granulocytes      <=0.4 10e3/uL 0.0   0.0    Absolute NRBCs      10e3/uL 0.0   0.0       Component      Latest Ref [facility] 7/22/2024  11:40 AM 7/23/2024  5:39 AM 7/24/2024  7:53 AM   Sodium      135 - 145 mmol/L 137  140  137    Potassium      3.4 - 5.3 mmol/L 4.7  3.8  4.3    Carbon Dioxide (CO2)      22 - 29 mmol/L 25  26  22    Anion Gap      7 - 15 mmol/L 8  7  11    Urea Nitrogen      8.0 - 23.0 mg/dL 19.6  16.8  22.4    Creatinine      0.51 - 0.95 mg/dL 1.59 (H)  1.39 (H)  1.34 (H)    GFR Estimate      >60 mL/min/1.73m2 32 (L)  37 (L)  39 (L)    Calcium      8.8 - 10.4 mg/dL 9.9  9.0  9.0    Chloride      98 - 107 mmol/L 104  107  104    Glucose      70 - 99 mg/dL 122 (H)  86  190 (H)    Alkaline Phosphatase      40 - 150 U/L 46      AST      0 - 45 U/L 27      ALT      0 - 50 U/L 13      Protein Total      6.4 - 8.3 g/dL 7.7      Albumin      3.5 - 5.2 g/dL 4.1      Bilirubin Total      <=1.2 mg/dL 0.7         Component      Latest Ref [facility] 7/22/2024  1:48 PM   Color Urine      Colorless, Straw, Light Yellow, Yellow  Light Yellow    Appearance Urine      Clear  Clear    Glucose Urine      Negative mg/dL Negative    Bilirubin Urine      Negative  Negative    Ketones Urine      Negative mg/dL Negative    Specific Gravity Urine      1.003 - 1.035  1.011    Blood Urine      Negative  Negative    pH Urine      5.0 - 7.0  7.0    Protein Albumin Urine      Negative mg/dL Negative    Urobilinogen mg/dL      Normal, 2.0 mg/dL Normal    Nitrite Urine      Negative  Positive !    Leukocyte Esterase Urine       Negative  Large !    Bacteria Urine      None Seen /HPF Few !    Mucus Urine      None Seen /LPF Present !    RBC Urine      <=2 /HPF 3 (H)    WBC Urine      <=5 /HPF 24 (H)    Squamous Epithelial /HPF Urine      <=1 /HPF 8 (H)       07/22/2024 1348 07/25/2024 0542 Urine Culture [[id number]]   (Abnormal)   Urine, Midstream    Preliminary result Component Value   Culture 10,000-50,000 CFU/mL Pseudomonas aeruginosa Abnormal  P    10,000-50,000 CFU/mL Pseudomonas aeruginosa Abnormal  P          CT ABDOMEN/PELVIS WITHOUT CONTRAST July 22, 2024 1:32 PM    CLINICAL HISTORY: Worsening abdominal pain and unexplained weight  loss, check for bowel perforation, abscess, diverticulitis,  malignancy.    TECHNIQUE: CT scan of the abdomen and pelvis was performed without IV  contrast. Multiplanar reformats were obtained. Dose reduction  techniques were used.  CONTRAST: None.    COMPARISON: CT 7/9/2024.    FINDINGS:  LOWER CHEST: Emphysematous changes. Small hiatal hernia is smaller in  size. Coronary artery calcifications.    HEPATOBILIARY: Cholecystectomy. Stable hepatic cysts.    PANCREAS: Fatty atrophy.    SPLEEN: Normal.    ADRENAL GLANDS: Stable mild nodular thickening of the bilateral  adrenals.    KIDNEYS/BLADDER: Proximal left ureter stone at the ureteropelvic  junction measuring 8 mm on coronal series 5 image 50. Mild left  hydronephrosis. Stable nonobstructing stones at the left lower kidney  with an example measuring 4 mm series 4 image 90. Renal cysts  suggested without specific imaging follow-up recommended. Bladder is  obscured.    BOWEL: No bowel obstruction. Normal appendix. No secondary signs of  appendicitis. Colonic diverticulosis. No convincing diverticulitis. No  abscess or free air.    LYMPH NODES: Normal.    VASCULATURE: Scattered calcifications.    PELVIC ORGANS: No acute abnormality.    MUSCULOSKELETAL: Left hip arthroplasty and spine degenerative changes.  Scoliotic spine.   Impression:      IMPRESSION:  1.  8 mm proximal left ureter stone at the ureteropelvic junction with  mild left hydronephrosis. Nonobstructing stones on the left again  noted.  2.  No other acute abnormality.  3.  Colonic diverticulosis.

## 2024-07-25 NOTE — PROGRESS NOTES
PRIMARY Concern: admitted for abdominal pain. Found 8mm ureteral stone. Ureteral stent placed 7/23.  SAFETY RISK Concerns (fall risk, behaviors, etc.):    Isolation/Type: None   Tests/Procedures for NEXT shift: AM labs   Consults? (Pending/following, signed-off?) Urology following   Where is patient from? (Home, TCU, etc.): Home   Other Important info for NEXT shift:  Anticipated DC date & active delays: TBD  SUMMARY NOTE: Patient reports that hematuria has subsided, voiding independently. Norco given for pain, PRN senakot given as patient reports more than 4 days without bowel movement.     Orientation/Cognitive: AXO4  Observation Goals (Met/ Not Met): Inp  Mobility Level/Assist Equipment: IND  Antibiotics & Plan (IV/po, length of tx left): IV Cefepime q12    Pain Management:  PRN IV Dilaudid given x1  Tele/VS/O2: VSS on RA.   ABNL Lab/BG: UC + Pseudomonas aeruginosa, provider waiting for sensitivity result.  Diet: Reg  Bowel/Bladder: Cont. No BM  Skin Concerns: WNL  Drains/Devices: PIV SL  Patient Stated Goal for Today: TBD

## 2024-07-25 NOTE — PROGRESS NOTES
[facility]    Medicine Progress Note - Hospitalist Service    Date of Admission:  7/22/2024      Summary:   Macie Jorge is an 83 year old female with CAD s/p RAMILA to LAD 7/2023, moderate aortic stenosis, CKD 4, severe spinal stenosis with chronic back pain, asthma/COPD, nephrolithiasis, who presents to ER on 7/22/2024 due to persistent left abdominal pain.       Reports left abdominal pain since February 2024.  Also reports constipation, decreased appetite and 16 kg weight loss since December 2023.  Recent outpatient CT A/P w/o contrast 7/9/2024 showed 3 nonobstructing stones in left ureteropelvic junction and proximal ureter, diffuse colonic diverticulosis with mild diverticulitis in descending colon, pulmonary emphysema. She was started on Augmentin for presumed acute diverticulitis which she had taken for 3 days on admission without any improvement in abdominal pain.     No fever or chills, no nausea or vomiting, no dysuria/hematuria, no chest pain or shortness of breath.     Labs showed creatinine of 1.59 which is around her prior Cr, no leukocytosis. UA consistent with UTI. CT A/P showed 8 mm proximal left ureteral stone at ureteropelvic junction with mild left hydronephrosis. And other nonobstructing stones in left kidney.      Assessment & Plan      Obstructing 8 mm proximal ureteral stone at left ureteropelvic junction with mild left hydronephrosis  Other nonobstructing stones in left kidney  History of nephrolithiasis with multiple previous procedures with Dr. Rivera at [facility].    Pseudomonas UTI  -Patient was started on Rocephin and IV fluid, kept n.p.o. and urology consulted and underwent left ureteral stent placement 7/23.  Did have some hematuria and clot postprocedure which has since improved.  Pain also improved.  -Urine culture grew Pseudomonas and so antibiotic changed to cefepime.  Awaiting sensitivity.  Discussed possible choices and after reviewing  adverse effects, patient does not want to be on fluoroquinolone.  Urology recommending 2 weeks of antibiotic given the stent in place.  Anticipate cefepime at discharge.  Social work consult for home infusion.  Will need midline prior to discharge if planned IV antibiotic.  - Discussed with micro lab, anticipate sensitivity tomorrow 7/26.  -Started on Flomax  -Pain control - IV Dilaudid and p.o. Norco as needed available.  Hyoscyamine added per urology.      Longstanding left abdominal pain  Patient reports she has low back pain, and also ongoing abd pain which is worse for the last few days. Could be secondary to ureteronephrolithiasis.  Per patient, pain has been present since 2/2024 and feels it was being masked by narcotics that she takes for back pain  -Recent pelvic ultrasound showed no abnormalities. Has seen GYN.   -Recent CT A/P 7/9/2024 showed possible descending colon diverticulitis for which she was started on Augmentin and has taken 3 days of medication.  No mention of diverticulitis on CT on 7/22/2024  -No other obvious cause to explain abdominal pain. Further evaluation can be considered if pain fails to improve/resolve with relief of ureteral obstruction and treating possible UTI. This can be done as outpatient.      Moderate aortic stenosis  Coronary artery disease, s/p PCI with RAMILA to LAD on 7/5/2023  Most recent echo 5/31/2024 showed normal LVEF of 60-65%, no WMA, normal RV size and function, mild-moderate aortic stenosis with valve area 1.6-1.8.  On previous echo valve area was 1 cm2  -She was assessed in valve clinic.  Due to lack of symptoms and  CKD 4, plan was to follow-up with repeat echoes   -Cardiology consulted this stay.  PTA on plavix, changed to aspirin per cardiology    Chronic kidney disease stage IV  -Baseline creatinine 1.5-1.9.  Follows with nephrology.  Presented with creatinine of 1.59.  -Improved to 1.3 with IV hydration and stable, DC IVF and encourage p.o.     Benign  essential hypertension  -Continue Toprol-XL 25 mg daily.  Hold lisinopril 2.5 mg for now.  Resume at discharge as long as creatinine remains stable.     Dyslipidemia  -Resume PTA rosuvastatin      Hypothyroidism with iatrogenic hyperthyroidism  -PTA is on levothyroxine 137 mcg  -Noted suppressed TSH and elevated free T4.  Patient is unclear when the dose was increased from 125 mcg.  -Held for 1 day and resumed at lower dose of 125 mcg daily, recheck thyroid function test in 4 weeks as outpatient.    Vitamin B12 deficiency  -Level is low.   B12 injection while in hospital and discharged on p.o. supplement     COPD/asthma  60-pack-year history of tobacco use  -PTA-Trelegy inhaler as needed, albuterol inhaler as needed  -Started on albuterol neb as needed while hospitalized  -No acute exacerbation     Spinal canal stenosis with chronic back pain  -Followed by spine clinic.  On hydrocodone-acetaminophen  mg 3 times daily as needed, continue     Unintentional weight loss  -Patient has lost 16 KGs in past 8 months.  Weight was 83 kg in December 2023 and is now 68.5 kg.  -recommend further workup as outpatient for etiologies including occult malignancy. Patient does have heavy smoking history     Pancytopenia  Chronic anemia, hemoglobin baseline around 9-10.  Now with leukopenia as well as thrombocytopenia.  -Iron panel, ferritin, folate normal, B12 low.  -Peripheral smear shows normocytic normochromic anemia, no abnormal cells noted.  -Platelet count and WBC count now normal.  Mild anemia, stable.          Diet: Regular Diet Adult    DVT Prophylaxis: Pneumatic Compression Devices  Claudio Catheter: Not present  Lines: None     Cardiac Monitoring: None  Code Status: Full Code      Clinically Significant Risk Factors                                            Disposition Plan     Medically Ready for Discharge: Anticipated Tomorrow pending sensitivity and final plan on antibiotic.       Julia Christy MD  Hospitalist  Service  [facility]  Securely message with [employer] (more info)  Text page via [email or web address] Paging/Directory   ______________________________________________________________________    Interval History     Patient was seen this morning.  No acute issues reported.  Patient feels well and wants to be discharged home soon.  Abdominal pain is much better.  Denies hematuria.    Physical Exam   Vital Signs: Temp: 97.8  F (36.6  C) Temp src: Oral BP: 120/63 Pulse: 62   Resp: 16 SpO2: 97 % O2 Device: None (Room air)    Weight: 151 lbs 0 oz    General: AAOx3, appears comfortable.  HEENT: PERRLA EOMI. Mucosa moist.   Lungs: Bilateral equal air entry. Clear to auscultation, normal work of breathing.   CVS: S1S2 regular, no tachycardia or murmur.   Abdomen: Soft, mildly tender Lt side of abdomen including LUQ/paraumbilical and LLQ. BS heard.  MSK: No edema or deformities.  Neuro: AAOX3. CN 2-12 normal. Strength symmetrical.  Skin: No rash.       Medical Decision Making       45 MINUTES SPENT BY ME on the date of service doing chart review, history, exam, documentation & further activities per the note.      Data     I have personally reviewed the following data over the past 24 hrs:    N/A  \   N/A   / N/A     N/A N/A N/A /  N/A   N/A N/A N/A \       Imaging results reviewed over the past 24 hrs:   No results found for this or any previous visit (from the past 24 hour(s)).

## 2024-07-25 NOTE — PROGRESS NOTES
PRIMARY Concern: admitted for abdominal pain. Found 8mm ureteral stone. Ureteral stent placed 7/23.  SAFETY RISK Concerns (fall risk, behaviors, etc.):    Isolation/Type: None   Tests/Procedures for NEXT shift: AM labs   Consults? (Pending/following, signed-off?) Urology following   Where is patient from? (Home, TCU, etc.): Home   Other Important info for NEXT shift:  Anticipated DC date & active delays: TBD  SUMMARY NOTE: Still no bowel movements yet, said she will let RN know when to have suppository.     Orientation/Cognitive: AXO4  Observation Goals (Met/ Not Met): Inp  Mobility Level/Assist Equipment: IND  Antibiotics & Plan (IV/po, length of tx left): IV Cefepime q12    Pain Management:  PRN Norco given.   Tele/VS/O2: VSS on RA.   ABNL Lab/BG: UC + Pseudomonas aeruginosa, provider waiting for sensitivity result.  Diet: Reg  Bowel/Bladder: Cont   Skin Concerns: Multiple bruises, fragile skin with thin epidermis.   Drains/Devices: PIV SL  Patient Stated Goal for Today: TBD

## 2024-07-26 ENCOUNTER — HOME INFUSION (PRE-WILLOW HOME INFUSION) (OUTPATIENT)
Dept: PHARMACY | Facility: CLINIC | Age: 84
End: 2024-07-26
Payer: COMMERCIAL

## 2024-07-26 ENCOUNTER — APPOINTMENT (OUTPATIENT)
Dept: ULTRASOUND IMAGING | Facility: CLINIC | Age: 84
DRG: 660 | End: 2024-07-26
Attending: HOSPITALIST
Payer: COMMERCIAL

## 2024-07-26 LAB
ALBUMIN SERPL BCG-MCNC: 3.6 G/DL (ref 3.5–5.2)
ALP SERPL-CCNC: 38 U/L (ref 40–150)
ALT SERPL W P-5'-P-CCNC: 11 U/L (ref 0–50)
AST SERPL W P-5'-P-CCNC: 22 U/L (ref 0–45)
BACTERIA UR CULT: ABNORMAL
BACTERIA UR CULT: ABNORMAL
BILIRUB DIRECT SERPL-MCNC: <0.2 MG/DL (ref 0–0.3)
BILIRUB SERPL-MCNC: 0.5 MG/DL
HCT VFR BLD AUTO: 25.6 % (ref 35–47)
HGB BLD-MCNC: 8.4 G/DL (ref 11.7–15.7)
LIPASE SERPL-CCNC: 40 U/L (ref 13–60)
POTASSIUM SERPL-SCNC: 3.9 MMOL/L (ref 3.4–5.3)
PROT SERPL-MCNC: 6.2 G/DL (ref 6.4–8.3)
UPPER GI ENDOSCOPY: NORMAL

## 2024-07-26 PROCEDURE — 88305 TISSUE EXAM BY PATHOLOGIST: CPT | Mod: 26 | Performed by: PATHOLOGY

## 2024-07-26 PROCEDURE — 93010 ELECTROCARDIOGRAM REPORT: CPT | Performed by: INTERNAL MEDICINE

## 2024-07-26 PROCEDURE — 258N000003 HC RX IP 258 OP 636: Performed by: HOSPITALIST

## 2024-07-26 PROCEDURE — 93005 ELECTROCARDIOGRAM TRACING: CPT

## 2024-07-26 PROCEDURE — 88342 IMHCHEM/IMCYTCHM 1ST ANTB: CPT | Mod: TC | Performed by: INTERNAL MEDICINE

## 2024-07-26 PROCEDURE — 88342 IMHCHEM/IMCYTCHM 1ST ANTB: CPT | Mod: 26 | Performed by: PATHOLOGY

## 2024-07-26 PROCEDURE — 250N000013 HC RX MED GY IP 250 OP 250 PS 637: Performed by: HOSPITALIST

## 2024-07-26 PROCEDURE — G0500 MOD SEDAT ENDO SERVICE >5YRS: HCPCS | Performed by: INTERNAL MEDICINE

## 2024-07-26 PROCEDURE — 250N000009 HC RX 250: Performed by: INTERNAL MEDICINE

## 2024-07-26 PROCEDURE — 250N000011 HC RX IP 250 OP 636: Performed by: INTERNAL MEDICINE

## 2024-07-26 PROCEDURE — 250N000013 HC RX MED GY IP 250 OP 250 PS 637: Performed by: UROLOGY

## 2024-07-26 PROCEDURE — 85018 HEMOGLOBIN: CPT | Performed by: HOSPITALIST

## 2024-07-26 PROCEDURE — 250N000013 HC RX MED GY IP 250 OP 250 PS 637

## 2024-07-26 PROCEDURE — 999N000040 HC STATISTIC CONSULT NO CHARGE VASC ACCESS

## 2024-07-26 PROCEDURE — 250N000011 HC RX IP 250 OP 636: Performed by: HOSPITALIST

## 2024-07-26 PROCEDURE — 120N000001 HC R&B MED SURG/OB

## 2024-07-26 PROCEDURE — 99232 SBSQ HOSP IP/OBS MODERATE 35: CPT | Performed by: PHYSICIAN ASSISTANT

## 2024-07-26 PROCEDURE — 99233 SBSQ HOSP IP/OBS HIGH 50: CPT | Performed by: HOSPITALIST

## 2024-07-26 PROCEDURE — 84155 ASSAY OF PROTEIN SERUM: CPT | Performed by: HOSPITALIST

## 2024-07-26 PROCEDURE — 76705 ECHO EXAM OF ABDOMEN: CPT

## 2024-07-26 PROCEDURE — 250N000013 HC RX MED GY IP 250 OP 250 PS 637: Performed by: INTERNAL MEDICINE

## 2024-07-26 PROCEDURE — 0DB98ZX EXCISION OF DUODENUM, VIA NATURAL OR ARTIFICIAL OPENING ENDOSCOPIC, DIAGNOSTIC: ICD-10-PCS | Performed by: INTERNAL MEDICINE

## 2024-07-26 PROCEDURE — 36415 COLL VENOUS BLD VENIPUNCTURE: CPT | Performed by: HOSPITALIST

## 2024-07-26 PROCEDURE — 43239 EGD BIOPSY SINGLE/MULTIPLE: CPT | Performed by: INTERNAL MEDICINE

## 2024-07-26 PROCEDURE — 0DB68ZX EXCISION OF STOMACH, VIA NATURAL OR ARTIFICIAL OPENING ENDOSCOPIC, DIAGNOSTIC: ICD-10-PCS | Performed by: INTERNAL MEDICINE

## 2024-07-26 PROCEDURE — 82040 ASSAY OF SERUM ALBUMIN: CPT | Performed by: HOSPITALIST

## 2024-07-26 PROCEDURE — 83690 ASSAY OF LIPASE: CPT | Performed by: HOSPITALIST

## 2024-07-26 PROCEDURE — 84132 ASSAY OF SERUM POTASSIUM: CPT | Performed by: HOSPITALIST

## 2024-07-26 RX ORDER — FENTANYL CITRATE 50 UG/ML
INJECTION, SOLUTION INTRAMUSCULAR; INTRAVENOUS PRN
Status: DISCONTINUED | OUTPATIENT
Start: 2024-07-26 | End: 2024-07-26 | Stop reason: HOSPADM

## 2024-07-26 RX ORDER — CYANOCOBALAMIN 1000 UG/ML
1000 INJECTION, SOLUTION INTRAMUSCULAR; SUBCUTANEOUS DAILY
Status: DISCONTINUED | OUTPATIENT
Start: 2024-07-26 | End: 2024-07-26

## 2024-07-26 RX ORDER — CYANOCOBALAMIN 1000 UG/ML
1000 INJECTION, SOLUTION INTRAMUSCULAR; SUBCUTANEOUS DAILY
Status: COMPLETED | OUTPATIENT
Start: 2024-07-26 | End: 2024-07-27

## 2024-07-26 RX ORDER — POLYETHYLENE GLYCOL 3350 17 G/17G
17 POWDER, FOR SOLUTION ORAL DAILY PRN
Status: DISCONTINUED | OUTPATIENT
Start: 2024-07-26 | End: 2024-07-27 | Stop reason: HOSPADM

## 2024-07-26 RX ORDER — CEFEPIME HYDROCHLORIDE 2 G/1
2 INJECTION, POWDER, FOR SOLUTION INTRAVENOUS EVERY 12 HOURS
DISCHARGE
Start: 2024-07-27 | End: 2024-08-06

## 2024-07-26 RX ORDER — PANTOPRAZOLE SODIUM 40 MG/1
40 TABLET, DELAYED RELEASE ORAL
Status: DISCONTINUED | OUTPATIENT
Start: 2024-07-26 | End: 2024-07-27 | Stop reason: HOSPADM

## 2024-07-26 RX ORDER — SODIUM CHLORIDE 9 MG/ML
INJECTION, SOLUTION INTRAVENOUS CONTINUOUS
Status: DISCONTINUED | OUTPATIENT
Start: 2024-07-26 | End: 2024-07-27

## 2024-07-26 RX ADMIN — ASPIRIN 81 MG: 81 TABLET, COATED ORAL at 08:22

## 2024-07-26 RX ADMIN — CEFEPIME 2 G: 2 INJECTION, POWDER, FOR SOLUTION INTRAVENOUS at 00:53

## 2024-07-26 RX ADMIN — PANTOPRAZOLE SODIUM 40 MG: 40 TABLET, DELAYED RELEASE ORAL at 14:22

## 2024-07-26 RX ADMIN — BISACODYL 10 MG: 10 SUPPOSITORY RECTAL at 18:41

## 2024-07-26 RX ADMIN — SENNOSIDES AND DOCUSATE SODIUM 2 TABLET: 50; 8.6 TABLET ORAL at 20:24

## 2024-07-26 RX ADMIN — CEFEPIME 2 G: 2 INJECTION, POWDER, FOR SOLUTION INTRAVENOUS at 14:27

## 2024-07-26 RX ADMIN — POLYETHYLENE GLYCOL 3350 17 G: 17 POWDER, FOR SOLUTION ORAL at 22:44

## 2024-07-26 RX ADMIN — LEVOTHYROXINE SODIUM 125 MCG: 75 TABLET ORAL at 06:44

## 2024-07-26 RX ADMIN — HYDROCODONE BITARTRATE AND ACETAMINOPHEN 2 TABLET: 5; 325 TABLET ORAL at 00:58

## 2024-07-26 RX ADMIN — METOPROLOL SUCCINATE 25 MG: 25 TABLET, EXTENDED RELEASE ORAL at 20:24

## 2024-07-26 RX ADMIN — CYANOCOBALAMIN 1000 MCG: 1000 INJECTION, SOLUTION INTRAMUSCULAR at 08:26

## 2024-07-26 RX ADMIN — SODIUM CHLORIDE: 9 INJECTION, SOLUTION INTRAVENOUS at 14:30

## 2024-07-26 RX ADMIN — HYOSCYAMINE SULFATE 125 MCG: 0.12 TABLET SUBLINGUAL at 23:35

## 2024-07-26 RX ADMIN — OXYBUTYNIN CHLORIDE 10 MG: 10 TABLET, EXTENDED RELEASE ORAL at 20:24

## 2024-07-26 RX ADMIN — SENNOSIDES AND DOCUSATE SODIUM 2 TABLET: 50; 8.6 TABLET ORAL at 08:23

## 2024-07-26 RX ADMIN — TAMSULOSIN HYDROCHLORIDE 0.4 MG: 0.4 CAPSULE ORAL at 08:22

## 2024-07-26 RX ADMIN — HYDROMORPHONE HYDROCHLORIDE 0.3 MG: 1 INJECTION, SOLUTION INTRAMUSCULAR; INTRAVENOUS; SUBCUTANEOUS at 06:50

## 2024-07-26 ASSESSMENT — ACTIVITIES OF DAILY LIVING (ADL)
ADLS_ACUITY_SCORE: 30
ADLS_ACUITY_SCORE: 30
DEPENDENT_IADLS:: INDEPENDENT
ADLS_ACUITY_SCORE: 30

## 2024-07-26 NOTE — PROGRESS NOTES
[facility]    Medicine Progress Note - Hospitalist Service    Date of Admission:  7/22/2024      Summary:   Macie Jorge is an 83 year old female with CAD s/p RAMILA to LAD 7/2023, moderate aortic stenosis, CKD 4, severe spinal stenosis with chronic back pain, asthma/COPD, nephrolithiasis, who presents to ER on 7/22/2024 due to persistent left abdominal pain.       Reports left abdominal pain since February 2024.  Also reports constipation, decreased appetite and 16 kg weight loss since December 2023.  Recent outpatient CT A/P w/o contrast 7/9/2024 showed 3 nonobstructing stones in left ureteropelvic junction and proximal ureter, diffuse colonic diverticulosis with mild diverticulitis in descending colon, pulmonary emphysema. She was started on Augmentin for presumed acute diverticulitis which she had taken for 3 days on admission without any improvement in abdominal pain.     No fever or chills, no nausea or vomiting, no dysuria/hematuria, no chest pain or shortness of breath.     Labs showed creatinine of 1.59 which is around her prior Cr, no leukocytosis. UA consistent with UTI. CT A/P showed 8 mm proximal left ureteral stone at ureteropelvic junction with mild left hydronephrosis. And other nonobstructing stones in left kidney.      Assessment & Plan      Obstructing 8 mm proximal ureteral stone at left ureteropelvic junction with mild left hydronephrosis  Other nonobstructing stones in left kidney  History of nephrolithiasis with multiple previous procedures with Dr. Rivera at [place] [facility].    Pseudomonas UTI  -Patient was started on Rocephin and IV fluid, kept n.p.o. and urology consulted and underwent left ureteral stent placement 7/23.  Did have some hematuria and clot postprocedure which has since improved.  Pain also improved.  -Urine culture grew Pseudomonas and so antibiotic changed to cefepime.  Noted it is pansensitive. Discussed possible choices and after reviewing  adverse effects, patient does not want to be on fluoroquinolone.  Urology recommending 2 weeks of antibiotic given the stent in place.  Anticipate cefepime at discharge.  Social work consult for home infusion.  Will need midline prior to discharge   -ID consulted and following, appreciate recommendation on antibiotic and outpatient follow-up.  -Started on Flomax  -Pain control - IV Dilaudid and p.o. Norco as needed available.  Hyoscyamine added per urology.      Longstanding left abdominal pain  Patient reports she has low back pain, and also ongoing abd pain which is worse for the last few days. Could be secondary to ureteronephrolithiasis.  Per patient, pain has been present since 2/2024 and feels it was being masked by narcotics that she takes for back pain  -Recent pelvic ultrasound showed no abnormalities. Has seen GYN.   -Recent CT A/P 7/9/2024 showed possible descending colon diverticulitis for which she was started on Augmentin and has taken 3 days of medication.  No mention of diverticulitis on CT on 7/22/2024  -No other obvious cause to explain abdominal pain. Further evaluation can be considered if pain fails to improve/resolve with relief of ureteral obstruction and treating possible UTI. This can be done as outpatient.      Epigastric abdominal pain  7/26: Patient reported retrosternal pain, nonradiating, with associated reflux.  On exam mildly tender epigastric area.  Twelve-lead EKG was done and without ischemic changes.  Patient has lost almost 40 pounds recently.  No hematochezia or melena.  Hemoglobin was stable at 9 desisted but is down to 8.4.  Iron studies not consistent with deficiency.  -Discussed with and consulted GI.  Patient will have EGD today.  Keep NPO.  Maintenance IVF.  -PPI added, Protonix daily  -LFTs normal.  Right upper quadrant ultrasound ordered and pending.  Patient is s/p cholecystectomy.  -Lipase ordered and pending    Moderate aortic stenosis  Coronary artery disease, s/p  PCI with RAMILA to LAD on 7/5/2023  Most recent echo 5/31/2024 showed normal LVEF of 60-65%, no WMA, normal RV size and function, mild-moderate aortic stenosis with valve area 1.6-1.8.  On previous echo valve area was 1 cm2  -She was assessed in valve clinic.  Due to lack of symptoms and  CKD 4, plan was to follow-up with repeat echoes   -Cardiology consulted this stay.  PTA on plavix, changed to aspirin per cardiology    Chronic kidney disease stage IV  -Baseline creatinine 1.5-1.9.  Follows with nephrology.  Presented with creatinine of 1.59.  -Improved to 1.3 with IV hydration and stable, DC IVF and encourage p.o.     Benign essential hypertension  -Continue Toprol-XL 25 mg daily.  Hold lisinopril 2.5 mg for now.  Resume at discharge as long as creatinine remains stable.     Dyslipidemia  -Resume PTA rosuvastatin      Hypothyroidism with iatrogenic hyperthyroidism  -PTA is on levothyroxine 137 mcg  -Noted suppressed TSH and elevated free T4.  Patient is unclear when the dose was increased from 125 mcg.  -Held for 1 day and resumed at lower dose of 125 mcg daily, recheck thyroid function test in 4 weeks as outpatient.    Vitamin B12 deficiency  -Level is low.   B12 injection while in hospital and discharged on p.o. supplement     COPD/asthma  60-pack-year history of tobacco use  -PTA-Trelegy inhaler as needed, albuterol inhaler as needed  -Started on albuterol neb as needed while hospitalized  -No acute exacerbation     Spinal canal stenosis with chronic back pain  -Followed by spine clinic.  On hydrocodone-acetaminophen  mg 3 times daily as needed, continue     Unintentional weight loss  -Patient has lost 16 KGs in past 8 months.  Weight was 83 kg in December 2023 and is now 68.5 kg.  -recommend further workup as outpatient for etiologies including occult malignancy. Patient does have heavy smoking history     Pancytopenia  Chronic anemia, hemoglobin baseline around 9-10.  Now with leukopenia as well as  thrombocytopenia.  -Iron panel, ferritin, folate normal, B12 low.  -Peripheral smear shows normocytic normochromic anemia, no abnormal cells noted.  -Platelet count and WBC count now normal.  Mild anemia, see above, GI consult.          Diet: NPO for Medical/Clinical Reasons Except for: Meds    DVT Prophylaxis: Pneumatic Compression Devices  Claudio Catheter: Not present  Lines: None     Cardiac Monitoring: None  Code Status: Full Code      Clinically Significant Risk Factors                              #Precipitous drop in Hgb/Hct: Lowest Hgb this hospitalization: 8.4 g/dL. Will continue to monitor and treat/transfuse as appropriate.         # Financial/Environmental Concerns: none         Disposition Plan     Medically Ready for Discharge: Anticipated Tomorrow pending GI workup, outpatient IV antibiotic plan established.  Discussed with patient, nursing staff, GI consultant, .     Julia Christy MD  Hospitalist Service  [facility]  Securely message with [employer] (more info)  Text page via [employer] Paging/Directory   ______________________________________________________________________    Interval History     Patient was seen this morning.  No acute issues reported.  Patient feels well and wants to be discharged home soon.  Abdominal pain is much better.  Denies hematuria.    Physical Exam   Vital Signs: Temp: 97.6  F (36.4  C) Temp src: Oral BP: 131/63 Pulse: 66   Resp: 16 SpO2: 98 % O2 Device: None (Room air)    Weight: 151 lbs 0 oz    General: AAOx3, appears comfortable.  HEENT: PERRLA EOMI. Mucosa moist.   Lungs: Bilateral equal air entry. Clear to auscultation, normal work of breathing.   CVS: S1S2 regular, no tachycardia or murmur.   Abdomen: Soft, mildly tender epigastrium, no guarding or rigidity. BS heard.  MSK: No edema or deformities.  Neuro: AAOX3. CN 2-12 normal. Strength symmetrical.  Skin: No rash.       Medical Decision Making       52 MINUTES SPENT BY ME on the  date of service doing chart review, history, exam, documentation & further activities per the note.      Data     I have personally reviewed the following data over the past 24 hrs:    N/A  \   8.4 (L)   / N/A     N/A N/A N/A /  N/A   N/A N/A N/A \       Imaging results reviewed over the past 24 hrs:   No results found for this or any previous visit (from the past 24 hour(s)).

## 2024-07-26 NOTE — PLAN OF CARE
PRIMARY Concern: admitted for abdominal pain. Found 8mm ureteral stone. Ureteral stent placed 7/23.  SAFETY RISK Concerns (fall risk, behaviors, etc.):  NA  Isolation/Type: None   Tests/Procedures for NEXT shift: PICC placement in the AM     Consults? (Pending/following, signed-off?) Urology s/o, GI following    Where is patient from? (Home, TCU, etc.): Home   Other Important info for NEXT shift: Patient c/o chest pain this morning briefly, obtained EKG which showed SR with 1st deg AVB, pain resolved as of now.   Tender abdomen with epigastric pain-- US abd showed renal disease and stable focal/aneurysmal dilation.  EGD showed large hiatal hernia non bleeding ulcers and Oren's lesions, biopsied for Celiac's and H. Pylori.   Anticipated DC date & active delays: TBD-- will need to discharge with midline/PICC      Orientation/Cognitive: A/Ox4  Observation Goals (Met/ Not Met): Inpt  Mobility Level/Assist Equipment: IND  Antibiotics & Plan (IV/po, length of tx left): IV Cefepime q12    Pain Management:  PRNs available, has not needed this shift    Tele/VS/O2: VSS RA  ABNL Lab/BG: UC+ Pseudomonas aeruginosa, Cr 1.34, GFR 39, Hgb 8.4  Diet: Reg  Bowel/Bladder: Cont-- Hasn't had a BM since 7/22 on schd senna, gave suppository this evening  Skin Concerns: Multiple bruises, fragile/thin skin.   Drains/Devices: PIV dislodged right before shift change. Placed vascular access order to place new IV  Patient Stated Goal for Today: TBD

## 2024-07-26 NOTE — PROGRESS NOTES
[facility]    Received request for benefit check should pt require home IV abx. Pt Macie Jorge has coverage for IV ABX through their [facility] Medicare Advantage plan. Pt's IV ABX must be done via CADD pump for coverage. Cefepime can be done via CADD pump, if drug changes please let us know so we can re-verify coverage.     Pt's plan will cover automatically at 80/20 coverage until their $452/$2900 out of pocket has been met. Once satisfied pt would be covered at 100%.    Please note that [facility] requires a central line for all medications administered via CADD pump in the home setting.    For nursing, pt should have coverage if homebound, however [facility] is not contracted with Medicare and an outside nursing agency would be utilized instead. If pt is not homebound, there is no coverage and [facility] can provide nursing if pt agrees to self-pay an additional $90 per visit. Pt would otherwise also be covered at an IC or TCU. Please let us know if you have any questions.     Addendum @ 3893h: Met with pt, her  Pedrito and her daughter Teri to introduce home infusion and review benefits. They would like to proceed with [facility] for home IV abx needs. Pt's daughter, Teri reports that she and her siblings will help pt with medication administration via CADD pump and 24hr bag. [facility] will schedule a nurse visit for pt's children at home on the day of discharge. [facility] has accepted for home RN. Please ensure the discharge orders contain a home infusion referral and a home infusion referral. Pt's care coordinator, Lin feliciano.     Thank you    Digna Leger RN  [facility] Liaison  [phone] (Mon thru Fri 8am - 5pm)  [phone] Office

## 2024-07-26 NOTE — PROGRESS NOTES
[facility]    Infectious Disease Progress Note    Date of Service (when I saw the patient): 07/26/2024     Assessment & Plan   Macei Jorge is a 83 year old female who was admitted on 7/22/2024.     Impression:  84 yo female with a history of CKD, CAD, chronic back pain, and nephrolithiasis who presented with several months LLQ abdominal pain and left flank pain with CT showing 8 mm proximal left ureter stone at the UPJ with mild hydronephrosis, now s/p left ureteral stent placement and urine culture growing pseudomonas.     -Urine culture with pan-sensitive pseudomonas.   -Afebrile. Normal WBC.   -CKD.  -On Cefepime.         Recommendations:   Continue Cefepime for 2 weeks total (last day of therapy is 8/6/24). Orders placed in discharge navigator.   Place midline for antibiotics at discharge. Ordered.   Given her age, would not want to give fluoroquinolone.  ID will sign off. Please call us back with questions.   Definitive stone management per Urology in near future.         Patient and plan discussed with Dr. Villafuerte.      Magnolia Rosas PA-C      Interval History   Tolerating antibiotics ok   No new rashes or issues with antibiotics   Went for EGD this morning. Hungry and feeling good.   Labs reviewed   No changes to past medical, social or family history       Physical Exam   Temp: 98.1  F (36.7  C) Temp src: Oral BP: (!) 145/68 Pulse: 65   Resp: 20 SpO2: 94 % O2 Device: None (Room air)    Vitals:    07/22/24 1119   Weight: 68.5 kg (151 lb)     Vital Signs with Ranges  Temp:  [97.4  F (36.3  C)-98.7  F (37.1  C)] 98.1  F (36.7  C)  Pulse:  [57-78] 65  Resp:  [12-28] 20  BP: ()/(48-84) 145/68  SpO2:  [78 %-100 %] 94 %    Constitutional: Awake, alert, cooperative, no apparent distress. Eating lunch.   Lungs: Clear to auscultation bilaterally, no crackles or wheezing  Cardiovascular: Regular rate and rhythm, normal S1 and S2, and no murmur noted  Abdomen: Normal bowel sounds,  soft, non-distended, non-tender  Skin: No rashes, no cyanosis, no edema  Other:    Medications   Current Facility-Administered Medications   Medication Dose Route Frequency Provider Last Rate Last Admin    sodium chloride 0.9 % infusion   Intravenous Continuous Julia Christy MD 50 mL/hr at 07/26/24 1430 New Bag at 07/26/24 1430     Current Facility-Administered Medications   Medication Dose Route Frequency Provider Last Rate Last Admin    aspirin EC tablet 81 mg  81 mg Oral Daily Kathrin Schneider PA-C   81 mg at 07/26/24 0822    ceFEPIme (MAXIPIME) 2 g vial to attach to  mL bag for ADULTS or NS 50 mL bag for PEDS  2 g Intravenous Q12H Julia Christy MD   2 g at 07/26/24 1427    cyanocobalamin injection 1,000 mcg  1,000 mcg Subcutaneous Daily Julia Christy MD        Followed by    [START ON 7/28/2024] cyanocobalamin (VITAMIN B-12) sublingual tablet 1,000 mcg  1,000 mcg Sublingual Daily Julia Christy MD        levothyroxine (SYNTHROID/LEVOTHROID) tablet 125 mcg  125 mcg Oral QAM Julia Salazar MD   125 mcg at 07/26/24 0644    metoprolol succinate ER (TOPROL XL) 24 hr tablet 25 mg  25 mg Oral QPM Madelin Bell MD   25 mg at 07/25/24 1935    oxyBUTYnin ER (DITROPAN XL) 24 hr tablet 10 mg  10 mg Oral QPM Madelin Bell MD   10 mg at 07/25/24 1935    pantoprazole (PROTONIX) EC tablet 40 mg  40 mg Oral QAM  Julia Christy MD   40 mg at 07/26/24 1422    senna-docusate (SENOKOT-S/PERICOLACE) 8.6-50 MG per tablet 1 tablet  1 tablet Oral BID Madelin Bell MD   1 tablet at 07/23/24 0829    Or    senna-docusate (SENOKOT-S/PERICOLACE) 8.6-50 MG per tablet 2 tablet  2 tablet Oral BID Madelin Bell MD   2 tablet at 07/26/24 0823    tamsulosin (FLOMAX) capsule 0.4 mg  0.4 mg Oral Daily Madelin Bell MD   0.4 mg at 07/26/24 0822       Data   All microbiology laboratory data reviewed.  Recent Labs   Lab Test 07/26/24  1049 07/24/24  0753 07/23/24  1819 07/23/24  0539   WBC  --  4.6 4.6 3.4*    HGB 8.4* 9.5* 9.8* 9.5*   HCT 25.6* 29.4* 29.2* 28.5*   MCV  --  96 95 94   PLT  --  162 134* 136*     Recent Labs   Lab Test 07/24/24  0753 07/23/24  0539 07/22/24  1140   CR 1.34* 1.39* 1.59*     07/22/2024 1348 07/26/2024 0307 Urine Culture [[id number]]    (Abnormal)   Urine, Midstream    Final result Component Value   Culture 10,000-50,000 CFU/mL Pseudomonas aeruginosa Abnormal     10,000-50,000 CFU/mL Pseudomonas aeruginosa Abnormal        Susceptibility     Pseudomonas aeruginosa (1) Pseudomonas aeruginosa (2)     LIZZY LIZZY     Amikacin <=2 ug/mL Susceptible <=2 ug/mL Susceptible     Cefepime <=1 ug/mL Susceptible <=1 ug/mL Susceptible     Ceftazidime <=1 ug/mL Susceptible 4 ug/mL Susceptible     Ciprofloxacin <=0.25 ug/mL Susceptible <=0.25 ug/mL Susceptible     Levofloxacin 0.25 ug/mL Susceptible 1 ug/mL Susceptible     Meropenem <=0.25 ug/mL Susceptible <=0.25 ug/mL Susceptible     Piperacillin/Tazobactam <=4 ug/mL Susceptible <=4 ug/mL Susceptible     Tobramycin <=1 ug/mL Susceptible <=1 ug/mL Susceptible

## 2024-07-26 NOTE — CONSULTS
[facility]  Gastroenterology Consultation         Macie Jorge  [address] [address]  83 year old female    Admission Date/Time: 7/22/2024  Primary Care Provider: Deedee Bui  Referring / Attending Physician:  Dr. Christy    We were asked to see the patient in consultation by Dr. Chirsty for evaluation of epigastric pain and unintentional wt loss.      CC: abdominal pain    HPI:  Macie Jorge is a pleasant 83 year old female with CAD s/p RAMILA to LAD 7/2023, moderate aortic stenosis, CKD 4, severe spinal stenosis with chronic back pain, asthma/COPD, nephrolithiasis, who presents to ER on 7/22/2024 due to persistent left abdominal pain.       Reports left abdominal pain since February 2024.  Also reports constipation, decreased appetite and 16 kg weight loss since December 2023.  Recent outpatient CT A/P w/o contrast 7/9/2024 showed 3 nonobstructing stones in left ureteropelvic junction and proximal ureter, diffuse colonic diverticulosis with mild diverticulitis in descending colon, pulmonary emphysema. She was started on Augmentin for presumed acute diverticulitis which she had taken for 3 days on admission without any improvement in abdominal pain.     No fever or chills, no nausea or vomiting, no dysuria/hematuria, no chest pain or shortness of breath.     Labs showed creatinine of 1.59 which is around her prior Cr, no leukocytosis. UA consistent with UTI. CT A/P showed 8 mm proximal left ureteral stone at ureteropelvic junction with mild left hydronephrosis. And other nonobstructing stones in left kidney.    Patient has had persistent epigastric pain without any associated nausea, vomiting, hematemesis.  She also reports constipation.  Unintentional 16 kg weight loss since December.  She needs EGD and colonoscopy for further evaluation.    ROS: A comprehensive ten point review of systems was negative aside from those in mentioned in the HPI.       PAST MED HX:  I have reviewed this patient's medical history and updated it with pertinent information if needed.   Past Medical History:   Diagnosis Date    Calculus of left ureter 2014    dr Arriaga - ureteroscopy and lithitripsy    Cataract     Chronic low back pain     Dr Villagran at [facility] in 2015- not surgical     Complication of anesthesia     COPD with asthma (H)      followed with pulmonary DR LENNOX 60-pack-year history of smoking    Esophagitis, reflux 9/2012    SOME EOSINOPHILIA NO BARRETS    Gastro-oesophageal reflux disease      has seen GI    Hydronephrosis, left     chronic , in 2017 Dr Arriaga plan was observaton    Hyperlipidaemia LDL goal < 100     Hypothyroid     Kidney stone 3/28/2019    Migraine headaches 9/1/2011    Moderate persistent asthma      lifelong, saw Pulm 2013    Obesity (BMI 30-39.9)     Panic attacks     PONV (postoperative nausea and vomiting)     Renal stones 2016    kidney stones, multiple small stones high risk recurrent ureteral stones. , Dr Arriaga in 2016 rec 24 hour urine.    Type 2 diabetes mellitus without complication (H)        MEDICATIONS:   Prior to Admission Medications   Prescriptions Last Dose Informant Patient Reported? Taking?   Fluticasone-Umeclidin-Vilanterol (TRELEGY ELLIPTA) 200-62.5-25 MCG/ACT oral inhaler  at prn  No Yes   Sig: Inhale 1 puff into the lungs daily   Patient taking differently: Inhale 1 puff into the lungs daily as needed   HYDROcodone-acetaminophen (NORCO)  MG per tablet  at prn  Yes Yes   Sig: Take 1 tablet by mouth 3 times daily as needed   albuterol (PROAIR HFA/PROVENTIL HFA/VENTOLIN HFA) 108 (90 Base) MCG/ACT inhaler  at prn  No Yes   Sig: Inhale 1-2 puffs into the lungs every 4 hours as needed for shortness of breath, wheezing or cough   allopurinol (ZYLOPRIM) 100 MG tablet   No Yes   Sig: Take 2 tablets by mouth once daily   amoxicillin-clavulanate (AUGMENTIN) 500-125 MG tablet 7/21/2024 at pm  No Yes   Sig: Take 1 tablet by mouth 2 times daily  for 7 days   blood glucose (NO BRAND SPECIFIED) lancets standard  Self No No   Sig: Use to test blood sugar 1 time daily or as directed.   Patient not taking: Reported on 2024   blood glucose (NO BRAND SPECIFIED) test strip  Self No No   Sig: Use to test blood sugar 1 time daily or as directed.   Patient not taking: Reported on 2024   blood glucose monitoring (NO BRAND SPECIFIED) meter device kit  Self No No   Sig: Use to test blood sugar 1 time daily or as directed.   Patient not taking: Reported on 2024   clopidogrel (PLAVIX) 75 MG tablet 2024 at pm  No Yes   Sig: Take 1 tablet (75 mg) by mouth daily   levothyroxine (SYNTHROID/LEVOTHROID) 137 MCG tablet 2024 at am  No Yes   Sig: Take 1 tablet (137 mcg) by mouth daily   lisinopril (ZESTRIL) 2.5 MG tablet 2024  Yes Yes   Sig: Take 2.5 mg by mouth daily   metoprolol succinate ER (TOPROL XL) 25 MG 24 hr tablet 2024  No Yes   Sig: Take 1 tablet (25 mg) by mouth daily   nitroGLYcerin (NITROSTAT) 0.4 MG sublingual tablet  at prn  No Yes   Sig: For chest pain place 1 tablet under the tongue every 5 minutes for 3 doses. If symptoms persist 5 minutes after 1st dose call 911.   oxyBUTYnin ER (DITROPAN XL) 10 MG 24 hr tablet 2024  No Yes   Sig: Take 1 tablet (10 mg) by mouth daily   rosuvastatin (CRESTOR) 20 MG tablet 2024  No Yes   Sig: Take 1 tablet (20 mg) by mouth daily      Facility-Administered Medications: None       ALLERGIES:   Allergies   Allergen Reactions    Morphine Nausea     Severe vomiting    Prednisone      Elevated blood glucose  weakness    Baclofen GI Disturbance    Imitrex [Sumatriptan] Nausea    Tetracycline      Sores in mouth       SOCIAL HISTORY:  Social History     Tobacco Use    Smoking status: Former     Current packs/day: 0.00     Average packs/day: 1.5 packs/day for 40.0 years (60.0 ttl pk-yrs)     Types: Cigarettes     Start date: 1955     Quit date: 1995     Years since quittin.5     Smokeless tobacco: Never   Substance Use Topics    Alcohol use: Yes     Comment: couple drinks per year    Drug use: No       FAMILY HISTORY:  No family history on file.    PHYSICAL EXAM:   Vital Signs with Ranges  Temp: 98  F (36.7  C) Temp src: Oral BP: 108/49 Pulse: 66   Resp: 16 SpO2: 93 % O2 Device: None (Room air)    I/O last 3 completed shifts:  In: 360 [P.O.:360]  Out: -       Constitutional: Alert, oriented to person, place, situation.  Cooperative, lying in bed in NAD.   Respiratory:  Lungs CTAB.  No crackles, wheezes, or rhonchi, no labored breathing.  Cardiovascular:  Heart RRR, no MRG  GI:  Abdomen soft, TTP in epigastric region and with normoactive BS  Skin/Integumen:  Warm, dry, non-diaphoretic.  MSK: CMS x4 intact.      ADDITIONAL COMMENTS:   I reviewed the patient's new clinical lab test results.   Recent Labs   Lab Test 07/24/24  0753 07/23/24  1819 07/23/24  0539 03/19/24  1440 03/11/24  1653 07/18/23  1504 07/05/23  0647   WBC 4.6 4.6 3.4*   < > 10.2   < > 4.5   HGB 9.5* 9.8* 9.5*   < > 9.5*   < > 10.7*   MCV 96 95 94   < > 92   < > 97    134* 136*   < > 225   < > 134*   INR  --   --   --   --  1.03  --  1.03    < > = values in this interval not displayed.     Recent Labs   Lab Test 07/24/24  0753 07/23/24  0539 07/22/24  1140   POTASSIUM 4.3 3.8 4.7   CHLORIDE 104 107 104   CO2 22 26 25   BUN 22.4 16.8 19.6   ANIONGAP 11 7 8     Recent Labs   Lab Test 07/22/24  1348 07/22/24  1140 06/27/24  1157 05/31/24  1213 06/03/23  0632   ALBUMIN  --  4.1 4.4  --  3.8   BILITOTAL  --  0.7 0.9  --  0.5   ALT  --  13 8 13 13   AST  --  27 27  --  19   PROTEIN Negative  --   --   --   --    LIPASE  --  51  --   --   --        I reviewed the patient's new imaging results.        CONSULTATION ASSESSMENT AND PLAN:    Macie Jorge is a pleasant 83 year old female with CAD s/p RAMILA to LAD 7/2023, moderate aortic stenosis, CKD 4, severe spinal stenosis with chronic back pain, asthma/COPD,  nephrolithiasis, who presents to ER on 7/22/2024 due to persistent left abdominal pain.      Epigastric and left lower quadrant abdominal pain  Chronic anemia  Unintentional weight loss-16 kg  -- Patient has had persistent epigastric pain without any associated nausea, vomiting, hematemesis.  She also reports constipation.  Unintentional 16 kg weight loss since December.  She needs EGD and colonoscopy for further evaluation.  Recent Labs   Lab 07/24/24  0753 07/23/24  1819 07/23/24  0539 07/22/24  1140   HGB 9.5* 9.8* 9.5* 10.4*   --hgb low but stable in ~9 range  --Will pursue EGD today, she is NPO.  Will determine if colonoscopy is needed inpatient versus outpatient.  We appreciate the consult.        [facility].  Office: [phone]  Cell: [phone] (Dr. Thompson)

## 2024-07-26 NOTE — PROGRESS NOTES
PRIMARY Concern: admitted for abdominal pain. Found 8mm ureteral stone. Ureteral stent placed 7/23.  SAFETY RISK Concerns (fall risk, behaviors, etc.):  None   Isolation/Type: None   Tests/Procedures for NEXT shift: AM labs   Consults? (Pending/following, signed-off?) Urology following   Where is patient from? (Home, TCU, etc.): Home   Other Important info for NEXT shift:  Anticipated DC date & active delays: TBD  SUMMARY NOTE: Still no bowel movements yet, said she will let RN know when to have suppository.     Orientation/Cognitive: AXO4  Observation Goals (Met/ Not Met): Inp  Mobility Level/Assist Equipment: IND  Antibiotics & Plan (IV/po, length of tx left): IV Cefepime q12    Pain Management:  PRN Norco given - helps 5/10 for reassess.    Tele/VS/O2: VSS on RA.   ABNL Lab/BG: UC + Pseudomonas aeruginosa, provider waiting for sensitivity result.  Diet: Reg  Bowel/Bladder: Cont   Skin Concerns: Multiple bruises, fragile skin with thin epidermis. Removed IV In R Hand.   Drains/Devices: New PIV SL on L FA   Patient Stated Goal for Today: TBD

## 2024-07-26 NOTE — PROGRESS NOTES
Per Infusion Services note, patient will need PICC.  Patient given option of PICC placement today or tomorrow am, patient prefers tomorrow am.

## 2024-07-26 NOTE — PROGRESS NOTES
Therapy: IV ABX  Insurance: [facility] Medicare Advantage     Co-Insurance: 80/20  Max Out of Pocket: $2900  Met: $452    Pt has coverage for IV ABX through their [facility] Medicare Advantage plan. Pt s IV ABX must be done via CADD pump for coverage. Cefepime can be done via CADD pump, if drug changes please let us know so we can re-verify coverage. Pt s plan will cover automatically at 80/20 coverage until their $452/$2900 out of pocket has been met. Once satisfied pt would be covered at 100%.    For nursing, pt should have coverage if homebound, however [facility] is not contracted with Medicare and an outside nursing agency would be utilized instead. If pt is not homebound, there is no coverage and [facility] can provide nursing if pt agrees to self-pay an additional $90 per visit. Pt would otherwise also be covered at an IC or TCU.    In reference to referral from [facility] on pt admitted 07/22/24 to check for IV ABX coverage.     Please contact Intake with any questions, [phone] or In Basket pool, [facility] ([id number]).

## 2024-07-26 NOTE — CONSULTS
Care Management Initial Consult    General Information  Assessment completed with: PatientMacie  Type of CM/SW Visit: Initial Assessment    Primary Care Provider verified and updated as needed: Yes   Readmission within the last 30 days: no previous admission in last 30 days      Reason for Consult: discharge planning  Advance Care Planning: Advance Care Planning Reviewed: concerns discussed          Communication Assessment  Patient's communication style: spoken language (English or Bilingual)    Hearing Difficulty or Deaf: no   Wear Glasses or Blind: no    Cognitive  Cognitive/Neuro/Behavioral: WDL  Level of Consciousness: alert  Arousal Level: opens eyes spontaneously  Orientation: oriented x 4  Mood/Behavior: calm, cooperative  Best Language: 0 - No aphasia  Speech: clear, spontaneous, logical    Living Environment:   People in home: spouse     Current living Arrangements: house      Able to return to prior arrangements: yes       Family/Social Support:  Care provided by:    Provides care for: no one  Marital Status:              Description of Support System:           Current Resources:   Patient receiving home care services: No     Community Resources: None  Equipment currently used at home: none  Supplies currently used at home: None    Employment/Financial:  Employment Status: retired        Financial Concerns: none           Does the patient's insurance plan have a 3 day qualifying hospital stay waiver?  Yes     Which insurance plan 3 day waiver is available? Alternative insurance waiver    Will the waiver be used for post-acute placement? No    Lifestyle & Psychosocial Needs:  Social Determinants of Health     Food Insecurity: Not on file   Depression: Not at risk (5/31/2024)    PHQ-2     PHQ-2 Score: 0   Housing Stability: Not on file   Tobacco Use: Medium Risk (7/19/2024)    Patient History     Smoking Tobacco Use: Former     Smokeless Tobacco Use: Never     Passive Exposure: Not on file  "  Financial Resource Strain: Not on file   Alcohol Use: Not on file   Transportation Needs: Not on file   Physical Activity: Not on file   Interpersonal Safety: Not on file   Stress: Not on file   Social Connections: Not on file   Health Literacy: Not on file       Functional Status:  Prior to admission patient needed assistance:   Dependent ADLs:: Independent  Dependent IADLs:: Independent       Mental Health Status:  Mental Health Status: No Current Concerns       Chemical Dependency Status:                Values/Beliefs:  Spiritual, Cultural Beliefs, Judaism Practices, Values that affect care: no               Additional Information:  Writer met with patient in room. Patient lives in a house with her . She is independent at baseline.  -Patient being seen by GI with EGD procedure today so writer wanted to speak to her prior to this procedure.  -Writer went over need for possible home infusion services.   Patient said, \"either that or [facility] but my  wants me at home.\"   -Referral sent to [facility] for a benefit check for Cefepime 2g IV every 12 hours.    Lin Dey RN      "

## 2024-07-26 NOTE — PLAN OF CARE
Goal Outcome Evaluation:  PRIMARY Concern: admitted for abdominal pain. Found 8mm ureteral stone. Ureteral stent placed 7/23.  SAFETY RISK Concerns (fall risk, behaviors, etc.):  NA  Isolation/Type: None   Tests/Procedures for NEXT shift: AM labs Awaiting sensitivities  Consults? (Pending/following, signed-off?) Urology following   Where is patient from? (Home, TCU, etc.): Home   Other Important info for NEXT shift:  Anticipated DC date & active delays: TBD  SUMMARY NOTE: Still no bowel movements yet, said she will let RN know when to have suppository.      Orientation/Cognitive: AXO4  Observation Goals (Met/ Not Met): Inpatient  Mobility Level/Assist Equipment: IND  Antibiotics & Plan (IV/po, length of tx left): IV Cefepime q12    Pain Management:  PRN Norco given   Tele/VS/O2: VSS on RA.   ABNL Lab/BG: UC + Pseudomonas aeruginosa, provider waiting for sensitivity result.  Diet: Reg  Bowel/Bladder: Cont   Skin Concerns: Multiple bruises, fragile skin with thin epidermis.   Drains/Devices: PIV SL  Patient Stated Goal for Today: TBD

## 2024-07-27 VITALS
RESPIRATION RATE: 14 BRPM | HEIGHT: 66 IN | SYSTOLIC BLOOD PRESSURE: 116 MMHG | BODY MASS INDEX: 24.27 KG/M2 | WEIGHT: 151 LBS | DIASTOLIC BLOOD PRESSURE: 57 MMHG | TEMPERATURE: 98.5 F | HEART RATE: 69 BPM | OXYGEN SATURATION: 96 %

## 2024-07-27 LAB
ATRIAL RATE - MUSE: 62 BPM
BASOPHILS # BLD AUTO: 0 10E3/UL (ref 0–0.2)
BASOPHILS NFR BLD AUTO: 0 %
DIASTOLIC BLOOD PRESSURE - MUSE: NORMAL MMHG
EOSINOPHIL # BLD AUTO: 0.2 10E3/UL (ref 0–0.7)
EOSINOPHIL NFR BLD AUTO: 4 %
ERYTHROCYTE [DISTWIDTH] IN BLOOD BY AUTOMATED COUNT: 16 % (ref 10–15)
HCT VFR BLD AUTO: 27.4 % (ref 35–47)
HGB BLD-MCNC: 9.1 G/DL (ref 11.7–15.7)
IMM GRANULOCYTES # BLD: 0 10E3/UL
IMM GRANULOCYTES NFR BLD: 1 %
INTERPRETATION ECG - MUSE: NORMAL
LYMPHOCYTES # BLD AUTO: 0.8 10E3/UL (ref 0.8–5.3)
LYMPHOCYTES NFR BLD AUTO: 17 %
MCH RBC QN AUTO: 31.3 PG (ref 26.5–33)
MCHC RBC AUTO-ENTMCNC: 33.2 G/DL (ref 31.5–36.5)
MCV RBC AUTO: 94 FL (ref 78–100)
MONOCYTES # BLD AUTO: 0.5 10E3/UL (ref 0–1.3)
MONOCYTES NFR BLD AUTO: 11 %
NEUTROPHILS # BLD AUTO: 3.1 10E3/UL (ref 1.6–8.3)
NEUTROPHILS NFR BLD AUTO: 67 %
NRBC # BLD AUTO: 0 10E3/UL
NRBC BLD AUTO-RTO: 0 /100
P AXIS - MUSE: 62 DEGREES
PLATELET # BLD AUTO: 152 10E3/UL (ref 150–450)
PR INTERVAL - MUSE: 212 MS
QRS DURATION - MUSE: 78 MS
QT - MUSE: 410 MS
QTC - MUSE: 416 MS
R AXIS - MUSE: 114 DEGREES
RBC # BLD AUTO: 2.91 10E6/UL (ref 3.8–5.2)
SYSTOLIC BLOOD PRESSURE - MUSE: NORMAL MMHG
T AXIS - MUSE: 60 DEGREES
VENTRICULAR RATE- MUSE: 62 BPM
WBC # BLD AUTO: 4.6 10E3/UL (ref 4–11)

## 2024-07-27 PROCEDURE — 99239 HOSP IP/OBS DSCHRG MGMT >30: CPT | Performed by: HOSPITALIST

## 2024-07-27 PROCEDURE — 250N000013 HC RX MED GY IP 250 OP 250 PS 637

## 2024-07-27 PROCEDURE — 250N000009 HC RX 250: Performed by: PHYSICIAN ASSISTANT

## 2024-07-27 PROCEDURE — 36569 INSJ PICC 5 YR+ W/O IMAGING: CPT

## 2024-07-27 PROCEDURE — 999N000040 HC STATISTIC CONSULT NO CHARGE VASC ACCESS

## 2024-07-27 PROCEDURE — 36415 COLL VENOUS BLD VENIPUNCTURE: CPT | Performed by: HOSPITALIST

## 2024-07-27 PROCEDURE — 272N000450 HC KIT 4FR POWER PICC SINGLE LUMEN

## 2024-07-27 PROCEDURE — 85025 COMPLETE CBC W/AUTO DIFF WBC: CPT | Performed by: HOSPITALIST

## 2024-07-27 PROCEDURE — 250N000013 HC RX MED GY IP 250 OP 250 PS 637: Performed by: INTERNAL MEDICINE

## 2024-07-27 PROCEDURE — 250N000011 HC RX IP 250 OP 636: Performed by: INTERNAL MEDICINE

## 2024-07-27 PROCEDURE — 250N000013 HC RX MED GY IP 250 OP 250 PS 637: Performed by: HOSPITALIST

## 2024-07-27 PROCEDURE — 250N000011 HC RX IP 250 OP 636: Performed by: HOSPITALIST

## 2024-07-27 RX ORDER — LEVOTHYROXINE SODIUM 125 UG/1
125 TABLET ORAL
Qty: 30 TABLET | Refills: 0 | Status: SHIPPED | OUTPATIENT
Start: 2024-07-28 | End: 2024-08-28

## 2024-07-27 RX ORDER — SUCRALFATE ORAL 1 G/10ML
1 SUSPENSION ORAL
Status: DISCONTINUED | OUTPATIENT
Start: 2024-07-27 | End: 2024-07-27 | Stop reason: HOSPADM

## 2024-07-27 RX ORDER — AMOXICILLIN 250 MG
1 CAPSULE ORAL 2 TIMES DAILY PRN
Qty: 30 TABLET | Refills: 0 | Status: SHIPPED | OUTPATIENT
Start: 2024-07-27

## 2024-07-27 RX ORDER — ASPIRIN 81 MG/1
81 TABLET ORAL DAILY
Qty: 30 TABLET | Refills: 0 | Status: SHIPPED | OUTPATIENT
Start: 2024-07-28

## 2024-07-27 RX ORDER — PANTOPRAZOLE SODIUM 40 MG/1
40 TABLET, DELAYED RELEASE ORAL
Qty: 30 TABLET | Refills: 0 | Status: SHIPPED | OUTPATIENT
Start: 2024-07-28

## 2024-07-27 RX ORDER — HYDROCODONE BITARTRATE AND ACETAMINOPHEN 10; 325 MG/1; MG/1
1 TABLET ORAL 3 TIMES DAILY PRN
Qty: 6 TABLET | Refills: 0 | Status: SHIPPED | OUTPATIENT
Start: 2024-07-27

## 2024-07-27 RX ORDER — TAMSULOSIN HYDROCHLORIDE 0.4 MG/1
0.4 CAPSULE ORAL DAILY
Qty: 30 CAPSULE | Refills: 0 | Status: ON HOLD | OUTPATIENT
Start: 2024-07-28 | End: 2024-08-16

## 2024-07-27 RX ORDER — SUCRALFATE 1 G/1
1 TABLET ORAL 4 TIMES DAILY
Qty: 120 TABLET | Refills: 0 | Status: SHIPPED | OUTPATIENT
Start: 2024-07-27 | End: 2024-08-26

## 2024-07-27 RX ADMIN — LIDOCAINE HYDROCHLORIDE ANHYDROUS 1 ML: 10 INJECTION, SOLUTION INFILTRATION at 13:46

## 2024-07-27 RX ADMIN — HYDROMORPHONE HYDROCHLORIDE 0.3 MG: 1 INJECTION, SOLUTION INTRAMUSCULAR; INTRAVENOUS; SUBCUTANEOUS at 02:06

## 2024-07-27 RX ADMIN — PANTOPRAZOLE SODIUM 40 MG: 40 TABLET, DELAYED RELEASE ORAL at 10:20

## 2024-07-27 RX ADMIN — SUCRALFATE 1 G: 1 SUSPENSION ORAL at 10:21

## 2024-07-27 RX ADMIN — HYDROMORPHONE HYDROCHLORIDE 0.3 MG: 1 INJECTION, SOLUTION INTRAMUSCULAR; INTRAVENOUS; SUBCUTANEOUS at 04:57

## 2024-07-27 RX ADMIN — SENNOSIDES AND DOCUSATE SODIUM 2 TABLET: 50; 8.6 TABLET ORAL at 10:22

## 2024-07-27 RX ADMIN — CEFEPIME 2 G: 2 INJECTION, POWDER, FOR SOLUTION INTRAVENOUS at 16:18

## 2024-07-27 RX ADMIN — ASPIRIN 81 MG: 81 TABLET, COATED ORAL at 10:20

## 2024-07-27 RX ADMIN — CEFEPIME 2 G: 2 INJECTION, POWDER, FOR SOLUTION INTRAVENOUS at 01:59

## 2024-07-27 RX ADMIN — LEVOTHYROXINE SODIUM 125 MCG: 75 TABLET ORAL at 10:20

## 2024-07-27 RX ADMIN — HYDROCODONE BITARTRATE AND ACETAMINOPHEN 2 TABLET: 5; 325 TABLET ORAL at 10:19

## 2024-07-27 RX ADMIN — CYANOCOBALAMIN 1000 MCG: 1000 INJECTION, SOLUTION INTRAMUSCULAR at 10:27

## 2024-07-27 RX ADMIN — SUCRALFATE 1 G: 1 SUSPENSION ORAL at 16:17

## 2024-07-27 RX ADMIN — TAMSULOSIN HYDROCHLORIDE 0.4 MG: 0.4 CAPSULE ORAL at 10:21

## 2024-07-27 ASSESSMENT — ACTIVITIES OF DAILY LIVING (ADL)
ADLS_ACUITY_SCORE: 30
ADLS_ACUITY_SCORE: 31
ADLS_ACUITY_SCORE: 31
ADLS_ACUITY_SCORE: 30
ADLS_ACUITY_SCORE: 31
ADLS_ACUITY_SCORE: 30
ADLS_ACUITY_SCORE: 31
ADLS_ACUITY_SCORE: 31
ADLS_ACUITY_SCORE: 30
ADLS_ACUITY_SCORE: 30
ADLS_ACUITY_SCORE: 31

## 2024-07-27 NOTE — PROGRESS NOTES
Care Management Discharge Note    Discharge Date: 07/27/2024       Discharge Disposition: Home, Home Infusion    Discharge Services: Home Care    Discharge DME: None    Discharge Transportation: family or friend will provide    Private pay costs discussed: Not applicable    Does the patient's insurance plan have a 3 day qualifying hospital stay waiver?  No    PAS Confirmation Code:    Patient/family educated on Medicare website which has current facility and service quality ratings: yes    Education Provided on the Discharge Plan: Yes  Persons Notified of Discharge Plans:   Patient/Family in Agreement with the Plan: yes    Handoff Referral Completed: Yes    Additional Information:  Writer received call from [facility]. They will be out with a nurse to teach patient and family again. They are all set and don't need any orders.    Lin Dey RN

## 2024-07-27 NOTE — PLAN OF CARE
PRIMARY Concern: admitted for abdominal pain. 8mm L ureteral stone, L ureteral stent placed 7/23. Constipation  SAFETY RISK Concerns (fall risk, behaviors, etc.):  None  Isolation/Type: None   Tests/Procedures for NEXT shift:   Consults? (Pending/following, signed-off?) Cardiology, ID, and urology have signed off. GI and SW/CC following. Vascular Access to see.  Where is patient from? (Home, TCU, etc.): Home   Other Important info for NEXT shift: Tender abdomen with epigastric pain-- US abd showed renal disease and stable focal/aneurysmal dilation.  EGD 7/26/24 showed large hiatal hernia non bleeding ulcers and Oren's lesions, biopsied for Celiac's and H. Pylori.   PICC was placed today in L arm, started bleeding and we are waiting for IV team to redress.   Anticipated DC date & active delays: 7/27     Orientation/Cognitive: A/Ox4  Observation Goals (Met/ Not Met): Inpt  Mobility Level/Assist Equipment: IND  Antibiotics & Plan (IV/po, length of tx left): IV Cefepime q12. Plan for PICC placement for IV antibiotics for 2 weeks  Pain Management: Generalized abdominal cramping/bloating/pain -- PRN norco x1  Tele/VS/O2: VSS RA  ABNL Lab/BG: UC+ Pseudomonas aeruginosa, Cr 1.34, GFR 39, Hgb 9.1  Diet: Reg  Bowel/Bladder: Cont, voiding   Skin Concerns: Multiple bruises, fragile/thin skin.   Drains/Devices: PIV SL, PICC placed today       VSS RA, no complaints of chest pain or SOB. PICC placed and PIV removed. AVS gone through with patient, all questions and concerns answered. All belongings were taken with the patient, including discharge medications. Patient was transported down to discharge door for  to provide ride.

## 2024-07-27 NOTE — PLAN OF CARE
[id number]: Pt's 10/10 abdominal pain/discomfort improving after tap water enema overnight. Pt has had 3 episodes loose stool, continue to monitor closely.     PRIMARY Concern: admitted for abdominal pain. 8mm L ureteral stone, L ureteral stent placed 7/23. Constipation  SAFETY RISK Concerns (fall risk, behaviors, etc.):  NA. Green on stoplight safety tool  Isolation/Type: None   Tests/Procedures for NEXT shift: PICC placement this AM    Consults? (Pending/following, signed-off?) Cardiology, ID, and urology have signed off. GI and SW/CC following. Vascular Access to see.  Where is patient from? (Home, TCU, etc.): Home   Other Important info for NEXT shift: Received tap water enema overnight w/ moderate results.   Tender abdomen with epigastric pain-- US abd showed renal disease and stable focal/aneurysmal dilation.  EGD 7/26/24 showed large hiatal hernia non bleeding ulcers and Oren's lesions, biopsied for Celiac's and H. Pylori.   Anticipated DC date & active delays: TBD pending clinical progress     Orientation/Cognitive: A/Ox4  Observation Goals (Met/ Not Met): Inpt  Mobility Level/Assist Equipment: IND  Antibiotics & Plan (IV/po, length of tx left): IV Cefepime q12. Plan for PICC placement for IV antibiotics for 2 weeks  Pain Management: Generalized abdominal cramping/bloating/pain -- Required 2 doses IV dilaudid overnight (0.3mg). C/o constipation: Received senna, miralax, suppository, heat packs, and tap water enema.  Tele/VS/O2: VSS RA  ABNL Lab/BG: UC+ Pseudomonas aeruginosa, Cr 1.34, GFR 39, Hgb 8.4  Diet: Reg  Bowel/Bladder: Continent. Voiding adequate amounts, c/o discomfort w/ voiding at times. Tap water enema overnight w/ moderate results.   Skin Concerns: Multiple bruises, fragile/thin skin.   Drains/Devices: IVF infusing PIV.

## 2024-07-27 NOTE — PROCEDURES
[facility]    Single Lumen PICC Placement    Date/Time: 7/27/2024 2:04 PM    Performed by: Odalys Mota RN  Authorized by: Magnolia Rosas PA-C  Indications: vascular access      UNIVERSAL PROTOCOL   Site Marked: Yes  Prior Images Obtained and Reviewed:  Yes  Required items: Required blood products, implants, devices and special equipment available    Patient identity confirmed:  Verbally with patient, arm band, provided demographic data and hospital-assigned identification number  NA - No sedation, light sedation, or local anesthesia  Confirmation Checklist:  Patient's identity using two indicators, relevant allergies, procedure was appropriate and matched the consent or emergent situation and correct equipment/implants were available  Time out: Immediately prior to the procedure a time out was called    Universal Protocol: the Joint Commission Universal Protocol was followed    Preparation: Patient was prepped and draped in usual sterile fashion    ESBL (mL):  1     ANESTHESIA    Anesthesia:  Local infiltration  Local Anesthetic:  Lidocaine 1% without epinephrine  Anesthetic Total (mL):  1      SEDATION    Patient Sedated: No        Preparation: skin prepped with 2% chlorhexidine  Skin prep agent: skin prep agent completely dried prior to procedure  Sterile barriers: maximum sterile barriers were used: cap, mask, sterile gown, sterile gloves, and large sterile sheet  Hand hygiene: hand hygiene performed prior to central venous catheter insertion  Type of line used: PICC  Catheter type: single lumen  Lumen type: valved and power PICC  Catheter size: 4 Fr  Brand: Bard  Lot number: [id number]  Placement method: MST, ultrasound and tip navigation system  Number of attempts: 1  Difficulty threading catheter: no  Successful placement: yes  Orientation: left    Location: basilic vein  Extremity circumference: 27  Visible catheter length: 3  Total catheter length: 47  Dressing and  "securement: alcohol impregnated caps, chlorhexidine disc applied, gloves changed prior to final dressing, sterile dressing applied, subcutaneous anchor securement system and transparent dressing  Post procedure assessment: blood return through all ports, free fluid flow and placement verified by 3CG technology  PROCEDURE   Patient Tolerance:  Patient tolerated the procedure well with no immediate complicationsDescribe Procedure: PICC initially attempted right basilic, able to cannulate vessel, only able to advance wire up to shoulder.  Patient describes \"bad shoulder\" on right.  New procedure on left, able to cannulate basilic vessel, and thread wire without incident.  Remainder of procedure without complication.  Disposal: sharps and needle count correct at the end of procedure, needles and guidewire disposed in sharps container        "

## 2024-07-27 NOTE — PROVIDER NOTIFICATION
MD Notification    Notified Person: MD    Notified Person Name: Marilin Monroy    Notification Date/Time: 2209 7/26/24    Notification Interaction: [employer] messaging     Purpose of Notification: Pt constipated/bloated/cramping and feeling very uncomfortable. Last BM 7/22/24. EGD today showed large hiatal hernia and non-bleeding gastric ulcers. Tried suppository and senna earlier this evening w/o results. Order for miralax?     Orders Received: awaiting callback / orders    Comments: Have also attempted heat packs to abdomen and ambulation      ADDENDUM 2213: MD ordered miralax. PRN levsin also requested from pharmacy (See EMAR)

## 2024-07-27 NOTE — PROVIDER NOTIFICATION
"MD Notification    Notified Person: MD    Notified Person Name: Dr. Lechuga    Notification Date/Time: 0008 7/27/24    Notification Interaction: [employer] messaging    Purpose of Notification:  Pt constipated/bloated/cramping, rates generalized abdominal pain 9/10. \"Feel like I need to poop I just can't.\" Last BM 7/22/24. EGD 7/26/24 showed large hiatal hernia and non-bleeding gastric ulcers. Have tried senna, miralax, suppository, levsin, and heat packs this evening. She said she took milk of mag at home before admission (7/22) w/o results. Pt ambulatory, should we try enema?     Orders Received: MD entered order for tap water enema      "

## 2024-07-27 NOTE — DISCHARGE SUMMARY
[facility]  Hospitalist Discharge Summary      Date of Admission:  7/22/2024  Date of Discharge:  7/27/2024  Discharging Provider: Julia Christy MD  Discharge Service: Hospitalist Service    Discharge Diagnoses     Please refer to the hospital course below    Clinically Significant Risk Factors          Follow-ups Needed After Discharge   Follow-up Appointments     Follow Up ([facility])      You are being scheduled for your second stage kidney stone surgery in 2-3   weeks at [facility]. [facility] scheduling team will reach   out to finalize surgery details. Please call if you have not heard from   our scheduling team within one week of discharge from the hospital.     You have a ureteral stent to help allow drainage from your kidney to   bladder. You should increase your fluid intake while you have a stent in   place to ensure good drainage of urine.   Your stent is not permanent, it requires routine exchanges or removal with   your urologist based on your plan of care.  While the stent is in place you may notice:  -Flank / back/ side  pain on the same side of your body as the stent  -Blood tinged urine that may come and go  -Sensation that you need to urinate more frequently   -Bladder cramping/discomfort at the end of urination  These are all normal to experience with the stent in place. However, if   these symptoms become too bothersome to tolerate, please alert your   urologist.        Follow-up and recommended labs and tests       Follow up with primary care provider, Deedee Bui, within 7 days to   evaluate medication change, to evaluate after surgery, for hospital   follow- up, regarding new diagnosis, and medication refill, and to be   evaluated for unexplained weight loss. Needs follow up thyroid function ,   B12 level and BMP in 4 weeks.             Unresulted Labs Ordered in the Past 30 Days of this Admission       Date and Time Order Name Status  Description    7/26/2024 12:31 PM Surgical Pathology Exam In process         These results will be followed up by [facility]    Discharge Disposition   Discharged to home  Condition at discharge: Stable    Hospital Course      Obstructing 8 mm proximal ureteral stone at left ureteropelvic junction with mild left hydronephrosis  Other nonobstructing stones in left kidney  History of nephrolithiasis with multiple previous procedures with Dr. Rivera at [facility].    Pseudomonas UTI  IV Rocephin started on admission, urology consulted and patient underwent left ureteral stent placement 7/23.  Did have some hematuria and clot postprocedure which has since resolved and left-sided abdominal pain has improved.  -Urine culture grew Pseudomonas and so antibiotic changed to cefepime.  Noted it is pansensitive. Discussed possible choices and after reviewing adverse effects, patient does not want to be on fluoroquinolone.  ID consulted and patient discharging home on cefepime.  PICC line has been placed.   has arranged home care infusion.  Urology recommended 2 weeks of antibiotic given the stent in place. Started on Flomax  -Pain controlled with Norco     Longstanding left abdominal pain  Patient reports she has low back pain, and also ongoing abd pain which is worse for the last few days. Could be secondary to ureteronephrolithiasis.  Per patient, pain has been present since 2/2024 and feels it was being masked by narcotics that she takes for back pain  -Recent pelvic ultrasound showed no abnormalities. Has seen GYN.   -Recent CT A/P 7/9/2024 showed possible descending colon diverticulitis for which she was started on Augmentin and has taken 3 days of medication.  No mention of diverticulitis on CT on 7/22/2024  -No other obvious cause to explain abdominal pain.  Pain has significantly improved now.    Epigastric abdominal pain  Nonbleeding gastric ulcers  7/26: Patient reported retrosternal pain,  nonradiating, with associated reflux.  On exam mildly tender epigastric area.  Twelve-lead EKG was done and without ischemic changes.  Patient has lost almost 40 pounds recently.  No hematochezia or melena.  Hemoglobin was stable at 9 desisted but is down to 8.4.  Iron studies not consistent with deficiency.  -Discussed with and consulted GI.  Underwent EGD 7/26.  Noted nonbleeding gastric ulcers.  -GI recommended daily PPI and Carafate.  -LFTs normal.  Lipase normal.  Right upper quadrant ultrasound ordered and unrevealing.  Patient is s/p cholecystectomy.  -Duodenal biopsy result to follow.  Follow-up with [facility] to repeat EGD in a month to ensure the gastric ulcers heal    Moderate aortic stenosis  Coronary artery disease, s/p PCI with RAMILA to LAD on 7/5/2023  Most recent echo 5/31/2024 showed normal LVEF of 60-65%, no WMA, normal RV size and function, mild-moderate aortic stenosis with valve area 1.6-1.8.  On previous echo valve area was 1 cm2  -She was assessed in valve clinic.  Due to lack of symptoms and  CKD 4, plan was to follow-up with repeat echoes   -Cardiology consulted this stay.  PTA on plavix, changed to aspirin per cardiology    Chronic kidney disease stage IV  -Baseline creatinine 1.5-1.9.  Follows with nephrology.  Presented with creatinine of 1.59.  -Improved to 1.3 with IV hydration and stable, off IV fluid.     Benign essential hypertension  -Continue Toprol-XL 25 mg daily.  Hold lisinopril 2.5 mg for now.  Resumed at discharge as creatinine remains stable.     Dyslipidemia  -Resume PTA rosuvastatin      Hypothyroidism with iatrogenic hyperthyroidism  -PTA is on levothyroxine 137 mcg  -Noted suppressed TSH and elevated free T4.  Patient is unclear when the dose was increased from 125 mcg.  -Held for 1 day and resumed at lower dose of 125 mcg daily, recheck thyroid function test in 4 weeks as outpatient.    Vitamin B12 deficiency  -Level is low.   B12 injection while in hospital and discharged  on p.o. supplement     COPD/asthma  60-pack-year history of tobacco use  -PTA-Trelegy inhaler as needed, albuterol inhaler as needed  -Started on albuterol neb as needed while hospitalized  -No acute exacerbation     Spinal canal stenosis with chronic back pain  -Followed by spine clinic.  On hydrocodone-acetaminophen  mg 3 times daily as needed, continue     Unintentional weight loss  -Patient has lost 16 KGs in past 8 months.  Weight was 83 kg in December 2023 and is now 68.5 kg.  -recommend further workup as outpatient for etiologies including occult malignancy. Patient does have heavy smoking history   -After EGD, it was felt that the Oren ulcer could likely be contributing to her abdominal pain and weight loss.  Also noted to be iatrogenic hyperthyroid.  Follow-up as outpatient, monitor weight closely and workup as needed.    Pancytopenia  Chronic anemia, hemoglobin baseline around 9-10.  Now with leukopenia as well as thrombocytopenia.  -Iron panel, ferritin, folate normal, B12 low.  -Peripheral smear shows normocytic normochromic anemia, no abnormal cells noted.  -Platelet count and WBC count now normal.  Mild anemia, see above, GI consult.    Consultations This Hospital Stay   UROLOGY IP CONSULT  CARDIOLOGY IP CONSULT  INFECTIOUS DISEASES IP CONSULT  GASTROENTEROLOGY IP CONSULT  CARE MANAGEMENT / SOCIAL WORK IP CONSULT  VASCULAR ACCESS ADULT IP CONSULT  VASCULAR ACCESS ADULT IP CONSULT    Code Status   Full Code    Time Spent on this Encounter   I, Julia Christy MD, personally saw the patient today and spent greater than 30 minutes discharging this patient.       Julia Christy MD  [facility] EXTENDED RECOVERY AND SHORT STAY  [address] [address]  Phone: [phone]  ______________________________________________________________________    Physical Exam   Vital Signs: Temp: 98.4  F (36.9  C) Temp src: Oral BP: (!) 144/85 Pulse: 80   Resp: 16 SpO2: 96  % O2 Device: None (Room air)    Weight: 151 lbs 0 oz    General: AAOx3, appears comfortable.  HEENT: PERRLA EOMI. Mucosa moist.   Lungs: Bilateral equal air entry. Clear to auscultation, normal work of breathing.   CVS: S1S2 regular, no tachycardia or murmur.   Abdomen: Soft, ND, epigastric tenderness still present. BS heard.  MSK: No edema or deformities.  Neuro: AAOX3. CN 2-12 normal. Strength symmetrical.  Skin: No rash.          Primary Care Physician   Deedee Bui    Discharge Orders      Basic metabolic panel     Follow-Up with Cardiology      Home Infusion Referral      Follow Up ([facility])    You are being scheduled for your second stage kidney stone surgery in 2-3 weeks at [facility]. [facility] scheduling team will reach out to finalize surgery details. Please call if you have not heard from our scheduling team within one week of discharge from the hospital.     You have a ureteral stent to help allow drainage from your kidney to bladder. You should increase your fluid intake while you have a stent in place to ensure good drainage of urine.   Your stent is not permanent, it requires routine exchanges or removal with your urologist based on your plan of care.  While the stent is in place you may notice:  -Flank / back/ side  pain on the same side of your body as the stent  -Blood tinged urine that may come and go  -Sensation that you need to urinate more frequently   -Bladder cramping/discomfort at the end of urination  These are all normal to experience with the stent in place. However, if these symptoms become too bothersome to tolerate, please alert your urologist.     Reason for your hospital stay    Obstructing left ureteric stone and Pseudomonas UTI     Activity    Your activity upon discharge: activity as tolerated     Follow-up and recommended labs and tests     Follow up with primary care provider, Deedee Bui, within 7 days to evaluate medication change, to evaluate after  surgery, for hospital follow- up, regarding new diagnosis, and medication refill, and to be evaluated for unexplained weight loss. Needs follow up thyroid function , B12 level and BMP in 4 weeks.     Echocardiogram Complete    Administration of IV contrast will be tailored to this examination per the appropriate written protocol listed in the Echocardiography department Protocol Book, or by the supervising Cardiologist. This may result in an order change.    Use of contrast is at the discretion of the supervising Cardiologist.     Diet    Follow this diet upon discharge: Orders Placed This Encounter      Regular Diet Adult       Significant Results and Procedures   Most Recent 3 CBC's:  Recent Labs   Lab Test 07/27/24  0644 07/26/24  1049 07/24/24  0753 07/23/24  1819   WBC 4.6  --  4.6 4.6   HGB 9.1* 8.4* 9.5* 9.8*   MCV 94  --  96 95     --  162 134*     Most Recent 3 BMP's:  Recent Labs   Lab Test 07/26/24  1049 07/24/24  0753 07/23/24  1647 07/23/24  0539 07/22/24  1140   NA  --  137  --  140 137   POTASSIUM 3.9 4.3  --  3.8 4.7   CHLORIDE  --  104  --  107 104   CO2  --  22  --  26 25   BUN  --  22.4  --  16.8 19.6   CR  --  1.34*  --  1.39* 1.59*   ANIONGAP  --  11  --  7 8   WARD  --  9.0  --  9.0 9.9   GLC  --  190* 94 86 122*     Most Recent 2 LFT's:  Recent Labs   Lab Test 07/26/24  1049 07/22/24  1140   AST 22 27   ALT 11 13   ALKPHOS 38* 46   BILITOTAL 0.5 0.7     Most Recent 3 Troponin's:No lab results found.  7-Day Micro Results       Collected Updated Procedure Result Status      07/22/2024 1348 07/26/2024 0307 Urine Culture [[id number]]    (Abnormal)   Urine, Midstream    Final result Component Value   Culture 10,000-50,000 CFU/mL Pseudomonas aeruginosa    10,000-50,000 CFU/mL Pseudomonas aeruginosa        Susceptibility        Pseudomonas aeruginosa (1)      LIZZY      Amikacin <=2 ug/mL Susceptible      Cefepime <=1 ug/mL Susceptible      Ceftazidime <=1 ug/mL Susceptible      Ciprofloxacin  <=0.25 ug/mL Susceptible      Levofloxacin 0.25 ug/mL Susceptible      Meropenem <=0.25 ug/mL Susceptible      Piperacillin/Tazobactam <=4 ug/mL Susceptible      Tobramycin <=1 ug/mL Susceptible                      Susceptibility        Pseudomonas aeruginosa (2)      LIZZY      Amikacin <=2 ug/mL Susceptible      Cefepime <=1 ug/mL Susceptible      Ceftazidime 4 ug/mL Susceptible      Ciprofloxacin <=0.25 ug/mL Susceptible      Levofloxacin 1 ug/mL Susceptible      Meropenem <=0.25 ug/mL Susceptible      Piperacillin/Tazobactam <=4 ug/mL Susceptible      Tobramycin <=1 ug/mL Susceptible                                 Most Recent TSH and T4:  Recent Labs   Lab Test 07/23/24  0539   TSH 0.20*   T4 2.13*     Most Recent 6 glucoses:  Recent Labs   Lab Test 07/24/24  0753 07/23/24  1647 07/23/24  0539 07/22/24  1140 06/27/24  1157 05/31/24  1213   * 94 86 122* 102* 100*     Most Recent Urinalysis:  Recent Labs   Lab Test 07/22/24  1348 09/23/19  0000   COLOR Light Yellow Yellow   APPEARANCE Clear  --    URINEGLC Negative  --    URINEBILI Negative  --    URINEKETONE Negative  --    SG 1.011 1.015   UBLD Negative  --    URINEPH 7.0 7.0   PROTEIN Negative  --    UROBILINOGEN  --  0.2   NITRITE Positive* Pos*   LEUKEST Large*  --    RBCU 3* 3-6   WBCU 24* 10-20   ,   Results for orders placed or performed during the hospital encounter of 07/22/24   CT Abdomen Pelvis w/o Contrast    Narrative    CT ABDOMEN/PELVIS WITHOUT CONTRAST July 22, 2024 1:32 PM    CLINICAL HISTORY: Worsening abdominal pain and unexplained weight  loss, check for bowel perforation, abscess, diverticulitis,  malignancy.    TECHNIQUE: CT scan of the abdomen and pelvis was performed without IV  contrast. Multiplanar reformats were obtained. Dose reduction  techniques were used.  CONTRAST: None.    COMPARISON: CT 7/9/2024.    FINDINGS:   LOWER CHEST: Emphysematous changes. Small hiatal hernia is smaller in  size. Coronary artery  calcifications.    HEPATOBILIARY: Cholecystectomy. Stable hepatic cysts.    PANCREAS: Fatty atrophy.    SPLEEN: Normal.    ADRENAL GLANDS: Stable mild nodular thickening of the bilateral  adrenals.    KIDNEYS/BLADDER: Proximal left ureter stone at the ureteropelvic  junction measuring 8 mm on coronal series 5 image 50. Mild left  hydronephrosis. Stable nonobstructing stones at the left lower kidney  with an example measuring 4 mm series 4 image 90. Renal cysts  suggested without specific imaging follow-up recommended. Bladder is  obscured.    BOWEL: No bowel obstruction. Normal appendix. No secondary signs of  appendicitis. Colonic diverticulosis. No convincing diverticulitis. No  abscess or free air.     LYMPH NODES: Normal.    VASCULATURE: Scattered calcifications.    PELVIC ORGANS: No acute abnormality.    MUSCULOSKELETAL: Left hip arthroplasty and spine degenerative changes.  Scoliotic spine.      Impression    IMPRESSION:   1.  8 mm proximal left ureter stone at the ureteropelvic junction with  mild left hydronephrosis. Nonobstructing stones on the left again  noted.  2.  No other acute abnormality.  3.  Colonic diverticulosis.    MERNA BARNES MD         SYSTEM ID:  [id number]   XR Surgery MICHAEL L/T 5 Min Fluoro w Stills    Narrative    This exam was marked as non-reportable because it will not be read by a   radiologist or a [place] non-radiologist provider.         US Abdomen Limited    Narrative    US ABDOMEN LIMITED 7/26/2024 11:33 AM    CLINICAL HISTORY: Epigastric chest pain, tenderness, evaluate for  cholecystitis.  TECHNIQUE: Limited abdominal ultrasound.    COMPARISON: CT abdomen and pelvis 7/22/2024, 8/4/2014    FINDINGS:    GALLBLADDER: Surgically absent.    BILE DUCTS: There is no biliary dilatation. The common duct measures 5  mm.    LIVER: Multiple cysts, the largest measuring 1.3 cm. Focal dilatation  of a presumed portal vein measuring 1.9 x 1.7 cm near the falciform  ligament.    RIGHT KIDNEY:  Mild right pelvic fullness. Echogenic kidney. Right  inferior pole cyst measuring 1.1 cm; no follow-up necessary.    PANCREAS: The visualized portions of the pancreas are normal.    No ascites.      Impression    IMPRESSION:  1.  Mild right pelvic fullness. Echogenic kidneys suggestive of  medical renal disease.  2.  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 This is probably stable since  2014, although not well assessed on prior studies due to noncontrast  technique.     TRAM ASHFORD MD         SYSTEM ID:  [id number]       Discharge Medications   Current Discharge Medication List        START taking these medications    Details   aspirin 81 MG EC tablet Take 1 tablet (81 mg) by mouth daily  Qty: 30 tablet, Refills: 0    Comments: Future refills by PCP Dr. Deedee Bui with phone number [phone].  Associated Diagnoses: Coronary artery disease of native heart with stable angina pectoris, unspecified vessel or lesion type (H24)      ceFEPIme (MAXIPIME) 2 g vial Inject 2 g into the vein every 12 hours for 10 days Please check weekly CBC with diff, creatinine, and AST and [facility].    Associated Diagnoses: Left ureteral stone; Acute UTI      cyanocobalamin (VITAMIN B-12) 500 MCG SUBL sublingual tablet Place 2 tablets (1,000 mcg) under the tongue daily  Qty: 60 tablet, Refills: 0    Comments: Future refills by PCP Dr. Deedee Bui with phone number [phone].  Associated Diagnoses: B12 deficiency      hyoscyamine (LEVSIN/SL) 0.125 MG sublingual tablet Place 1 tablet (125 mcg) under the tongue every 6 hours as needed for cramping (for bladder spasms related to ureteral stent)  Qty: 20 tablet, Refills: 0    Associated Diagnoses: Left ureteral stone; Acute UTI      pantoprazole (PROTONIX) 40 MG EC tablet Take 1 tablet (40 mg) by mouth every morning (before breakfast)  Qty: 30 tablet, Refills: 0    Comments: Future  refills by PCP Dr. Deedee Bui with phone number [phone].  Associated Diagnoses: Ulcer of esophagus without bleeding      senna-docusate (SENOKOT-S/PERICOLACE) 8.6-50 MG tablet Take 1 tablet by mouth 2 times daily as needed for constipation  Qty: 30 tablet, Refills: 0    Associated Diagnoses: Constipation, unspecified constipation type      sucralfate (CARAFATE) 1 GM tablet Take 1 tablet (1 g) by mouth 4 times daily for 30 days  Qty: 120 tablet, Refills: 0    Comments: Future refills by PCP Dr. Deedee Bui with phone number [phone].  Associated Diagnoses: Ulcer of esophagus without bleeding      tamsulosin (FLOMAX) 0.4 MG capsule Take 1 capsule (0.4 mg) by mouth daily  Qty: 30 capsule, Refills: 0    Associated Diagnoses: Left ureteral stone           CONTINUE these medications which have CHANGED    Details   levothyroxine (SYNTHROID/LEVOTHROID) 125 MCG tablet Take 1 tablet (125 mcg) by mouth every morning (before breakfast)  Qty: 30 tablet, Refills: 0    Comments: Future refills by PCP Dr. Deedee Bui with phone number [phone].  Associated Diagnoses: Hypothyroidism, unspecified type           CONTINUE these medications which have NOT CHANGED    Details   albuterol (PROAIR HFA/PROVENTIL HFA/VENTOLIN HFA) 108 (90 Base) MCG/ACT inhaler Inhale 1-2 puffs into the lungs every 4 hours as needed for shortness of breath, wheezing or cough  Qty: 36 g, Refills: 2    Comments: Pharmacy may dispense brand covered by insurance (Proair, or proventil or ventolin or generic albuterol inhaler)  Associated Diagnoses: Chronic obstructive pulmonary disease, unspecified COPD type (H)      allopurinol (ZYLOPRIM) 100 MG tablet Take 2 tablets by mouth once daily  Qty: 90 tablet, Refills: 1    Associated Diagnoses: Uric acid kidney stone      Fluticasone-Umeclidin-Vilanterol (TRELEGY ELLIPTA) 200-62.5-25 MCG/ACT oral inhaler Inhale 1 puff into the lungs daily  Qty: 28 each, Refills: 1    Associated Diagnoses: COPD  with asthma (H)      HYDROcodone-acetaminophen (NORCO)  MG per tablet Take 1 tablet by mouth 3 times daily as needed      lisinopril (ZESTRIL) 2.5 MG tablet Take 2.5 mg by mouth daily      metoprolol succinate ER (TOPROL XL) 25 MG 24 hr tablet Take 1 tablet (25 mg) by mouth daily  Qty: 90 tablet, Refills: 3    Associated Diagnoses: Coronary artery disease of native heart with stable angina pectoris, unspecified vessel or lesion type (H24)      nitroGLYcerin (NITROSTAT) 0.4 MG sublingual tablet For chest pain place 1 tablet under the tongue every 5 minutes for 3 doses. If symptoms persist 5 minutes after 1st dose call 911.  Qty: 25 tablet, Refills: 1    Associated Diagnoses: Chest pain, unspecified type; Coronary artery disease involving native coronary artery of native heart without angina pectoris      oxyBUTYnin ER (DITROPAN XL) 10 MG 24 hr tablet Take 1 tablet (10 mg) by mouth daily  Qty: 90 tablet, Refills: 0    Associated Diagnoses: Uric acid kidney stone      rosuvastatin (CRESTOR) 20 MG tablet Take 1 tablet (20 mg) by mouth daily  Qty: 90 tablet, Refills: 3    Associated Diagnoses: Coronary artery disease involving native coronary artery of native heart without angina pectoris      blood glucose (NO BRAND SPECIFIED) lancets standard Use to test blood sugar 1 time daily or as directed.  Qty: 100 each, Refills: 3    Comments: OK to fill per patient insurance  Associated Diagnoses: Type 2 diabetes mellitus with stage 3 chronic kidney disease, without long-term current use of insulin (H)      blood glucose (NO BRAND SPECIFIED) test strip Use to test blood sugar 1 time daily or as directed.  Qty: 100 strip, Refills: 3    Comments: OK to fill per patient insurance  Associated Diagnoses: Type 2 diabetes mellitus with stage 3 chronic kidney disease, without long-term current use of insulin (H)      blood glucose monitoring (NO BRAND SPECIFIED) meter device kit Use to test blood sugar 1 time daily or as  directed.  Qty: 1 kit, Refills: 0    Comments: OK to fill per patient insurance  Associated Diagnoses: Type 2 diabetes mellitus with stage 3 chronic kidney disease, without long-term current use of insulin (H)           STOP taking these medications       amoxicillin-clavulanate (AUGMENTIN) 500-125 MG tablet Comments:   Reason for Stopping:         clopidogrel (PLAVIX) 75 MG tablet Comments:   Reason for Stopping:             Allergies   Allergies   Allergen Reactions    Morphine Nausea     Severe vomiting    Prednisone      Elevated blood glucose  weakness    Baclofen GI Disturbance    Imitrex [Sumatriptan] Nausea    Tetracycline      Sores in mouth

## 2024-07-27 NOTE — PROGRESS NOTES
Loss of IV access. Resource RN and Flyer attempted w/o success. Vascular access consulted ordered. (Pt has IV cefepime due at 0100)    ADDENDUM 2235: 2nd flyer attempted w/o success. IV team on until 11, if IV team unable to see will have to contact anesthesia    ADDENDUM 7765: IV team unable to see pt before leaving. I paged anesthesia team regarding IV and was informed that anesthesia team is no longer part of IV access algorithm. Charge RN Gloria contacted ANS per algorithm and explained situation. ANS (Minerva) said she will try find someone from the ED who can come up.     ADDENDUM 0145: ED Nurse (Diallo) at pt bedside attempting IV

## 2024-07-27 NOTE — PROGRESS NOTES
Patient with bleeding from PICC insertion site short period after placement.  Floor staff able to control bleeding with pressure.  Hemostat applied with dressing change.  Patient will require sterile dressing change within 48 hours secondary to gauze/ hemostat application.

## 2024-07-30 LAB
PATH REPORT.ADDENDUM SPEC: NORMAL
PATH REPORT.COMMENTS IMP SPEC: NORMAL
PATH REPORT.COMMENTS IMP SPEC: NORMAL
PATH REPORT.FINAL DX SPEC: NORMAL
PATH REPORT.GROSS SPEC: NORMAL
PATH REPORT.MICROSCOPIC SPEC OTHER STN: NORMAL
PATH REPORT.RELEVANT HX SPEC: NORMAL
PHOTO IMAGE: NORMAL

## 2024-07-31 ENCOUNTER — LAB REQUISITION (OUTPATIENT)
Dept: LAB | Facility: CLINIC | Age: 84
End: 2024-07-31
Payer: COMMERCIAL

## 2024-07-31 DIAGNOSIS — N39.0 URINARY TRACT INFECTION, SITE NOT SPECIFIED: ICD-10-CM

## 2024-07-31 DIAGNOSIS — B96.5 PSEUDOMONAS (AERUGINOSA) (MALLEI) (PSEUDOMALLEI) AS THE CAUSE OF DISEASES CLASSIFIED ELSEWHERE: ICD-10-CM

## 2024-07-31 LAB
AST SERPL W P-5'-P-CCNC: 25 U/L (ref 0–45)
BASOPHILS # BLD AUTO: 0 10E3/UL (ref 0–0.2)
BASOPHILS NFR BLD AUTO: 1 %
CREAT SERPL-MCNC: 1.27 MG/DL (ref 0.51–0.95)
EGFRCR SERPLBLD CKD-EPI 2021: 42 ML/MIN/1.73M2
EOSINOPHIL # BLD AUTO: 0.2 10E3/UL (ref 0–0.7)
EOSINOPHIL NFR BLD AUTO: 4 %
ERYTHROCYTE [DISTWIDTH] IN BLOOD BY AUTOMATED COUNT: 16.2 % (ref 10–15)
HCT VFR BLD AUTO: 26.1 % (ref 35–47)
HGB BLD-MCNC: 8.5 G/DL (ref 11.7–15.7)
HOLD SPECIMEN: NORMAL
IMM GRANULOCYTES # BLD: 0 10E3/UL
IMM GRANULOCYTES NFR BLD: 1 %
LYMPHOCYTES # BLD AUTO: 0.6 10E3/UL (ref 0.8–5.3)
LYMPHOCYTES NFR BLD AUTO: 14 %
MCH RBC QN AUTO: 31 PG (ref 26.5–33)
MCHC RBC AUTO-ENTMCNC: 32.6 G/DL (ref 31.5–36.5)
MCV RBC AUTO: 95 FL (ref 78–100)
MONOCYTES # BLD AUTO: 0.5 10E3/UL (ref 0–1.3)
MONOCYTES NFR BLD AUTO: 11 %
NEUTROPHILS # BLD AUTO: 3.1 10E3/UL (ref 1.6–8.3)
NEUTROPHILS NFR BLD AUTO: 71 %
NRBC # BLD AUTO: 0 10E3/UL
NRBC BLD AUTO-RTO: 0 /100
PLATELET # BLD AUTO: 150 10E3/UL (ref 150–450)
RBC # BLD AUTO: 2.74 10E6/UL (ref 3.8–5.2)
WBC # BLD AUTO: 4.3 10E3/UL (ref 4–11)

## 2024-07-31 PROCEDURE — 84450 TRANSFERASE (AST) (SGOT): CPT | Performed by: PHYSICIAN ASSISTANT

## 2024-07-31 PROCEDURE — 82565 ASSAY OF CREATININE: CPT | Performed by: PHYSICIAN ASSISTANT

## 2024-07-31 PROCEDURE — 85049 AUTOMATED PLATELET COUNT: CPT | Performed by: PHYSICIAN ASSISTANT

## 2024-08-01 ENCOUNTER — OFFICE VISIT (OUTPATIENT)
Dept: FAMILY MEDICINE | Facility: CLINIC | Age: 84
End: 2024-08-01

## 2024-08-01 VITALS
WEIGHT: 153 LBS | SYSTOLIC BLOOD PRESSURE: 111 MMHG | HEART RATE: 80 BPM | BODY MASS INDEX: 24.69 KG/M2 | OXYGEN SATURATION: 99 % | DIASTOLIC BLOOD PRESSURE: 63 MMHG

## 2024-08-01 DIAGNOSIS — R63.4 WEIGHT LOSS: ICD-10-CM

## 2024-08-01 DIAGNOSIS — K25.3 ACUTE GASTRIC ULCER WITHOUT HEMORRHAGE OR PERFORATION: ICD-10-CM

## 2024-08-01 DIAGNOSIS — I25.118 CORONARY ARTERY DISEASE OF NATIVE HEART WITH STABLE ANGINA PECTORIS, UNSPECIFIED VESSEL OR LESION TYPE (H): ICD-10-CM

## 2024-08-01 DIAGNOSIS — E03.4 HYPOTHYROIDISM DUE TO ACQUIRED ATROPHY OF THYROID: ICD-10-CM

## 2024-08-01 DIAGNOSIS — E53.8 VITAMIN B12 DEFICIENCY (NON ANEMIC): ICD-10-CM

## 2024-08-01 DIAGNOSIS — N39.0 PSEUDOMONAS URINARY TRACT INFECTION: ICD-10-CM

## 2024-08-01 DIAGNOSIS — N20.1 LEFT URETERAL STONE: Primary | ICD-10-CM

## 2024-08-01 DIAGNOSIS — T82.838A BLEEDING FROM PERIPHERALLY INSERTED CENTRAL CATHETER (PICC), INITIAL ENCOUNTER (H): ICD-10-CM

## 2024-08-01 DIAGNOSIS — I35.0 MODERATE AORTIC STENOSIS: ICD-10-CM

## 2024-08-01 DIAGNOSIS — R10.13 ABDOMINAL PAIN, EPIGASTRIC: ICD-10-CM

## 2024-08-01 DIAGNOSIS — B96.5 PSEUDOMONAS URINARY TRACT INFECTION: ICD-10-CM

## 2024-08-01 LAB
% GRANULOCYTES: 78.5 % (ref 42.2–75.2)
ANION GAP SERPL CALCULATED.3IONS-SCNC: 9 MMOL/L (ref 7–15)
BUN SERPL-MCNC: 24.3 MG/DL (ref 8–23)
CALCIUM SERPL-MCNC: 9.5 MG/DL (ref 8.8–10.4)
CHLORIDE SERPL-SCNC: 101 MMOL/L (ref 98–107)
CREAT SERPL-MCNC: 1.43 MG/DL (ref 0.51–0.95)
EGFRCR SERPLBLD CKD-EPI 2021: 36 ML/MIN/1.73M2
FASTING STATUS PATIENT QL REPORTED: NO
GLUCOSE SERPL-MCNC: 119 MG/DL (ref 70–99)
HCO3 SERPL-SCNC: 26 MMOL/L (ref 22–29)
HCT VFR BLD AUTO: 25.2 % (ref 35–46)
HEMOGLOBIN: 8.8 G/DL (ref 11.8–15.5)
LYMPHOCYTES NFR BLD AUTO: 15.1 % (ref 20.5–51.1)
MCH RBC QN AUTO: 31.5 PG (ref 27–31)
MCHC RBC AUTO-ENTMCNC: 34.9 G/DL (ref 33–37)
MCV RBC AUTO: 90.2 FL (ref 80–100)
MONOCYTES NFR BLD AUTO: 6.4 % (ref 1.7–9.3)
PLATELET # BLD AUTO: 152 K/UL (ref 140–450)
POTASSIUM SERPL-SCNC: 4.2 MMOL/L (ref 3.4–5.3)
RBC # BLD AUTO: 2.8 X10/CMM (ref 3.7–5.2)
SODIUM SERPL-SCNC: 136 MMOL/L (ref 135–145)
T4 FREE SERPL-MCNC: 2.29 NG/DL (ref 0.9–1.7)
TSH SERPL DL<=0.005 MIU/L-ACNC: 0.17 UIU/ML (ref 0.3–4.2)
WBC # BLD AUTO: 4.1 X10/CMM (ref 3.8–11)

## 2024-08-01 PROCEDURE — 84439 ASSAY OF FREE THYROXINE: CPT | Mod: 90

## 2024-08-01 PROCEDURE — 85025 COMPLETE CBC W/AUTO DIFF WBC: CPT

## 2024-08-01 PROCEDURE — 84443 ASSAY THYROID STIM HORMONE: CPT | Mod: 90

## 2024-08-01 PROCEDURE — 80048 BASIC METABOLIC PNL TOTAL CA: CPT | Mod: 90

## 2024-08-01 PROCEDURE — 99496 TRANSJ CARE MGMT HIGH F2F 7D: CPT

## 2024-08-01 PROCEDURE — 36415 COLL VENOUS BLD VENIPUNCTURE: CPT

## 2024-08-01 PROCEDURE — 82607 VITAMIN B-12: CPT

## 2024-08-01 RX ORDER — AMOXICILLIN AND CLAVULANATE POTASSIUM 500; 125 MG/1; MG/1
1 TABLET, FILM COATED ORAL 2 TIMES DAILY
COMMUNITY
End: 2024-08-01

## 2024-08-01 NOTE — PROGRESS NOTES
Assessment & Plan     Left ureteral stone  Pseudomonas urinary tract infection  PICC line placed in hospital and started on home infusions of cefepime. Also started on Flomax and hyoscyamine. Following with urology. Underwent left ureteral stent placement 7/23.  Has follow up scheduled for 8/16 for cystoscopy and stent removal. Discussed plan for follow up for pre-op. Red flags that warrant emergent evaluation discussed. All questions answered. Patient agreeable to plan.   - Basic metabolic panel  - VENOUS COLLECTION  - Basic metabolic panel    Bleeding from peripherally inserted central catheter (PICC), initial encounter (H24)  Bleeding at site of PICC line. Following with home care for infusions of Cefepime. Discussed following up with home care for dressing change. Red flags that warrant emergent evaluation discussed. Patient agreeable to plan. All questions answered.     Abdominal pain, epigastric  Acute gastric ulcer without hemorrhage or perforation  Underwent EGD 7/26. Noted nonbleeding gastric ulcers. Started on PPI and Carafate per GI. Plant to follow-up with Donna FAYE to repeat EGD in a month to ensure the gastric ulcers heal. Patient agreeable to plan. All questions answered.    - VENOUS COLLECTION  - CBC with Diff/Plt ([facility])    Weight loss  Reviewed weight loss and common etiologies. Will review lab work and plan to monitor weight. Discussed adequate protein intake. Red flags that warrant emergent evaluation discussed. Patient agreeable to plan. All questions answered.   - VENOUS COLLECTION  - CBC with Diff/Plt ([facility])    Vitamin B12 deficiency (non anemic)  Taking B12 replacement daily. Previously level was low. Will recheck today.   - VENOUS COLLECTION  - Vitamin B12  - Vitamin B12    Hypothyroidism due to acquired atrophy of thyroid  Following hospitalization Levothyroxine was adjusted to 125 mcg from 137 mcg daily. Will recheck TSH and adjust levothyroxine as needed to maintain within goal. Reviewed  s/sxs hypothyroidism. Red flags that warrant emergent evaluation discussed. Patient agreeable to plan. All questions    - VENOUS COLLECTION  - TSH with free T4 reflex  - TSH with free T4 reflex    Moderate aortic stenosis  Coronary artery disease of native heart with stable angina pectoris, unspecified vessel or lesion type (H24)  Stopped Plavix and started on Aspirin per Cardiology recommendations. Continue current medication regimen and plan..           MED REC REQUIRED  Post Medication Reconciliation Status: discharge medications reconciled and changed, per note/orders    See Patient Instructions    Return if symptoms worsen or fail to improve, for Follow up.    Rui Quijano is a 83 year old, presenting for the following health issues:  Hospital Follow-up    HPI       Hospital Follow-up Visit:    Hospital/Nursing Home/IP Rehab Facility: [facility]  Date of Admission: 7/22/24  Date of Discharge: 7/27/24  Reason(s) for Admission:     Obstructing 8 mm proximal ureteral stone at left ureteropelvic junction with mild left hydronephrosis  Other nonobstructing stones in left kidney  History of nephrolithiasis with multiple previous procedures with Dr. Rivera at [facility].    Pseudomonas UTI  Longstanding left abdominal pain   Epigastric abdominal pain  Nonbleeding gastric ulcers  Was the patient in the ICU or did the patient experience delirium during hospitalization?  No  Do you have any other stressors you would like to discuss with your provider? No    Problems taking medications regularly:  None  Medication changes since discharge:        START taking these medications     Details   aspirin 81 MG EC tablet Take 1 tablet (81 mg) by mouth daily  Qty: 30 tablet, Refills: 0     Comments: Future refills by PCP Dr. Deedee Bui with phone number [phone].  Associated Diagnoses: Coronary artery disease of native heart with stable angina pectoris, unspecified vessel or lesion  type (H24)       ceFEPIme (MAXIPIME) 2 g vial Inject 2 g into the vein every 12 hours for 10 days Please check weekly CBC with diff, creatinine, and AST and fax results to [facility].     Associated Diagnoses: Left ureteral stone; Acute UTI       cyanocobalamin (VITAMIN B-12) 500 MCG SUBL sublingual tablet Place 2 tablets (1,000 mcg) under the tongue daily  Qty: 60 tablet, Refills: 0     Comments: Future refills by PCP Dr. Deedee Bui with phone number [phone].  Associated Diagnoses: B12 deficiency       hyoscyamine (LEVSIN/SL) 0.125 MG sublingual tablet Place 1 tablet (125 mcg) under the tongue every 6 hours as needed for cramping (for bladder spasms related to ureteral stent)  Qty: 20 tablet, Refills: 0     Associated Diagnoses: Left ureteral stone; Acute UTI       pantoprazole (PROTONIX) 40 MG EC tablet Take 1 tablet (40 mg) by mouth every morning (before breakfast)  Qty: 30 tablet, Refills: 0     Comments: Future refills by PCP Dr. Deedee Bui with phone number [phone].  Associated Diagnoses: Ulcer of esophagus without bleeding       senna-docusate (SENOKOT-S/PERICOLACE) 8.6-50 MG tablet Take 1 tablet by mouth 2 times daily as needed for constipation  Qty: 30 tablet, Refills: 0     Associated Diagnoses: Constipation, unspecified constipation type       sucralfate (CARAFATE) 1 GM tablet Take 1 tablet (1 g) by mouth 4 times daily for 30 days  Qty: 120 tablet, Refills: 0     Comments: Future refills by PCP Dr. Deedee Bui with phone number [phone].  Associated Diagnoses: Ulcer of esophagus without bleeding       tamsulosin (FLOMAX) 0.4 MG capsule Take 1 capsule (0.4 mg) by mouth daily  Qty: 30 capsule, Refills: 0     Associated Diagnoses: Left ureteral stone                   CONTINUE these medications which have CHANGED     Details   levothyroxine (SYNTHROID/LEVOTHROID) 125 MCG tablet Take 1 tablet (125 mcg) by mouth every morning (before breakfast)  Qty: 30 tablet, Refills: 0      Comments: Future refills by PCP Dr. Deedee Bui with phone number [phone].  Associated Diagnoses: Hypothyroidism, unspecified type         STOP taking these medications         amoxicillin-clavulanate (AUGMENTIN) 500-125 MG tablet Comments:   Reason for Stopping:            clopidogrel (PLAVIX) 75 MG tablet Comments:   Reason for Stopping:            Problems adhering to non-medication therapy:  None    Summary of hospitalization:  [facility] discharge summary reviewed  Diagnostic Tests/Treatments reviewed.  Follow up needed: [facility] for second stage kidney stone surgery. Planned for August 16th. Follow up with Donna FAYE in 1 month.   Other Healthcare Providers Involved in Patient s Care:         Homecare and Specialist appointment - Urology  and GI  Update since discharge: stable.       Plan of care communicated with patient        Review of Systems  Constitutional, HEENT, cardiovascular, pulmonary, GI, , musculoskeletal, neuro, skin, endocrine and psych systems are negative, except as otherwise noted.      Objective    /63   Pulse 80   Wt 69.4 kg (153 lb)   SpO2 99%   BMI 24.69 kg/m    Body mass index is 24.69 kg/m .  Physical Exam   GENERAL: alert and no distress  RESP: lungs clear to auscultation - no rales, rhonchi or wheezes  CV: regular rate and rhythm, normal S1 S2, no S3 or S4, no murmur, click or rub  ABDOMEN: soft, nontender  BACK: no CVA tenderness  PSYCH: mentation appears normal, affect normal/bright    Results for orders placed or performed in visit on 08/01/24 (from the past 24 hour(s))   CBC with Diff/Plt ([facility])   Result Value Ref Range    WBC x10/cmm 4.1 3.8 - 11.0 x10/cmm    % Lymphocytes 15.1 (A) 20.5 - 51.1 %    % Monocytes 6.4 1.7 - 9.3 %    % Granulocytes 78.5 (A) 42.2 - 75.2 %    RBC x10/cmm 2.80 (A) 3.7 - 5.2 x10/cmm    Hemoglobin 8.8 (A) 11.8 - 15.5 g/dl    Hematocrit 25.2 (A) 35 - 46 %    MCV 90.2 80 - 100 fL    MCH 31.5 (A) 27.0 - 31.0 pg    MCHC  34.9 33.0 - 37.0 g/dL    Platelet Count 152 140 - 450 K/uL    Impression    Verified by duplication, no clots/Gaby JACKIE Overton August 1, 2024 3:54 PM             Signed Electronically by: WILEY Brumfield CNP

## 2024-08-02 LAB — VIT B12 SERPL-MCNC: 1709 PG/ML (ref 232–1245)

## 2024-08-08 ENCOUNTER — MEDICAL CORRESPONDENCE (OUTPATIENT)
Dept: FAMILY MEDICINE | Facility: CLINIC | Age: 84
End: 2024-08-08

## 2024-08-08 NOTE — Clinical Note
Refill request received for insulin aspart (NovoLOG FlexPen) 100 UNIT/ML pen-injector, insulin glargine (Lantus SoloStar) 100 UNIT/ML pen-injector       Insulins Refill Protocol - 12 Month Protocol [id number]/08/2024 12:36 PM   Protocol Details Hgb A1c less than 8 within last 6 months looking at last value  -- IF CRITERIA FAILED REFER TO PROTOCOL DETAILS        Insulins Refill Protocol - 12 Month Protocol [id number]/08/2024 12:36 PM   Protocol Details Hgb A1c less than 8 within last 6 months looking at last value  -- IF CRITERIA FAILED REFER TO PROTOCOL DETAILS       Last office visit: 8/2/2024  Next office visit: Visit date not found  Last refill: 6/21/23, 5/29/24  Last labs: 6/24/24   Removed intact

## 2024-08-09 NOTE — PATIENT INSTRUCTIONS
How to Take Your Medication Before Surgery  Preoperative Medication Instructions   Antiplatelet or Anticoagulation Medication Instructions   - Bleeding risk is low for this procedure (e.g. dental, skin, cataract).    Additional Medication Instructions  Take all scheduled medications on the day of surgery EXCEPT for modifications listed below:   - ACE/ARB: DO NOT TAKE on day of surgery (minimum 11 hours for general anesthesia).   - Beta Blockers: Continue taking on the day of surgery.   - Statins: Continue taking on the day of surgery.    - Herbal medications and vitamins: DO NOT TAKE 14 days prior to surgery.   - LABA, inhaled corticosteroid, long-acting anticholinergics: Continue without modification.   - rescue Inhaler: Continue PRN. Bring to hospital on the day of surgery.       Patient Education   Preparing for Your Surgery  Getting started  A nurse will call you to review your health history and instructions. They will give you an arrival time based on your scheduled surgery time. Please be ready to share:  Your doctor's clinic name and phone number  Your medical, surgical, and anesthesia history  A list of allergies and sensitivities  A list of medicines, including herbal treatments and over-the-counter drugs  Whether the patient has a legal guardian (ask how to send us the papers in advance)  Please tell us if you're pregnant--or if there's any chance you might be pregnant. Some surgeries may injure a fetus (unborn baby), so they require a pregnancy test. Surgeries that are safe for a fetus don't always need a test, and you can choose whether to have one.   If you have a child who's having surgery, please ask for a copy of Preparing for Your Child's Surgery.    Preparing for surgery  Within 10 to 30 days of surgery: Have a pre-op exam (sometimes called an H&P, or History and Physical). This can be done at a clinic or pre-operative center.  If you're having a , you may not need this exam. Talk to your  care team.  At your pre-op exam, talk to your care team about all medicines you take. If you need to stop any medicines before surgery, ask when to start taking them again.  We do this for your safety. Many medicines can make you bleed too much during surgery. Some change how well surgery (anesthesia) drugs work.  Call your insurance company to let them know you're having surgery. (If you don't have insurance, call [phone].)  Call your clinic if there's any change in your health. This includes signs of a cold or flu (sore throat, runny nose, cough, rash, fever). It also includes a scrape or scratch near the surgery site.  If you have questions on the day of surgery, call your hospital or surgery center.  Eating and drinking guidelines  For your safety: Unless your surgeon tells you otherwise, follow the guidelines below.  Eat and drink as usual until 8 hours before you arrive for surgery. After that, no food or milk.  Drink clear liquids until 2 hours before you arrive. These are liquids you can see through, like water, Gatorade, and Propel Water. They also include plain black coffee and tea (no cream or milk), candy, and breath mints. You can spit out gum when you arrive.  If you drink alcohol: Stop drinking it the night before surgery.  If your care team tells you to take medicine on the morning of surgery, it's okay to take it with a sip of water.  Preventing infection  Shower or bathe the night before and morning of your surgery. Follow the instructions your clinic gave you. (If no instructions, use regular soap.)  Don't shave or clip hair near your surgery site. We'll remove the hair if needed.  Don't smoke or vape the morning of surgery. You may chew nicotine gum up to 2 hours before surgery. A nicotine patch is okay.  Note: Some surgeries require you to completely quit smoking and nicotine. Check with your surgeon.  Your care team will make every effort to keep you safe from infection. We will:  Clean  our hands often with soap and water (or an alcohol-based hand rub).  Clean the skin at your surgery site with a special soap that kills germs.  Give you a special gown to keep you warm. (Cold raises the risk of infection.)  Wear special hair covers, masks, gowns and gloves during surgery.  Give antibiotic medicine, if prescribed. Not all surgeries need antibiotics.  What to bring on the day of surgery  Photo ID and insurance card  Copy of your health care directive, if you have one  Glasses and hearing aids (bring cases)  You can't wear contacts during surgery  Inhaler and eye drops, if you use them (tell us about these when you arrive)  CPAP machine or breathing device, if you use them  A few personal items, if spending the night  If you have . . .  A pacemaker, ICD (cardiac defibrillator) or other implant: Bring the ID card.  An implanted stimulator: Bring the remote control.  A legal guardian: Bring a copy of the certified (court-stamped) guardianship papers.  Please remove any jewelry, including body piercings. Leave jewelry and other valuables at home.  If you're going home the day of surgery  You must have a responsible adult drive you home. They should stay with you overnight as well.  If you don't have someone to stay with you, and you aren't safe to go home alone, we may keep you overnight. Insurance often won't pay for this.  After surgery  If it's hard to control your pain or you need more pain medicine, please call your surgeon's office.  Questions?   If you have any questions for your care team, list them here: _________________________________________________________________________________________________________________________________________________________________________ ____________________________________ ____________________________________ ____________________________________  For informational purposes only. Not to replace the advice of your health care provider. Copyright   2003, 2019  [facility]. All rights reserved. Clinically reviewed by Adriana Balbuena MD. [employer] [id number] - REV 12/22.

## 2024-08-10 ENCOUNTER — HEALTH MAINTENANCE LETTER (OUTPATIENT)
Age: 84
End: 2024-08-10

## 2024-08-12 ENCOUNTER — OFFICE VISIT (OUTPATIENT)
Dept: FAMILY MEDICINE | Facility: CLINIC | Age: 84
End: 2024-08-12

## 2024-08-12 VITALS
HEART RATE: 69 BPM | DIASTOLIC BLOOD PRESSURE: 61 MMHG | WEIGHT: 148.4 LBS | BODY MASS INDEX: 24.72 KG/M2 | SYSTOLIC BLOOD PRESSURE: 107 MMHG | HEIGHT: 65 IN | OXYGEN SATURATION: 95 %

## 2024-08-12 DIAGNOSIS — E03.4 HYPOTHYROIDISM DUE TO ACQUIRED ATROPHY OF THYROID: ICD-10-CM

## 2024-08-12 DIAGNOSIS — Z01.818 PREOP GENERAL PHYSICAL EXAM: Primary | ICD-10-CM

## 2024-08-12 DIAGNOSIS — N20.0 URIC ACID KIDNEY STONE: ICD-10-CM

## 2024-08-12 DIAGNOSIS — N18.32 TYPE 2 DIABETES MELLITUS WITH STAGE 3B CHRONIC KIDNEY DISEASE, WITHOUT LONG-TERM CURRENT USE OF INSULIN (H): ICD-10-CM

## 2024-08-12 DIAGNOSIS — E78.00 HYPERCHOLESTEROLEMIA: ICD-10-CM

## 2024-08-12 DIAGNOSIS — E11.22 TYPE 2 DIABETES MELLITUS WITH STAGE 3B CHRONIC KIDNEY DISEASE, WITHOUT LONG-TERM CURRENT USE OF INSULIN (H): ICD-10-CM

## 2024-08-12 DIAGNOSIS — I25.118 CORONARY ARTERY DISEASE OF NATIVE HEART WITH STABLE ANGINA PECTORIS, UNSPECIFIED VESSEL OR LESION TYPE (H): ICD-10-CM

## 2024-08-12 DIAGNOSIS — K25.3 ACUTE GASTRIC ULCER WITHOUT HEMORRHAGE OR PERFORATION: ICD-10-CM

## 2024-08-12 DIAGNOSIS — J44.89 COPD WITH ASTHMA (H): ICD-10-CM

## 2024-08-12 DIAGNOSIS — N20.1 LEFT URETERAL STONE: ICD-10-CM

## 2024-08-12 LAB
% GRANULOCYTES: 78.1 % (ref 42.2–75.2)
ANION GAP SERPL CALCULATED.3IONS-SCNC: 10 MMOL/L (ref 7–15)
BUN SERPL-MCNC: 27.8 MG/DL (ref 8–23)
CALCIUM SERPL-MCNC: 9.6 MG/DL (ref 8.8–10.4)
CHLORIDE SERPL-SCNC: 103 MMOL/L (ref 98–107)
CREAT SERPL-MCNC: 2.21 MG/DL (ref 0.51–0.95)
EGFRCR SERPLBLD CKD-EPI 2021: 21 ML/MIN/1.73M2
FASTING STATUS PATIENT QL REPORTED: NO
GLUCOSE SERPL-MCNC: 152 MG/DL (ref 70–99)
HCO3 SERPL-SCNC: 24 MMOL/L (ref 22–29)
HCT VFR BLD AUTO: 26 % (ref 35–46)
HEMOGLOBIN: 9 G/DL (ref 11.8–15.5)
LYMPHOCYTES NFR BLD AUTO: 14.1 % (ref 20.5–51.1)
MCH RBC QN AUTO: 31.7 PG (ref 27–31)
MCHC RBC AUTO-ENTMCNC: 34.8 G/DL (ref 33–37)
MCV RBC AUTO: 90.9 FL (ref 80–100)
MONOCYTES NFR BLD AUTO: 7.8 % (ref 1.7–9.3)
PLATELET # BLD AUTO: 168 K/UL (ref 140–450)
POTASSIUM SERPL-SCNC: 3.7 MMOL/L (ref 3.4–5.3)
RBC # BLD AUTO: 2.86 X10/CMM (ref 3.7–5.2)
SODIUM SERPL-SCNC: 137 MMOL/L (ref 135–145)
WBC # BLD AUTO: 4.2 X10/CMM (ref 3.8–11)

## 2024-08-12 PROCEDURE — 80048 BASIC METABOLIC PNL TOTAL CA: CPT | Mod: 90

## 2024-08-12 PROCEDURE — 36415 COLL VENOUS BLD VENIPUNCTURE: CPT

## 2024-08-12 PROCEDURE — G2211 COMPLEX E/M VISIT ADD ON: HCPCS

## 2024-08-12 PROCEDURE — 85025 COMPLETE CBC W/AUTO DIFF WBC: CPT

## 2024-08-12 PROCEDURE — 99214 OFFICE O/P EST MOD 30 MIN: CPT

## 2024-08-12 NOTE — PROGRESS NOTES
Preoperative Evaluation  [facility]  [address] [place] [address]  Phone: [phone]  Fax: [phone]  Primary Provider: Deedee Bui MD  Pre-op Performing Provider: WILEY Brumfield CNP  Aug 12, 2024               8/12/2024   Surgical Information   What procedure is being done? CYSTOSCOPY , LEFT URETEROSCOPY , HOLMIUM LASER LITHOTRIPSY AND LEFT URETERAL STENT REMOVAL VERSES LEFT URETERAL STENT EXCHANGE   Facility or Hospital where procedure/surgery will be performed: [facility]   Who is doing the procedure / surgery? Dr. Derrek Rivera   Date of surgery / procedure: 8/16/24   Time of surgery / procedure: 0730   Where do you plan to recover after surgery? at home with family      Fax number for surgical facility: Note does not need to be faxed, will be available electronically in Epic.    Assessment & Plan     The proposed surgical procedure is considered LOW risk.    Preop general physical exam  No apparent contraindications noted for purposed procedure.    - VENOUS COLLECTION  - Basic metabolic panel  - CBC with Diff/Plt ([facility])    Left ureteral stone  Indication for surgery    Uric acid kidney stone  H/o uric acid stones. Taking Allopurinol 100 mg daily and Oxybutynin 10 mg. Continue current medication regimen.     Acute gastric ulcer without hemorrhage or perforation  Taking PPI and Carafate. Following with Dr. Thompson. Due for repeat EGD in 2 weeks. Continue current medication regimen and treatment plan.     Hypothyroidism due to acquired atrophy of thyroid  Taking Levothyroxine with recent dose adjustment to 125 mcg. Due for recheck of TSH in 4-6 weeks. Continue current medication regimen.     Coronary artery disease of native heart with stable angina pectoris, unspecified vessel or lesion type (H24)  Hypercholesterolemia  Taking Aspirin, Metoprolol 25 mg and rosuvastatin 20 mg daily. Following with cardiology. Stable/controlled. Continue current medication regimen.    Type 2  diabetes mellitus with stage 3b chronic kidney disease, without long-term current use of insulin (H)  Taking Lisinopril 2.5 mg daily. Last A1c was 5.8. Stable/controlled. Continue current medication regimen.    COPD with asthma (H)  Taking Albuterol as needed and Trelegy inhaler. Stable/controlled. Continue current medication regimen.      MED REC REQUIRED  Post Medication Reconciliation Status: discharge medications reconciled and changed, per note/orders        - No identified additional risk factors other than previously addressed    Antiplatelet or Anticoagulation Medication Instructions   - Bleeding risk is low for this procedure (e.g. dental, skin, cataract).    Additional Medication Instructions  Take all scheduled medications on the day of surgery EXCEPT for modifications listed below:   - ACE/ARB: DO NOT TAKE on day of surgery (minimum 11 hours for general anesthesia).   - Beta Blockers: Continue taking on the day of surgery.   - Statins: Continue taking on the day of surgery.    - Herbal medications and vitamins: DO NOT TAKE 14 days prior to surgery.   - LABA, inhaled corticosteroid, long-acting anticholinergics: Continue without modification.   - rescue Inhaler: Continue PRN. Bring to hospital on the day of surgery.    Recommendation  No apparent contraindications noted to proceed with proposed procedure, without further diagnostic evaluation.    Rui Quijano is a 83 year old, presenting for the following:  Pre-Op Exam (No concerns /Nonfasting)      HPI related to upcoming procedure: CYSTOSCOPY , LEFT URETEROSCOPY , HOLMIUM LASER LITHOTRIPSY AND LEFT URETERAL STENT REMOVAL VERSES LEFT URETERAL STENT EXCHANGE    Left stone: Started on Flomax and hyoscyamine with last hospitalization.     Hx of Kidney stones: Uric acid: Takes Allopurinol 100 mg daily and Oxybutynin 10 mg.     Ulcer: Non bleeding ulcer last hospitalization. Started on PPI and Carafate. Following with Dr. Thompson.     Hypothyroid:  taking Levothyroxine was adjusted to 125 mcg. States hair has been thinning.     HLD/CAD: Aspirin, Metoprolol 25 mg and rosuvastatin 20 mg daily. Following with cardiology. No chest pain.    T2DM: Taking Lisinopril 2.5 mg. Last A1c was 5.8    COPD: Albuterol as needed and Trelegy inhaler. No shortness of breath.             8/12/2024   Pre-Op Questionnaire   Have you ever had a heart attack or stroke? No   Have you ever had surgery on your heart or blood vessels, such as a stent placement, a coronary artery bypass, or surgery on an artery in your head, neck, heart, or legs? (!) YES - stent placement in chest 2023  Kidney stent placement in 7/2024   Do you have chest pain with activity? No   Do you have a history of heart failure? No   Do you currently have a cold, bronchitis or symptoms of other infection? No   Do you have a cough, shortness of breath, or wheezing? No   Do you or anyone in your family have previous history of blood clots? No   Do you or does anyone in your family have a serious bleeding problem such as prolonged bleeding following surgeries or cuts? No   Have you ever had problems with anemia or been told to take iron pills? (!) YES - currently  anemic    Have you had any abnormal blood loss such as black, tarry or bloody stools, or abnormal vaginal bleeding? No   Have you ever had a blood transfusion? No   Are you willing to have a blood transfusion if it is medically needed before, during, or after your surgery? Yes   Have you or any of your relatives ever had problems with anesthesia? No   Do you have sleep apnea, excessive snoring or daytime drowsiness? No   Do you have any artifical heart valves or other implanted medical devices like a pacemaker, defibrillator, or continuous glucose monitor? No   Do you have artificial joints? (!) YES   Are you allergic to latex? No      Health Care Directive  Patient does not have a Health Care Directive or Living Will: Patient states has Advance Directive  and will bring in a copy to clinic.    Preoperative Review of    reviewed - controlled substances reflected in medication list.      Status of Chronic Conditions:  See problem list for active medical problems.  Problems all longstanding and stable, except as noted/documented.  See ROS for pertinent symptoms related to these conditions.    Patient Active Problem List    Diagnosis Date Noted    Acute UTI 07/22/2024     Priority: Medium    Left ureteral stone 07/22/2024     Priority: Medium    Nonrheumatic aortic valve stenosis 07/18/2023     Priority: Medium    Coronary artery disease of native heart with stable angina pectoris, unspecified vessel or lesion type (H24) 05/16/2023     Priority: Medium     CT 2023      Iron deficiency anemia, unspecified 12/07/2022     Priority: Medium    Chronic renal disease, stage IV (H) 12/07/2022     Priority: Medium    Chronic midline low back pain without sciatica 02/05/2019     Priority: Medium    Physical deconditioning 02/05/2019     Priority: Medium    Spinal stenosis of lumbar region with neurogenic claudication 02/05/2019     Priority: Medium    Hypothyroidism due to acquired atrophy of thyroid 02/05/2019     Priority: Medium    COPD with asthma (H)      Priority: Medium      followed with pulmonary DR LENNOX 60-pack-year history of smoking      Type 2 diabetes mellitus with stage 4 chronic kidney disease, without long-term current use of insulin (H)      Priority: Medium    Nonintractable migraine, unspecified migraine type 01/20/2016     Priority: Medium    Urgency incontinence 04/02/2014     Priority: Medium    Hypercholesterolemia      Priority: Medium     Diagnosis updated by automated process. Provider to review and confirm.        Past Medical History:   Diagnosis Date    Calculus of left ureter 2014    dr Arriaga - ureteroscopy and lithitripsy    Cataract     Chronic low back pain     Dr Villagran at [facility] in 2015- not surgical     Complication of anesthesia     COPD with  asthma (H)      followed with pulmonary DR LENNOX 60-pack-year history of smoking    Esophagitis, reflux 9/2012    SOME EOSINOPHILIA NO BARRETS    Gastro-oesophageal reflux disease      has seen GI    Hydronephrosis, left     chronic , in 2017 Dr Arriaga plan was observaton    Hyperlipidaemia LDL goal < 100     Hypothyroid     Kidney stone 3/28/2019    Migraine headaches 9/1/2011    Moderate persistent asthma      lifelong, saw Pulm 2013    Obesity (BMI 30-39.9)     Panic attacks     PONV (postoperative nausea and vomiting)     Renal stones 2016    kidney stones, multiple small stones high risk recurrent ureteral stones. , Dr Arriaga in 2016 rec 24 hour urine.    Type 2 diabetes mellitus without complication (H)      Past Surgical History:   Procedure Laterality Date    ARTHROPLASTY HIP  3/13/2013    Procedure: ARTHROPLASTY HIP;  LEFT TOTAL HIP ARTHROPLASTY (BIOMET)^ ;  Surgeon: Anand Main MD;  Location:  OR    ARTHROPLASTY KNEE Right 8/9/2021    Procedure: RIGHT TOTAL KNEE ARTHROPLASTY;  Surgeon: Anand Main MD;  Location:  OR    ARTHROPLASTY PATELLO-FEMORAL (KNEE)  2005ish    Left    CHOLECYSTECTOMY, OPEN  27 yo    COMBINED CYSTOSCOPY, INSERT STENT URETER(S)  8/5/2014    Procedure: COMBINED CYSTOSCOPY, INSERT STENT URETER(S);  Surgeon: Sam Arriaga MD;  Location:  OR    COMBINED CYSTOSCOPY, RETROGRADES, URETEROSCOPY, LASER HOLMIUM LITHOTRIPSY URETER(S), INSERT STENT Left 6/14/2016    Procedure: COMBINED CYSTOSCOPY, RETROGRADES, URETEROSCOPY, LASER HOLMIUM LITHOTRIPSY URETER(S), INSERT STENT;  Surgeon: Thomas Edmondson MD;  Location:  OR    COMBINED CYSTOSCOPY, RETROGRADES, URETEROSCOPY, LASER HOLMIUM LITHOTRIPSY URETER(S), INSERT STENT Left 3/28/2019    Procedure: CYSTOSCOPY, LEFT RETROGRADE PYLEOGRAM, LEFT URETEROSCOPY, HOLMIUM LASER LITHOTRIPSY, LEFT URETERAL STENT EXCHANGE;  Surgeon: Derrek Rivera MD;  Location:  OR    CV CORONARY ANGIOGRAM N/A 7/5/2023    Procedure: Coronary  Angiogram;  Surgeon: Fausto Espinal MD;  Location:  [facility]    CV INTRAVASULAR ULTRASOUND N/A 7/5/2023    Procedure: Intravascular Ultrasound;  Surgeon: Fausto Espinal MD;  Location:  [facility]    CV PCI N/A 7/5/2023    Procedure: Percutaneous Coronary Intervention;  Surgeon: Fausto Espinal MD;  Location:  [facility]    CYSTOSCOPY, RETROGRADES, INSERT STENT URETER(S), COMBINED Left 3/8/2019    Procedure: CYSTOSCOPY,LEFT RETROGRADE, LEFT STENT PLACEMENT;  Surgeon: Derrek Rivera MD;  Location:  OR    CYSTOSCOPY, RETROGRADES, INSERT STENT URETER(S), COMBINED Left 7/23/2024    Procedure: Cystoscopy, retrogrades, insert stent ureter(s), combined;  Surgeon: Anand Hendricks MD;  Location:  OR    ESOPHAGOSCOPY, GASTROSCOPY, DUODENOSCOPY (EGD), COMBINED N/A 7/26/2024    Procedure: Esophagoscopy, gastroscopy, duodenoscopy (EGD), combined;  Surgeon: Evette Salcido MD;  Location:  GI    GENITOURINARY SURGERY      LAMINECT/DISCECTOMY, LUMBAR      Laminectomy/Discectomy Lumbar    LASER HOLMIUM LITHOTRIPSY URETER(S), INSERT STENT, COMBINED Left 8/20/2014    Procedure: COMBINED CYSTOSCOPY, URETEROSCOPY, LASER HOLMIUM LITHOTRIPSY URETER(S), INSERT STENT;  Surgeon: Sam Arriaga MD;  Location:  OR    LASER HOLMIUM LITHOTRIPSY URETER(S), INSERT STENT, COMBINED Left 5/2/2019    Procedure: CYSTOSCOPY, LEFT RETROGRADE, LEFT URETEROSCOPY, HOLMIUM LASER LITHOTRIPSY, STONE REMOVAL, LEFT STENT EXCHANGE;  Surgeon: Derrek Rivera MD;  Location:  OR    LASER HOLMIUM LITHOTRIPSY URETER(S), INSERT STENT, COMBINED Right 6/13/2019    Procedure: CYSTOSCOPY; RIGHT URETEROSCOPY WITH HOLMIUM LASER LITHOTRIPSY; RIGHT STENT PLACEMENT (DIGITAL FLEXIBLE URETEROSCOPE);  Surgeon: Derrek Rivera MD;  Location:  OR    ORTHOPEDIC SURGERY      left ankle    RECESSION EYELID UPPER       Current Outpatient Medications   Medication Sig Dispense Refill    allopurinol  (ZYLOPRIM) 100 MG tablet Take 2 tablets by mouth once daily 90 tablet 1    aspirin 81 MG EC tablet Take 1 tablet (81 mg) by mouth daily 30 tablet 0    cyanocobalamin (VITAMIN B-12) 500 MCG SUBL sublingual tablet Place 2 tablets (1,000 mcg) under the tongue daily 60 tablet 0    HYDROcodone-acetaminophen (NORCO)  MG per tablet Take 1 tablet by mouth 3 times daily as needed for moderate to severe pain 6 tablet 0    levothyroxine (SYNTHROID/LEVOTHROID) 125 MCG tablet Take 1 tablet (125 mcg) by mouth every morning (before breakfast) 30 tablet 0    lisinopril (ZESTRIL) 2.5 MG tablet Take 2.5 mg by mouth daily      metoprolol succinate ER (TOPROL XL) 25 MG 24 hr tablet Take 1 tablet (25 mg) by mouth daily 90 tablet 3    oxyBUTYnin ER (DITROPAN XL) 10 MG 24 hr tablet Take 1 tablet (10 mg) by mouth daily 90 tablet 0    pantoprazole (PROTONIX) 40 MG EC tablet Take 1 tablet (40 mg) by mouth every morning (before breakfast) 30 tablet 0    rosuvastatin (CRESTOR) 20 MG tablet Take 1 tablet (20 mg) by mouth daily 90 tablet 3    senna-docusate (SENOKOT-S/PERICOLACE) 8.6-50 MG tablet Take 1 tablet by mouth 2 times daily as needed for constipation 30 tablet 0    sucralfate (CARAFATE) 1 GM tablet Take 1 tablet (1 g) by mouth 4 times daily for 30 days 120 tablet 0    tamsulosin (FLOMAX) 0.4 MG capsule Take 1 capsule (0.4 mg) by mouth daily 30 capsule 0    albuterol (PROAIR HFA/PROVENTIL HFA/VENTOLIN HFA) 108 (90 Base) MCG/ACT inhaler Inhale 1-2 puffs into the lungs every 4 hours as needed for shortness of breath, wheezing or cough (Patient not taking: Reported on 8/1/2024) 36 g 2    blood glucose (NO BRAND SPECIFIED) lancets standard Use to test blood sugar 1 time daily or as directed. (Patient not taking: Reported on 6/6/2024) 100 each 3    blood glucose (NO BRAND SPECIFIED) test strip Use to test blood sugar 1 time daily or as directed. (Patient not taking: Reported on 8/1/2024) 100 strip 3    blood glucose monitoring (NO BRAND  "SPECIFIED) meter device kit Use to test blood sugar 1 time daily or as directed. (Patient not taking: Reported on 2024) 1 kit 0    Fluticasone-Umeclidin-Vilanterol (TRELEGY ELLIPTA) 200-62.5-25 MCG/ACT oral inhaler Inhale 1 puff into the lungs daily (Patient not taking: Reported on 2024) 28 each 1    hyoscyamine (LEVSIN/SL) 0.125 MG sublingual tablet Place 1 tablet (125 mcg) under the tongue every 6 hours as needed for cramping (for bladder spasms related to ureteral stent) (Patient not taking: Reported on 2024) 20 tablet 0    nitroGLYcerin (NITROSTAT) 0.4 MG sublingual tablet For chest pain place 1 tablet under the tongue every 5 minutes for 3 doses. If symptoms persist 5 minutes after 1st dose call 911. (Patient not taking: Reported on 2024) 25 tablet 1       Allergies   Allergen Reactions    Morphine Nausea     Severe vomiting    Prednisone      Elevated blood glucose  weakness    Baclofen GI Disturbance    Imitrex [Sumatriptan] Nausea    Tetracycline      Sores in mouth        Social History     Tobacco Use    Smoking status: Former     Current packs/day: 0.00     Average packs/day: 1.5 packs/day for 40.0 years (60.0 ttl pk-yrs)     Types: Cigarettes     Start date: 1955     Quit date: 1995     Years since quittin.6    Smokeless tobacco: Never   Substance Use Topics    Alcohol use: Yes     Comment: couple drinks per year     History reviewed. No pertinent family history.  History   Drug Use No             Review of Systems  Constitutional, HEENT, cardiovascular, pulmonary, GI, , musculoskeletal, neuro, skin, endocrine and psych systems are negative, except as otherwise noted.    Objective    /61   Pulse 69   Ht 1.651 m (5' 5\")   Wt 67.3 kg (148 lb 6.4 oz)   SpO2 95%   BMI 24.70 kg/m     Estimated body mass index is 24.7 kg/m  as calculated from the following:    Height as of this encounter: 1.651 m (5' 5\").    Weight as of this encounter: 67.3 kg (148 lb 6.4 " oz).  Physical Exam  GENERAL: alert and no distress  EYES: Eyes grossly normal to inspection, PERRL and conjunctivae and sclerae normal  HENT: ear canals and TM's normal, nose and mouth without ulcers or lesions  NECK: no adenopathy, no asymmetry, masses, or scars  RESP: lungs clear to auscultation - no rales, rhonchi or wheezes  CV: regular rate and rhythm, normal S1 S2, no S3 or S4, no murmur, click or rub, no peripheral edema  ABDOMEN: soft, nontender, no hepatosplenomegaly, no masses and bowel sounds normal  MS: no gross musculoskeletal defects noted, mild left lower ankle edema  SKIN: no suspicious lesions or rashes  NEURO: Normal strength and tone, mentation intact and speech normal  PSYCH: mentation appears normal, affect normal/bright    Recent Labs   Lab Test 08/01/24  1553 08/01/24  1539 07/31/24  1244 07/27/24  0644 07/26/24  1049 07/24/24  0753 06/27/24  1157 06/06/24  1145 05/31/24  1213 03/19/24  1448 03/11/24  1655 03/11/24  1653   HGB 8.8*  --  8.5*   < > 8.4* 9.5*   < >  --   --   --    < > 9.5*     --  150   < >  --  162   < >  --   --   --    < > 225   INR  --   --   --   --   --   --   --   --   --   --   --  1.03   NA  --  136  --   --   --  137   < >  --    < >  --    < >  --    POTASSIUM  --  4.2  --   --  3.9 4.3   < >  --    < >  --    < >  --    CR  --  1.43* 1.27*  --   --  1.34*   < >  --    < >  --    < >  --    A1C  --   --   --   --   --   --   --  5.8*  --  6.3*  --   --     < > = values in this interval not displayed.        Diagnostics  Office Visit on 08/12/2024   Component Date Value Ref Range Status    WBC x10/cmm 08/12/2024 4.2  3.8 - 11.0 x10/cmm Final    % Lymphocytes 08/12/2024 14.1 (A)  20.5 - 51.1 % Final    % Monocytes 08/12/2024 7.8  1.7 - 9.3 % Final    % Granulocytes 08/12/2024 78.1 (A)  42.2 - 75.2 % Final    RBC x10/cmm 08/12/2024 2.86 (A)  3.7 - 5.2 x10/cmm Final    Hemoglobin 08/12/2024 9.0 (A)  11.8 - 15.5 g/dl Final    Hematocrit 08/12/2024 26.0 (A)  35 -  46 % Final    MCV 08/12/2024 90.9  80 - 100 fL Final    MCH 08/12/2024 31.7 (A)  27.0 - 31.0 pg Final    MCHC 08/12/2024 34.8  33.0 - 37.0 g/dL Final    Platelet Count 08/12/2024 168  140 - 450 K/uL Final      No EKG required for low risk surgery (cataract, skin procedure, breast biopsy, etc).    Revised Cardiac Risk Index (RCRI)  The patient has the following serious cardiovascular risks for perioperative complications:   - No serious cardiac risks = 0 points     RCRI Interpretation: 0 points: Class I (very low risk - 0.4% complication rate)         Signed Electronically by: WILEY Brumfield CNP  A copy of this evaluation report is provided to the requesting physician.

## 2024-08-15 ENCOUNTER — ANESTHESIA EVENT (OUTPATIENT)
Dept: SURGERY | Facility: CLINIC | Age: 84
End: 2024-08-15
Payer: COMMERCIAL

## 2024-08-15 NOTE — ANESTHESIA PREPROCEDURE EVALUATION
Anesthesia Pre-Procedure Evaluation    Patient: Macie Jorge   MRN: [id number] : 1940        Procedure : Procedure(s):  CYSTOSCOPY , LEFT URETEROSCOPY , HOLMIUM LASER LITHOTRIPSY AND LEFT URETERAL STENT REMOVAL VERSES LEFT URETERAL STENT EXCHANGE          Past Medical History:   Diagnosis Date    Calculus of left ureter     dr Arriaga - ureteroscopy and lithitripsy    Cataract     Chronic low back pain     Dr Villagran at [facility] in - not surgical     Complication of anesthesia     COPD with asthma (H)      followed with pulmonary DR LENNOX 60-pack-year history of smoking    Esophagitis, reflux 2012    SOME EOSINOPHILIA NO BARRETS    Gastro-oesophageal reflux disease      has seen GI    Hydronephrosis, left     chronic , in  Dr Arriaga plan was observaton    Hyperlipidaemia LDL goal < 100     Hypothyroid     Kidney stone 3/28/2019    Migraine headaches 2011    Moderate persistent asthma      lifelong, saw Pulm     Obesity (BMI 30-39.9)     Panic attacks     PONV (postoperative nausea and vomiting)     Renal stones     kidney stones, multiple small stones high risk recurrent ureteral stones. , Dr Arriaga in  rec 24 hour urine.    Type 2 diabetes mellitus without complication (H)       Past Surgical History:   Procedure Laterality Date    ARTHROPLASTY HIP  3/13/2013    Procedure: ARTHROPLASTY HIP;  LEFT TOTAL HIP ARTHROPLASTY (BIOMET)^ ;  Surgeon: Anand Main MD;  Location:  OR    ARTHROPLASTY KNEE Right 2021    Procedure: RIGHT TOTAL KNEE ARTHROPLASTY;  Surgeon: Anand Main MD;  Location:  OR    ARTHROPLASTY PATELLO-FEMORAL (KNEE)  2005ish    Left    CHOLECYSTECTOMY, OPEN  25 yo    COMBINED CYSTOSCOPY, INSERT STENT URETER(S)  2014    Procedure: COMBINED CYSTOSCOPY, INSERT STENT URETER(S);  Surgeon: Sam Arriaga MD;  Location:  OR    COMBINED CYSTOSCOPY, RETROGRADES, URETEROSCOPY, LASER HOLMIUM LITHOTRIPSY URETER(S), INSERT STENT Left 2016    Procedure:  COMBINED CYSTOSCOPY, RETROGRADES, URETEROSCOPY, LASER HOLMIUM LITHOTRIPSY URETER(S), INSERT STENT;  Surgeon: Thomas Edmondson MD;  Location:  OR    COMBINED CYSTOSCOPY, RETROGRADES, URETEROSCOPY, LASER HOLMIUM LITHOTRIPSY URETER(S), INSERT STENT Left 3/28/2019    Procedure: CYSTOSCOPY, LEFT RETROGRADE PYLEOGRAM, LEFT URETEROSCOPY, HOLMIUM LASER LITHOTRIPSY, LEFT URETERAL STENT EXCHANGE;  Surgeon: Derrek Rivera MD;  Location:  OR    CV CORONARY ANGIOGRAM N/A 7/5/2023    Procedure: Coronary Angiogram;  Surgeon: Fausto Espinal MD;  Location:  HE[facility]ATH LAB    CV INTRAVASULAR ULTRASOUND N/A 7/5/2023    Procedure: Intravascular Ultrasound;  Surgeon: Fausto Espinal MD;  Location:  HEART [facility]ARD[facility] CATH LAB    CV PCI N/A 7/5/2023    Procedure: Percutaneous Coronary Intervention;  Surgeon: Fausto Espinal MD;  Location:  HEART CARDIAC CATH LAB    CYSTOSCOPY, RETROGRADES, INSERT STENT URETER(S), COMBINED Left 3/8/2019    Procedure: CYSTOSCOPY,LEFT RETROGRADE, LEFT STENT PLACEMENT;  Surgeon: Derrek Rivera MD;  Location:  OR    CYSTOSCOPY, RETROGRADES, INSERT STENT URETER(S), COMBINED Left 7/23/2024    Procedure: Cystoscopy, retrogrades, insert stent ureter(s), combined;  Surgeon: Anand Hendricks MD;  Location:  OR    ESOPHAGOSCOPY, GASTROSCOPY, DUODENOSCOPY (EGD), COMBINED N/A 7/26/2024    Procedure: Esophagoscopy, gastroscopy, duodenoscopy (EGD), combined;  Surgeon: Evette Salcido MD;  Location:  GI    GENITOURINARY SURGERY      LAMINECT/DISCECTOMY, LUMBAR      Laminectomy/Discectomy Lumbar    LASER HOLMIUM LITHOTRIPSY URETER(S), INSERT STENT, COMBINED Left 8/20/2014    Procedure: COMBINED CYSTOSCOPY, URETEROSCOPY, LASER HOLMIUM LITHOTRIPSY URETER(S), INSERT STENT;  Surgeon: Sam Arriaga MD;  Location:  OR    LASER HOLMIUM LITHOTRIPSY URETER(S), INSERT STENT, COMBINED Left 5/2/2019    Procedure: CYSTOSCOPY, LEFT RETROGRADE, LEFT URETEROSCOPY, HOLMIUM LASER  LITHOTRIPSY, STONE REMOVAL, LEFT STENT EXCHANGE;  Surgeon: Derrek Rivera MD;  Location:  OR    LASER HOLMIUM LITHOTRIPSY URETER(S), INSERT STENT, COMBINED Right 2019    Procedure: CYSTOSCOPY; RIGHT URETEROSCOPY WITH HOLMIUM LASER LITHOTRIPSY; RIGHT STENT PLACEMENT (DIGITAL FLEXIBLE URETEROSCOPE);  Surgeon: Derrek Rivera MD;  Location:  OR    ORTHOPEDIC SURGERY      left ankle    RECESSION EYELID UPPER        Allergies   Allergen Reactions    Morphine Nausea     Severe vomiting    Prednisone      Elevated blood glucose  weakness    Baclofen GI Disturbance    Imitrex [Sumatriptan] Nausea    Tetracycline      Sores in mouth      Social History     Tobacco Use    Smoking status: Former     Current packs/day: 0.00     Average packs/day: 1.5 packs/day for 40.0 years (60.0 ttl pk-yrs)     Types: Cigarettes     Start date: 1955     Quit date: 1995     Years since quittin.6    Smokeless tobacco: Never   Substance Use Topics    Alcohol use: Yes     Comment: couple drinks per year      Wt Readings from Last 1 Encounters:   24 67.3 kg (148 lb 6.4 oz)        Anesthesia Evaluation   Pt has had prior anesthetic.     No history of anesthetic complications       ROS/MED HX  ENT/Pulmonary:     (+)                      asthma    COPD (she feels good right now),           (-) sleep apnea   Neurologic:       Cardiovascular:     (+)  - -  CAD -  - stent-2023.                           valvular problems/murmurs type: AS mild-mod.    Previous cardiac testing   Echo: Date: Results:  Left ventricular systolic function is normal.  The visual ejection fraction is 60-65%.  No regional wall motion abnormalities noted.  The right ventricle is normal in size and function.  Mild aortic valve stenosis with trace regurgitation.  Peak velocity 2.5 m/s, mean gradient 13-15 mmHg, valve area 1.6-1.8 cmÂ .  Normal stroke index of 53 mL/m2. Velocity ratio 0.39.     COMMENT: There have been significant variance in  the reported severity of  aortic valve stenosis on previous echocardiograms ranging from moderate to  severe. On current study, aortic valve opening is clearly visualized in the  short axis view and is consistent with the hemodynamics of mild to moderate  stenosis.  Stress Test:  Date: Results:    ECG Reviewed:  Date: Results:    Cath:  Date: Results:   (-) angina and angina   METS/Exercise Tolerance:     Hematologic:       Musculoskeletal: Comment: Spinal stenosis, lumbar      GI/Hepatic: Comment: Gastric ulcer being treated currently with PPI   (-) GERD   Renal/Genitourinary:     (+) renal disease (3b), type: CRI and ARF,     Nephrolithiasis ,       Endo:     (+)  type II DM,   Not using insulin,     thyroid problem, hypothyroidism,    Obesity,       Psychiatric/Substance Use:       Infectious Disease:       Malignancy:       Other:            Physical Exam    Airway        Mallampati: III   TM distance: > 3 FB   Neck ROM: full   Mouth opening: > 3 cm    Respiratory Devices and Support         Dental       (+) Minor Abnormalities - some fillings, tiny chips      Cardiovascular   cardiovascular exam normal          Pulmonary   pulmonary exam normal                OUTSIDE LABS:  CBC:   Lab Results   Component Value Date    WBC 4.2 08/12/2024    WBC 4.1 08/01/2024    HGB 9.0 (A) 08/12/2024    HGB 8.8 (A) 08/01/2024    HCT 26.0 (A) 08/12/2024    HCT 25.2 (A) 08/01/2024     08/12/2024     08/01/2024     BMP:   Lab Results   Component Value Date     08/12/2024     08/01/2024    POTASSIUM 3.7 08/12/2024    POTASSIUM 4.2 08/01/2024    CHLORIDE 103 08/12/2024    CHLORIDE 101 08/01/2024    CO2 24 08/12/2024    CO2 26 08/01/2024    BUN 27.8 (H) 08/12/2024    BUN 24.3 (H) 08/01/2024    CR 2.21 (H) 08/12/2024    CR 1.43 (H) 08/01/2024     (H) 08/12/2024     (H) 08/01/2024     COAGS:   Lab Results   Component Value Date    PTT 25 03/11/2024    INR 1.03 03/11/2024     POC:   Lab Results    Component Value Date     (H) 06/13/2019     HEPATIC:   Lab Results   Component Value Date    ALBUMIN 3.6 07/26/2024    PROTTOTAL 6.2 (L) 07/26/2024    ALT 11 07/26/2024    AST 25 07/31/2024    ALKPHOS 38 (L) 07/26/2024    BILITOTAL 0.5 07/26/2024     OTHER:   Lab Results   Component Value Date    LACT 1.1 07/23/2024    A1C 5.8 (H) 06/06/2024    WARD 9.6 08/12/2024    PHOS 3.6 11/15/2022    LIPASE 40 07/26/2024    TSH 0.17 (L) 08/01/2024    T4 2.29 (H) 08/01/2024    T3 81 03/04/2019       Anesthesia Plan    ASA Status:  3    NPO Status:  NPO Appropriate    Anesthesia Type: General.     - Airway: LMA   Induction: Intravenous.   Maintenance: Balanced.        Consents    Anesthesia Plan(s) and associated risks, benefits, and realistic alternatives discussed. Questions answered and patient/representative(s) expressed understanding.     - Discussed:     - Discussed with:  Patient            Postoperative Care    Pain management: IV analgesics, Multi-modal analgesia.   PONV prophylaxis: Ondansetron (or other 5HT-3), Dexamethasone or Solumedrol, Background Propofol Infusion     Comments:    Other Comments: Recheck BMP given large creatinine increase recently           Taz Johnson MD    I have reviewed the pertinent notes and labs in the chart from the past 30 days and (re)examined the patient.  Any updates or changes from those notes are reflected in this note.

## 2024-08-16 ENCOUNTER — APPOINTMENT (OUTPATIENT)
Dept: GENERAL RADIOLOGY | Facility: CLINIC | Age: 84
End: 2024-08-16
Attending: UROLOGY
Payer: COMMERCIAL

## 2024-08-16 ENCOUNTER — HOSPITAL ENCOUNTER (OUTPATIENT)
Facility: CLINIC | Age: 84
Discharge: HOME | End: 2024-08-16
Attending: UROLOGY | Admitting: UROLOGY
Payer: COMMERCIAL

## 2024-08-16 ENCOUNTER — ANESTHESIA (OUTPATIENT)
Dept: SURGERY | Facility: CLINIC | Age: 84
End: 2024-08-16
Payer: COMMERCIAL

## 2024-08-16 VITALS
RESPIRATION RATE: 14 BRPM | OXYGEN SATURATION: 97 % | HEART RATE: 66 BPM | HEIGHT: 66 IN | BODY MASS INDEX: 23.95 KG/M2 | SYSTOLIC BLOOD PRESSURE: 141 MMHG | TEMPERATURE: 96.7 F | DIASTOLIC BLOOD PRESSURE: 67 MMHG | WEIGHT: 149 LBS

## 2024-08-16 DIAGNOSIS — Z98.890 POST-OPERATIVE STATE: Primary | ICD-10-CM

## 2024-08-16 LAB
ANION GAP SERPL CALCULATED.3IONS-SCNC: 10 MMOL/L (ref 7–15)
BUN SERPL-MCNC: 29.5 MG/DL (ref 8–23)
CALCIUM SERPL-MCNC: 9 MG/DL (ref 8.8–10.4)
CHLORIDE SERPL-SCNC: 106 MMOL/L (ref 98–107)
CREAT SERPL-MCNC: 1.58 MG/DL (ref 0.51–0.95)
EGFRCR SERPLBLD CKD-EPI 2021: 32 ML/MIN/1.73M2
GLUCOSE BLDC GLUCOMTR-MCNC: 88 MG/DL (ref 70–99)
GLUCOSE SERPL-MCNC: 126 MG/DL (ref 70–99)
HCO3 SERPL-SCNC: 25 MMOL/L (ref 22–29)
POTASSIUM SERPL-SCNC: 3.7 MMOL/L (ref 3.4–5.3)
SODIUM SERPL-SCNC: 141 MMOL/L (ref 135–145)

## 2024-08-16 PROCEDURE — 999N000141 HC STATISTIC PRE-PROCEDURE NURSING ASSESSMENT: Performed by: UROLOGY

## 2024-08-16 PROCEDURE — 250N000011 HC RX IP 250 OP 636: Performed by: REGISTERED NURSE

## 2024-08-16 PROCEDURE — 710N000009 HC RECOVERY PHASE 1, LEVEL 1, PER MIN: Performed by: UROLOGY

## 2024-08-16 PROCEDURE — 258N000001 HC RX 258: Performed by: UROLOGY

## 2024-08-16 PROCEDURE — C1894 INTRO/SHEATH, NON-LASER: HCPCS | Performed by: UROLOGY

## 2024-08-16 PROCEDURE — 80048 BASIC METABOLIC PNL TOTAL CA: CPT | Performed by: ANESTHESIOLOGY

## 2024-08-16 PROCEDURE — 999N000179 XR SURGERY CARM FLUORO LESS THAN 5 MIN W STILLS: Mod: TC

## 2024-08-16 PROCEDURE — 370N000017 HC ANESTHESIA TECHNICAL FEE, PER MIN: Performed by: UROLOGY

## 2024-08-16 PROCEDURE — 250N000011 HC RX IP 250 OP 636: Performed by: ANESTHESIOLOGY

## 2024-08-16 PROCEDURE — C2617 STENT, NON-COR, TEM W/O DEL: HCPCS | Performed by: UROLOGY

## 2024-08-16 PROCEDURE — 82365 CALCULUS SPECTROSCOPY: CPT | Performed by: UROLOGY

## 2024-08-16 PROCEDURE — 250N000013 HC RX MED GY IP 250 OP 250 PS 637: Performed by: PHYSICIAN ASSISTANT

## 2024-08-16 PROCEDURE — 250N000009 HC RX 250: Performed by: UROLOGY

## 2024-08-16 PROCEDURE — C1758 CATHETER, URETERAL: HCPCS | Performed by: UROLOGY

## 2024-08-16 PROCEDURE — 360N000076 HC SURGERY LEVEL 3, PER MIN: Performed by: UROLOGY

## 2024-08-16 PROCEDURE — 272N000001 HC OR GENERAL SUPPLY STERILE: Performed by: UROLOGY

## 2024-08-16 PROCEDURE — 36415 COLL VENOUS BLD VENIPUNCTURE: CPT | Performed by: ANESTHESIOLOGY

## 2024-08-16 PROCEDURE — 82962 GLUCOSE BLOOD TEST: CPT

## 2024-08-16 PROCEDURE — 250N000009 HC RX 250: Performed by: REGISTERED NURSE

## 2024-08-16 PROCEDURE — 250N000025 HC SEVOFLURANE, PER MIN: Performed by: UROLOGY

## 2024-08-16 PROCEDURE — 99100 ANES PT EXTEME AGE<1 YR&>70: CPT | Performed by: REGISTERED NURSE

## 2024-08-16 PROCEDURE — 255N000002 HC RX 255 OP 636: Performed by: UROLOGY

## 2024-08-16 PROCEDURE — 52356 CYSTO/URETERO W/LITHOTRIPSY: CPT | Performed by: ANESTHESIOLOGY

## 2024-08-16 PROCEDURE — 258N000003 HC RX IP 258 OP 636: Performed by: REGISTERED NURSE

## 2024-08-16 PROCEDURE — 710N000012 HC RECOVERY PHASE 2, PER MINUTE: Performed by: UROLOGY

## 2024-08-16 PROCEDURE — 250N000011 HC RX IP 250 OP 636: Performed by: PHYSICIAN ASSISTANT

## 2024-08-16 PROCEDURE — C1769 GUIDE WIRE: HCPCS | Performed by: UROLOGY

## 2024-08-16 PROCEDURE — 52356 CYSTO/URETERO W/LITHOTRIPSY: CPT | Performed by: REGISTERED NURSE

## 2024-08-16 DEVICE — URETERAL STENT
Type: IMPLANTABLE DEVICE | Site: URETER | Status: FUNCTIONAL
Brand: POLARIS™ ULTRA

## 2024-08-16 RX ORDER — SODIUM CHLORIDE, SODIUM LACTATE, POTASSIUM CHLORIDE, CALCIUM CHLORIDE 600; 310; 30; 20 MG/100ML; MG/100ML; MG/100ML; MG/100ML
INJECTION, SOLUTION INTRAVENOUS CONTINUOUS PRN
Status: DISCONTINUED | OUTPATIENT
Start: 2024-08-16 | End: 2024-08-16

## 2024-08-16 RX ORDER — ONDANSETRON 2 MG/ML
4 INJECTION INTRAMUSCULAR; INTRAVENOUS ONCE
Status: COMPLETED | OUTPATIENT
Start: 2024-08-16 | End: 2024-08-16

## 2024-08-16 RX ORDER — FENTANYL CITRATE 0.05 MG/ML
25 INJECTION, SOLUTION INTRAMUSCULAR; INTRAVENOUS EVERY 5 MIN PRN
Status: DISCONTINUED | OUTPATIENT
Start: 2024-08-16 | End: 2024-08-16 | Stop reason: HOSPADM

## 2024-08-16 RX ORDER — HYDROMORPHONE HCL IN WATER/PF 6 MG/30 ML
0.2 PATIENT CONTROLLED ANALGESIA SYRINGE INTRAVENOUS EVERY 5 MIN PRN
Status: DISCONTINUED | OUTPATIENT
Start: 2024-08-16 | End: 2024-08-16 | Stop reason: HOSPADM

## 2024-08-16 RX ORDER — DEXAMETHASONE SODIUM PHOSPHATE 4 MG/ML
INJECTION, SOLUTION INTRA-ARTICULAR; INTRALESIONAL; INTRAMUSCULAR; INTRAVENOUS; SOFT TISSUE PRN
Status: DISCONTINUED | OUTPATIENT
Start: 2024-08-16 | End: 2024-08-16

## 2024-08-16 RX ORDER — PROPOFOL 10 MG/ML
INJECTION, EMULSION INTRAVENOUS CONTINUOUS PRN
Status: DISCONTINUED | OUTPATIENT
Start: 2024-08-16 | End: 2024-08-16

## 2024-08-16 RX ORDER — DEXAMETHASONE SODIUM PHOSPHATE 4 MG/ML
4 INJECTION, SOLUTION INTRA-ARTICULAR; INTRALESIONAL; INTRAMUSCULAR; INTRAVENOUS; SOFT TISSUE
Status: DISCONTINUED | OUTPATIENT
Start: 2024-08-16 | End: 2024-08-16 | Stop reason: HOSPADM

## 2024-08-16 RX ORDER — PROPOFOL 10 MG/ML
INJECTION, EMULSION INTRAVENOUS PRN
Status: DISCONTINUED | OUTPATIENT
Start: 2024-08-16 | End: 2024-08-16

## 2024-08-16 RX ORDER — ONDANSETRON 2 MG/ML
INJECTION INTRAMUSCULAR; INTRAVENOUS PRN
Status: DISCONTINUED | OUTPATIENT
Start: 2024-08-16 | End: 2024-08-16

## 2024-08-16 RX ORDER — ONDANSETRON 2 MG/ML
4 INJECTION INTRAMUSCULAR; INTRAVENOUS EVERY 30 MIN PRN
Status: DISCONTINUED | OUTPATIENT
Start: 2024-08-16 | End: 2024-08-16 | Stop reason: HOSPADM

## 2024-08-16 RX ORDER — SODIUM CHLORIDE, SODIUM LACTATE, POTASSIUM CHLORIDE, CALCIUM CHLORIDE 600; 310; 30; 20 MG/100ML; MG/100ML; MG/100ML; MG/100ML
INJECTION, SOLUTION INTRAVENOUS CONTINUOUS
Status: DISCONTINUED | OUTPATIENT
Start: 2024-08-16 | End: 2024-08-16 | Stop reason: HOSPADM

## 2024-08-16 RX ORDER — ONDANSETRON 4 MG/1
4 TABLET, ORALLY DISINTEGRATING ORAL EVERY 30 MIN PRN
Status: DISCONTINUED | OUTPATIENT
Start: 2024-08-16 | End: 2024-08-16 | Stop reason: HOSPADM

## 2024-08-16 RX ORDER — IOPAMIDOL 612 MG/ML
INJECTION, SOLUTION INTRAVASCULAR PRN
Status: DISCONTINUED | OUTPATIENT
Start: 2024-08-16 | End: 2024-08-16 | Stop reason: HOSPADM

## 2024-08-16 RX ORDER — ACETAMINOPHEN 325 MG/1
650 TABLET ORAL ONCE
Status: DISCONTINUED | OUTPATIENT
Start: 2024-08-16 | End: 2024-08-16 | Stop reason: HOSPADM

## 2024-08-16 RX ORDER — ONDANSETRON 2 MG/ML
4 INJECTION INTRAMUSCULAR; INTRAVENOUS EVERY 6 HOURS PRN
Status: DISCONTINUED | OUTPATIENT
Start: 2024-08-16 | End: 2024-08-16

## 2024-08-16 RX ORDER — HYDROMORPHONE HCL IN WATER/PF 6 MG/30 ML
0.4 PATIENT CONTROLLED ANALGESIA SYRINGE INTRAVENOUS EVERY 5 MIN PRN
Status: DISCONTINUED | OUTPATIENT
Start: 2024-08-16 | End: 2024-08-16 | Stop reason: HOSPADM

## 2024-08-16 RX ORDER — FENTANYL CITRATE 50 UG/ML
INJECTION, SOLUTION INTRAMUSCULAR; INTRAVENOUS PRN
Status: DISCONTINUED | OUTPATIENT
Start: 2024-08-16 | End: 2024-08-16

## 2024-08-16 RX ORDER — MAGNESIUM HYDROXIDE 1200 MG/15ML
LIQUID ORAL PRN
Status: DISCONTINUED | OUTPATIENT
Start: 2024-08-16 | End: 2024-08-16 | Stop reason: HOSPADM

## 2024-08-16 RX ORDER — OXYCODONE HYDROCHLORIDE 5 MG/1
5 TABLET ORAL EVERY 4 HOURS PRN
Qty: 10 TABLET | Refills: 0 | Status: SHIPPED | OUTPATIENT
Start: 2024-08-16 | End: 2024-08-28

## 2024-08-16 RX ORDER — OXYCODONE HYDROCHLORIDE 5 MG/1
5 TABLET ORAL ONCE
Status: DISCONTINUED | OUTPATIENT
Start: 2024-08-16 | End: 2024-08-16 | Stop reason: HOSPADM

## 2024-08-16 RX ORDER — FENTANYL CITRATE 0.05 MG/ML
50 INJECTION, SOLUTION INTRAMUSCULAR; INTRAVENOUS EVERY 5 MIN PRN
Status: DISCONTINUED | OUTPATIENT
Start: 2024-08-16 | End: 2024-08-16 | Stop reason: HOSPADM

## 2024-08-16 RX ORDER — ACETAMINOPHEN 325 MG/1
975 TABLET ORAL ONCE
Status: COMPLETED | OUTPATIENT
Start: 2024-08-16 | End: 2024-08-16

## 2024-08-16 RX ORDER — NALOXONE HYDROCHLORIDE 0.4 MG/ML
0.1 INJECTION, SOLUTION INTRAMUSCULAR; INTRAVENOUS; SUBCUTANEOUS
Status: DISCONTINUED | OUTPATIENT
Start: 2024-08-16 | End: 2024-08-16 | Stop reason: HOSPADM

## 2024-08-16 RX ORDER — LIDOCAINE HYDROCHLORIDE 20 MG/ML
INJECTION, SOLUTION INFILTRATION; PERINEURAL PRN
Status: DISCONTINUED | OUTPATIENT
Start: 2024-08-16 | End: 2024-08-16

## 2024-08-16 RX ORDER — CIPROFLOXACIN 2 MG/ML
400 INJECTION, SOLUTION INTRAVENOUS ONCE
Status: COMPLETED | OUTPATIENT
Start: 2024-08-16 | End: 2024-08-16

## 2024-08-16 RX ADMIN — SUGAMMADEX 200 MG: 100 INJECTION, SOLUTION INTRAVENOUS at 08:44

## 2024-08-16 RX ADMIN — PROPOFOL 20 MCG/KG/MIN: 10 INJECTION, EMULSION INTRAVENOUS at 07:41

## 2024-08-16 RX ADMIN — ACETAMINOPHEN 975 MG: 325 TABLET, FILM COATED ORAL at 06:11

## 2024-08-16 RX ADMIN — DEXAMETHASONE SODIUM PHOSPHATE 4 MG: 4 INJECTION, SOLUTION INTRA-ARTICULAR; INTRALESIONAL; INTRAMUSCULAR; INTRAVENOUS; SOFT TISSUE at 07:41

## 2024-08-16 RX ADMIN — PHENYLEPHRINE HYDROCHLORIDE 100 MCG: 10 INJECTION INTRAVENOUS at 07:47

## 2024-08-16 RX ADMIN — ONDANSETRON 4 MG: 2 INJECTION INTRAMUSCULAR; INTRAVENOUS at 09:24

## 2024-08-16 RX ADMIN — PHENYLEPHRINE HYDROCHLORIDE 100 MCG: 10 INJECTION INTRAVENOUS at 07:53

## 2024-08-16 RX ADMIN — CIPROFLOXACIN 400 MG: 2 INJECTION, SOLUTION INTRAVENOUS at 06:23

## 2024-08-16 RX ADMIN — LIDOCAINE HYDROCHLORIDE 60 MG: 20 INJECTION, SOLUTION INFILTRATION; PERINEURAL at 07:38

## 2024-08-16 RX ADMIN — SODIUM CHLORIDE, POTASSIUM CHLORIDE, SODIUM LACTATE AND CALCIUM CHLORIDE: 600; 310; 30; 20 INJECTION, SOLUTION INTRAVENOUS at 07:30

## 2024-08-16 RX ADMIN — FENTANYL CITRATE 25 MCG: 50 INJECTION INTRAMUSCULAR; INTRAVENOUS at 07:38

## 2024-08-16 RX ADMIN — ONDANSETRON 4 MG: 2 INJECTION INTRAMUSCULAR; INTRAVENOUS at 07:14

## 2024-08-16 RX ADMIN — ROCURONIUM BROMIDE 50 MG: 50 INJECTION, SOLUTION INTRAVENOUS at 07:38

## 2024-08-16 RX ADMIN — PHENYLEPHRINE HYDROCHLORIDE 100 MCG: 10 INJECTION INTRAVENOUS at 08:12

## 2024-08-16 RX ADMIN — PROPOFOL 110 MG: 10 INJECTION, EMULSION INTRAVENOUS at 07:38

## 2024-08-16 RX ADMIN — FENTANYL CITRATE 25 MCG: 50 INJECTION INTRAMUSCULAR; INTRAVENOUS at 07:46

## 2024-08-16 RX ADMIN — ONDANSETRON 4 MG: 2 INJECTION INTRAMUSCULAR; INTRAVENOUS at 07:41

## 2024-08-16 ASSESSMENT — ACTIVITIES OF DAILY LIVING (ADL)
ADLS_ACUITY_SCORE: 39
ADLS_ACUITY_SCORE: 37
ADLS_ACUITY_SCORE: 39

## 2024-08-16 ASSESSMENT — COPD QUESTIONNAIRES: COPD: 1

## 2024-08-16 NOTE — OR NURSING
Advised pt to not take oxycodone if she takes her regular norco. Advised to take 1 or the other, not both together.

## 2024-08-16 NOTE — ANESTHESIA PROCEDURE NOTES
Airway       Patient location during procedure: OR       Procedure Start/Stop Times: 8/16/2024 7:40 AM  Staff -        CRNA: Sharonda Echavarria APRN CRNA       Other Anesthesia Staff: Chirstie Flores RN       Performed By: SRNA  Consent for Airway        Urgency: elective  Indications and Patient Condition       Indications for airway management: danielle-procedural       Induction type:RSI       Mask difficulty assessment: 0 - not attempted    Final Airway Details       Final airway type: endotracheal airway       Successful airway: ETT - single  Endotracheal Airway Details        ETT size (mm): 7.0       Cuffed: yes       Successful intubation technique: video laryngoscopy       VL Blade Size: Krishna 3       Grade View of Cords: 1       Adjucts: stylet       Position: Right       Measured from: gums/teeth       Secured at (cm): 20       Bite block used: None    Post intubation assessment        Placement verified by: capnometry, equal breath sounds and chest rise        Number of attempts at approach: 1       Number of other approaches attempted: 0       Secured with: tape       Ease of procedure: easy       Dentition: Intact and Unchanged    Medication(s) Administered   Medication Administration Time: 8/16/2024 7:40 AM

## 2024-08-16 NOTE — ANESTHESIA CARE TRANSFER NOTE
Patient: Macie Jorge    Procedure: Procedure(s):  CYSTOSCOPY , LEFT URETEROSCOPY , HOLMIUM LASER LITHOTRIPSY AND LEFT URETERAL STENT REMOVAL VERSES LEFT URETERAL STENT EXCHANGE       Diagnosis: Kidney stones [N20.0]  Diagnosis Additional Information: No value filed.    Anesthesia Type:   General     Note:    Oropharynx: oropharynx clear of all foreign objects and spontaneously breathing  Level of Consciousness: drowsy  Oxygen Supplementation: face mask  Level of Supplemental Oxygen (L/min / FiO2): 6  Independent Airway: airway patency satisfactory and stable  Dentition: dentition unchanged  Vital Signs Stable: post-procedure vital signs reviewed and stable  Report to RN Given: handoff report given  Patient transferred to: PACU  Comments: Patient comfortable  Handoff Report: Identifed the Patient, Identified the Reponsible Provider, Reviewed the pertinent medical history, Discussed the surgical course, Reviewed Intra-OP anesthesia mangement and issues during anesthesia, Set expectations for post-procedure period and Allowed opportunity for questions and acknowledgement of understanding      Vitals:  Vitals Value Taken Time   /65 08/16/24 0908   Temp     Pulse 75 08/16/24 0909   Resp 16 08/16/24 0909   SpO2 99 % 08/16/24 0909   Vitals shown include unfiled device data.    Electronically Signed By: WILEY Gómez CRNA  August 16, 2024  9:10 AM

## 2024-08-16 NOTE — DISCHARGE INSTRUCTIONS
Same Day Surgery Discharge Instructions for  Sedation and General Anesthesia     It's not unusual to feel dizzy, light-headed or faint for up to 24 hours after surgery or while taking pain medication.  If you have these symptoms: sit for a few minutes before standing and have someone assist you when you get up to walk or use the bathroom.    You should rest and relax for the next 24 hours. We recommend you make arrangements to have an adult stay with you for at least 24 hours after your discharge.  Avoid hazardous and strenuous activity.    DO NOT DRIVE any vehicle or operate mechanical equipment for 24 hours following the end of your surgery.  Even though you may feel normal, your reactions may be affected by the medication you have received.    Do not drink alcoholic beverages for 24 hours following surgery.     Slowly progress to your regular diet as you feel able. It's not unusual to feel nauseated and/or vomit after receiving anesthesia.  If you develop these symptoms, drink clear liquids (apple juice, ginger ale, broth, 7-up, etc. ) until you feel better.  If your nausea and vomiting persists for 24 hours, please notify your surgeon.      All narcotic pain medications, along with inactivity and anesthesia, can cause constipation. Drinking plenty of liquids and increasing fiber intake will help.    For any questions of a medical nature, call your surgeon.    Do not make important decisions for 24 hours.    If you had general anesthesia, you may have a sore throat for a couple of days related to the breathing tube used during surgery.  You may use Cepacol lozenges to help with this discomfort.  If it worsens or if you develop a fever, contact your surgeon.     If you feel your pain is not well managed with the pain medications prescribed by your surgeon, please contact your surgeon's office to let them know so they can address your concerns.       Cystoscopy and Stent Placement Discharge Instructions    During  surgery, a stent was placed in the ureter.  The ureter is the tube that drains urine from the kidney to the bladder.  The stent is placed to dilate (open) the ureter so the stone fragments can pass easily through the ureter or to decrease ureteral swelling after surgery, or to relieve an obstruction. The stent is made of rubber. The upper end of the stent curls in the kidney while the lower end rests in the bladder      Diet:  Return to the diet that you were on before the procedure, unless you are given specific diet instructions.  It is important to drink 6-8 glasses of fluids per day at home - at least 3-4 glasses should be water.    Activity:  Walk short distances and increase as your strength allows.  You may climb stairs.  Do not do strenuous exercise or heavy lifting until approved by surgeon.  Do not drive while taking narcotic pain medications.    Bathing:  You may take a shower.    While the stent is in place you may experience the following symptoms:  Blood and/or small blood clots in urine.  Bladder spasm (frequency and urgency of urination).  Discomfort or aching in the back or side where the stent is.  Burning or discomfort at the end of urine stream.    To decrease these symptoms you should:  Take pain medication as prescribed.  Drink plenty of fluids.  If you experience pain at the end of urination try not emptying your bladder completely.  If having discomfort in back or side, decrease activity.    Call your physician if these signs/symptoms are present:  Pain that is not relieved by a short rest or ordered pain medications.  Temperature at or above 101.0 F or chills.  Inability or difficulty urinating.   Excessive blood in urine.  Any questions or concerns.       **If you have questions or concerns about your procedure,   call Dr. Rivera at [phone]**

## 2024-08-16 NOTE — ANESTHESIA POSTPROCEDURE EVALUATION
Patient: Macie Jorge    Procedure: Procedure(s):  CYSTOSCOPY , LEFT URETEROSCOPY , HOLMIUM LASER LITHOTRIPSY AND LEFT URETERAL STENT REMOVAL VERSES LEFT URETERAL STENT EXCHANGE       Anesthesia Type:  General    Note:     Postop Pain Control: Uneventful            Sign Out: Well controlled pain   PONV: No   Neuro/Psych: Uneventful            Sign Out: Acceptable/Baseline neuro status   Airway/Respiratory: Uneventful            Sign Out: Acceptable/Baseline resp. status   CV/Hemodynamics: Uneventful            Sign Out: Acceptable CV status; No obvious hypovolemia; No obvious fluid overload   Other NRE: NONE   DID A NON-ROUTINE EVENT OCCUR? No           Last vitals:  Vitals Value Taken Time   /64 08/16/24 0915   Temp 36.2  C (97.2  F) 08/16/24 0908   Pulse 75 08/16/24 0922   Resp 13 08/16/24 0922   SpO2 95 % 08/16/24 0922   Vitals shown include unfiled device data.    Electronically Signed By: Taz Johnson MD  August 16, 2024  9:23 AM

## 2024-08-19 LAB
APPEARANCE STONE: NORMAL
COMPN STONE: NORMAL
SPECIMEN WT: 33 MG

## 2024-08-26 DIAGNOSIS — N20.0 URIC ACID KIDNEY STONE: ICD-10-CM

## 2024-08-27 RX ORDER — ALLOPURINOL 100 MG/1
200 TABLET ORAL DAILY
Qty: 90 TABLET | Refills: 3 | Status: SHIPPED | OUTPATIENT
Start: 2024-08-27

## 2024-08-27 NOTE — CONFIDENTIAL NOTE
Med: ALLOPURINOL    LOV (related): 8/12/24      Due for F/U around: 10/2024 FOR TSH RECHECK    Next Appt: NONE

## 2024-08-28 ENCOUNTER — APPOINTMENT (OUTPATIENT)
Dept: CT IMAGING | Facility: CLINIC | Age: 84
End: 2024-08-28
Attending: BEHAVIOR TECHNICIAN
Payer: COMMERCIAL

## 2024-08-28 ENCOUNTER — HOSPITAL ENCOUNTER (EMERGENCY)
Facility: CLINIC | Age: 84
Discharge: HOME | End: 2024-08-28
Attending: EMERGENCY MEDICINE | Admitting: EMERGENCY MEDICINE
Payer: COMMERCIAL

## 2024-08-28 VITALS
HEART RATE: 85 BPM | OXYGEN SATURATION: 93 % | TEMPERATURE: 98 F | WEIGHT: 148 LBS | BODY MASS INDEX: 23.78 KG/M2 | HEIGHT: 66 IN | SYSTOLIC BLOOD PRESSURE: 130 MMHG | RESPIRATION RATE: 18 BRPM | DIASTOLIC BLOOD PRESSURE: 78 MMHG

## 2024-08-28 DIAGNOSIS — N20.0 URIC ACID KIDNEY STONE: ICD-10-CM

## 2024-08-28 DIAGNOSIS — E03.9 HYPOTHYROIDISM, UNSPECIFIED TYPE: ICD-10-CM

## 2024-08-28 DIAGNOSIS — R10.9 LEFT FLANK PAIN: ICD-10-CM

## 2024-08-28 DIAGNOSIS — N20.0 LEFT RENAL STONE: ICD-10-CM

## 2024-08-28 DIAGNOSIS — N30.00 ACUTE CYSTITIS WITHOUT HEMATURIA: ICD-10-CM

## 2024-08-28 DIAGNOSIS — Z96.0 URETERAL STENT PRESENT: ICD-10-CM

## 2024-08-28 LAB
ALBUMIN SERPL BCG-MCNC: 3.9 G/DL (ref 3.5–5.2)
ALBUMIN UR-MCNC: NEGATIVE MG/DL
ALP SERPL-CCNC: 51 U/L (ref 40–150)
ALT SERPL W P-5'-P-CCNC: 14 U/L (ref 0–50)
ANION GAP SERPL CALCULATED.3IONS-SCNC: 16 MMOL/L (ref 7–15)
APPEARANCE UR: ABNORMAL
AST SERPL W P-5'-P-CCNC: 29 U/L (ref 0–45)
BACTERIA #/AREA URNS HPF: ABNORMAL /HPF
BILIRUB DIRECT SERPL-MCNC: <0.2 MG/DL (ref 0–0.3)
BILIRUB SERPL-MCNC: 0.5 MG/DL
BILIRUB UR QL STRIP: NEGATIVE
BUN SERPL-MCNC: 17.5 MG/DL (ref 8–23)
CALCIUM SERPL-MCNC: 9.5 MG/DL (ref 8.8–10.4)
CHLORIDE SERPL-SCNC: 103 MMOL/L (ref 98–107)
COLOR UR AUTO: ABNORMAL
CREAT SERPL-MCNC: 1.64 MG/DL (ref 0.51–0.95)
EGFRCR SERPLBLD CKD-EPI 2021: 31 ML/MIN/1.73M2
ERYTHROCYTE [DISTWIDTH] IN BLOOD BY AUTOMATED COUNT: 14.2 % (ref 10–15)
GLUCOSE SERPL-MCNC: 129 MG/DL (ref 70–99)
GLUCOSE UR STRIP-MCNC: NEGATIVE MG/DL
HCO3 SERPL-SCNC: 22 MMOL/L (ref 22–29)
HCT VFR BLD AUTO: 30.3 % (ref 35–47)
HGB BLD-MCNC: 9.8 G/DL (ref 11.7–15.7)
HGB UR QL STRIP: ABNORMAL
KETONES UR STRIP-MCNC: NEGATIVE MG/DL
LEUKOCYTE ESTERASE UR QL STRIP: ABNORMAL
LIPASE SERPL-CCNC: 56 U/L (ref 13–60)
MCH RBC QN AUTO: 30.5 PG (ref 26.5–33)
MCHC RBC AUTO-ENTMCNC: 32.3 G/DL (ref 31.5–36.5)
MCV RBC AUTO: 94 FL (ref 78–100)
MUCOUS THREADS #/AREA URNS LPF: PRESENT /LPF
NITRATE UR QL: POSITIVE
PH UR STRIP: 7 [PH] (ref 5–7)
PLATELET # BLD AUTO: 160 10E3/UL (ref 150–450)
POTASSIUM SERPL-SCNC: 3.4 MMOL/L (ref 3.4–5.3)
PROT SERPL-MCNC: 7.5 G/DL (ref 6.4–8.3)
RBC # BLD AUTO: 3.21 10E6/UL (ref 3.8–5.2)
RBC URINE: 7 /HPF
SODIUM SERPL-SCNC: 141 MMOL/L (ref 135–145)
SP GR UR STRIP: 1.01 (ref 1–1.03)
SQUAMOUS EPITHELIAL: 3 /HPF
UROBILINOGEN UR STRIP-MCNC: NORMAL MG/DL
WBC # BLD AUTO: 4.7 10E3/UL (ref 4–11)
WBC CLUMPS #/AREA URNS HPF: PRESENT /HPF
WBC URINE: 48 /HPF

## 2024-08-28 PROCEDURE — 81001 URINALYSIS AUTO W/SCOPE: CPT | Performed by: EMERGENCY MEDICINE

## 2024-08-28 PROCEDURE — 87186 SC STD MICRODIL/AGAR DIL: CPT | Performed by: EMERGENCY MEDICINE

## 2024-08-28 PROCEDURE — 80048 BASIC METABOLIC PNL TOTAL CA: CPT | Performed by: EMERGENCY MEDICINE

## 2024-08-28 PROCEDURE — 258N000003 HC RX IP 258 OP 636: Performed by: BEHAVIOR TECHNICIAN

## 2024-08-28 PROCEDURE — 82248 BILIRUBIN DIRECT: CPT | Performed by: BEHAVIOR TECHNICIAN

## 2024-08-28 PROCEDURE — 96375 TX/PRO/DX INJ NEW DRUG ADDON: CPT

## 2024-08-28 PROCEDURE — 96365 THER/PROPH/DIAG IV INF INIT: CPT

## 2024-08-28 PROCEDURE — 250N000011 HC RX IP 250 OP 636: Performed by: BEHAVIOR TECHNICIAN

## 2024-08-28 PROCEDURE — 96361 HYDRATE IV INFUSION ADD-ON: CPT

## 2024-08-28 PROCEDURE — 36415 COLL VENOUS BLD VENIPUNCTURE: CPT | Performed by: EMERGENCY MEDICINE

## 2024-08-28 PROCEDURE — 87086 URINE CULTURE/COLONY COUNT: CPT | Performed by: EMERGENCY MEDICINE

## 2024-08-28 PROCEDURE — 85027 COMPLETE CBC AUTOMATED: CPT | Performed by: EMERGENCY MEDICINE

## 2024-08-28 PROCEDURE — 74176 CT ABD & PELVIS W/O CONTRAST: CPT

## 2024-08-28 PROCEDURE — 83690 ASSAY OF LIPASE: CPT | Performed by: BEHAVIOR TECHNICIAN

## 2024-08-28 PROCEDURE — 99285 EMERGENCY DEPT VISIT HI MDM: CPT | Mod: 25

## 2024-08-28 RX ORDER — LEVOFLOXACIN 250 MG/1
250 TABLET, FILM COATED ORAL DAILY
Qty: 7 TABLET | Refills: 0 | Status: SHIPPED | OUTPATIENT
Start: 2024-08-28 | End: 2024-09-04

## 2024-08-28 RX ORDER — PIPERACILLIN SODIUM, TAZOBACTAM SODIUM 2; .25 G/10ML; G/10ML
2.25 INJECTION, POWDER, LYOPHILIZED, FOR SOLUTION INTRAVENOUS ONCE
Status: COMPLETED | OUTPATIENT
Start: 2024-08-28 | End: 2024-08-28

## 2024-08-28 RX ORDER — LEVOTHYROXINE SODIUM 125 UG/1
125 TABLET ORAL
Qty: 30 TABLET | Refills: 0 | Status: SHIPPED | OUTPATIENT
Start: 2024-08-28

## 2024-08-28 RX ORDER — HYDROMORPHONE HYDROCHLORIDE 1 MG/ML
0.5 INJECTION, SOLUTION INTRAMUSCULAR; INTRAVENOUS; SUBCUTANEOUS ONCE
Status: COMPLETED | OUTPATIENT
Start: 2024-08-28 | End: 2024-08-28

## 2024-08-28 RX ORDER — TAMSULOSIN HYDROCHLORIDE 0.4 MG/1
0.4 CAPSULE ORAL DAILY
Qty: 10 CAPSULE | Refills: 0 | Status: SHIPPED | OUTPATIENT
Start: 2024-08-28 | End: 2024-09-07

## 2024-08-28 RX ORDER — CIPROFLOXACIN 250 MG/1
250 TABLET, FILM COATED ORAL DAILY
Qty: 7 TABLET | Refills: 0 | Status: SHIPPED | OUTPATIENT
Start: 2024-08-28 | End: 2024-08-28

## 2024-08-28 RX ADMIN — HYDROMORPHONE HYDROCHLORIDE 0.5 MG: 1 INJECTION, SOLUTION INTRAMUSCULAR; INTRAVENOUS; SUBCUTANEOUS at 10:55

## 2024-08-28 RX ADMIN — PIPERACILLIN AND TAZOBACTAM 2.25 G: 2; .25 INJECTION, POWDER, FOR SOLUTION INTRAVENOUS at 12:36

## 2024-08-28 RX ADMIN — SODIUM CHLORIDE 1000 ML: 9 INJECTION, SOLUTION INTRAVENOUS at 10:55

## 2024-08-28 ASSESSMENT — ACTIVITIES OF DAILY LIVING (ADL)
ADLS_ACUITY_SCORE: 37

## 2024-08-28 ASSESSMENT — COLUMBIA-SUICIDE SEVERITY RATING SCALE - C-SSRS
2. HAVE YOU ACTUALLY HAD ANY THOUGHTS OF KILLING YOURSELF IN THE PAST MONTH?: NO
6. HAVE YOU EVER DONE ANYTHING, STARTED TO DO ANYTHING, OR PREPARED TO DO ANYTHING TO END YOUR LIFE?: NO
1. IN THE PAST MONTH, HAVE YOU WISHED YOU WERE DEAD OR WISHED YOU COULD GO TO SLEEP AND NOT WAKE UP?: NO

## 2024-08-28 NOTE — ED TRIAGE NOTES
Triage Assessment (Adult)       Row Name 08/28/24 0945          Triage Assessment    Airway WDL WDL        Respiratory WDL    Respiratory WDL WDL        Skin Circulation/Temperature WDL    Skin Circulation/Temperature WDL WDL        Cardiac WDL    Cardiac WDL WDL        Peripheral/Neurovascular WDL    Peripheral Neurovascular WDL WDL        Cognitive/Neuro/Behavioral WDL    Cognitive/Neuro/Behavioral WDL WDL

## 2024-08-28 NOTE — DISCHARGE INSTRUCTIONS
As discussed, your urine looks suspicious for infection.  A culture is pending.  You will be called with results.  Please take antibiotics as prescribed.  Please follow-up with urology next week for stent removal and reassessment.  You can take Flomax to help with stent pain.  Please return to the ER if you develop fever, abdominal or flank pain pain, blood in urine, or any other concerning symptoms.

## 2024-08-28 NOTE — ED PROVIDER NOTES
Emergency Department Attending Supervision Note  8/28/2024  12:31 PM      I evaluated this patient in conjunction with an Advanced Practice Clinician:    APC: Teddy    Briefly, the patient presented with left-sided abdominal pain and bilateral low back pain.  The patient is status post left ureteral stenting for a large kidney stone.  She comes in complaining of increased pain.  See the physician assistant note for full details.  Of note, the patient was treated in the hospital for Pseudomonas urinary tract infection.  She did go home on intravenous cefepime via PICC line for a period of time.  She is not having high fevers chills or sweats.    Examination:   General: Resting uncomfortably on the gurney  Head:  The scalp, face, and head appear normal  Eyes:  The pupils are normal    Conjunctivae and sclera appear normal  ENT:    The nose is normal    Ears/pinnae are normal  Lungs: Clear  CV:  Normal  Neck:  Normal range of motion    There is no CVA tenderness   MS:  Normal  Skin:  No rash or lesions noted.  Neuro:  Speech is normal and fluent  Psych: Awake. Alert.  Normal affect.      Appropriate interactions        Medical decision making: This patient presents with a known left ureteral stent that has been in place for some time.  She is having increasing back pain and abdominal pain.  Patient is noted to have urinary tract infection in the setting of left ureteral stent and also a retained stone involving the left proximal ureter.  She is hemodynamically stable.  She is not having any significant chills, sweats, fevers, or significant leukocytosis currently.  She has a history of recent pseudomonal urinary tract infection and was on antibiotics with cefepime.  She does not tolerate fluoroquinolones well.  We discussed the situation with urology in terms of either admitting the patient for IV antibiotics and consideration of stent removal versus oral antibiotics and scheduling the stent removal as an outpatient.   Urology favored an outpatient perspective since the patient appears relatively well.  The patient did not wish to be admitted to the hospital and is okay with an outpatient plan.  The patient's urine culture results with a resistant organism or an organism that does not respond to the oral antibiotic prescribed, the culture nurse will have to triage that situation and touch base with the patient.  The patient develops systemic features such as fevers, chills, sweats, or increasing pain, she should return to the emergency department.      My impression/diagnosis is:    Urinary tract infection in the setting of ongoing left-sided ureteral stenting and a small 3 mm proximal left ureteral stone    MD Carlos Brandon Michael P, MD  08/28/24 0845

## 2024-08-28 NOTE — CONSULTS
[facility] Inpatient Consultation Note    Macie Jorge MRN# [id number]   Age: 83 year old YOB: 1940     Date of Admission:  8/28/2024    Reason for consult: Flank and abdominal pain                         History of Present Illness:   82 yo female with recent history of nephrolithiasis with current left stent in place with left flank pain and left sided abdominal pain.     The patient was admitted 7/22/24 at [facility] with left flank pain and found to have a 8 mm stone in the proximal left ureter, and 4 mm and 5 mm stones lower pole left kidney-no stones in the right kidney.  Urine culture grew out Pseudomonas for which she was treated with cefepime.  She had a left ureteral stent placed on 7/23/2024.     She then underwent cysto, left URS/HLL, left stent exchange on 8/16/24 with Dr. Rivera. Plan was for stent removal in the office via cystoscopy in 1-2 weeks. With questioning today, she is not scheduled for this currently, seemed unaware of need to remove stent.     She notes that since her stent was in place she has felt like she has had pain radiating from her left flank to her left upper to lower abdominal quadrants. She notes that this has been constant. With further questioning today she notes that she decided to come in today because she felt like something was wrong that she continued to have pain, she is unsure if it is worse in nature then it has been the past 1.5 weeks (on chart review appears to have had pain in this area since 2/2024, which could have been from obstructing stones but even at time of last admission was present though improved).  She notes some feeling of bladder spasms intermittently. She denies gross hematuria, dysuria, fevers, chills, nausea, vomiting. 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 She doesn't feel that they have been helpful. She is unsure what other medications that she is  taking.     She presented to the ED today for evaluation of continued pain. She was afebrile and hemodynamically stable on arrival. Laboratory evaluation without leukocytosis. Creatinine 1.64, somewhat consistent with recent baseline.  UA nitrite positive, large leuks, 48 WBCs, 7 RBCs. CT A/p w/o in the ED with good positing of left stent without hydronephrosis and a 3 mm stone along the course of the stent near the proximal left ureter. Previously seen large stone not longer present.           Past Medical History:     Past Medical History:   Diagnosis Date    Calculus of left ureter 2014    dr Arriaga - ureteroscopy and lithitripsy    Cataract     Chronic low back pain     Dr Villagran at [facility] in 2015- not surgical     Complication of anesthesia     COPD with asthma (H)      followed with pulmonary DR LENNOX 60-pack-year history of smoking    Esophagitis, reflux 9/2012    SOME EOSINOPHILIA NO BARRETS    Gastro-oesophageal reflux disease      has seen GI    Hydronephrosis, left     chronic , in 2017 Dr Arriaga plan was observaton    Hyperlipidaemia LDL goal < 100     Hypothyroid     Kidney stone 3/28/2019    Migraine headaches 9/1/2011    Moderate persistent asthma      lifelong, saw Pulm 2013    Obesity (BMI 30-39.9)     Panic attacks     PONV (postoperative nausea and vomiting)     Renal stones 2016    kidney stones, multiple small stones high risk recurrent ureteral stones. , Dr Arriaga in 2016 rec 24 hour urine.    Type 2 diabetes mellitus without complication (H)              Past Surgical History:     Past Surgical History:   Procedure Laterality Date    ARTHROPLASTY HIP  3/13/2013    Procedure: ARTHROPLASTY HIP;  LEFT TOTAL HIP ARTHROPLASTY (BIOMET)^ ;  Surgeon: Anand Main MD;  Location: [facility] OR    ARTHROPLASTY KNEE Right 8/9/2021    Procedure: RIGHT TOTAL KNEE ARTHROPLASTY;  Surgeon: Anand Main MD;  Location: [facility] OR    ARTHROPLASTY PATELLO-FEMORAL (KNEE)  2005ish    Left    CHOLECYSTECTOMY, OPEN  27 yo    COMBINED  CYSTOSCOPY, INSERT STENT URETER(S)  8/5/2014    Procedure: COMBINED CYSTOSCOPY, INSERT STENT URETER(S);  Surgeon: Sam Arriaga MD;  Location:  OR    COMBINED CYSTOSCOPY, RETROGRADES, URETEROSCOPY, LASER HOLMIUM LITHOTRIPSY URETER(S), INSERT STENT Left 6/14/2016    Procedure: COMBINED CYSTOSCOPY, RETROGRADES, URETEROSCOPY, LASER HOLMIUM LITHOTRIPSY URETER(S), INSERT STENT;  Surgeon: Thomas Edmondson MD;  Location:  OR    COMBINED CYSTOSCOPY, RETROGRADES, URETEROSCOPY, LASER HOLMIUM LITHOTRIPSY URETER(S), INSERT STENT Left 3/28/2019    Procedure: CYSTOSCOPY, LEFT RETROGRADE PYLEOGRAM, LEFT URETEROSCOPY, HOLMIUM LASER LITHOTRIPSY, LEFT URETERAL STENT EXCHANGE;  Surgeon: Derrek Rivera MD;  Location:  OR    CV CORONARY ANGIOGRAM N/A 7/5/2023    Procedure: Coronary Angiogram;  Surgeon: Fausto Espinal MD;  Location:  HEART CARDIAC CATH LAB    CV INTRAVASULAR ULTRASOUND N/A 7/5/2023    Procedure: Intravascular Ultrasound;  Surgeon: Fausto Espinal MD;  Location:  HEART CARDIAC CATH LAB    CV PCI N/A 7/5/2023    Procedure: Percutaneous Coronary Intervention;  Surgeon: Fausto Espinal MD;  Location:  HEART CARDIAC CATH LAB    CYSTOSCOPY, RETROGRADES, INSERT STENT URETER(S), COMBINED Left 3/8/2019    Procedure: CYSTOSCOPY,LEFT RETROGRADE, LEFT STENT PLACEMENT;  Surgeon: Derrek Rivera MD;  Location:  OR    CYSTOSCOPY, RETROGRADES, INSERT STENT URETER(S), COMBINED Left 7/23/2024    Procedure: Cystoscopy, retrogrades, insert stent ureter(s), combined;  Surgeon: Anand Hendricks MD;  Location:  OR    ESOPHAGOSCOPY, GASTROSCOPY, DUODENOSCOPY (EGD), COMBINED N/A 7/26/2024    Procedure: Esophagoscopy, gastroscopy, duodenoscopy (EGD), combined;  Surgeon: Evette Salcido MD;  Location:  GI    GENITOURINARY SURGERY      LAMINECT/DISCECTOMY, LUMBAR      Laminectomy/Discectomy Lumbar    LASER HOLMIUM LITHOTRIPSY URETER(S), INSERT STENT, COMBINED Left 8/20/2014    Procedure: COMBINED  CYSTOSCOPY, URETEROSCOPY, LASER HOLMIUM LITHOTRIPSY URETER(S), INSERT STENT;  Surgeon: Sam Arriaga MD;  Location: [facility]    LASER HOLMIUM LITHOTRIPSY URETER(S), INSERT STENT, COMBINED Left 2019    Procedure: CYSTOSCOPY, LEFT RETROGRADE, LEFT URETEROSCOPY, HOLMIUM LASER LITHOTRIPSY, STONE REMOVAL, LEFT STENT EXCHANGE;  Surgeon: Derrek Rivera MD;  Location: [facility]    LASER HOLMIUM LITHOTRIPSY URETER(S), INSERT STENT, COMBINED Right 2019    Procedure: CYSTOSCOPY; RIGHT URETEROSCOPY WITH HOLMIUM LASER LITHOTRIPSY; RIGHT STENT PLACEMENT (DIGITAL FLEXIBLE URETEROSCOPE);  Surgeon: Derrek Rivera MD;  Location: [facility]    LASER HOLMIUM LITHOTRIPSY URETER(S), INSERT STENT, COMBINED Left 2024    Procedure: CYSTOSCOPY , LEFT URETEROSCOPY , HOLMIUM LASER LITHOTRIPSY AND LEFT URETERAL STENT REMOVAL VERSES LEFT URETERAL STENT EXCHANGE;  Surgeon: Derrek Rivera MD;  Location:  OR    ORTHOPEDIC SURGERY      left ankle    RECESSION EYELID UPPER               Social History:     Social History     Socioeconomic History    Marital status:      Spouse name: Not on file    Number of children: Not on file    Years of education: Not on file    Highest education level: Not on file   Occupational History    Not on file   Tobacco Use    Smoking status: Former     Current packs/day: 0.00     Average packs/day: 1.5 packs/day for 40.0 years (60.0 ttl pk-yrs)     Types: Cigarettes     Start date: 1955     Quit date: 1995     Years since quittin.6    Smokeless tobacco: Never   Vaping Use    Vaping status: Never Used   Substance and Sexual Activity    Alcohol use: Yes     Comment: couple drinks per year    Drug use: No    Sexual activity: Not on file   Other Topics Concern    Parent/sibling w/ CABG, MI or angioplasty before 65F 55M? Not Asked   Social History Narrative    Not on file     Social Determinants of Health     Financial Resource Strain: Not on file   Food Insecurity: Not on file    Transportation Needs: Not on file   Physical Activity: Not on file   Stress: Not on file   Social Connections: Not on file   Interpersonal Safety: Not on file   Housing Stability: Not on file             Family History:   No family history on file.          Immunizations:     Immunization History   Administered Date(s) Administered    COVID-19 MONOVALENT 12+ (Pfizer) 01/30/2021, 02/20/2021, 10/19/2021    HEPA 01/10/2006, 01/28/2013    Influenza (High Dose) 3 valent vaccine 10/08/2014, 11/03/2015, 09/21/2016, 12/01/2017, 10/26/2018, 10/15/2019    Influenza (IIV3) PF 01/18/2013, 10/28/2013    Influenza Vaccine 65+ (FLUAD) 11/15/2022    Influenza Vaccine 65+ (Fluzone HD) 10/09/2020, 10/14/2021    Influenza, seasonal, injectable, PF 10/28/2013    Pneumo Conj 13-V (2010&after) 12/18/2003, 11/03/2015    Pneumococcal 23 valent 01/28/2013    TD,PF 7+ (Tenivac) 11/15/2012             Allergies:     Allergies   Allergen Reactions    Morphine Nausea     Severe vomiting    Prednisone      Elevated blood glucose  weakness    Baclofen GI Disturbance    Imitrex [Sumatriptan] Nausea    Tetracycline      Sores in mouth             Medications:     No current facility-administered medications for this encounter.     Current Outpatient Medications   Medication Sig Dispense Refill    albuterol (PROAIR HFA/PROVENTIL HFA/VENTOLIN HFA) 108 (90 Base) MCG/ACT inhaler Inhale 1-2 puffs into the lungs every 4 hours as needed for shortness of breath, wheezing or cough 36 g 2    allopurinol (ZYLOPRIM) 100 MG tablet Take 2 tablets (200 mg) by mouth daily. 90 tablet 3    cyanocobalamin (VITAMIN B-12) 500 MCG SUBL sublingual tablet Place 2 tablets (1,000 mcg) under the tongue daily 60 tablet 0    Fluticasone-Umeclidin-Vilanterol (TRELEGY ELLIPTA) 200-62.5-25 MCG/ACT oral inhaler Inhale 1 puff into the lungs daily 28 each 1    HYDROcodone-acetaminophen (NORCO)  MG per tablet Take 1 tablet by mouth 3 times daily as needed for moderate to  severe pain 6 tablet 0    hyoscyamine (LEVSIN/SL) 0.125 MG sublingual tablet Place 1 tablet (125 mcg) under the tongue every 6 hours as needed for cramping (for bladder spasms related to ureteral stent) 20 tablet 0    levofloxacin (LEVAQUIN) 250 MG tablet Take 1 tablet (250 mg) by mouth daily for 7 days. 7 tablet 0    levothyroxine (SYNTHROID/LEVOTHROID) 125 MCG tablet Take 1 tablet (125 mcg) by mouth every morning (before breakfast). 30 tablet 0    lisinopril (ZESTRIL) 2.5 MG tablet Take 2.5 mg by mouth daily      metoprolol succinate ER (TOPROL XL) 25 MG 24 hr tablet Take 1 tablet (25 mg) by mouth daily 90 tablet 3    oxyBUTYnin ER (DITROPAN XL) 10 MG 24 hr tablet Take 1 tablet (10 mg) by mouth daily 90 tablet 0    pantoprazole (PROTONIX) 40 MG EC tablet Take 1 tablet (40 mg) by mouth every morning (before breakfast) 30 tablet 0    rosuvastatin (CRESTOR) 20 MG tablet Take 1 tablet (20 mg) by mouth daily 90 tablet 3    senna-docusate (SENOKOT-S/PERICOLACE) 8.6-50 MG tablet Take 1 tablet by mouth 2 times daily as needed for constipation 30 tablet 0    tamsulosin (FLOMAX) 0.4 MG capsule Take 1 capsule (0.4 mg) by mouth daily for 10 doses. 10 capsule 0    aspirin 81 MG EC tablet Take 1 tablet (81 mg) by mouth daily 30 tablet 0    blood glucose (NO BRAND SPECIFIED) lancets standard Use to test blood sugar 1 time daily or as directed. (Patient not taking: Reported on 6/6/2024) 100 each 3    blood glucose (NO BRAND SPECIFIED) test strip Use to test blood sugar 1 time daily or as directed. (Patient not taking: Reported on 8/1/2024) 100 strip 3    blood glucose monitoring (NO BRAND SPECIFIED) meter device kit Use to test blood sugar 1 time daily or as directed. (Patient not taking: Reported on 8/1/2024) 1 kit 0    nitroGLYcerin (NITROSTAT) 0.4 MG sublingual tablet For chest pain place 1 tablet under the tongue every 5 minutes for 3 doses. If symptoms persist 5 minutes after 1st dose call 911. 25 tablet 1             Review  "of Systems:   Comprehensive review of systems from the ED Visit at [facility] 8/29/24 was reviewed with no changes except per HPI.     Examination:  /78   Pulse 85   Temp 98  F (36.7  C) (Oral)   Resp 18   Ht 1.676 m (5' 6\")   Wt 67.1 kg (148 lb)   SpO2 93%   BMI 23.89 kg/m    General: Alert and oriented, no acute discuss, non toxic appearing  HEENT: Face symmetric, mucous membranes   Eyes: No scleral icterus  Neck: Symmetric  Chest wall: Symmetric  Respiratory: Breathing unlabored  Cardiac: Extremities warm and well perfuse  Abdomen: soft, points to left upper and lower quadrants as area of pain bt not worse with palpation, non distended; no rebound, guarding or peritoneal signs  Back: No CVA or flank tenderness  Neuro:Grossly non focal  Pysch: Normal mood and affect            Data:     Lab Results   Component Value Date    WBC 4.7 08/28/2024    WBC 4.2 08/12/2024    WBC 7.8 10/01/2015     Lab Results   Component Value Date    RBC 3.21 08/28/2024    RBC 2.86 08/12/2024    RBC 4.24 10/01/2015     Lab Results   Component Value Date    HGB 9.8 08/28/2024    HGB 9.0 08/12/2024     Lab Results   Component Value Date    HCT 30.3 08/28/2024    HCT 26.0 08/12/2024     Lab Results   Component Value Date     08/28/2024     08/12/2024     Creatinine   Date Value Ref Range Status   08/28/2024 1.64 (H) 0.51 - 0.95 mg/dL Final   01/23/2023 1.48 (H) 0.57 - 1.00 mg/dL Final   ]  Lab Results   Component Value Date    BUN 17.5 08/28/2024    BUN 25 01/23/2023    BUN 17 01/23/2023       Imaging:  CT A/P w/o (8/28/24):  IMPRESSION:   1.  Left ureteral stent in good position without hydronephrosis.  2.  There is a 3 mm stone along the course of the stent in the  proximal left ureter. The large stone seen previously is no longer  demonstrated.    Impression:  84 yo female s/p cysto, left URS/HLL, left stent exchange on 8/16/24 with Dr. Rivera with left flank and abdominal pain since stent " placement, persistent     Plan:  -Imaging and case reviewed with Dr. Rodriguez. Stent appears to be in good position, functioning well. No surgical intervention reccommended at this time from a urology perspective.    -She will need to be scheduled for stent removal, I discussed with with patient and , that stents are temporary. I sent messaging to our scheduling team to make sure she gets set up for stent removal in office in the next week as originally planned.   -Symptom could be consistent to stent related pain as she feels like the pain has been present the entire time she has had the stent in place. She could trial Flomax and bladder spasms medication (Levsin/oxybutynin). She notes at baseline she take opioids and has a controlled substance agreement, she notes that she is not supposed to take other narcotic medication given this   -UA dirty, though she has an indwelling stent in place. Abx per ED.   -ED to determine if admission warranted for pain control for IV abx, though at present she is afebrile, hemodynamically stable, without leukocytosis, without fever/chills.     Patient case and imaging reviewed with Dr. Michael Sandoval PA-C  [facility] ([facility])  [phone]  [employer] ([email or web address])   Text Page (7:30am to 4:30pm)

## 2024-08-28 NOTE — ED NOTES
[facility]  ED Nurse Handoff Report    ED Chief complaint: Flank Pain      ED Diagnosis:   Final diagnoses:   None       Code Status: Full Code    Allergies:   Allergies   Allergen Reactions    Morphine Nausea     Severe vomiting    Prednisone      Elevated blood glucose  weakness    Baclofen GI Disturbance    Imitrex [Sumatriptan] Nausea    Tetracycline      Sores in mouth       Patient Story:   Pt presents with complaints of flank pain after a stent placement for a kidney stone    Focused Assessment:    Neuro: Alert, oriented x 4  Respiratory:Clear lung sounds, on room air   Cardiology:  NA   Gastrointestinal: soft, non tender, non distended   Genitourinary/Renal:    Musculoskeletal: moves all extremities   Skin: Intact skin   Lines: 20 right AC    Labs Ordered and Resulted from Time of ED Arrival to Time of ED Departure   CBC WITH PLATELETS - Abnormal       Result Value    WBC Count 4.7      RBC Count 3.21 (*)     Hemoglobin 9.8 (*)     Hematocrit 30.3 (*)     MCV 94      MCH 30.5      MCHC 32.3      RDW 14.2      Platelet Count 160     BASIC METABOLIC PANEL - Abnormal    Sodium 141      Potassium 3.4      Chloride 103      Carbon Dioxide (CO2) 22      Anion Gap 16 (*)     Urea Nitrogen 17.5      Creatinine 1.64 (*)     GFR Estimate 31 (*)     Calcium 9.5      Glucose 129 (*)    ROUTINE UA WITH MICROSCOPIC REFLEX TO CULTURE - Abnormal    Color Urine Light Yellow      Appearance Urine Slightly Cloudy (*)     Glucose Urine Negative      Bilirubin Urine Negative      Ketones Urine Negative      Specific Gravity Urine 1.010      Blood Urine Trace (*)     pH Urine 7.0      Protein Albumin Urine Negative      Urobilinogen Urine Normal      Nitrite Urine Positive (*)     Leukocyte Esterase Urine Large (*)     Bacteria Urine Few (*)     WBC Clumps Urine Present (*)     Mucus Urine Present (*)     RBC Urine 7 (*)     WBC Urine 48 (*)     Squamous Epithelials Urine 3 (*)    LIPASE - Normal    Lipase 56      HEPATIC FUNCTION PANEL - Normal    Protein Total 7.5      Albumin 3.9      Bilirubin Total 0.5      Alkaline Phosphatase 51      AST 29      ALT 14      Bilirubin Direct <0.20     URINE CULTURE        CT Abdomen Pelvis w/o Contrast   Final Result   IMPRESSION:    1.  Left ureteral stent in good position without hydronephrosis.   2.  There is a 3 mm stone along the course of the stent in the   proximal left ureter. The large stone seen previously is no longer   demonstrated.      BEBE MCFARLAND MD            SYSTEM ID:  [id number]            Treatments and/or interventions provided:      Medications   piperacillin-tazobactam (ZOSYN) 2.25 g vial to attach to  ml bag (2.25 g Intravenous $New Bag 8/28/24 1236)   HYDROmorphone (PF) (DILAUDID) injection 0.5 mg (0.5 mg Intravenous $Given 8/28/24 1055)   sodium chloride 0.9% BOLUS 1,000 mL (1,000 mLs Intravenous $New Bag 8/28/24 1055)        Patient's response to treatments and/or interventions:   Resting comfortably    To be done/followed up on inpatient unit:    See any in-patient orders    Does this patient have any cognitive concerns?:  none    Activity level - Baseline/Home:    Independent    Activity Level - Current:     Independent    Patient's Preferred language: English     Needed?: No    Isolation: None  Infection: Not Applicable  Patient tested for COVID 19 prior to admission: NO    Bariatric?: No    Vital Signs:   Vitals:    08/28/24 1145 08/28/24 1200 08/28/24 1215 08/28/24 1230   BP: (!) 143/106      Pulse:       Resp:       Temp:       TempSrc:       SpO2:  97% 94% 95%   Weight:       Height:           Cardiac Rhythm:     Was the PSS-3 completed:   Yes    Family Comments:  at bedside    For the majority of the shift this patient's behavior was Green.   Behavioral interventions performed were     ED NURSE PHONE NUMBER: [phone]

## 2024-08-28 NOTE — ED TRIAGE NOTES
Flank pain - multiple kidney stones removed, has stents.       Triage Assessment (Adult)       Row Name 08/28/24 0945          Triage Assessment    Airway WDL WDL        Cognitive/Neuro/Behavioral WDL    Cognitive/Neuro/Behavioral WDL WDL

## 2024-08-29 ENCOUNTER — TELEPHONE (OUTPATIENT)
Dept: NURSING | Facility: CLINIC | Age: 84
End: 2024-08-29
Payer: COMMERCIAL

## 2024-08-29 NOTE — LETTER
August 31, 2024        Macie Jorge  [address] [id number]          Dear Macie Jorge:    You were seen in the [facility] Emergency Department at [facility] on 8/28/2024.  We are unable to reach you by phone, so we are sending you this letter.     It is important that you call [facility] Emergency Department lab result nurse at [phone], as we have information to relay to you AND/OR we MAY have to make some changes in your treatment.    Best time to call back is between 9AM and 5:30PM, 7 days a week.      Sincerely,     [facility] Emergency Department Lab Result RN  [phone]

## 2024-08-29 NOTE — ED PROVIDER NOTES
Emergency Department Note      History of Present Illness     Chief Complaint   Flank Pain      HPI   Mcaie Jorge is a 83 year old female with history of chronic back pain, GERD, esophagitis, type 2 diabetes, nephrolithiasis, hypothyroidism, hyperlipidemia who presents to the ED for evaluation of flank pain.  Patient reports that she has a history of multiple kidney stones.  She was admitted to the hospital on 7/22/2024 and was found to have a large left ureteral stone and a UTI.  She had a left ureteral stent placed on 7/23/2024 and was placed on IV antibiotics at discharge.  She had a left ureteral exchange and lithotripsy on 8/16/2024.  Patient states that she has had constant abdominal and left flank pain since having the stent placed.  She has been taking hydrocodone but it has not been helping with pain.  She also reports dysuria and bladder irritation.  She does not know when she is scheduled to have the stent removed.  She denies fever, nausea, and vomiting.    Independent Historian   Patient only.    Review of External Notes   Reviewed hospital admission note on 7/22/2024.  Obstructing 8 mm proximal ureteral stone on the left with associated hydronephrosis.  Pseudomonas UTI.  Left ureteral stent placed on 723.  Urine culture grew Pseudomonas which was pansensitive.  She was discharged home with IV cefepime.    Reviewed urology note from 8/16/2024.  Left ureteral stent exchange and lithotripsy.  Plan for removal in 1 to 2 weeks.    Past Medical History     Medical History and Problem List   Past Medical History:   Diagnosis Date    Calculus of left ureter 2014    Cataract     Chronic low back pain     Complication of anesthesia     COPD with asthma (H)     Esophagitis, reflux 9/2012    Gastro-oesophageal reflux disease     Hydronephrosis, left     Hyperlipidaemia LDL goal < 100     Hypothyroid     Kidney stone 3/28/2019    Migraine headaches 9/1/2011    Moderate persistent asthma     Obesity (BMI  30-39.9)     Panic attacks     PONV (postoperative nausea and vomiting)     Renal stones 2016    Type 2 diabetes mellitus without complication (H)        Medications   albuterol (PROAIR HFA/PROVENTIL HFA/VENTOLIN HFA) 108 (90 Base) MCG/ACT inhaler  allopurinol (ZYLOPRIM) 100 MG tablet  cyanocobalamin (VITAMIN B-12) 500 MCG SUBL sublingual tablet  Fluticasone-Umeclidin-Vilanterol (TRELEGY ELLIPTA) 200-62.5-25 MCG/ACT oral inhaler  HYDROcodone-acetaminophen (NORCO)  MG per tablet  hyoscyamine (LEVSIN/SL) 0.125 MG sublingual tablet  levofloxacin (LEVAQUIN) 250 MG tablet  levothyroxine (SYNTHROID/LEVOTHROID) 125 MCG tablet  lisinopril (ZESTRIL) 2.5 MG tablet  metoprolol succinate ER (TOPROL XL) 25 MG 24 hr tablet  oxyBUTYnin ER (DITROPAN XL) 10 MG 24 hr tablet  pantoprazole (PROTONIX) 40 MG EC tablet  rosuvastatin (CRESTOR) 20 MG tablet  senna-docusate (SENOKOT-S/PERICOLACE) 8.6-50 MG tablet  tamsulosin (FLOMAX) 0.4 MG capsule  aspirin 81 MG EC tablet  blood glucose (NO BRAND SPECIFIED) lancets standard  blood glucose (NO BRAND SPECIFIED) test strip  blood glucose monitoring (NO BRAND SPECIFIED) meter device kit  nitroGLYcerin (NITROSTAT) 0.4 MG sublingual tablet        Surgical History   Past Surgical History:   Procedure Laterality Date    ARTHROPLASTY HIP  3/13/2013    Procedure: ARTHROPLASTY HIP;  LEFT TOTAL HIP ARTHROPLASTY (BIOMET)^ ;  Surgeon: Anand Main MD;  Location:  OR    ARTHROPLASTY KNEE Right 8/9/2021    Procedure: RIGHT TOTAL KNEE ARTHROPLASTY;  Surgeon: Anand Main MD;  Location:  OR    ARTHROPLASTY PATELLO-FEMORAL (KNEE)  2005ish    Left    CHOLECYSTECTOMY, OPEN  27 yo    COMBINED CYSTOSCOPY, INSERT STENT URETER(S)  8/5/2014    Procedure: COMBINED CYSTOSCOPY, INSERT STENT URETER(S);  Surgeon: Sam Arriaga MD;  Location:  OR    COMBINED CYSTOSCOPY, RETROGRADES, URETEROSCOPY, LASER HOLMIUM LITHOTRIPSY URETER(S), INSERT STENT Left 6/14/2016    Procedure: COMBINED CYSTOSCOPY,  RETROGRADES, URETEROSCOPY, LASER HOLMIUM LITHOTRIPSY URETER(S), INSERT STENT;  Surgeon: Thomas Edmondson MD;  Location:  OR    COMBINED CYSTOSCOPY, RETROGRADES, URETEROSCOPY, LASER HOLMIUM LITHOTRIPSY URETER(S), INSERT STENT Left 3/28/2019    Procedure: CYSTOSCOPY, LEFT RETROGRADE PYLEOGRAM, LEFT URETEROSCOPY, HOLMIUM LASER LITHOTRIPSY, LEFT URETERAL STENT EXCHANGE;  Surgeon: Derrek Rivera MD;  Location:  OR    CV CORONARY ANGIOGRAM N/A 7/5/2023    Procedure: Coronary Angiogram;  Surgeon: Fausto Espinal MD;  Location:  [facility]    CV INTRAVASULAR ULTRASOUND N/A 7/5/2023    Procedure: Intravascular Ultrasound;  Surgeon: Fausto Espinal MD;  Location:  [facility]    CV PCI N/A 7/5/2023    Procedure: Percutaneous Coronary Intervention;  Surgeon: Fausto Espinal MD;  Location:  [facility]    CYSTOSCOPY, RETROGRADES, INSERT STENT URETER(S), COMBINED Left 3/8/2019    Procedure: CYSTOSCOPY,LEFT RETROGRADE, LEFT STENT PLACEMENT;  Surgeon: Derrek Rivera MD;  Location:  OR    CYSTOSCOPY, RETROGRADES, INSERT STENT URETER(S), COMBINED Left 7/23/2024    Procedure: Cystoscopy, retrogrades, insert stent ureter(s), combined;  Surgeon: Anand Hendricks MD;  Location:  OR    ESOPHAGOSCOPY, GASTROSCOPY, DUODENOSCOPY (EGD), COMBINED N/A 7/26/2024    Procedure: Esophagoscopy, gastroscopy, duodenoscopy (EGD), combined;  Surgeon: Evette Salcido MD;  Location:  GI    GENITOURINARY SURGERY      LAMINECT/DISCECTOMY, LUMBAR      Laminectomy/Discectomy Lumbar    LASER HOLMIUM LITHOTRIPSY URETER(S), INSERT STENT, COMBINED Left 8/20/2014    Procedure: COMBINED CYSTOSCOPY, URETEROSCOPY, LASER HOLMIUM LITHOTRIPSY URETER(S), INSERT STENT;  Surgeon: Sam Arriaga MD;  Location:  OR    LASER HOLMIUM LITHOTRIPSY URETER(S), INSERT STENT, COMBINED Left 5/2/2019    Procedure: CYSTOSCOPY, LEFT RETROGRADE, LEFT URETEROSCOPY, HOLMIUM LASER LITHOTRIPSY, STONE  "REMOVAL, LEFT STENT EXCHANGE;  Surgeon: Derrek Rivera MD;  Location:  OR    LASER HOLMIUM LITHOTRIPSY URETER(S), INSERT STENT, COMBINED Right 6/13/2019    Procedure: CYSTOSCOPY; RIGHT URETEROSCOPY WITH HOLMIUM LASER LITHOTRIPSY; RIGHT STENT PLACEMENT (DIGITAL FLEXIBLE URETEROSCOPE);  Surgeon: Derrek Rivera MD;  Location:  OR    LASER HOLMIUM LITHOTRIPSY URETER(S), INSERT STENT, COMBINED Left 8/16/2024    Procedure: CYSTOSCOPY , LEFT URETEROSCOPY , HOLMIUM LASER LITHOTRIPSY AND LEFT URETERAL STENT REMOVAL VERSES LEFT URETERAL STENT EXCHANGE;  Surgeon: Derrek Rivera MD;  Location:  OR    ORTHOPEDIC SURGERY      left ankle    RECESSION EYELID UPPER         Physical Exam     Patient Vitals for the past 24 hrs:   BP Temp Temp src Pulse Resp SpO2 Height Weight   08/28/24 1300 130/78 -- -- 85 -- 93 % -- --   08/28/24 1230 -- -- -- -- -- 95 % -- --   08/28/24 1215 -- -- -- -- -- 94 % -- --   08/28/24 1200 -- -- -- -- -- 97 % -- --   08/28/24 1145 (!) 143/106 -- -- -- -- -- -- --   08/28/24 1129 -- -- -- -- -- 94 % -- --   08/28/24 1128 -- -- -- -- -- 94 % -- --   08/28/24 1127 -- -- -- -- -- 95 % -- --   08/28/24 1126 -- -- -- -- -- 96 % -- --   08/28/24 1124 -- -- -- -- -- 95 % -- --   08/28/24 1123 -- -- -- -- -- 96 % -- --   08/28/24 1122 -- -- -- -- -- 96 % -- --   08/28/24 1121 -- -- -- -- -- 96 % -- --   08/28/24 1118 -- -- -- -- -- 95 % -- --   08/28/24 1001 (!) 146/70 98  F (36.7  C) Oral 93 18 96 % 1.676 m (5' 6\") 67.1 kg (148 lb)     Physical Exam  Physical Exam:  General: lying comfortably on hospital bed  Head: normocephalic, atraumatic  Eyes: PERRLA, EOMI  Ears: External ears appear normal.   Nose: no signs of bleeding   Throat: moist mucous membranes  Neck: No JVD  CV: regular rate and rhythm  Pulm: lungs clear to ausculation bilaterally, normal respiratory effort, normal chest expansion with breathing   Abdomen: soft, non-tender. Left flank tenderness.   MSK: No midline " tenderness  Ext: normal range of motion of all extremities. No gross deformities  Skin: warm, dry, no rashes  Neuro: alert and oriented  Psych: Appropriate mood. Cooperative      Diagnostics     Lab Results   Labs Ordered and Resulted from Time of ED Arrival to Time of ED Departure   CBC WITH PLATELETS - Abnormal       Result Value    WBC Count 4.7      RBC Count 3.21 (*)     Hemoglobin 9.8 (*)     Hematocrit 30.3 (*)     MCV 94      MCH 30.5      MCHC 32.3      RDW 14.2      Platelet Count 160     BASIC METABOLIC PANEL - Abnormal    Sodium 141      Potassium 3.4      Chloride 103      Carbon Dioxide (CO2) 22      Anion Gap 16 (*)     Urea Nitrogen 17.5      Creatinine 1.64 (*)     GFR Estimate 31 (*)     Calcium 9.5      Glucose 129 (*)    ROUTINE UA WITH MICROSCOPIC REFLEX TO CULTURE - Abnormal    Color Urine Light Yellow      Appearance Urine Slightly Cloudy (*)     Glucose Urine Negative      Bilirubin Urine Negative      Ketones Urine Negative      Specific Gravity Urine 1.010      Blood Urine Trace (*)     pH Urine 7.0      Protein Albumin Urine Negative      Urobilinogen Urine Normal      Nitrite Urine Positive (*)     Leukocyte Esterase Urine Large (*)     Bacteria Urine Few (*)     WBC Clumps Urine Present (*)     Mucus Urine Present (*)     RBC Urine 7 (*)     WBC Urine 48 (*)     Squamous Epithelials Urine 3 (*)    LIPASE - Normal    Lipase 56     HEPATIC FUNCTION PANEL - Normal    Protein Total 7.5      Albumin 3.9      Bilirubin Total 0.5      Alkaline Phosphatase 51      AST 29      ALT 14      Bilirubin Direct <0.20     URINE CULTURE       Imaging   CT Abdomen Pelvis w/o Contrast   Final Result   IMPRESSION:    1.  Left ureteral stent in good position without hydronephrosis.   2.  There is a 3 mm stone along the course of the stent in the   proximal left ureter. The large stone seen previously is no longer   demonstrated.      BEBE MCFARLAND MD            SYSTEM ID:  [id number]          EKG    None    Independent Interpretation   None    ED Course      Medications Administered   Medications   HYDROmorphone (PF) (DILAUDID) injection 0.5 mg (0.5 mg Intravenous $Given 8/28/24 1051)   sodium chloride 0.9% BOLUS 1,000 mL (0 mLs Intravenous Stopped 8/28/24 1339)   piperacillin-tazobactam (ZOSYN) 2.25 g vial to attach to  ml bag (0 g Intravenous Stopped 8/28/24 1310)       Procedures   Procedures     Discussion of Management   Urology, CHAU May with [facility].     ED Course   ED Course as of 08/28/24 2009   Wed Aug 28, 2024   1121 I evaluated patient and obtained history.   1300 Discussed with HCAU May from [facility] in the room with patient.   1327 Reassessed patient. Discussed discharge plans.        Additional Documentation  None    Medical Decision Making / Diagnosis     CMS Diagnoses: None    MIPS       None    MDM   Macie Jorge is a 83 year old female who presents to the ED for evaluation of flank pain.  Patient has has a history of multiple kidney stones.  She was admitted to the hospital on 7/22/2024 for a large left ureteral stone and UTI.  She had a left ureteral stent placed on 7/23/2024.  She was discharged home on IV antibiotics.  She had a follow-up ureteral stent exchange and lithotripsy on 8/16/2024.  She reports constant pain since initial stent placement.  She also endorses dysuria and bladder irritation.  See further HPI details above.  The above workup was undertaken.  Broad differential was considered including stent misplacement, UTI, recurrent stone, stricture, pyelonephritis.  On exam, patient is well-appearing.  Her vitals are reassuring.  She does have left flank tenderness.  CT of abdomen/pelvis shows a left ureteral stent in good position without hydronephrosis.  There is also a 3 mm stone along the course of the stent in the proximal left ureter.  UA shows evidence of infection with trace hematuria.  Discussed with [facility] who looked at  the CT images.  Urology team states that discomfort is likely due to stent and there is no concern for obstruction at this time.  They recommend outpatient follow-up for stent removal in a week.  They also recommended oxybutynin and Flomax to help with bladder discomfort.  Will plan to discharge patient with antibiotics for acute cystitis and Flomax.  Patient's last urine culture grew Pseudomonas.  Will prescribe levofloxacin.  Patient voices understanding and is agreeable to plan of care.  Return precautions were given.    Disposition   The patient was discharged.     Diagnosis     ICD-10-CM    1. Left flank pain  R10.9       2. Acute cystitis without hematuria  N30.00       3. Ureteral stent present  Z96.0       4. Uric acid kidney stone  N20.0       5. Hypothyroidism, unspecified type  E03.9 levothyroxine (SYNTHROID/LEVOTHROID) 125 MCG tablet      6. Left renal stone  N20.0            Discharge Medications   Discharge Medication List as of 8/28/2024  1:39 PM        START taking these medications    Details   levofloxacin (LEVAQUIN) 250 MG tablet Take 1 tablet (250 mg) by mouth daily for 7 days., Disp-7 tablet, R-0, E-Prescribe      tamsulosin (FLOMAX) 0.4 MG capsule Take 1 capsule (0.4 mg) by mouth daily for 10 doses., Disp-10 capsule, R-0, E-Prescribe               BEL Florence Kausar, PA-C  08/28/24 2019

## 2024-08-31 LAB
BACTERIA UR CULT: ABNORMAL
BACTERIA UR CULT: ABNORMAL

## 2024-08-31 NOTE — TELEPHONE ENCOUNTER
[facility]    Reason for call: Lab Result Notification     Lab Result (including Rx patient on, if applicable).  If culture, copy of lab report at bottom.  Lab Result: Urine Culture - see below    ED Rx: levofloxacin (LEVAQUIN) 250 MG tablet - Take 1 tablet (250 mg) by mouth daily for 7 days (Susceptible)    Creatinine Level (mg/dl)   Creatinine   Date Value Ref Range Status   08/28/2024 1.64 (H) 0.51 - 0.95 mg/dL Final   01/23/2023 1.48 (H) 0.57 - 1.00 mg/dL Final    Creatinine clearance (ml/min), if applicable    Serum creatinine: 1.64 mg/dL (H) 08/28/24 1003  Estimated creatinine clearance: 27.5 mL/min (A)     Patient presented to [facility] ED on 8/28/2024 for evaluation of flank pain, dysuria and bladder irritation. History of ureteral stones and UTI. Stent place 7/23/2024    RN Recommendations/Instructions per [place] ED lab result protocol:   [facility] ED lab result protocol utilized: Urine Culture - final positive    Unable to reach patient/caregiver.     Left voicemail message requesting a call back to [phone] between 9 a.m. and 5:30 p.m. for patient's ED/UC lab results.      Letter pended to be sent via [employer].      Pat Ritter RN

## 2024-08-31 NOTE — TELEPHONE ENCOUNTER
"Patient calling back. Advised of her urine culture results and that her antibiotic is the right one for her infection. States her urinary symptoms are improved. Patient states that she fells the other day and has been having pain since. Advised patient to reach out to her PCP regarding the fall. Patient states she did and was advised to go to the ED or urgent care. Patient stated \"I'm not going back there, I can't lay on those carts.\"  Care advice given per the urine culture protocol. Patient verbalized understanding.     FRITZ CANELA RN    "

## 2024-09-04 ENCOUNTER — TRANSFERRED RECORDS (OUTPATIENT)
Dept: FAMILY MEDICINE | Facility: CLINIC | Age: 84
End: 2024-09-04

## 2024-09-12 ENCOUNTER — OFFICE VISIT (OUTPATIENT)
Dept: FAMILY MEDICINE | Facility: CLINIC | Age: 84
End: 2024-09-12

## 2024-09-12 VITALS
WEIGHT: 144 LBS | DIASTOLIC BLOOD PRESSURE: 73 MMHG | HEART RATE: 71 BPM | SYSTOLIC BLOOD PRESSURE: 114 MMHG | BODY MASS INDEX: 23.24 KG/M2

## 2024-09-12 DIAGNOSIS — N39.0 RECURRENT UTI: Primary | ICD-10-CM

## 2024-09-12 DIAGNOSIS — R39.9 LOWER URINARY TRACT SYMPTOMS (LUTS): ICD-10-CM

## 2024-09-12 DIAGNOSIS — N20.1 LEFT URETERAL STONE: ICD-10-CM

## 2024-09-12 LAB
BACTERIA (RMG): ABNORMAL
BILIRUBIN (RMG): NEGATIVE
BLOOD (RMG): ABNORMAL
CASTS (RMG): ABNORMAL
COLOR UR: YELLOW
CRYSTAL (RMG): ABNORMAL
EPITHELIAL (RMG): ABNORMAL
GLUCOSE (RMG): NEGATIVE
KETONE (RMG): NEGATIVE
LEUKOCYTE (RMG): ABNORMAL
MUCOUS (RMG): ABNORMAL
NITRITE (RMG): NEGATIVE
PH UR STRIP: 5.5 PH (ref 5–7)
PROTEIN (RMG): NEGATIVE
RBC (RMG): ABNORMAL
SP GR UR STRIP: 1.01
UROBILINOGEN (RMG): 0.2
WBC (RMG): ABNORMAL

## 2024-09-12 PROCEDURE — 87086 URINE CULTURE/COLONY COUNT: CPT

## 2024-09-12 PROCEDURE — 99214 OFFICE O/P EST MOD 30 MIN: CPT

## 2024-09-12 PROCEDURE — G2211 COMPLEX E/M VISIT ADD ON: HCPCS

## 2024-09-12 PROCEDURE — 81003 URINALYSIS AUTO W/O SCOPE: CPT

## 2024-09-12 RX ORDER — TAMSULOSIN HYDROCHLORIDE 0.4 MG/1
0.4 CAPSULE ORAL DAILY
Qty: 14 CAPSULE | Refills: 0 | Status: CANCELLED | OUTPATIENT
Start: 2024-09-12

## 2024-09-12 NOTE — PROGRESS NOTES
Assessment & Plan     Recurrent UTI  Left ureteral stone  We discussed that her symptoms and lab results are consistent with a likely recurrent infection of the bladder.  There are no red flags to suggest pyelonephritis.  She does have a known 3 mm left kidney stone seen on her last CT. Rx for Cipro, side effects reviewed.  The patient will be contacted if an adjustment in therapy is indicated based on urine culture.  Adequate fluid intake discussed. Red flags that would indicate more serious infection discussed and understood. Follow up as needed for new or worsening symptoms or if symptoms fail to improve. Patient agreeable to plan. All questions answered.   - Urine Culture  - Urine Culture  - ciprofloxacin (CIPRO) 500 MG tablet  Dispense: 7 tablet; Refill: 0    Lower urinary tract symptoms (LUTS)  - Urinalysis (RMG)  - Urinalysis (RMG)  - Urine Culture  - Urine Culture              See Patient Instructions    Return if symptoms worsen or fail to improve, for Follow up.    Rui Quijano is a 83 year old, presenting for the following health issues:  Abdominal Pain (Extreme ongoing pains in pt's stomach since initial stones being removed in July and is worsening, has recent history of  multiple kidney stones and other kidney/UTI issues /)    HPI     Recent ER Visit for UTI: 7 days of levofloxacin course completed. Urine culture positive for pseudomonas aeruginosa sensitive to Levofloxacin. Had stent removal last week. Then on cipro for 2 days post stent removal. Pains in stomach like she has UTI again started 2 days ago. No N/V/D. Pain worse with drinking water. No appetite. Last CT scan 8/28 with a 3 mm left uretal stone proximal ureter. Needs to do 24 hour urine panel. States she is not feeling much better after the kidney stones were removed. Some frequency last night every 1.5 hours urination. Some burning on the bottom with urination. No hematuria.         Review of Systems  Constitutional, HEENT,  cardiovascular, pulmonary, GI, , musculoskeletal, neuro, skin, endocrine and psych systems are negative, except as otherwise noted.      Objective    /73   Pulse 71   Wt 65.3 kg (144 lb)   BMI 23.24 kg/m    Body mass index is 23.24 kg/m .  Physical Exam   GENERAL: alert and no distress  RESP: lungs clear to auscultation - no rales, rhonchi or wheezes  CV: regular rate and rhythm, normal S1 S2, no S3 or S4, no murmur, click or rub, no peripheral edema  ABDOMEN: tenderness suprapubic and bowel sounds normal  BACK: no CVA tenderness, no paralumbar tenderness  PSYCH: mentation appears normal, affect normal/bright    Office Visit on 09/12/2024   Component Date Value Ref Range Status    Color Urine 09/12/2024 Yellow   Final    CLEAR    pH Urine 09/12/2024 5.5  pH Final    Specific Gravity Urine 09/12/2024 1.015   Final    PROTEIN (RMG) 09/12/2024 Negative  Negative Final    GLUCOSE (RMG) 09/12/2024 Negative  Negative Final    KETONE (RMG) 09/12/2024 Negative  Negative Final    LEUKOCYTE (RMG) 09/12/2024 2+ (A)  Negative Final    BLOOD (RMG) 09/12/2024 Trace (A)  Negative Final    Nitrite (RMG) 09/12/2024 Negative  Negative Final    BILIRUBIN (RMG) 09/12/2024 Negative  Negative Final    UROBILINOGEN (RMG) 09/12/2024 0.2  0.2 - 1 Final    WBC (RMG) 09/12/2024 10-20 (A)   Final    RBC (RMG) 09/12/2024 0-3   Final    CRYSTAL (RMG) 09/12/2024 None   Final    BACTERIA (RMG) 09/12/2024 Moderate (A)   Final    MUCOUS (RMG) 09/12/2024 None   Final    CASTS (RMG) 09/12/2024 None   Final    EPITHELIAL (RMG) 09/12/2024 Few (A)   Final         Signed Electronically by: WILEY Brumfield CNP

## 2024-09-13 RX ORDER — CIPROFLOXACIN 500 MG/1
500 TABLET, FILM COATED ORAL DAILY
Qty: 7 TABLET | Refills: 0 | Status: SHIPPED | OUTPATIENT
Start: 2024-09-13 | End: 2024-09-20

## 2024-09-14 LAB — BACTERIA UR CULT: NORMAL

## 2024-09-25 ENCOUNTER — TRANSFERRED RECORDS (OUTPATIENT)
Dept: FAMILY MEDICINE | Facility: CLINIC | Age: 84
End: 2024-09-25

## 2024-10-01 DIAGNOSIS — E03.9 HYPOTHYROIDISM, UNSPECIFIED TYPE: ICD-10-CM

## 2024-10-02 RX ORDER — LEVOTHYROXINE SODIUM 125 UG/1
125 TABLET ORAL
Qty: 30 TABLET | Refills: 3 | OUTPATIENT
Start: 2024-10-02

## 2024-10-02 NOTE — TELEPHONE ENCOUNTER
"Med: Levothyroxine    LOV (related): 8/12/24    Last Lab: 8/01/2024      Due for F/U around: OVERDUE \"Due for recheck of TSH in 4-6 weeks. Continue current medication regimen.\"     Next Appt: none        TSH   Date Value Ref Range Status   08/01/2024 0.17 (L) 0.30 - 4.20 uIU/mL Final     T4 Free   Date Value Ref Range Status   07/13/2020 1.39 0.82 - 1.77 ng/dL Final     Free T4   Date Value Ref Range Status   08/01/2024 2.29 (H) 0.90 - 1.70 ng/dL Final         "
LEVOTHYROXINE REFILL TO [facility] IS CLOSE TO RUNNING OUT  
Overdue TSH recheck after dose change. Needs lab appointment.  
Patient

## 2024-10-03 DIAGNOSIS — E03.4 HYPOTHYROIDISM DUE TO ACQUIRED ATROPHY OF THYROID: Primary | ICD-10-CM

## 2024-10-03 DIAGNOSIS — E11.22 TYPE 2 DIABETES MELLITUS WITH STAGE 3B CHRONIC KIDNEY DISEASE, WITHOUT LONG-TERM CURRENT USE OF INSULIN (H): ICD-10-CM

## 2024-10-03 DIAGNOSIS — N18.32 TYPE 2 DIABETES MELLITUS WITH STAGE 3B CHRONIC KIDNEY DISEASE, WITHOUT LONG-TERM CURRENT USE OF INSULIN (H): ICD-10-CM

## 2024-10-03 LAB
T4 FREE SERPL-MCNC: 2.04 NG/DL (ref 0.9–1.7)
TSH SERPL DL<=0.005 MIU/L-ACNC: 0.2 UIU/ML (ref 0.3–4.2)

## 2024-10-03 PROCEDURE — 36415 COLL VENOUS BLD VENIPUNCTURE: CPT

## 2024-10-03 PROCEDURE — 84443 ASSAY THYROID STIM HORMONE: CPT | Mod: 90

## 2024-10-03 PROCEDURE — 84439 ASSAY OF FREE THYROXINE: CPT

## 2024-10-04 DIAGNOSIS — E03.4 HYPOTHYROIDISM DUE TO ACQUIRED ATROPHY OF THYROID: Primary | ICD-10-CM

## 2024-10-04 RX ORDER — LEVOTHYROXINE SODIUM 112 UG/1
112 TABLET ORAL DAILY
Qty: 90 TABLET | Refills: 3 | Status: SHIPPED | OUTPATIENT
Start: 2024-10-04

## 2024-10-06 DIAGNOSIS — N20.0 URIC ACID KIDNEY STONE: ICD-10-CM

## 2024-10-07 RX ORDER — OXYBUTYNIN CHLORIDE 10 MG/1
10 TABLET, EXTENDED RELEASE ORAL DAILY
Qty: 90 TABLET | Refills: 0 | Status: SHIPPED | OUTPATIENT
Start: 2024-10-07

## 2024-10-07 NOTE — TELEPHONE ENCOUNTER
Med: oxybutynin     LOV (related): 06/06/2024      Due for F/U around: Overdue for MAW since 01/2017!!    Next Appt: None scheduled

## 2024-11-18 ENCOUNTER — TELEPHONE (OUTPATIENT)
Dept: CARDIOLOGY | Facility: CLINIC | Age: 84
End: 2024-11-18

## 2024-11-18 ENCOUNTER — LAB (OUTPATIENT)
Dept: LAB | Facility: CLINIC | Age: 84
End: 2024-11-18
Payer: COMMERCIAL

## 2024-11-18 DIAGNOSIS — I25.118 CORONARY ARTERY DISEASE OF NATIVE HEART WITH STABLE ANGINA PECTORIS, UNSPECIFIED VESSEL OR LESION TYPE (H): ICD-10-CM

## 2024-11-18 DIAGNOSIS — I35.0 NONRHEUMATIC AORTIC VALVE STENOSIS: ICD-10-CM

## 2024-11-18 LAB
ANION GAP SERPL CALCULATED.3IONS-SCNC: 8 MMOL/L (ref 7–15)
BUN SERPL-MCNC: 23 MG/DL (ref 8–23)
CALCIUM SERPL-MCNC: 9.9 MG/DL (ref 8.8–10.4)
CHLORIDE SERPL-SCNC: 104 MMOL/L (ref 98–107)
CREAT SERPL-MCNC: 1.49 MG/DL (ref 0.51–0.95)
EGFRCR SERPLBLD CKD-EPI 2021: 34 ML/MIN/1.73M2
GLUCOSE SERPL-MCNC: 136 MG/DL (ref 70–99)
HCO3 SERPL-SCNC: 25 MMOL/L (ref 22–29)
POTASSIUM SERPL-SCNC: 4.1 MMOL/L (ref 3.4–5.3)
SODIUM SERPL-SCNC: 137 MMOL/L (ref 135–145)

## 2024-11-18 NOTE — TELEPHONE ENCOUNTER
Pt needs to r/s her missed echo. Spoke to spouse and she will call us back. Once echo is scheduled, please r/s Dr Andino's appt- can use urgent slots to get pt seen in Dec- per Team 2-MK

## 2024-12-06 ENCOUNTER — TRANSFERRED RECORDS (OUTPATIENT)
Dept: FAMILY MEDICINE | Facility: CLINIC | Age: 84
End: 2024-12-06

## 2024-12-11 ENCOUNTER — HOSPITAL ENCOUNTER (OUTPATIENT)
Dept: CARDIOLOGY | Facility: CLINIC | Age: 84
Discharge: HOME | End: 2024-12-11
Payer: COMMERCIAL

## 2024-12-11 DIAGNOSIS — I35.0 NONRHEUMATIC AORTIC VALVE STENOSIS: ICD-10-CM

## 2024-12-11 DIAGNOSIS — I25.118 CORONARY ARTERY DISEASE OF NATIVE HEART WITH STABLE ANGINA PECTORIS, UNSPECIFIED VESSEL OR LESION TYPE (H): ICD-10-CM

## 2024-12-11 LAB — LVEF ECHO: NORMAL

## 2024-12-11 PROCEDURE — 93306 TTE W/DOPPLER COMPLETE: CPT

## 2024-12-18 ENCOUNTER — OFFICE VISIT (OUTPATIENT)
Dept: CARDIOLOGY | Facility: CLINIC | Age: 84
End: 2024-12-18
Payer: COMMERCIAL

## 2024-12-18 VITALS
HEART RATE: 59 BPM | BODY MASS INDEX: 23.49 KG/M2 | SYSTOLIC BLOOD PRESSURE: 118 MMHG | HEIGHT: 65 IN | WEIGHT: 141 LBS | DIASTOLIC BLOOD PRESSURE: 70 MMHG | OXYGEN SATURATION: 99 %

## 2024-12-18 DIAGNOSIS — I35.0 NONRHEUMATIC AORTIC VALVE STENOSIS: ICD-10-CM

## 2024-12-18 DIAGNOSIS — N18.4 TYPE 2 DIABETES MELLITUS WITH STAGE 4 CHRONIC KIDNEY DISEASE, WITHOUT LONG-TERM CURRENT USE OF INSULIN (H): ICD-10-CM

## 2024-12-18 DIAGNOSIS — E78.00 HYPERCHOLESTEROLEMIA: ICD-10-CM

## 2024-12-18 DIAGNOSIS — I25.10 CORONARY ARTERY DISEASE INVOLVING NATIVE CORONARY ARTERY OF NATIVE HEART WITHOUT ANGINA PECTORIS: Primary | ICD-10-CM

## 2024-12-18 DIAGNOSIS — E11.22 TYPE 2 DIABETES MELLITUS WITH STAGE 4 CHRONIC KIDNEY DISEASE, WITHOUT LONG-TERM CURRENT USE OF INSULIN (H): ICD-10-CM

## 2024-12-18 NOTE — PROGRESS NOTES
HPI and Plan:   Macie is a very nice 84-year-old woman originally referred because of heavily calcified coronary arteries noted on her recent CT scan.     Macie has chronic renal insufficiency stage IIIb, chronic pancytopenia, 50 pound unexplained weight loss, asthma, COPD, coronary artery disease, diabetes mellitus, and moderate aortic stenosis.     Macie has no family history of coronary disease.  Both parents  of cancer at about age 67.  Macie has diabetes mellitus for the last 6 to 7 years, she is a former smoker having quit 28 years ago prior to that 1-1/2 packs/day.  She has hypercholesterolemia although the highest LDL I find in the chart is 128.  She does not have hypertension.  She occasionally uses alcohol     She has multiple orthopedic issues including bilateral knee surgeries and spinal stenosis that primarily limits her ability to exercise.      She has no chest, arm, neck, jaw or shoulder discomfort.  No dyspnea on exertion, orthopnea, or PND.  No palpitations, lightheadedness, dizziness, syncope, or near syncope.  No peripheral edema.     She was started on rosuvastatin and aspirin because of her CT scan.      An abnormal Lexiscan led to IVUS directed stenting of her proximal left anterior descending artery performed on 2023.       She returns to clinic today stating she is doing quite well from a cardiac standpoint without exertional chest arm neck jaw or shoulder discomfort.  No dyspnea on exertion orthopnea or PND.  No palpitations lightheadedness dizziness syncope or near syncope.  Her activity is markedly limited by her spinal stenosis however.  Her main complaint is that of increased bruising for which she occasionally skips her aspirin.  Of note though she was asymptomatic going into her intervention.     She also reports unexplained 50 pound weight loss.    Echocardiogram at this time demonstrates ejection fraction of 55 to 60%.  Aortic valve area is estimated be 1.1 with a  mean gradient of 26 and a mild to moderate aortic insufficiency.     Assessment and plan.  Appears to be doing well without clinical evidence of ischemia, heart failure or significant arrhythmia.     She does have aortic stenosis in the moderate range.  I will follow this up in 6 months.    We talked about the bruising on her forearms that is partially due to years of sun exposure exacerbated by her aspirin.  We talked about the importance of taking aspirin at least on a Monday Wednesday Friday regiment.  She does have pancytopenia with most recent platelet count of 160,000.  Hemoglobin is 9.8 which is good for her     Creatinine is 1.49 giving her a GFR of 34.  Electrolytes are normal.          Fasting lipid profile is excellent with total cholesterol 100 with an HDL of 42 and LDL of 39 and triglycerides of 94.      As stated I will plan on a follow-up in 6 months with an echocardiogram.  We be glad to see her sooner if she should have any issues.  Thank you for allow me to participate in this patient's care.  Sincerely,                                Honorio Andino MD [facility]    Today's clinic visit entailed:  Review of the result(s) of each unique test - echocardiogram, lab work  Ordering of each unique test  Prescription drug management  30 minutes spent by me on the date of the encounter doing chart review, history and exam, documentation and further activities per the note  Provider  Link to [facility] Help Grid     The level of medical decision making during this visit was of moderate complexity.      Orders Placed This Encounter   Procedures    Follow-Up with Cardiology JULIETTE    Echocardiogram Complete       No orders of the defined types were placed in this encounter.      Medications Discontinued During This Encounter   Medication Reason    levothyroxine (SYNTHROID/LEVOTHROID) 125 MCG tablet Dose adjustment         Encounter Diagnoses   Name Primary?    Nonrheumatic aortic valve stenosis     Hypercholesterolemia      Type 2 diabetes mellitus with stage 4 chronic kidney disease, without long-term current use of insulin (H)     Coronary artery disease involving native coronary artery of native heart without angina pectoris Yes       CURRENT MEDICATIONS:  Current Outpatient Medications   Medication Sig Dispense Refill    allopurinol (ZYLOPRIM) 100 MG tablet Take 2 tablets (200 mg) by mouth daily. 90 tablet 3    aspirin 81 MG EC tablet Take 1 tablet (81 mg) by mouth daily 30 tablet 0    cyanocobalamin (VITAMIN B-12) 500 MCG SUBL sublingual tablet Place 2 tablets (1,000 mcg) under the tongue daily 60 tablet 0    Fluticasone-Umeclidin-Vilanterol (TRELEGY ELLIPTA) 200-62.5-25 MCG/ACT oral inhaler Inhale 1 puff into the lungs daily 28 each 1    HYDROcodone-acetaminophen (NORCO)  MG per tablet Take 1 tablet by mouth 3 times daily as needed for moderate to severe pain 6 tablet 0    hyoscyamine (LEVSIN/SL) 0.125 MG sublingual tablet Place 1 tablet (125 mcg) under the tongue every 6 hours as needed for cramping (for bladder spasms related to ureteral stent) 20 tablet 0    levothyroxine (SYNTHROID/LEVOTHROID) 112 MCG tablet Take 1 tablet (112 mcg) by mouth daily. 90 tablet 3    lisinopril (ZESTRIL) 2.5 MG tablet Take 2.5 mg by mouth daily      metoprolol succinate ER (TOPROL XL) 25 MG 24 hr tablet Take 1 tablet (25 mg) by mouth daily 90 tablet 3    oxyBUTYnin ER (DITROPAN XL) 10 MG 24 hr tablet Take 1 tablet by mouth once daily 90 tablet 0    pantoprazole (PROTONIX) 40 MG EC tablet Take 1 tablet (40 mg) by mouth every morning (before breakfast) 30 tablet 0    rosuvastatin (CRESTOR) 20 MG tablet Take 1 tablet (20 mg) by mouth daily 90 tablet 3    senna-docusate (SENOKOT-S/PERICOLACE) 8.6-50 MG tablet Take 1 tablet by mouth 2 times daily as needed for constipation 30 tablet 0    albuterol (PROAIR HFA/PROVENTIL HFA/VENTOLIN HFA) 108 (90 Base) MCG/ACT inhaler Inhale 1-2 puffs into the lungs every 4 hours as needed for shortness of  breath, wheezing or cough (Patient not taking: Reported on 12/18/2024) 36 g 2    blood glucose (NO BRAND SPECIFIED) lancets standard Use to test blood sugar 1 time daily or as directed. (Patient not taking: Reported on 6/6/2024) 100 each 3    blood glucose (NO BRAND SPECIFIED) test strip Use to test blood sugar 1 time daily or as directed. (Patient not taking: Reported on 8/1/2024) 100 strip 3    blood glucose monitoring (NO BRAND SPECIFIED) meter device kit Use to test blood sugar 1 time daily or as directed. (Patient not taking: Reported on 8/1/2024) 1 kit 0    nitroGLYcerin (NITROSTAT) 0.4 MG sublingual tablet For chest pain place 1 tablet under the tongue every 5 minutes for 3 doses. If symptoms persist 5 minutes after 1st dose call 911. (Patient not taking: Reported on 12/18/2024) 25 tablet 1       ALLERGIES     Allergies   Allergen Reactions    Morphine Nausea     Severe vomiting    Prednisone      Elevated blood glucose  weakness    Baclofen GI Disturbance    Imitrex [Sumatriptan] Nausea    Tetracycline      Sores in mouth       PAST MEDICAL HISTORY:  Past Medical History:   Diagnosis Date    Calculus of left ureter 2014    dr Arriaga - ureteroscopy and lithitripsy    Cataract     Chronic low back pain     Dr Villagran at [facility] in 2015- not surgical     Complication of anesthesia     COPD with asthma (H)      followed with pulmonary DR LENNOX 60-pack-year history of smoking    Esophagitis, reflux 9/2012    SOME EOSINOPHILIA NO BARRETS    Gastro-oesophageal reflux disease      has seen GI    Hydronephrosis, left     chronic , in 2017 Dr Arriaga plan was observaton    Hyperlipidaemia LDL goal < 100     Hypothyroid     Kidney stone 3/28/2019    Migraine headaches 9/1/2011    Moderate persistent asthma      lifelong, saw Pulm 2013    Obesity (BMI 30-39.9)     Panic attacks     PONV (postoperative nausea and vomiting)     Renal stones 2016    kidney stones, multiple small stones high risk recurrent ureteral stones. , Dr Arriaga in  2016 rec 24 hour urine.    Type 2 diabetes mellitus without complication (H)        PAST SURGICAL HISTORY:  Past Surgical History:   Procedure Laterality Date    ARTHROPLASTY HIP  3/13/2013    Procedure: ARTHROPLASTY HIP;  LEFT TOTAL HIP ARTHROPLASTY (BIOMET)^ ;  Surgeon: Anand Main MD;  Location:  OR    ARTHROPLASTY KNEE Right 8/9/2021    Procedure: RIGHT TOTAL KNEE ARTHROPLASTY;  Surgeon: Anand Main MD;  Location:  OR    ARTHROPLASTY PATELLO-FEMORAL (KNEE)  2005ish    Left    CHOLECYSTECTOMY, OPEN  27 yo    COMBINED CYSTOSCOPY, INSERT STENT URETER(S)  8/5/2014    Procedure: COMBINED CYSTOSCOPY, INSERT STENT URETER(S);  Surgeon: Sam Arriaga MD;  Location:  OR    COMBINED CYSTOSCOPY, RETROGRADES, URETEROSCOPY, LASER HOLMIUM LITHOTRIPSY URETER(S), INSERT STENT Left 6/14/2016    Procedure: COMBINED CYSTOSCOPY, RETROGRADES, URETEROSCOPY, LASER HOLMIUM LITHOTRIPSY URETER(S), INSERT STENT;  Surgeon: Thomas Edmondson MD;  Location:  OR    COMBINED CYSTOSCOPY, RETROGRADES, URETEROSCOPY, LASER HOLMIUM LITHOTRIPSY URETER(S), INSERT STENT Left 3/28/2019    Procedure: CYSTOSCOPY, LEFT RETROGRADE PYLEOGRAM, LEFT URETEROSCOPY, HOLMIUM LASER LITHOTRIPSY, LEFT URETERAL STENT EXCHANGE;  Surgeon: Derrek Rivera MD;  Location:  OR    CV CORONARY ANGIOGRAM N/A 7/5/2023    Procedure: Coronary Angiogram;  Surgeon: Fausto Espinal MD;  Location:  HEART CARDIAC CATH LAB    CV INTRAVASULAR ULTRASOUND N/A 7/5/2023    Procedure: Intravascular Ultrasound;  Surgeon: Fausto Espinal MD;  Location:  HEART CARDIAC CATH LAB    CV PCI N/A 7/5/2023    Procedure: Percutaneous Coronary Intervention;  Surgeon: Fausto Espinal MD;  Location:  HEART CARDIAC CATH LAB    CYSTOSCOPY, RETROGRADES, INSERT STENT URETER(S), COMBINED Left 3/8/2019    Procedure: CYSTOSCOPY,LEFT RETROGRADE, LEFT STENT PLACEMENT;  Surgeon: Derrek Rivera MD;  Location:  OR    CYSTOSCOPY, RETROGRADES, INSERT STENT  URETER(S), COMBINED Left 7/23/2024    Procedure: Cystoscopy, retrogrades, insert stent ureter(s), combined;  Surgeon: Anand Hendricks MD;  Location:  OR    ESOPHAGOSCOPY, GASTROSCOPY, DUODENOSCOPY (EGD), COMBINED N/A 7/26/2024    Procedure: Esophagoscopy, gastroscopy, duodenoscopy (EGD), combined;  Surgeon: Evette Salcido MD;  Location:  GI    GENITOURINARY SURGERY      LAMINECT/DISCECTOMY, LUMBAR      Laminectomy/Discectomy Lumbar    LASER HOLMIUM LITHOTRIPSY URETER(S), INSERT STENT, COMBINED Left 8/20/2014    Procedure: COMBINED CYSTOSCOPY, URETEROSCOPY, LASER HOLMIUM LITHOTRIPSY URETER(S), INSERT STENT;  Surgeon: Sam Arriaga MD;  Location:  OR    LASER HOLMIUM LITHOTRIPSY URETER(S), INSERT STENT, COMBINED Left 5/2/2019    Procedure: CYSTOSCOPY, LEFT RETROGRADE, LEFT URETEROSCOPY, HOLMIUM LASER LITHOTRIPSY, STONE REMOVAL, LEFT STENT EXCHANGE;  Surgeon: Derrek Rivera MD;  Location:  OR    LASER HOLMIUM LITHOTRIPSY URETER(S), INSERT STENT, COMBINED Right 6/13/2019    Procedure: CYSTOSCOPY; RIGHT URETEROSCOPY WITH HOLMIUM LASER LITHOTRIPSY; RIGHT STENT PLACEMENT (DIGITAL FLEXIBLE URETEROSCOPE);  Surgeon: Derrek Rivera MD;  Location:  OR    LASER HOLMIUM LITHOTRIPSY URETER(S), INSERT STENT, COMBINED Left 8/16/2024    Procedure: CYSTOSCOPY , LEFT URETEROSCOPY , HOLMIUM LASER LITHOTRIPSY AND LEFT URETERAL STENT REMOVAL VERSES LEFT URETERAL STENT EXCHANGE;  Surgeon: Derrek Rivera MD;  Location:  OR    ORTHOPEDIC SURGERY      left ankle    RECESSION EYELID UPPER         FAMILY HISTORY:  History reviewed. No pertinent family history.    SOCIAL HISTORY:  Social History     Socioeconomic History    Marital status:      Spouse name: None    Number of children: None    Years of education: None    Highest education level: None   Tobacco Use    Smoking status: Former     Current packs/day: 0.00     Average packs/day: 1.5 packs/day for 40.0 years (60.0 ttl pk-yrs)     Types:  "Cigarettes     Start date: 1955     Quit date: 1995     Years since quittin.9    Smokeless tobacco: Never   Vaping Use    Vaping status: Never Used   Substance and Sexual Activity    Alcohol use: Yes     Comment: couple drinks per year    Drug use: No       Review of Systems:  Skin:  Positive for bruising     Eyes:  Negative      ENT:  Negative      Respiratory:  Positive for   asthma   Cardiovascular:  Negative      Gastroenterology: Negative      Genitourinary:  Negative      Musculoskeletal:  Negative      Neurologic:  Negative      Psychiatric:  Negative      Heme/Lymph/Imm:  Negative      Endocrine:  Positive for diabetes, thyroid disorder thyroid meds decreased    Physical Exam:  Vitals: /70   Pulse 59   Ht 1.651 m (5' 5\")   Wt 64 kg (141 lb)   SpO2 99%   BMI 23.46 kg/m      Constitutional:  cooperative, alert and oriented, well developed, well nourished, in no acute distress thin      Skin:  warm and dry to the touch          Head:  normocephalic        Eyes:  pupils equal and round, conjunctivae and lids unremarkable, sclera white, no xanthalasma, EOMS intact, no nystagmus        Lymph:      ENT:  no pallor or cyanosis, dentition good        Neck:  no carotid bruit        Respiratory:  normal breath sounds, clear to auscultation, normal A-P diameter, normal symmetry, normal respiratory excursion, no use of accessory muscles         Cardiac: regular rhythm       systolic ejection murmur, grade 2, RUSB, LUSB, radiation to the carotid        pulses full and equal                                        GI:           Extremities and Muscular Skeletal:  no edema kyphosis            Neurological:  no gross motor deficits        Psych:  affect appropriate, oriented to time, person and place        OZZY Schneider, PA-C  [address] [place] [address]                "

## 2024-12-18 NOTE — LETTER
2024    Deedee Bui MD  [address]  [facility] [address]    RE: Macie Jorge       Dear Colleague,     I had the pleasure of seeing Macie Jorge in the [facility].  HPI and Plan:   Macie is a very nice 84-year-old woman originally referred because of heavily calcified coronary arteries noted on her recent CT scan.     Macie has chronic renal insufficiency stage IIIb, chronic pancytopenia, 50 pound unexplained weight loss, asthma, COPD, coronary artery disease, diabetes mellitus, and moderate aortic stenosis.     Macie has no family history of coronary disease.  Both parents  of cancer at about age 67.  Macie has diabetes mellitus for the last 6 to 7 years, she is a former smoker having quit 28 years ago prior to that 1-1/2 packs/day.  She has hypercholesterolemia although the highest LDL I find in the chart is 128.  She does not have hypertension.  She occasionally uses alcohol     She has multiple orthopedic issues including bilateral knee surgeries and spinal stenosis that primarily limits her ability to exercise.      She has no chest, arm, neck, jaw or shoulder discomfort.  No dyspnea on exertion, orthopnea, or PND.  No palpitations, lightheadedness, dizziness, syncope, or near syncope.  No peripheral edema.     She was started on rosuvastatin and aspirin because of her CT scan.      An abnormal Lexiscan led to IVUS directed stenting of her proximal left anterior descending artery performed on 2023.       She returns to clinic today stating she is doing quite well from a cardiac standpoint without exertional chest arm neck jaw or shoulder discomfort.  No dyspnea on exertion orthopnea or PND.  No palpitations lightheadedness dizziness syncope or near syncope.  Her activity is markedly limited by her spinal stenosis however.  Her main complaint is that of increased bruising for which she occasionally skips her aspirin.  Of note though she was  asymptomatic going into her intervention.     She also reports unexplained 50 pound weight loss.    Echocardiogram at this time demonstrates ejection fraction of 55 to 60%.  Aortic valve area is estimated be 1.1 with a mean gradient of 26 and a mild to moderate aortic insufficiency.     Assessment and plan.  Appears to be doing well without clinical evidence of ischemia, heart failure or significant arrhythmia.     She does have aortic stenosis in the moderate range.  I will follow this up in 6 months.    We talked about the bruising on her forearms that is partially due to years of sun exposure exacerbated by her aspirin.  We talked about the importance of taking aspirin at least on a Monday Wednesday Friday regiment.  She does have pancytopenia with most recent platelet count of 160,000.  Hemoglobin is 9.8 which is good for her     Creatinine is 1.49 giving her a GFR of 34.  Electrolytes are normal.          Fasting lipid profile is excellent with total cholesterol 100 with an HDL of 42 and LDL of 39 and triglycerides of 94.      As stated I will plan on a follow-up in 6 months with an echocardiogram.  We be glad to see her sooner if she should have any issues.  Thank you for allow me to participate in this patient's care.  Sincerely,                                Honorio Andino MD [facility]    Today's clinic visit entailed:  Review of the result(s) of each unique test - echocardiogram, lab work  Ordering of each unique test  Prescription drug management  30 minutes spent by me on the date of the encounter doing chart review, history and exam, documentation and further activities per the note  Provider  Link to [facility] Help Grid     The level of medical decision making during this visit was of moderate complexity.      Orders Placed This Encounter   Procedures     Follow-Up with Cardiology JULIETTE     Echocardiogram Complete       No orders of the defined types were placed in this encounter.      Medications Discontinued  During This Encounter   Medication Reason     levothyroxine (SYNTHROID/LEVOTHROID) 125 MCG tablet Dose adjustment         Encounter Diagnoses   Name Primary?     Nonrheumatic aortic valve stenosis      Hypercholesterolemia      Type 2 diabetes mellitus with stage 4 chronic kidney disease, without long-term current use of insulin (H)      Coronary artery disease involving native coronary artery of native heart without angina pectoris Yes       CURRENT MEDICATIONS:  Current Outpatient Medications   Medication Sig Dispense Refill     allopurinol (ZYLOPRIM) 100 MG tablet Take 2 tablets (200 mg) by mouth daily. 90 tablet 3     aspirin 81 MG EC tablet Take 1 tablet (81 mg) by mouth daily 30 tablet 0     cyanocobalamin (VITAMIN B-12) 500 MCG SUBL sublingual tablet Place 2 tablets (1,000 mcg) under the tongue daily 60 tablet 0     Fluticasone-Umeclidin-Vilanterol (TRELEGY ELLIPTA) 200-62.5-25 MCG/ACT oral inhaler Inhale 1 puff into the lungs daily 28 each 1     HYDROcodone-acetaminophen (NORCO)  MG per tablet Take 1 tablet by mouth 3 times daily as needed for moderate to severe pain 6 tablet 0     hyoscyamine (LEVSIN/SL) 0.125 MG sublingual tablet Place 1 tablet (125 mcg) under the tongue every 6 hours as needed for cramping (for bladder spasms related to ureteral stent) 20 tablet 0     levothyroxine (SYNTHROID/LEVOTHROID) 112 MCG tablet Take 1 tablet (112 mcg) by mouth daily. 90 tablet 3     lisinopril (ZESTRIL) 2.5 MG tablet Take 2.5 mg by mouth daily       metoprolol succinate ER (TOPROL XL) 25 MG 24 hr tablet Take 1 tablet (25 mg) by mouth daily 90 tablet 3     oxyBUTYnin ER (DITROPAN XL) 10 MG 24 hr tablet Take 1 tablet by mouth once daily 90 tablet 0     pantoprazole (PROTONIX) 40 MG EC tablet Take 1 tablet (40 mg) by mouth every morning (before breakfast) 30 tablet 0     rosuvastatin (CRESTOR) 20 MG tablet Take 1 tablet (20 mg) by mouth daily 90 tablet 3     senna-docusate (SENOKOT-S/PERICOLACE) 8.6-50 MG  tablet Take 1 tablet by mouth 2 times daily as needed for constipation 30 tablet 0     albuterol (PROAIR HFA/PROVENTIL HFA/VENTOLIN HFA) 108 (90 Base) MCG/ACT inhaler Inhale 1-2 puffs into the lungs every 4 hours as needed for shortness of breath, wheezing or cough (Patient not taking: Reported on 12/18/2024) 36 g 2     blood glucose (NO BRAND SPECIFIED) lancets standard Use to test blood sugar 1 time daily or as directed. (Patient not taking: Reported on 6/6/2024) 100 each 3     blood glucose (NO BRAND SPECIFIED) test strip Use to test blood sugar 1 time daily or as directed. (Patient not taking: Reported on 8/1/2024) 100 strip 3     blood glucose monitoring (NO BRAND SPECIFIED) meter device kit Use to test blood sugar 1 time daily or as directed. (Patient not taking: Reported on 8/1/2024) 1 kit 0     nitroGLYcerin (NITROSTAT) 0.4 MG sublingual tablet For chest pain place 1 tablet under the tongue every 5 minutes for 3 doses. If symptoms persist 5 minutes after 1st dose call 911. (Patient not taking: Reported on 12/18/2024) 25 tablet 1       ALLERGIES     Allergies   Allergen Reactions     Morphine Nausea     Severe vomiting     Prednisone      Elevated blood glucose  weakness     Baclofen GI Disturbance     Imitrex [Sumatriptan] Nausea     Tetracycline      Sores in mouth       PAST MEDICAL HISTORY:  Past Medical History:   Diagnosis Date     Calculus of left ureter 2014    dr Arriaga - ureteroscopy and lithitripsy     Cataract      Chronic low back pain     Dr Villagran at [facility] in 2015- not surgical      Complication of anesthesia      COPD with asthma (H)      followed with pulmonary DR LENNOX 60-pack-year history of smoking     Esophagitis, reflux 9/2012    SOME EOSINOPHILIA NO BARRETS     Gastro-oesophageal reflux disease      has seen GI     Hydronephrosis, left     chronic , in 2017 Dr Arriaga plan was observaton     Hyperlipidaemia LDL goal < 100      Hypothyroid      Kidney stone 3/28/2019     Migraine headaches  9/1/2011     Moderate persistent asthma      lifelong, saw Pulm 2013     Obesity (BMI 30-39.9)      Panic attacks      PONV (postoperative nausea and vomiting)      Renal stones 2016    kidney stones, multiple small stones high risk recurrent ureteral stones. , Dr Arriaga in 2016 rec 24 hour urine.     Type 2 diabetes mellitus without complication (H)        PAST SURGICAL HISTORY:  Past Surgical History:   Procedure Laterality Date     ARTHROPLASTY HIP  3/13/2013    Procedure: ARTHROPLASTY HIP;  LEFT TOTAL HIP ARTHROPLASTY (BIOMET)^ ;  Surgeon: Anand Main MD;  Location: [facility] OR     ARTHROPLASTY KNEE Right 8/9/2021    Procedure: RIGHT TOTAL KNEE ARTHROPLASTY;  Surgeon: Anand Main MD;  Location: [facility] OR     ARTHROPLASTY PATELLO-FEMORAL (KNEE)  2005ish    Left     CHOLECYSTECTOMY, OPEN  27 yo     COMBINED CYSTOSCOPY, INSERT STENT URETER(S)  8/5/2014    Procedure: COMBINED CYSTOSCOPY, INSERT STENT URETER(S);  Surgeon: Sam Arriaga MD;  Location:  OR     COMBINED CYSTOSCOPY, RETROGRADES, URETEROSCOPY, LASER HOLMIUM LITHOTRIPSY URETER(S), INSERT STENT Left 6/14/2016    Procedure: COMBINED CYSTOSCOPY, RETROGRADES, URETEROSCOPY, LASER HOLMIUM LITHOTRIPSY URETER(S), INSERT STENT;  Surgeon: Thomas Edmondson MD;  Location:  OR     COMBINED CYSTOSCOPY, RETROGRADES, URETEROSCOPY, LASER HOLMIUM LITHOTRIPSY URETER(S), INSERT STENT Left 3/28/2019    Procedure: CYSTOSCOPY, LEFT RETROGRADE PYLEOGRAM, LEFT URETEROSCOPY, HOLMIUM LASER LITHOTRIPSY, LEFT URETERAL STENT EXCHANGE;  Surgeon: Derrek Rivera MD;  Location:  OR     CV CORONARY ANGIOGRAM N/A 7/5/2023    Procedure: Coronary Angiogram;  Surgeon: Fausto Espinal MD;  Location:  HEART CARDIAC CATH LAB     CV INTRAVASULAR ULTRASOUND N/A 7/5/2023    Procedure: Intravascular Ultrasound;  Surgeon: Fausto Espinal MD;  Location:  HEART CARDIAC CATH LAB     CV PCI N/A 7/5/2023    Procedure: Percutaneous Coronary Intervention;  Surgeon: Fausto Espinal  MD Andre;  Location:  HEART CARDIAC CATH LAB     CYSTOSCOPY, RETROGRADES, INSERT STENT URETER(S), COMBINED Left 3/8/2019    Procedure: CYSTOSCOPY,LEFT RETROGRADE, LEFT STENT PLACEMENT;  Surgeon: Derrek Rivera MD;  Location:  OR     CYSTOSCOPY, RETROGRADES, INSERT STENT URETER(S), COMBINED Left 7/23/2024    Procedure: Cystoscopy, retrogrades, insert stent ureter(s), combined;  Surgeon: Anand Hendricks MD;  Location:  OR     ESOPHAGOSCOPY, GASTROSCOPY, DUODENOSCOPY (EGD), COMBINED N/A 7/26/2024    Procedure: Esophagoscopy, gastroscopy, duodenoscopy (EGD), combined;  Surgeon: Evette Salcido MD;  Location:  GI     GENITOURINARY SURGERY       LAMINECT/DISCECTOMY, LUMBAR      Laminectomy/Discectomy Lumbar     LASER HOLMIUM LITHOTRIPSY URETER(S), INSERT STENT, COMBINED Left 8/20/2014    Procedure: COMBINED CYSTOSCOPY, URETEROSCOPY, LASER HOLMIUM LITHOTRIPSY URETER(S), INSERT STENT;  Surgeon: Sam Arriaga MD;  Location:  OR     LASER HOLMIUM LITHOTRIPSY URETER(S), INSERT STENT, COMBINED Left 5/2/2019    Procedure: CYSTOSCOPY, LEFT RETROGRADE, LEFT URETEROSCOPY, HOLMIUM LASER LITHOTRIPSY, STONE REMOVAL, LEFT STENT EXCHANGE;  Surgeon: Derrek Rivera MD;  Location:  OR     LASER HOLMIUM LITHOTRIPSY URETER(S), INSERT STENT, COMBINED Right 6/13/2019    Procedure: CYSTOSCOPY; RIGHT URETEROSCOPY WITH HOLMIUM LASER LITHOTRIPSY; RIGHT STENT PLACEMENT (DIGITAL FLEXIBLE URETEROSCOPE);  Surgeon: Derrek Rivera MD;  Location:  OR     LASER HOLMIUM LITHOTRIPSY URETER(S), INSERT STENT, COMBINED Left 8/16/2024    Procedure: CYSTOSCOPY , LEFT URETEROSCOPY , HOLMIUM LASER LITHOTRIPSY AND LEFT URETERAL STENT REMOVAL VERSES LEFT URETERAL STENT EXCHANGE;  Surgeon: Derrek Rivera MD;  Location:  OR     ORTHOPEDIC SURGERY      left ankle     RECESSION EYELID UPPER         FAMILY HISTORY:  History reviewed. No pertinent family history.    SOCIAL HISTORY:  Social History     Socioeconomic History  "    Marital status:      Spouse name: None     Number of children: None     Years of education: None     Highest education level: None   Tobacco Use     Smoking status: Former     Current packs/day: 0.00     Average packs/day: 1.5 packs/day for 40.0 years (60.0 ttl pk-yrs)     Types: Cigarettes     Start date: 1955     Quit date: 1995     Years since quittin.9     Smokeless tobacco: Never   Vaping Use     Vaping status: Never Used   Substance and Sexual Activity     Alcohol use: Yes     Comment: couple drinks per year     Drug use: No       Review of Systems:  Skin:  Positive for bruising     Eyes:  Negative      ENT:  Negative      Respiratory:  Positive for   asthma   Cardiovascular:  Negative      Gastroenterology: Negative      Genitourinary:  Negative      Musculoskeletal:  Negative      Neurologic:  Negative      Psychiatric:  Negative      Heme/Lymph/Imm:  Negative      Endocrine:  Positive for diabetes, thyroid disorder thyroid meds decreased    Physical Exam:  Vitals: /70   Pulse 59   Ht 1.651 m (5' 5\")   Wt 64 kg (141 lb)   SpO2 99%   BMI 23.46 kg/m      Constitutional:  cooperative, alert and oriented, well developed, well nourished, in no acute distress thin      Skin:  warm and dry to the touch          Head:  normocephalic        Eyes:  pupils equal and round, conjunctivae and lids unremarkable, sclera white, no xanthalasma, EOMS intact, no nystagmus        Lymph:      ENT:  no pallor or cyanosis, dentition good        Neck:  no carotid bruit        Respiratory:  normal breath sounds, clear to auscultation, normal A-P diameter, normal symmetry, normal respiratory excursion, no use of accessory muscles         Cardiac: regular rhythm       systolic ejection murmur, grade 2, RUSB, LUSB, radiation to the carotid        pulses full and equal                                        GI:           Extremities and Muscular Skeletal:  no edema kyphosis            Neurological:  no " gross motor deficits        Psych:  affect appropriate, oriented to time, person and place        CC  Kathrin Schneider PA-C  [address] [place] [address]                  Thank you for allowing me to participate in the care of your patient.      Sincerely,     Honorio Andino MD     [facility]  cc:   Kathrin Schneider PA-C  [address] [place] [address]

## 2024-12-22 ENCOUNTER — HEALTH MAINTENANCE LETTER (OUTPATIENT)
Age: 84
End: 2024-12-22

## 2024-12-30 ENCOUNTER — OFFICE VISIT (OUTPATIENT)
Dept: FAMILY MEDICINE | Facility: CLINIC | Age: 84
End: 2024-12-30

## 2024-12-30 VITALS
BODY MASS INDEX: 22.8 KG/M2 | SYSTOLIC BLOOD PRESSURE: 112 MMHG | OXYGEN SATURATION: 95 % | TEMPERATURE: 98.2 F | DIASTOLIC BLOOD PRESSURE: 59 MMHG | HEART RATE: 66 BPM | WEIGHT: 137 LBS

## 2024-12-30 DIAGNOSIS — R39.9 LOWER URINARY TRACT SYMPTOMS: Primary | ICD-10-CM

## 2024-12-30 DIAGNOSIS — R53.81 MALAISE: ICD-10-CM

## 2024-12-30 DIAGNOSIS — R30.0 DYSURIA: ICD-10-CM

## 2024-12-30 PROBLEM — N39.0 ACUTE UTI: Status: RESOLVED | Noted: 2024-07-22 | Resolved: 2024-12-30

## 2024-12-30 LAB
BACTERIA (RMG): ABNORMAL
BILIRUBIN (RMG): NEGATIVE
BLOOD (RMG): NEGATIVE
CASTS (RMG): ABNORMAL
COLOR UR: YELLOW
CRYSTAL (RMG): ABNORMAL
EPITHELIAL (RMG): ABNORMAL
GLUCOSE (RMG): NEGATIVE
KETONE (RMG): NEGATIVE
LEUKOCYTE (RMG): ABNORMAL
MUCOUS (RMG): ABNORMAL
NITRITE (RMG): NEGATIVE
PH UR STRIP: 5.5 PH (ref 5–7)
PROTEIN (RMG): ABNORMAL
RBC (RMG): ABNORMAL
SP GR UR STRIP: 1.02
UROBILINOGEN (RMG): 0.2
WBC (RMG): ABNORMAL

## 2024-12-30 PROCEDURE — 87086 URINE CULTURE/COLONY COUNT: CPT | Mod: ORL

## 2024-12-30 RX ORDER — FERROUS SULFATE 325(65) MG
325 TABLET ORAL EVERY OTHER DAY
COMMUNITY

## 2024-12-30 NOTE — PROGRESS NOTES
Assessment & Plan     Lower urinary tract symptoms  - Urinalysis ([facility])  - Urine Culture  - Urine Culture  - phenazopyridine (AZO) 97.5 MG tablet    Dysuria  - Urine Culture  - Urine Culture  - phenazopyridine (AZO) 97.5 MG tablet    Malaise  - Urine Culture  - Urine Culture    No signs of infection on urinalysis, relatively benign exam, will run culture.  Advised to increase fluid intake and can take azo and cranberry for the next couple days.  If not improving or worsening should have further work up.  Red flags that warrant emergent evaluation discussed. Patient verbalized understanding and is agreeable to the discussed plan of care.        See Patient Instructions    No follow-ups on file.    Rui Quijano is a 84 year old, presenting for the following health issues:  LUTS (Low abdominal pain and burning with urination /Denies fever, blood in urine/Sx onset: a couple weeks)    HPI     1.) LUTS: seen in June and diagnosed with kidney stones - feels this is similar.  3 stones found.   Drinking 90 fl oz of fluid per urology?  Treated with cipro post cystoscopy  No nausea/vomiting        Review of Systems  Constitutional, HEENT, cardiovascular, pulmonary, gi and gu systems are negative, except as otherwise noted.      Objective    /59   Pulse 66   Temp 98.2  F (36.8  C)   Wt 62.1 kg (137 lb)   SpO2 95%   BMI 22.80 kg/m    Body mass index is 22.8 kg/m .  Physical Exam   GENERAL: alert and no distress  NECK: no adenopathy, no asymmetry, masses, or scars  RESP: lungs clear to auscultation - no rales, rhonchi or wheezes  CV: regular rate and rhythm, normal S1 S2, no S3 or S4, no murmur, click or rub, no peripheral edema  ABDOMEN: tenderness over bladder - mild and bowel sounds normal  MS: no gross musculoskeletal defects noted, no edema  SKIN: no suspicious lesions or rashes  PSYCH: mentation appears normal, affect normal/bright    No results found for this or any previous visit (from the past 24  hours).        Signed Electronically by: WILEY Lee CNP      The longitudinal plan of care for the diagnosis(es)/condition(s) as documented were addressed during this visit. Due to the added complexity in care, I will continue to support Macie in the subsequent management and with ongoing continuity of care.

## 2025-01-01 LAB — BACTERIA UR CULT: NORMAL

## 2025-01-13 ENCOUNTER — TELEPHONE (OUTPATIENT)
Dept: CARDIOLOGY | Facility: CLINIC | Age: 85
End: 2025-01-13
Payer: COMMERCIAL

## 2025-01-13 ENCOUNTER — OFFICE VISIT (OUTPATIENT)
Dept: FAMILY MEDICINE | Facility: CLINIC | Age: 85
End: 2025-01-13

## 2025-01-13 VITALS
SYSTOLIC BLOOD PRESSURE: 109 MMHG | HEART RATE: 62 BPM | WEIGHT: 136 LBS | DIASTOLIC BLOOD PRESSURE: 70 MMHG | BODY MASS INDEX: 22.66 KG/M2 | HEIGHT: 65 IN

## 2025-01-13 DIAGNOSIS — E03.4 HYPOTHYROIDISM DUE TO ACQUIRED ATROPHY OF THYROID: ICD-10-CM

## 2025-01-13 DIAGNOSIS — E11.22 TYPE 2 DIABETES MELLITUS WITH STAGE 4 CHRONIC KIDNEY DISEASE, WITHOUT LONG-TERM CURRENT USE OF INSULIN (H): ICD-10-CM

## 2025-01-13 DIAGNOSIS — N20.0 URIC ACID KIDNEY STONE: ICD-10-CM

## 2025-01-13 DIAGNOSIS — M48.062 SPINAL STENOSIS OF LUMBAR REGION WITH NEUROGENIC CLAUDICATION: ICD-10-CM

## 2025-01-13 DIAGNOSIS — D50.9 IRON DEFICIENCY ANEMIA, UNSPECIFIED IRON DEFICIENCY ANEMIA TYPE: ICD-10-CM

## 2025-01-13 DIAGNOSIS — R10.11 ABDOMINAL PAIN, RIGHT UPPER QUADRANT: Primary | ICD-10-CM

## 2025-01-13 DIAGNOSIS — B37.2 YEAST INFECTION OF THE SKIN: ICD-10-CM

## 2025-01-13 DIAGNOSIS — N18.4 TYPE 2 DIABETES MELLITUS WITH STAGE 4 CHRONIC KIDNEY DISEASE, WITHOUT LONG-TERM CURRENT USE OF INSULIN (H): ICD-10-CM

## 2025-01-13 LAB
% GRANULOCYTES: 82.5 % (ref 42.2–75.2)
AMPHETAMINES (AMP) UR: NEGATIVE
BARBITURATES (BAR) UR: NEGATIVE
BENZODIAZEPINES (BZO) UR: NEGATIVE
BUPRENORPHINE (BUP) UR: NEGATIVE
CANNABINOIDS (THC) UR: NEGATIVE
COCAINE (COC) UR: NEGATIVE
HBA1C MFR BLD: 5.8 % (ref 4–6)
HCT VFR BLD AUTO: 30.5 % (ref 35–46)
HEMOGLOBIN: 10.3 G/DL (ref 11.8–15.5)
LYMPHOCYTES NFR BLD AUTO: 12.2 % (ref 20.5–51.1)
MCH RBC QN AUTO: 30 PG (ref 27–31)
MCHC RBC AUTO-ENTMCNC: 33.8 G/DL (ref 33–37)
MCV RBC AUTO: 88.6 FL (ref 80–100)
MDMA UR: NEGATIVE
METHADONE (MTD) UR: NEGATIVE
METHAMPHETAMINE (METHA) UR: NEGATIVE
MONOCYTES NFR BLD AUTO: 5.3 % (ref 1.7–9.3)
MORPH/OPIATES (MOP) UR: NORMAL
OXYCODONE (OXYCO) UR: NORMAL
PCP UR: NEGATIVE
PLATELET # BLD AUTO: 205 K/UL (ref 140–450)
RBC # BLD AUTO: 3.45 X10/CMM (ref 3.7–5.2)
SPECIMEN VALIDITY INTERNAL QC UR: NORMAL
SPECIMEN VALIDITY TEMPERATURE UR: NORMAL
SPECIMEN VALIDITY UR CREATININE: NORMAL MG/DL
SPECIMEN VALIDITY UR PH: 5
SPECIMEN VALIDITY UR SPECIFIC GRAVITY: 1.02
TSH SERPL DL<=0.005 MIU/L-ACNC: 1.1 UIU/ML (ref 0.3–4.2)
WBC # BLD AUTO: 3.7 X10/CMM (ref 3.8–11)

## 2025-01-13 PROCEDURE — 36415 COLL VENOUS BLD VENIPUNCTURE: CPT | Performed by: STUDENT IN AN ORGANIZED HEALTH CARE EDUCATION/TRAINING PROGRAM

## 2025-01-13 PROCEDURE — 83036 HEMOGLOBIN GLYCOSYLATED A1C: CPT | Performed by: STUDENT IN AN ORGANIZED HEALTH CARE EDUCATION/TRAINING PROGRAM

## 2025-01-13 PROCEDURE — 99417 PROLNG OP E/M EACH 15 MIN: CPT | Performed by: STUDENT IN AN ORGANIZED HEALTH CARE EDUCATION/TRAINING PROGRAM

## 2025-01-13 PROCEDURE — 82043 UR ALBUMIN QUANTITATIVE: CPT | Mod: ORL | Performed by: STUDENT IN AN ORGANIZED HEALTH CARE EDUCATION/TRAINING PROGRAM

## 2025-01-13 PROCEDURE — 85025 COMPLETE CBC W/AUTO DIFF WBC: CPT | Performed by: STUDENT IN AN ORGANIZED HEALTH CARE EDUCATION/TRAINING PROGRAM

## 2025-01-13 PROCEDURE — 82570 ASSAY OF URINE CREATININE: CPT | Mod: ORL | Performed by: STUDENT IN AN ORGANIZED HEALTH CARE EDUCATION/TRAINING PROGRAM

## 2025-01-13 PROCEDURE — 80306 DRUG TEST PRSMV INSTRMNT: CPT | Performed by: STUDENT IN AN ORGANIZED HEALTH CARE EDUCATION/TRAINING PROGRAM

## 2025-01-13 PROCEDURE — 84443 ASSAY THYROID STIM HORMONE: CPT | Mod: ORL | Performed by: FAMILY MEDICINE

## 2025-01-13 PROCEDURE — 99215 OFFICE O/P EST HI 40 MIN: CPT | Performed by: STUDENT IN AN ORGANIZED HEALTH CARE EDUCATION/TRAINING PROGRAM

## 2025-01-13 RX ORDER — NYSTATIN 100000 U/G
OINTMENT TOPICAL 2 TIMES DAILY
Qty: 15 G | Refills: 0 | Status: SHIPPED | OUTPATIENT
Start: 2025-01-13 | End: 2025-01-18

## 2025-01-13 RX ORDER — OXYBUTYNIN CHLORIDE 10 MG/1
10 TABLET, EXTENDED RELEASE ORAL DAILY
Qty: 90 TABLET | Refills: 0 | Status: CANCELLED | OUTPATIENT
Start: 2025-01-13

## 2025-01-13 ASSESSMENT — ENCOUNTER SYMPTOMS
NAUSEA: 0
DIARRHEA: 0
MYALGIAS: 1
HEADACHES: 0
DIZZINESS: 0
CONSTIPATION: 1
CHILLS: 1
ABDOMINAL PAIN: 1

## 2025-01-13 NOTE — ASSESSMENT & PLAN NOTE
-Last HgA1c   Lab Results   Component Value Date    A1C 5.8 01/13/2025    A1C 5.8 06/06/2024    A1C 6.3 03/19/2024    A1C 5.9 01/23/2023    A1C 5.7 07/19/2022    A1C 5.8 08/02/2021      ~Ordered today  -Regimen: no medication  -Last lipid panel   Recent Labs   Lab Test 05/31/24  1213 06/03/23  0632   CHOL 100 106   HDL 42* 45*   LDL 39 47   TRIG 94 71      ~On statin  -Last microalbuminuria screening: completed today  ~On ACE/ARB  -Diabetic retinal exam: got LOPEZ  -Patient's DM is controlled, will follow up in 6 months      Orders:    Hemoglobin A1C ([facility])    VENOUS COLLECTION    Albumin Random Urine Quantitative with Creat Ratio; Future

## 2025-01-13 NOTE — ASSESSMENT & PLAN NOTE
-Last TSH   TSH   Date Value Ref Range Status   10/03/2024 0.20 (L) 0.30 - 4.20 uIU/mL Final   -patient had recent medication adjustment  -could also be contributing to her weight loss  -will recheck    Orders:    TSH

## 2025-01-13 NOTE — TELEPHONE ENCOUNTER
Called patient, says that she was just looking for some names of some providers she could see at [facility] for primary care. Names and contact number provided.

## 2025-01-13 NOTE — ASSESSMENT & PLAN NOTE
-s/p cholecystectomy  -patient has history of kidney stones  -CT A/P from 8/2024, did not show concerning pathology in the URQ  -patient endorses 50lb weight loss in the last year and a half   -noted 15 lb weight loss from July 2024 appt   -noted 46 lb weight loss from July 2023 appt  -patient is exasperated and frustrated  -will refer to GI for further investigation    Orders:    Adult GI  Referral - Consult Only - To a [facility] Location (Use POS/Location); Future

## 2025-01-13 NOTE — ASSESSMENT & PLAN NOTE
-patient follows with pain management  -current regimen: hydrocodone-acetaminophen    Orders:    UScreen Toxisure (KALEN)

## 2025-01-13 NOTE — ASSESSMENT & PLAN NOTE
-Last CBC   CBC RESULTS:   Recent Labs   Lab Test 01/13/25  1355 08/28/24  1003   WBC 3.7* 4.7   RBC 3.45* 3.21*   HGB 10.3* 9.8*   HCT 30.5* 30.3*   MCV 88.6 94   MCH 30.0 30.5   MCHC 33.8 32.3   RDW  --  14.2    160    -patient's HGB continues to improve  -will continue to monitor        Orders:    CBC with Diff/Plt ([facility])

## 2025-01-13 NOTE — ASSESSMENT & PLAN NOTE
-looks consistent with yeast infection on physical examination  -will prescribe nystatin    Orders:    nystatin (MYCOSTATIN) 613118 UNIT/GM external ointment; Apply topically 2 times daily for 5 days. To belly button

## 2025-01-13 NOTE — TELEPHONE ENCOUNTER
Patient left message on Team 5 VM requesting to speak with Dr. Andino's nurse.  Routing to team to return call to patient.  Dilcia Be RN on 1/13/2025 at 11:50 AM

## 2025-01-14 LAB
CREAT UR-MCNC: 129 MG/DL
MICROALBUMIN UR-MCNC: <12 MG/L
MICROALBUMIN/CREAT UR: NORMAL MG/G{CREAT}

## 2025-01-20 DIAGNOSIS — I25.118 CORONARY ARTERY DISEASE OF NATIVE HEART WITH STABLE ANGINA PECTORIS, UNSPECIFIED VESSEL OR LESION TYPE: ICD-10-CM

## 2025-01-20 RX ORDER — METOPROLOL SUCCINATE 25 MG/1
25 TABLET, EXTENDED RELEASE ORAL DAILY
Qty: 90 TABLET | Refills: 4 | Status: SHIPPED | OUTPATIENT
Start: 2025-01-20

## 2025-01-28 ENCOUNTER — TRANSFERRED RECORDS (OUTPATIENT)
Dept: FAMILY MEDICINE | Facility: CLINIC | Age: 85
End: 2025-01-28

## 2025-01-29 DIAGNOSIS — N20.0 URIC ACID KIDNEY STONE: ICD-10-CM

## 2025-01-29 DIAGNOSIS — K22.10 ULCER OF ESOPHAGUS WITHOUT BLEEDING: ICD-10-CM

## 2025-01-29 RX ORDER — OXYBUTYNIN CHLORIDE 10 MG/1
10 TABLET, EXTENDED RELEASE ORAL DAILY
Qty: 30 TABLET | Refills: 0 | Status: SHIPPED | OUTPATIENT
Start: 2025-01-29

## 2025-01-29 RX ORDER — PANTOPRAZOLE SODIUM 40 MG/1
40 TABLET, DELAYED RELEASE ORAL
Qty: 30 TABLET | Refills: 0 | Status: SHIPPED | OUTPATIENT
Start: 2025-01-29

## 2025-01-29 NOTE — TELEPHONE ENCOUNTER
Med: oxybutynin    LOV (related): 01      Due for F/U around: Overdue for MAW since 2016 (looks like she has been informed many times), otherwise 07/2025 for 6mo DM check    Next Appt: None scheduled

## 2025-02-06 ENCOUNTER — OFFICE VISIT (OUTPATIENT)
Dept: FAMILY MEDICINE | Facility: CLINIC | Age: 85
End: 2025-02-06

## 2025-02-06 VITALS — DIASTOLIC BLOOD PRESSURE: 68 MMHG | OXYGEN SATURATION: 95 % | HEART RATE: 78 BPM | SYSTOLIC BLOOD PRESSURE: 103 MMHG

## 2025-02-06 DIAGNOSIS — N18.32 TYPE 2 DIABETES MELLITUS WITH STAGE 3B CHRONIC KIDNEY DISEASE, WITHOUT LONG-TERM CURRENT USE OF INSULIN (H): ICD-10-CM

## 2025-02-06 DIAGNOSIS — N39.41 URGENCY INCONTINENCE: ICD-10-CM

## 2025-02-06 DIAGNOSIS — K59.00 CONSTIPATION, UNSPECIFIED CONSTIPATION TYPE: ICD-10-CM

## 2025-02-06 DIAGNOSIS — M48.062 SPINAL STENOSIS OF LUMBAR REGION WITH NEUROGENIC CLAUDICATION: ICD-10-CM

## 2025-02-06 DIAGNOSIS — Z01.818 PREOP GENERAL PHYSICAL EXAM: Primary | ICD-10-CM

## 2025-02-06 DIAGNOSIS — E03.4 HYPOTHYROIDISM DUE TO ACQUIRED ATROPHY OF THYROID: ICD-10-CM

## 2025-02-06 DIAGNOSIS — E78.00 HYPERCHOLESTEROLEMIA: ICD-10-CM

## 2025-02-06 DIAGNOSIS — D50.9 IRON DEFICIENCY ANEMIA, UNSPECIFIED IRON DEFICIENCY ANEMIA TYPE: ICD-10-CM

## 2025-02-06 DIAGNOSIS — N20.1 LEFT URETERAL STONE: ICD-10-CM

## 2025-02-06 DIAGNOSIS — E11.22 TYPE 2 DIABETES MELLITUS WITH STAGE 3B CHRONIC KIDNEY DISEASE, WITHOUT LONG-TERM CURRENT USE OF INSULIN (H): ICD-10-CM

## 2025-02-06 DIAGNOSIS — I25.118 CORONARY ARTERY DISEASE OF NATIVE HEART WITH STABLE ANGINA PECTORIS, UNSPECIFIED VESSEL OR LESION TYPE: ICD-10-CM

## 2025-02-06 DIAGNOSIS — J44.89 COPD WITH ASTHMA (H): ICD-10-CM

## 2025-02-06 DIAGNOSIS — N18.32 STAGE 3B CHRONIC KIDNEY DISEASE (H): ICD-10-CM

## 2025-02-06 PROCEDURE — 99214 OFFICE O/P EST MOD 30 MIN: CPT | Performed by: STUDENT IN AN ORGANIZED HEALTH CARE EDUCATION/TRAINING PROGRAM

## 2025-02-06 RX ORDER — ASPIRIN 81 MG/1
81 TABLET ORAL EVERY OTHER DAY
COMMUNITY
Start: 2025-02-05

## 2025-02-06 ASSESSMENT — ENCOUNTER SYMPTOMS
VOMITING: 0
PALPITATIONS: 0
SORE THROAT: 0
ABDOMINAL PAIN: 1
HEADACHES: 1
CHILLS: 0
DIZZINESS: 0
SHORTNESS OF BREATH: 0
BLOOD IN STOOL: 0
HEMATURIA: 0
DIARRHEA: 0
NAUSEA: 0
CONSTIPATION: 1
FEVER: 0
COUGH: 0

## 2025-02-06 NOTE — ASSESSMENT & PLAN NOTE
-current regimen: rosuvastatin  -Last lipid  Recent Labs   Lab Test 05/31/24  1213 06/03/23  0632   CHOL 100 106   HDL 42* 45*   LDL 39 47   TRIG 94 71   -will continue to monitor

## 2025-02-06 NOTE — PATIENT INSTRUCTIONS
How to Take Your Medication Before Surgery  Preoperative Medication Instructions   Antiplatelet or Anticoagulation Medication Instructions   - aspirin: patient will not take any more ASA before procedure on Monday, then to resume    Additional Medication Instructions  Take all scheduled medications on the day of surgery EXCEPT for modifications listed below: ASA       Patient Education   Preparing for Your Surgery  For Adults  Getting started  In most cases, a nurse will call to review your health history and instructions. They will give you an arrival time based on your scheduled surgery time. Please be ready to share:  Your doctor's clinic name and phone number  Your medical, surgical, and anesthesia history  A list of allergies and sensitivities  A list of medicines, including herbal treatments and over-the-counter drugs  Whether the patient has a legal guardian (ask how to send us the papers in advance)  Note: You may not receive a call if you were seen at our PAC (Preoperative Assessment Center).  Please tell us if you're pregnant--or if there's any chance you might be pregnant. Some surgeries may injure a fetus (unborn baby), so they require a pregnancy test. Surgeries that are safe for a fetus don't always need a test, and you can choose whether to have one.   Preparing for surgery  Within 10 to 30 days of surgery: Have a pre-op exam (sometimes called an H&P, or History and Physical). This can be done at a clinic or pre-operative center.  If you're having a , you may not need this exam. Talk to your care team.  At your pre-op exam, talk to your care team about all medicines you take. (This includes CBD oil and any drugs, such as THC, marijuana, and other forms of cannabis.) If you need to stop any medicine before surgery, ask when to start taking it again.  This is for your safety. Many medicines and drugs can make you bleed too much during surgery. Some change how well surgery (anesthesia) drugs  work.  Call your insurance company to let them know you're having surgery. (If you don't have insurance, call [phone].)  Call your clinic if there's any change in your health. This includes a scrape or scratch near the surgery site, or any signs of a cold (sore throat, runny nose, cough, rash, fever).  Eating and drinking guidelines  For your safety: Unless your surgeon tells you otherwise, follow the guidelines below.  Eat and drink as normal until 8 hours before you arrive for surgery. After that, no food or milk. You can spit out gum when you arrive.  Drink clear liquids until 2 hours before you arrive. These are liquids you can see through, like water, Gatorade, and Propel Water. They also include plain black coffee and tea (no cream or milk).  No alcohol for 24 hours before you arrive. The night before surgery, stop any drinks that contain THC.  If your care team tells you to take medicine on the morning of surgery, it's okay to take it with a sip of water. No other medicines or drugs are allowed (including CBD oil)--follow your care team's instructions.  If you have questions the day of surgery, call your hospital or surgery center.   Preventing infection  Shower or bathe the night before and the morning of surgery. Follow the instructions your clinic gave you. (If no instructions, use regular soap.)  Don't shave or clip hair near your surgery site. We'll remove the hair if needed.  Don't smoke or vape the morning of surgery. No chewing tobacco for 6 hours before you arrive. A nicotine patch is okay. You may spit out nicotine gum when you arrive.  For some surgeries, the surgeon will tell you to fully quit smoking and nicotine.  We will make every effort to keep you safe from infection. We will:  Clean our hands often with soap and water (or an alcohol-based hand rub).  Clean the skin at your surgery site with a special soap that kills germs.  Give you a special gown to keep you warm. (Cold raises the  risk of infection.)  Wear hair covers, masks, gowns, and gloves during surgery.  Give antibiotic medicine, if prescribed. Not all surgeries need this medicine.  What to bring on the day of surgery  Photo ID and insurance card  Copy of your health care directive, if you have one  Glasses and hearing aids (bring cases)  You can't wear contacts during surgery  Inhaler and eye drops, if you use them (tell us about these when you arrive)  CPAP machine or breathing device, if you use them  A few personal items, if spending the night  If you have . . .  A pacemaker, ICD (cardiac defibrillator), or other implant: Bring the ID card.  An implanted stimulator: Bring the remote control.  A legal guardian: Bring a copy of the certified (court-stamped) guardianship papers.  Please remove any jewelry, including body piercings. Leave jewelry and other valuables at home.  If you're going home the day of surgery  You must have a responsible adult drive you home. They should stay with you overnight as well.  If you don't have someone to stay with you, and you aren't safe to go home alone, we may keep you overnight. Insurance often won't pay for this.  After surgery  If it's hard to control your pain or you need more pain medicine, please call your surgeon's office.  Questions?   If you have any questions for your care team, list them here:   ____________________________________________________________________________________________________________________________________________________________________________________________________________________________________________________________  For informational purposes only. Not to replace the advice of your health care provider. Copyright   2003, 2019 [facility]. All rights reserved. Clinically reviewed by Jean Najera MD. [employer] [id number] - REV 08/24.

## 2025-02-06 NOTE — PROGRESS NOTES
Preoperative Evaluation  [facility]  [address] [place] [address]  Phone: [phone]  Fax: [phone]  Primary Provider: Kita Newberry DO  Pre-op Performing Provider: Kita Newberry DO  Feb 6, 2025 2/6/2025   Surgical Information   What procedure is being done? Endoscopy    Facility or [facility] where procedure[facility]surgery [facility] be performed[facility]    Who is doing the procedure / surgery? [facility]    Date of surgery / procedure: 2/10/2025    Time of surgery / procedure: 11:00 am    Where do you plan to recover after surgery? at home with family        Proxy-reported     Fax number for surgical facility: Note does not need to be faxed, will be available electronically in Epic.    Assessment & Plan     The proposed surgical procedure is considered LOW risk.    Problem List Items Addressed This Visit       Hypercholesterolemia     -current regimen: rosuvastatin  -Last lipid  Recent Labs   Lab Test 05/31/24  1213 06/03/23  0632   CHOL 100 106   HDL 42* 45*   LDL 39 47   TRIG 94 71   -will continue to monitor         Urgency incontinence     -patient has had improvement since kidney stone removal and starting oxybutynin  -patient still has to get up at night to urinate  -discussed cutting off fluids 1.5-2 hours before bed to see if there is an improvement         Type 2 diabetes mellitus with stage 3 chronic kidney disease, without long-term current use of insulin (H)      -Last HgA1c   Lab Results   Component Value Date    A1C 5.8 01/13/2025    A1C 5.8 06/06/2024    A1C 6.3 03/19/2024    A1C 5.9 01/23/2023    A1C 5.7 07/19/2022    A1C 5.8 08/02/2021    -Regimen: no medication  -Last lipid panel   Recent Labs   Lab Test 05/31/24  1213 06/03/23  0632   CHOL 100 106   HDL 42* 45*   LDL 39 47   TRIG 94 71      ~On statin  -Last microalbuminuria screening: completed today  ~On ACE/ARB  -Patient's DM is controlled         COPD with asthma (H)     -current regimen: trelegy  and albuterol  -patient denies recent flare         Spinal stenosis of lumbar region with neurogenic claudication     -patient follows with pain management  -current regimen: hydrocodone-acetaminophen         Relevant Medications    aspirin 81 MG EC tablet    Hypothyroidism due to acquired atrophy of thyroid     -Last TSH   TSH   Date Value Ref Range Status   01/13/2025 1.10 0.30 - 4.20 uIU/mL Final   -current regimen: levothyroxine         Iron deficiency anemia, unspecified     -Last CBC   CBC RESULTS:   Recent Labs   Lab Test 01/13/25  1355 08/28/24  1003   WBC 3.7* 4.7   RBC 3.45* 3.21*   HGB 10.3* 9.8*   HCT 30.5* 30.3*   MCV 88.6 94   MCH 30.0 30.5   MCHC 33.8 32.3   RDW  --  14.2    160    -patient's HGB continues to improve  -will continue to monitor         Stage 3b chronic kidney disease (H)     -Last Cr   Creatinine   Date Value Ref Range Status   11/18/2024 1.49 (H) 0.51 - 0.95 mg/dL Final   01/23/2023 1.48 (H) 0.57 - 1.00 mg/dL Final    -Last GFR   GFR Estimate   Date Value Ref Range Status   11/18/2024 34 (L) >60 mL/min/1.73m2 Final     Comment:     eGFR calculated using 2021 CKD-EPI equation.   10/01/2015 37 (L) >60 mL/min/1.7m2 Final     Comment:     Non  GFR Calc     GFR, ESTIMATED POCT   Date Value Ref Range Status   07/09/2024 24 (L) >60 mL/min/1.73m2 Final    -patient follows with nephrology         Coronary artery disease of native heart with stable angina pectoris, unspecified vessel or lesion type     -CT in 2023  -patient follows with cardiology  -current regimen: metoprolol, ASA every other day, PRN nitroglycerin         Relevant Medications    aspirin 81 MG EC tablet    Left ureteral stone     -hx of uric acid kidney stones  -current regimen: allopurinol         Preop general physical exam - Primary     -RCRI Risk Class I  -Benefits likely outweigh risks, will recommend proceeding with procedure         Constipation, unspecified constipation type     -patient is  complaining of feeling constipated regularly  -no improvement with Miralax, Senna, Docusate  -will try Linaclotide, patient also to ask when follows up with GI         Relevant Medications    linaclotide (LINZESS) 72 MCG capsule               - No identified additional risk factors other than previously addressed         Recommendation  Approval given to proceed with proposed procedure, without further diagnostic evaluation.    Rui Quijano is a 84 year old, presenting for the following:  Pre-Op Exam (Endoscopy at [facility]. )    Social History  Smoking History: former cigarette smoker  Alcohol Use History: very rare  Illicit Drug Use History: no drug use    Patient has a history of allergies to morphine, prednisone, baclofen, imitrex and tetracycline. Patient denies allergies to latex and tape.    Patient has a PSHx of upper eyelid recession, left ankle surgery, knee replacement, hip replacement, multiple bilateral lithotripsy, multiple urinary stents, lumbar discectomy, EGD, and cholecystectomy. Patient denies history of problems with anesthesia.     Patient has a Fhx of mother who  from ovarian cancer at 65. Maternal grandmother with stroke at 85.. Patient denies family history of problems with anesthesia. Patient denies family history of bleeding or clotting.    Patient has a PMHx of uric acid stones on allopurinol.    Patient has a history of diabetes that is diet controlled. Patient is on lisinopril.    Patient has a hisory of spinal stenosis. Patient is on hydrocodone-acetaminophen. Robert follows with pain management.    Patient has a history of hypothyroidism on levothyroxine.    Patient has a history of COPD with asthma on Trelegy and albuterol.    Patient has a history of hypercholesterolemia on rosuvastatin.    Patient states that she has been taking senna-docusate for constipation without improvement. Patient states that she has taken Miralax in the past with no noted improvement.  Patient states that she has taken Milk of Magnesia in the past with improvement in symptoms, but was told not to take any more.    Patient has a history of stomach ulcer on pantoprazole.    Patient has a history of CAD with stent on asa every other day, metoprolol, nitroglycerin PRN. Patient also has AV stenosis. Patient had echocardiogram in December    Patient has a history of CKD 3. Patient follows with nephrology.    Patient has a history of urgency incontinence on oxybutynin.    Patient has a history of Iron deficiency anemia. Patient take iron supplement intermittently due to constipation.    Patient has a history of B12 deficiency and is on supplementation.    Patient takes ASA every other day. Recommended skipping prior to procedure.    Patient denies learning or nutritional barriers.    HPI related to upcoming procedure: abdominal pain and weight loss        2/6/2025   Pre-Op Questionnaire   Have you ever had a heart attack or stroke? No    Have you ever had surgery on your heart or blood vessels, such as a stent placement, a coronary artery bypass, or surgery on an artery in your head, neck, heart, or legs? (!) YES stent placement    Do you have chest pain with activity? No    Do you have a history of heart failure? No    Do you currently have a cold, bronchitis or symptoms of other infection? No    Do you have a cough, shortness of breath, or wheezing? No    Do you or anyone in your family have previous history of blood clots? No    Do you or does anyone in your family have a serious bleeding problem such as prolonged bleeding following surgeries or cuts? No    Have you ever had problems with anemia or been told to take iron pills? (!) YES iron deficiency anemia    Have you had any abnormal blood loss such as black, tarry or bloody stools, or abnormal vaginal bleeding? No    Have you ever had a blood transfusion? (!) YES    Have you ever had a transfusion reaction? No    Are you willing to have a blood  transfusion if it is medically needed before, during, or after your surgery? (!) NO     Have you or any of your relatives ever had problems with anesthesia? No    Do you have sleep apnea, excessive snoring or daytime drowsiness? No    Do you have any artifical heart valves or other implanted medical devices like a pacemaker, defibrillator, or continuous glucose monitor? No    Do you have artificial joints? (!) YES    Are you allergic to latex? No        Proxy-reported     Health Care Directive  Patient does not have a Health Care Directive: Patient states has Advance Directive and will bring in a copy to clinic.    Preoperative Review of    reviewed - controlled substances reflected in medication list.      Status of Chronic Conditions:  ANEMIA - Patient has a recent history of moderate-severe anemia. Patient was diagnosed with iron deficiency anemia and told to take iron supplementation. Treatment has been inhibited by constipation. Patient's last CBC had Hg at 10.3 with normal MCV.    CAD - Patient has a longstanding history of moderate-severe CAD. Patient denies recent chest pain or NTG use, denies exercise induced dyspnea or PND. Last echocardiogram was 12/2024. Last EKG was 7/2024. Last stress test was 6/2023. Patient follows with cardiology.    COPD - Patient has a longstanding history of moderate-severe COPD . Patient has been doing well overall noting NO SYMPTOMS and continues on medication regimen consisting of trelegy and albuterol without adverse reactions or side effects.    DIABETES - Patient has a longstanding history of DiabetesType Type II . Patient is being treated with diet and denies significant side effects. Control has been good. Complicating factors include but are not limited to: hyperlipidemia, chronic kidney disease, and CAD/PVD.     HYPERLIPIDEMIA - Patient has a long history of significant Hyperlipidemia requiring medication for treatment with recent good control, LDL was last 39.  Patient reports no problems or side effects with the medication.     HYPOTHYROIDISM - Patient has a longstanding history of chronic Hypothyroidism. Patient has been doing well, noting no tremor, insomnia, hair loss or changes in skin texture. Continues to take medications as directed, without adverse reactions or side effects. Last TSH   Lab Results   Component Value Date    TSH 1.10 01/13/2025   .      RENAL INSUFFICIENCY - Patient has a longstanding history of moderate-severe chronic renal insufficiency. Last Cr was 1.49 and GFR estimate was 34.    Patient Active Problem List    Diagnosis Date Noted    Preop general physical exam 02/06/2025     Priority: Medium    Constipation, unspecified constipation type 02/06/2025     Priority: Medium    Abdominal pain, right upper quadrant 01/13/2025     Priority: Medium    Yeast infection of the skin 01/13/2025     Priority: Medium    Left ureteral stone 07/22/2024     Priority: Medium    Nonrheumatic aortic valve stenosis 07/18/2023     Priority: Medium    Coronary artery disease of native heart with stable angina pectoris, unspecified vessel or lesion type 05/16/2023     Priority: Medium    Iron deficiency anemia, unspecified 12/07/2022     Priority: Medium    Stage 3b chronic kidney disease (H) 12/07/2022     Priority: Medium    Chronic midline low back pain without sciatica 02/05/2019     Priority: Medium    Physical deconditioning 02/05/2019     Priority: Medium    Spinal stenosis of lumbar region with neurogenic claudication 02/05/2019     Priority: Medium    Hypothyroidism due to acquired atrophy of thyroid 02/05/2019     Priority: Medium    COPD with asthma (H)      Priority: Medium    Type 2 diabetes mellitus with stage 3 chronic kidney disease, without long-term current use of insulin (H)      Priority: Medium    Nonintractable migraine, unspecified migraine type 01/20/2016     Priority: Medium    Urgency incontinence 04/02/2014     Priority: Medium     Hypercholesterolemia      Priority: Medium      Past Medical History:   Diagnosis Date    Calculus of left ureter 2014    dr Arriaga - ureteroscopy and lithitripsy    Cataract     Chronic low back pain     Dr Villagran at [facility] in 2015- not surgical     Complication of anesthesia     COPD with asthma (H)      followed with pulmonary DR LENNOX 60-pack-year history of smoking    Esophagitis, reflux 9/2012    SOME EOSINOPHILIA NO BARRETS    Gastro-oesophageal reflux disease      has seen GI    Hydronephrosis, left     chronic , in 2017 Dr Arriaga plan was observaton    Hyperlipidaemia LDL goal < 100     Hypothyroid     Kidney stone 3/28/2019    Migraine headaches 9/1/2011    Moderate persistent asthma      lifelong, saw Pulm 2013    Obesity (BMI 30-39.9)     Panic attacks     PONV (postoperative nausea and vomiting)     Renal stones 2016    kidney stones, multiple small stones high risk recurrent ureteral stones. , Dr Arriaga in 2016 rec 24 hour urine.    Type 2 diabetes mellitus without complication (H)      Past Surgical History:   Procedure Laterality Date    ARTHROPLASTY HIP  3/13/2013    Procedure: ARTHROPLASTY HIP;  LEFT TOTAL HIP ARTHROPLASTY (BIOMET)^ ;  Surgeon: Anand Main MD;  Location:  OR    ARTHROPLASTY KNEE Right 8/9/2021    Procedure: RIGHT TOTAL KNEE ARTHROPLASTY;  Surgeon: Anand Main MD;  Location:  OR    ARTHROPLASTY PATELLO-FEMORAL (KNEE)  2005ish    Left    CHOLECYSTECTOMY, OPEN  27 yo    COMBINED CYSTOSCOPY, INSERT STENT URETER(S)  8/5/2014    Procedure: COMBINED CYSTOSCOPY, INSERT STENT URETER(S);  Surgeon: Sam Arriaga MD;  Location:  OR    COMBINED CYSTOSCOPY, RETROGRADES, URETEROSCOPY, LASER HOLMIUM LITHOTRIPSY URETER(S), INSERT STENT Left 6/14/2016    Procedure: COMBINED CYSTOSCOPY, RETROGRADES, URETEROSCOPY, LASER HOLMIUM LITHOTRIPSY URETER(S), INSERT STENT;  Surgeon: Thomas Edmondson MD;  Location:  OR    COMBINED CYSTOSCOPY, RETROGRADES, URETEROSCOPY, LASER HOLMIUM LITHOTRIPSY  URETER(S), INSERT STENT Left 3/28/2019    Procedure: CYSTOSCOPY, LEFT RETROGRADE PYLEOGRAM, LEFT URETEROSCOPY, HOLMIUM LASER LITHOTRIPSY, LEFT URETERAL STENT EXCHANGE;  Surgeon: Derrek Rivera MD;  Location:  OR    CV CORONARY ANGIOGRAM N/A 7/5/2023    Procedure: Coronary Angiogram;  Surgeon: Fausto Espinal MD;  Location: [facility] CARDIAC CATH LAB    CV INTRAVASULAR ULTRASOUND N/A 7/5/2023    Procedure: Intravascular Ultrasound;  Surgeon: Fausto Espinal MD;  Location:  HEART CARDIAC CATH LAB    CV PCI N/A 7/5/2023    Procedure: Percutaneous Coronary Intervention;  Surgeon: Fausto Espinal MD;  Location: [facility] CARDIAC CATH LAB    CYSTOSCOPY, RETROGRADES, INSERT STENT URETER(S), COMBINED Left 3/8/2019    Procedure: CYSTOSCOPY,LEFT RETROGRADE, LEFT STENT PLACEMENT;  Surgeon: Derrek Rivera MD;  Location:  OR    CYSTOSCOPY, RETROGRADES, INSERT STENT URETER(S), COMBINED Left 7/23/2024    Procedure: Cystoscopy, retrogrades, insert stent ureter(s), combined;  Surgeon: Anand Hendricks MD;  Location:  OR    ESOPHAGOSCOPY, GASTROSCOPY, DUODENOSCOPY (EGD), COMBINED N/A 7/26/2024    Procedure: Esophagoscopy, gastroscopy, duodenoscopy (EGD), combined;  Surgeon: Evette Salcido MD;  Location:  GI    GENITOURINARY SURGERY      LAMINECT/DISCECTOMY, LUMBAR      Laminectomy/Discectomy Lumbar    LASER HOLMIUM LITHOTRIPSY URETER(S), INSERT STENT, COMBINED Left 8/20/2014    Procedure: COMBINED CYSTOSCOPY, URETEROSCOPY, LASER HOLMIUM LITHOTRIPSY URETER(S), INSERT STENT;  Surgeon: Sam Arriaga MD;  Location:  OR    LASER HOLMIUM LITHOTRIPSY URETER(S), INSERT STENT, COMBINED Left 5/2/2019    Procedure: CYSTOSCOPY, LEFT RETROGRADE, LEFT URETEROSCOPY, HOLMIUM LASER LITHOTRIPSY, STONE REMOVAL, LEFT STENT EXCHANGE;  Surgeon: Derrek Rivera MD;  Location:  OR    LASER HOLMIUM LITHOTRIPSY URETER(S), INSERT STENT, COMBINED Right 6/13/2019    Procedure: CYSTOSCOPY; RIGHT URETEROSCOPY WITH  HOLMIUM LASER LITHOTRIPSY; RIGHT STENT PLACEMENT (DIGITAL FLEXIBLE URETEROSCOPE);  Surgeon: Derrek Rivera MD;  Location:  OR    LASER HOLMIUM LITHOTRIPSY URETER(S), INSERT STENT, COMBINED Left 8/16/2024    Procedure: CYSTOSCOPY , LEFT URETEROSCOPY , HOLMIUM LASER LITHOTRIPSY AND LEFT URETERAL STENT REMOVAL VERSES LEFT URETERAL STENT EXCHANGE;  Surgeon: Derrek Rivera MD;  Location:  OR    ORTHOPEDIC SURGERY      left ankle    RECESSION EYELID UPPER       Current Outpatient Medications   Medication Sig Dispense Refill    albuterol (PROAIR HFA/PROVENTIL HFA/VENTOLIN HFA) 108 (90 Base) MCG/ACT inhaler Inhale 1-2 puffs into the lungs every 4 hours as needed for shortness of breath, wheezing or cough 36 g 2    allopurinol (ZYLOPRIM) 100 MG tablet Take 2 tablets (200 mg) by mouth daily. 90 tablet 3    aspirin 81 MG EC tablet Take 1 tablet (81 mg) by mouth every other day.      blood glucose (NO BRAND SPECIFIED) lancets standard Use to test blood sugar 1 time daily or as directed. 100 each 3    blood glucose (NO BRAND SPECIFIED) test strip Use to test blood sugar 1 time daily or as directed. 100 strip 3    blood glucose monitoring (NO BRAND SPECIFIED) meter device kit Use to test blood sugar 1 time daily or as directed. 1 kit 0    cyanocobalamin (VITAMIN B-12) 500 MCG SUBL sublingual tablet Place 2 tablets (1,000 mcg) under the tongue daily 60 tablet 0    ferrous sulfate (FEROSUL) 325 (65 Fe) MG tablet Take 325 mg by mouth every other day.      Fluticasone-Umeclidin-Vilanterol (TRELEGY ELLIPTA) 200-62.5-25 MCG/ACT oral inhaler Inhale 1 puff into the lungs daily (Patient taking differently: Inhale 1 puff into the lungs as needed.) 28 each 1    HYDROcodone-acetaminophen (NORCO)  MG per tablet Take 1 tablet by mouth 3 times daily as needed for moderate to severe pain 6 tablet 0    levothyroxine (SYNTHROID/LEVOTHROID) 112 MCG tablet Take 1 tablet (112 mcg) by mouth daily. 90 tablet 3    linaclotide  (LINZESS) 72 MCG capsule Take 1 capsule (72 mcg) by mouth every morning (before breakfast). 30 capsule 0    lisinopril (ZESTRIL) 2.5 MG tablet Take 2.5 mg by mouth daily      metoprolol succinate ER (TOPROL XL) 25 MG 24 hr tablet Take 1 tablet (25 mg) by mouth daily. 90 tablet 4    oxyBUTYnin ER (DITROPAN XL) 10 MG 24 hr tablet Take 1 tablet (10 mg) by mouth daily. 30 tablet 0    pantoprazole (PROTONIX) 40 MG EC tablet Take 1 tablet (40 mg) by mouth every morning (before breakfast). 30 tablet 0    rosuvastatin (CRESTOR) 20 MG tablet Take 1 tablet (20 mg) by mouth daily 90 tablet 3    senna-docusate (SENOKOT-S/PERICOLACE) 8.6-50 MG tablet Take 1 tablet by mouth 2 times daily as needed for constipation 30 tablet 0    nitroGLYcerin (NITROSTAT) 0.4 MG sublingual tablet For chest pain place 1 tablet under the tongue every 5 minutes for 3 doses. If symptoms persist 5 minutes after 1st dose call 911. (Patient not taking: Reported on 2025) 25 tablet 1       Allergies   Allergen Reactions    Morphine Nausea     Severe vomiting    Prednisone      Elevated blood glucose  weakness    Baclofen GI Disturbance    Imitrex [Sumatriptan] Nausea    Tetracycline      Sores in mouth        Social History     Tobacco Use    Smoking status: Former     Current packs/day: 0.00     Average packs/day: 1.5 packs/day for 40.0 years (60.0 ttl pk-yrs)     Types: Cigarettes     Start date: 1955     Quit date: 1995     Years since quittin.1    Smokeless tobacco: Never   Substance Use Topics    Alcohol use: Yes     Comment: couple drinks per year     Family History   Problem Relation Age of Onset    Ovarian Cancer Mother 64    Cerebrovascular Disease Maternal Grandmother 85    Hypertension No family hx of     Hyperlipidemia No family hx of     Myocardial Infarction No family hx of     Diabetes No family hx of      History   Drug Use No           Review of Systems   Constitutional:  Negative for chills and fever.   HENT:  Negative  "for congestion and sore throat.    Respiratory:  Negative for cough and shortness of breath.    Cardiovascular:  Negative for chest pain and palpitations.   Gastrointestinal:  Positive for abdominal pain and constipation. Negative for blood in stool, diarrhea, melena, nausea and vomiting.   Genitourinary:  Negative for hematuria.   Neurological:  Positive for headaches. Negative for dizziness.        Objective    /68   Pulse 78   SpO2 95%    Estimated body mass index is 22.81 kg/m  as calculated from the following:    Height as of 1/13/25: 1.645 m (5' 4.75\").    Weight as of 1/13/25: 61.7 kg (136 lb).  Physical Exam  Constitutional:       General: She is not in acute distress.     Appearance: She is not ill-appearing.   HENT:      Head: Normocephalic and atraumatic.      Right Ear: Tympanic membrane, ear canal and external ear normal. There is no impacted cerumen.      Left Ear: Tympanic membrane, ear canal and external ear normal. There is no impacted cerumen.      Nose: Nose normal.      Mouth/Throat:      Mouth: Mucous membranes are moist.      Pharynx: No oropharyngeal exudate or posterior oropharyngeal erythema.   Eyes:      Extraocular Movements: Extraocular movements intact.      Conjunctiva/sclera: Conjunctivae normal.      Pupils: Pupils are equal, round, and reactive to light.   Cardiovascular:      Rate and Rhythm: Normal rate and regular rhythm.      Pulses: Normal pulses.      Heart sounds: Murmur heard.   Pulmonary:      Effort: Pulmonary effort is normal. No respiratory distress.      Breath sounds: Normal breath sounds. No wheezing.   Abdominal:      Palpations: Abdomen is soft. There is no mass.   Lymphadenopathy:      Cervical: No cervical adenopathy.   Skin:     General: Skin is warm and dry.   Neurological:      General: No focal deficit present.      Mental Status: She is alert.   Psychiatric:         Thought Content: Thought content normal.         Recent Labs   Lab Test 01/13/25  1355 " "11/18/24  1205 08/28/24  1003 06/27/24  1157 06/06/24  1145 03/11/24  1655 03/11/24  1653   HGB 10.3*  --  9.8*   < >  --    < > 9.5*     --  160   < >  --    < > 225   INR  --   --   --   --   --   --  1.03   NA  --  137 141   < >  --    < >  --    POTASSIUM  --  4.1 3.4   < >  --    < >  --    CR  --  1.49* 1.64*   < >  --    < >  --    A1C 5.8  --   --   --  5.8*   < >  --     < > = values in this interval not displayed.        Diagnostics  Lab Results   Component Value Date    WBC 3.7 01/13/2025    WBC 7.8 10/01/2015     Lab Results   Component Value Date    RBC 3.45 01/13/2025    RBC 4.24 10/01/2015     Lab Results   Component Value Date    HGB 10.3 01/13/2025     Lab Results   Component Value Date    HCT 30.5 01/13/2025     No components found for: \"MCT\"  Lab Results   Component Value Date    MCV 88.6 01/13/2025     Lab Results   Component Value Date    MCH 30.0 01/13/2025     Lab Results   Component Value Date    MCHC 33.8 01/13/2025     Lab Results   Component Value Date    RDW 14.2 08/28/2024    RDW 13.8 10/01/2015     Lab Results   Component Value Date     01/13/2025    Last Comprehensive Metabolic Panel:  Lab Results   Component Value Date     11/18/2024    POTASSIUM 4.1 11/18/2024    CHLORIDE 104 11/18/2024    CO2 25 11/18/2024    ANIONGAP 8 11/18/2024     (H) 11/18/2024    BUN 23.0 11/18/2024    CR 1.49 (H) 11/18/2024    GFRESTIMATED 34 (L) 11/18/2024    WARD 9.9 11/18/2024        Lab Results   Component Value Date    A1C 5.8 01/13/2025    A1C 5.8 06/06/2024    A1C 6.3 03/19/2024    A1C 5.9 01/23/2023    A1C 5.7 07/19/2022    A1C 5.8 08/02/2021     Lab Results   Component Value Date    TSH 1.10 01/13/2025      Echo 12/2024: The visual ejection fraction is 55-60%.  The left atrium is mildly dilated.  Moderate valvular aortic stenosis.  The calculated aortic valve area is 1.1cm2.  The mean AoV pressure gradient is 26mmHg.  There is mild (1+) aortic regurgitation.  The above data " are consistent with the echos from 12-23 and 6-23, but not with  the study reported in 5-24  EKG 7/2024: HR was 62 bpm, sinus rhythm, consistent with previous.    Revised Cardiac Risk Index (RCRI)  The patient has the following serious cardiovascular risks for perioperative complications:   - No serious cardiac risks = 0 points     RCRI Interpretation: 0 points: Class I (very low risk - 0.4% complication rate)    Signed Electronically by: Kita Newberry DO  A copy of this evaluation report is provided to the requesting physician.

## 2025-02-06 NOTE — ASSESSMENT & PLAN NOTE
-Last CBC   CBC RESULTS:   Recent Labs   Lab Test 01/13/25  1355 08/28/24  1003   WBC 3.7* 4.7   RBC 3.45* 3.21*   HGB 10.3* 9.8*   HCT 30.5* 30.3*   MCV 88.6 94   MCH 30.0 30.5   MCHC 33.8 32.3   RDW  --  14.2    160    -patient's HGB continues to improve  -will continue to monitor

## 2025-02-06 NOTE — ASSESSMENT & PLAN NOTE
-CT in 2023  -patient follows with cardiology  -current regimen: metoprolol, ASA every other day, PRN nitroglycerin

## 2025-02-06 NOTE — ASSESSMENT & PLAN NOTE
-Last HgA1c   Lab Results   Component Value Date    A1C 5.8 01/13/2025    A1C 5.8 06/06/2024    A1C 6.3 03/19/2024    A1C 5.9 01/23/2023    A1C 5.7 07/19/2022    A1C 5.8 08/02/2021    -Regimen: no medication  -Last lipid panel   Recent Labs   Lab Test 05/31/24  1213 06/03/23  0632   CHOL 100 106   HDL 42* 45*   LDL 39 47   TRIG 94 71      ~On statin  -Last microalbuminuria screening: completed today  ~On ACE/ARB  -Patient's DM is controlled

## 2025-02-06 NOTE — ASSESSMENT & PLAN NOTE
-patient is complaining of feeling constipated regularly  -no improvement with Miralax, Senna, Docusate  -will try Linaclotide, patient also to ask when follows up with GI

## 2025-02-06 NOTE — ASSESSMENT & PLAN NOTE
-Last Cr   Creatinine   Date Value Ref Range Status   11/18/2024 1.49 (H) 0.51 - 0.95 mg/dL Final   01/23/2023 1.48 (H) 0.57 - 1.00 mg/dL Final    -Last GFR   GFR Estimate   Date Value Ref Range Status   11/18/2024 34 (L) >60 mL/min/1.73m2 Final     Comment:     eGFR calculated using 2021 CKD-EPI equation.   10/01/2015 37 (L) >60 mL/min/1.7m2 Final     Comment:     Non  GFR Calc     GFR, ESTIMATED POCT   Date Value Ref Range Status   07/09/2024 24 (L) >60 mL/min/1.73m2 Final    -patient follows with nephrology

## 2025-02-06 NOTE — ASSESSMENT & PLAN NOTE
-patient has had improvement since kidney stone removal and starting oxybutynin  -patient still has to get up at night to urinate  -discussed cutting off fluids 1.5-2 hours before bed to see if there is an improvement

## 2025-02-06 NOTE — ASSESSMENT & PLAN NOTE
-Last TSH   TSH   Date Value Ref Range Status   01/13/2025 1.10 0.30 - 4.20 uIU/mL Final   -current regimen: levothyroxine

## 2025-02-09 ENCOUNTER — ANESTHESIA EVENT (OUTPATIENT)
Dept: GASTROENTEROLOGY | Facility: CLINIC | Age: 85
End: 2025-02-09
Payer: COMMERCIAL

## 2025-02-09 ASSESSMENT — COPD QUESTIONNAIRES: COPD: 1

## 2025-02-09 NOTE — ANESTHESIA PREPROCEDURE EVALUATION
Anesthesia Pre-Procedure Evaluation    Patient: Macie Jorge   MRN: [id number] : 1940        Procedure : Procedure(s):  ESOPHAGOGASTRODUODENOSCOPY          Past Medical History:   Diagnosis Date    Calculus of left ureter     dr Arriaga - ureteroscopy and lithitripsy    Cataract     Chronic low back pain     Dr Villagran at [facility] in - not surgical     Complication of anesthesia     COPD with asthma (H)      followed with pulmonary DR LENNOX 60-pack-year history of smoking    Esophagitis, reflux 2012    SOME EOSINOPHILIA NO BARRETS    Gastro-oesophageal reflux disease      has seen GI    Hydronephrosis, left     chronic , in  Dr Arriaga plan was observaton    Hyperlipidaemia LDL goal < 100     Hypothyroid     Kidney stone 3/28/2019    Migraine headaches 2011    Moderate persistent asthma      lifelong, saw Pulm     Obesity (BMI 30-39.9)     Panic attacks     PONV (postoperative nausea and vomiting)     Renal stones     kidney stones, multiple small stones high risk recurrent ureteral stones. , Dr Arriaga in  rec 24 hour urine.    Type 2 diabetes mellitus without complication (H)       Past Surgical History:   Procedure Laterality Date    ARTHROPLASTY HIP  3/13/2013    Procedure: ARTHROPLASTY HIP;  LEFT TOTAL HIP ARTHROPLASTY (BIOMET)^ ;  Surgeon: Anand Main MD;  Location:  OR    ARTHROPLASTY KNEE Right 2021    Procedure: RIGHT TOTAL KNEE ARTHROPLASTY;  Surgeon: Anand Main MD;  Location:  OR    ARTHROPLASTY PATELLO-FEMORAL (KNEE)  2005ish    Left    CHOLECYSTECTOMY, OPEN  27 yo    COMBINED CYSTOSCOPY, INSERT STENT URETER(S)  2014    Procedure: COMBINED CYSTOSCOPY, INSERT STENT URETER(S);  Surgeon: Sam Arriaga MD;  Location:  OR    COMBINED CYSTOSCOPY, RETROGRADES, URETEROSCOPY, LASER HOLMIUM LITHOTRIPSY URETER(S), INSERT STENT Left 2016    Procedure: COMBINED CYSTOSCOPY, RETROGRADES, URETEROSCOPY, LASER HOLMIUM LITHOTRIPSY URETER(S), INSERT STENT;   Surgeon: Thomas Edmondson MD;  Location:  OR    COMBINED CYSTOSCOPY, RETROGRADES, URETEROSCOPY, LASER HOLMIUM LITHOTRIPSY URETER(S), INSERT STENT Left 3/28/2019    Procedure: CYSTOSCOPY, LEFT RETROGRADE PYLEOGRAM, LEFT URETEROSCOPY, HOLMIUM LASER LITHOTRIPSY, LEFT URETERAL STENT EXCHANGE;  Surgeon: Derrek Rivera MD;  Location:  OR    CV CORONARY ANGIOGRAM N/A 7/5/2023    Procedure: Coronary Angiogram;  Surgeon: Fausto Espinal MD;  Location:  HEART CARDI[facility] CATH LAB    CV INTRAVASULAR ULTRASOUND N/A 7/5/2023    Procedure: Intravascular Ultrasound;  Surgeon: Fausto Espinal MD;  Location:  HEART CARDIAC CATH LAB    CV PCI N/A 7/5/2023    Procedure: Percutaneous Coronary Intervention;  Surgeon: Fausto Espinal MD;  Location:  HEART CARDIAC CATH LAB    CYSTOSCOPY, RETROGRADES, INSERT STENT URETER(S), COMBINED Left 3/8/2019    Procedure: CYSTOSCOPY,LEFT RETROGRADE, LEFT STENT PLACEMENT;  Surgeon: Derrek Rivera MD;  Location:  OR    CYSTOSCOPY, RETROGRADES, INSERT STENT URETER(S), COMBINED Left 7/23/2024    Procedure: Cystoscopy, retrogrades, insert stent ureter(s), combined;  Surgeon: Anand Hendricks MD;  Location:  OR    ESOPHAGOSCOPY, GASTROSCOPY, DUODENOSCOPY (EGD), COMBINED N/A 7/26/2024    Procedure: Esophagoscopy, gastroscopy, duodenoscopy (EGD), combined;  Surgeon: Evette Salcido MD;  Location:  GI    GENITOURINARY SURGERY      LAMINECT/DISCECTOMY, LUMBAR      Laminectomy/Discectomy Lumbar    LASER HOLMIUM LITHOTRIPSY URETER(S), INSERT STENT, COMBINED Left 8/20/2014    Procedure: COMBINED CYSTOSCOPY, URETEROSCOPY, LASER HOLMIUM LITHOTRIPSY URETER(S), INSERT STENT;  Surgeon: Sam Arriaga MD;  Location:  OR    LASER HOLMIUM LITHOTRIPSY URETER(S), INSERT STENT, COMBINED Left 5/2/2019    Procedure: CYSTOSCOPY, LEFT RETROGRADE, LEFT URETEROSCOPY, HOLMIUM LASER LITHOTRIPSY, STONE REMOVAL, LEFT STENT EXCHANGE;  Surgeon: Derrek Rivera MD;  Location:  OR     LASER HOLMIUM LITHOTRIPSY URETER(S), INSERT STENT, COMBINED Right 2019    Procedure: CYSTOSCOPY; RIGHT URETEROSCOPY WITH HOLMIUM LASER LITHOTRIPSY; RIGHT STENT PLACEMENT (DIGITAL FLEXIBLE URETEROSCOPE);  Surgeon: Derrek Rivera MD;  Location:  OR    LASER HOLMIUM LITHOTRIPSY URETER(S), INSERT STENT, COMBINED Left 2024    Procedure: CYSTOSCOPY , LEFT URETEROSCOPY , HOLMIUM LASER LITHOTRIPSY AND LEFT URETERAL STENT REMOVAL VERSES LEFT URETERAL STENT EXCHANGE;  Surgeon: Derrek Rivera MD;  Location:  OR    ORTHOPEDIC SURGERY      left ankle    RECESSION EYELID UPPER        Allergies   Allergen Reactions    Morphine Nausea     Severe vomiting    Prednisone      Elevated blood glucose  weakness    Baclofen GI Disturbance    Imitrex [Sumatriptan] Nausea    Tetracycline      Sores in mouth      Social History     Tobacco Use    Smoking status: Former     Current packs/day: 0.00     Average packs/day: 1.5 packs/day for 40.0 years (60.0 ttl pk-yrs)     Types: Cigarettes     Start date: 1955     Quit date: 1995     Years since quittin.1    Smokeless tobacco: Never   Substance Use Topics    Alcohol use: Yes     Comment: couple drinks per year      Wt Readings from Last 1 Encounters:   25 61.7 kg (136 lb)        Anesthesia Evaluation   Pt has had prior anesthetic. Type: General.    History of anesthetic complications  - PONV.      ROS/MED HX  ENT/Pulmonary:     (+)                tobacco use, Past use,     asthma    COPD,              Neurologic: Comment: Lumbar stenosis with claudication    (+)      migraines,                          Cardiovascular:     (+) Dyslipidemia hypertension- -  CAD angina-with excertion.  - stent-2023. 1 Drug Eluting Stent.                               Previous cardiac testing   Echo: Date: 24 Results:  The visual ejection fraction is 55-60%.  The left atrium is mildly dilated.  Moderate valvular aortic stenosis.  The calculated aortic valve area  is 1.1cm2.  The mean AoV pressure gradient is 26mmHg.  There is mild (1+) aortic regurgitation.  The above data are consistent with the echos from 12-23 and 6-23, but not with  the study reported in 5-24    Stress Test:  Date: Results:    ECG Reviewed:  Date: 7/26/24 Results:  Sinus rhythm with 1st degree A-V block   Right axis deviation   Low voltage QRS     Cath:  Date: 7/5/23 Results:  High grade proximal-mid LAD stenosis s/p IVUS guided PCI with single RAMILA placement     METS/Exercise Tolerance:     Hematologic:       Musculoskeletal:       GI/Hepatic:     (+) GERD,                   Renal/Genitourinary:     (+) renal disease,             Endo:     (+) type I DM,         thyroid problem,     Obesity,       Psychiatric/Substance Use:       Infectious Disease:       Malignancy:       Other:            Physical Exam    Airway        Mallampati: II   TM distance: > 3 FB   Neck ROM: full     Respiratory Devices and Support         Dental       (+) Modest Abnormalities - crowns, retainers, 1 or 2 missing teeth      Cardiovascular   cardiovascular exam normal       Rhythm and rate: regular and normal     Pulmonary   pulmonary exam normal        breath sounds clear to auscultation           OUTSIDE LABS:  CBC:   Lab Results   Component Value Date    WBC 3.7 (A) 01/13/2025    WBC 4.7 08/28/2024    HGB 10.3 (A) 01/13/2025    HGB 9.8 (L) 08/28/2024    HCT 30.5 (A) 01/13/2025    HCT 30.3 (L) 08/28/2024     01/13/2025     08/28/2024     BMP:   Lab Results   Component Value Date     11/18/2024     08/28/2024    POTASSIUM 4.1 11/18/2024    POTASSIUM 3.4 08/28/2024    CHLORIDE 104 11/18/2024    CHLORIDE 103 08/28/2024    CO2 25 11/18/2024    CO2 22 08/28/2024    BUN 23.0 11/18/2024    BUN 17.5 08/28/2024    CR 1.49 (H) 11/18/2024    CR 1.64 (H) 08/28/2024     (H) 11/18/2024     (H) 08/28/2024     COAGS:   Lab Results   Component Value Date    PTT 25 03/11/2024    INR 1.03 03/11/2024      POC:   Lab Results   Component Value Date     (H) 06/13/2019     HEPATIC:   Lab Results   Component Value Date    ALBUMIN 3.9 08/28/2024    PROTTOTAL 7.5 08/28/2024    ALT 14 08/28/2024    AST 29 08/28/2024    ALKPHOS 51 08/28/2024    BILITOTAL 0.5 08/28/2024     OTHER:   Lab Results   Component Value Date    LACT 1.1 07/23/2024    A1C 5.8 01/13/2025    WARD 9.9 11/18/2024    PHOS 3.6 11/15/2022    LIPASE 56 08/28/2024    TSH 1.10 01/13/2025    T4 2.04 (H) 10/03/2024    T3 81 03/04/2019       Anesthesia Plan    ASA Status:  3    NPO Status:  NPO Appropriate    Anesthesia Type: MAC.     - Reason for MAC: immobility needed              Consents    Anesthesia Plan(s) and associated risks, benefits, and realistic alternatives discussed. Questions answered and patient/representative(s) expressed understanding.     - Discussed:     - Discussed with:  Patient            Postoperative Care            Comments:               Salud Rosenthal MD    I have reviewed the pertinent notes and labs in the chart from the past 30 days and (re)examined the patient.  Any updates or changes from those notes are reflected in this note.    Clinically Significant Risk Factors Present on Admission                                 # Financial/Environmental Concerns:

## 2025-02-10 ENCOUNTER — ANESTHESIA (OUTPATIENT)
Dept: GASTROENTEROLOGY | Facility: CLINIC | Age: 85
End: 2025-02-10
Payer: COMMERCIAL

## 2025-02-10 ENCOUNTER — HOSPITAL ENCOUNTER (OUTPATIENT)
Facility: CLINIC | Age: 85
Discharge: HOME | End: 2025-02-10
Attending: STUDENT IN AN ORGANIZED HEALTH CARE EDUCATION/TRAINING PROGRAM | Admitting: STUDENT IN AN ORGANIZED HEALTH CARE EDUCATION/TRAINING PROGRAM
Payer: COMMERCIAL

## 2025-02-10 VITALS
OXYGEN SATURATION: 100 % | DIASTOLIC BLOOD PRESSURE: 61 MMHG | SYSTOLIC BLOOD PRESSURE: 102 MMHG | WEIGHT: 131 LBS | HEART RATE: 52 BPM | HEIGHT: 66 IN | RESPIRATION RATE: 14 BRPM | BODY MASS INDEX: 21.05 KG/M2

## 2025-02-10 LAB — UPPER GI ENDOSCOPY: NORMAL

## 2025-02-10 PROCEDURE — 250N000009 HC RX 250: Performed by: NURSE ANESTHETIST, CERTIFIED REGISTERED

## 2025-02-10 PROCEDURE — 250N000011 HC RX IP 250 OP 636: Performed by: NURSE ANESTHETIST, CERTIFIED REGISTERED

## 2025-02-10 PROCEDURE — 88305 TISSUE EXAM BY PATHOLOGIST: CPT | Mod: TC | Performed by: STUDENT IN AN ORGANIZED HEALTH CARE EDUCATION/TRAINING PROGRAM

## 2025-02-10 PROCEDURE — 370N000017 HC ANESTHESIA TECHNICAL FEE, PER MIN: Performed by: STUDENT IN AN ORGANIZED HEALTH CARE EDUCATION/TRAINING PROGRAM

## 2025-02-10 PROCEDURE — 999N000010 HC STATISTIC ANES STAT CODE-CRNA PER MINUTE: Performed by: STUDENT IN AN ORGANIZED HEALTH CARE EDUCATION/TRAINING PROGRAM

## 2025-02-10 PROCEDURE — 43239 EGD BIOPSY SINGLE/MULTIPLE: CPT | Performed by: STUDENT IN AN ORGANIZED HEALTH CARE EDUCATION/TRAINING PROGRAM

## 2025-02-10 PROCEDURE — 258N000003 HC RX IP 258 OP 636: Performed by: NURSE ANESTHETIST, CERTIFIED REGISTERED

## 2025-02-10 PROCEDURE — 88341 IMHCHEM/IMCYTCHM EA ADD ANTB: CPT | Mod: TC | Performed by: STUDENT IN AN ORGANIZED HEALTH CARE EDUCATION/TRAINING PROGRAM

## 2025-02-10 RX ORDER — PROPOFOL 10 MG/ML
INJECTION, EMULSION INTRAVENOUS CONTINUOUS PRN
Status: DISCONTINUED | OUTPATIENT
Start: 2025-02-10 | End: 2025-02-10

## 2025-02-10 RX ORDER — SODIUM CHLORIDE, SODIUM LACTATE, POTASSIUM CHLORIDE, CALCIUM CHLORIDE 600; 310; 30; 20 MG/100ML; MG/100ML; MG/100ML; MG/100ML
INJECTION, SOLUTION INTRAVENOUS CONTINUOUS PRN
Status: DISCONTINUED | OUTPATIENT
Start: 2025-02-10 | End: 2025-02-10

## 2025-02-10 RX ORDER — LIDOCAINE HYDROCHLORIDE 20 MG/ML
INJECTION, SOLUTION INFILTRATION; PERINEURAL PRN
Status: DISCONTINUED | OUTPATIENT
Start: 2025-02-10 | End: 2025-02-10

## 2025-02-10 RX ADMIN — LIDOCAINE HYDROCHLORIDE 40 MG: 20 INJECTION, SOLUTION INFILTRATION; PERINEURAL at 10:53

## 2025-02-10 RX ADMIN — LIDOCAINE HYDROCHLORIDE 40 MG: 20 INJECTION, SOLUTION INFILTRATION; PERINEURAL at 10:56

## 2025-02-10 RX ADMIN — PROPOFOL 150 MCG/KG/MIN: 10 INJECTION, EMULSION INTRAVENOUS at 10:53

## 2025-02-10 RX ADMIN — SODIUM CHLORIDE, POTASSIUM CHLORIDE, SODIUM LACTATE AND CALCIUM CHLORIDE: 600; 310; 30; 20 INJECTION, SOLUTION INTRAVENOUS at 10:50

## 2025-02-10 RX ADMIN — PHENYLEPHRINE HYDROCHLORIDE 100 MCG: 10 INJECTION INTRAVENOUS at 11:14

## 2025-02-10 ASSESSMENT — ACTIVITIES OF DAILY LIVING (ADL)
ADLS_ACUITY_SCORE: 54
ADLS_ACUITY_SCORE: 54

## 2025-02-10 ASSESSMENT — LIFESTYLE VARIABLES: TOBACCO_USE: 1

## 2025-02-10 NOTE — ANESTHESIA CARE TRANSFER NOTE
Patient: Macie oJrge    Procedure: Procedure(s):  ESOPHAGOGASTRODUODENOSCOPY, WITH BIOPSY       Diagnosis: Melena [K92.1]  Weight loss [R63.4]  Diagnosis Additional Information: No value filed.    Anesthesia Type:   MAC     Note:    Oropharynx: oropharynx clear of all foreign objects and spontaneously breathing  Level of Consciousness: awake  Oxygen Supplementation: face mask  Level of Supplemental Oxygen (L/min / FiO2): 6  Independent Airway: airway patency satisfactory and stable  Dentition: dentition unchanged  Vital Signs Stable: post-procedure vital signs reviewed and stable  Report to RN Given: handoff report given  Patient transferred to: PACU    Handoff Report: Identifed the Patient, Identified the Reponsible Provider, Reviewed the pertinent medical history, Discussed the surgical course, Reviewed Intra-OP anesthesia mangement and issues during anesthesia, Set expectations for post-procedure period and Allowed opportunity for questions and acknowledgement of understanding      Vitals:  Vitals Value Taken Time   /62 02/10/25 1112   Temp 97    Pulse 54 02/10/25 1115   Resp 17 02/10/25 1115   SpO2 100 % 02/10/25 1115   Vitals shown include unfiled device data.    Electronically Signed By: WILEY Gonzalez CRNA  February 10, 2025  11:16 AM

## 2025-02-12 LAB
PATH REPORT.COMMENTS IMP SPEC: NORMAL
PATH REPORT.COMMENTS IMP SPEC: NORMAL
PATH REPORT.FINAL DX SPEC: NORMAL
PATH REPORT.GROSS SPEC: NORMAL
PATH REPORT.MICROSCOPIC SPEC OTHER STN: NORMAL
PATH REPORT.MICROSCOPIC SPEC OTHER STN: NORMAL
PATH REPORT.RELEVANT HX SPEC: NORMAL
PHOTO IMAGE: NORMAL

## 2025-02-12 PROCEDURE — 88305 TISSUE EXAM BY PATHOLOGIST: CPT | Mod: 26 | Performed by: PATHOLOGY

## 2025-02-12 PROCEDURE — 88341 IMHCHEM/IMCYTCHM EA ADD ANTB: CPT | Mod: 26 | Performed by: PATHOLOGY

## 2025-02-12 PROCEDURE — 88342 IMHCHEM/IMCYTCHM 1ST ANTB: CPT | Mod: 26 | Performed by: PATHOLOGY

## 2025-02-26 DIAGNOSIS — K59.00 CONSTIPATION, UNSPECIFIED CONSTIPATION TYPE: ICD-10-CM

## 2025-02-26 DIAGNOSIS — K22.10 ULCER OF ESOPHAGUS WITHOUT BLEEDING: ICD-10-CM

## 2025-02-26 DIAGNOSIS — N20.0 URIC ACID KIDNEY STONE: ICD-10-CM

## 2025-03-04 RX ORDER — PANTOPRAZOLE SODIUM 40 MG/1
40 TABLET, DELAYED RELEASE ORAL
Qty: 30 TABLET | Refills: 0 | Status: SHIPPED | OUTPATIENT
Start: 2025-03-04

## 2025-03-04 RX ORDER — LINACLOTIDE 72 UG/1
CAPSULE, GELATIN COATED ORAL
Qty: 30 CAPSULE | Refills: 0 | Status: SHIPPED | OUTPATIENT
Start: 2025-03-04

## 2025-03-04 RX ORDER — OXYBUTYNIN CHLORIDE 10 MG/1
10 TABLET, EXTENDED RELEASE ORAL DAILY
Qty: 30 TABLET | Refills: 0 | Status: SHIPPED | OUTPATIENT
Start: 2025-03-04

## 2025-03-04 NOTE — CONFIDENTIAL NOTE
Med: LINZESS, OXYBUTYNIN, PANTOPRAZOLE    LOV (related): 2/6/25 - PREOP      Due for F/U around: N/A - OVERDUE FOR CPX  LAST CPX: 2016    Next Appt: NONE

## 2025-03-30 DIAGNOSIS — K59.00 CONSTIPATION, UNSPECIFIED CONSTIPATION TYPE: ICD-10-CM

## 2025-03-30 DIAGNOSIS — N20.0 URIC ACID KIDNEY STONE: ICD-10-CM

## 2025-03-31 RX ORDER — LINACLOTIDE 72 UG/1
CAPSULE, GELATIN COATED ORAL
Qty: 30 CAPSULE | Refills: 3 | Status: SHIPPED | OUTPATIENT
Start: 2025-03-31

## 2025-03-31 RX ORDER — OXYBUTYNIN CHLORIDE 10 MG/1
10 TABLET, EXTENDED RELEASE ORAL DAILY
Qty: 30 TABLET | Refills: 3 | Status: SHIPPED | OUTPATIENT
Start: 2025-03-31

## 2025-03-31 NOTE — TELEPHONE ENCOUNTER
Med: oxyBUTYnin ER (DITROPAN XL) 10 MG 24 hr tablet   LINZESS 72 MCG capsule   LOV (related): 2-6-25      Due for F/U around: none noted    Next Appt: none

## 2025-04-15 DIAGNOSIS — K22.10 ULCER OF ESOPHAGUS WITHOUT BLEEDING: ICD-10-CM

## 2025-04-15 RX ORDER — PANTOPRAZOLE SODIUM 40 MG/1
40 TABLET, DELAYED RELEASE ORAL
Qty: 30 TABLET | Refills: 0 | Status: SHIPPED | OUTPATIENT
Start: 2025-04-15

## 2025-04-15 NOTE — TELEPHONE ENCOUNTER
NO AWV SINCE 1/2016      Med: Pantoprazole    LOV (related): 8/12/2024    Due for F/U around: not specified, due for A1C on 7/13/2025. Overdue for AWV as of 1/20/2017    Next Appt: none scheduled

## 2025-04-22 ENCOUNTER — TRANSFERRED RECORDS (OUTPATIENT)
Dept: FAMILY MEDICINE | Facility: CLINIC | Age: 85
End: 2025-04-22

## 2025-04-29 ENCOUNTER — TRANSFERRED RECORDS (OUTPATIENT)
Dept: FAMILY MEDICINE | Facility: CLINIC | Age: 85
End: 2025-04-29

## 2025-05-06 ENCOUNTER — OFFICE VISIT (OUTPATIENT)
Dept: FAMILY MEDICINE | Facility: CLINIC | Age: 85
End: 2025-05-06

## 2025-05-06 VITALS
DIASTOLIC BLOOD PRESSURE: 69 MMHG | BODY MASS INDEX: 21.92 KG/M2 | SYSTOLIC BLOOD PRESSURE: 134 MMHG | OXYGEN SATURATION: 96 % | HEIGHT: 65 IN | WEIGHT: 131.6 LBS | HEART RATE: 60 BPM

## 2025-05-06 DIAGNOSIS — L08.9 LOCAL INFECTION OF SKIN AND SUBCUTANEOUS TISSUE: ICD-10-CM

## 2025-05-06 DIAGNOSIS — S90.851A FOREIGN BODY IN RIGHT FOOT, INITIAL ENCOUNTER: Primary | ICD-10-CM

## 2025-05-06 PROCEDURE — 3075F SYST BP GE 130 - 139MM HG: CPT

## 2025-05-06 PROCEDURE — 99213 OFFICE O/P EST LOW 20 MIN: CPT

## 2025-05-06 PROCEDURE — 73630 X-RAY EXAM OF FOOT: CPT | Mod: RT

## 2025-05-06 PROCEDURE — 3078F DIAST BP <80 MM HG: CPT

## 2025-05-06 PROCEDURE — G2211 COMPLEX E/M VISIT ADD ON: HCPCS

## 2025-05-06 RX ORDER — DICLOXACILLIN SODIUM 500 MG/1
500 CAPSULE ORAL 4 TIMES DAILY
Qty: 28 CAPSULE | Refills: 0 | Status: SHIPPED | OUTPATIENT
Start: 2025-05-06 | End: 2025-05-13
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
## 2025-05-08 ENCOUNTER — RESULTS FOLLOW-UP (OUTPATIENT)
Dept: FAMILY MEDICINE | Facility: CLINIC | Age: 85
End: 2025-05-08

## 2025-05-12 ENCOUNTER — TRANSFERRED RECORDS (OUTPATIENT)
Dept: FAMILY MEDICINE | Facility: CLINIC | Age: 85
End: 2025-05-12

## 2025-05-13 ENCOUNTER — OFFICE VISIT (OUTPATIENT)
Dept: FAMILY MEDICINE | Facility: CLINIC | Age: 85
End: 2025-05-13

## 2025-05-13 VITALS
SYSTOLIC BLOOD PRESSURE: 100 MMHG | WEIGHT: 131 LBS | HEART RATE: 63 BPM | DIASTOLIC BLOOD PRESSURE: 60 MMHG | BODY MASS INDEX: 21.97 KG/M2 | OXYGEN SATURATION: 98 %

## 2025-05-13 DIAGNOSIS — M48.062 SPINAL STENOSIS OF LUMBAR REGION WITH NEUROGENIC CLAUDICATION: Primary | ICD-10-CM

## 2025-05-13 PROCEDURE — G2211 COMPLEX E/M VISIT ADD ON: HCPCS

## 2025-05-13 PROCEDURE — 3074F SYST BP LT 130 MM HG: CPT

## 2025-05-13 PROCEDURE — 99214 OFFICE O/P EST MOD 30 MIN: CPT

## 2025-05-13 PROCEDURE — 3078F DIAST BP <80 MM HG: CPT

## 2025-05-13 ASSESSMENT — ENCOUNTER SYMPTOMS: BACK PAIN: 1

## 2025-05-13 NOTE — PROGRESS NOTES
Assessment & Plan     Spinal stenosis of lumbar region with neurogenic claudication  Patient presents for right hip/back pain. Currently following with [facility] pain clinic, but reports pain is worse following each intervention but [facility] Declines interest in [facility] as she has followed with them in the past. Requesting referral to [facility]. Referral placed.   Reviewed OTC symptomatic cares. Patient requesting wheelchair due to difficulty with ADL due to pain.   Patient has a mobility limitation to that impairs ability to perform their ADLs at home  Patient can safely use wheelchair at home  Patient mobility issue cannot be resolved with cane or walker  Use of a manual wheelchair will significantly improve the patient's ability to participate in ADLs and the patient will use it on a regular basis in the home  The patient has not expressed an unwillingness to use the wheelchair provided in the home  The patient has sufficient upper extremity function and other physical and mental capabilities needed to safely self-propel the manual wheelchair that is provided in the home during a typical day AND/OR the patient has a caregiver who is available, willing, and able to provide assistance with the wheelchair    Red flags that warrant emergent evaluation discussed. Follow up reviewed. Patient agreeable to plan. All questions answered.    - Spine  Referral - To a [facility] Location (Use POS/Location)  - Wheelchair Order for DME - ONLY FOR DME              Follow-up  Return if symptoms worsen or fail to improve, for Follow up.    Subjective   Macie is a 84 year old, presenting for the following health issues:  Back Pain (Has been dealing with lower back pain and has been treated at [facility] 3 times, feels like she has been worse after those visits, would like to be seen at [facility]/Needs a Ct or MRI ordered from provider to be seen at [facility], would also like to discuss getting a wheelchair due to mobility  concerns )    Back Pain     History of Present Illness       Reason for visit:  Foot   She is taking medications regularly.        Back Pain: Both sides, mainly the right side. Pain is constant and worsening after procedures. Following with [facility] pain clinic. Has had 3 procedures. Last one was last week and pain has been worse after each one. MRI with [facility] 12/18/24 and recent xray of right hip.. Everything D[facility] has been doing just makes it worse. Doesn't want to proceed with [facility]. Wasn't impressed with [facility] either. [facility] told her she needs a CT or MRI ordered by primary. Having trouble walking due to her back pain. Decided she needs a wheelchair. Trouble evening taking a full shower. Restricted to her house. Has her great granddaughters Church coming up and needs a wheelchair to get around to buy a gift and go to the Church. Mentally and physically capable of using a wheelchair. Wants to be seen at [facility] per her son's recommendations. Currently taking Norco  mg 3x a day on average at times 4x a day for pain medicine       Review of Systems  Constitutional, HEENT, cardiovascular, pulmonary, GI, , musculoskeletal, neuro, skin, endocrine and psych systems are negative, except as otherwise noted.      Objective    /60   Pulse 63   Wt 59.4 kg (131 lb)   SpO2 98%   BMI 21.97 kg/m    Body mass index is 21.97 kg/m .  Physical Exam   GENERAL: alert and no distress  EYES: Eyes grossly normal to inspection, PERRL and conjunctivae and sclerae normal  RESP: respirations even and unlabored   MS: right hip pain. Back: no midline tenderness.   SKIN: no suspicious lesions or rashes  PSYCH: mentation appears normal, affect normal/bright          Signed Electronically by: Lavern Romano, WILEY CNP

## 2025-06-24 ENCOUNTER — OFFICE VISIT (OUTPATIENT)
Dept: FAMILY MEDICINE | Facility: CLINIC | Age: 85
End: 2025-06-24

## 2025-06-24 VITALS
SYSTOLIC BLOOD PRESSURE: 111 MMHG | DIASTOLIC BLOOD PRESSURE: 70 MMHG | WEIGHT: 133.4 LBS | OXYGEN SATURATION: 97 % | BODY MASS INDEX: 22.37 KG/M2 | HEART RATE: 58 BPM

## 2025-06-24 DIAGNOSIS — B07.8 COMMON WART: ICD-10-CM

## 2025-06-24 DIAGNOSIS — R07.81 RIB PAIN ON LEFT SIDE: Primary | ICD-10-CM

## 2025-06-24 PROCEDURE — 3078F DIAST BP <80 MM HG: CPT

## 2025-06-24 PROCEDURE — 3074F SYST BP LT 130 MM HG: CPT

## 2025-06-24 NOTE — PROGRESS NOTES
Assessment & Plan     Rib pain on left side  Patient presents for left sided rib pain. No evidence for rib fracture on x-ray. Xray awaiting formal read. Reviewed Tylenol, ice as needed, positioning for comfort and increased activity as tolerated. Can expect waxing and waning course of gradual improvement over next few weeks. Discussed risk of PNE and atelectasis with underventilation. Asked them to brace well with coughing and to deep breathe as much as possible to help keep the lungs expanded as to avoid secondary complications. Red flags that warrant emergent evaluation discussed. Follow up reviewed as needed. Patient agreeable to plan. All questions answered.    - XR Ribs and Chest Left 3 Views    Common wart  The viral etiology and natural history has been discussed.   Various treatment methods, side effects and failure rates have been   discussed.  A choice of liquid nitrogen was made, and the expected   blistering or scabbing reaction explained.  Liquid nitrogen was   applied to 3 wart(s);  the patient will return at 2-4 week intervals   for retreatments as needed. All questions answered.   - DESTRUCT BENIGN LESION, UP TO 14              Follow-up  Return if symptoms worsen or fail to improve, for Follow up.    Rui Quijano is a 84 year old, presenting for the following health issues:  Fall (Fell when trying to get something out of car and hit the back seat, now feeling abdominal/rib pain, only painful on L side )    History of Present Illness       Reason for visit:  Pain in my side           Concern - Left rib pain  Onset: 3 days ago  Description: Left rib pain following a fall after trying to get something out of the back seat. Was leaning over her  then he moved then hit the frame of the back seat. Something had fallen out of her purse. Was hot last night then got cold then triggered her side to hurt since her side never hurt since last night.   Intensity: mild  Progression of Symptoms:   improving  Accompanying Signs & Symptoms: left side pain  Previous history of similar problem: none  Precipitating factors:        Worsened by: laid funny and ceiling fan above her   Alleviating factors:        Improved by: pain pills  Therapies tried and outcome: pain medication         Review of Systems  Constitutional, HEENT, cardiovascular, pulmonary, gi and gu systems are negative, except as otherwise noted.      Objective    /70   Pulse 58   Wt 60.5 kg (133 lb 6.4 oz)   SpO2 97%   BMI 22.37 kg/m    Body mass index is 22.37 kg/m .  Physical Exam   GENERAL: alert and no distress  RESP: lungs clear to auscultation - no rales, rhonchi or wheezes  CV: regular rates and rhythm and murmur heard  MS: tenderness to palpation left flank. No lesions or injury noted.   SKIN: common wart - left shoulder  PSYCH: mentation appears normal, affect normal/bright    CXR - awaiting formal interpretation from Radiologist at this time      PROCEDURE:    After the potential risks were discussed, verbal consent was obtained.  Approximately 15 seconds of freeze time was applied to the lesion using open spray technique with a 1 mm margin using 3 freeze-thaw cycles.  There were no immediate complications.  The patient tolerated the procedure well.  Frequent use of vaseline was recommended to promote good healing.  Signs of infection or complications were discussed and understood.       Signed Electronically by: WILEY Brumfield CNP

## 2025-06-26 ENCOUNTER — RESULTS FOLLOW-UP (OUTPATIENT)
Dept: FAMILY MEDICINE | Facility: CLINIC | Age: 85
End: 2025-06-26

## 2025-07-17 DIAGNOSIS — N20.0 URIC ACID KIDNEY STONE: ICD-10-CM

## 2025-07-17 RX ORDER — ALLOPURINOL 100 MG/1
200 TABLET ORAL DAILY
Qty: 180 TABLET | Refills: 2 | Status: SHIPPED | OUTPATIENT
Start: 2025-07-17

## 2025-07-17 NOTE — TELEPHONE ENCOUNTER
Med: Allopurinol    Last refill 8/274/24 #90 ,3-R      LOV (related): 1/13/25      Due for F/U around:  due for CPX last cpx 1/20/2016      Next Appt: [place] [facility] [place]

## 2025-07-28 DIAGNOSIS — K22.10 ULCER OF ESOPHAGUS WITHOUT BLEEDING: ICD-10-CM

## 2025-07-28 RX ORDER — PANTOPRAZOLE SODIUM 40 MG/1
40 TABLET, DELAYED RELEASE ORAL
Qty: 30 TABLET | Refills: 0 | Status: SHIPPED | OUTPATIENT
Start: 2025-07-28

## 2025-07-28 NOTE — TELEPHONE ENCOUNTER
Med: Pantoprazole    LOV (related): 2/6/25 Pre-op      Due for F/U around: 7/2025 DM check (overdue) , overdue for CPX since 2017    Next Appt: None scheduled

## 2025-07-31 ENCOUNTER — TRANSFERRED RECORDS (OUTPATIENT)
Dept: FAMILY MEDICINE | Facility: CLINIC | Age: 85
End: 2025-07-31

## 2025-08-07 ENCOUNTER — OFFICE VISIT (OUTPATIENT)
Dept: FAMILY MEDICINE | Facility: CLINIC | Age: 85
End: 2025-08-07

## 2025-08-07 VITALS
WEIGHT: 125.2 LBS | DIASTOLIC BLOOD PRESSURE: 77 MMHG | SYSTOLIC BLOOD PRESSURE: 123 MMHG | HEART RATE: 61 BPM | OXYGEN SATURATION: 99 % | BODY MASS INDEX: 21 KG/M2

## 2025-08-07 DIAGNOSIS — R10.13 ABDOMINAL PAIN, EPIGASTRIC: Primary | ICD-10-CM

## 2025-08-07 DIAGNOSIS — R07.9 CHEST PAIN, UNSPECIFIED TYPE: ICD-10-CM

## 2025-08-07 DIAGNOSIS — Z86.2 HX OF IRON DEFICIENCY ANEMIA: ICD-10-CM

## 2025-08-07 DIAGNOSIS — I25.10 CORONARY ARTERY DISEASE INVOLVING NATIVE CORONARY ARTERY OF NATIVE HEART WITHOUT ANGINA PECTORIS: ICD-10-CM

## 2025-08-07 DIAGNOSIS — E03.4 HYPOTHYROIDISM DUE TO ACQUIRED ATROPHY OF THYROID: ICD-10-CM

## 2025-08-07 DIAGNOSIS — I50.9 CHF (NYHA CLASS I, ACC/AHA STAGE B) (H): ICD-10-CM

## 2025-08-07 LAB
% GRANULOCYTES: 82.7 % (ref 42.2–75.2)
ALBUMIN SERPL BCG-MCNC: 4.1 G/DL (ref 3.5–5.2)
ALP SERPL-CCNC: 71 U/L (ref 40–150)
ALT SERPL W P-5'-P-CCNC: 13 U/L (ref 0–50)
ANION GAP SERPL CALCULATED.3IONS-SCNC: 10 MMOL/L (ref 7–15)
AST SERPL W P-5'-P-CCNC: 22 U/L (ref 0–45)
BILIRUB SERPL-MCNC: 0.5 MG/DL
BUN SERPL-MCNC: 26.2 MG/DL (ref 8–23)
CALCIUM SERPL-MCNC: 9.5 MG/DL (ref 8.8–10.4)
CHLORIDE SERPL-SCNC: 102 MMOL/L (ref 98–107)
CREAT SERPL-MCNC: 1.33 MG/DL (ref 0.51–0.95)
EGFRCR SERPLBLD CKD-EPI 2021: 39 ML/MIN/1.73M2
FASTING STATUS PATIENT QL REPORTED: NO
FERRITIN SERPL-MCNC: 95 NG/ML (ref 11–328)
GLUCOSE SERPL-MCNC: 110 MG/DL (ref 70–99)
HCO3 SERPL-SCNC: 24 MMOL/L (ref 22–29)
HCT VFR BLD AUTO: 33.5 % (ref 35–46)
HEMOGLOBIN: 11.5 G/DL (ref 11.8–15.5)
IRON BINDING CAPACITY (ROCHE): 284 UG/DL (ref 240–430)
IRON SATN MFR SERPL: 22 % (ref 15–46)
IRON SERPL-MCNC: 62 UG/DL (ref 37–145)
LYMPHOCYTES NFR BLD AUTO: 11.4 % (ref 20.5–51.1)
MCH RBC QN AUTO: 32.2 PG (ref 27–31)
MCHC RBC AUTO-ENTMCNC: 34.4 G/DL (ref 33–37)
MCV RBC AUTO: 93.5 FL (ref 80–100)
MONOCYTES NFR BLD AUTO: 5.9 % (ref 1.7–9.3)
PLATELET # BLD AUTO: 170 K/UL (ref 140–450)
POTASSIUM SERPL-SCNC: 4.4 MMOL/L (ref 3.4–5.3)
PROT SERPL-MCNC: 8 G/DL (ref 6.4–8.3)
RBC # BLD AUTO: 3.58 X10/CMM (ref 3.7–5.2)
SODIUM SERPL-SCNC: 136 MMOL/L (ref 135–145)
TSH SERPL DL<=0.005 MIU/L-ACNC: 2.82 UIU/ML (ref 0.3–4.2)
WBC # BLD AUTO: 5.2 X10/CMM (ref 3.8–11)

## 2025-08-07 PROCEDURE — 84443 ASSAY THYROID STIM HORMONE: CPT | Mod: ORL

## 2025-08-07 PROCEDURE — 82728 ASSAY OF FERRITIN: CPT | Mod: ORL

## 2025-08-07 PROCEDURE — G2211 COMPLEX E/M VISIT ADD ON: HCPCS

## 2025-08-07 PROCEDURE — 93000 ELECTROCARDIOGRAM COMPLETE: CPT

## 2025-08-07 PROCEDURE — 3074F SYST BP LT 130 MM HG: CPT

## 2025-08-07 PROCEDURE — 99214 OFFICE O/P EST MOD 30 MIN: CPT

## 2025-08-07 PROCEDURE — 83550 IRON BINDING TEST: CPT | Mod: ORL

## 2025-08-07 PROCEDURE — 82947 ASSAY GLUCOSE BLOOD QUANT: CPT | Mod: ORL

## 2025-08-07 PROCEDURE — 85025 COMPLETE CBC W/AUTO DIFF WBC: CPT

## 2025-08-07 PROCEDURE — 36415 COLL VENOUS BLD VENIPUNCTURE: CPT

## 2025-08-07 PROCEDURE — 3078F DIAST BP <80 MM HG: CPT

## 2025-08-07 RX ORDER — DICYCLOMINE HYDROCHLORIDE 10 MG/1
10 CAPSULE ORAL
Status: CANCELLED | OUTPATIENT
Start: 2025-08-07

## 2025-08-07 RX ORDER — NITROGLYCERIN 0.4 MG/1
TABLET SUBLINGUAL
Qty: 25 TABLET | Refills: 1 | Status: SHIPPED | OUTPATIENT
Start: 2025-08-07

## 2025-08-25 DIAGNOSIS — I25.10 CORONARY ARTERY DISEASE INVOLVING NATIVE CORONARY ARTERY OF NATIVE HEART WITHOUT ANGINA PECTORIS: ICD-10-CM

## 2025-08-26 RX ORDER — ROSUVASTATIN CALCIUM 20 MG/1
20 TABLET, COATED ORAL DAILY
Qty: 90 TABLET | Refills: 0 | Status: SHIPPED | OUTPATIENT
Start: 2025-08-26

## 2025-09-01 DIAGNOSIS — N20.0 URIC ACID KIDNEY STONE: ICD-10-CM

## 2025-09-02 RX ORDER — OXYBUTYNIN CHLORIDE 10 MG/1
10 TABLET, EXTENDED RELEASE ORAL DAILY
Qty: 90 TABLET | Refills: 0 | Status: SHIPPED | OUTPATIENT
Start: 2025-09-02

## 2025-09-04 ENCOUNTER — TELEPHONE (OUTPATIENT)
Dept: FAMILY MEDICINE | Facility: CLINIC | Age: 85
End: 2025-09-04

## (undated) DEVICE — GUIDEWIRE VASC 0.014INX180CM RUNTHROUGH 25-1011

## (undated) DEVICE — GUIDEWIRE SENSOR DUAL FLEX STR 0.035"X150CM M0066703080

## (undated) DEVICE — BAG CYSTO TABLE DRAIN

## (undated) DEVICE — SHEATH URETERAL ACCESS NAVIGATOR 11/13FRX36CM 250-203

## (undated) DEVICE — CATH BALLOON NC EMERGE 2.50X20MM H7493926720250

## (undated) DEVICE — LASER FIBER HOLMIUM FLEXIVA 200UM M0068403910 840-391

## (undated) DEVICE — BASKET RETRIEVAL 1.9FRX120CM ESCAPE NTNL 4 WIRE 390-201

## (undated) DEVICE — WIRE GUIDE AMPLATZ SUPER STIFF 0.035"X145CM 46-524

## (undated) DEVICE — TOTE ANGIO CORP PC15AT SAN32CC83O

## (undated) DEVICE — GUIDEWIRE AMPLATZ SUPER STIFF 0.035"X180CM M001465250

## (undated) DEVICE — NDL 18GA 1.5" 305196

## (undated) DEVICE — DRSG ADAPTIC 3X8" 6113

## (undated) DEVICE — INFL DVC BASIXCOMPAK PLYCRB 30 ATM 13IN 20ML IN4530

## (undated) DEVICE — RAD RX ISOVUE 300 (50ML) 61% IOPAMIDOL CHARGE PER ML

## (undated) DEVICE — SOL NACL 0.9% IRRIG 1000ML BOTTLE 2F7124

## (undated) DEVICE — PAD CHUX UNDERPAD 23X24" 7136

## (undated) DEVICE — CATH BALLOON NC EMERGE 3.00X20MM H7493926720300

## (undated) DEVICE — CATH URETERAL DUAL LUMEN 10FRX54CM M0064051000

## (undated) DEVICE — PACK CYSTOSCOPY SBA15CYFSI

## (undated) DEVICE — SOL WATER IRRIG 3000ML BAG 2B7117

## (undated) DEVICE — DEFIB PRO-PADZ LVP LQD GEL ADULT 8900-2105-01

## (undated) DEVICE — BONE CEMENT MIXEVAC III HI VAC KIT  0206-015-000

## (undated) DEVICE — SOL WATER IRRIG 1000ML BOTTLE 2F7114

## (undated) DEVICE — GLOVE PROTEXIS W/NEU-THERA 7.5  2D73TE75

## (undated) DEVICE — MANIFOLD NEPTUNE 4 PORT 700-20

## (undated) DEVICE — LINEN TOWEL PACK X5 5464

## (undated) DEVICE — KIT HAND CONTROL ANGIOTOUCH ACIST 65CM AT-P65

## (undated) DEVICE — DRAIN ROUND W/RESERV KIT JACKSON PRATT 10FR 400ML SU130-402D

## (undated) DEVICE — BAG DRAIN URO FOR SIEMENS 8MM ADAPTER NS CC164NS-A

## (undated) DEVICE — SOL NACL 0.9% IRRIG 3000ML BAG 2B7477

## (undated) DEVICE — ESU GROUND PAD UNIVERSAL W/O CORD

## (undated) DEVICE — BASKET NITINOL TIPLESS HALO  1.5FRX120CM 554120

## (undated) DEVICE — RAD G/W INQWIRE .035X260CM J-TIP EXCHANGE IQ35F260J1O5RS

## (undated) DEVICE — SHEATH URETERAL ACCESS NAVIGATOR HD 11/13FRX36CM M0062502220

## (undated) DEVICE — GLOVE PROTEXIS POWDER FREE 8.0 ORTHOPEDIC 2D73ET80

## (undated) DEVICE — GLOVE PROTEXIS W/NEU-THERA 8.5  2D73TE85

## (undated) DEVICE — CATH DIAGNOSTIC RADIAL 5FR TIG 4.0

## (undated) DEVICE — CATH URETERAL OPEN END 6FR AXXCESS

## (undated) DEVICE — GLOVE PROTEXIS BLUE W/NEU-THERA 8.5  2D73EB85

## (undated) DEVICE — GUIDEWIRE ZIPWIRE STR STIFF .035"X150CM M006630222B0

## (undated) DEVICE — SOL NACL 0.9% INJ 1000ML BAG 2B1324X

## (undated) DEVICE — Device

## (undated) DEVICE — BLADE SAW SAGITTAL STRK 18X90X1.19MM HD SYS 6 6118-119-090

## (undated) DEVICE — CATH EAGLE EYE PLAT IVUS SHRT

## (undated) DEVICE — PREP CHLORAPREP 26ML TINTED ORANGE  260815

## (undated) DEVICE — GLOVE BIOGEL PI ULTRATOUCH SZ 7.5 41175

## (undated) DEVICE — GLOVE PROTEXIS W/NEU-THERA 8.0  2D73TE80

## (undated) DEVICE — SU VICRYL 2-0 CP-1 27" UND J266H

## (undated) DEVICE — PACK TUR CUSTOM SBA15RUFSE

## (undated) DEVICE — SYR 20ML LL W/O NDL 302830

## (undated) DEVICE — SYR 30ML LL W/O NDL 302832

## (undated) DEVICE — PACK TOTAL KNEE SOP15TKFSD

## (undated) DEVICE — GLOVE PROTEXIS POWDER FREE 8.5 ORTHOPEDIC 2D73ET85

## (undated) DEVICE — CATH URETERAL OPEN END 6FR

## (undated) DEVICE — NDL 22GA 1.5"

## (undated) DEVICE — STENT URETERAL CONTOUR SOFT PERCUFLEX 7FRX24CM: Type: IMPLANTABLE DEVICE | Site: URETER | Status: NON-FUNCTIONAL

## (undated) DEVICE — INTRO GLIDESHEATH SLENDER 6FR 10X45CM 60-1060

## (undated) DEVICE — STPL SKIN 35W 6.9MM  PXW35

## (undated) DEVICE — SYR 10ML FINGER CONTROL W/O NDL 309695

## (undated) DEVICE — SU ETHIBOND 1 CTX 30" X865H

## (undated) DEVICE — SUCTION IRR SYSTEM W/O TIP INTERPULSE HANDPIECE 0210-100-000

## (undated) DEVICE — CATH GUIDING BLUE YELLOW PTFE XB3.5 6FRX100CM 67005400

## (undated) DEVICE — SLEEVE TR BAND RADIAL COMPRESSION DEVICE 24CM TRB24-REG

## (undated) DEVICE — GUIDEWIRE SENSOR DUAL FLEX ANG 0.035"X150CM M0066703010

## (undated) DEVICE — BONE CLEANING TIP INTERPULSE  0210-010-000

## (undated) DEVICE — CAST PADDING 6" UNSTERILE 9046

## (undated) DEVICE — LASER FIBER HOLMIUM MOSES 200 D/F/L AC-10030100

## (undated) DEVICE — MANIFOLD KIT ANGIO AUTOMATED 014613

## (undated) DEVICE — SU ETHIBOND 0 CTX CR  8X18" CX31D

## (undated) RX ORDER — LIDOCAINE HYDROCHLORIDE 20 MG/ML
INJECTION, SOLUTION EPIDURAL; INFILTRATION; INTRACAUDAL; PERINEURAL
Status: DISPENSED
Start: 2019-03-28

## (undated) RX ORDER — ONDANSETRON 2 MG/ML
INJECTION INTRAMUSCULAR; INTRAVENOUS
Status: DISPENSED
Start: 2024-07-23

## (undated) RX ORDER — CEFAZOLIN SODIUM 2 G/100ML
INJECTION, SOLUTION INTRAVENOUS
Status: DISPENSED
Start: 2019-03-08

## (undated) RX ORDER — PROPOFOL 10 MG/ML
INJECTION, EMULSION INTRAVENOUS
Status: DISPENSED
Start: 2019-06-13

## (undated) RX ORDER — VANCOMYCIN HYDROCHLORIDE 1 G/20ML
INJECTION, POWDER, LYOPHILIZED, FOR SOLUTION INTRAVENOUS
Status: DISPENSED
Start: 2021-08-09

## (undated) RX ORDER — ONDANSETRON 2 MG/ML
INJECTION INTRAMUSCULAR; INTRAVENOUS
Status: DISPENSED
Start: 2024-08-16

## (undated) RX ORDER — FENTANYL CITRATE 50 UG/ML
INJECTION, SOLUTION INTRAMUSCULAR; INTRAVENOUS
Status: DISPENSED
Start: 2024-08-16

## (undated) RX ORDER — DEXAMETHASONE SODIUM PHOSPHATE 4 MG/ML
INJECTION, SOLUTION INTRA-ARTICULAR; INTRALESIONAL; INTRAMUSCULAR; INTRAVENOUS; SOFT TISSUE
Status: DISPENSED
Start: 2024-08-16

## (undated) RX ORDER — TRANEXAMIC ACID 650 MG/1
TABLET ORAL
Status: DISPENSED
Start: 2021-08-09

## (undated) RX ORDER — PROPOFOL 10 MG/ML
INJECTION, EMULSION INTRAVENOUS
Status: DISPENSED
Start: 2019-03-28

## (undated) RX ORDER — PROPOFOL 10 MG/ML
INJECTION, EMULSION INTRAVENOUS
Status: DISPENSED
Start: 2019-03-08

## (undated) RX ORDER — HYDROMORPHONE HCL IN WATER/PF 6 MG/30 ML
PATIENT CONTROLLED ANALGESIA SYRINGE INTRAVENOUS
Status: DISPENSED
Start: 2021-08-09

## (undated) RX ORDER — CEFAZOLIN SODIUM 2 G/100ML
INJECTION, SOLUTION INTRAVENOUS
Status: DISPENSED
Start: 2019-06-13

## (undated) RX ORDER — FENTANYL CITRATE 50 UG/ML
INJECTION, SOLUTION INTRAMUSCULAR; INTRAVENOUS
Status: DISPENSED
Start: 2024-07-26

## (undated) RX ORDER — LIDOCAINE HYDROCHLORIDE 20 MG/ML
INJECTION, SOLUTION EPIDURAL; INFILTRATION; INTRACAUDAL; PERINEURAL
Status: DISPENSED
Start: 2019-06-13

## (undated) RX ORDER — LIDOCAINE HYDROCHLORIDE 20 MG/ML
INJECTION, SOLUTION EPIDURAL; INFILTRATION; INTRACAUDAL; PERINEURAL
Status: DISPENSED
Start: 2019-03-08

## (undated) RX ORDER — FENTANYL CITRATE 50 UG/ML
INJECTION, SOLUTION INTRAMUSCULAR; INTRAVENOUS
Status: DISPENSED
Start: 2019-03-08

## (undated) RX ORDER — CEFAZOLIN SODIUM 2 G/100ML
INJECTION, SOLUTION INTRAVENOUS
Status: DISPENSED
Start: 2021-08-09

## (undated) RX ORDER — VERAPAMIL HYDROCHLORIDE 2.5 MG/ML
INJECTION, SOLUTION INTRAVENOUS
Status: DISPENSED
Start: 2023-07-05

## (undated) RX ORDER — DEXAMETHASONE SODIUM PHOSPHATE 4 MG/ML
INJECTION, SOLUTION INTRA-ARTICULAR; INTRALESIONAL; INTRAMUSCULAR; INTRAVENOUS; SOFT TISSUE
Status: DISPENSED
Start: 2019-03-08

## (undated) RX ORDER — REGADENOSON 0.08 MG/ML
INJECTION, SOLUTION INTRAVENOUS
Status: DISPENSED
Start: 2023-06-15

## (undated) RX ORDER — PROPOFOL 10 MG/ML
INJECTION, EMULSION INTRAVENOUS
Status: DISPENSED
Start: 2024-07-23

## (undated) RX ORDER — ONDANSETRON 2 MG/ML
INJECTION INTRAMUSCULAR; INTRAVENOUS
Status: DISPENSED
Start: 2019-05-02

## (undated) RX ORDER — CIPROFLOXACIN 2 MG/ML
INJECTION, SOLUTION INTRAVENOUS
Status: DISPENSED
Start: 2024-08-16

## (undated) RX ORDER — PROPOFOL 10 MG/ML
INJECTION, EMULSION INTRAVENOUS
Status: DISPENSED
Start: 2021-08-09

## (undated) RX ORDER — ONDANSETRON 2 MG/ML
INJECTION INTRAMUSCULAR; INTRAVENOUS
Status: DISPENSED
Start: 2019-03-28

## (undated) RX ORDER — HEPARIN SODIUM 1000 [USP'U]/ML
INJECTION, SOLUTION INTRAVENOUS; SUBCUTANEOUS
Status: DISPENSED
Start: 2023-07-05

## (undated) RX ORDER — DEXAMETHASONE SODIUM PHOSPHATE 4 MG/ML
INJECTION, SOLUTION INTRA-ARTICULAR; INTRALESIONAL; INTRAMUSCULAR; INTRAVENOUS; SOFT TISSUE
Status: DISPENSED
Start: 2019-06-13

## (undated) RX ORDER — HYDROMORPHONE HYDROCHLORIDE 1 MG/ML
INJECTION, SOLUTION INTRAMUSCULAR; INTRAVENOUS; SUBCUTANEOUS
Status: DISPENSED
Start: 2019-03-28

## (undated) RX ORDER — CEFAZOLIN SODIUM 2 G/100ML
INJECTION, SOLUTION INTRAVENOUS
Status: DISPENSED
Start: 2019-05-02

## (undated) RX ORDER — FENTANYL CITRATE 0.05 MG/ML
INJECTION, SOLUTION INTRAMUSCULAR; INTRAVENOUS
Status: DISPENSED
Start: 2024-07-23

## (undated) RX ORDER — PROPOFOL 10 MG/ML
INJECTION, EMULSION INTRAVENOUS
Status: DISPENSED
Start: 2024-08-16

## (undated) RX ORDER — HYDROCODONE BITARTRATE AND ACETAMINOPHEN 5; 325 MG/1; MG/1
TABLET ORAL
Status: DISPENSED
Start: 2019-03-28

## (undated) RX ORDER — ONDANSETRON 2 MG/ML
INJECTION INTRAMUSCULAR; INTRAVENOUS
Status: DISPENSED
Start: 2019-06-13

## (undated) RX ORDER — DEXAMETHASONE SODIUM PHOSPHATE 4 MG/ML
INJECTION, SOLUTION INTRA-ARTICULAR; INTRALESIONAL; INTRAMUSCULAR; INTRAVENOUS; SOFT TISSUE
Status: DISPENSED
Start: 2024-07-23

## (undated) RX ORDER — CEFAZOLIN SODIUM/WATER 2 G/20 ML
SYRINGE (ML) INTRAVENOUS
Status: DISPENSED
Start: 2024-07-23

## (undated) RX ORDER — FENTANYL CITRATE 50 UG/ML
INJECTION, SOLUTION INTRAMUSCULAR; INTRAVENOUS
Status: DISPENSED
Start: 2023-07-05

## (undated) RX ORDER — LIDOCAINE HYDROCHLORIDE 10 MG/ML
INJECTION, SOLUTION EPIDURAL; INFILTRATION; INTRACAUDAL; PERINEURAL
Status: DISPENSED
Start: 2019-03-08

## (undated) RX ORDER — HYDROCODONE BITARTRATE AND ACETAMINOPHEN 5; 325 MG/1; MG/1
TABLET ORAL
Status: DISPENSED
Start: 2019-06-13

## (undated) RX ORDER — FENTANYL CITRATE 50 UG/ML
INJECTION, SOLUTION INTRAMUSCULAR; INTRAVENOUS
Status: DISPENSED
Start: 2019-05-02

## (undated) RX ORDER — HYDROCODONE BITARTRATE AND ACETAMINOPHEN 5; 325 MG/1; MG/1
TABLET ORAL
Status: DISPENSED
Start: 2019-03-08

## (undated) RX ORDER — FENTANYL CITRATE 50 UG/ML
INJECTION, SOLUTION INTRAMUSCULAR; INTRAVENOUS
Status: DISPENSED
Start: 2019-06-13

## (undated) RX ORDER — CEFAZOLIN SODIUM/WATER 2 G/20 ML
SYRINGE (ML) INTRAVENOUS
Status: DISPENSED
Start: 2024-08-16

## (undated) RX ORDER — LIDOCAINE HYDROCHLORIDE 10 MG/ML
INJECTION, SOLUTION EPIDURAL; INFILTRATION; INTRACAUDAL; PERINEURAL
Status: DISPENSED
Start: 2023-07-05

## (undated) RX ORDER — CEFAZOLIN SODIUM 2 G/100ML
INJECTION, SOLUTION INTRAVENOUS
Status: DISPENSED
Start: 2019-03-28

## (undated) RX ORDER — FENTANYL CITRATE 50 UG/ML
INJECTION, SOLUTION INTRAMUSCULAR; INTRAVENOUS
Status: DISPENSED
Start: 2019-03-28

## (undated) RX ORDER — FENTANYL CITRATE 0.05 MG/ML
INJECTION, SOLUTION INTRAMUSCULAR; INTRAVENOUS
Status: DISPENSED
Start: 2021-08-09

## (undated) RX ORDER — FENTANYL CITRATE 50 UG/ML
INJECTION, SOLUTION INTRAMUSCULAR; INTRAVENOUS
Status: DISPENSED
Start: 2024-07-23

## (undated) RX ORDER — ONDANSETRON 2 MG/ML
INJECTION INTRAMUSCULAR; INTRAVENOUS
Status: DISPENSED
Start: 2019-03-08

## (undated) RX ORDER — ACETAMINOPHEN 325 MG/1
TABLET ORAL
Status: DISPENSED
Start: 2024-08-16

## (undated) RX ORDER — DEXAMETHASONE SODIUM PHOSPHATE 4 MG/ML
INJECTION, SOLUTION INTRA-ARTICULAR; INTRALESIONAL; INTRAMUSCULAR; INTRAVENOUS; SOFT TISSUE
Status: DISPENSED
Start: 2019-03-28